# Patient Record
Sex: MALE | Race: WHITE | NOT HISPANIC OR LATINO | Employment: UNEMPLOYED
[De-identification: names, ages, dates, MRNs, and addresses within clinical notes are randomized per-mention and may not be internally consistent; named-entity substitution may affect disease eponyms.]

---

## 2017-01-04 RX ORDER — BUPROPION HYDROCHLORIDE 300 MG/1
TABLET ORAL
Start: 2017-01-04

## 2017-01-04 NOTE — TELEPHONE ENCOUNTER
Wellbutrin        Last Written Prescription Date: 11/10/2016  Last Fill Quantity: 30; # refills: 0  Last Office Visit with [facility], [facility] or [facility] prescribing provider:  10/05/2015        Last PHQ-9 score on record=   PHQ-9 SCORE 10/5/2015   Total Score 14       No results found for this basename: ast  No results found for this basename: alt

## 2017-01-04 NOTE — TELEPHONE ENCOUNTER
See TE from 12/7/2016, pt needs appt, [facility] denied Rx  Have tried to reach pt multiple times, no response from our outreach.  Denied refill request  Shanti ALLAN RN

## 2017-02-07 RX ORDER — BUPROPION HYDROCHLORIDE 300 MG/1
TABLET ORAL
Start: 2017-02-07

## 2017-02-07 RX ORDER — LAMOTRIGINE 200 MG/1
TABLET ORAL
Start: 2017-02-07

## 2017-02-07 NOTE — TELEPHONE ENCOUNTER
Denied  Needs appt; no showed to recent appt.  Multiple VMs left for pt.  Shanti ALLAN RN    Pending Prescriptions:                       Disp   Refills    lamoTRIgine (LAMICTAL) 200 MG tablet [Phar*30 tab*0        Sig: TAKE 1 TABLET BY MOUTH EVERY DAY        Last Written Prescription Date: 10/7/2016  Last Fill Quantity: 30,  # refills: 0   Last Office Visit with [facility], [facility] or [facility] prescribing provider: 10/2015

## 2017-02-07 NOTE — TELEPHONE ENCOUNTER
Last seen 10/2015.  Was given 1 month supply 11/2016.  No Show 11/25/16.    Needs OV for any refills.  Silvia Richard RN

## 2017-02-07 NOTE — TELEPHONE ENCOUNTER
Wellbutrin       Last Written Prescription Date: 11-10-16  Notes to Pharmacy: 1 month refill only; further refills will be sent at upcoming appointment  Last Fill Quantity: 30; # refills: 0  Last Office Visit with [facility], [facility] or [facility] prescribing provider:  10-5-2015        Last PHQ-9 score on record=   PHQ-9 SCORE 10/5/2015   Total Score 14       No results found for this basename: ast  No results found for this basename: alt

## 2020-03-10 ENCOUNTER — HEALTH MAINTENANCE LETTER (OUTPATIENT)
Age: 25
End: 2020-03-10

## 2020-12-27 ENCOUNTER — HEALTH MAINTENANCE LETTER (OUTPATIENT)
Age: 25
End: 2020-12-27

## 2021-04-24 ENCOUNTER — HEALTH MAINTENANCE LETTER (OUTPATIENT)
Age: 26
End: 2021-04-24

## 2021-10-04 ENCOUNTER — HEALTH MAINTENANCE LETTER (OUTPATIENT)
Age: 26
End: 2021-10-04

## 2022-04-14 ENCOUNTER — HOSPITAL ENCOUNTER (EMERGENCY)
Facility: CLINIC | Age: 27
Discharge: LEFT BEFORE DISCHARGE | End: 2022-04-14
Admitting: EMERGENCY MEDICINE
Payer: COMMERCIAL

## 2022-04-14 VITALS
HEART RATE: 93 BPM | HEIGHT: 67 IN | DIASTOLIC BLOOD PRESSURE: 92 MMHG | WEIGHT: 160 LBS | RESPIRATION RATE: 18 BRPM | BODY MASS INDEX: 25.11 KG/M2 | SYSTOLIC BLOOD PRESSURE: 160 MMHG | OXYGEN SATURATION: 98 % | TEMPERATURE: 97.8 F

## 2022-04-14 PROCEDURE — 93005 ELECTROCARDIOGRAM TRACING: CPT

## 2022-04-14 PROCEDURE — 999N000104 HC STATISTIC NO CHARGE

## 2022-04-14 NOTE — ED TRIAGE NOTES
Woke up with a tightness and pain to midsternal chest 2 days ago and is progressively getting worse. Stated pain is worse with coughing. Stated has chronic cough due to second hand smoke in the house. ABCs intact.

## 2022-05-02 ENCOUNTER — HOSPITAL ENCOUNTER (EMERGENCY)
Facility: CLINIC | Age: 27
Discharge: HOME | End: 2022-05-02
Attending: EMERGENCY MEDICINE | Admitting: EMERGENCY MEDICINE
Payer: COMMERCIAL

## 2022-05-02 VITALS
DIASTOLIC BLOOD PRESSURE: 61 MMHG | RESPIRATION RATE: 16 BRPM | HEART RATE: 98 BPM | OXYGEN SATURATION: 98 % | TEMPERATURE: 97.3 F | SYSTOLIC BLOOD PRESSURE: 124 MMHG

## 2022-05-02 DIAGNOSIS — F10.920 ALCOHOLIC INTOXICATION WITHOUT COMPLICATION (H): ICD-10-CM

## 2022-05-02 DIAGNOSIS — F32.A DEPRESSION, UNSPECIFIED DEPRESSION TYPE: ICD-10-CM

## 2022-05-02 LAB
ALCOHOL BREATH TEST: 0.15 (ref 0–0.01)
AMPHETAMINES UR QL SCN: ABNORMAL
BARBITURATES UR QL: ABNORMAL
BENZODIAZ UR QL: ABNORMAL
CANNABINOIDS UR QL SCN: ABNORMAL
COCAINE UR QL: ABNORMAL
OPIATES UR QL SCN: ABNORMAL

## 2022-05-02 PROCEDURE — 82075 ASSAY OF BREATH ETHANOL: CPT | Performed by: EMERGENCY MEDICINE

## 2022-05-02 PROCEDURE — 90791 PSYCH DIAGNOSTIC EVALUATION: CPT

## 2022-05-02 PROCEDURE — 80307 DRUG TEST PRSMV CHEM ANLYZR: CPT | Performed by: EMERGENCY MEDICINE

## 2022-05-02 PROCEDURE — 99285 EMERGENCY DEPT VISIT HI MDM: CPT | Performed by: EMERGENCY MEDICINE

## 2022-05-02 PROCEDURE — 99285 EMERGENCY DEPT VISIT HI MDM: CPT | Mod: 25 | Performed by: EMERGENCY MEDICINE

## 2022-05-02 ASSESSMENT — ENCOUNTER SYMPTOMS
ABDOMINAL PAIN: 0
DYSURIA: 0
SHORTNESS OF BREATH: 0
BRUISES/BLEEDS EASILY: 0
CONFUSION: 0
WEAKNESS: 0
FEVER: 0
BACK PAIN: 0

## 2022-05-02 NOTE — ED PROVIDER NOTES
"ED Provider Note  [facility]      History     Chief Complaint   Patient presents with     Suicidal     Aggressive Behavior     Alcohol Intoxication     HPI  Sebastián Rodas is a 26 year old male who has a past medical history of bipolar, alcohol abuse who presents emergency department by EMS for mental health evaluation.  Patient states that tonight he was drinking alcohol and was in a verbal altercation with his mother.  Patient states he did not hurt his mother however he said some things that would make her concerned for hurting himself.  Patient states that he was drinking, and there is an accumulation of all of his old family stressors, he got upset, and him and his mother wer arguing with each other.  Patient states that he made a comment such as \" you make me want to kill myself\". Patient states \"I don't have the balls to do it anyways\". patient also reports that he was swinging one of the swords around the house but states he did not want to hurt himself or hurt anybody else.  Patient denies any suicide ideation, homicide ideation, or intent to self-harm.  Patient denies any self injures behaviors.  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  Patient states he is stressed because he does have a history of alcohol abuse would like to go to treatment however does need to work and make money.  Patient states that he is considering going back to treatment.  Patient reports drinking alcohol daily for the past 7 years.  Denies any alcohol seizures or delirium tremens.  Patient reports this evening he did take a few shots of alcohol.  Patient states he is more calm and relaxed upon arrival to the emergency department.  Patient with no medical complaints.    Past Medical History  History reviewed. No pertinent past medical history.  History reviewed. No pertinent surgical history.  buPROPion (WELLBUTRIN XL) 300 MG 24 hr tablet  lamoTRIgine (LAMICTAL) " 200 MG tablet  traZODone (DESYREL) 50 MG tablet      No Known Allergies  Family History  Family History   Problem Relation Age of Onset     Hypertension Mother      Coronary Artery Disease Father      Substance Abuse Father      Mental Illness Mother      Mental Illness Father      Social History   Social History     Tobacco Use     Smoking status: Never Smoker     Smokeless tobacco: Never Used   Substance Use Topics     Alcohol use: No     Alcohol/week: 0.0 standard drinks     Drug use: No      Past medical history, past surgical history, medications, allergies, family history, and social history were reviewed with the patient. No additional pertinent items.       Review of Systems   Constitutional: Negative for fever.   HENT: Negative for congestion.    Eyes: Negative for visual disturbance.   Respiratory: Negative for shortness of breath.    Cardiovascular: Negative for chest pain.   Gastrointestinal: Negative for abdominal pain.   Endocrine: Negative for polyuria.   Genitourinary: Negative for dysuria.   Musculoskeletal: Negative for back pain.   Skin: Negative for rash.   Allergic/Immunologic: Negative for immunocompromised state.   Neurological: Negative for weakness.   Hematological: Does not bruise/bleed easily.   Psychiatric/Behavioral: Negative for confusion and self-injury.         Physical Exam   BP: 124/61  Pulse: 98  Temp: 97.3  F (36.3  C)  Resp: 16  SpO2: 98 %  Physical Exam  General: Afebrile, no acute distress   HEENT: Normocephalic, atraumatic, conjunctivae normal. MMM  Neck: non-tender, supple  Cardio: regular rate. regular rhythm   Resp: Normal work of breathing, no respiratory distress, lungs clear bilaterally, no wheezing, rhonchi, rales  Chest/Back: no visual signs of trauma, no CVA tenderness   Abdomen: soft, non distension, no tenderness, no peritoneal signs   Neuro: alert and fully oriented. CN II-XII grossly intact. Grossly normal strength and sensation in all extremities.   MSK: no  deformities. Normal range of motion  Integumentary/Skin: no rash visualized, normal color  Psych: normal affect, normal behavior, denies suicidal ideation, homicidal ideation, intent to self-harm    ED Course      Procedures    Mental Health Risk Assessment      PSS-3    Date and Time Over the past 2 weeks have you felt down, depressed, or hopeless? Over the past 2 weeks have you had thoughts of killing yourself? Have you ever attempted to kill yourself? When did this last happen? User   05/02/22 0021 yes yes no -- MM      C-SSRS ([place])    Date and Time Q1 Wished to be Dead (Past Month) Q2 Suicidal Thoughts (Past Month) Q3 Suicidal Thought Method Q4 Suicidal Intent without Specific Plan Q5 Suicide Intent with Specific Plan Q6 Suicide Behavior (Lifetime) Within the Past 3 Months? RETIRED: Level of Risk per Screen Screening Not Complete User   05/02/22 0021 yes yes no yes no no -- -- -- MM              Suicide assessment completed by mental health (D.E.C., LCSW, etc.)       Results for orders placed or performed during the hospital encounter of 05/02/22   Alcohol breath test POCT     Status: Abnormal   Result Value Ref Range    Alcohol Breath Test 0.153 (A) 0.00 - 0.01     Medications - No data to display     Assessments & Plan (with Medical Decision Making)   Sebastián Rodas is a 26 year old male who has a past medical history of bipolar, alcohol abuse who presents emergency department by EMS for mental health evaluation after making suicidal comments prior to arrival.  Upon arrival patient is well-appearing, afebrile, no distress.  Patient hemodynamically stable, vital signs within normal limits.  Patient calm, cooperative, currently denies any suicidal ideation, homicidal ideation, intent to self-harm.  Initial call breath test 0.153. Patient pending behavioral health assessment    Behavioral health  evaluated the patient and discussed with mother and at this time plan for discharge home. Please see  behavioral health assessors note for  Additional details. Patient denies any suicide ideation, homicidal ideation, or intent to self harm. Patient does not want to harm his mother. Mother is agreeable to have him come back home. Patient had a recent rule 25, would like to get into treatment. At this time patient is able to contract for safety and will discharge home. Patient and mother understand and agree with the plan     I have reviewed the nursing notes. I have reviewed the findings, diagnosis, plan and need for follow up with the patient.    New Prescriptions    No medications on file       Final diagnoses:   Suicide ideation   Alcoholic intoxication without complication (H)       --  Tova Duvall MD  [facility] EMERGENCY DEPARTMENT  5/2/2022     Tova Duvall MD  05/02/22 1693
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
## 2022-05-02 NOTE — ED NOTES
Bed: [facility]  Expected date:   Expected time:   Means of arrival:   Comments:  [facility] restraints, 26 yr male
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
## 2022-05-02 NOTE — ED TRIAGE NOTES
Triage Assessment     Row Name 05/02/22 0021       Triage Assessment (Adult)    Airway WDL WDL       Respiratory WDL    Respiratory WDL WDL       Skin Circulation/Temperature WDL    Skin Circulation/Temperature WDL WDL       Cognitive/Neuro/Behavioral WDL    Cognitive/Neuro/Behavioral WDL WDL

## 2022-05-02 NOTE — DISCHARGE INSTRUCTIONS
"Aftercare Plan  If I am feeling unsafe or I am in a crisis, I will:   Contact my established care providers:  Tennille Quarles -[phone], Bertrand Preciado MA and Nancy Zuleta -[phone] [address] [facility]   Call the National Suicide Prevention Lifeline: 711.839.9642 275.137.2436  Go to the nearest emergency room   Call 911     Warning signs that I or other people might notice when a crisis is developing for me: \"I drink and get angry.\"    Things I am able to do on my own to cope or help me feel better: \"Therapy, take my meds\"    Things that I am able to do with others to cope or help me feel better: \"Go back to treatment\"    Things I can use or do for distraction: \"try to find other things\"    Changes I can make to support my mental health and wellness: \"Go to inpatient treatment\"    People in my life that I can ask for help: \"My therapist\"    Your [facility] has a mental health crisis team you can call 24/7: [facility] Crisis  [phone]    Other things that are important when I m in crisis: \"I need to focus on treatment.\"    Appointment information and/or additional resources available to me:      Crisis Lines  Crisis Text Line  Text 939548  You will be connected with a trained live crisis counselor to provide support.    Por espanol, texto  PANFILO a 825280 o texto a 442-AYUDAME en WhatsApp    National Hope Line  1.800.SUICIDE [9220930]      Community Resources  Fast Tracker  Linking people to mental health and substance use disorder resources  fasttrackermn.org     [place] Mental Health Warm Line  Peer to peer support  Monday thru Saturday, 12 pm to 10 pm  894.715.6451 or 6.271.270.1341  Text \"Support\" to 77126    National [place] on Mental Illness (COMFORT)  885.781.1677 or 1.888.COMFORT.HELPS        Mental Health Apps  My3  https://my3app.org/    VirtualHopeBox  https://SiConnect.org/apps/virtual-hope-box/      Additional Information  Today you were seen by a licensed mental health professional " through Triage and Transition services, [facility] ([facility])  for a crisis assessment in the Emergency Department at [facility].  It is recommended that you follow up with your established providers (psychiatrist, mental health therapist, and/or primary care doctor - as relevant) as soon as possible. Coordinators from [facility] will be calling you in the next 24-48 hours to ensure that you have the resources you need.  You can also contact [facility] coordinators directly at [phone]. You may have been scheduled for or offered an appointment with a mental health provider. [facility] maintains an extensive network of licensed behavioral health providers to connect patients with the services they need.  We do not charge providers a fee to participate in our referral network.  We match patients with providers based on a patient's specific needs, insurance coverage, and location.  Our first effort will be to refer you to a provider within your care system, and will utilize providers outside your care system as needed.

## 2022-05-02 NOTE — ED NOTES
"Patient states that he doesn't want to harm himself at all. He made statements but not what he meant at all. Never wants to do that, and \"doesn't have any balls to do it anyways-lots to live for\".   "

## 2022-05-03 ENCOUNTER — PATIENT OUTREACH (OUTPATIENT)
Dept: CARE COORDINATION | Facility: CLINIC | Age: 27
End: 2022-05-03
Payer: COMMERCIAL

## 2022-05-03 DIAGNOSIS — Z71.89 OTHER SPECIFIED COUNSELING: ICD-10-CM
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
## 2022-05-15 ENCOUNTER — HEALTH MAINTENANCE LETTER (OUTPATIENT)
Age: 27
End: 2022-05-15

## 2022-09-11 ENCOUNTER — HEALTH MAINTENANCE LETTER (OUTPATIENT)
Age: 27
End: 2022-09-11

## 2023-06-03 ENCOUNTER — HEALTH MAINTENANCE LETTER (OUTPATIENT)
Age: 28
End: 2023-06-03

## 2024-07-07 ENCOUNTER — HEALTH MAINTENANCE LETTER (OUTPATIENT)
Age: 29
End: 2024-07-07

## 2025-01-01 ENCOUNTER — APPOINTMENT (OUTPATIENT)
Dept: OCCUPATIONAL THERAPY | Facility: CLINIC | Age: 30
DRG: 856 | End: 2025-01-01
Attending: SURGERY
Payer: COMMERCIAL

## 2025-01-01 ENCOUNTER — APPOINTMENT (OUTPATIENT)
Dept: PHYSICAL THERAPY | Facility: CLINIC | Age: 30
DRG: 856 | End: 2025-01-01
Attending: SURGERY
Payer: COMMERCIAL

## 2025-01-01 ENCOUNTER — APPOINTMENT (OUTPATIENT)
Dept: CT IMAGING | Facility: CLINIC | Age: 30
DRG: 856 | End: 2025-01-01
Attending: NURSE PRACTITIONER
Payer: COMMERCIAL

## 2025-01-01 ENCOUNTER — APPOINTMENT (OUTPATIENT)
Dept: SPEECH THERAPY | Facility: CLINIC | Age: 30
DRG: 856 | End: 2025-01-01
Attending: STUDENT IN AN ORGANIZED HEALTH CARE EDUCATION/TRAINING PROGRAM
Payer: COMMERCIAL

## 2025-01-01 ENCOUNTER — APPOINTMENT (OUTPATIENT)
Dept: INTERVENTIONAL RADIOLOGY/VASCULAR | Facility: CLINIC | Age: 30
DRG: 856 | End: 2025-01-01
Attending: NURSE PRACTITIONER
Payer: COMMERCIAL

## 2025-01-01 ENCOUNTER — APPOINTMENT (OUTPATIENT)
Dept: CT IMAGING | Facility: CLINIC | Age: 30
DRG: 856 | End: 2025-01-01
Payer: COMMERCIAL

## 2025-01-01 ENCOUNTER — HOSPITAL ENCOUNTER (OUTPATIENT)
Age: 30
End: 2025-01-01
Attending: SURGERY
Payer: COMMERCIAL

## 2025-01-01 ENCOUNTER — APPOINTMENT (OUTPATIENT)
Dept: CT IMAGING | Facility: CLINIC | Age: 30
DRG: 856 | End: 2025-01-01
Attending: SURGERY
Payer: COMMERCIAL

## 2025-01-01 ENCOUNTER — APPOINTMENT (OUTPATIENT)
Dept: GENERAL RADIOLOGY | Facility: CLINIC | Age: 30
DRG: 856 | End: 2025-01-01
Attending: NURSE PRACTITIONER
Payer: COMMERCIAL

## 2025-01-01 ENCOUNTER — APPOINTMENT (OUTPATIENT)
Dept: SPEECH THERAPY | Facility: CLINIC | Age: 30
DRG: 856 | End: 2025-01-01
Payer: COMMERCIAL

## 2025-01-01 PROCEDURE — 71045 X-RAY EXAM CHEST 1 VIEW: CPT | Mod: 26 | Performed by: STUDENT IN AN ORGANIZED HEALTH CARE EDUCATION/TRAINING PROGRAM

## 2025-01-01 PROCEDURE — 74177 CT ABD & PELVIS W/CONTRAST: CPT | Mod: 26 | Performed by: RADIOLOGY

## 2025-01-01 PROCEDURE — 71260 CT THORAX DX C+: CPT

## 2025-01-01 PROCEDURE — 49406 IMAGE CATH FLUID PERI/RETRO: CPT | Mod: GC | Performed by: STUDENT IN AN ORGANIZED HEALTH CARE EDUCATION/TRAINING PROGRAM

## 2025-01-01 PROCEDURE — 71260 CT THORAX DX C+: CPT | Mod: 26 | Performed by: RADIOLOGY

## 2025-01-01 PROCEDURE — 74177 CT ABD & PELVIS W/CONTRAST: CPT

## 2025-01-01 PROCEDURE — 272N000502 HC NEEDLE CR3

## 2025-01-01 PROCEDURE — C1729 CATH, DRAINAGE: HCPCS

## 2025-01-01 PROCEDURE — 49406 IMAGE CATH FLUID PERI/RETRO: CPT | Mod: XS

## 2025-01-01 PROCEDURE — 99153 MOD SED SAME PHYS/QHP EA: CPT

## 2025-01-01 PROCEDURE — 49406 IMAGE CATH FLUID PERI/RETRO: CPT

## 2025-01-01 PROCEDURE — C1769 GUIDE WIRE: HCPCS

## 2025-01-01 PROCEDURE — 74174 CTA ABD&PLVS W/CONTRAST: CPT | Mod: 26 | Performed by: RADIOLOGY

## 2025-01-01 PROCEDURE — 71275 CT ANGIOGRAPHY CHEST: CPT

## 2025-01-01 PROCEDURE — 99152 MOD SED SAME PHYS/QHP 5/>YRS: CPT | Mod: GC | Performed by: STUDENT IN AN ORGANIZED HEALTH CARE EDUCATION/TRAINING PROGRAM

## 2025-01-01 PROCEDURE — 71275 CT ANGIOGRAPHY CHEST: CPT | Mod: 26 | Performed by: RADIOLOGY

## 2025-01-01 PROCEDURE — 71045 X-RAY EXAM CHEST 1 VIEW: CPT

## 2025-01-01 PROCEDURE — 49406 IMAGE CATH FLUID PERI/RETRO: CPT | Mod: XS | Performed by: STUDENT IN AN ORGANIZED HEALTH CARE EDUCATION/TRAINING PROGRAM

## 2025-01-01 PROCEDURE — 74174 CTA ABD&PLVS W/CONTRAST: CPT

## 2025-04-07 NOTE — TELECONSULT
External Facility Transfer    Location: [facility] ICU  Diagnosis:   - Necrotizing pancreatitis  - Abdominal hypertension  - Multisystem organ failure  - Cardiogenic shock/cardiomyopathy  - Acute respiratory failure  - Acute renal failure on CRRT    Reason for transfer: Need for specialized service or procedure    Clinical details:   28 y/o man with longstanding history EtOH use disorder, prior hospitalizations for withdrawal. Admitted 3/30 with alcohol withdrawal, progressed to needing ICU care 4/1. Developed pancreatitis which has worsened, with necrosis seen on CT imaging. Increasing intra-abdominal pressures, though pressor needs and ventilator are stable per referring physician.    Needs advanced GI and pancreatic surgery consults.     Care everywhere has been updated and reviewed: Yes  Referring facility has made imaging available through PACS:  Not at this time but could be requested  If patient is transferring for specialty care or procedure, the specialist has agreed with need for transfer and anticipated timeline: Not applicable  Transfer accepted: Yes  ICU Level of Care Recommendation: [place]  ICU Service Recommendation: [place]-  MICU  Final destination: pending  Recommendations for referring provider: not applicable    Anthony Salinas MD, PhD  Surgical critical care  4/7/2025, 11:19 AM

## 2025-04-30 ENCOUNTER — APPOINTMENT (OUTPATIENT)
Dept: GENERAL RADIOLOGY | Facility: CLINIC | Age: 30
End: 2025-04-30
Payer: COMMERCIAL

## 2025-04-30 ENCOUNTER — HOSPITAL ENCOUNTER (INPATIENT)
Facility: CLINIC | Age: 30
End: 2025-04-30
Attending: SURGERY | Admitting: SURGERY
Payer: COMMERCIAL

## 2025-04-30 DIAGNOSIS — K85.22 ALCOHOL-INDUCED ACUTE PANCREATITIS WITH INFECTED NECROSIS: Primary | ICD-10-CM

## 2025-04-30 PROBLEM — K85.90 PANCREATITIS: Status: ACTIVE | Noted: 2025-04-30

## 2025-04-30 LAB
ABO + RH BLD: NORMAL
ALBUMIN SERPL BCG-MCNC: 2.6 G/DL (ref 3.5–5.2)
ALP SERPL-CCNC: 280 U/L (ref 40–150)
ALT SERPL W P-5'-P-CCNC: 111 U/L (ref 0–70)
ANION GAP SERPL CALCULATED.3IONS-SCNC: 11 MMOL/L (ref 7–15)
APTT PPP: 24 SECONDS (ref 22–38)
AST SERPL W P-5'-P-CCNC: 126 U/L (ref 0–45)
ATRIAL RATE - MUSE: 137 BPM
BASE EXCESS BLDV CALC-SCNC: -2.6 MMOL/L (ref -3–3)
BILIRUB SERPL-MCNC: 0.9 MG/DL
BLD GP AB SCN SERPL QL: NEGATIVE
BUN SERPL-MCNC: 39.2 MG/DL (ref 6–20)
CALCIUM SERPL-MCNC: 7.6 MG/DL (ref 8.8–10.4)
CHLORIDE SERPL-SCNC: 104 MMOL/L (ref 98–107)
CREAT SERPL-MCNC: 1.59 MG/DL (ref 0.67–1.17)
DIASTOLIC BLOOD PRESSURE - MUSE: NORMAL MMHG
EGFRCR SERPLBLD CKD-EPI 2021: 59 ML/MIN/1.73M2
ERYTHROCYTE [DISTWIDTH] IN BLOOD BY AUTOMATED COUNT: 20.7 % (ref 10–15)
FIBRINOGEN PPP-MCNC: 353 MG/DL (ref 170–510)
GLUCOSE BLDC GLUCOMTR-MCNC: 110 MG/DL (ref 70–99)
GLUCOSE BLDC GLUCOMTR-MCNC: 131 MG/DL (ref 70–99)
GLUCOSE SERPL-MCNC: 124 MG/DL (ref 70–99)
HCO3 BLDV-SCNC: 21 MMOL/L (ref 21–28)
HCO3 SERPL-SCNC: 19 MMOL/L (ref 22–29)
HCT VFR BLD AUTO: 24.3 % (ref 40–53)
HGB BLD-MCNC: 7.7 G/DL (ref 13.3–17.7)
INR PPP: 1.09 (ref 0.85–1.15)
INTERPRETATION ECG - MUSE: NORMAL
LACTATE SERPL-SCNC: 2.9 MMOL/L (ref 0.7–2)
LIPASE SERPL-CCNC: 107 U/L (ref 13–60)
MAGNESIUM SERPL-MCNC: 2.2 MG/DL (ref 1.7–2.3)
MCH RBC QN AUTO: 30.6 PG (ref 26.5–33)
MCHC RBC AUTO-ENTMCNC: 31.7 G/DL (ref 31.5–36.5)
MCV RBC AUTO: 96 FL (ref 78–100)
O2/TOTAL GAS SETTING VFR VENT: 30 %
OXYHGB MFR BLDV: 57 % (ref 70–75)
P AXIS - MUSE: 60 DEGREES
PCO2 BLDV: 29 MM HG (ref 40–50)
PH BLDV: 7.47 [PH] (ref 7.32–7.43)
PHOSPHATE SERPL-MCNC: 3.1 MG/DL (ref 2.5–4.5)
PLAT MORPH BLD: ABNORMAL
PLATELET # BLD AUTO: 117 10E3/UL (ref 150–450)
PO2 BLDV: 31 MM HG (ref 25–47)
POLYCHROMASIA BLD QL SMEAR: ABNORMAL
POTASSIUM SERPL-SCNC: 4.6 MMOL/L (ref 3.4–5.3)
PR INTERVAL - MUSE: 122 MS
PROT SERPL-MCNC: 5.8 G/DL (ref 6.4–8.3)
PROTHROMBIN TIME: 14.4 SECONDS (ref 11.8–14.8)
QRS DURATION - MUSE: 78 MS
QT - MUSE: 326 MS
QTC - MUSE: 492 MS
R AXIS - MUSE: 63 DEGREES
RBC # BLD AUTO: 2.52 10E6/UL (ref 4.4–5.9)
RBC MORPH BLD: ABNORMAL
SAO2 % BLDV: 58.8 % (ref 70–75)
SODIUM SERPL-SCNC: 134 MMOL/L (ref 135–145)
SPECIMEN EXP DATE BLD: NORMAL
SYSTOLIC BLOOD PRESSURE - MUSE: NORMAL MMHG
T AXIS - MUSE: 218 DEGREES
TOXIC GRANULES BLD QL SMEAR: PRESENT
VENTRICULAR RATE- MUSE: 137 BPM
WBC # BLD AUTO: 15.4 10E3/UL (ref 4–11)

## 2025-04-30 PROCEDURE — 86901 BLOOD TYPING SEROLOGIC RH(D): CPT

## 2025-04-30 PROCEDURE — 85384 FIBRINOGEN ACTIVITY: CPT

## 2025-04-30 PROCEDURE — 86923 COMPATIBILITY TEST ELECTRIC: CPT

## 2025-04-30 PROCEDURE — 84450 TRANSFERASE (AST) (SGOT): CPT

## 2025-04-30 PROCEDURE — 36415 COLL VENOUS BLD VENIPUNCTURE: CPT

## 2025-04-30 PROCEDURE — 71045 X-RAY EXAM CHEST 1 VIEW: CPT

## 2025-04-30 PROCEDURE — 93010 ELECTROCARDIOGRAM REPORT: CPT | Mod: GC | Performed by: INTERNAL MEDICINE

## 2025-04-30 PROCEDURE — 85730 THROMBOPLASTIN TIME PARTIAL: CPT

## 2025-04-30 PROCEDURE — 99291 CRITICAL CARE FIRST HOUR: CPT | Mod: GC | Performed by: INTERNAL MEDICINE

## 2025-04-30 PROCEDURE — 82805 BLOOD GASES W/O2 SATURATION: CPT

## 2025-04-30 PROCEDURE — 999N000157 HC STATISTIC RCP TIME EA 10 MIN

## 2025-04-30 PROCEDURE — 83605 ASSAY OF LACTIC ACID: CPT

## 2025-04-30 PROCEDURE — 250N000013 HC RX MED GY IP 250 OP 250 PS 637

## 2025-04-30 PROCEDURE — 5A1955Z RESPIRATORY VENTILATION, GREATER THAN 96 CONSECUTIVE HOURS: ICD-10-PCS | Performed by: SURGERY

## 2025-04-30 PROCEDURE — 85027 COMPLETE CBC AUTOMATED: CPT

## 2025-04-30 PROCEDURE — 200N000002 HC R&B ICU UMMC

## 2025-04-30 PROCEDURE — 83735 ASSAY OF MAGNESIUM: CPT

## 2025-04-30 PROCEDURE — 99223 1ST HOSP IP/OBS HIGH 75: CPT | Mod: 25 | Performed by: SURGERY

## 2025-04-30 PROCEDURE — 3E043XZ INTRODUCTION OF VASOPRESSOR INTO CENTRAL VEIN, PERCUTANEOUS APPROACH: ICD-10-PCS | Performed by: SURGERY

## 2025-04-30 PROCEDURE — 87040 BLOOD CULTURE FOR BACTERIA: CPT

## 2025-04-30 PROCEDURE — 71045 X-RAY EXAM CHEST 1 VIEW: CPT | Mod: 26 | Performed by: STUDENT IN AN ORGANIZED HEALTH CARE EDUCATION/TRAINING PROGRAM

## 2025-04-30 PROCEDURE — 83690 ASSAY OF LIPASE: CPT

## 2025-04-30 PROCEDURE — 3E0436Z INTRODUCTION OF NUTRITIONAL SUBSTANCE INTO CENTRAL VEIN, PERCUTANEOUS APPROACH: ICD-10-PCS | Performed by: SURGERY

## 2025-04-30 PROCEDURE — 85007 BL SMEAR W/DIFF WBC COUNT: CPT

## 2025-04-30 PROCEDURE — 85610 PROTHROMBIN TIME: CPT

## 2025-04-30 PROCEDURE — 84100 ASSAY OF PHOSPHORUS: CPT

## 2025-04-30 PROCEDURE — 94002 VENT MGMT INPAT INIT DAY: CPT

## 2025-04-30 PROCEDURE — 93005 ELECTROCARDIOGRAM TRACING: CPT

## 2025-04-30 PROCEDURE — 250N000011 HC RX IP 250 OP 636

## 2025-04-30 RX ORDER — NALOXONE HYDROCHLORIDE 0.4 MG/ML
0.2 INJECTION, SOLUTION INTRAMUSCULAR; INTRAVENOUS; SUBCUTANEOUS
Status: DISCONTINUED | OUTPATIENT
Start: 2025-04-30 | End: 2025-06-03 | Stop reason: HOSPADM

## 2025-04-30 RX ORDER — THIAMINE HYDROCHLORIDE 100 MG/ML
100 INJECTION, SOLUTION INTRAMUSCULAR; INTRAVENOUS DAILY
Status: DISCONTINUED | OUTPATIENT
Start: 2025-05-01 | End: 2025-05-10

## 2025-04-30 RX ORDER — MEROPENEM 500 MG/1
500 INJECTION, POWDER, FOR SOLUTION INTRAVENOUS EVERY 24 HOURS
Status: DISCONTINUED | OUTPATIENT
Start: 2025-05-01 | End: 2025-05-02

## 2025-04-30 RX ORDER — NALOXONE HYDROCHLORIDE 0.4 MG/ML
0.4 INJECTION, SOLUTION INTRAMUSCULAR; INTRAVENOUS; SUBCUTANEOUS
Status: DISCONTINUED | OUTPATIENT
Start: 2025-04-30 | End: 2025-06-03 | Stop reason: HOSPADM

## 2025-04-30 RX ORDER — MEROPENEM 1 G/1
1 INJECTION, POWDER, FOR SOLUTION INTRAVENOUS EVERY 8 HOURS
Status: DISCONTINUED | OUTPATIENT
Start: 2025-05-01 | End: 2025-04-30

## 2025-04-30 RX ORDER — DEXTROSE MONOHYDRATE 25 G/50ML
25-50 INJECTION, SOLUTION INTRAVENOUS
Status: DISCONTINUED | OUTPATIENT
Start: 2025-04-30 | End: 2025-05-02

## 2025-04-30 RX ORDER — NICOTINE POLACRILEX 4 MG
15-30 LOZENGE BUCCAL
Status: DISCONTINUED | OUTPATIENT
Start: 2025-04-30 | End: 2025-05-02

## 2025-04-30 RX ORDER — DEXTROSE MONOHYDRATE 25 G/50ML
25-50 INJECTION, SOLUTION INTRAVENOUS
Status: DISCONTINUED | OUTPATIENT
Start: 2025-04-30 | End: 2025-04-30

## 2025-04-30 RX ORDER — HEPARIN SODIUM 10000 [USP'U]/100ML
0-5000 INJECTION, SOLUTION INTRAVENOUS CONTINUOUS
Status: DISCONTINUED | OUTPATIENT
Start: 2025-05-01 | End: 2025-05-03

## 2025-04-30 RX ORDER — NICOTINE POLACRILEX 4 MG
15-30 LOZENGE BUCCAL
Status: DISCONTINUED | OUTPATIENT
Start: 2025-04-30 | End: 2025-04-30

## 2025-04-30 RX ORDER — CHLORHEXIDINE GLUCONATE ORAL RINSE 1.2 MG/ML
15 SOLUTION DENTAL EVERY 12 HOURS
Status: DISCONTINUED | OUTPATIENT
Start: 2025-04-30 | End: 2025-05-01

## 2025-04-30 RX ADMIN — CHLORHEXIDINE GLUCONATE 15 ML: 1.2 SOLUTION ORAL at 22:18

## 2025-04-30 RX ADMIN — Medication 50 MCG/HR: at 22:10

## 2025-04-30 ASSESSMENT — ACTIVITIES OF DAILY LIVING (ADL)
ADLS_ACUITY_SCORE: 41
ADLS_ACUITY_SCORE: 41
ADLS_ACUITY_SCORE: 71

## 2025-04-30 NOTE — PROGRESS NOTES
SURGICAL ICU ADMISSION NOTE  4/30/25      PRIMARY TEAM: EGS  PRIMARY PHYSICIAN: MD Waldo  REASON FOR CRITICAL CARE ADMISSION: Necrotizing Pancreatitis    ADMITTING PHYSICIAN: Dr. Bosch  Date of Service (when I saw the patient): 4/30/25    ASSESSMENT: Sebastián Rodas is a 29-year-old male with a history of alcohol abuse, anxiety, and bipolar disorder who was admitted on 3/30/2025 for alcohol withdrawal following a recent binge, presenting with diffuse abdominal pain. Initial workup revealed leukocytosis and elevated lipase concerning for acute pancreatitis. He developed acute encephalopathy and was transferred to the ICU on 3/31, requiring intubation and vasopressors by 4/1 due to shock. Hospital course has been complicated by acute renal failure requiring CRRT, cardiomyopathy (initial LVEF 20-25%, improved to 65-70% by 4/14), and necrotizing pancreatitis with unorganized fluid collections. He completed a 14-day course of meropenem but remains intermittently febrile and critically ill, requiring ongoing dialysis and vasopressor support. On 4/27, after clinical deterioration and imaging consistent with a likely perforated viscus, multidisciplinary consensus determined the condition was likely non-survivable. Following family discussion, he underwent emergent exploratory laparotomy, necrosectomy, transverse colectomy, abdominal washout, and drain placement transferred to the SICU at [facility] on 4/30 for hemodynamic monitoring.     PLAN:    Neurological:  # Acute pain   # Agitation  # Encephalopathy - Supportive Care  - Monitor neurological status. Delirium preventions and precautions.   - Pain: Fentanyl Drip, Fentanyl PRN bolus      Pulmonary:   # Acute hypoxic respiratory support  - VENT: FiO2 (%): 30 %, Resp: 27, Vent Mode: VC/AC, Resp Rate (Set): 16 breaths/min, Tidal Volume (Set, mL): 550 mL, PEEP (cm H2O): 5 cmH2O, Resp Rate (Set): 16 breaths/min, Tidal Volume (Set, mL): 550 mL, PEEP (cm H2O): 5 cmH2O  -  Continue full vent support. PST when meets criteria.  Ventilatory bundle.    Cardiovascular:    # Cardiomyopathy   - Initial LVEF 20-25%, improved to 65-70%  # Shock-distributive (septic): Concern for developing sepsis again with rising WBC (28K) and higher pressor requirements    - Monitor hemodynamic status  - Off pressors    Gastroenterology/Nutrition:  # S/p emergent exploratory laparotomy, necrosectomy, transverse colectomy, abdominal washout, and drain placement  #Severe necrotizing pancreatitis  - Secondary to alcohol use disorder, splenic vein thrombosis, ongoing fevers  # Protein calorie deficit malnutrition    - NPO, no exceptions  - TPN  - RD consult. Appreciate cares and recommendations.     Renal/Fluids/Electrolytes:   #AGMA  - Likely secondary to SARAHI, hypoperfusion, ongoing diarrhea, lactate not elevated    #Severe hypocalcemia  - 2/2 pancreatitis, bicarb, PTH resistance from hypomagnesemia, and Vit D deficiency  - Replete as indicated       # Acute kidney injury  # CCRT - CVVHDF - Flow 250  Baseline Cr 0.7-0.8. Presented with Cr 0.96 on 3/30. Rapidly markos to 5.2 on 4/1. Oliguric. Garcia UA with proteinuria, hematuria, pyuria, moderate bacteria.  Kidneys unremarkable on CT. Has severe ATN in the setting of shock, ADHF, intravascular hypovolemia/3rd spacing from pancreatitis.   --CRRT 4/1-4/4.   - IHD with levophed started 4/5 but poor CVC function / HoTN limiting.   - Line exchanged 4/10 and again 4/19 - due to poor flow.  - PCAD placed 4/21 by IR.   - CRRT resumed 4/21/25 for fluid overload and assist with more aggressive UF in setting of shock.    - Off pressors 4/30 - plan at outside hospital was to continue CRRT today and move to IHD tomorrow.  - Will continue to monitor intake and output.  - Garcia to be placed  - Nephrology consulted     Endocrine:   # Stress hyperglycemia    - Sliding scale for glucose management   - Goal to keep BG< 180 for optimal wound healing     ID:  # Leukocytosis  - WBC  14.3  - Completed 14 days course of meropenem and caspofungin. Restart meropenem and micafungin    Positive cultures:  - 4/30 blood cultures obtained     Heme:     # Acute blood loss anemia  # Thrombosed splenic vein   - Hemoglobin 7.4  - Transfuse if hgb <7.0 or signs/symptoms of hypoperfusion. Monitor and trend  - Low intensity heparin gtt     Musculoskeletal:   # Deconditioning   - Physical and occupational therapy consult     Skin:  # Pressure Ulcers - Buttocks/rectal Area   - Barrier cream with liberal application. Continue fecal management system   - WOC consult     General Cares/Prophylaxis:    DVT Prophylaxis: Low intensity heparin gtt  GI Prophylaxis: PPI  Restraints: Restraints for medical healing needed: YES    Lines/ tubes/ drains:  - SHANNON x 2  - PICC line  - Trach     Disposition:  - Surgical ICU    Patient to be discussed with Dr. Bosch.     Mireya López, DO  General Surgery, PGY-2    - - - - - - - - - - - - - - - - - - - - - - - - - - - - - - - - - - - - - - - - - - - - - -   HISTORY PRESENTING ILLNESS: Sebastián Rodas is a 29-year-old male with a history of alcohol abuse, anxiety, and bipolar disorder who was admitted on 3/30/2025 for alcohol withdrawal following a recent binge, presenting with diffuse abdominal pain. Initial workup revealed leukocytosis and elevated lipase concerning for acute pancreatitis. He developed acute encephalopathy and was transferred to the ICU on 3/31, requiring intubation and vasopressors by 4/1 due to shock. Hospital course has been complicated by acute renal failure requiring CRRT, cardiomyopathy (initial LVEF 20-25%, improved to 65-70% by 4/14), and necrotizing pancreatitis with unorganized fluid collections. He completed a 14-day course of meropenem but remains intermittently febrile and critically ill, requiring ongoing dialysis and vasopressor support. On 4/27, after clinical deterioration and imaging consistent with a likely perforated viscus, multidisciplinary  consensus determined the condition was likely non-survivable. Following family discussion, he underwent emergent exploratory laparotomy, necrosectomy, transverse colectomy, abdominal washout, and drain placement.  REVIEW OF SYSTEMS: 10 point ROS neg other than the symptoms noted above in the HPI.  PAST MEDICAL HISTORY:   -AUD  -Bipolar  -Alcohol Substance Use  SURGICAL HISTORY:   S/p emergent exploratory laparotomy, necrosectomy, transverse colectomy, abdominal washout, and drain placement    SOCIAL HISTORY:   Social History     Socioeconomic History    Marital status: Single   Tobacco Use    Smoking status: Never    Smokeless tobacco: Never   Substance and Sexual Activity    Alcohol use: No     Alcohol/week: 0.0 standard drinks of alcohol    Drug use: No    Sexual activity: Yes     Partners: Female     Social Drivers of Health     Financial Resource Strain: Low Risk  (3/31/2025)    [facility]    Financial Resource Strain     Difficulty of Paying Living Expenses: 3   Food Insecurity: No Food Insecurity (3/31/2025)    [facility]    Food Insecurity     Do you worry your food will run out before you are able to buy more?: 1   Transportation Needs: Unmet Transportation Needs (3/31/2025)    Received from [facility]    Transportation Needs     Does lack of transportation keep you from medical appointments?: 1     Does lack of transportation keep you from work, meetings or getting things that you need?: 2   Physical Activity: Not on File (8/26/2019)    Received from [facility]    Physical Activity     Physical Activity: 0   Stress: Not on File (8/26/2019)    Received from [facility]    Stress     Stress: 0   Social Connections: Socially Isolated (3/31/2025)    [facility] from [facility]    Social Connections     Do you often feel lonely or isolated from those around you?: 4    Housing Stability: Low Risk  (3/31/2025)    [facility] from [facility]    Housing Stability     What is your housing situation today?: 1     ALLERGIES:   No Known Allergies    MEDICATIONS:  Current Facility-Administered Medications   Medication Dose Route Frequency Provider Last Rate Last Admin    chlorhexidine (PERIDEX) 0.12 % solution 15 mL  15 mL Mouth/Throat Q12H Mireya López, DO   15 mL at 04/30/25 2218    glucose gel 15-30 g  15-30 g Oral Q15 Min PRN Mireya López DO        Or    dextrose 50 % injection 25-50 mL  25-50 mL Intravenous Q15 Min PRN Mireya López DO        Or    glucagon injection 1 mg  1 mg Subcutaneous Q15 Min PRN Mireya López DO        fentaNYL (SUBLIMAZE) 50 mcg/mL bolus from pump  50 mcg Intravenous Q1H PRN Mireya López DO        fentaNYL (SUBLIMAZE) infusion   mcg/hr Intravenous Continuous Mireya López DO 2 mL/hr at 05/01/25 0012 100 mcg/hr at 05/01/25 0012    heparin 25,000 units in 0.45% NaCl 250 mL ANTICOAGULANT infusion  0-5,000 Units/hr Intravenous Continuous Mireya López DO 8.5 mL/hr at 05/01/25 0026 850 Units/hr at 05/01/25 0026    insulin aspart (NovoLOG) injection (RAPID ACTING)  1-12 Units Subcutaneous Q4H Mireya López,         meropenem (MERREM) 500 mg vial to attach to  mL bag for ADULTS or 25 mL bag for PEDS  500 mg Intravenous Q24H Brendon Haas DO        micafungin (MYCAMINE) 100 mg in sodium chloride 0.9 % 100 mL intermittent infusion  100 mg Intravenous Q24H Mireya López DO        naloxone (NARCAN) injection 0.2 mg  0.2 mg Intravenous Q2 Min PRN Brendon Haas DO        Or    naloxone (NARCAN) injection 0.4 mg  0.4 mg Intravenous Q2 Min PRN Brendon Haas DO        Or    naloxone (NARCAN) injection 0.2 mg  0.2 mg Intramuscular Q2 Min PRN Brendon Haas DO        Or    naloxone (NARCAN) injection 0.4 mg  0.4 mg Intramuscular Q2 Min PRN Brendon Haas,          pantoprazole (PROTONIX) 2 mg/mL suspension 40 mg  40 mg Per Feeding Tube [facility] Maribel, Mireya, DO        Or    pantoprazole (PROTONIX) IV push injection 40 mg  40 mg Intravenous [facility] Maribel Mireya, DO        thiamine (B-1) injection 100 mg  100 mg Intravenous Daily Maribel MireyaDO         PHYSICAL EXAMINATION:  Temp:  [99.9  F (37.7  C)-100.9  F (38.3  C)] 100.9  F (38.3  C)  Pulse:  [129-144] 129  Resp:  [18-31] 27  BP: ()/(56-74) 80/56  FiO2 (%):  [30 %] 30 %  SpO2:  [98 %-100 %] 100 %    Gen: Resting comfortably in bed   Eyes: Nonicteric  ENT: Mucous Membranes Moist  Pulm: Nonlabored Breathing mechanical ventilation via trach  CV: S1, S2, no murmurs  Abd: abthera in place, SHANNON drains x 2 with dark thin sanguinous output, g-tube in place and clamped, remainder of abdomen is compressible   : No shore present  Extremities: warm and well perfused, palpable bilateral DP pulses  Neuro: moving all extremities on command, shaked and nodding his head to answer questions    LABS: Reviewed.   Arterial Blood Gases   No lab results found in last 7 days.  Complete Blood Count   Recent Labs   Lab 05/01/25  0020 04/30/25 2201   WBC 14.3* 15.4*   HGB 7.4* 7.7*   * 117*     Basic Metabolic Panel  Recent Labs   Lab 04/30/25  2349 04/30/25 2201 04/30/25 2116   NA  --  134*  --    POTASSIUM  --  4.6  --    CHLORIDE  --  104  --    CO2  --  19*  --    BUN  --  39.2*  --    CR  --  1.59*  --    * 124* 131*     Liver Function Tests  Recent Labs   Lab 04/30/25 2217 04/30/25 2201   AST  --  126*   ALT  --  111*   ALKPHOS  --  280*   BILITOTAL  --  0.9   ALBUMIN  --  2.6*   INR 1.09  --      Pancreatic Enzymes  Recent Labs   Lab 04/30/25 2201   LIPASE 107*     Coagulation Profile  Recent Labs   Lab 04/30/25  2217   INR 1.09   PTT 24       IMAGING:  Recent Results (from the past 24 hours)   XR Chest Port 1 View    Impression    RESIDENT PRELIMINARY INTERPRETATION  IMPRESSION:   1.  Tracheostomy tube in the  midthoracic trachea. Right IJ central  venous catheter with tip at the superior cavoatrial junction. Left  PICC with tip in the low SVC.  2.  Mild perihilar and left basilar CT opacities, likely atelectasis  3.  Trace left pleural effusion.

## 2025-05-01 ENCOUNTER — ANESTHESIA EVENT (OUTPATIENT)
Dept: SURGERY | Facility: CLINIC | Age: 30
End: 2025-05-01
Payer: COMMERCIAL

## 2025-05-01 ENCOUNTER — ANESTHESIA (OUTPATIENT)
Dept: SURGERY | Facility: CLINIC | Age: 30
End: 2025-05-01
Payer: COMMERCIAL

## 2025-05-01 LAB
ALBUMIN SERPL BCG-MCNC: 2.2 G/DL (ref 3.5–5.2)
ALBUMIN SERPL BCG-MCNC: 2.5 G/DL (ref 3.5–5.2)
ALLEN'S TEST: ABNORMAL
ALP SERPL-CCNC: 285 U/L (ref 40–150)
ALP SERPL-CCNC: 349 U/L (ref 40–150)
ALT SERPL W P-5'-P-CCNC: 103 U/L (ref 0–70)
ALT SERPL W P-5'-P-CCNC: 69 U/L (ref 0–70)
ANION GAP SERPL CALCULATED.3IONS-SCNC: 13 MMOL/L (ref 7–15)
ANION GAP SERPL CALCULATED.3IONS-SCNC: 14 MMOL/L (ref 7–15)
ANION GAP SERPL CALCULATED.3IONS-SCNC: 14 MMOL/L (ref 7–15)
APTT PPP: 26 SECONDS (ref 22–38)
AST SERPL W P-5'-P-CCNC: 121 U/L (ref 0–45)
AST SERPL W P-5'-P-CCNC: 76 U/L (ref 0–45)
BACTERIA BLD CULT: NORMAL
BACTERIA BLD CULT: NORMAL
BASE EXCESS BLDA CALC-SCNC: -2.8 MMOL/L (ref -3–3)
BASE EXCESS BLDA CALC-SCNC: -2.9 MMOL/L (ref -3–3)
BASE EXCESS BLDA CALC-SCNC: -3.3 MMOL/L (ref -3–3)
BASE EXCESS BLDA CALC-SCNC: -3.5 MMOL/L (ref -3–3)
BASE EXCESS BLDA CALC-SCNC: -4.3 MMOL/L (ref -3–3)
BASOPHILS # BLD MANUAL: 0 10E3/UL (ref 0–0.2)
BASOPHILS NFR BLD MANUAL: 0 %
BILIRUB SERPL-MCNC: 1 MG/DL
BILIRUB SERPL-MCNC: 1.1 MG/DL
BLD PROD TYP BPU: NORMAL
BLOOD COMPONENT TYPE: NORMAL
BUN SERPL-MCNC: 43.7 MG/DL (ref 6–20)
BUN SERPL-MCNC: 47.3 MG/DL (ref 6–20)
BUN SERPL-MCNC: 53.4 MG/DL (ref 6–20)
CA-I BLD-MCNC: 4.4 MG/DL (ref 4.4–5.2)
CALCIUM SERPL-MCNC: 7.3 MG/DL (ref 8.8–10.4)
CALCIUM SERPL-MCNC: 7.3 MG/DL (ref 8.8–10.4)
CALCIUM SERPL-MCNC: 8.4 MG/DL (ref 8.8–10.4)
CHLORIDE SERPL-SCNC: 104 MMOL/L (ref 98–107)
CHLORIDE SERPL-SCNC: 104 MMOL/L (ref 98–107)
CHLORIDE SERPL-SCNC: 107 MMOL/L (ref 98–107)
CLOT INIT KAOL IND TO POST HEP NEUT TRTO: 1.1 {RATIO}
CLOT INIT KAOLIN IND BLD US: 134 SEC (ref 113–166)
CLOT INIT KAOLIN IND P HEP NEUT BLD US: 127 SEC (ref 103–153)
CLOT STIFF PLT CONT BLD CALC: 10.7 HPA (ref 11.9–29.8)
CLOT STIFF TF IND P HEP NEUT BLD US: 12.6 HPA (ref 13–33.2)
CLOT STIFF TF IND+IIB-IIIA INH P HEP NEU: 1.9 HPA (ref 1–3.7)
CODING SYSTEM: NORMAL
CREAT SERPL-MCNC: 1.82 MG/DL (ref 0.67–1.17)
CREAT SERPL-MCNC: 2.03 MG/DL (ref 0.67–1.17)
CREAT SERPL-MCNC: 2.23 MG/DL (ref 0.67–1.17)
CROSSMATCH: NORMAL
EGFRCR SERPLBLD CKD-EPI 2021: 39 ML/MIN/1.73M2
EGFRCR SERPLBLD CKD-EPI 2021: 44 ML/MIN/1.73M2
EGFRCR SERPLBLD CKD-EPI 2021: 50 ML/MIN/1.73M2
EOSINOPHIL # BLD MANUAL: 0 10E3/UL (ref 0–0.7)
EOSINOPHIL NFR BLD MANUAL: 0 %
ERYTHROCYTE [DISTWIDTH] IN BLOOD BY AUTOMATED COUNT: 18.4 % (ref 10–15)
ERYTHROCYTE [DISTWIDTH] IN BLOOD BY AUTOMATED COUNT: 20.6 % (ref 10–15)
ERYTHROCYTE [DISTWIDTH] IN BLOOD BY AUTOMATED COUNT: 22.1 % (ref 10–15)
FIBRINOGEN PPP-MCNC: 340 MG/DL (ref 170–510)
GLUCOSE BLDC GLUCOMTR-MCNC: 114 MG/DL (ref 70–99)
GLUCOSE BLDC GLUCOMTR-MCNC: 123 MG/DL (ref 70–99)
GLUCOSE BLDC GLUCOMTR-MCNC: 136 MG/DL (ref 70–99)
GLUCOSE BLDC GLUCOMTR-MCNC: 145 MG/DL (ref 70–99)
GLUCOSE BLDC GLUCOMTR-MCNC: 211 MG/DL (ref 70–99)
GLUCOSE BLDC GLUCOMTR-MCNC: 245 MG/DL (ref 70–99)
GLUCOSE BLDC GLUCOMTR-MCNC: 98 MG/DL (ref 70–99)
GLUCOSE BLDC GLUCOMTR-MCNC: 99 MG/DL (ref 70–99)
GLUCOSE SERPL-MCNC: 127 MG/DL (ref 70–99)
GLUCOSE SERPL-MCNC: 94 MG/DL (ref 70–99)
GLUCOSE SERPL-MCNC: 99 MG/DL (ref 70–99)
HCO3 BLD-SCNC: 19 MMOL/L (ref 21–28)
HCO3 BLD-SCNC: 20 MMOL/L (ref 21–28)
HCO3 SERPL-SCNC: 17 MMOL/L (ref 22–29)
HCO3 SERPL-SCNC: 18 MMOL/L (ref 22–29)
HCO3 SERPL-SCNC: 18 MMOL/L (ref 22–29)
HCT VFR BLD AUTO: 23.3 % (ref 40–53)
HCT VFR BLD AUTO: 24.5 % (ref 40–53)
HCT VFR BLD AUTO: 36.9 % (ref 40–53)
HGB BLD-MCNC: 11.7 G/DL (ref 13.3–17.7)
HGB BLD-MCNC: 7.2 G/DL (ref 13.3–17.7)
HGB BLD-MCNC: 7.4 G/DL (ref 13.3–17.7)
INR PPP: 1.19 (ref 0.85–1.15)
ISSUE DATE AND TIME: NORMAL
LACTATE SERPL-SCNC: 2.5 MMOL/L (ref 0.7–2)
LACTATE SERPL-SCNC: 2.8 MMOL/L (ref 0.7–2)
LYMPHOCYTES # BLD MANUAL: 1.1 10E3/UL (ref 0.8–5.3)
LYMPHOCYTES NFR BLD MANUAL: 7 %
MAGNESIUM SERPL-MCNC: 2.3 MG/DL (ref 1.7–2.3)
MAGNESIUM SERPL-MCNC: 2.4 MG/DL (ref 1.7–2.3)
MCH RBC QN AUTO: 29.2 PG (ref 26.5–33)
MCH RBC QN AUTO: 30.1 PG (ref 26.5–33)
MCH RBC QN AUTO: 30.4 PG (ref 26.5–33)
MCHC RBC AUTO-ENTMCNC: 30.2 G/DL (ref 31.5–36.5)
MCHC RBC AUTO-ENTMCNC: 30.9 G/DL (ref 31.5–36.5)
MCHC RBC AUTO-ENTMCNC: 31.7 G/DL (ref 31.5–36.5)
MCV RBC AUTO: 95 FL (ref 78–100)
MCV RBC AUTO: 97 FL (ref 78–100)
MCV RBC AUTO: 98 FL (ref 78–100)
METAMYELOCYTES # BLD MANUAL: 0.3 10E3/UL
METAMYELOCYTES NFR BLD MANUAL: 2 %
MONOCYTES # BLD MANUAL: 0.4 10E3/UL (ref 0–1.3)
MONOCYTES NFR BLD MANUAL: 3 %
MYELOCYTES # BLD MANUAL: 0.7 10E3/UL
MYELOCYTES NFR BLD MANUAL: 4 %
NEUTROPHILS # BLD MANUAL: 12.7 10E3/UL (ref 1.6–8.3)
NEUTROPHILS NFR BLD MANUAL: 82 %
NRBC # BLD AUTO: 5.8 10E3/UL
NRBC BLD MANUAL-RTO: 38 %
O2/TOTAL GAS SETTING VFR VENT: 30 %
OXYHGB MFR BLDA: 93 % (ref 92–100)
OXYHGB MFR BLDA: 94 % (ref 92–100)
OXYHGB MFR BLDA: 94 % (ref 92–100)
OXYHGB MFR BLDA: 95 % (ref 92–100)
OXYHGB MFR BLDA: 96 % (ref 92–100)
PATH REV: ABNORMAL
PCO2 BLD: 24 MM HG (ref 35–45)
PCO2 BLD: 25 MM HG (ref 35–45)
PCO2 BLD: 25 MM HG (ref 35–45)
PCO2 BLD: 27 MM HG (ref 35–45)
PCO2 BLD: 29 MM HG (ref 35–45)
PEEP: 5 CM H2O
PH BLD: 7.44 [PH] (ref 7.35–7.45)
PH BLD: 7.44 [PH] (ref 7.35–7.45)
PH BLD: 7.48 [PH] (ref 7.35–7.45)
PH BLD: 7.5 [PH] (ref 7.35–7.45)
PH BLD: 7.5 [PH] (ref 7.35–7.45)
PHOSPHATE SERPL-MCNC: 3.6 MG/DL (ref 2.5–4.5)
PHOSPHATE SERPL-MCNC: 7 MG/DL (ref 2.5–4.5)
PLATELET # BLD AUTO: 112 10E3/UL (ref 150–450)
PLATELET # BLD AUTO: 117 10E3/UL (ref 150–450)
PLATELET # BLD AUTO: 123 10E3/UL (ref 150–450)
PO2 BLD: 67 MM HG (ref 80–105)
PO2 BLD: 72 MM HG (ref 80–105)
PO2 BLD: 76 MM HG (ref 80–105)
PO2 BLD: 78 MM HG (ref 80–105)
PO2 BLD: 79 MM HG (ref 80–105)
POTASSIUM SERPL-SCNC: 4.3 MMOL/L (ref 3.4–5.3)
POTASSIUM SERPL-SCNC: 4.3 MMOL/L (ref 3.4–5.3)
POTASSIUM SERPL-SCNC: 5.2 MMOL/L (ref 3.4–5.3)
PROMYELOCYTES # BLD MANUAL: 0.2 10E3/UL
PROMYELOCYTES NFR BLD MANUAL: 1 %
PROT SERPL-MCNC: 4.5 G/DL (ref 6.4–8.3)
PROT SERPL-MCNC: 5.4 G/DL (ref 6.4–8.3)
PROTHROMBIN TIME: 15.4 SECONDS (ref 11.8–14.8)
RBC # BLD AUTO: 2.37 10E6/UL (ref 4.4–5.9)
RBC # BLD AUTO: 2.53 10E6/UL (ref 4.4–5.9)
RBC # BLD AUTO: 3.89 10E6/UL (ref 4.4–5.9)
SAO2 % BLDA: 95.1 % (ref 96–97)
SAO2 % BLDA: 96.1 % (ref 96–97)
SAO2 % BLDA: 96.6 % (ref 96–97)
SAO2 % BLDA: 97.1 % (ref 96–97)
SAO2 % BLDA: 97.6 % (ref 96–97)
SODIUM SERPL-SCNC: 135 MMOL/L (ref 135–145)
SODIUM SERPL-SCNC: 136 MMOL/L (ref 135–145)
SODIUM SERPL-SCNC: 138 MMOL/L (ref 135–145)
TRIGL SERPL-MCNC: 248 MG/DL
UFH PPP CHRO-ACNC: <0.1 IU/ML
UNIT ABO/RH: NORMAL
UNIT NUMBER: NORMAL
UNIT STATUS: NORMAL
UNIT TYPE ISBT: 5100
UNIT TYPE ISBT: 6200
WBC # BLD AUTO: 14.3 10E3/UL (ref 4–11)
WBC # BLD AUTO: 14.3 10E3/UL (ref 4–11)
WBC # BLD AUTO: 16.2 10E3/UL (ref 4–11)

## 2025-05-01 PROCEDURE — 82805 BLOOD GASES W/O2 SATURATION: CPT

## 2025-05-01 PROCEDURE — 83735 ASSAY OF MAGNESIUM: CPT

## 2025-05-01 PROCEDURE — 85520 HEPARIN ASSAY: CPT | Performed by: SURGERY

## 2025-05-01 PROCEDURE — 85396 CLOTTING ASSAY WHOLE BLOOD: CPT

## 2025-05-01 PROCEDURE — 83605 ASSAY OF LACTIC ACID: CPT

## 2025-05-01 PROCEDURE — 3E1M38Z IRRIGATION OF PERITONEAL CAVITY USING IRRIGATING SUBSTANCE, PERCUTANEOUS APPROACH: ICD-10-PCS | Performed by: SURGERY

## 2025-05-01 PROCEDURE — 999N000157 HC STATISTIC RCP TIME EA 10 MIN

## 2025-05-01 PROCEDURE — 84520 ASSAY OF UREA NITROGEN: CPT

## 2025-05-01 PROCEDURE — 85384 FIBRINOGEN ACTIVITY: CPT | Performed by: STUDENT IN AN ORGANIZED HEALTH CARE EDUCATION/TRAINING PROGRAM

## 2025-05-01 PROCEDURE — P9059 PLASMA, FRZ BETWEEN 8-24HOUR: HCPCS

## 2025-05-01 PROCEDURE — 82805 BLOOD GASES W/O2 SATURATION: CPT | Performed by: STUDENT IN AN ORGANIZED HEALTH CARE EDUCATION/TRAINING PROGRAM

## 2025-05-01 PROCEDURE — 250N000025 HC SEVOFLURANE, PER MIN: Performed by: SURGERY

## 2025-05-01 PROCEDURE — 278N000051 HC OR IMPLANT GENERAL: Performed by: SURGERY

## 2025-05-01 PROCEDURE — 84132 ASSAY OF SERUM POTASSIUM: CPT | Performed by: STUDENT IN AN ORGANIZED HEALTH CARE EDUCATION/TRAINING PROGRAM

## 2025-05-01 PROCEDURE — 48105 RESECT/DEBRIDE PANCREAS: CPT | Mod: 52 | Performed by: SURGERY

## 2025-05-01 PROCEDURE — 99291 CRITICAL CARE FIRST HOUR: CPT | Mod: GC | Performed by: SURGERY

## 2025-05-01 PROCEDURE — 250N000011 HC RX IP 250 OP 636: Performed by: STUDENT IN AN ORGANIZED HEALTH CARE EDUCATION/TRAINING PROGRAM

## 2025-05-01 PROCEDURE — B4185 PARENTERAL SOL 10 GM LIPIDS: HCPCS | Performed by: STUDENT IN AN ORGANIZED HEALTH CARE EDUCATION/TRAINING PROGRAM

## 2025-05-01 PROCEDURE — 85027 COMPLETE CBC AUTOMATED: CPT | Performed by: STUDENT IN AN ORGANIZED HEALTH CARE EDUCATION/TRAINING PROGRAM

## 2025-05-01 PROCEDURE — 84478 ASSAY OF TRIGLYCERIDES: CPT

## 2025-05-01 PROCEDURE — G0463 HOSPITAL OUTPT CLINIC VISIT: HCPCS

## 2025-05-01 PROCEDURE — 82330 ASSAY OF CALCIUM: CPT | Performed by: STUDENT IN AN ORGANIZED HEALTH CARE EDUCATION/TRAINING PROGRAM

## 2025-05-01 PROCEDURE — 370N000017 HC ANESTHESIA TECHNICAL FEE, PER MIN: Performed by: SURGERY

## 2025-05-01 PROCEDURE — 87040 BLOOD CULTURE FOR BACTERIA: CPT | Performed by: STUDENT IN AN ORGANIZED HEALTH CARE EDUCATION/TRAINING PROGRAM

## 2025-05-01 PROCEDURE — 82310 ASSAY OF CALCIUM: CPT

## 2025-05-01 PROCEDURE — 85027 COMPLETE CBC AUTOMATED: CPT

## 2025-05-01 PROCEDURE — 94003 VENT MGMT INPAT SUBQ DAY: CPT

## 2025-05-01 PROCEDURE — P9016 RBC LEUKOCYTES REDUCED: HCPCS

## 2025-05-01 PROCEDURE — 83605 ASSAY OF LACTIC ACID: CPT | Performed by: STUDENT IN AN ORGANIZED HEALTH CARE EDUCATION/TRAINING PROGRAM

## 2025-05-01 PROCEDURE — 250N000009 HC RX 250: Performed by: STUDENT IN AN ORGANIZED HEALTH CARE EDUCATION/TRAINING PROGRAM

## 2025-05-01 PROCEDURE — 258N000003 HC RX IP 258 OP 636

## 2025-05-01 PROCEDURE — 250N000011 HC RX IP 250 OP 636

## 2025-05-01 PROCEDURE — 84100 ASSAY OF PHOSPHORUS: CPT | Performed by: STUDENT IN AN ORGANIZED HEALTH CARE EDUCATION/TRAINING PROGRAM

## 2025-05-01 PROCEDURE — 85610 PROTHROMBIN TIME: CPT | Performed by: STUDENT IN AN ORGANIZED HEALTH CARE EDUCATION/TRAINING PROGRAM

## 2025-05-01 PROCEDURE — 85520 HEPARIN ASSAY: CPT

## 2025-05-01 PROCEDURE — 88309 TISSUE EXAM BY PATHOLOGIST: CPT | Mod: 26 | Performed by: PATHOLOGY

## 2025-05-01 PROCEDURE — 200N000002 HC R&B ICU UMMC

## 2025-05-01 PROCEDURE — 85520 HEPARIN ASSAY: CPT | Performed by: STUDENT IN AN ORGANIZED HEALTH CARE EDUCATION/TRAINING PROGRAM

## 2025-05-01 PROCEDURE — P9045 ALBUMIN (HUMAN), 5%, 250 ML: HCPCS

## 2025-05-01 PROCEDURE — 258N000003 HC RX IP 258 OP 636: Performed by: STUDENT IN AN ORGANIZED HEALTH CARE EDUCATION/TRAINING PROGRAM

## 2025-05-01 PROCEDURE — 36415 COLL VENOUS BLD VENIPUNCTURE: CPT | Performed by: STUDENT IN AN ORGANIZED HEALTH CARE EDUCATION/TRAINING PROGRAM

## 2025-05-01 PROCEDURE — 250N000009 HC RX 250

## 2025-05-01 PROCEDURE — 250N000011 HC RX IP 250 OP 636: Mod: JZ | Performed by: STUDENT IN AN ORGANIZED HEALTH CARE EDUCATION/TRAINING PROGRAM

## 2025-05-01 PROCEDURE — 84100 ASSAY OF PHOSPHORUS: CPT

## 2025-05-01 PROCEDURE — 99233 SBSQ HOSP IP/OBS HIGH 50: CPT | Mod: 24 | Performed by: SURGERY

## 2025-05-01 PROCEDURE — 88309 TISSUE EXAM BY PATHOLOGIST: CPT | Mod: TC | Performed by: SURGERY

## 2025-05-01 PROCEDURE — 99222 1ST HOSP IP/OBS MODERATE 55: CPT | Performed by: CLINICAL NURSE SPECIALIST

## 2025-05-01 PROCEDURE — 272N000001 HC OR GENERAL SUPPLY STERILE: Performed by: SURGERY

## 2025-05-01 PROCEDURE — 0FBG0ZZ EXCISION OF PANCREAS, OPEN APPROACH: ICD-10-PCS | Performed by: SURGERY

## 2025-05-01 PROCEDURE — 250N000012 HC RX MED GY IP 250 OP 636 PS 637: Performed by: STUDENT IN AN ORGANIZED HEALTH CARE EDUCATION/TRAINING PROGRAM

## 2025-05-01 PROCEDURE — 83735 ASSAY OF MAGNESIUM: CPT | Performed by: STUDENT IN AN ORGANIZED HEALTH CARE EDUCATION/TRAINING PROGRAM

## 2025-05-01 PROCEDURE — 258N000001 HC RX 258

## 2025-05-01 PROCEDURE — 84132 ASSAY OF SERUM POTASSIUM: CPT

## 2025-05-01 PROCEDURE — 85730 THROMBOPLASTIN TIME PARTIAL: CPT | Performed by: STUDENT IN AN ORGANIZED HEALTH CARE EDUCATION/TRAINING PROGRAM

## 2025-05-01 PROCEDURE — 360N000076 HC SURGERY LEVEL 3, PER MIN: Performed by: SURGERY

## 2025-05-01 RX ORDER — CEFAZOLIN SODIUM 2 G/50ML
2 SOLUTION INTRAVENOUS
Status: CANCELLED | OUTPATIENT
Start: 2025-05-01

## 2025-05-01 RX ORDER — INDOMETHACIN 25 MG/1
CAPSULE ORAL PRN
Status: DISCONTINUED | OUTPATIENT
Start: 2025-05-01 | End: 2025-05-01

## 2025-05-01 RX ORDER — DEXMEDETOMIDINE HYDROCHLORIDE 4 UG/ML
.1-1.2 INJECTION, SOLUTION INTRAVENOUS CONTINUOUS
Status: DISCONTINUED | OUTPATIENT
Start: 2025-05-01 | End: 2025-05-13

## 2025-05-01 RX ORDER — NOREPINEPHRINE BITARTRATE 0.06 MG/ML
.01-.6 INJECTION, SOLUTION INTRAVENOUS CONTINUOUS
Status: DISCONTINUED | OUTPATIENT
Start: 2025-05-01 | End: 2025-05-16

## 2025-05-01 RX ORDER — FENTANYL CITRATE 0.05 MG/ML
INJECTION, SOLUTION INTRAMUSCULAR; INTRAVENOUS PRN
Status: DISCONTINUED | OUTPATIENT
Start: 2025-05-01 | End: 2025-05-01

## 2025-05-01 RX ORDER — DEXTROSE MONOHYDRATE 100 MG/ML
INJECTION, SOLUTION INTRAVENOUS CONTINUOUS PRN
Status: DISCONTINUED | OUTPATIENT
Start: 2025-05-01 | End: 2025-05-06

## 2025-05-01 RX ORDER — PROPOFOL 10 MG/ML
5-30 INJECTION, EMULSION INTRAVENOUS CONTINUOUS
Status: DISCONTINUED | OUTPATIENT
Start: 2025-05-01 | End: 2025-05-01

## 2025-05-01 RX ORDER — NOREPINEPHRINE BITARTRATE 0.02 MG/ML
INJECTION, SOLUTION INTRAVENOUS CONTINUOUS PRN
Status: DISCONTINUED | OUTPATIENT
Start: 2025-05-01 | End: 2025-05-01

## 2025-05-01 RX ORDER — VASOPRESSIN IN 0.9 % NACL 2 UNIT/2ML
SYRINGE (ML) INTRAVENOUS PRN
Status: DISCONTINUED | OUTPATIENT
Start: 2025-05-01 | End: 2025-05-01

## 2025-05-01 RX ORDER — CALCIUM CHLORIDE 100 MG/ML
INJECTION INTRAVENOUS; INTRAVENTRICULAR PRN
Status: DISCONTINUED | OUTPATIENT
Start: 2025-05-01 | End: 2025-05-01

## 2025-05-01 RX ORDER — NOREPINEPHRINE BITARTRATE 0.06 MG/ML
INJECTION, SOLUTION INTRAVENOUS
Status: DISCONTINUED
Start: 2025-05-01 | End: 2025-05-02 | Stop reason: HOSPADM

## 2025-05-01 RX ORDER — CEFAZOLIN SODIUM/WATER 2 G/20 ML
SYRINGE (ML) INTRAVENOUS PRN
Status: DISCONTINUED | OUTPATIENT
Start: 2025-05-01 | End: 2025-05-01

## 2025-05-01 RX ORDER — SODIUM CHLORIDE, SODIUM GLUCONATE, SODIUM ACETATE, POTASSIUM CHLORIDE AND MAGNESIUM CHLORIDE 526; 502; 368; 37; 30 MG/100ML; MG/100ML; MG/100ML; MG/100ML; MG/100ML
INJECTION, SOLUTION INTRAVENOUS CONTINUOUS PRN
Status: DISCONTINUED | OUTPATIENT
Start: 2025-05-01 | End: 2025-05-01

## 2025-05-01 RX ORDER — CEFAZOLIN SODIUM 2 G/50ML
2 SOLUTION INTRAVENOUS SEE ADMIN INSTRUCTIONS
Status: CANCELLED | OUTPATIENT
Start: 2025-05-01

## 2025-05-01 RX ORDER — DEXTROSE MONOHYDRATE 100 MG/ML
INJECTION, SOLUTION INTRAVENOUS CONTINUOUS
Status: DISCONTINUED | OUTPATIENT
Start: 2025-05-01 | End: 2025-05-01

## 2025-05-01 RX ADMIN — Medication 50 MG: at 09:42

## 2025-05-01 RX ADMIN — Medication 30 MG: at 11:22

## 2025-05-01 RX ADMIN — MICAFUNGIN 100 MG: 20 INJECTION, POWDER, LYOPHILIZED, FOR SOLUTION INTRAVENOUS at 16:24

## 2025-05-01 RX ADMIN — CALCIUM CHLORIDE INJECTION 1 G: 100 INJECTION, SOLUTION INTRAVENOUS at 10:25

## 2025-05-01 RX ADMIN — NOREPINEPHRINE BITARTRATE 0.02 MCG/KG/MIN: 0.02 INJECTION, SOLUTION INTRAVENOUS at 10:29

## 2025-05-01 RX ADMIN — FENTANYL CITRATE 200 MCG: 0.05 INJECTION, SOLUTION INTRAMUSCULAR; INTRAVENOUS at 10:45

## 2025-05-01 RX ADMIN — Medication 2 G: at 10:18

## 2025-05-01 RX ADMIN — SODIUM BICARBONATE 50 MEQ: 84 INJECTION, SOLUTION INTRAVENOUS at 10:25

## 2025-05-01 RX ADMIN — DEXTROSE: 10 SOLUTION INTRAVENOUS at 01:20

## 2025-05-01 RX ADMIN — THIAMINE HYDROCHLORIDE 100 MG: 100 INJECTION, SOLUTION INTRAMUSCULAR; INTRAVENOUS at 07:46

## 2025-05-01 RX ADMIN — NOREPINEPHRINE BITARTRATE 0.03 MCG/KG/MIN: 0.06 INJECTION, SOLUTION INTRAVENOUS at 13:05

## 2025-05-01 RX ADMIN — FENTANYL CITRATE 100 MCG: 0.05 INJECTION, SOLUTION INTRAMUSCULAR; INTRAVENOUS at 12:21

## 2025-05-01 RX ADMIN — HEPARIN SODIUM 850 UNITS/HR: 10000 INJECTION, SOLUTION INTRAVENOUS at 00:24

## 2025-05-01 RX ADMIN — MEROPENEM 500 MG: 500 INJECTION, POWDER, FOR SOLUTION INTRAVENOUS at 16:32

## 2025-05-01 RX ADMIN — PANTOPRAZOLE SODIUM 40 MG: 40 INJECTION, POWDER, FOR SOLUTION INTRAVENOUS at 07:58

## 2025-05-01 RX ADMIN — INSULIN ASPART 1 UNITS: 100 INJECTION, SOLUTION INTRAVENOUS; SUBCUTANEOUS at 18:26

## 2025-05-01 RX ADMIN — CALCIUM CHLORIDE INJECTION 1 G: 100 INJECTION, SOLUTION INTRAVENOUS at 11:06

## 2025-05-01 RX ADMIN — Medication 1 UNITS: at 11:06

## 2025-05-01 RX ADMIN — HEPARIN SODIUM 1150 UNITS/HR: 10000 INJECTION, SOLUTION INTRAVENOUS at 20:03

## 2025-05-01 RX ADMIN — NOREPINEPHRINE BITARTRATE 6.4 MCG: 1 INJECTION, SOLUTION, CONCENTRATE INTRAVENOUS at 10:57

## 2025-05-01 RX ADMIN — ALBUMIN HUMAN 12.5 G: 0.05 INJECTION, SOLUTION INTRAVENOUS at 03:46

## 2025-05-01 RX ADMIN — MIDAZOLAM 2 MG: 1 INJECTION INTRAMUSCULAR; INTRAVENOUS at 09:33

## 2025-05-01 RX ADMIN — INSULIN ASPART 5 UNITS: 100 INJECTION, SOLUTION INTRAVENOUS; SUBCUTANEOUS at 22:15

## 2025-05-01 RX ADMIN — SMOFLIPID 250 ML: 6; 6; 5; 3 INJECTION, EMULSION INTRAVENOUS at 20:18

## 2025-05-01 RX ADMIN — Medication 0.5 UNITS: at 11:34

## 2025-05-01 RX ADMIN — SODIUM BICARBONATE 50 MEQ: 84 INJECTION, SOLUTION INTRAVENOUS at 11:06

## 2025-05-01 RX ADMIN — SODIUM CHLORIDE, SODIUM GLUCONATE, SODIUM ACETATE, POTASSIUM CHLORIDE AND MAGNESIUM CHLORIDE: 526; 502; 368; 37; 30 INJECTION, SOLUTION INTRAVENOUS at 09:42

## 2025-05-01 RX ADMIN — FENTANYL CITRATE 100 MCG: 0.05 INJECTION, SOLUTION INTRAMUSCULAR; INTRAVENOUS at 10:17

## 2025-05-01 RX ADMIN — SODIUM CHLORIDE, SODIUM LACTATE, POTASSIUM CHLORIDE, AND CALCIUM CHLORIDE 1000 ML: .6; .31; .03; .02 INJECTION, SOLUTION INTRAVENOUS at 13:13

## 2025-05-01 RX ADMIN — MAGNESIUM SULFATE HEPTAHYDRATE: 500 INJECTION, SOLUTION INTRAMUSCULAR; INTRAVENOUS at 17:40

## 2025-05-01 RX ADMIN — HYDROMORPHONE HYDROCHLORIDE 0.5 MG: 1 INJECTION, SOLUTION INTRAMUSCULAR; INTRAVENOUS; SUBCUTANEOUS at 11:28

## 2025-05-01 RX ADMIN — Medication 50 MG: at 10:18

## 2025-05-01 ASSESSMENT — ACTIVITIES OF DAILY LIVING (ADL)
ADLS_ACUITY_SCORE: 56
ADLS_ACUITY_SCORE: 54
ADLS_ACUITY_SCORE: 56
ADLS_ACUITY_SCORE: 54
DEPENDENT_IADLS:: INDEPENDENT
ADLS_ACUITY_SCORE: 56
ADLS_ACUITY_SCORE: 54
ADLS_ACUITY_SCORE: 56
ADLS_ACUITY_SCORE: 54
ADLS_ACUITY_SCORE: 56
ADLS_ACUITY_SCORE: 56
ADLS_ACUITY_SCORE: 54
ADLS_ACUITY_SCORE: 56
ADLS_ACUITY_SCORE: 56
ADLS_ACUITY_SCORE: 54
ADLS_ACUITY_SCORE: 56
ADLS_ACUITY_SCORE: 54
ADLS_ACUITY_SCORE: 56
ADLS_ACUITY_SCORE: 54

## 2025-05-01 NOTE — H&P
[facility]    History and Physical - Emergency General Surgery Service       Date of Admission:  4/30/2025    Assessment & Plan: Surgery   Sebastián Rodas is a 29-year-old male with a history of alcohol use disorder, anxiety, and bipolar disorder who was admitted on 3/30/2025 for alcohol withdrawal following a recent binge, presenting with diffuse abdominal pain. Initial workup revealed leukocytosis and elevated lipase concerning for acute pancreatitis. On 4/27, after clinical deterioration and imaging consistent with a likely perforated viscus, he underwent emergent exploratory laparotomy, necrosectomy, transverse colectomy, abdominal washout, drain placement, and abthera placement. Patient was transferred to [facility] on 4/30/25 for further surgical management.     - Added on for reopening of laparotomy with washout for tomorrow  - Okay for heparin gtt at this time, will hold prior to OR  - Appreciate SICU cares      The patient's care was discussed with the chief resident.    Mireya López DO  [facility]  All communications related to this note should be expressed to resident/SANTOS on call for this team at the time of your communication. Please be advised that not all members of this team utilize vocera.  ______________________________________________________________________    Chief Complaint   Emergent exploratory laparotomy, necrosectomy, transverse colectomy, abdominal washout, drain placement, and abthera placement    History of Present Illness   Sebastián Rodas is a 29-year-old male with a history of alcohol use disorder, anxiety, and bipolar disorder who was admitted on 3/30/2025 for alcohol withdrawal following a recent binge, presenting with diffuse abdominal pain. Initial workup revealed leukocytosis and elevated lipase concerning for acute pancreatitis. He developed acute encephalopathy and was transferred to the  ICU on 3/31, requiring intubation and vasopressors by  due to shock. Hospital course has been complicated by acute renal failure requiring CRRT, cardiomyopathy (initial LVEF 20-25%, improved to 65-70% by ), and necrotizing pancreatitis with unorganized fluid collections. He completed a 14-day course of meropenem but remains intermittently febrile and critically ill, requiring ongoing dialysis and vasopressor support. On , after clinical deterioration and imaging consistent with a likely perforated viscus, following family discussion, he underwent emergent exploratory laparotomy, necrosectomy, transverse colectomy, abdominal washout, and drain placement. Patient was transferred to [facility] on 25 for further surgical management.       Past Medical History    Alcohol use disorder  Anxiety  Bipolar disorder    Past Surgical History   No past surgical history on file.    Prior to Admission Medications   Prior to Admission Medications   Prescriptions Last Dose Informant Patient Reported? Taking?   buPROPion (WELLBUTRIN XL) 300 MG 24 hr tablet   No No   Sig: TAKE ONE TABLET BY MOUTH EVERY MORNING   lamoTRIgine (LAMICTAL) 200 MG tablet   No No   Sig: TAKE 1 TABLET(200 MG) BY MOUTH DAILY   traZODone (DESYREL) 50 MG tablet   No No   Siat hs prn sleep      Facility-Administered Medications: None        Review of Systems    Unable to be obtained    Social History   I have reviewed this patient's social history and updated it with pertinent information if needed.  Social History     Tobacco Use    Smoking status: Never    Smokeless tobacco: Never   Substance Use Topics    Alcohol use: No     Alcohol/week: 0.0 standard drinks of alcohol    Drug use: No     Family History   I have reviewed this patient's family history and updated it with pertinent information if needed.  Family History   Problem Relation Age of Onset    Hypertension Mother     Coronary Artery Disease Father     Substance Abuse Father     Mental  Illness Mother     Mental Illness Father          Allergies   No Known Allergies     Physical Exam   Vital Signs: Temp: 100.9  F (38.3  C) Temp src: Bladder BP: (!) 80/56 Pulse: (!) 129   Resp: 27 SpO2: 100 % O2 Device: Mechanical Ventilator    Weight: 153 lbs 10.57 ozNo intake or output data in the 24 hours ending 04/30/25 2206     Gen: Resting comfortably in bed   Eyes: Nonicteric  ENT: Mucous Membranes Moist  Pulm: Nonlabored Breathing mechanical ventilation via trach  CV: S1, S2, no murmurs  Abd: abthera in place, SHANNON drains x 2 with dark thin sanguinous output, g-tube in place and clamped, remainder of abdomen is compressible   : No shore present  Extremities: warm and well perfused, palpable bilateral DP pulses  Neuro: moving all extremities on command, shaked and nodding his head to answer questions         Data     I have personally reviewed the following data over the past 24 hrs:    15.4 (H)  \   7.7 (L)   / 117 (L)     134 (L) 104 39.2 (H) /  110 (H)   4.6 19 (L) 1.59 (H) \     ALT: 111 (H) AST: 126 (H) AP: 280 (H) TBILI: 0.9   ALB: 2.6 (L) TOT PROTEIN: 5.8 (L) LIPASE: 107 (H)     Procal: N/A CRP: N/A Lactic Acid: 2.9 (H)       INR:  1.09 PTT:  24   D-dimer:  N/A Fibrinogen:  353       Imaging results reviewed over the past 24 hrs:   Recent Results (from the past 24 hours)   XR Chest Port 1 View    Impression    RESIDENT PRELIMINARY INTERPRETATION  IMPRESSION:   1.  Tracheostomy tube in the midthoracic trachea. Right IJ central  venous catheter with tip at the superior cavoatrial junction. Left  PICC with tip in the low SVC.  2.  Mild perihilar and left basilar CT opacities, likely atelectasis  3.  Trace left pleural effusion.

## 2025-05-01 NOTE — CARE CONFERENCE
Additional Information:     Scheduled Care Conference    Date: 5/2  Time: 12 pm providers 1 pm family    Location: 4C/4E     Providers:  Surgery - Tonny Escobar - Confirmed  Nephrology - Raheem Gabriel - Confirmed  Surgery - Casimiro Erglayla - Confirmed   ICU - Jean Paul Pierre - Unconfimred   Palliative - team will be present - Confirmed      Family:  Mother - Marbella Rodas - Confirmed     Call into conference information:   [phone]   Meeting ID# [id number]  PIN # [phone]      Lyla Jha   Inpatient [facility] 4 ICU/ED/OBS  [phone]

## 2025-05-01 NOTE — ANESTHESIA PREPROCEDURE EVALUATION
Anesthesia Pre-Procedure Evaluation    Patient: Sebastián oRdas   MRN: [id number] : 1995        Procedure : Procedure(s):  Reopen Laparotomy, Irrigation and Debriement, Poss bowel resection, Poss colostomy, Poss wound vac placement, Poss Abd closure          Sebastián Rodas is a 29-year-old male with a history of alcohol use disorder, anxiety, and bipolar disorder who was admitted on 3/30/2025 for alcohol withdrawal following a recent binge, presenting with diffuse abdominal pain. Initial workup revealed leukocytosis and elevated lipase concerning for acute pancreatitis. He developed acute encephalopathy and was transferred to the ICU on 3/31, requiring intubation and vasopressors by  due to shock. Hospital course has been complicated by acute renal failure requiring CRRT, cardiomyopathy (initial LVEF 20-25%, improved to 65-70% by ), and necrotizing pancreatitis with unorganized fluid collections. He completed a 14-day course of meropenem but remains intermittently febrile and critically ill, requiring ongoing dialysis and vasopressor support. On , after clinical deterioration and imaging consistent with a likely perforated viscus, following family discussion, he underwent emergent exploratory laparotomy, necrosectomy, transverse colectomy, abdominal washout, and drain placement. Patient was transferred to [facility] on 25 for further surgical management.     No past medical history on file.   No past surgical history on file.   No Known Allergies   Social History     Tobacco Use    Smoking status: Never    Smokeless tobacco: Never   Substance Use Topics    Alcohol use: No     Alcohol/week: 0.0 standard drinks of alcohol      Wt Readings from Last 1 Encounters:   25 69.7 kg (153 lb 10.6 oz)        Anesthesia Evaluation   Pt has had prior anesthetic. Type: General.        ROS/MED HX  ENT/Pulmonary: Comment: Trach      Neurologic: Comment: Acute encephalopathy      Cardiovascular:      "  METS/Exercise Tolerance:     Hematologic:       Musculoskeletal:       GI/Hepatic: Comment: PEG tube  Necrotizing pancreatitis s/p meropenem      Renal/Genitourinary:     (+) renal disease,  Pt requires dialysis,           Endo:       Psychiatric/Substance Use:     (+) psychiatric history anxiety and bipolar alcohol abuse      Infectious Disease:       Malignancy:       Other:            Physical Exam    Airway   unable to assess          Respiratory Devices and Support         Dental       (+) Multiple crowns, permanant bridges      Cardiovascular          Rhythm and rate: regular and tachycardia     Pulmonary       Comment: Mechanical breath sounds    breath sounds clear to auscultation           OUTSIDE LABS:  CBC:   Lab Results   Component Value Date    WBC 14.3 (H) 05/01/2025    WBC 14.3 (H) 05/01/2025    HGB 7.2 (L) 05/01/2025    HGB 7.4 (L) 05/01/2025    HCT 23.3 (L) 05/01/2025    HCT 24.5 (L) 05/01/2025     (L) 05/01/2025     (L) 05/01/2025     BMP:   Lab Results   Component Value Date     05/01/2025     05/01/2025    POTASSIUM 4.3 05/01/2025    POTASSIUM 4.3 05/01/2025    CHLORIDE 104 05/01/2025    CHLORIDE 104 05/01/2025    CO2 18 (L) 05/01/2025    CO2 18 (L) 05/01/2025    BUN 47.3 (H) 05/01/2025    BUN 43.7 (H) 05/01/2025    CR 2.03 (H) 05/01/2025    CR 1.82 (H) 05/01/2025     (H) 05/01/2025    GLC 94 05/01/2025    GLC 98 05/01/2025     COAGS:   Lab Results   Component Value Date    PTT 24 04/30/2025    INR 1.09 04/30/2025    FIBR 353 04/30/2025     POC: No results found for: \"BGM\", \"HCG\", \"HCGS\"  HEPATIC:   Lab Results   Component Value Date    ALBUMIN 2.5 (L) 05/01/2025    PROTTOTAL 5.4 (L) 05/01/2025     (H) 05/01/2025     (H) 05/01/2025    ALKPHOS 349 (H) 05/01/2025    BILITOTAL 1.0 05/01/2025     OTHER:   Lab Results   Component Value Date    LACT 2.8 (H) 05/01/2025    KARINA 7.3 (L) 05/01/2025    PHOS 3.6 05/01/2025    MAG 2.3 05/01/2025    LIPASE 107 " (H) 04/30/2025       Anesthesia Plan    ASA Status:  4    NPO Status:  NPO Appropriate    Anesthesia Type: General.     - Airway: Tracheostomy   Induction: Inhalation.   Maintenance: Inhalation.   Techniques and Equipment:     - Lines/Monitors: Arterial Line, BIS, 2nd IV     - Blood: Blood in Room     - Drips/Meds: Norepi     Consents    Anesthesia Plan(s) and associated risks, benefits, and realistic alternatives discussed. Questions answered and patient/representative(s) expressed understanding.     - Discussed: Risks, Benefits and Alternatives for the PROCEDURE were discussed     - Discussed with:  Patient      - Extended Intubation/Ventilatory Support Discussed: Yes.      - Patient is DNR/DNI Status: No     Use of blood products discussed: Yes.     - Discussed with: Legal guardian.     - Consented: consented to blood products     Postoperative Care    Pain management: IV analgesics, Multi-modal analgesia.   PONV prophylaxis: Ondansetron (or other 5HT-3)     Comments:               Papo Gramajo MD    Clinically Significant Risk Factors Present on Admission         # Hyponatremia: Lowest Na = 134 mmol/L in last 2 days, will monitor as appropriate       # Hypoalbuminemia: Lowest albumin = 2.5 g/dL at 5/1/2025  3:44 AM, will monitor as appropriate   # Thrombocytopenia: Lowest platelets = 112 in last 2 days, will monitor for bleeding        # Anemia: based on hgb <11

## 2025-05-01 NOTE — ANESTHESIA CARE TRANSFER NOTE
Patient: Sebastián Rodas    Procedure: Procedure(s):  Reopen Laparotomy, Irrigation and Debriement, placement of colostomy tube, temporary abdominal closure, necrosectomy       Diagnosis: Free intraperitoneal air [K66.8]  Diagnosis Additional Information: No value filed.    Anesthesia Type:   General     Note:    Oropharynx: spontaneously breathing (Trach secures)  Level of Consciousness: drowsy    Level of Supplemental Oxygen (L/min / FiO2): 10  Independent Airway: airway patency not satisfactory and stable  Dentition: dentition unchanged  Vital Signs Stable: post-procedure vital signs reviewed and stable  Report to RN Given: handoff report given  Patient transferred to: ICU    ICU Handoff: Call for PAUSE to initiate/utilize ICU HANDOFF, Identified Patient, Identified Responsible Provider, Reviewed the Pertinent Medical History, Discussed Surgical Course, Reviewed Intra-OP Anesthesia Management and Issues during Anesthesia, Set Expectations for Post Procedure Period and Allowed Opportunity for Questions and Acknowledgement of Understanding    Vitals:  Vitals Value Taken Time   BP     Temp     Pulse     Resp     SpO2         Electronically Signed By: Papo Gramajo MD  May 1, 2025  12:48 PM

## 2025-05-01 NOTE — PROGRESS NOTES
"  Emergency General Surgery Progress Note  Surgery Cross-Cover  Post Op Check    05/01/2025    Sebastián Rodas is a 29 year old male POD#0 s/p Procedure(s):  Reopen Laparotomy, Irrigation and Debriement, placement of colostomy tube, temporary abdominal closure, necrosectomy for Pre-Op Diagnosis Codes:      * Free intraperitoneal air [K66.8]    Pt is sedated /w fentanyl. Grimaces to painful stimuli. Unable to assess nausea, SOB, chest pain, or dizziness. Is voiding via urinary catheter. On 0.08 NE     /74 (BP Location: Right arm)   Pulse 112   Temp 100.6  F (38.1  C)   Resp 21   Ht 1.727 m (5' 7.99\")   Wt 69.7 kg (153 lb 10.6 oz)   SpO2 97%   BMI 23.37 kg/m      Gen: A&O x4, NAD   Chest: Tracheostomy on VENT   Abdomen: soft, tender, non-distended  Incision: clean, dry, intact with abthera in place /w sanguinous output. Shannon drains in place with similar sanguinous output.   Extremities: warm and well perfused  Devices: Abthera, shore catheter, SHANNON drains    A/P: No acute post-op issues. Continue plan of care per primary team. Please call with any questions.    Casimiro Chirinos MD  PGY-1, General Surgery   "

## 2025-05-01 NOTE — BRIEF OP NOTE
[facility]    Brief Operative Note    Pre-operative diagnosis: Free intraperitoneal air [K66.8]  Post-operative diagnosis Open abdomen, Infected pancreatic necrosis,  breakdown of previous colon resection staple line.     Procedure: Reopen Laparotomy, Irrigation and Debriement, placement of colostomy tube, temporary abdominal closure, necrosectomy, N/A - Abdomen    Surgeon: Surgeons and Role:     * Brendon Haas DO - Primary     * Casimiro Chirinos MD - Resident - Assisting     * Roxi Alston MD - Fellow - Assisting  Anesthesia: General   Estimated Blood Loss: Less than 100 ml    Drains: 24F Bashir-Snyder and 26F malecot   Specimens:   ID Type Source Tests Collected by Time Destination   1 : Presumed pancreas, necrosectomy Tissue Pancreas SURGICAL PATHOLOGY EXAM Brendon Haas DO 5/1/2025 11:10 AM      Findings:   Breakdown of the previous colonic resection staple line with janelle necrosis.  Large volume stool in abdomen.  Necrotic pancrease.  Thrombosed middle colic vessels. Large amount of fat necrosis throughout the abdomen.  Dense interloop adehsions .  Complications: None.  Implants: * No implants in log *

## 2025-05-01 NOTE — CONSULTS
Care Management Initial Consult    General Information  Assessment completed with: VM-chart review, Parents, MotherBlayne Tyson  Type of CM/SW Visit: Initial Assessment    Primary Care Provider verified and updated as needed: Yes   Readmission within the last 30 days: no previous admission in last 30 days      Reason for Consult: care coordination/care conference, decision-making, substance use concerns  Advance Care Planning: Advance Care Planning Reviewed: no concerns identified          Communication Assessment  Patient's communication style: spoken language (English or Bilingual)    Hearing Difficulty or Deaf: no   Wear Glasses or Blind: no    Cognitive  Cognitive/Neuro/Behavioral: .WDL except, speech  Level of Consciousness: lethargic  Arousal Level: opens eyes spontaneously  Orientation: other (see comments) (flora- trached)  Mood/Behavior: flat affect, calm  Best Language:  (flora- trached, doesn't attempt to mouth words)  Speech: trached    Living Environment:   People in home: parent(s), sibling(s)  Yanet Tyson  Current living Arrangements: house      Able to return to prior arrangements: yes       Family/Social Support:  Care provided by: self  Provides care for: no one  Marital Status: Single  Support system: Parent(s), Sibling(s)          Description of Support System: Supportive, Involved    Support Assessment: Adequate family and caregiver support    Current Resources:   Patient receiving home care services: No        Community Resources: [employer]  Equipment currently used at home: none  Supplies currently used at home: None    Employment/Financial:  Employment Status: unemployed        Financial Concerns: unemployed   Referral to Financial Worker: No       Does the patient's insurance plan have a 3 day qualifying hospital stay waiver?  No    Lifestyle & Psychosocial Needs:  Social Drivers of Health     Food Insecurity: Unknown (4/30/2025)    Food Insecurity     Within the past 12 months,  did you worry that your food would run out before you got money to buy more?: Patient unable to answer     Within the past 12 months, did the food you bought just not last and you didn t have money to get more?: Patient unable to answer   Depression: At risk (11/13/2024)    Received from [facility]     PHQ-2     PHQ2 Total: 5   Housing Stability: Unknown (4/30/2025)    Housing Stability     Do you have housing? : Patient unable to answer     Are you worried about losing your housing?: Patient unable to answer   Tobacco Use: Medium Risk (3/30/2025)    Received from [facility]    Patient History     Smoking Tobacco Use: Never     Smokeless Tobacco Use: Never     Passive Exposure: Yes   Financial Resource Strain: Unknown (4/30/2025)    Financial Resource Strain     Within the past 12 months, have you or your family members you live with been unable to get utilities (heat, electricity) when it was really needed?: Patient unable to answer   Alcohol Use: Not on file   Transportation Needs: Unknown (4/30/2025)    Transportation Needs     Within the past 12 months, has lack of transportation kept you from medical appointments, getting your medicines, non-medical meetings or appointments, work, or from getting things that you need?: Patient unable to answer   Recent Concern: Transportation Needs - Unmet Transportation Needs (3/31/2025)    [facility]    Transportation Needs     Does lack of transportation keep you from medical appointments?: 1     Does lack of transportation keep you from work, meetings or getting things that you need?: 2   Physical Activity: Not on File (8/26/2019)    Received from [facility]    Physical Activity     Physical Activity: 0   Interpersonal Safety: Unknown (4/30/2025)    Interpersonal Safety     Do you feel physically and emotionally safe where you currently live?: Patient unable to answer     Within the past  "12 months, have you been hit, slapped, kicked or otherwise physically hurt by someone?: Patient unable to answer     Within the past 12 months, have you been humiliated or emotionally abused in other ways by your partner or ex-partner?: Patient unable to answer   Stress: Not on File (8/26/2019)    Received from [facility]    Stress     Stress: 0   Social Connections: Socially Isolated (3/31/2025)    Received from [facility]    Social Connections     Do you often feel lonely or isolated from those around you?: 4   Health Literacy: Not on file       Functional Status:  Prior to admission patient needed assistance:   Dependent ADLs:: Independent  Dependent IADLs:: Independent       Mental Health Status:  Mental Health Status: Current Concern  Mental Health Management: Individual Therapy, Medication    Chemical Dependency Status:  Chemical Dependency Status: Current Concern  Chemical Dependency Management: Previous treatment          Values/Beliefs:  Spiritual, Cultural Beliefs, Rastafari Practices, Values that affect care: no               Discussed  Partnership in Safe Discharge Planning  document with patient/family: No    Additional Information:  SW was consulted for purpose of organizing family care conference. Patient also discussed in morning care rounds and medical team appreciates support from care management. Writer placed consult order and completed initial assessment via chart review; writer also met with pt and pt's mother, Marbella, at bedside. Writer confirmed facesheet information with Marbella, but additional information for purpose of this assessment was gathered via chart review as patient was transferred from [facility], and an initial assessment was completed upon admission.     Per h&p dated 4/30/25, Sebastián is a \"29-year-old male with a history of alcohol use disorder, anxiety, and bipolar disorder who was admitted on 3/30/2025 for alcohol withdrawal following a recent " "dominik, presenting with diffuse abdominal pain. Initial workup revealed leukocytosis and elevated lipase concerning for acute pancreatitis. On , after clinical deterioration and imaging consistent with a likely perforated viscus, he underwent emergent exploratory laparotomy, necrosectomy, transverse colectomy, abdominal washout, drain placement, and abthera placement. Patient was transferred to [facility] on 25 for further surgical management.\"    Per chart, Sebastián lives at home with his mother and younger brother in a single-family home in [place], [place]. He is currently unemployed, and has been seeking employment. He receives outpatient mental health therapy and has previously attended Chem Dep treatment (2 weeks at [facility] in 2024, previously another 30 day inpatient program followed by 90 days in sober housing). Sebastián has a history of depression, anxiety, and ADHD. Marbella and Sebastián's Uncle previously reported that Sebastián's father  last year, which has been reported as traumatic for him. Prior to admission at [facility], he was independent with all ADLs and IADLs. In his leisure time, he enjoys video games, playing [employer] and the keyboard. Sebastián also has an older brother with whom he speaks with regularly.     Writer shared care conference information with Marbella. She shared there her brother (Sebastián's uncle) will likely attend in person. Sebastián's older brother mall call-in. Writer offered choice of bedside care conference v. conference room, and Marbella shared that Sebastián has been quite lethargic and confused and she would prefer to have the conference without him as she predicts it could overwhelm him.     CHW also provided Marbella with a weekly parking pass, as she reports financial stress and concern with cost of parking.     Next Steps:     Care management will continue to follow for support, resources, and care coordination while Sebastián is inpatient.     Care conference with palliative, SICU, nephrology, " general surgery and family tomorrow, 5/2 in 4C/E Conference Room. See CHW note for more details.     JESENIA Morales, Northern Light Mayo HospitalSW  [facility] ICU Clinical - 4C/4E  Phone: [phone]  4C Vocera, & 4E Vocera

## 2025-05-01 NOTE — PLAN OF CARE
Goal Outcome Evaluation:      Plan of Care Reviewed With: patient    Overall Patient Progress: no change    Major Shift Events: Alert, follows commands, febrile, tachycardic, team aware, fent at 75 for pain, trached CMV 30%/15/550/5, rectal tube, minimal OP, D10 at 10 to replace TPN, cloudy brown OP from shore, open abdomen with wound vac and SHANNON drains x2, brown/ cloudy OP from all three drains, low intensity heparin gtt, mitts for pulling at trach    Plan:   Laparotomy/ washout today  HD today?  Wean fent as tolerated  Titrate heparin gtt per protocol  Monitor fevers      For vital signs and complete assessments, please see documentation flowsheets.

## 2025-05-01 NOTE — PROGRESS NOTES
SURGICAL ICU PROGRESS NOTE  05/01/2025        Date of Service (when I saw the patient): 05/01/2025    ASSESSMENT:  Sebastián Rodas is a 29-year-old male with a history of alcohol abuse, anxiety, and bipolar disorder, admitted on 3/30/2025 for alcohol withdrawal after a binge, presenting with abdominal pain. Initial workup revealed leukocytosis and elevated lipase, suggestive of acute pancreatitis. He developed encephalopathy and required ICU transfer on 3/31, intubation, and vasopressors by 4/1 due to shock. His hospital course was complicated by acute renal failure (requiring CRRT), cardiomyopathy (LVEF 20-25%, improved to 65-70% by 4/14), and necrotizing pancreatitis with unorganized fluid collections. He completed a 14-day course of meropenem but remained febrile and critically ill, requiring ongoing dialysis and vasopressor support. On 4/27, imaging indicated likely perforated viscus, and after family discussion, he underwent emergent laparotomy, necrosectomy, transverse colectomy, abdominal washout, and drain placement. He was transferred to the SICU at [facility] on 4/30 for further monitoring. On 5/1, he underwent a second laparotomy with irrigation and debridement, colostomy tube placement, temporary abdominal closure, and further necrosectomy due to breakdown of the colonic resection staple line, necrotic pancreas, thrombosed middle colic vessels, fat necrosis, and dense interloop adhesions. He has a poor prognosis given the OR findings, palliative care consulted.     CHANGES and MAJOR THINGS TODAY:   - NPO  - Norepinephrine drip ordered  - Propofol ordered, If needed for sedation  - Labs ordered (ABG, BMP, CBC)  -1L 0.9% saline ordered  - Resume Heparin drip  - Blood Cultures ordered  - Palliative consult ordered  - Care conference, 5/2/2025 - Appreciate social care work    PLAN:    Neurological:  # Acute pain   # Agitation  # Encephalopathy - Supportive Care  - Monitor neurological status. Delirium  preventions and precautions.   # Pain: Fentanyl Drip, Fentanyl PRN bolus   # Sedation: Propofol    Pulmonary:   # Acute hypoxic respiratory support  - VENT: FiO2 (%): 30 %, Resp: 26, Vent Mode: VC/AC, Resp Rate (Set): 16 breaths/min, Tidal Volume (Set, mL): 550 mL, PEEP (cm H2O): 5 cmH2O, Resp Rate (Set): 16 breaths/min, Tidal Volume (Set, mL): 550 mL, PEEP (cm H2O): 5 cmH2O  - Continue full vent support. PST when meets criteria.  Ventilatory bundle.    Cardiovascular:    # Cardiomyopathy   - Initial LVEF 20-25%, improved to 65-70%  # Shock-distributive (septic): Concern for developing sepsis again with rising WBC (28K) and higher pressor requirements    - Monitor hemodynamic status  - On pressor Norepinephrine  - 1L 0.9% saline IV    Gastroenterology/Nutrition:  # S/p emergent exploratory laparotomy, necrosectomy, transverse colectomy, abdominal washout, and drain placement  #Severe necrotizing pancreatitis  - Secondary to alcohol use disorder, splenic vein thrombosis, ongoing fevers  # Protein calorie deficit malnutrition    - NPO, no exceptions  - RD consult. Appreciate cares and recommendations.     Renal/Fluids/Electrolytes:   #AGMA  - Likely secondary to SARAHI, hypoperfusion, ongoing diarrhea, lactate not elevated    #Severe hypocalcemia  - 2/2 pancreatitis, bicarb, PTH resistance from hypomagnesemia, and Vit D deficiency  - Replete as indicated       # Acute kidney injury  # CCRT - CVVHDF - Flow 250  Baseline Cr 0.7-0.8. Presented with Cr 0.96 on 3/30. Rapidly markos to 5.2 on 4/1. Oliguric. Garcia UA with proteinuria, hematuria, pyuria, moderate bacteria.  Kidneys unremarkable on CT. Has severe ATN in the setting of shock, ADHF, intravascular hypovolemia/3rd spacing from pancreatitis.   --CRRT 4/1-4/4.   - IHD with levophed started 4/5 but poor CVC function / HoTN limiting.   - Line exchanged 4/10 and again 4/19 - due to poor flow.  - PCAD placed 4/21 by IR.   - CRRT resumed 4/21/25 for fluid overload and assist  with more aggressive UF in setting of shock.    - Off pressors 4/30 - plan at outside hospital was to continue CRRT today and move to IHD tomorrow.  - Will continue to monitor intake and output.  - Garcia to be placed  - Nephrology consulted, managing CRRT (5/1 vs 5/2 pending labs)    Endocrine:   # Stress hyperglycemia    - Sliding scale for glucose management   - Goal to keep BG< 180 for optimal wound healing     ID:  # Leukocytosis  - WBC 14.3  - Completed 14 days course of meropenem and caspofungin. Restart meropenem and micafungin    Positive cultures:  - 4/30 blood cultures obtained   - 5/1 blood cultures ordered    Heme:     # Acute blood loss anemia  # Thrombosed splenic vein   - Hemoglobin 7.4  - Transfuse if hgb <7.0 or signs/symptoms of hypoperfusion. Monitor and trend  - Low intensity heparin gtt   - Multiple transfusion in OR 5/1    Musculoskeletal:   # Deconditioning   - Physical and occupational therapy consult     Skin:  # Pressure Ulcers - Buttocks/rectal Area   - Barrier cream with liberal application. Continue fecal management system   - WOC consult     General Cares/Prophylaxis:    DVT Prophylaxis: Low intensity heparin gtt  GI Prophylaxis: PPI  Restraints: Restraints for medical healing needed: YES    Lines/ tubes/ drains:  - SHANNON x 3  - Drain 4 - Colostomy drain  - PICC line  - A line  - Trach     Disposition:  - Surgical ICU    Patient to be discussed with Dr. Saad Pierre MD  Plastic Surgery    - - - - - - - - - - - - - - - - - - - - - - - - - - - - - - - - - - - - - - - - - - - - - -   HISTORY PRESENTING ILLNESS: Sebastián Rodas is a 29-year-old male with a history of alcohol abuse, anxiety, and bipolar disorder, admitted on 3/30/2025 for alcohol withdrawal after a binge, presenting with abdominal pain. Initial workup revealed leukocytosis and elevated lipase, suggestive of acute pancreatitis. He developed encephalopathy and required ICU transfer on 3/31, intubation, and vasopressors  by 4/1 due to shock. His hospital course was complicated by acute renal failure (requiring CRRT), cardiomyopathy (LVEF 20-25%, improved to 65-70% by 4/14), and necrotizing pancreatitis with unorganized fluid collections. He completed a 14-day course of meropenem but remained febrile and critically ill, requiring ongoing dialysis and vasopressor support. On 4/27, imaging indicated likely perforated viscus, and after family discussion, he underwent emergent laparotomy, necrosectomy, transverse colectomy, abdominal washout, and drain placement. He was transferred to the SICU at [facility] on 4/30 for further monitoring. On 5/1, he underwent a second laparotomy with irrigation and debridement, colostomy tube placement, temporary abdominal closure, and further necrosectomy due to breakdown of the colonic resection staple line, necrotic pancreas, thrombosed middle colic vessels, fat necrosis, and dense interloop adhesions.    REVIEW OF SYSTEMS: 10 point ROS neg other than the symptoms noted above in the HPI.    PAST MEDICAL HISTORY:   -AUD  -Bipolar  -Alcohol Substance Use    SURGICAL HISTORY:   S/p emergent exploratory laparotomy, necrosectomy, transverse colectomy, abdominal washout, and drain placement    SOCIAL HISTORY:   Social History     Socioeconomic History    Marital status: Single   Tobacco Use    Smoking status: Never    Smokeless tobacco: Never   Substance and Sexual Activity    Alcohol use: No     Alcohol/week: 0.0 standard drinks of alcohol    Drug use: No    Sexual activity: Yes     Partners: Female     Social Drivers of Health     Financial Resource Strain: Low Risk  (3/31/2025)    Received from [employer] [facility]    Financial Resource Strain     Difficulty of Paying Living Expenses: 3   Food Insecurity: No Food Insecurity (3/31/2025)    Received from [employer] [facility]    Food Insecurity     Do you worry your food will run out before you are able to buy more?:  1   Transportation Needs: Unmet Transportation Needs (3/31/2025)    Received from [employer] [facility]    Transportation Needs     Does lack of transportation keep you from medical appointments?: 1     Does lack of transportation keep you from work, meetings or getting things that you need?: 2   Physical Activity: Not on File (8/26/2019)    Received from [employer]    Physical Activity     Physical Activity: 0   Stress: Not on File (8/26/2019)    Received from [employer]    Stress     Stress: 0   Social Connections: Socially Isolated (3/31/2025)    Received from [employer] [facility] [employer] [facility]    Social Connections     Do you often feel lonely or isolated from those around you?: 4   Housing Stability: Low Risk  (3/31/2025)    Received from [employer] [facility] [employer] [facility]    Housing Stability     What is your housing situation today?: 1     ALLERGIES:   No Known Allergies    MEDICATIONS:  Current Facility-Administered Medications   Medication Dose Route Frequency Provider Last Rate Last Admin    dextrose 10% infusion   Intravenous Continuous Roxi Alston MD   Stopped at 05/01/25 0930    dextrose 10% infusion   Intravenous Continuous PRN Roxi Alston MD        [Auto Hold] glucose gel 15-30 g  15-30 g Oral Q15 Min PRRoxi Bingham MD        Or    [Auto Hold] dextrose 50 % injection 25-50 mL  25-50 mL Intravenous Q15 Min PRRoxi Bingham MD        Or    [Auto Hold] glucagon injection 1 mg  1 mg Subcutaneous Q15 Min PRRoxi Bingham MD        [Auto Hold] fentaNYL (SUBLIMAZE) 50 mcg/mL bolus from pump  50 mcg Intravenous Q1H PRRoxi Bingham MD        fentaNYL (SUBLIMAZE) infusion   mcg/hr Intravenous Continuous Roxi Alston MD   Stopped at 05/01/25 0930    heparin 25,000 units in 0.45% NaCl 250 mL ANTICOAGULANT infusion  0-5,000 Units/hr Intravenous Continuous Roxi Alston MD   Stopped at 05/01/25 0930    [Auto Hold] insulin aspart (NovoLOG) injection (RAPID ACTING)  1-12  Units Subcutaneous Q4H Roxi Alston MD        lipids 4 oil (SMOFLIPID) 20 % infusion 250 mL  250 mL Intravenous Q24H Roxi Alston MD        [Auto Hold] meropenem (MERREM) 500 mg vial to attach to  mL bag for ADULTS or 25 mL bag for PEDS  500 mg Intravenous Q24H Roxi Alston MD        [Auto Hold] micafungin (MYCAMINE) 100 mg in sodium chloride 0.9 % 100 mL intermittent infusion  100 mg Intravenous Q24H Roxi Alston MD        [Auto Hold] naloxone (NARCAN) injection 0.2 mg  0.2 mg Intravenous Q2 Min PRN Roxi Alston MD        Or    [Auto Hold] naloxone (NARCAN) injection 0.4 mg  0.4 mg Intravenous Q2 Min PRN Roxi Alston MD        Or    [Auto Hold] naloxone (NARCAN) injection 0.2 mg  0.2 mg Intramuscular Q2 Min PRN Roxi Alston MD        Or    [Auto Hold] naloxone (NARCAN) injection 0.4 mg  0.4 mg Intramuscular Q2 Min PRN Roxi Alston MD        [Auto Hold] pantoprazole (PROTONIX) IV push injection 40 mg  40 mg Intravenous QAM AC Roxi Alston MD   40 mg at 05/01/25 0758    parenteral nutrition - ADULT compounded formula   CENTRAL LINE IV TPN CONTINUOUS Roxi Alston MD        [Auto Hold] thiamine (B-1) injection 100 mg  100 mg Intravenous Daily Roxi Alston MD   100 mg at 05/01/25 0746     PHYSICAL EXAMINATION:  Temp:  [99.9  F (37.7  C)-102.2  F (39  C)] 101.7  F (38.7  C)  Pulse:  [122-147] 134  Resp:  [18-32] 26  BP: ()/(51-74) 82/57  FiO2 (%):  [30 %] 30 %  SpO2:  [97 %-100 %] 99 %    Gen: Resting comfortably in bed   Eyes: Nonicteric  ENT: Mucous Membranes Moist  Pulm: Nonlabored Breathing mechanical ventilation via trach  CV: S1, S2, no murmurs  Abd: abthera in place, SHANNON drains x 3 with dark thin sanguinous output, 1 colostomy drain, g-tube in place and clamped, remainder of abdomen is compressible   : Garcia present  Extremities: warm and well perfused, palpable bilateral DP pulses  Neuro: Pt not able to cooperate due to sedation            LABS: Reviewed.   Arterial Blood Gases   No lab results found  in last 7 days.  Complete Blood Count     Recent Labs   Lab 05/01/25  0540 05/01/25  0020 04/30/25  2201   WBC 14.3* 14.3* 15.4*   HGB 7.2* 7.4* 7.7*   * 112* 117*     Basic Metabolic Panel  Recent Labs   Lab 05/01/25  0745 05/01/25  0344 05/01/25  0126 05/01/25  0117 04/30/25  2349 04/30/25  2201   NA  --  136  --  135  --  134*   POTASSIUM  --  4.3  --  4.3  --  4.6   CHLORIDE  --  104  --  104  --  104   CO2  --  18*  --  18*  --  19*   BUN  --  47.3*  --  43.7*  --  39.2*   CR  --  2.03*  --  1.82*  --  1.59*   * 98  94 99 99   < > 124*    < > = values in this interval not displayed.     Liver Function Tests  Recent Labs   Lab 05/01/25  0344 04/30/25  2217 04/30/25  2201   *  --  126*   *  --  111*   ALKPHOS 349*  --  280*   BILITOTAL 1.0  --  0.9   ALBUMIN 2.5*  --  2.6*   INR  --  1.09  --      Pancreatic Enzymes  Recent Labs   Lab 04/30/25  2201   LIPASE 107*     Coagulation Profile  Recent Labs   Lab 04/30/25 2217   INR 1.09   PTT 24       IMAGING:  Recent Results (from the past 24 hours)   XR Chest Port 1 View    Narrative    EXAM: XR CHEST PORT 1 VIEW 4/30/2025 9:31 PM    DEMOGRAPHICS: 29 years Male    INDICATION: Endotracheal tube positioning    COMPARISON: None    TECHNIQUE: Single portable AP view of the chest.    FINDINGS:   Tracheostomy tube in the midthoracic trachea. Right IJ central venous  catheter with tip in the superior cavoatrial junction. Left PICC with  tip at the lower SVC.    The cardiomediastinal silhouette is within normal limits. Mild  perihilar and left basilar streaky opacities. Trace left pleural  effusion. No definite pneumothorax. Metallic opacity overlying the  left upper quadrant, external to the patient or post  surgical/intervention, consider correlation      Impression    IMPRESSION:     1.  Tracheostomy tube in the midthoracic trachea about 5.7 cm above  the nichole. Right IJ central venous catheter with tip near the  superior cavoatrial junction.  Left PICC with tip in the low SVC.  2.  Mild perihilar and left basilar pulmonary opacities, possibly  atelectasis/pulmonary edema  3.  Trace left pleural effusion.  4.  Metallic opacity overlying the left upper quadrant, external to  the patient or post surgical/intervention, consider correlation    I have personally reviewed the examination and initial interpretation  and I agree with the findings.    KIAH TERRY MD         SYSTEM ID:  [id number]

## 2025-05-01 NOTE — ANESTHESIA POSTPROCEDURE EVALUATION
Patient: Sebastián Rodas    Procedure: Procedure(s):  Reopen Laparotomy, Irrigation and Debriement, placement of colostomy tube, temporary abdominal closure, necrosectomy       Anesthesia Type:  General    Note:  Disposition: ICU            ICU Sign Out: Anesthesiologist/ICU physician sign out WAS performed   Postop Pain Control: Uneventful            Sign Out: Well controlled pain   PONV: No   Neuro/Psych: Uneventful            Sign Out: Acceptable/Baseline neuro status   Airway/Respiratory:             Sign Out: AIRWAY IN SITU/Resp. Support   CV/Hemodynamics: Uneventful            Sign Out: Acceptable CV status; No obvious hypovolemia; No obvious fluid overload   Other NRE: NONE   DID A NON-ROUTINE EVENT OCCUR? No    Event details/Postop Comments:  Patient was all ready trach. Patient reversed and spont breathing. Adequately resuscitated during the case.           Last vitals:  Vitals:    05/01/25 1340 05/01/25 1350 05/01/25 1354   BP:   108/74   Pulse: 118 (!) 124 115   Resp:  21    Temp: 38  C (100.4  F) 38  C (100.4  F)    SpO2: 100% 97%        Electronically Signed By: Shaan Aceves DO  May 1, 2025  2:48 PM

## 2025-05-01 NOTE — PROGRESS NOTES
Surgery Progress Note  05/01/2025       Subjective:  - Patient on ventilator, sedated. Not following commands. Overnight had persistent tachycardia to 140s and fevers up to 101.8. Otherwise vitals stable. Has trach and PEG.      Objective:  Temp:  [99.9  F (37.7  C)-102.2  F (39  C)] 102.2  F (39  C)  Pulse:  [122-147] 141  Resp:  [18-32] 28  BP: ()/(51-74) 79/57  FiO2 (%):  [30 %] 30 %  SpO2:  [97 %-100 %] 98 %    I/O last 3 completed shifts:  In: 109.53 [I.V.:109.53]  Out: 1230 [Urine:395; Drains:835]      Gen: Sedated, alert but not oriented  Resp: Ventilated  Abd: Rigid, tender. Abthera in place draining copious brown output, 2 SHANNON drains R>L with similar brown output.   Ext: WWP, no edema     Labs:  Recent Labs   Lab 05/01/25  0540 05/01/25  0020 04/30/25  2201   WBC 14.3* 14.3* 15.4*   HGB 7.2* 7.4* 7.7*   * 112* 117*       Recent Labs   Lab 05/01/25  0344 05/01/25  0126 05/01/25  0117 04/30/25  2349 04/30/25  2201     --  135  --  134*   POTASSIUM 4.3  --  4.3  --  4.6   CHLORIDE 104  --  104  --  104   CO2 18*  --  18*  --  19*   BUN 47.3*  --  43.7*  --  39.2*   CR 2.03*  --  1.82*  --  1.59*   GLC 98  94 99 99   < > 124*   KARINA 7.3*  --  7.3*  --  7.6*   MAG 2.3  --   --   --  2.2   PHOS 3.6  --   --   --  3.1    < > = values in this interval not displayed.       Imaging:  CTAP OS 04/27:    Impression    1.  Worsening sequela of necrotizing pancreatitis. Dominant peripancreatic collection has increased in size, now measuring 19 x 13 cm, previously 17 x 11 cm; increasing gas within this collection is worrisome for infection. Large volume ascites and fat necrosis elsewhere throughout the abdomen and pelvis has also increased from prior.  2.  New presumed blood products in the dominant peripancreatic collection, as well as layering within the left paracolic gutter, suspicious for interval bleeding. Recommend trending hemoglobin levels; CTA abdomen and pelvis could be considered if there is  concern for active bleeding.  3.  Mild small bowel wall thickening, possibly reactive or sequela of enteritis.  4.  Similar right lower lobe consolidation.     Assessment/Plan:   Sebastián Rodas is a 29-year-old male with a history of alcohol use disorder, anxiety, and bipolar disorder who was admitted on 3/30/2025 for alcohol withdrawal following a recent binge, presenting with diffuse abdominal pain. Initial workup revealed leukocytosis and elevated lipase concerning for acute pancreatitis. He developed acute encephalopathy and was transferred to the ICU on 3/31, requiring intubation and vasopressors by 4/1 due to shock. Hospital course has been complicated by acute renal failure requiring CRRT, cardiomyopathy (initial LVEF 20-25%, improved to 65-70% by 4/14), and necrotizing pancreatitis with unorganized fluid collections. He completed a 14-day course of meropenem but remains intermittently febrile and critically ill, requiring ongoing dialysis and vasopressor support. On 4/27, after clinical deterioration and imaging consistent with a likely perforated viscus, following family discussion, he underwent emergent exploratory laparotomy, necrosectomy, transverse colectomy, abdominal washout, and drain placement. Patient was transferred to [facility] on 4/30/25 for further surgical management.     Overnight patient was persistently tachycardic with low-grade fevers. Required 250cc Albumin bolus. Lac 2.8. On hep GTT for known splenic vein thrombosis. Hgb 7.2 and slowly downtrending. WBC 14.3 and stable.    Plan:  - OR today for exploratory laparotomy, poss. Resection, poss. Anastomosis, poss. Closure, poss. Ostomy creation.   - Continue hep gtt. Plan to stop as soon as OR time is finalized, pending consent from mother who would like to discuss the plan in person.  - NPO  - Fentanyl for sedation  - D10 10cc  - Continue abx      Seen, examined, and discussed with chief resident, who will discuss with staff.   - - - - - - - -  - - - - - - - - - -  Casimiro Chirinos MD  Urology PGY-1  General surgery service

## 2025-05-01 NOTE — CONSULTS
[facility] Nurse Inpatient Assessment     Consulted for: Tracheostomy site, sacrum, left heel    [facility] nurse follow-up plan: weekly    Patient History (according to provider note(s):      Sebastián Rodas is a 29-year-old male with a history of alcohol abuse, anxiety, and bipolar disorder, admitted on 3/30/2025 for alcohol withdrawal after a binge, presenting with abdominal pain. Initial workup revealed leukocytosis and elevated lipase, suggestive of acute pancreatitis. He developed encephalopathy and required ICU transfer on 3/31, intubation, and vasopressors by 4/1 due to shock. His hospital course was complicated by acute renal failure (requiring CRRT), cardiomyopathy (LVEF 20-25%, improved to 65-70% by 4/14), and necrotizing pancreatitis with unorganized fluid collections. He completed a 14-day course of meropenem but remained febrile and critically ill, requiring ongoing dialysis and vasopressor support. On 4/27, imaging indicated likely perforated viscus, and after family discussion, he underwent emergent laparotomy, necrosectomy, transverse colectomy, abdominal washout, and drain placement. He was transferred to the SICU at [facility] on 4/30 for further monitoring. On 5/1, he underwent a second laparotomy with irrigation and debridement, colostomy tube placement, temporary abdominal closure, and further necrosectomy due to breakdown of the colonic resection staple line, necrotic pancreas, thrombosed middle colic vessels, fat necrosis, and dense interloop adhesions. He has a poor prognosis given the OR findings, palliative care consulted.     Assessment:      Areas visualized during today's visit: Focused:, Perineal area, Sacrum/coccyx, and left heel, trach    Pressure Injury Location: Left heel      Last photo: 5/1  Wound type: Pressure Injury     Pressure Injury Stage: Deep Tissue Pressure Injury (DTPI), present on admission       Wound history/plan of care:   Pt  admitted from OSH on 4/31. Wound present on admission. There is an intact blister over a deeper purple discoloration.    Wound base: 100 % Purple, Blister, and Epidermis     Palpation of the wound bed: boggy      Drainage: none     Description of drainage: none     Measurements (length x width x depth, in cm) 2  x 1.5  x  0 cm      Tunneling N/A     Undermining N/A  Periwound skin: Intact      Color: normal and consistent with surrounding tissue      Temperature: normal   Odor: none  Pain: unable to assess due to  sedation , none  Pain intervention prior to dressing change: slow and gentle cares   Treatment goal: Heal  and Protection  STATUS: initial assessment  Supplies ordered: at bedside    My PI Risk Assessment     Sensory Perception: 2 - Very Limited     Moisture: 2 - Very moist      Activity: 1 - Bedfast      Mobility: 2 - Very limited     Nutrition: 2 - Probably inadequate      Friction/Shear: 1 - Problem     TOTAL: 10    Pressure Injury Location: coccyx    Zoomed out                                              Zoomed in    Last photo: 5/1  Wound type: Pressure Injury, Incontinence Associated Dermatitis (IAD), and Moisture Associated Skin Damage (MASD)     Pressure Injury Stage: at least Stage 2, deferring definitive staging until wound base has evolved, present on admission        Wound history/plan of care:   Pt transferred from OSH on 4/31. Wound present on admission. There is MASD/IAD to the bilateral buttocks with exposed dermis. There is further breakdown over the Pt's coccyx that is fibrin vs slough. Wound is at least a stage 2. The wound will need to evolve further before it can be definitively staged.     Wound base: 60 % Dermis, 40 %  Fibrin vs slough     Palpation of the wound bed: normal      Drainage: moderate     Description of drainage: serosanguinous     Measurements (length x width x depth, in cm) 6  x 6  x  0.2 cm - central wound, 1.5 x 1.5 x 0.1 cm left buttock     Tunneling N/A      Undermining N/A  Periwound skin: Edematous and Skin stripping      Color: pink and red      Temperature: normal   Odor: none  Pain: unable to assess due to  sedation , none  Pain intervention prior to dressing change: slow and gentle cares   Treatment goal: Heal  and Protection  STATUS: initial assessment  Supplies ordered: at bedside    My PI Risk Assessment     Sensory Perception: 2 - Very Limited     Moisture: 2 - Very moist      Activity: 1 - Bedfast      Mobility: 2 - Very limited     Nutrition: 2 - Probably inadequate      Friction/Shear: 1 - Problem     TOTAL: 10    Wound location: Trach        Last photo: 5/1  Wound due to: Surgical Wound  Wound history/plan of care:  Pt transferred from OSH on 4/31. Pt had trach placed at OSH. There are areas of fibrin from previous trach suture locations which are mostly healed. There is a linear incision on the left side of Trach cannula from insertion. The wound base wass difficult to visualize, appears to be fibrin and adipose tissue marbled together.  Wound base: 100 % Fibrin and Adipose tissue     Palpation of the wound bed: normal      Drainage: moderate     Description of drainage: yellow - respiratory secretions     Measurements (length x width x depth, in cm): 0.4  x 1  x  0.4 cm      Tunneling: N/A     Undermining: N/A  Periwound skin: Intact      Color: normal and consistent with surrounding tissue      Temperature: normal   Odor: none  Pain: unable to assess due to  sedation , none  Pain interventions prior to dressing change: slow and gentle cares   Treatment goal: Heal  and Protection  STATUS: initial assessment  Supplies ordered: at bedside        Treatment Plan:     Left heel wound(s): Every 3 days Cleanse with saline and pat dry. Conform a sacral Mepilex around Pt's heel. Place in Prevalon boots. Float heels off of support surface with pillows under calves.    Buttocks/coccyx wound(s): BID and PRN    Cleanse the area with Mimi cleanse and protect, very  "gently with soft cloth.  Apply ostomy powder (#836616) on all open and denuded skin.  Apply thin layer of Triad paste (613987) on top of it.  With repeat application, do not scrub the paste, only remove soiled paste and reapply.  If complete removal of paste is necessary use baby oil/mineral oil (#353864) and soft wash cloth.  Ensure pt has chair cushion (#[id number]) while sitting up in the chair.  Use only one blue pad in between mattress and pt. No brief in bed.      Trach wound: Perform trach cares per unit routine. Place a fenestrated optifoam between trach faceplate and Pt's skin.    Pressure Injury Prevention (PIP) Plan:  If patient is declining pressure injury prevention interventions: Explore reason why and address patient's concerns, Educate on pressure injury risk and prevention intervention(s), If patient is still declining, document \"informed refusal\" , and Ensure Care team is aware ( provider, charge nurse, etc)  Mattress: Follow bed algorithm, add Low Air Loss (Air+) mattress pump if skin is very moist or constantly moist.   HOB: Maintain at or below 30 degrees, unless contraindicated  Repositioning in bed: Every 1-2 hours , Left/right positioning; avoid supine, Raise foot of bed prior to raising head of bed, to reduce patient sliding down (shear), and Frequent microturns using positioning wedges, as patient tolerates  Heels: Pillows under calves and Heel lift boots  Protective Dressing: Sacral Mepilex for prevention (#455642),  especially for the agitated patient   Positioning Equipment:Positioning wedges (#487694) to help maintain 30 degree side lying position   Chair positioning: Chair cushion (#[id number]) , Assist patient to reposition hourly, and Do NOT use a donut for sitting (this increases pressure to smaller area and creates a higher potential for injury)    If patient has a buttock pressure injury, or high risk for PI use chair cushion or SPS.  Moisture Management: Perineal cleansing /protection: " Follow Incontinence Protocol, Avoid brief in bed, Clean and dry skin folds with bathing , Consider InterDry (#693429) between folds, and Moisturize dry skin  Under Devices: Inspect skin under all medical devices during skin inspection , Ensure tubes are stabilized without tension, and Ensure patient is not lying on medical devices or equipment when repositioned  Ask provider to discontinue device when no longer needed.     Orders: Written    RECOMMEND PRIMARY TEAM ORDER: None, at this time  Education provided: plan of care  Discussed plan of care with: Nurse  Notify [facility] if wound(s) deteriorate.  Nursing to notify the Provider(s) and re-consult the [facility] Nurse if new skin concern.    DATA:     Current support surface: Standard  Low air loss (ISABELL pump, Isolibrium, Pulsate)  Containment of urine/stool: Incontinent pad in bed, Indwelling catheter, and Internal fecal management  BMI: Body mass index is 23.37 kg/m .   Active diet order: Orders Placed This Encounter      NPO for Medical/Clinical Reasons Except for: No Exceptions     Output: I/O last 3 completed shifts:  In: 2116.62 [I.V.:1216.62]  Out: 1945 [Urine:525; Drains:1320; Blood:100]     Labs:   Recent Labs   Lab 05/01/25  1322   ALBUMIN 2.2*   HGB 11.7*   INR 1.19*   WBC 16.2*     Pressure injury risk assessment:   Sensory Perception: 2-->very limited  Moisture: 3-->occasionally moist  Activity: 1-->bedfast  Mobility: 2-->very limited  Nutrition: 2-->probably inadequate  Friction and Shear: 2-->potential problem  Dick Score: 12    Stephen Tilley RN, CWOCN  Pager no longer in use, please contact through [employer] group: [facility] Nurse [place]    Dept. Office Number: [phone]

## 2025-05-01 NOTE — PLAN OF CARE
Goal Outcome Evaluation:      Plan of Care Reviewed With: parent    Overall Patient Progress: no changeOverall Patient Progress: no change    Outcome Evaluation: care management to follow pt while he is admitted; family and provider care conference tomorrow at noon

## 2025-05-01 NOTE — CONSULTS
Care Conference:     Family goals of care conference requested by SICU team. Requested teams are General Surgery, SICU, and Palliative. SW delegated to CHW who will be setting up conference. ICU SW will attend care conference.     Time: TBD- being arranged by CHW, see note    Location: TBD- being arranged by CHW, see note    Invitees: General Surgery, SICU, Palliative, and Family    Care Conference Call-In Instructions  1)  Call [phone]   2)  Enter pt tablet ID # ( [id number] )  3)  Enter pt tablet pin # ( [id number] )      JESENIA Morales, LICSW  [facility] ICU Clinical - 4C/4E  Phone: [phone]  4C Vocera, & 4E Vocera

## 2025-05-01 NOTE — PROGRESS NOTES
CLINICAL NUTRITION SERVICES - ASSESSMENT NOTE      Registered Dietitian Interventions:  TPN change order placed:  Dosing weight:  70 kg  Access: central    Initial parameters (per day)  Volume: max concentrate  Dextrose: 150 g  AA: 150 g  Lipids: 250 mL SMOF, 7 days per week     Dextrose titration:   Monitor lytes and if within acceptable parameters (Mg++ > or = 1.5, K+ is > or = 3, and PO4 > or = 1.9), increase dextrose by 50 g/day to goal of 250 g dextrose.    Additives: Infuvite, MTE    Goal PN provides 250 g dextrose, 150 g AA, and 250 mL SMOF lipids 7 days per week for total provision of 1950 Kcals (28 Kcals/kg), 2.1 g/kg protein, GIR 2 mg/kg/minute, and 26% fat kcals on average daily.     - TG ordered (add on lab)    Future/Additional Recommendations:  - Monitor lytes, ability to advance dextrose; weekly TG, LFT's, Bili  - Consider metabolic cart study pending GOC discussions  - Follow for ability to feed enterally.  Recommend Pivot 1.5 Luis (or equivalent) @ goal of  60ml/hr  (1440ml/day) provides: 2160 kcals, 135 g PRO, 1080 ml free H20, 248 g CHO, and 10 g fiber daily. With RELIZORB     REASON FOR ASSESSMENT  Pharmacy/nutrition to start and manage PN    Pt admitted to OSH 3/30/2025 for alcohol withdrawal following a recent binge and subsequent concern for severe pancreatitis c/b acute encephalopathy and shock requiring intubation on 3/31. Hospital course has been complicated by acute renal failure requiring CRRT, cardiomyopathy (initial LVEF 20-25%, improved to 65-70% by 4/14), and necrotizing pancreatitis with unorganized fluid collections. On 4/27, after clinical deterioration and imaging consistent with a likely perforated viscus and underwent emergent exploratory laparotomy, necrosectomy, transverse colectomy, abdominal washout, and drain placement transferred to the SICU at [facility] on 4/30 for hemodynamic monitoring. RTOR this am for second laparotomy with irrigation and debridement, colostomy tube  "placement, temporary abdominal closure, and further necrosectomy due to breakdown of the colonic resection staple line, necrotic pancreas, thrombosed middle colic vessels, fat necrosis, and dense interloop adhesion     INFORMATION OBTAINED  Assessed patient in room.    NUTRITION HISTORY  Per OSH record:  Pt intubated 3/31  CRRT off and on since 4/1 - stable enough for iHD for about two weeks, CRRT resumed 4/21 and held on transfer.  Unable to view RD notes from OSH despite extensive review of care everywhere - able to ascertain that pt received TPN 4/28 - 4/30 (2L clinimix), propofol, possibly receiving EN prior to that.  GJ placed 4/18     CURRENT NUTRITION ORDERS  Diet: NPO    LABS  Nutrition-relevant labs:   K+ and Phos WNL  BUN 47.3 (H) /Cr 2.03 (H)  ALT / AST / Alk phos elevated.  T bili WNL   (H)    MEDICATIONS  Nutrition-relevant medications:   High resistance novolog  IV thiamine 100 mg  Norepi @ 0.03 mcg/kg/min    ANTHROPOMETRICS  Height: 172.7 cm (5' 7.992\")  Admission Weight: 69.7 kg (153 lb 10.6 oz) (05/01/25 0000)   Most Recent Weight: 69.7 kg (153 lb 10.6 oz) (05/01/25 0000)  IBW: 70 kg  BMI: Body mass index is 23.37 kg/m .   Weight History:     Wt Readings from Last 20 Encounters:   05/01/25 69.7 kg (153 lb 10.6 oz)   04/14/22 72.6 kg (160 lb)   10/05/15 59.9 kg (132 lb)   04/01/25 0300 86.3 kg (190 lb 4.1 oz)   03/30/25 1802 81.6 kg (180 lb) - adm to OSH, 14.5% weight loss in one month    Dosing Weight: 70 kg, based on actual wt    ASSESSED NUTRITION NEEDS  Estimated Energy Needs: 1750 - 2100 kcals/day (25 - 30+ kcals/kg)  Justification: Increased needs aim for lower end with PN  Estimated Protein Needs: 140 - 175 grams protein/day (2 - 2.5 grams of pro/kg)  Justification:  CRRT, Increased needs, and Post-op  Estimated Fluid Needs: Per provider pending fluid status    SYSTEM AND PHYSICAL FINDINGS    GI symptoms: NPO with OR findings per above  Skin/wounds: WOCN following for Coccyx / buttocks " "wounds, left heel and neck; abd wound vac in place    MALNUTRITION  % Intake: Decreased intake does not meet criteria - PN at OSH  % Weight Loss: > 5% in 1 month (severe)   Subcutaneous Fat Loss: Orbital: Mild and Buccal: Mild  Muscle Loss: Temples (temporalis muscle): Mild, Shoulders (deltoids): Mild, Interosseous muscles: Mild, Thigh (quadriceps): Moderate, and Calf (gastrocnemius): Mild  Fluid Accumulation/Edema: Does not meet criteria  Malnutrition Diagnosis: Severe malnutrition in the context of acute illness or injury  Malnutrition Present on Admission: Yes    NUTRITION DIAGNOSIS  Altered gastrointestinal (GI) function related to necrotizing pancreatitis and recent GI surgery as evidenced by NPO and reliant on PN to meet 100% needs    INTERVENTIONS  Collaboration by nutrition professional with other providers - SICU, RN; clarified D10 order.  Parenteral nutrition/IV fluid management    GOALS  Total avg nutritional intake to meet a minimum of 25 kcal/kg and 2 g PRO/kg daily (per dosing wt 70 kg).     MONITORING/EVALUATION  Progress toward goals will be monitored and evaluated per policy.    Maryann Cobb, RD, LD, CNSC  \"4E Clinical Dietitian\" on Vocera  Weekend/Holiday RD - \"Weekend Clinical Dietitian\" on vocera    "

## 2025-05-01 NOTE — PLAN OF CARE
ICU End of Shift Summary. See flowsheets for vital signs and detailed assessment.    Changes this shift:    To OR for emergent exploratory laparotomy, necrosectomy, transverse colectomy, abdominal washout, and drain placement.  See surgery note for details and findings.  - Norepinephrine drip @ 0.08 to maintain BP (post procedure)  - fentanyl gtt @ 100 to control pain. Without fentanyl, pt is rigid in posture, has tachypnea  - trending abgs, hgb stable. Pt received 3 units of blood during procedure  -1L 0.9% saline given for low BP, started TPN early per MD team.  - hep gtt restarted.  - Blood Cultures done- peripheral  - Palliative consult ordered  - Care conference, 5/2/2025 social work consulted    Plan:  care conference. Trend abgs and UOP. Manage pain and control fever by external measures (ice packs, light bedding, room temp control)    Goal Outcome Evaluation:      Plan of Care Reviewed With: parent    Overall Patient Progress: decliningOverall Patient Progress: declining    Outcome Evaluation: care conference for noon 5/2 scheduled. pt continues to be tachypneic in mid 20-30s, tachycardic (ranges from 110-140s, and febrile ( tmax 102.) bloody output through wound vac. MD aware. trending ABGs

## 2025-05-01 NOTE — ANESTHESIA PROCEDURE NOTES
Arterial Line Procedure Note    Pre-Procedure   Staff -        Anesthesiologist:  Shaan Aceves DO       Resident/Fellow: Papo Gramajo MD       Performed By: resident       Location: OR       Pre-Anesthestic Checklist: patient identified, IV checked, risks and benefits discussed, informed consent, monitors and equipment checked, pre-op evaluation and at physician/surgeon's request  Timeout:       Correct Patient: Yes        Correct Procedure: Yes        Correct Site: Yes        Correct Position: Yes   Line Placement:   This line was placed Post Induction  Procedure   Procedure: arterial line       Diagnosis: Hemodynamic Monitoring       Laterality: right       Insertion Site: radial.  Sterile Prep        Standard elements of sterile barrier followed       Skin prep: Chloraprep  Insertion/Injection        Technique: ultrasound guided        1. Ultrasound was used to evaluate the access site.       2. Artery evaluated via ultrasound for patency/adequacy.       3. Using real-time ultrasound the needle/catheter was observed entering the artery/vein.       Catheter Type/Size: 20 G, 1.75 in/4.5 cm quick cath (integral wire)  Narrative         Secured by: suture       Tegaderm dressing used.       Complications: None apparent,        Arterial waveform: Yes        IBP within 10% of NIBP: Yes

## 2025-05-01 NOTE — PROGRESS NOTES
[facility]  Reason for admission/transfer: surgical mgmt of nec panc  2 RN skin assessment: completed by Murtaza  Result of skin assessment and interventions/actions: Excoriated bottom Mepis applied), L heel blister-like blue wound, scattered bruises/ scabs, open abdomen with wound vac and SHANNON drain x2, blachable redness at trach site (trach cares/ ties changed), WOC consult  Height, weight, drug calc weight: Done  Patient belongings (see Flowsheet)  ?

## 2025-05-01 NOTE — OP NOTE
[facility]     Operative Note     Pre-operative diagnosis:         Free intraperitoneal air [K66.8]  Post-operative diagnosis        Open abdomen, Infected pancreatic necrosis,  breakdown of previous colon resection staple line.      Procedure:      Reopen Laparotomy, Irrigation and Debriement, placement of colostomy tube, temporary abdominal closure, necrosectomy, N/A - Abdomen     Surgeon:         Surgeons and Role:     * Brendon Haas DO - Primary     * Casimiro Chirinos MD - Resident - Assisting     * Roxi Alston MD - Fellow - Assisting  Anesthesia:     General             Estimated Blood Loss: Less than 100 ml     Drains: 24F Bashir-Snyder and 26F malecot   Specimens:       ID Type Source Tests Collected by Time Destination   1 : Presumed pancreas, necrosectomy Tissue Pancreas SURGICAL PATHOLOGY EXAM Brendon Haas DO 5/1/2025 11:10 AM        Findings:                     Breakdown of the previous colonic resection staple line with janelle necrosis.  Large volume stool in abdomen.  Necrotic pancrease.  Thrombosed middle colic vessels. Large amount of fat necrosis throughout the abdomen.  Dense interloop adehsions .  Complications:            None.  Implants:         * No implants in log *     Indications: 29-year-old male transferred from outside hospital who is being treated for necrotizing pancreatitis who underwent abdominal exploration and resection of transverse colon secondary to colonic necrosis and perforation.  He was transferred here with open abdomen for definitive cares and possible necrosectomy.    Procedure: Patient was brought to the operating room and remained in the supine position.  Anesthesia was controlled by the [facility].  Preoperatively a arterial line was placed and packed red blood cells were transfused.  The abdomen was prepped and draped in the usual sterile fashion.  The ABThera device was taken down and upon  entry was immediately noticed large volume of stool and succus in the abdomen.  Copious amounts of irrigation were utilized to help clear with residual pancreatic necrosis as well as large feculent material from the abdomen.  The previous staple line near the hepatic flexure of the transverse colon was identified as the source of the feculence as there was a breakdown of the staple line secondary to colonic necrosis.    At this time the rest of the abdomen was attempted to be explored.  Dense interloop adhesions of small bowel with constricted mesentery was found throughout the abdomen.  We were only able to lightly free the mass from the surrounding abdominal wall.  This was done bluntly with finger fracture.  Attention was then paid to the stomach which was identified via the previously placed GJ tube.  We then identified the lesser sac and previously open injury from outside hospital.  We finished draining remaining pancreatic necrosis and abscess fluid.  Vascular structures were palpated and identified and easily removable pancreatic tissue was debrided bluntly and passed off.  At this time attention was turned back to the hepatic flexure and the broken down staple line.  No identifiable plane between the colon edge of the liver edge and the matted small bowel in the middle to allow for mobilization and takedown of the right hepatic flexure.  There was not enough length of the mesentery to bring up an ostomy to help control the spillage.  At this time a 26 Montserratian Malecot drain was brought into the field and passed into the abdomen 0 silk suture x 2 in a pursestring fashion were placed around the opening of the colon and Malecot tube was passed inside the colon.  The sutures were cinched down and tied.  A 24 Montserratian Jaxon drain was then placed into the lesser sac.  ABThera device was replaced.  Patient was transferred back to the ICU in critical condition.

## 2025-05-01 NOTE — CONSULTS
Nephrology Initial Consult  May 1, 2025      Sebastián Rodas MRN:[id number] YOB: 1995  Date of Admission:4/30/2025  Primary care provider: Tennille Quarles  Requesting physician: Brendon Haas    ASSESSMENT AND RECOMMENDATIONS:   SARAHI-Baseline Cr 0.8 as recently as March 2025 before acute events, was started on CRRT emergently on 4/1 in setting of septic shock. Had ~2 weeks of stability enough to manage with iHD but back on CRRT 4/21 until tx to [facility] on 4/30. Running fevers with intraabdominal sepsis, in OR today.  Plan to resume CRRT when indicated, labs prior to OR were reasonable after stopping CRRT yesterday.    -No need for new consent, continuing RRT started last month at [facility].    -Access is tunneled RIJ from 4/21.     -Holding on RRT for the moment with acceptable chemistries and OR today, will be watching closely for re-initiation of CRRT with fevers of 102 and borderline BP's.     Volume-Total body volume overloaded but much of it 3rd spaced.  Making small amount of UOP but would be surprised if this improves with current BP's.      Electrolytes-K 4.3, bicarb 18, CRRT was stopped yesterday, will hold for now but watch for need post OR.      BMD-No acute issues.     Anemia-Hgb 7.2, stable in the short term, acute management per team.     Nutrition-NPO today for surgery.     Time spent: 50 minutes on this date of encounter for chart review, physical exam, medical decision making and co-ordination of care.     Discussed with Dr Lees    Recommendations were communicated to primary team via verbal communication.     Raheem Gabriel, ASIF CNS  Clinical Nurse Specialist  [phone]    REASON FOR CONSULT: RRT dependent SARAHI    HISTORY OF PRESENT ILLNESS:  Sebastián Rodas is a 29 yom with Bipolar disorder, ETOH use c/b necrotizing pancreatitis admitted 3/30/25 to [facility] for ETOH withdrawal and abdominal pain, found to have acute pancreatitis which has progressed to  necrotizing pancreatitis complicated by SARAHI.  Seen by Nephrology at [place], started on CRRT 4/1.  Had periods of stability enough for iHD but back to CRRT on 4/21 until tx to [facility] for further surgical interventions.      PAST MEDICAL HISTORY:  ETOH use   -Necrotizing pancreatitis.    -Perforated viscous    -OR, colectomy 4/30/25  Bipolar disorder      MEDICATIONS:  PTA Meds  Prior to Admission medications    Medication Sig Last Dose Taking? Auth Provider Long Term End Date   buPROPion (WELLBUTRIN XL) 300 MG 24 hr tablet TAKE ONE TABLET BY MOUTH EVERY MORNING   Lei Torres MD Yes    lamoTRIgine (LAMICTAL) 200 MG tablet TAKE 1 TABLET(200 MG) BY MOUTH DAILY   Medina Valles MD Yes    traZODone (DESYREL) 50 MG tablet 1at hs prn sleep   Jamey Whaley MD Yes       Current Meds  Current Facility-Administered Medications   Medication Dose Route Frequency Provider Last Rate Last Admin    [Auto Hold] insulin aspart (NovoLOG) injection (RAPID ACTING)  1-12 Units Subcutaneous Q4H Mireya López DO        [Auto Hold] meropenem (MERREM) 500 mg vial to attach to  mL bag for ADULTS or 25 mL bag for PEDS  500 mg Intravenous Q24H Brendon Haas DO        [Auto Hold] micafungin (MYCAMINE) 100 mg in sodium chloride 0.9 % 100 mL intermittent infusion  100 mg Intravenous Q24H Mireya López DO        [Auto Hold] pantoprazole (PROTONIX) IV push injection 40 mg  40 mg Intravenous QAM AC Mireya López DO   40 mg at 05/01/25 0758    [Auto Hold] thiamine (B-1) injection 100 mg  100 mg Intravenous Daily Mireya López DO   100 mg at 05/01/25 0746     Infusion Meds  Current Facility-Administered Medications   Medication Dose Route Frequency Provider Last Rate Last Admin    dextrose 10% infusion   Intravenous Continuous Mireya López DO   Stopped at 05/01/25 0930    fentaNYL (SUBLIMAZE) infusion   mcg/hr Intravenous Continuous Mireya López DO   Stopped at 05/01/25 0930    heparin 25,000 units in  0.45% NaCl 250 mL ANTICOAGULANT infusion  0-5,000 Units/hr Intravenous Continuous Mireya López DO   Stopped at 05/01/25 0930       ALLERGIES:    No Known Allergies    REVIEW OF SYSTEMS:  A 10 point review of systems was negative except as noted above.    SOCIAL HISTORY:   Social History     Socioeconomic History    Marital status: Single     Spouse name: Not on file    Number of children: Not on file    Years of education: Not on file    Highest education level: Not on file   Occupational History    Not on file   Tobacco Use    Smoking status: Never    Smokeless tobacco: Never   Substance and Sexual Activity    Alcohol use: No     Alcohol/week: 0.0 standard drinks of alcohol    Drug use: No    Sexual activity: Yes     Partners: Female   Other Topics Concern    Not on file   Social History Narrative    Not on file     Social Drivers of Health     Financial Resource Strain: Unknown (4/30/2025)    Financial Resource Strain     Within the past 12 months, have you or your family members you live with been unable to get utilities (heat, electricity) when it was really needed?: Patient unable to answer   Food Insecurity: Unknown (4/30/2025)    Food Insecurity     Within the past 12 months, did you worry that your food would run out before you got money to buy more?: Patient unable to answer     Within the past 12 months, did the food you bought just not last and you didn t have money to get more?: Patient unable to answer   Transportation Needs: Unknown (4/30/2025)    Transportation Needs     Within the past 12 months, has lack of transportation kept you from medical appointments, getting your medicines, non-medical meetings or appointments, work, or from getting things that you need?: Patient unable to answer   Recent Concern: Transportation Needs - Unmet Transportation Needs (3/31/2025)    Received from [facility]    Transportation Needs     Does lack of transportation keep you from  "medical appointments?: 1     Does lack of transportation keep you from work, meetings or getting things that you need?: 2   Physical Activity: Not on File (2019)    Received from [facility]    Physical Activity     Physical Activity: 0   Stress: Not on File (2019)    Received from [employer]    Stress     Stress: 0   Social Connections: Socially Isolated (3/31/2025)    [facility] from [facility]    Social Connections     Do you often feel lonely or isolated from those around you?: 4   Interpersonal Safety: Unknown (2025)    Interpersonal Safety     Do you feel physically and emotionally safe where you currently live?: Patient unable to answer     Within the past 12 months, have you been hit, slapped, kicked or otherwise physically hurt by someone?: Patient unable to answer     Within the past 12 months, have you been humiliated or emotionally abused in other ways by your partner or ex-partner?: Patient unable to answer   Housing Stability: Unknown (2025)    Housing Stability     Do you have housing? : Patient unable to answer     Are you worried about losing your housing?: Patient unable to answer     Reviewed with patient, noncontributory.     FAMILY MEDICAL HISTORY:   Family History   Problem Relation Age of Onset    Hypertension Mother     Coronary Artery Disease Father     Substance Abuse Father     Mental Illness Mother     Mental Illness Father      Reviewed with patient, noncontribuory.     PHYSICAL EXAM:   Temp  Av.3  F (38.5  C)  Min: 99.9  F (37.7  C)  Max: 102.2  F (39  C)      Pulse  Av.8  Min: 122  Max: 147 Resp  Av  Min: 18  Max: 32  FiO2 (%)  Av %  Min: 30 %  Max: 30 %  SpO2  Av.9 %  Min: 97 %  Max: 100 %       BP (!) 82/57   Pulse (!) 134   Temp (!) 101.7  F (38.7  C)   Resp 26   Ht 1.727 m (5' 7.99\")   Wt 69.7 kg (153 lb 10.6 oz)   SpO2 99%   BMI 23.37 kg/m     Date 25 07 - 25 0659   Shift [id number] 2394-8120 " 6657-1080 24 Hour Total   INTAKE   I.V. 54.7   54.7   Shift Total(mL/kg) 54.7(0.78)   54.7(0.78)   OUTPUT   Urine 30   30   Drains 10   10   Stool 0   0   Shift Total(mL/kg) 40(0.57)   40(0.57)   Weight (kg) 69.7 69.7 69.7 69.7      Admit Weight: 69.7 kg (153 lb 10.6 oz)     GENERAL APPEARANCE: Intubated and sedated.   Pulmonary: Intubated and sedated.   CV: Regular rhythm, normal rate   - Edema +2 generalized  GI: Surgical dressing in place  MS: no evidence of inflammation in joints, no muscle tenderness  : + Garcia  SKIN: no rash, warm, dry  NEURO: Intubated and sedated.     LABS:   CMP  Recent Labs   Lab 05/01/25  0745 05/01/25  0344 05/01/25  0126 05/01/25  0117 04/30/25  2349 04/30/25  2201   NA  --  136  --  135  --  134*   POTASSIUM  --  4.3  --  4.3  --  4.6   CHLORIDE  --  104  --  104  --  104   CO2  --  18*  --  18*  --  19*   ANIONGAP  --  14  --  13  --  11   * 98  94 99 99   < > 124*   BUN  --  47.3*  --  43.7*  --  39.2*   CR  --  2.03*  --  1.82*  --  1.59*   GFRESTIMATED  --  44*  --  50*  --  59*   KARINA  --  7.3*  --  7.3*  --  7.6*   MAG  --  2.3  --   --   --  2.2   PHOS  --  3.6  --   --   --  3.1   PROTTOTAL  --  5.4*  --   --   --  5.8*   ALBUMIN  --  2.5*  --   --   --  2.6*   BILITOTAL  --  1.0  --   --   --  0.9   ALKPHOS  --  349*  --   --   --  280*   AST  --  121*  --   --   --  126*   ALT  --  103*  --   --   --  111*    < > = values in this interval not displayed.     CBC  Recent Labs   Lab 05/01/25  0540 05/01/25  0020 04/30/25  2201   HGB 7.2* 7.4* 7.7*   WBC 14.3* 14.3* 15.4*   RBC 2.37* 2.53* 2.52*   HCT 23.3* 24.5* 24.3*   MCV 98 97 96   MCH 30.4 29.2 30.6   MCHC 30.9* 30.2* 31.7   RDW 22.1* 20.6* 20.7*   * 112* 117*     INR  Recent Labs   Lab 04/30/25  2217   INR 1.09   PTT 24     ABG  Recent Labs   Lab 04/30/25  2201   O2PER 30      URINE STUDIES  No lab results found.  No lab results found.  PTH  No lab results found.  IRON STUDIES  No lab results  found.

## 2025-05-02 ENCOUNTER — APPOINTMENT (OUTPATIENT)
Dept: GENERAL RADIOLOGY | Facility: CLINIC | Age: 30
End: 2025-05-02
Payer: COMMERCIAL

## 2025-05-02 VITALS
SYSTOLIC BLOOD PRESSURE: 108 MMHG | DIASTOLIC BLOOD PRESSURE: 74 MMHG | RESPIRATION RATE: 32 BRPM | OXYGEN SATURATION: 94 % | HEIGHT: 68 IN | BODY MASS INDEX: 23.29 KG/M2 | WEIGHT: 153.66 LBS | TEMPERATURE: 100 F | HEART RATE: 131 BPM

## 2025-05-02 LAB
ALBUMIN SERPL BCG-MCNC: 1.8 G/DL (ref 3.5–5.2)
ALBUMIN SERPL BCG-MCNC: 1.9 G/DL (ref 3.5–5.2)
ALLEN'S TEST: ABNORMAL
ALP SERPL-CCNC: 212 U/L (ref 40–150)
ALT SERPL W P-5'-P-CCNC: 38 U/L (ref 0–70)
ANION GAP SERPL CALCULATED.3IONS-SCNC: 14 MMOL/L (ref 7–15)
ANION GAP SERPL CALCULATED.3IONS-SCNC: 15 MMOL/L (ref 7–15)
APTT PPP: 29 SECONDS (ref 22–38)
AST SERPL W P-5'-P-CCNC: 28 U/L (ref 0–45)
ATRIAL RATE - MUSE: 137 BPM
ATRIAL RATE - MUSE: 154 BPM
BASE EXCESS BLDA CALC-SCNC: -8.1 MMOL/L (ref -3–3)
BILIRUB DIRECT SERPL-MCNC: 0.48 MG/DL (ref 0–0.3)
BILIRUB SERPL-MCNC: 0.6 MG/DL
BLD PROD TYP BPU: NORMAL
BLOOD COMPONENT TYPE: NORMAL
BUN SERPL-MCNC: 101 MG/DL (ref 6–20)
BUN SERPL-MCNC: 83.6 MG/DL (ref 6–20)
CA-I BLD-MCNC: 4.2 MG/DL (ref 4.4–5.2)
CALCIUM SERPL-MCNC: 7.3 MG/DL (ref 8.8–10.4)
CALCIUM SERPL-MCNC: 7.4 MG/DL (ref 8.8–10.4)
CHLORIDE SERPL-SCNC: 105 MMOL/L (ref 98–107)
CHLORIDE SERPL-SCNC: 105 MMOL/L (ref 98–107)
CODING SYSTEM: NORMAL
CREAT SERPL-MCNC: 1.82 MG/DL (ref 0.67–1.17)
CREAT SERPL-MCNC: 2.08 MG/DL (ref 0.67–1.17)
CROSSMATCH: NORMAL
CYSTATIN C (ROCHE): 2.9 MG/L (ref 0.6–1)
DIASTOLIC BLOOD PRESSURE - MUSE: NORMAL MMHG
DIASTOLIC BLOOD PRESSURE - MUSE: NORMAL MMHG
EGFRCR SERPLBLD CKD-EPI 2021: 43 ML/MIN/1.73M2
EGFRCR SERPLBLD CKD-EPI 2021: 50 ML/MIN/1.73M2
ERYTHROCYTE [DISTWIDTH] IN BLOOD BY AUTOMATED COUNT: 18.6 % (ref 10–15)
EST. AVERAGE GLUCOSE BLD GHB EST-MCNC: 105 MG/DL
FIBRINOGEN PPP-MCNC: 487 MG/DL (ref 170–510)
GFR/BSA.PRED SERPLBLD CYS-BASED-ARV: 21 ML/MIN/1.73M2
GLUCOSE BLDC GLUCOMTR-MCNC: 102 MG/DL (ref 70–99)
GLUCOSE BLDC GLUCOMTR-MCNC: 109 MG/DL (ref 70–99)
GLUCOSE BLDC GLUCOMTR-MCNC: 113 MG/DL (ref 70–99)
GLUCOSE BLDC GLUCOMTR-MCNC: 114 MG/DL (ref 70–99)
GLUCOSE BLDC GLUCOMTR-MCNC: 114 MG/DL (ref 70–99)
GLUCOSE BLDC GLUCOMTR-MCNC: 116 MG/DL (ref 70–99)
GLUCOSE BLDC GLUCOMTR-MCNC: 125 MG/DL (ref 70–99)
GLUCOSE BLDC GLUCOMTR-MCNC: 125 MG/DL (ref 70–99)
GLUCOSE BLDC GLUCOMTR-MCNC: 126 MG/DL (ref 70–99)
GLUCOSE BLDC GLUCOMTR-MCNC: 127 MG/DL (ref 70–99)
GLUCOSE BLDC GLUCOMTR-MCNC: 136 MG/DL (ref 70–99)
GLUCOSE BLDC GLUCOMTR-MCNC: 144 MG/DL (ref 70–99)
GLUCOSE BLDC GLUCOMTR-MCNC: 166 MG/DL (ref 70–99)
GLUCOSE BLDC GLUCOMTR-MCNC: 184 MG/DL (ref 70–99)
GLUCOSE BLDC GLUCOMTR-MCNC: 196 MG/DL (ref 70–99)
GLUCOSE BLDC GLUCOMTR-MCNC: 210 MG/DL (ref 70–99)
GLUCOSE BLDC GLUCOMTR-MCNC: 238 MG/DL (ref 70–99)
GLUCOSE BLDC GLUCOMTR-MCNC: 279 MG/DL (ref 70–99)
GLUCOSE BLDC GLUCOMTR-MCNC: 289 MG/DL (ref 70–99)
GLUCOSE BLDC GLUCOMTR-MCNC: 299 MG/DL (ref 70–99)
GLUCOSE BLDC GLUCOMTR-MCNC: 92 MG/DL (ref 70–99)
GLUCOSE SERPL-MCNC: 125 MG/DL (ref 70–99)
GLUCOSE SERPL-MCNC: 257 MG/DL (ref 70–99)
HBA1C MFR BLD: 5.3 %
HCO3 BLD-SCNC: 17 MMOL/L (ref 21–28)
HCO3 SERPL-SCNC: 16 MMOL/L (ref 22–29)
HCO3 SERPL-SCNC: 17 MMOL/L (ref 22–29)
HCT VFR BLD AUTO: 30.5 % (ref 40–53)
HGB BLD-MCNC: 8.2 G/DL (ref 13.3–17.7)
HGB BLD-MCNC: 9 G/DL (ref 13.3–17.7)
HGB BLD-MCNC: 9.3 G/DL (ref 13.3–17.7)
HGB BLD-MCNC: 9.8 G/DL (ref 13.3–17.7)
INR PPP: 1.3 (ref 0.85–1.15)
INTERPRETATION ECG - MUSE: NORMAL
INTERPRETATION ECG - MUSE: NORMAL
ISSUE DATE AND TIME: NORMAL
MAGNESIUM SERPL-MCNC: 1.9 MG/DL (ref 1.7–2.3)
MAGNESIUM SERPL-MCNC: 2 MG/DL (ref 1.7–2.3)
MCH RBC QN AUTO: 30.5 PG (ref 26.5–33)
MCHC RBC AUTO-ENTMCNC: 32.1 G/DL (ref 31.5–36.5)
MCV RBC AUTO: 95 FL (ref 78–100)
O2/TOTAL GAS SETTING VFR VENT: 45 %
OXYHGB MFR BLDA: 96 % (ref 92–100)
P AXIS - MUSE: 53 DEGREES
P AXIS - MUSE: 60 DEGREES
PCO2 BLD: 35 MM HG (ref 35–45)
PEEP: 5 CM H2O
PH BLD: 7.31 [PH] (ref 7.35–7.45)
PHOSPHATE SERPL-MCNC: 7.6 MG/DL (ref 2.5–4.5)
PHOSPHATE SERPL-MCNC: 8.2 MG/DL (ref 2.5–4.5)
PLATELET # BLD AUTO: 169 10E3/UL (ref 150–450)
PO2 BLD: 96 MM HG (ref 80–105)
POTASSIUM SERPL-SCNC: 4.6 MMOL/L (ref 3.4–5.3)
POTASSIUM SERPL-SCNC: 5.1 MMOL/L (ref 3.4–5.3)
PR INTERVAL - MUSE: 122 MS
PR INTERVAL - MUSE: 124 MS
PROT SERPL-MCNC: 4.5 G/DL (ref 6.4–8.3)
PROTHROMBIN TIME: 16.5 SECONDS (ref 11.8–14.8)
QRS DURATION - MUSE: 68 MS
QRS DURATION - MUSE: 78 MS
QT - MUSE: 266 MS
QT - MUSE: 326 MS
QTC - MUSE: 426 MS
QTC - MUSE: 492 MS
R AXIS - MUSE: 63 DEGREES
R AXIS - MUSE: 64 DEGREES
RBC # BLD AUTO: 3.21 10E6/UL (ref 4.4–5.9)
SAO2 % BLDA: 97.4 % (ref 96–97)
SODIUM SERPL-SCNC: 136 MMOL/L (ref 135–145)
SODIUM SERPL-SCNC: 136 MMOL/L (ref 135–145)
SYSTOLIC BLOOD PRESSURE - MUSE: NORMAL MMHG
SYSTOLIC BLOOD PRESSURE - MUSE: NORMAL MMHG
T AXIS - MUSE: -46 DEGREES
T AXIS - MUSE: 218 DEGREES
UFH PPP CHRO-ACNC: 0.35 IU/ML
UNIT ABO/RH: NORMAL
UNIT NUMBER: NORMAL
UNIT STATUS: NORMAL
UNIT TYPE ISBT: 9500
VENTRICULAR RATE- MUSE: 137 BPM
VENTRICULAR RATE- MUSE: 154 BPM
WBC # BLD AUTO: 26.7 10E3/UL (ref 4–11)

## 2025-05-02 PROCEDURE — 99223 1ST HOSP IP/OBS HIGH 75: CPT | Performed by: FAMILY MEDICINE

## 2025-05-02 PROCEDURE — 200N000002 HC R&B ICU UMMC

## 2025-05-02 PROCEDURE — 250N000011 HC RX IP 250 OP 636: Mod: JZ | Performed by: STUDENT IN AN ORGANIZED HEALTH CARE EDUCATION/TRAINING PROGRAM

## 2025-05-02 PROCEDURE — 83735 ASSAY OF MAGNESIUM: CPT | Performed by: STUDENT IN AN ORGANIZED HEALTH CARE EDUCATION/TRAINING PROGRAM

## 2025-05-02 PROCEDURE — 83036 HEMOGLOBIN GLYCOSYLATED A1C: CPT

## 2025-05-02 PROCEDURE — 85520 HEPARIN ASSAY: CPT | Performed by: SURGERY

## 2025-05-02 PROCEDURE — 85384 FIBRINOGEN ACTIVITY: CPT | Performed by: STUDENT IN AN ORGANIZED HEALTH CARE EDUCATION/TRAINING PROGRAM

## 2025-05-02 PROCEDURE — 82330 ASSAY OF CALCIUM: CPT | Performed by: CLINICAL NURSE SPECIALIST

## 2025-05-02 PROCEDURE — 250N000009 HC RX 250: Performed by: SURGERY

## 2025-05-02 PROCEDURE — 85730 THROMBOPLASTIN TIME PARTIAL: CPT | Performed by: STUDENT IN AN ORGANIZED HEALTH CARE EDUCATION/TRAINING PROGRAM

## 2025-05-02 PROCEDURE — 250N000011 HC RX IP 250 OP 636

## 2025-05-02 PROCEDURE — 250N000009 HC RX 250: Performed by: STUDENT IN AN ORGANIZED HEALTH CARE EDUCATION/TRAINING PROGRAM

## 2025-05-02 PROCEDURE — B4185 PARENTERAL SOL 10 GM LIPIDS: HCPCS | Performed by: STUDENT IN AN ORGANIZED HEALTH CARE EDUCATION/TRAINING PROGRAM

## 2025-05-02 PROCEDURE — 85041 AUTOMATED RBC COUNT: CPT | Performed by: STUDENT IN AN ORGANIZED HEALTH CARE EDUCATION/TRAINING PROGRAM

## 2025-05-02 PROCEDURE — 99233 SBSQ HOSP IP/OBS HIGH 50: CPT | Mod: 24 | Performed by: SURGERY

## 2025-05-02 PROCEDURE — 94003 VENT MGMT INPAT SUBQ DAY: CPT

## 2025-05-02 PROCEDURE — 258N000003 HC RX IP 258 OP 636

## 2025-05-02 PROCEDURE — 82610 CYSTATIN C: CPT | Performed by: SURGERY

## 2025-05-02 PROCEDURE — 84100 ASSAY OF PHOSPHORUS: CPT | Performed by: STUDENT IN AN ORGANIZED HEALTH CARE EDUCATION/TRAINING PROGRAM

## 2025-05-02 PROCEDURE — 99418 PROLNG IP/OBS E/M EA 15 MIN: CPT | Performed by: CLINICAL NURSE SPECIALIST

## 2025-05-02 PROCEDURE — 250N000011 HC RX IP 250 OP 636: Performed by: SURGERY

## 2025-05-02 PROCEDURE — 82248 BILIRUBIN DIRECT: CPT | Performed by: STUDENT IN AN ORGANIZED HEALTH CARE EDUCATION/TRAINING PROGRAM

## 2025-05-02 PROCEDURE — 71045 X-RAY EXAM CHEST 1 VIEW: CPT

## 2025-05-02 PROCEDURE — 85610 PROTHROMBIN TIME: CPT | Performed by: STUDENT IN AN ORGANIZED HEALTH CARE EDUCATION/TRAINING PROGRAM

## 2025-05-02 PROCEDURE — P9016 RBC LEUKOCYTES REDUCED: HCPCS

## 2025-05-02 PROCEDURE — 258N000003 HC RX IP 258 OP 636: Performed by: STUDENT IN AN ORGANIZED HEALTH CARE EDUCATION/TRAINING PROGRAM

## 2025-05-02 PROCEDURE — 999N000157 HC STATISTIC RCP TIME EA 10 MIN

## 2025-05-02 PROCEDURE — 85018 HEMOGLOBIN: CPT

## 2025-05-02 PROCEDURE — 250N000011 HC RX IP 250 OP 636: Performed by: CLINICAL NURSE SPECIALIST

## 2025-05-02 PROCEDURE — 250N000009 HC RX 250: Performed by: CLINICAL NURSE SPECIALIST

## 2025-05-02 PROCEDURE — 82247 BILIRUBIN TOTAL: CPT | Performed by: STUDENT IN AN ORGANIZED HEALTH CARE EDUCATION/TRAINING PROGRAM

## 2025-05-02 PROCEDURE — 82805 BLOOD GASES W/O2 SATURATION: CPT

## 2025-05-02 PROCEDURE — 99233 SBSQ HOSP IP/OBS HIGH 50: CPT | Mod: FS | Performed by: INTERNAL MEDICINE

## 2025-05-02 PROCEDURE — 90947 DIALYSIS REPEATED EVAL: CPT

## 2025-05-02 PROCEDURE — 250N000009 HC RX 250

## 2025-05-02 PROCEDURE — 84100 ASSAY OF PHOSPHORUS: CPT | Performed by: CLINICAL NURSE SPECIALIST

## 2025-05-02 PROCEDURE — 71045 X-RAY EXAM CHEST 1 VIEW: CPT | Mod: 26 | Performed by: STUDENT IN AN ORGANIZED HEALTH CARE EDUCATION/TRAINING PROGRAM

## 2025-05-02 PROCEDURE — 250N000011 HC RX IP 250 OP 636: Performed by: STUDENT IN AN ORGANIZED HEALTH CARE EDUCATION/TRAINING PROGRAM

## 2025-05-02 PROCEDURE — 93005 ELECTROCARDIOGRAM TRACING: CPT

## 2025-05-02 PROCEDURE — 93010 ELECTROCARDIOGRAM REPORT: CPT | Performed by: INTERNAL MEDICINE

## 2025-05-02 PROCEDURE — 83735 ASSAY OF MAGNESIUM: CPT | Performed by: CLINICAL NURSE SPECIALIST

## 2025-05-02 PROCEDURE — 999N000248 HC STATISTIC IV INSERT WITH US BY RN

## 2025-05-02 PROCEDURE — 99291 CRITICAL CARE FIRST HOUR: CPT | Mod: GC | Performed by: SURGERY

## 2025-05-02 PROCEDURE — 82435 ASSAY OF BLOOD CHLORIDE: CPT | Performed by: STUDENT IN AN ORGANIZED HEALTH CARE EDUCATION/TRAINING PROGRAM

## 2025-05-02 RX ORDER — CALCIUM GLUCONATE 20 MG/ML
2 INJECTION, SOLUTION INTRAVENOUS EVERY 8 HOURS PRN
Status: DISCONTINUED | OUTPATIENT
Start: 2025-05-02 | End: 2025-05-05

## 2025-05-02 RX ORDER — DEXTROSE MONOHYDRATE 100 MG/ML
INJECTION, SOLUTION INTRAVENOUS CONTINUOUS PRN
Status: DISCONTINUED | OUTPATIENT
Start: 2025-05-02 | End: 2025-05-05

## 2025-05-02 RX ORDER — PIPERACILLIN SODIUM, TAZOBACTAM SODIUM 4; .5 G/20ML; G/20ML
4.5 INJECTION, POWDER, LYOPHILIZED, FOR SOLUTION INTRAVENOUS EVERY 6 HOURS
Status: DISCONTINUED | OUTPATIENT
Start: 2025-05-02 | End: 2025-05-07

## 2025-05-02 RX ORDER — CALCIUM CHLORIDE, MAGNESIUM CHLORIDE, DEXTROSE MONOHYDRATE, LACTIC ACID, SODIUM CHLORIDE, SODIUM BICARBONATE AND POTASSIUM CHLORIDE 5.15; 2.03; 22; 5.4; 6.46; 3.09; .157 G/L; G/L; G/L; G/L; G/L; G/L; G/L
12.5 INJECTION INTRAVENOUS CONTINUOUS
Status: DISCONTINUED | OUTPATIENT
Start: 2025-05-02 | End: 2025-05-05

## 2025-05-02 RX ORDER — NICOTINE POLACRILEX 4 MG
15-30 LOZENGE BUCCAL
Status: DISCONTINUED | OUTPATIENT
Start: 2025-05-02 | End: 2025-05-06

## 2025-05-02 RX ORDER — MAGNESIUM SULFATE HEPTAHYDRATE 40 MG/ML
2 INJECTION, SOLUTION INTRAVENOUS EVERY 8 HOURS PRN
Status: DISCONTINUED | OUTPATIENT
Start: 2025-05-02 | End: 2025-05-05

## 2025-05-02 RX ORDER — POTASSIUM CHLORIDE 29.8 MG/ML
20 INJECTION INTRAVENOUS EVERY 8 HOURS PRN
Status: DISCONTINUED | OUTPATIENT
Start: 2025-05-02 | End: 2025-05-05

## 2025-05-02 RX ORDER — DEXTROSE MONOHYDRATE 25 G/50ML
25-50 INJECTION, SOLUTION INTRAVENOUS
Status: DISCONTINUED | OUTPATIENT
Start: 2025-05-02 | End: 2025-05-06

## 2025-05-02 RX ORDER — HYDROMORPHONE HYDROCHLORIDE 1 MG/ML
0.5 INJECTION, SOLUTION INTRAMUSCULAR; INTRAVENOUS; SUBCUTANEOUS ONCE
Status: COMPLETED | OUTPATIENT
Start: 2025-05-02 | End: 2025-05-02

## 2025-05-02 RX ORDER — CALCIUM CHLORIDE, MAGNESIUM CHLORIDE, SODIUM CHLORIDE, SODIUM BICARBONATE, POTASSIUM CHLORIDE AND SODIUM PHOSPHATE DIBASIC DIHYDRATE 3.68; 3.05; 6.34; 3.09; .314; .187 G/L; G/L; G/L; G/L; G/L; G/L
12.5 INJECTION INTRAVENOUS CONTINUOUS
Status: DISCONTINUED | OUTPATIENT
Start: 2025-05-02 | End: 2025-05-03

## 2025-05-02 RX ORDER — CALCIUM CHLORIDE, MAGNESIUM CHLORIDE, SODIUM CHLORIDE, SODIUM BICARBONATE, POTASSIUM CHLORIDE AND SODIUM PHOSPHATE DIBASIC DIHYDRATE 3.68; 3.05; 6.34; 3.09; .314; .187 G/L; G/L; G/L; G/L; G/L; G/L
INJECTION INTRAVENOUS CONTINUOUS
Status: DISCONTINUED | OUTPATIENT
Start: 2025-05-02 | End: 2025-05-05

## 2025-05-02 RX ORDER — PIPERACILLIN SODIUM, TAZOBACTAM SODIUM 3; .375 G/15ML; G/15ML
3.38 INJECTION, POWDER, LYOPHILIZED, FOR SOLUTION INTRAVENOUS EVERY 6 HOURS
Status: DISCONTINUED | OUTPATIENT
Start: 2025-05-02 | End: 2025-05-02

## 2025-05-02 RX ADMIN — PIPERACILLIN AND TAZOBACTAM 3.38 G: 3; .375 INJECTION, POWDER, LYOPHILIZED, FOR SOLUTION INTRAVENOUS at 09:32

## 2025-05-02 RX ADMIN — PANTOPRAZOLE SODIUM 40 MG: 40 INJECTION, POWDER, FOR SOLUTION INTRAVENOUS at 07:54

## 2025-05-02 RX ADMIN — CALCIUM GLUCONATE 2 G: 20 INJECTION, SOLUTION INTRAVENOUS at 23:15

## 2025-05-02 RX ADMIN — CALCIUM CHLORIDE, MAGNESIUM CHLORIDE, SODIUM CHLORIDE, SODIUM BICARBONATE, POTASSIUM CHLORIDE AND SODIUM PHOSPHATE DIBASIC DIHYDRATE: 3.68; 3.05; 6.34; 3.09; .314; .187 INJECTION INTRAVENOUS at 20:10

## 2025-05-02 RX ADMIN — PIPERACILLIN AND TAZOBACTAM 3.38 G: 3; .375 INJECTION, POWDER, LYOPHILIZED, FOR SOLUTION INTRAVENOUS at 16:17

## 2025-05-02 RX ADMIN — INSULIN HUMAN 3.5 UNITS/HR: 1 INJECTION, SOLUTION INTRAVENOUS at 02:00

## 2025-05-02 RX ADMIN — CALCIUM CHLORIDE, MAGNESIUM CHLORIDE, DEXTROSE MONOHYDRATE, LACTIC ACID, SODIUM CHLORIDE, SODIUM BICARBONATE AND POTASSIUM CHLORIDE 12.5 ML/KG/HR: 5.15; 2.03; 22; 5.4; 6.46; 3.09; .157 INJECTION INTRAVENOUS at 20:09

## 2025-05-02 RX ADMIN — Medication 50 MCG: at 18:29

## 2025-05-02 RX ADMIN — THIAMINE HYDROCHLORIDE 100 MG: 100 INJECTION, SOLUTION INTRAMUSCULAR; INTRAVENOUS at 07:54

## 2025-05-02 RX ADMIN — Medication 50 MCG: at 20:18

## 2025-05-02 RX ADMIN — CALCIUM CHLORIDE, MAGNESIUM CHLORIDE, SODIUM CHLORIDE, SODIUM BICARBONATE, POTASSIUM CHLORIDE AND SODIUM PHOSPHATE DIBASIC DIHYDRATE 12.5 ML/KG/HR: 3.68; 3.05; 6.34; 3.09; .314; .187 INJECTION INTRAVENOUS at 20:10

## 2025-05-02 RX ADMIN — Medication 150 MCG/HR: at 20:08

## 2025-05-02 RX ADMIN — Medication 50 MCG: at 00:42

## 2025-05-02 RX ADMIN — NOREPINEPHRINE BITARTRATE 0.15 MCG/KG/MIN: 0.06 INJECTION, SOLUTION INTRAVENOUS at 19:58

## 2025-05-02 RX ADMIN — SMOFLIPID 250 ML: 6; 6; 5; 3 INJECTION, EMULSION INTRAVENOUS at 19:46

## 2025-05-02 RX ADMIN — DEXMEDETOMIDINE HYDROCHLORIDE 0.2 MCG/KG/HR: 4 INJECTION, SOLUTION INTRAVENOUS at 05:13

## 2025-05-02 RX ADMIN — HYDROMORPHONE HYDROCHLORIDE 0.5 MG: 1 INJECTION, SOLUTION INTRAMUSCULAR; INTRAVENOUS; SUBCUTANEOUS at 09:32

## 2025-05-02 RX ADMIN — INSULIN HUMAN 2 UNITS/HR: 1 INJECTION, SOLUTION INTRAVENOUS at 19:03

## 2025-05-02 RX ADMIN — Medication 50 MCG: at 08:48

## 2025-05-02 RX ADMIN — MAGNESIUM SULFATE HEPTAHYDRATE: 500 INJECTION, SOLUTION INTRAMUSCULAR; INTRAVENOUS at 17:59

## 2025-05-02 RX ADMIN — Medication 150 MCG/HR: at 05:33

## 2025-05-02 RX ADMIN — MICAFUNGIN 100 MG: 20 INJECTION, POWDER, LYOPHILIZED, FOR SOLUTION INTRAVENOUS at 17:40

## 2025-05-02 RX ADMIN — Medication 50 MCG: at 05:22

## 2025-05-02 RX ADMIN — PIPERACILLIN AND TAZOBACTAM 4.5 G: 4; .5 INJECTION, POWDER, LYOPHILIZED, FOR SOLUTION INTRAVENOUS at 22:41

## 2025-05-02 RX ADMIN — SODIUM CHLORIDE, SODIUM LACTATE, POTASSIUM CHLORIDE, AND CALCIUM CHLORIDE 1000 ML: .6; .31; .03; .02 INJECTION, SOLUTION INTRAVENOUS at 09:32

## 2025-05-02 ASSESSMENT — ACTIVITIES OF DAILY LIVING (ADL)
ADLS_ACUITY_SCORE: 56
ADLS_ACUITY_SCORE: 55
ADLS_ACUITY_SCORE: 56
ADLS_ACUITY_SCORE: 55
ADLS_ACUITY_SCORE: 56

## 2025-05-02 NOTE — CONSULTS
[facility]  Palliative Care Consultation Note    Patient: Sebastián Rodas  Date of Admission:  4/30/2025    Requesting Clinician / Team: General Surgery    Goals of care:   Full restorative cares at this time.  Involved care teams spoke openly with family that there is very limited likelihood that Sebastián will survive this hospitalization.  Decision was made to continue time-limited trial of full cares.    CODE STATUS: Remains full code at this time.  We discussed with family what I considered to be the minuscule probability that attempts at resuscitation would be successful for Zia, or support goals of eventually leaving the hospital to resume as normal of life as possible.  Family continues to consider  ACD: No ACD on file; family by consensus supports his mother Marbella as final decision maker when his surrogate decision maker is needed  POLST form: No POLST form on file    Medical management:  We are not involved in medical management of this patient at this time.    These recommendations have been discussed with family members and all treatment teams.    Brendon Fairbanks MD  Palliative Medicine Consult Team  Text me via [employer]  TT: 84 minutes, with > 50% spent in C/C/E patient/family/care teams re: GOC, POC, Sx management. [id number]      Assessments:  Sebastián Rodas is a 29-year-old male with a history of alcohol use disorder, anxiety, and bipolar disorder who was admitted on 3/30/2025 to OSH for alcohol withdrawal following a recent binge, presenting with diffuse abdominal pain. Initial workup revealed leukocytosis and elevated lipase concerning for acute pancreatitis.   On 4/27, after clinical deterioration and imaging consistent with a likely perforated viscus, he underwent emergent exploratory laparotomy, necrosectomy, transverse colectomy, abdominal washout, drain placement, and abthera placement.    Started on CRRT.    Patient was transferred to [facility] on 4/30/25 for  "further surgical management.  Reexploration of belly here at [facility] reveals no possibility of creating diverting colostomy, and limited likelihood of ever being unable to close the abdomen short of granulation    Today, the patient was seen for:  AUD, pancreatitis, perforated viscus, goals of care planning, palliative care encounter, patient support    Prognosis, Goals, & Planning:    Functional Status just prior to hospitalization: ECOG 2    Prognosis, Goals, and/or Advance Care Planning were addressed today: Yes        Summary/Comments: Prognosis was explained to family, family feels adequate supported, current plan is to do a several day time-limited trial.        Sebastián does not demonstrate capacity to make complex medical decisions today         Full code    Coping, Meaning, & Spirituality:   Mood, coping, and/or meaning in the context of serious illness were addressed today: Yes  Although Marbella is actively a member of the kole community and finds her [employer] kole to be essential to her coping/life, she describes Sebastián is being \"somewhat spiritual\" but not really part of a Shinto or Hinduism.    Social:   Single, no children or significant other, does have friends who he likes to do  activities with.  His mother and uncle describe him as being a very friendly and polite young man.  He enjoys outdoor activities as well, and has recently been looking for a job    History of Present Illness:  See context outlined in the \"assessment\" above.  Admitted to outside hospital with complications as described above, and transferred to our hospital for.  Mother Marbella notes that \"entire family is shocked by this\".  \"We had no idea it with this bad\".    ROS:  Comprehensive ROS is reviewed and is negative except as here & per HPI: Sebastián is unable to participate in review of systems secondary to intubation/sedation.  He appears comfortable     Past Medical History:  Alcohol use disorder.     Past Surgical History:  No " "past surgical history on file.      Family History:  Mother is living.  Unsure at this time about father.     Allergies:  No Known Allergies     Medications:  I have reviewed this patient's medication profile and medications from this hospitalization.   Precedex infusion   Fentanyl infusion 150 mcg/hour  Insulin drip    Physical Exam:  Blood pressure 108/74, pulse 107, temperature 100  F (37.8  C), temperature source Bladder, resp. rate 13, height 1.727 m (5' 7.99\"), weight 72.3 kg (159 lb 6.3 oz), SpO2 100%.  General: Sedated, tracheostomy, appears comfortable multiple lines and tubes.  Opened eyes to command but did not nod or follow commands for me.    ROUTINE ICU LABS   Recent Labs   Lab 05/03/25  0614 05/03/25  0339 05/03/25  0338 05/03/25  0313 05/02/25  2359 05/02/25  2224 05/02/25  0418 05/02/25  0413 05/01/25  1541 05/01/25  1322 05/01/25  0745 05/01/25  0344   NA  --  136  136  --   --   --  136  --  136  --  138  --  136   POTASSIUM  --  4.2  4.2  --   --   --  4.6  --  5.1  --  5.2  --  4.3   CHLORIDE  --  105  105  --   --   --  105  --  105  --  107  --  104   CO2  --  18*  18*  --   --   --  17*  --  16*  --  17*  --  18*   ANIONGAP  --  13  13  --   --   --  14  --  15  --  14  --  14   * 128*  128* 123* 120*   < > 125*   < > 257*   < > 127*   < > 98  94   BUN  --  86.7*  86.7*  --   --   --  101.0*  --  83.6*  --  53.4*  --  47.3*   CR  --  1.58*  1.58*  --   --   --  1.82*  --  2.08*  --  2.23*  --  2.03*   GFRESTIMATED  --  60*  60*  --   --   --  50*  --  43*  --  39*  --  44*   KARINA  --  8.1*  8.1*  --   --   --  7.4*  --  7.3*  --  8.4*  --  7.3*   MAG  --  2.9*  --   --   --  1.9  --  2.0  --  2.4*  --  2.3   PHOS  --  5.9*  --   --   --  7.6*  --  8.2*  --  7.0*  --  3.6   PROTTOTAL  --  4.7*  --   --   --   --   --  4.5*  --  4.5*  --  5.4*   ALBUMIN  --  1.8*  1.8*  --   --   --  1.8*  --  1.9*  --  2.2*  --  2.5*   BILITOTAL  --  0.8  --   --   --   --   --  0.6  --  1.1  " --  1.0   ALKPHOS  --  160*  --   --   --   --   --  212*  --  285*  --  349*   AST  --  24  --   --   --   --   --  28  --  76*  --  121*   ALT  --  22  --   --   --   --   --  38  --  69  --  103*    < > = values in this interval not displayed.     CBC  Recent Labs   Lab 05/03/25  0339 05/02/25  2359 05/02/25  1830 05/02/25  1259 05/02/25  0703 05/02/25  0413 05/01/25  1322 05/01/25  0540   WBC 18.1*  --   --   --   --  26.7* 16.2* 14.3*   RBC 2.89*  --   --   --   --  3.21* 3.89* 2.37*   HGB 8.5*  8.5* 8.6* 9.0* 8.2*   < > 9.8* 11.7* 7.2*   HCT 27.0*  --   --   --   --  30.5* 36.9* 23.3*   MCV 93  --   --   --   --  95 95 98   MCH 29.4  --   --   --   --  30.5 30.1 30.4   MCHC 31.5  --   --   --   --  32.1 31.7 30.9*   RDW 18.3*  --   --   --   --  18.6* 18.4* 22.1*     --   --   --   --  169 123* 117*    < > = values in this interval not displayed.     INR  Recent Labs   Lab 05/02/25  1449 05/01/25  1322 04/30/25  2217   INR 1.30* 1.19* 1.09     Arterial Blood Gas  Recent Labs   Lab 05/03/25  0339 05/02/25 0703 05/01/25 2033 05/01/25  1817   PH 7.32* 7.31* 7.44 7.48*   PCO2 39 35 27* 25*   PO2 113* 96 79* 67*   HCO3 20* 17* 19* 19*   O2PER 45 45 30 30

## 2025-05-02 NOTE — PROGRESS NOTES
Care Management Follow Up  Additional Information     5/2: CHW delegated by JONATHAN provided Pt's (family) with parking pass.       Lyla Jha   Inpatient CHW  [facility] 4 ICU/ED/OBS  [phone]

## 2025-05-02 NOTE — PLAN OF CARE
ICU End of Shift Summary: Please see flowsheets for detailed assessments and vital signs.     Changes this Shift: A&Ox4, follows commands and moves all extremities. T-max 38.9 (bladder): used ice packs, fan, and cold compress without relief, notified provider. MD came to beside and restarted Precedex @ 0.2. Tachypneic throughout shift mid 20s to 40s. Increased WOB: titrated up on FiO2 45% and all other vent settings remained unchanged. Tachycardic throughout shift low 120s to low 150s. Fentanyl increased from 100 to 150 and administered PRN fentanyl x2. Levo @ 0.08. Heparin increased from 1150 to 1450. Start insulin gtt @ 3.5 and currently @ 5.5 (Algorithm 3). TPN @ 50 and Lipids @ 20.8. New L-PIV placed. Adequate urine output, dark, cloudy, sediment. RUQ drain, brown  for shift. SHANNON drains x3 with dark red, sanguinous output. Abdomen wound vac with sanguinous output:  for shift.     Plan: Care conference. Continue to monitor oxygenation, pain, and sedation needs; titrate as tolerated by patient.     Goal Outcome Evaluation:    Problem: Adult Inpatient Plan of Care  Goal: Plan of Care Review  Description: The Plan of Care Review/Shift note should be completed every shift.  The Outcome Evaluation is a brief statement about your assessment that the patient is improving, declining, or no change.  This information will be displayed automatically on your shiftnote.  Outcome: Not Progressing  Flowsheets (Taken 5/2/2025 0618)  Plan of Care Reviewed With: patient  Overall Patient Progress: declining     Problem: Adult Inpatient Plan of Care  Goal: Optimal Comfort and Wellbeing  Intervention: Monitor Pain and Promote Comfort  Recent Flowsheet Documentation  Taken 5/2/2025 0400 by Delphine Nj, RN  Pain Management Interventions: around-the-clock dosing utilized  Taken 5/2/2025 0000 by Delphine Nj, RN  Pain Management Interventions:   around-the-clock dosing utilized   care clustered   cold applied    emotional support   pillow support provided   quiet environment facilitated   repositioned   rest  Taken 5/1/2025 2000 by Delphine Nj, RN  Pain Management Interventions:   around-the-clock dosing utilized   care clustered   cold applied   emotional support   pillow support provided   quiet environment facilitated   repositioned   rest    Plan of Care Reviewed With: patient    Overall Patient Progress: declining

## 2025-05-02 NOTE — PROGRESS NOTES
Medical Assistant Note:  Krupa Mora presents today for blood draw.    Patient seen by provider today: No.   present during visit today: Not Applicable.    Concerns: No Concerns.    Procedure:  Lab draw site: rac, Needle type: bf, Gauge: 23.    Post Assessment:  Labs drawn without difficulty: Yes.    Discharge Plan:  Departure Mode: Ambulatory.    Face to Face Time: 5 min.    Selene Nogueira, CMA         Nephrology Progress Note  05/02/2025       Sebastián Rodas is a 29 yom with Bipolar disorder, ETOH use c/b necrotizing pancreatitis admitted 3/30/25 to [facility] for ETOH withdrawal and abdominal pain, found to have acute pancreatitis which has progressed to necrotizing pancreatitis complicated by SARAHI.  Seen by Nephrology at [place], started on CRRT 4/1.  Had periods of stability enough for iHD but back to CRRT on 4/21 until tx to [facility] for further surgical interventions.       Interval History :   We had interdisciplinary conference with family regarding Mr Rodas as his intraabdominal process puts him at imminent risk of catastrophic bleeding and longer term will be difficult to avoid infections or provide nutrition. Family is discussing GOC, currently full code and restorative cares with plans to explore abdomen in OR again this weekend.  Given this we are restarting CRRT to manage chemistries and volume to support. Ongoing discussions given his risk of decompensation with little to escalate.     Assessment & Recommendations:   SARAHI-Baseline Cr 0.8 as recently as March 2025 before acute events, was started on CRRT emergently on 4/1 in setting of septic shock. Had ~2 weeks of stability enough to manage with iHD but back on CRRT 4/21 until tx to [facility] on 4/30. Running fevers with intraabdominal sepsis.  Had break from CRRT on admission but restarting today with plans for OR tomorrow.                  -No need for new consent, continuing RRT started last month at [facility].                 -Access is tunneled RIJ from 4/21.                  -CRRT, mix of 2k/4k baths, ordered for 0-50cc/h.      Volume-Total body volume overloaded but much of it 3rd spaced.  Making small amount of UOP but would be surprised if this improves with current BP's.       Electrolytes-K 5.1, bicarb 16, restarting CRRT.       BMD-No acute issues.      Anemia-Hgb 8.2, stable in the short term, acute management per team.     "  Nutrition-NPO today for surgery.      Time spent: 50 minutes on this date of encounter for chart review, physical exam, medical decision making and co-ordination of care.      Discussed with Dr Lees     Recommendations were communicated to primary team via verbal communication.        ASIF Roger CNS  Clinical Nurse Specialist  [phone]    Review of Systems:   I reviewed the following systems:  ROS not done due to vent/sedation.     Physical Exam:   I/O last 3 completed shifts:  In: 4498.12 [I.V.:1779.69; IV Piggyback:1000]  Out: 3328 [Urine:482; Drains:2746; Blood:100]   /74 (BP Location: Right arm)   Pulse 117   Temp 99.9  F (37.7  C)   Resp 21   Ht 1.727 m (5' 7.99\")   Wt 70.5 kg (155 lb 6.8 oz)   SpO2 92%   BMI 23.64 kg/m       GENERAL APPEARANCE: Vent via trach  Pulmonary: Intubated and sedated.   CV: Regular rhythm, normal rate   - Edema +2 generalized  GI: Surgical dressing in place  MS: no evidence of inflammation in joints, no muscle tenderness  : + Garcia  SKIN: no rash, warm, dry  NEURO: Intubated and sedated.     Labs:   All labs reviewed by me  Electrolytes/Renal -   Recent Labs   Lab Test 05/02/25  1412 05/02/25  1304 05/02/25  1237 05/02/25  0418 05/02/25  0413 05/01/25  1541 05/01/25  1322 05/01/25  0745 05/01/25  0344   NA  --   --   --   --  136  --  138  --  136   POTASSIUM  --   --   --   --  5.1  --  5.2  --  4.3   CHLORIDE  --   --   --   --  105  --  107  --  104   CO2  --   --   --   --  16*  --  17*  --  18*   BUN  --   --   --   --  83.6*  --  53.4*  --  47.3*   CR  --   --   --   --  2.08*  --  2.23*  --  2.03*   * 113* 136*   < > 257*   < > 127*   < > 98  94   KARINA  --   --   --   --  7.3*  --  8.4*  --  7.3*   MAG  --   --   --   --  2.0  --  2.4*  --  2.3   PHOS  --   --   --   --  8.2*  --  7.0*  --  3.6    < > = values in this interval not displayed.       CBC -   Recent Labs   Lab Test 05/02/25  1259 05/02/25  0703 05/02/25  0413 05/01/25  1322 " 05/01/25  0540   WBC  --   --  26.7* 16.2* 14.3*   HGB 8.2* 9.3* 9.8* 11.7* 7.2*   PLT  --   --  169 123* 117*       LFTs -   Recent Labs   Lab Test 05/02/25  0413 05/01/25  1322 05/01/25  0344   ALKPHOS 212* 285* 349*   BILITOTAL 0.6 1.1 1.0   ALT 38 69 103*   AST 28 76* 121*   PROTTOTAL 4.5* 4.5* 5.4*   ALBUMIN 1.9* 2.2* 2.5*       Iron Panel - No lab results found.        Current Medications:  Current Facility-Administered Medications   Medication Dose Route Frequency Provider Last Rate Last Admin    lipids 4 oil (SMOFLIPID) 20 % infusion 250 mL  250 mL Intravenous Q24H Roxi Alston MD   Stopped at 05/02/25 0800    micafungin (MYCAMINE) 100 mg in sodium chloride 0.9 % 100 mL intermittent infusion  100 mg Intravenous Q24H Roxi Alston  mL/hr at 05/01/25 1624 100 mg at 05/01/25 1624    pantoprazole (PROTONIX) IV push injection 40 mg  40 mg Intravenous QAM AC Roxi Alston MD   40 mg at 05/02/25 0754    piperacillin-tazobactam (ZOSYN) 3.375 g vial to attach to  mL bag  3.375 g Intravenous Q6H Jolie Dexter APRN CNP   Stopped at 05/02/25 1030    thiamine (B-1) injection 100 mg  100 mg Intravenous Daily Roxi Alston MD   100 mg at 05/02/25 0754     Current Facility-Administered Medications   Medication Dose Route Frequency Provider Last Rate Last Admin    dexmedeTOMIDine (PRECEDEX) 4 mcg/mL in sodium chloride 0.9 % 100 mL infusion  0.1-1.2 mcg/kg/hr (Dosing Weight) Intravenous Continuous Mireya López DO 3.5 mL/hr at 05/02/25 1339 0.2 mcg/kg/hr at 05/02/25 1339    dextrose 10% infusion   Intravenous Continuous PRN Mireya López DO        dextrose 10% infusion   Intravenous Continuous PRN Roxi Alston MD        dialysate for CVVHD & CVVHDF (PrismaSol BGK 2/3.5)  12.5 mL/kg/hr CRRT Continuous Raheem Gabriel APRN CNS        fentaNYL (SUBLIMAZE) infusion   mcg/hr Intravenous Continuous Roxi Alston MD 3 mL/hr at 05/02/25 1339 150 mcg/hr at 05/02/25 1339    [Held by provider] heparin  25,000 units in 0.45% NaCl 250 mL ANTICOAGULANT infusion  0-5,000 Units/hr Intravenous Continuous Roxi Alston MD   Stopped at 05/02/25 0640    insulin regular (MYXREDLIN) 1 unit/mL infusion  0-24 Units/hr Intravenous Continuous Mireya López DO 2 mL/hr at 05/02/25 1339 2 Units/hr at 05/02/25 1339    No heparin required   Does not apply Continuous PRN Raheem Gabriel APRN CNS        norepinephrine (LEVOPHED) 16 mg in  mL infusion MAX CONC CENTRAL LINE  0.01-0.6 mcg/kg/min (Dosing Weight) Intravenous Continuous Brandan Hicks PA-C 10.5 mL/hr at 05/02/25 1339 0.16 mcg/kg/min at 05/02/25 1339    parenteral nutrition - ADULT compounded formula   CENTRAL LINE IV TPN CONTINUOUS Brendon Haas DO        parenteral nutrition - ADULT compounded formula   CENTRAL LINE IV TPN CONTINUOUS Roxi Alston MD 50 mL/hr at 05/02/25 1237 Rate Verify at 05/02/25 1237    POST-filter replacement solution for CVVHD & CVVHDF (Phoxillum BK4/2.5)   CRRT Continuous Raheem Gabriel APRN CNS        PRE-filter replacement solution for CVVHD & CVVHDF (Phoxillum BK4/2.5)  12.5 mL/kg/hr CRRT Continuous Raheem Gabriel APRN CNS

## 2025-05-02 NOTE — CARE CONFERENCE
Care Conference Note:    Time: 12-1pm    Location: 4C/E Conf Room     Attendees:  General Surgery:   Dr. Lancaster-Marry, - Primary  Dr. Roxi Alston MD- Fellow     SICU:   Dr. Jamey Nash MD, Attending  Dr. Jean Paul Pierre MD Resident  Nona Fletcher, [facility]    Palliative:   Dr. Brendon Fairbanks MD     Renal:  Raheem Gabriel, APRN CNS     Family:   Marbella Rodas- Mother  Dustin- Uncle (brother of Marbella)  Rony and Ny (older brother and brother's fiance) on conference call    Narrative:   SICU team requested a  family care conference to include the above listed parties. Sebastián's medical picture & prognosis were discussed. Code status and goals of care were also discussed; no change in code status or goals of care as a result of the conference as family has requested time to process information shared by the medical teams.      Plan: Family requests additional time to process information presented at meeting. At this time, patient is still full code with restorative goals.      Care management will continue to follow while Sebastián is admitted. We are available for resources, care conference scheduling, and general support.       JESENIA Morales, [facility] ICU Clinical - 4C/4E  Phone: [phone]  4C Vocera, & 4E Vocera         yes

## 2025-05-02 NOTE — PROGRESS NOTES
SPIRITUAL HEALTH SERVICES - Progress Note  [place] 4C    Referral Source: Routine Consult per Family Request    Attempted to see Sebatsián's mother, Marbella, who is requesting spiritual support, but was unable to connect with her.    will try again on Monday 5/5.    Plan: A  will visit patient's mother on Monday 5/5 if she is available. Spiritual Health Services remain available on request. Please place a standard consult order on Epic.    Maria D Hough,   Chaplain Resident    [facility] routine referrals *[phone]   [facility] available 24/7 for emergent requests/referrals, either by paging the on-call  or by entering an ASAP/STAT consult in Epic (this will also page the on-call ).

## 2025-05-02 NOTE — PROGRESS NOTES
SURGICAL ICU PROGRESS NOTE  05/02/2025        Date of Service (when I saw the patient): 05/02/2025    ASSESSMENT:  Sebastián Rodsa is a 29M with alcohol abuse, anxiety, and bipolar disorder, who was admitted 3/30/25 for alcohol withdrawal and abdominal pain. Workup showed leukocytosis and elevated lipase, consistent with acute pancreatitis. He developed encephalopathy and shock, requiring ICU care, intubation, vasopressors, and CRRT by 4/1. His course was complicated by cardiomyopathy (EF 20-25%, improved to 65-70% by 4/14), necrotizing pancreatitis, and persistent infection despite 14 days of meropenem. On 4/27, imaging showed likely perforated viscus; he underwent emergent laparotomy, necrosectomy, and colectomy. Transferred to [facility] SICU on 4/30. On 5/1, reoperation revealed colonic staple line breakdown, necrotic pancreas, vessel thrombosis, and adhesions. Colostomy with malankot drain and temporary abdominal closure performed. Prognosis is poor; palliative care involved. Today: adequate stool output from malankot, increased sanguinous drainage from drains; heparin held. Care conference today.    CHANGES and MAJOR THINGS TODAY:   - NPO  - 1L 0/9% Saline Ordered  - Trend Hbg  - Zosyn added, meropenem discontinued  - Continue Insulin drip   - Heparin Held  - Palliative team following, appreciate input  - Care conference, 5/2/2025 - Appreciate social care work    PLAN:    Neurological:  # Acute pain   # Agitation  # Encephalopathy - Supportive Care  - Monitor neurological status. Delirium preventions and precautions.   # Pain: Fentanyl Drip, Fentanyl PRN bolus   # Sedation: Propofol, Precedex    Pulmonary:   # Acute hypoxic respiratory support  - VENT: FiO2 (%): 30 %, Resp: 26, Vent Mode: VC/AC, Resp Rate (Set): 16 breaths/min, Tidal Volume (Set, mL): 420 mL, PEEP (cm H2O): 5 cmH2O, Resp Rate (Set): 16 breaths/min, Tidal Volume (Set, mL): 550 mL, PEEP (cm H2O): 5 cmH2O  - Continue full vent support. PST when meets  criteria.  Ventilatory bundle.    Cardiovascular:    # Cardiomyopathy   - Initial LVEF 20-25%, improved to 65-70%  # Shock-distributive (septic): Concern for developing sepsis again with rising WBC (28K) and higher pressor requirements    - Monitor hemodynamic status  - On pressor Norepinephrine    Gastroenterology/Nutrition:  # S/p emergent exploratory laparotomy, necrosectomy, transverse colectomy, abdominal washout, and drain placement  #Severe necrotizing pancreatitis  - Secondary to alcohol use disorder, splenic vein thrombosis, ongoing fevers  # Protein calorie deficit malnutrition    - NPO, no exceptions  - RD consult. Appreciate cares and recommendations.     Renal/Fluids/Electrolytes:   #AGMA  - Likely secondary to SARAHI, hypoperfusion, ongoing diarrhea, lactate not elevated    #Severe hypocalcemia  - 2/2 pancreatitis, bicarb, PTH resistance from hypomagnesemia, and Vit D deficiency  - Replete as indicated       # Acute kidney injury  # CCRT  Baseline Cr 0.7-0.8. Presented with Cr 0.96 on 3/30. Rapidly markos to 5.2 on 4/1. Oliguric. Garcia UA with proteinuria, hematuria, pyuria, moderate bacteria.  Kidneys unremarkable on CT. Has severe ATN in the setting of shock, ADHF, intravascular hypovolemia/3rd spacing from pancreatitis.   --CRRT 4/1-4/4.   - IHD with levophed started 4/5 but poor CVC function / HoTN limiting.   - Line exchanged 4/10 and again 4/19 - due to poor flow.  - PCAD placed 4/21 by IR.   - CRRT resumed 4/21/25 for fluid overload and assist with more aggressive UF in setting of shock.    - Off pressors 4/30 - plan at outside hospital was to continue CRRT today and move to IHD tomorrow.  - Will continue to monitor intake and output.  - Continue Garcia   - Nephrology consulted, managing CRRT     Endocrine:   # Stress hyperglycemia    - Sliding scale for glucose management   - Goal to keep BG< 180 for optimal wound healing     ID:  # Leukocytosis  - WBC 26.7  - Completed 14 days course of meropenem  and caspofungin.   - Zosyn added, meropenem discontinued  - Continue micafungin    Positive cultures:  - 4/30 blood cultures obtained   - 5/1 blood cultures ordered - NGTD    Heme:     # Acute blood loss anemia  # Thrombosed splenic vein   - Hemoglobin 9.3  - Transfuse if hgb <7.0 or signs/symptoms of hypoperfusion. Monitor and trend  - Low intensity heparin gtt   - Multiple transfusion in OR 5/1    Musculoskeletal:   # Deconditioning   - Physical and occupational therapy consult     Skin:  # Pressure Ulcers - Buttocks/rectal Area   - Barrier cream with liberal application. Continue fecal management system   - WOC consult     General Cares/Prophylaxis:    DVT Prophylaxis: Low intensity heparin gtt  GI Prophylaxis: PPI  Restraints: Restraints for medical healing needed: YES    Lines/ tubes/ drains:  - SHANNON x 3  - Drain 4 - Colostomy drain  - PICC line  - A line  - Trach     Disposition:  - Surgical ICU    Patient to be discussed with Dr. Saad Pierre MD  Plastic Surgery    - - - - - - - - - - - - - - - - - - - - - - - - - - - - - - - - - - - - - - - - - - - - - -   HISTORY PRESENTING ILLNESS: Sebastián Rodas is a 29M with alcohol abuse, anxiety, and bipolar disorder, who was admitted 3/30/25 for alcohol withdrawal and abdominal pain. Workup showed leukocytosis and elevated lipase, consistent with acute pancreatitis. He developed encephalopathy and shock, requiring ICU care, intubation, vasopressors, and CRRT by 4/1. His course was complicated by cardiomyopathy (EF 20-25%, improved to 65-70% by 4/14), necrotizing pancreatitis, and persistent infection despite 14 days of meropenem. On 4/27, imaging showed likely perforated viscus; he underwent emergent laparotomy, necrosectomy, and colectomy. Transferred to [facility] SICU on 4/30. On 5/1, reoperation revealed colonic staple line breakdown, necrotic pancreas, vessel thrombosis, and adhesions. Colostomy with malankot drain and temporary abdominal closure performed.  Prognosis is poor; palliative care involved. Today: adequate stool output from malankot, increased sanguinous drainage; heparin held. Care conference today.    REVIEW OF SYSTEMS: 10 point ROS neg other than the symptoms noted above in the HPI.    PAST MEDICAL HISTORY:   -AUD  -Bipolar  -Alcohol Substance Use    SURGICAL HISTORY:   S/p emergent exploratory laparotomy, necrosectomy, transverse colectomy, abdominal washout, and drain placement    SOCIAL HISTORY:   Social History     Socioeconomic History    Marital status: Single   Tobacco Use    Smoking status: Never    Smokeless tobacco: Never   Substance and Sexual Activity    Alcohol use: No     Alcohol/week: 0.0 standard drinks of alcohol    Drug use: No    Sexual activity: Yes     Partners: Female     Social Drivers of Health     Financial Resource Strain: Low Risk  (3/31/2025)    Received from [facility]    Financial Resource Strain     Difficulty of Paying Living Expenses: 3   Food Insecurity: No Food Insecurity (3/31/2025)    [facility]    Food Insecurity     Do you worry your food will run out before you are able to buy more?: 1   Transportation Needs: Unmet Transportation Needs (3/31/2025)    Received from [facility]    Transportation Needs     Does lack of transportation keep you from medical appointments?: 1     Does lack of transportation keep you from work, meetings or getting things that you need?: 2   Physical Activity: Not on File (8/26/2019)    Received from [facility]    Physical Activity     Physical Activity: 0   Stress: Not on File (8/26/2019)    Received from [facility]    Stress     Stress: 0   Social Connections: Socially Isolated (3/31/2025)    Received from [facility]    Social Connections     Do you often feel lonely or isolated from those around you?: 4   Housing Stability: Low Risk  (3/31/2025)     Received from [facility]    Housing Stability     What is your housing situation today?: 1     ALLERGIES:   No Known Allergies    MEDICATIONS:  Current Facility-Administered Medications   Medication Dose Route Frequency Provider Last Rate Last Admin    dextrose 10% infusion   Intravenous Continuous Roxi Alston MD   Stopped at 05/01/25 0930    dextrose 10% infusion   Intravenous Continuous PRN Roxi Alston MD        [Auto Hold] glucose gel 15-30 g  15-30 g Oral Q15 Min PRRoxi Bingham MD        Or    [Auto Hold] dextrose 50 % injection 25-50 mL  25-50 mL Intravenous Q15 Min PRN Roxi Alston MD        Or    [Auto Hold] glucagon injection 1 mg  1 mg Subcutaneous Q15 Min PRRoxi Bingham MD        [Auto Hold] fentaNYL (SUBLIMAZE) 50 mcg/mL bolus from pump  50 mcg Intravenous Q1H PRRoxi Bingham MD        fentaNYL (SUBLIMAZE) infusion   mcg/hr Intravenous Continuous Roxi Alston MD   Stopped at 05/01/25 0930    heparin 25,000 units in 0.45% NaCl 250 mL ANTICOAGULANT infusion  0-5,000 Units/hr Intravenous Continuous Roxi Alston MD   Stopped at 05/01/25 0930    [Auto Hold] insulin aspart (NovoLOG) injection (RAPID ACTING)  1-12 Units Subcutaneous Q4H Roxi Alsotn MD        lipids 4 oil (SMOFLIPID) 20 % infusion 250 mL  250 mL Intravenous Q24H Roxi Alston MD        [Auto Hold] meropenem (MERREM) 500 mg vial to attach to  mL bag for ADULTS or 25 mL bag for PEDS  500 mg Intravenous Q24H Roxi Alston MD        [Auto Hold] micafungin (MYCAMINE) 100 mg in sodium chloride 0.9 % 100 mL intermittent infusion  100 mg Intravenous Q24H Roxi Alston MD        [Auto Hold] naloxone (NARCAN) injection 0.2 mg  0.2 mg Intravenous Q2 Min PRRoix Bingham MD        Or    [Auto Hold] naloxone (NARCAN) injection 0.4 mg  0.4 mg Intravenous Q2 Min Roxi Gomez MD        Or    [Auto Hold] naloxone (NARCAN) injection 0.2 mg  0.2 mg Intramuscular Q2 Min PRRoxi Bingham MD        Or    [Auto  Hold] naloxone (NARCAN) injection 0.4 mg  0.4 mg Intramuscular Q2 Min PRN Roxi Alston MD        [Auto Hold] pantoprazole (PROTONIX) IV push injection 40 mg  40 mg Intravenous QAM AC Roxi Alston MD   40 mg at 05/01/25 0758    parenteral nutrition - ADULT compounded formula   CENTRAL LINE IV TPN CONTINUOUS Roxi Alston MD        [Auto Hold] thiamine (B-1) injection 100 mg  100 mg Intravenous Daily Roxi Alston MD   100 mg at 05/01/25 0746     PHYSICAL EXAMINATION:  Temp:  [99.9  F (37.7  C)-102.2  F (39  C)] 101.7  F (38.7  C)  Pulse:  [122-147] 134  Resp:  [18-32] 26  BP: ()/(51-74) 82/57  FiO2 (%):  [30 %] 30 %  SpO2:  [97 %-100 %] 99 %    Gen: Resting comfortably in bed   Eyes: Nonicteric  ENT: Mucous Membranes Moist  Pulm: Nonlabored Breathing mechanical ventilation via trach  CV: S1, S2, no murmurs  Abd: abthera in place, SHANNON drains x 3 with dark thin sanguinous output, 1 colostomy drain, g-tube in place and clamped, remainder of abdomen is compressible   : Garcia present  Extremities: warm and well perfused, palpable bilateral DP pulses  Neuro: Pt not able to cooperate due to sedation            LABS: Reviewed.   Arterial Blood Gases   No lab results found in last 7 days.  Complete Blood Count     Recent Labs   Lab 05/01/25  0540 05/01/25  0020 04/30/25  2201   WBC 14.3* 14.3* 15.4*   HGB 7.2* 7.4* 7.7*   * 112* 117*     Basic Metabolic Panel  Recent Labs   Lab 05/01/25  0745 05/01/25  0344 05/01/25  0126 05/01/25  0117 04/30/25  2349 04/30/25  2201   NA  --  136  --  135  --  134*   POTASSIUM  --  4.3  --  4.3  --  4.6   CHLORIDE  --  104  --  104  --  104   CO2  --  18*  --  18*  --  19*   BUN  --  47.3*  --  43.7*  --  39.2*   CR  --  2.03*  --  1.82*  --  1.59*   * 98  94 99 99   < > 124*    < > = values in this interval not displayed.     Liver Function Tests  Recent Labs   Lab 05/01/25  0344 04/30/25  2217 04/30/25  2201   *  --  126*   *  --  111*   ALKPHOS 349*  --   280*   BILITOTAL 1.0  --  0.9   ALBUMIN 2.5*  --  2.6*   INR  --  1.09  --      Pancreatic Enzymes  Recent Labs   Lab 04/30/25  2201   LIPASE 107*     Coagulation Profile  Recent Labs   Lab 04/30/25  2217   INR 1.09   PTT 24       IMAGING:  Recent Results (from the past 24 hours)   XR Chest Port 1 View    Narrative    EXAM: XR CHEST PORT 1 VIEW 4/30/2025 9:31 PM    DEMOGRAPHICS: 29 years Male    INDICATION: Endotracheal tube positioning    COMPARISON: None    TECHNIQUE: Single portable AP view of the chest.    FINDINGS:   Tracheostomy tube in the midthoracic trachea. Right IJ central venous  catheter with tip in the superior cavoatrial junction. Left PICC with  tip at the lower SVC.    The cardiomediastinal silhouette is within normal limits. Mild  perihilar and left basilar streaky opacities. Trace left pleural  effusion. No definite pneumothorax. Metallic opacity overlying the  left upper quadrant, external to the patient or post  surgical/intervention, consider correlation      Impression    IMPRESSION:     1.  Tracheostomy tube in the midthoracic trachea about 5.7 cm above  the nichole. Right IJ central venous catheter with tip near the  superior cavoatrial junction. Left PICC with tip in the low SVC.  2.  Mild perihilar and left basilar pulmonary opacities, possibly  atelectasis/pulmonary edema  3.  Trace left pleural effusion.  4.  Metallic opacity overlying the left upper quadrant, external to  the patient or post surgical/intervention, consider correlation    I have personally reviewed the examination and initial interpretation  and I agree with the findings.    KIAH TERRY MD         SYSTEM ID:  [id number]

## 2025-05-02 NOTE — PROGRESS NOTES
Surgery Progress Note  05/02/2025       Subjective:  - Patient on ventilator, sedated, precedex added overnight for discomfort. Not following commands. Overnight had persistent tachycardia to 130s and fevers up to 102.2. On 0.08 levophed since procedure yesterday, Bps around 100s/50s.     Objective:  Temp:  [99.9  F (37.7  C)-102.4  F (39.1  C)] 102.2  F (39  C)  Pulse:  [112-155] 138  Resp:  [21-40] 32  BP: ()/(52-74) 108/74  MAP:  [55 mmHg-93 mmHg] 61 mmHg  Arterial Line BP: ()/(46-78) 90/47  FiO2 (%):  [30 %-45 %] 45 %  SpO2:  [88 %-100 %] 97 %    I/O last 3 completed shifts:  In: 4498.12 [I.V.:1779.69; IV Piggyback:1000]  Out: 3328 [Urine:482; Drains:2746; Blood:100]      Gen: Sedated, not alert   Resp: Ventilated, trach  Abd: Rigid, tender. Abthera in place draining copious sanguinous output, SHANNON drains with increased output this am.   Ext: WWP, no edema     Labs:  Recent Labs   Lab 05/02/25  0703 05/02/25  0413 05/01/25  1322 05/01/25  0540   WBC  --  26.7* 16.2* 14.3*   HGB 9.3* 9.8* 11.7* 7.2*   PLT  --  169 123* 117*       Recent Labs   Lab 05/02/25  0654 05/02/25  0608 05/02/25  0528 05/02/25  0418 05/02/25  0413 05/01/25  1541 05/01/25  1322 05/01/25  0745 05/01/25  0344   NA  --   --   --   --  136  --  138  --  136   POTASSIUM  --   --   --   --  5.1  --  5.2  --  4.3   CHLORIDE  --   --   --   --  105  --  107  --  104   CO2  --   --   --   --  16*  --  17*  --  18*   BUN  --   --   --   --  83.6*  --  53.4*  --  47.3*   CR  --   --   --   --  2.08*  --  2.23*  --  2.03*   * 184* 210*   < > 257*   < > 127*   < > 98  94   KARINA  --   --   --   --  7.3*  --  8.4*  --  7.3*   MAG  --   --   --   --  2.0  --  2.4*  --  2.3   PHOS  --   --   --   --  8.2*  --  7.0*  --  3.6    < > = values in this interval not displayed.       [facility]    Impression    1.  Worsening sequela of necrotizing pancreatitis. Dominant peripancreatic collection has increased in size, now measuring  19 x 13 cm, previously 17 x 11 cm; increasing gas within this collection is worrisome for infection. Large volume ascites and fat necrosis elsewhere throughout the abdomen and pelvis has also increased from prior.  2.  New presumed blood products in the dominant peripancreatic collection, as well as layering within the left paracolic gutter, suspicious for interval bleeding. Recommend trending hemoglobin levels; CTA abdomen and pelvis could be considered if there is concern for active bleeding.  3.  Mild small bowel wall thickening, possibly reactive or sequela of enteritis.  4.  Similar right lower lobe consolidation.     Assessment/Plan:   Sebastián Rodas is a 29-year-old male with a history of alcohol use disorder, anxiety, and bipolar disorder who was admitted on 3/30/2025 for alcohol withdrawal following a recent binge, presenting with diffuse abdominal pain. Initial workup revealed leukocytosis and elevated lipase concerning for acute pancreatitis. He developed acute encephalopathy and was transferred to the ICU on 3/31, requiring intubation and vasopressors by 4/1 due to shock. Hospital course has been complicated by acute renal failure requiring CRRT, cardiomyopathy (initial LVEF 20-25%, improved to 65-70% by 4/14), and necrotizing pancreatitis with unorganized fluid collections. He completed a 14-day course of meropenem but remains intermittently febrile and critically ill, requiring ongoing dialysis and vasopressor support. On 4/27, after clinical deterioration and imaging consistent with a likely perforated viscus, following family discussion, he underwent emergent exploratory laparotomy, necrosectomy, transverse colectomy, abdominal washout, and drain placement. Patient was transferred to [facility] on 4/30/25 for further surgical management.     Overnight patient was persistently tachycardic with fevers. Hgb 9.3, WBC uptrending 26.7. Oligouric. Increased Bloody output from drains this am concerning for  bleeding from the pancreatic bed.     Plan:  - Will discuss with SICU team for potentially stopping hep gtt.  - Goals of care discussion scheduled with family at noon.  - Other cares per SICU, we appreciate excellent cares.   - Obtained consent for abdominal washout, poss. Closure, tentative plan to take back on 5/3 or 5/4.   - Surgery will continue to follow.     Seen, examined, and discussed with chief resident, who will discuss with staff.   - - - - - - - - - - - - - - - - - -  Casimiro Chirinos MD  Urology PGY-1  General surgery service

## 2025-05-02 NOTE — PROGRESS NOTES
Brief SICU Note    Care conference held today with members of the SICU, nephrology, and palliative care teams, Dr. Haas from [facility], and the patient's family members.  We reviewed the operative findings from yesterday and provided a clinical update on his status which remains critical.  We reviewed that Sebastián is in multisystem organ failure as a result of his pancreatitis, surgical options are very limited, and that he is at risk of catastrophic bleeding at any time.  We reviewed code status, he will remain full code at this time however family are discussing DNR.    Roxi Alston MD  SICU Fellow

## 2025-05-02 NOTE — PHARMACY-ADMISSION MEDICATION HISTORY
Pharmacist Admission Medication History    Admission medication history is complete. The information provided in this note is only as accurate as the sources available at the time of the update.    Information Source(s): Clinic records, Hospital records, and CareEverywhere/SureScripts via  No patient interview    Pertinent Information:   - Patient likely not taking any prescription medication prior to admission.  - No recent RX fill history  - No mention in recent notes of medication prescribing  - Patient has been in hospital since 3/30/25    Changes made to PTA medication list:  Added: None  Deleted: all  Changed: None    Allergies reviewed with patient and updates made in EHR: unable to assess    Medication History Completed By: Dianne Brady [facility] 5/2/2025 7:52 AM    No outpatient medications have been marked as taking for the 4/30/25 encounter (Hospital Encounter).

## 2025-05-03 ENCOUNTER — ANESTHESIA EVENT (OUTPATIENT)
Dept: SURGERY | Facility: CLINIC | Age: 30
End: 2025-05-03
Payer: COMMERCIAL

## 2025-05-03 LAB
ALBUMIN SERPL BCG-MCNC: 1.6 G/DL (ref 3.5–5.2)
ALBUMIN SERPL BCG-MCNC: 1.7 G/DL (ref 3.5–5.2)
ALBUMIN SERPL BCG-MCNC: 1.8 G/DL (ref 3.5–5.2)
ALBUMIN SERPL BCG-MCNC: 1.8 G/DL (ref 3.5–5.2)
ALLEN'S TEST: ABNORMAL
ALP SERPL-CCNC: 160 U/L (ref 40–150)
ALT SERPL W P-5'-P-CCNC: 22 U/L (ref 0–70)
ANION GAP SERPL CALCULATED.3IONS-SCNC: 12 MMOL/L (ref 7–15)
ANION GAP SERPL CALCULATED.3IONS-SCNC: 12 MMOL/L (ref 7–15)
ANION GAP SERPL CALCULATED.3IONS-SCNC: 13 MMOL/L (ref 7–15)
ANION GAP SERPL CALCULATED.3IONS-SCNC: 13 MMOL/L (ref 7–15)
APTT PPP: 31 SECONDS (ref 22–38)
AST SERPL W P-5'-P-CCNC: 24 U/L (ref 0–45)
BASE EXCESS BLDA CALC-SCNC: -5.6 MMOL/L (ref -3–3)
BILIRUB DIRECT SERPL-MCNC: 0.6 MG/DL (ref 0–0.3)
BILIRUB SERPL-MCNC: 0.8 MG/DL
BLD PROD TYP BPU: NORMAL
BLOOD COMPONENT TYPE: NORMAL
BUN SERPL-MCNC: 67.4 MG/DL (ref 6–20)
BUN SERPL-MCNC: 74.4 MG/DL (ref 6–20)
BUN SERPL-MCNC: 86.7 MG/DL (ref 6–20)
BUN SERPL-MCNC: 86.7 MG/DL (ref 6–20)
CA-I BLD-MCNC: 4.3 MG/DL (ref 4.4–5.2)
CA-I BLD-MCNC: 4.5 MG/DL (ref 4.4–5.2)
CA-I BLD-MCNC: 4.6 MG/DL (ref 4.4–5.2)
CALCIUM SERPL-MCNC: 7.3 MG/DL (ref 8.8–10.4)
CALCIUM SERPL-MCNC: 7.6 MG/DL (ref 8.8–10.4)
CALCIUM SERPL-MCNC: 8.1 MG/DL (ref 8.8–10.4)
CALCIUM SERPL-MCNC: 8.1 MG/DL (ref 8.8–10.4)
CHLORIDE SERPL-SCNC: 105 MMOL/L (ref 98–107)
CHLORIDE SERPL-SCNC: 105 MMOL/L (ref 98–107)
CHLORIDE SERPL-SCNC: 106 MMOL/L (ref 98–107)
CHLORIDE SERPL-SCNC: 106 MMOL/L (ref 98–107)
CODING SYSTEM: NORMAL
CREAT SERPL-MCNC: 1.22 MG/DL (ref 0.67–1.17)
CREAT SERPL-MCNC: 1.36 MG/DL (ref 0.67–1.17)
CREAT SERPL-MCNC: 1.58 MG/DL (ref 0.67–1.17)
CREAT SERPL-MCNC: 1.58 MG/DL (ref 0.67–1.17)
CROSSMATCH: NORMAL
EGFRCR SERPLBLD CKD-EPI 2021: 60 ML/MIN/1.73M2
EGFRCR SERPLBLD CKD-EPI 2021: 60 ML/MIN/1.73M2
EGFRCR SERPLBLD CKD-EPI 2021: 71 ML/MIN/1.73M2
EGFRCR SERPLBLD CKD-EPI 2021: 81 ML/MIN/1.73M2
ERYTHROCYTE [DISTWIDTH] IN BLOOD BY AUTOMATED COUNT: 18.3 % (ref 10–15)
GLUCOSE BLDC GLUCOMTR-MCNC: 109 MG/DL (ref 70–99)
GLUCOSE BLDC GLUCOMTR-MCNC: 112 MG/DL (ref 70–99)
GLUCOSE BLDC GLUCOMTR-MCNC: 117 MG/DL (ref 70–99)
GLUCOSE BLDC GLUCOMTR-MCNC: 118 MG/DL (ref 70–99)
GLUCOSE BLDC GLUCOMTR-MCNC: 120 MG/DL (ref 70–99)
GLUCOSE BLDC GLUCOMTR-MCNC: 123 MG/DL (ref 70–99)
GLUCOSE BLDC GLUCOMTR-MCNC: 123 MG/DL (ref 70–99)
GLUCOSE BLDC GLUCOMTR-MCNC: 124 MG/DL (ref 70–99)
GLUCOSE BLDC GLUCOMTR-MCNC: 125 MG/DL (ref 70–99)
GLUCOSE BLDC GLUCOMTR-MCNC: 126 MG/DL (ref 70–99)
GLUCOSE BLDC GLUCOMTR-MCNC: 126 MG/DL (ref 70–99)
GLUCOSE BLDC GLUCOMTR-MCNC: 98 MG/DL (ref 70–99)
GLUCOSE BLDC GLUCOMTR-MCNC: 98 MG/DL (ref 70–99)
GLUCOSE SERPL-MCNC: 107 MG/DL (ref 70–99)
GLUCOSE SERPL-MCNC: 121 MG/DL (ref 70–99)
GLUCOSE SERPL-MCNC: 128 MG/DL (ref 70–99)
GLUCOSE SERPL-MCNC: 128 MG/DL (ref 70–99)
HCO3 BLD-SCNC: 20 MMOL/L (ref 21–28)
HCO3 SERPL-SCNC: 18 MMOL/L (ref 22–29)
HCO3 SERPL-SCNC: 18 MMOL/L (ref 22–29)
HCO3 SERPL-SCNC: 19 MMOL/L (ref 22–29)
HCO3 SERPL-SCNC: 19 MMOL/L (ref 22–29)
HCT VFR BLD AUTO: 27 % (ref 40–53)
HGB BLD-MCNC: 8.5 G/DL (ref 13.3–17.7)
HGB BLD-MCNC: 8.5 G/DL (ref 13.3–17.7)
HGB BLD-MCNC: 8.6 G/DL (ref 13.3–17.7)
HGB BLD-MCNC: 8.7 G/DL (ref 13.3–17.7)
HGB BLD-MCNC: 9.4 G/DL (ref 13.3–17.7)
INR PPP: 1.24 (ref 0.85–1.15)
ISSUE DATE AND TIME: NORMAL
MAGNESIUM SERPL-MCNC: 1.8 MG/DL (ref 1.7–2.3)
MAGNESIUM SERPL-MCNC: 2.1 MG/DL (ref 1.7–2.3)
MAGNESIUM SERPL-MCNC: 2.9 MG/DL (ref 1.7–2.3)
MCH RBC QN AUTO: 29.4 PG (ref 26.5–33)
MCHC RBC AUTO-ENTMCNC: 31.5 G/DL (ref 31.5–36.5)
MCV RBC AUTO: 93 FL (ref 78–100)
O2/TOTAL GAS SETTING VFR VENT: 45 %
OXYHGB MFR BLDA: 97 % (ref 92–100)
PCO2 BLD: 39 MM HG (ref 35–45)
PEEP: 5 CM H2O
PH BLD: 7.32 [PH] (ref 7.35–7.45)
PHOSPHATE SERPL-MCNC: 4.4 MG/DL (ref 2.5–4.5)
PHOSPHATE SERPL-MCNC: 4.9 MG/DL (ref 2.5–4.5)
PHOSPHATE SERPL-MCNC: 5.9 MG/DL (ref 2.5–4.5)
PLATELET # BLD AUTO: 180 10E3/UL (ref 150–450)
PO2 BLD: 113 MM HG (ref 80–105)
POTASSIUM SERPL-SCNC: 3.8 MMOL/L (ref 3.4–5.3)
POTASSIUM SERPL-SCNC: 4.2 MMOL/L (ref 3.4–5.3)
POTASSIUM SERPL-SCNC: 4.2 MMOL/L (ref 3.4–5.3)
POTASSIUM SERPL-SCNC: 4.3 MMOL/L (ref 3.4–5.3)
PROT SERPL-MCNC: 4.7 G/DL (ref 6.4–8.3)
PROTHROMBIN TIME: 15.6 SECONDS (ref 11.8–14.8)
RBC # BLD AUTO: 2.89 10E6/UL (ref 4.4–5.9)
SAO2 % BLDA: 99 % (ref 96–97)
SODIUM SERPL-SCNC: 136 MMOL/L (ref 135–145)
SODIUM SERPL-SCNC: 136 MMOL/L (ref 135–145)
SODIUM SERPL-SCNC: 137 MMOL/L (ref 135–145)
SODIUM SERPL-SCNC: 137 MMOL/L (ref 135–145)
UNIT ABO/RH: NORMAL
UNIT NUMBER: NORMAL
UNIT STATUS: NORMAL
UNIT TYPE ISBT: 5100
WBC # BLD AUTO: 18.1 10E3/UL (ref 4–11)

## 2025-05-03 PROCEDURE — 99291 CRITICAL CARE FIRST HOUR: CPT | Mod: GC | Performed by: SURGERY

## 2025-05-03 PROCEDURE — 258N000003 HC RX IP 258 OP 636

## 2025-05-03 PROCEDURE — B4185 PARENTERAL SOL 10 GM LIPIDS: HCPCS | Performed by: STUDENT IN AN ORGANIZED HEALTH CARE EDUCATION/TRAINING PROGRAM

## 2025-05-03 PROCEDURE — 250N000009 HC RX 250: Performed by: CLINICAL NURSE SPECIALIST

## 2025-05-03 PROCEDURE — 250N000011 HC RX IP 250 OP 636: Performed by: STUDENT IN AN ORGANIZED HEALTH CARE EDUCATION/TRAINING PROGRAM

## 2025-05-03 PROCEDURE — 250N000009 HC RX 250: Performed by: SURGERY

## 2025-05-03 PROCEDURE — 99233 SBSQ HOSP IP/OBS HIGH 50: CPT | Mod: 24 | Performed by: SURGERY

## 2025-05-03 PROCEDURE — 200N000002 HC R&B ICU UMMC

## 2025-05-03 PROCEDURE — 90947 DIALYSIS REPEATED EVAL: CPT

## 2025-05-03 PROCEDURE — 83735 ASSAY OF MAGNESIUM: CPT | Performed by: CLINICAL NURSE SPECIALIST

## 2025-05-03 PROCEDURE — 85018 HEMOGLOBIN: CPT

## 2025-05-03 PROCEDURE — 84075 ASSAY ALKALINE PHOSPHATASE: CPT | Performed by: STUDENT IN AN ORGANIZED HEALTH CARE EDUCATION/TRAINING PROGRAM

## 2025-05-03 PROCEDURE — P9016 RBC LEUKOCYTES REDUCED: HCPCS

## 2025-05-03 PROCEDURE — 85027 COMPLETE CBC AUTOMATED: CPT | Performed by: STUDENT IN AN ORGANIZED HEALTH CARE EDUCATION/TRAINING PROGRAM

## 2025-05-03 PROCEDURE — 86923 COMPATIBILITY TEST ELECTRIC: CPT

## 2025-05-03 PROCEDURE — 250N000011 HC RX IP 250 OP 636: Performed by: SURGERY

## 2025-05-03 PROCEDURE — 82040 ASSAY OF SERUM ALBUMIN: CPT | Performed by: STUDENT IN AN ORGANIZED HEALTH CARE EDUCATION/TRAINING PROGRAM

## 2025-05-03 PROCEDURE — 82330 ASSAY OF CALCIUM: CPT | Performed by: CLINICAL NURSE SPECIALIST

## 2025-05-03 PROCEDURE — 250N000009 HC RX 250

## 2025-05-03 PROCEDURE — 82805 BLOOD GASES W/O2 SATURATION: CPT

## 2025-05-03 PROCEDURE — 999N000157 HC STATISTIC RCP TIME EA 10 MIN

## 2025-05-03 PROCEDURE — 85730 THROMBOPLASTIN TIME PARTIAL: CPT

## 2025-05-03 PROCEDURE — 250N000011 HC RX IP 250 OP 636: Mod: JZ

## 2025-05-03 PROCEDURE — 82310 ASSAY OF CALCIUM: CPT | Performed by: CLINICAL NURSE SPECIALIST

## 2025-05-03 PROCEDURE — 99231 SBSQ HOSP IP/OBS SF/LOW 25: CPT | Performed by: INTERNAL MEDICINE

## 2025-05-03 PROCEDURE — 85610 PROTHROMBIN TIME: CPT

## 2025-05-03 PROCEDURE — 258N000003 HC RX IP 258 OP 636: Performed by: STUDENT IN AN ORGANIZED HEALTH CARE EDUCATION/TRAINING PROGRAM

## 2025-05-03 PROCEDURE — 250N000009 HC RX 250: Performed by: STUDENT IN AN ORGANIZED HEALTH CARE EDUCATION/TRAINING PROGRAM

## 2025-05-03 PROCEDURE — 82248 BILIRUBIN DIRECT: CPT | Performed by: CLINICAL NURSE SPECIALIST

## 2025-05-03 PROCEDURE — 84100 ASSAY OF PHOSPHORUS: CPT | Performed by: CLINICAL NURSE SPECIALIST

## 2025-05-03 PROCEDURE — 86901 BLOOD TYPING SEROLOGIC RH(D): CPT | Performed by: SURGERY

## 2025-05-03 PROCEDURE — 250N000011 HC RX IP 250 OP 636

## 2025-05-03 PROCEDURE — 250N000011 HC RX IP 250 OP 636: Mod: JZ | Performed by: CLINICAL NURSE SPECIALIST

## 2025-05-03 PROCEDURE — 250N000009 HC RX 250: Performed by: INTERNAL MEDICINE

## 2025-05-03 PROCEDURE — 94003 VENT MGMT INPAT SUBQ DAY: CPT

## 2025-05-03 PROCEDURE — 250N000011 HC RX IP 250 OP 636: Mod: JZ | Performed by: STUDENT IN AN ORGANIZED HEALTH CARE EDUCATION/TRAINING PROGRAM

## 2025-05-03 RX ORDER — FUROSEMIDE 10 MG/ML
20 INJECTION INTRAMUSCULAR; INTRAVENOUS ONCE
Status: DISCONTINUED | OUTPATIENT
Start: 2025-05-03 | End: 2025-05-03

## 2025-05-03 RX ORDER — CALCIUM CHLORIDE, MAGNESIUM CHLORIDE, SODIUM CHLORIDE, SODIUM BICARBONATE, POTASSIUM CHLORIDE AND SODIUM PHOSPHATE DIBASIC DIHYDRATE 3.68; 3.05; 6.34; 3.09; .314; .187 G/L; G/L; G/L; G/L; G/L; G/L
15 INJECTION INTRAVENOUS CONTINUOUS
Status: DISCONTINUED | OUTPATIENT
Start: 2025-05-03 | End: 2025-05-05

## 2025-05-03 RX ORDER — HEPARIN SODIUM 5000 [USP'U]/.5ML
5000 INJECTION, SOLUTION INTRAVENOUS; SUBCUTANEOUS EVERY 8 HOURS SCHEDULED
Status: DISCONTINUED | OUTPATIENT
Start: 2025-05-03 | End: 2025-06-03 | Stop reason: HOSPADM

## 2025-05-03 RX ADMIN — THIAMINE HYDROCHLORIDE 100 MG: 100 INJECTION, SOLUTION INTRAMUSCULAR; INTRAVENOUS at 08:23

## 2025-05-03 RX ADMIN — PIPERACILLIN AND TAZOBACTAM 4.5 G: 4; .5 INJECTION, POWDER, LYOPHILIZED, FOR SOLUTION INTRAVENOUS at 21:57

## 2025-05-03 RX ADMIN — PIPERACILLIN AND TAZOBACTAM 4.5 G: 4; .5 INJECTION, POWDER, LYOPHILIZED, FOR SOLUTION INTRAVENOUS at 03:22

## 2025-05-03 RX ADMIN — CALCIUM CHLORIDE, MAGNESIUM CHLORIDE, DEXTROSE MONOHYDRATE, LACTIC ACID, SODIUM CHLORIDE, SODIUM BICARBONATE AND POTASSIUM CHLORIDE 12.5 ML/KG/HR: 5.15; 2.03; 22; 5.4; 6.46; 3.09; .157 INJECTION INTRAVENOUS at 07:03

## 2025-05-03 RX ADMIN — PIPERACILLIN AND TAZOBACTAM 4.5 G: 4; .5 INJECTION, POWDER, LYOPHILIZED, FOR SOLUTION INTRAVENOUS at 16:18

## 2025-05-03 RX ADMIN — DEXMEDETOMIDINE HYDROCHLORIDE 0.2 MCG/KG/HR: 4 INJECTION, SOLUTION INTRAVENOUS at 01:31

## 2025-05-03 RX ADMIN — VASOPRESSIN 1.5 UNITS/HR: 20 INJECTION, SOLUTION INTRAMUSCULAR; SUBCUTANEOUS at 21:33

## 2025-05-03 RX ADMIN — CALCIUM CHLORIDE, MAGNESIUM CHLORIDE, DEXTROSE MONOHYDRATE, LACTIC ACID, SODIUM CHLORIDE, SODIUM BICARBONATE AND POTASSIUM CHLORIDE 12.5 ML/KG/HR: 5.15; 2.03; 22; 5.4; 6.46; 3.09; .157 INJECTION INTRAVENOUS at 01:36

## 2025-05-03 RX ADMIN — SMOFLIPID 250 ML: 6; 6; 5; 3 INJECTION, EMULSION INTRAVENOUS at 20:25

## 2025-05-03 RX ADMIN — Medication 50 MCG: at 12:43

## 2025-05-03 RX ADMIN — Medication 150 MCG/HR: at 12:17

## 2025-05-03 RX ADMIN — CALCIUM CHLORIDE, MAGNESIUM CHLORIDE, SODIUM CHLORIDE, SODIUM BICARBONATE, POTASSIUM CHLORIDE AND SODIUM PHOSPHATE DIBASIC DIHYDRATE 12.5 ML/KG/HR: 3.68; 3.05; 6.34; 3.09; .314; .187 INJECTION INTRAVENOUS at 01:34

## 2025-05-03 RX ADMIN — CALCIUM CHLORIDE, MAGNESIUM CHLORIDE, DEXTROSE MONOHYDRATE, LACTIC ACID, SODIUM CHLORIDE, SODIUM BICARBONATE AND POTASSIUM CHLORIDE 12.5 ML/KG/HR: 5.15; 2.03; 22; 5.4; 6.46; 3.09; .157 INJECTION INTRAVENOUS at 19:07

## 2025-05-03 RX ADMIN — CALCIUM CHLORIDE, MAGNESIUM CHLORIDE, DEXTROSE MONOHYDRATE, LACTIC ACID, SODIUM CHLORIDE, SODIUM BICARBONATE AND POTASSIUM CHLORIDE 12.5 ML/KG/HR: 5.15; 2.03; 22; 5.4; 6.46; 3.09; .157 INJECTION INTRAVENOUS at 12:47

## 2025-05-03 RX ADMIN — Medication 50 MCG: at 14:18

## 2025-05-03 RX ADMIN — MAGNESIUM SULFATE HEPTAHYDRATE 2 G: 2 INJECTION, SOLUTION INTRAVENOUS at 00:31

## 2025-05-03 RX ADMIN — MAGNESIUM SULFATE HEPTAHYDRATE: 500 INJECTION, SOLUTION INTRAMUSCULAR; INTRAVENOUS at 19:50

## 2025-05-03 RX ADMIN — CALCIUM CHLORIDE, MAGNESIUM CHLORIDE, SODIUM CHLORIDE, SODIUM BICARBONATE, POTASSIUM CHLORIDE AND SODIUM PHOSPHATE DIBASIC DIHYDRATE 15 ML/KG/HR: 3.68; 3.05; 6.34; 3.09; .314; .187 INJECTION INTRAVENOUS at 17:32

## 2025-05-03 RX ADMIN — MICAFUNGIN 100 MG: 20 INJECTION, POWDER, LYOPHILIZED, FOR SOLUTION INTRAVENOUS at 16:59

## 2025-05-03 RX ADMIN — NOREPINEPHRINE BITARTRATE 0.1 MCG/KG/MIN: 0.06 INJECTION, SOLUTION INTRAVENOUS at 23:19

## 2025-05-03 RX ADMIN — VASOPRESSIN 1 UNITS/HR: 20 INJECTION, SOLUTION INTRAMUSCULAR; SUBCUTANEOUS at 08:37

## 2025-05-03 RX ADMIN — PIPERACILLIN AND TAZOBACTAM 4.5 G: 4; .5 INJECTION, POWDER, LYOPHILIZED, FOR SOLUTION INTRAVENOUS at 09:33

## 2025-05-03 RX ADMIN — Medication 50 MCG: at 22:44

## 2025-05-03 RX ADMIN — HEPARIN SODIUM 5000 UNITS: 5000 INJECTION, SOLUTION INTRAVENOUS; SUBCUTANEOUS at 18:11

## 2025-05-03 RX ADMIN — CALCIUM CHLORIDE, MAGNESIUM CHLORIDE, SODIUM CHLORIDE, SODIUM BICARBONATE, POTASSIUM CHLORIDE AND SODIUM PHOSPHATE DIBASIC DIHYDRATE 12.5 ML/KG/HR: 3.68; 3.05; 6.34; 3.09; .314; .187 INJECTION INTRAVENOUS at 07:05

## 2025-05-03 RX ADMIN — Medication 50 MCG: at 01:24

## 2025-05-03 RX ADMIN — ALTEPLASE 2 MG: 2.2 INJECTION, POWDER, LYOPHILIZED, FOR SOLUTION INTRAVENOUS at 22:32

## 2025-05-03 RX ADMIN — CALCIUM GLUCONATE 2 G: 20 INJECTION, SOLUTION INTRAVENOUS at 21:23

## 2025-05-03 RX ADMIN — SODIUM CHLORIDE, SODIUM LACTATE, POTASSIUM CHLORIDE, AND CALCIUM CHLORIDE 500 ML: .6; .31; .03; .02 INJECTION, SOLUTION INTRAVENOUS at 09:09

## 2025-05-03 RX ADMIN — CALCIUM CHLORIDE, MAGNESIUM CHLORIDE, SODIUM CHLORIDE, SODIUM BICARBONATE, POTASSIUM CHLORIDE AND SODIUM PHOSPHATE DIBASIC DIHYDRATE 15 ML/KG/HR: 3.68; 3.05; 6.34; 3.09; .314; .187 INJECTION INTRAVENOUS at 12:47

## 2025-05-03 RX ADMIN — HEPARIN SODIUM 5000 UNITS: 5000 INJECTION, SOLUTION INTRAVENOUS; SUBCUTANEOUS at 09:14

## 2025-05-03 RX ADMIN — PANTOPRAZOLE SODIUM 40 MG: 40 INJECTION, POWDER, FOR SOLUTION INTRAVENOUS at 08:23

## 2025-05-03 RX ADMIN — Medication 50 MCG: at 23:35

## 2025-05-03 RX ADMIN — MAGNESIUM SULFATE HEPTAHYDRATE 2 G: 2 INJECTION, SOLUTION INTRAVENOUS at 22:35

## 2025-05-03 ASSESSMENT — ACTIVITIES OF DAILY LIVING (ADL)
ADLS_ACUITY_SCORE: 56
ADLS_ACUITY_SCORE: 55
ADLS_ACUITY_SCORE: 52
ADLS_ACUITY_SCORE: 51
ADLS_ACUITY_SCORE: 51
ADLS_ACUITY_SCORE: 56
ADLS_ACUITY_SCORE: 51
ADLS_ACUITY_SCORE: 55
ADLS_ACUITY_SCORE: 51
ADLS_ACUITY_SCORE: 52
ADLS_ACUITY_SCORE: 56
ADLS_ACUITY_SCORE: 52
ADLS_ACUITY_SCORE: 56
ADLS_ACUITY_SCORE: 56
ADLS_ACUITY_SCORE: 52
ADLS_ACUITY_SCORE: 51
ADLS_ACUITY_SCORE: 55
ADLS_ACUITY_SCORE: 51
ADLS_ACUITY_SCORE: 51

## 2025-05-03 NOTE — PLAN OF CARE
ICU End of Shift Summary. See flowsheets for vital signs and detailed assessment.    Changes this shift: Pt alert and calm on decreased dose of Precedex; able to follow simple commands and make needs known. Pt was asking to watch tv and asking for help with volume control, but is also still testing CAM positive. PRN fentanyl for c/o pain, worse with cares/movement. Vasopressin added, labile levo needs. Tmax 100.4, Thermax turned off briefly. Now normothermic with thermax set at 36C. CRRT sent went down suddenly this morning and unable to return blood; 1 unit RBCs given. Hgb 9.4, 8.7 today, trending q6h. SHANNON drain output stable in amount and color. No output from malecot drain, team updated. Aiming for more net even today; meeting. 500 LR given this am d/t tachycardia, bp and HR improved after. PS 5/5 x 1 hour. Family updated at bedside today by nursing and SICU team.    Plan:  Possible ex lap tomorrow. Continue to closely monitor drain output. Update team with changes.      Goal Outcome Evaluation:           Overall Patient Progress: no changeOverall Patient Progress: no change    Outcome Evaluation: stable drain output, stable hgb

## 2025-05-03 NOTE — PROGRESS NOTES
ICU End of Shift Summary. See flowsheets for vital signs and detailed assessment.    Changes this shift: Precedex and fentanyl drips continued, arouses but does not follow commands. PRN fentanyl boluses for grimacing. Tachy to 130 this AM, resolved to NSR 80-90 after 1L LR bolus. Levo at 0.15 to maintain MAP greater than 65. Vent settings unchanged, scant secretions. See flowsheets for SHANNON drain output, all three continue to have bloody drainage. 1 unit RBCs given. Last hgb: 9. Yellow fluid with some brown output from colostomy tube. Garcia in place- see flowsheets for output. Care conference held today.       Plan:  Initiate CRRT at 2000.

## 2025-05-03 NOTE — PROGRESS NOTES
CRRT STATUS NOTE    DATA:  Time:  0630 5/3  Pressures WNL:  YES  Filter Status:  WDL    Problems Reported/Alarms Noted:  None reported by bedside RN    Supplies Present:  Yes    ASSESSMENT:  Patient Net Fluid Balance:    Intake/Output Summary (Last 24 hours) at 5/3/2025 0631  Last data filed at 5/3/2025 0600  Gross per 24 hour   Intake 4182.68 ml   Output 2726.6 ml   Net 1456.08 ml      Ended 5/2 +1.1L, currently -104ml since 0000    Vital Signs:  Temp: 99.7  F (37.6  C) Temp src: Bladder   Pulse: 93   Resp: 19 SpO2: 100 % O2 Device: Mechanical Ventilator        Labs:    Lab Results   Component Value Date    WBC 18.1 (H) 05/03/2025    HGB 8.5 (L) 05/03/2025    HGB 8.5 (L) 05/03/2025    HCT 27.0 (L) 05/03/2025    MCV 93 05/03/2025     05/03/2025     Lab Results   Component Value Date     05/03/2025     05/03/2025    POTASSIUM 4.2 05/03/2025    POTASSIUM 4.2 05/03/2025    CHLORIDE 105 05/03/2025    CHLORIDE 105 05/03/2025    CO2 18 (L) 05/03/2025    CO2 18 (L) 05/03/2025     (H) 05/03/2025     Lab Results   Component Value Date    BUN 86.7 (H) 05/03/2025    BUN 86.7 (H) 05/03/2025    CR 1.58 (H) 05/03/2025    CR 1.58 (H) 05/03/2025         Goals of Therapy:  0-50mL/hr    INTERVENTIONS: Continue fluid removal per goals of care as patient tolerates. Check filter daily. Change filter q72 hours and PRN. Please call CRRT Resource RN on [place] with any questions or concerns.

## 2025-05-03 NOTE — PROGRESS NOTES
"CRRT INITIATION NOTE    Consent for CRRT Completed:  NO - previously on CRRT at OSH  Patient s Vascular Access: Catheter     R tunneled subclavian line   Placement Confirmed: YES - verb \"okay to use\" order from SICU fellow      DATA:  Procedure:  CRRT intiation  Start Time:  2015 5/2/2025  Machine#:  [id number]  Filter:    Blood Flow:  200  ML/min  Pre-Replacement Solution:  Phoxillium BK4  Post-Replacement Solution:  Phoxillium BK4  Dialysate Solution:  Prismol 2K  Pre-Replacement Solution Rate:  900 mL/hr  Post-Replacement Solution Rate:  200 mL/hr  Dialysate Flow Rate:  900 mL/hr   Patient Removal Rate:  0 mL/hr  Anticoagulation Type and Rate:  N/A    ASSESSMENT:  How Patient Tolerated Initiation:   Vital Signs:  Stable  Initial Pressures:  Access:  -53  Filter:  126  Return:  47  TMP:  63  Change in Filter Pressure:  15      INTERVENTIONS:  None    PLAN:  Notify CRRT resource nurse of any alarms, remove fluid as able, draw labs 2hrs following initiation         "

## 2025-05-03 NOTE — ANESTHESIA PREPROCEDURE EVALUATION
Anesthesia Pre-Procedure Evaluation    Patient: Sebastián Rodas   MRN: [id number] : 1995        Procedure : Procedure(s):  Laparotomy exploratory-poss abdominal cloaure and other indicated procedures          No past medical history on file.   Past Surgical History:   Procedure Laterality Date    LAPAROTOMY, LYSIS ADHESIONS, COMBINED N/A 2025    Procedure: Reopen Laparotomy, Irrigation and Debriement, placement of colostomy tube, temporary abdominal closure, necrosectomy;  Surgeon: Brendon Haas DO;  Location:  OR      No Known Allergies   Social History     Tobacco Use    Smoking status: Never    Smokeless tobacco: Never   Substance Use Topics    Alcohol use: No     Alcohol/week: 0.0 standard drinks of alcohol      Wt Readings from Last 1 Encounters:   25 72.3 kg (159 lb 6.3 oz)        Anesthesia Evaluation   Pt has had prior anesthetic. Type: General.        ROS/MED HX  ENT/Pulmonary: Comment: Trach      Neurologic: Comment: Acute encephalopathy      Cardiovascular:     (+)  - -   -  - -                                 Previous cardiac testing   Echo: Date: 25 Results:  Limited ECHO at OSH:  1. Normal left ventricular chamber size. Normal left ventricular wall thickness. Normal left ventricular systolic function. Estimated left ventricular ejection fraction is 65-70%. No regional wall motion abnormalities.  2. Normal right ventricular size and systolic function based on limited visualization.     Stress Test:  Date: Results:    ECG Reviewed:  Date: 25 Results:  Sinus rhythm   Nonspecific T wave abnormality   Abnormal ECG     Cath:  Date: Results:      METS/Exercise Tolerance:     Hematologic:       Musculoskeletal:       GI/Hepatic: Comment: PEG tube  Necrotizing pancreatitis s/p meropenem      Renal/Genitourinary: Comment: Pt currently with R. Double lumen tunneled subclavian line requiring CRRT    (+) renal disease,  Pt requires dialysis,           Endo:      "  Psychiatric/Substance Use:     (+) psychiatric history anxiety and bipolar alcohol abuse      Infectious Disease:       Malignancy:       Other: Comment: 30 y/o with Bipolar disorder, ETOH use c/b necrotizing pancreatitis admitted 3/30/25 to [facility] for ETOH withdrawal and abdominal pain, found to have acute pancreatitis which has progressed to necrotizing pancreatitis complicated by SARAHI. Pt initially tolerated HD, now requiring CRRT.           Physical Exam    Airway   unable to assess          Respiratory Devices and Support         Dental    unable to assess        Cardiovascular          Rhythm and rate: regular and tachycardia     Pulmonary                   OUTSIDE LABS:  CBC:   Lab Results   Component Value Date    WBC 18.1 (H) 05/03/2025    WBC 26.7 (H) 05/02/2025    HGB 8.5 (L) 05/03/2025    HGB 8.5 (L) 05/03/2025    HCT 27.0 (L) 05/03/2025    HCT 30.5 (L) 05/02/2025     05/03/2025     05/02/2025     BMP:   Lab Results   Component Value Date     05/03/2025     05/03/2025     05/03/2025    POTASSIUM 4.3 05/03/2025    POTASSIUM 4.2 05/03/2025    POTASSIUM 4.2 05/03/2025    CHLORIDE 106 05/03/2025    CHLORIDE 105 05/03/2025    CHLORIDE 105 05/03/2025    CO2 19 (L) 05/03/2025    CO2 18 (L) 05/03/2025    CO2 18 (L) 05/03/2025    BUN 74.4 (H) 05/03/2025    BUN 86.7 (H) 05/03/2025    BUN 86.7 (H) 05/03/2025    CR 1.36 (H) 05/03/2025    CR 1.58 (H) 05/03/2025    CR 1.58 (H) 05/03/2025     (H) 05/03/2025    GLC 98 05/03/2025     COAGS:   Lab Results   Component Value Date    PTT 29 05/02/2025    INR 1.30 (H) 05/02/2025    FIBR 487 05/02/2025     POC: No results found for: \"BGM\", \"HCG\", \"HCGS\"  HEPATIC:   Lab Results   Component Value Date    ALBUMIN 1.7 (L) 05/03/2025    PROTTOTAL 4.7 (L) 05/03/2025    ALT 22 05/03/2025    AST 24 05/03/2025    ALKPHOS 160 (H) 05/03/2025    BILITOTAL 0.8 05/03/2025     OTHER:   Lab Results   Component Value Date    PH 7.32 (L) " 05/03/2025    LACT 2.5 (H) 05/01/2025    A1C 5.3 05/02/2025    KARINA 7.6 (L) 05/03/2025    PHOS 4.9 (H) 05/03/2025    MAG 2.1 05/03/2025    LIPASE 107 (H) 04/30/2025       Anesthesia Plan    ASA Status:  4    NPO Status:  NPO Appropriate    Anesthesia Type: General.     - Airway: Tracheostomy   Induction: Intravenous, Propofol.   Maintenance: Balanced.   Techniques and Equipment:     - Lines/Monitors: Arterial Line, BIS, 2nd IV, Central Line     - Blood: Blood in Room     - Drips/Meds: Norepi     Consents    Anesthesia Plan(s) and associated risks, benefits, and realistic alternatives discussed. Questions answered and patient/representative(s) expressed understanding.     - Discussed: Risks, Benefits and Alternatives for the PROCEDURE were discussed     - Discussed with:  Parent (Mother and/or Father)      - Extended Intubation/Ventilatory Support Discussed: Yes.      - Patient is DNR/DNI Status: No     Use of blood products discussed: Yes.     - Discussed with: Legal guardian.     - Consented: consented to blood products     Postoperative Care    Pain management: IV analgesics, Multi-modal analgesia.   PONV prophylaxis: Ondansetron (or other 5HT-3)     Comments:               Eduin Dao MD    Clinically Significant Risk Factors           # Hypocalcemia: Lowest iCa = 4.2 mg/dL in last 2 days, will monitor and replace as appropriate     # Hypoalbuminemia: Lowest albumin = 1.7 g/dL at 5/3/2025 11:41 AM, will monitor as appropriate  # Coagulation Defect: INR = 1.30 (Ref range: 0.85 - 1.15) and/or PTT = 29 Seconds (Ref range: 22 - 38 Seconds), will monitor for bleeding  # Thrombocytopenia: Lowest platelets = 123 in last 2 days, will monitor for bleeding               # Severe Malnutrition: based on nutrition assessment and treatment provided per dietitian's recommendations., PRESENT ON ADMISSION   # Financial/Environmental Concerns: unemployed

## 2025-05-03 NOTE — PROGRESS NOTES
Nephrology Progress Note  05/03/2025       Sebastián Rodas is a 29 yom with Bipolar disorder, ETOH use c/b necrotizing pancreatitis admitted 3/30/25 to [facility] for ETOH withdrawal and abdominal pain, found to have acute pancreatitis which has progressed to necrotizing pancreatitis complicated by SARAHI.  Seen by Nephrology at [place], started on CRRT 4/1.  Had periods of stability enough for iHD but back to CRRT on 4/21 until tx to [facility] for further surgical interventions.       Interval History :   CRRt was resumed. Circuit clotted 13 hours after initiated. Other labs appear to be  relatively stable although no change to his metabolic acidosis as bicarb remains at 19. Albumin is 1.7.     Assessment & Recommendations:   SARAHI-Baseline Cr 0.8 as recently as March 2025 before acute events, was started on CRRT emergently on 4/1 in setting of septic shock. Had ~2 weeks of stability enough to manage with iHD but back on CRRT 4/21 until tx to [facility] on 4/30. Running fevers with intraabdominal sepsis.  Had break from CRRT on admission but restarting today with plans for OR tomorrow.                  -No need for new consent, continuing RRT started last month at [facility].                 -Access is tunneled RIJ from 4/21.  Note this is not a subclavian line                  CRRT orders renewed. Increased pre dilution rate to attempt to keep circuit open     Volume-minimal UOP.  Attempting I=O      Electrolytes-bicarb is 18. The increase in the pre dilutation rate will likely help improve this.      BMD-No acute issues.      Discussed with nursing.      Maria D Boateng MD MS A total of 35 mintues was spent reviewing the chart, examining the patient and discussing the care plan.     Review of Systems:   I reviewed the following systems:  ROS not done due to vent/sedation.     Physical Exam:   I/O last 3 completed shifts:  In: 4215.68 [I.V.:2411.23]  Out: 2716.6 [Urine:365; Drains:1554; Other:797.6]   /74 (BP  "Location: Right arm)   Pulse 84   Temp 97.7  F (36.5  C)   Resp 13   Ht 1.727 m (5' 7.99\")   Wt 72.3 kg (159 lb 6.3 oz)   SpO2 95%   BMI 24.24 kg/m       Gen; trached opens eyes to voice  :L;ungs: clear anterior  Card: regular  Abd: open, bowel sounds present multiple drains with sig sanguineous outpt.  Ext: no edema  Access Tunneled R internal jugular catheter    Labs:   All labs reviewed by me  Electrolytes/Renal -   Recent Labs   Lab Test 05/03/25  1244 05/03/25  1141 05/03/25  0614 05/03/25 0339 05/02/25 2359 05/02/25  2224   NA  --  137  --  136  136  --  136   POTASSIUM  --  4.3  --  4.2  4.2  --  4.6   CHLORIDE  --  106  --  105  105  --  105   CO2  --  19*  --  18*  18*  --  17*   BUN  --  74.4*  --  86.7*  86.7*  --  101.0*   CR  --  1.36*  --  1.58*  1.58*  --  1.82*   * 98  107*   < > 128*  128*   < > 125*   KARINA  --  7.6*  --  8.1*  8.1*  --  7.4*   MAG  --  2.1  --  2.9*  --  1.9   PHOS  --  4.9*  --  5.9*  --  7.6*    < > = values in this interval not displayed.       CBC -   Recent Labs   Lab Test 05/03/25 0339 05/02/25 2359 05/02/25  1830 05/02/25  0703 05/02/25  0413 05/01/25  1322   WBC 18.1*  --   --   --  26.7* 16.2*   HGB 8.5*  8.5* 8.6* 9.0*   < > 9.8* 11.7*     --   --   --  169 123*    < > = values in this interval not displayed.       LFTs -   Recent Labs   Lab Test 05/03/25  1141 05/03/25  0339 05/02/25  2224 05/02/25 0413 05/01/25  1322   ALKPHOS  --  160*  --  212* 285*   BILITOTAL  --  0.8  --  0.6 1.1   ALT  --  22  --  38 69   AST  --  24  --  28 76*   PROTTOTAL  --  4.7*  --  4.5* 4.5*   ALBUMIN 1.7* 1.8*  1.8* 1.8* 1.9* 2.2*       Iron Panel - No lab results found.        Current Medications:  Current Facility-Administered Medications   Medication Dose Route Frequency Provider Last Rate Last Admin    heparin ANTICOAGULANT injection 5,000 Units  5,000 Units Subcutaneous Q8H [facility] Jolie Dexter, ASIF CNP   5,000 Units at 05/03/25 0914    " lipids 4 oil (SMOFLIPID) 20 % infusion 250 mL  250 mL Intravenous Q24H Roxi Alston MD   Stopped at 05/03/25 0800    micafungin (MYCAMINE) 100 mg in sodium chloride 0.9 % 100 mL intermittent infusion  100 mg Intravenous Q24H Roxi Alston  mL/hr at 05/02/25 1740 100 mg at 05/02/25 1740    pantoprazole (PROTONIX) IV push injection 40 mg  40 mg Intravenous QAM AC Roxi Alston MD   40 mg at 05/03/25 0823    piperacillin-tazobactam (ZOSYN) 4.5 g vial to attach to  mL bag  4.5 g Intravenous Q6H Brendon Haas DO   4.5 g at 05/03/25 0933    thiamine (B-1) injection 100 mg  100 mg Intravenous Daily Roxi Alston MD   100 mg at 05/03/25 0823     Current Facility-Administered Medications   Medication Dose Route Frequency Provider Last Rate Last Admin    dexmedeTOMIDine (PRECEDEX) 4 mcg/mL in sodium chloride 0.9 % 100 mL infusion  0.1-1.2 mcg/kg/hr (Dosing Weight) Intravenous Continuous Mireya López DO 1.7 mL/hr at 05/03/25 1326 0.1 mcg/kg/hr at 05/03/25 1326    dextrose 10% infusion   Intravenous Continuous PRN Mireya López DO        dextrose 10% infusion   Intravenous Continuous PRN Roxi Alston MD        dialysate for CVVHD & CVVHDF (PrismaSol BGK 2/3.5)  12.5 mL/kg/hr CRRT Continuous Raheem Gabriel APRN  mL/hr at 05/03/25 1247 12.5 mL/kg/hr at 05/03/25 1247    fentaNYL (SUBLIMAZE) infusion   mcg/hr Intravenous Continuous Roxi Alston MD 3 mL/hr at 05/03/25 1300 150 mcg/hr at 05/03/25 1300    insulin regular (MYXREDLIN) 1 unit/mL infusion  0-24 Units/hr Intravenous Continuous Mireya López DO 2 mL/hr at 05/03/25 1300 2 Units/hr at 05/03/25 1300    No heparin required   Does not apply Continuous PRN Gabriel, Raheem Theo, APRN CNS        norepinephrine (LEVOPHED) 16 mg in  mL infusion MAX CONC CENTRAL LINE  0.01-0.6 mcg/kg/min (Dosing Weight) Intravenous Continuous Brandan Hicks PA-C 3.3 mL/hr at 05/03/25 1300 0.05 mcg/kg/min at 05/03/25 1300    parenteral nutrition -  ADULT compounded formula   CENTRAL LINE IV TPN CONTINUOUS Brendon Haas DO        parenteral nutrition - ADULT compounded formula   CENTRAL LINE IV TPN CONTINUOUS Brendon Haas DO 50 mL/hr at 05/03/25 0800 Rate Verify at 05/03/25 0800    POST-filter replacement solution for CVVHD & CVVHDF (Phoxillum BK4/2.5)   CRRT Continuous Raheem Gabriel APRN  mL/hr at 05/02/25 2010 New Bag at 05/02/25 2010    PRE-filter replacement solution for CVVHD & CVVHDF (Phoxillum BK4/2.5)  15 mL/kg/hr CRRT Continuous Maria D Boateng MD 1,100 mL/hr at 05/03/25 1247 15 mL/kg/hr at 05/03/25 1247    vasopressin 1 unit/mL MAX Conc (PITRESSIN) infusion  0.5-4 Units/hr Intravenous Continuous Jean Paul Pierre MD 1.5 mL/hr at 05/03/25 1300 1.5 Units/hr at 05/03/25 1300

## 2025-05-03 NOTE — PROGRESS NOTES
SURGICAL ICU PROGRESS NOTE  05/03/2025      Date of Service (when I saw the patient): 05/03/2025    ASSESSMENT:  Sebastián Rodas is a 29M with alcohol abuse, anxiety, and bipolar disorder, who was admitted 3/30/25 for alcohol withdrawal and abdominal pain. Workup showed leukocytosis and elevated lipase, consistent with acute pancreatitis. He developed encephalopathy and shock, requiring ICU care, intubation, vasopressors, and CRRT by 4/1. His course was complicated by cardiomyopathy (EF 20-25%, improved to 65-70% by 4/14), necrotizing pancreatitis, and persistent infection despite 14 days of meropenem. On 4/27, imaging showed likely perforated viscus; he underwent emergent laparotomy, necrosectomy, and colectomy. Transferred to [facility] SICU on 4/30. On 5/1, reoperation revealed colonic staple line breakdown, necrotic pancreas, vessel thrombosis, and adhesions. Colostomy with malankot drain and temporary abdominal closure performed. Prognosis is poor; palliative care involved. Today: adequate stool output from malankot, increased sanguinous drainage from drains; heparin held. Care conference today.    CHANGES and MAJOR THINGS TODAY:   - NPO  - 500 mL 0/9% Saline Ordered  - Trend Hbg  - Continue Insulin drip   - SQH started, prophylactic only   - Palliative team following  - 1 Unit of pRBC transfused    PLAN:    Neurological:  # Acute pain   # Agitation  # Encephalopathy - Supportive Care  - Monitor neurological status. Delirium preventions and precautions.   # Pain: Fentanyl Drip, Fentanyl PRN bolus   # Sedation: Precedex    Pulmonary:   # Acute hypoxic respiratory support  - VENT: FiO2 (%): 30 %, Resp: 26, Vent Mode: VC/AC, Resp Rate (Set): 16 breaths/min, Tidal Volume (Set, mL): 420 mL, PEEP (cm H2O): 5 cmH2O, Resp Rate (Set): 16 breaths/min, Tidal Volume (Set, mL): 550 mL, PEEP (cm H2O): 5 cmH2O  - Continue full vent support. PST when meets criteria.  Ventilatory bundle.    Cardiovascular:    # Cardiomyopathy   -  Initial LVEF 20-25%, improved to 65-70%  # Shock-distributive (septic): Concern for developing sepsis with rising WBC (28K) and higher pressor requirements    - Monitor hemodynamic status  - On pressor Norepinephrine  - On Vasopressin    Gastroenterology/Nutrition:  # S/p emergent exploratory laparotomy, necrosectomy, transverse colectomy, abdominal washout, and drain placement  #Severe necrotizing pancreatitis  - Secondary to alcohol use disorder, splenic vein thrombosis, ongoing fevers  # Protein calorie deficit malnutrition    - NPO, no exceptions  - PPI   - RD consult. Appreciate cares and recommendations.     Renal/Fluids/Electrolytes:   #AGMA  - Likely secondary to SARAHI, hypoperfusion, ongoing diarrhea, lactate not elevated    #Severe hypocalcemia (8.1)  - 2/2 pancreatitis, bicarb, PTH resistance from hypomagnesemia, and Vit D deficiency  - Replete as indicated       # Acute kidney injury  # CCRT  Baseline Cr 0.7-0.8. Presented with Cr 0.96 on 3/30. Rapidly markos to 5.2 on 4/1. Oliguric. Garcia UA with proteinuria, hematuria, pyuria, moderate bacteria.  Kidneys unremarkable on CT. Has severe ATN in the setting of shock, ADHF, intravascular hypovolemia/3rd spacing from pancreatitis.   --CRRT 4/1-4/4.   - IHD with levophed started 4/5 but poor CVC function / HoTN limiting.   - Line exchanged 4/10 and again 4/19 - due to poor flow.  - PCAD placed 4/21 by IR.   - CRRT resumed 4/21/25 for fluid overload and assist with more aggressive UF in setting of shock.    - Off pressors 4/30 - plan at outside hospital was to continue CRRT today and move to IHD tomorrow.  - Back on pressor and CRRT 5/1   - Will continue to monitor intake and output.  - Continue Garcia   - Nephrology consulted, managing CRRT     Endocrine:   # Stress hyperglycemia    - Sliding scale for glucose management   - Goal to keep BG< 180 for optimal wound healing     ID:  # Leukocytosis  - WBC 18.1 (26.7)  - Completed 14 days course of meropenem and  caspofungin.   - Zosyn added, meropenem discontinued  - Continue micafungin    Positive cultures:  - 4/30 blood cultures obtained   - 5/1 blood cultures ordered - NGTD    Heme:     # Acute blood loss anemia  # Thrombosed splenic vein   - Hemoglobin 8.5 (9.0)  - Transfuse if hgb <7.0 or signs/symptoms of hypoperfusion. Monitor and trend  - Multiple transfusion in OR 5/1  - SQH  - Transfuse 1 unit pRBC    Musculoskeletal:   # Deconditioning   - Physical and occupational therapy consult     Skin:  # Pressure Ulcers - Buttocks/rectal Area   - Barrier cream with liberal application. Continue fecal management system   - WOC consult     General Cares/Prophylaxis:    DVT Prophylaxis: SQH  GI Prophylaxis: PPI  Restraints: Restraints for medical healing needed: YES    Lines/ tubes/ drains:  - SHANNON x 3  - Drain 4 - Colostomy drain  - PICC line  - A line  - Trach     Disposition:  - Surgical ICU    Patient to be discussed with Dr. Saad Pierre MD  Plastic Surgery    - - - - - - - - - - - - - - - - - - - - - - - - - - - - - - - - - - - - - - - - - - - - - -   HISTORY PRESENTING ILLNESS: Sebastián Rodas is a 29M with alcohol abuse, anxiety, and bipolar disorder, who was admitted 3/30/25 for alcohol withdrawal and abdominal pain. Workup showed leukocytosis and elevated lipase, consistent with acute pancreatitis. He developed encephalopathy and shock, requiring ICU care, intubation, vasopressors, and CRRT by 4/1. His course was complicated by cardiomyopathy (EF 20-25%, improved to 65-70% by 4/14), necrotizing pancreatitis, and persistent infection despite 14 days of meropenem. On 4/27, imaging showed likely perforated viscus; he underwent emergent laparotomy, necrosectomy, and colectomy. Transferred to [facility] SICU on 4/30. On 5/1, reoperation revealed colonic staple line breakdown, necrotic pancreas, vessel thrombosis, and adhesions. Colostomy with malankot drain and temporary abdominal closure performed. Prognosis is poor;  palliative care involved. Today: adequate stool output from malankot, increased sanguinous drainage; heparin held. Care conference today.    REVIEW OF SYSTEMS: 10 point ROS neg other than the symptoms noted above in the HPI.    PAST MEDICAL HISTORY:   -AUD  -Bipolar  -Alcohol Substance Use    SURGICAL HISTORY:   S/p emergent exploratory laparotomy, necrosectomy, transverse colectomy, abdominal washout, and drain placement    SOCIAL HISTORY:   Social History     Socioeconomic History    Marital status: Single   Tobacco Use    Smoking status: Never    Smokeless tobacco: Never   Substance and Sexual Activity    Alcohol use: No     Alcohol/week: 0.0 standard drinks of alcohol    Drug use: No    Sexual activity: Yes     Partners: Female     Social Drivers of Health     Financial Resource Strain: Low Risk  (3/31/2025)    [facility]    Financial Resource Strain     Difficulty of Paying Living Expenses: 3   Food Insecurity: No Food Insecurity (3/31/2025)    Received from [facility]    Food Insecurity     Do you worry your food will run out before you are able to buy more?: 1   Transportation Needs: Unmet Transportation Needs (3/31/2025)    Received from [facility]    Transportation Needs     Does lack of transportation keep you from medical appointments?: 1     Does lack of transportation keep you from work, meetings or getting things that you need?: 2   Physical Activity: Not on File (8/26/2019)    Received from [facility]    Physical Activity     Physical Activity: 0   Stress: Not on File (8/26/2019)    Received from [facility]    Stress     Stress: 0   Social Connections: Socially Isolated (3/31/2025)    Received from [facility]    Social Connections     Do you often feel lonely or isolated from those around you?: 4   Housing Stability: Low Risk  (3/31/2025)    Received from [employer]  [facility]    Housing Stability     What is your housing situation today?: 1     ALLERGIES:   No Known Allergies    MEDICATIONS:  Current Facility-Administered Medications   Medication Dose Route Frequency Provider Last Rate Last Admin    dextrose 10% infusion   Intravenous Continuous Roxi Alston MD   Stopped at 05/01/25 0930    dextrose 10% infusion   Intravenous Continuous PRRoxi Bingham MD        [Auto Hold] glucose gel 15-30 g  15-30 g Oral Q15 Min PRRoxi Bingham MD        Or    [Auto Hold] dextrose 50 % injection 25-50 mL  25-50 mL Intravenous Q15 Min PRRoix Bingham MD        Or    [Auto Hold] glucagon injection 1 mg  1 mg Subcutaneous Q15 Min PRRoxi Bingham MD        [Auto Hold] fentaNYL (SUBLIMAZE) 50 mcg/mL bolus from pump  50 mcg Intravenous Q1H PRRoxi Bingham MD        fentaNYL (SUBLIMAZE) infusion   mcg/hr Intravenous Continuous Roxi Alston MD   Stopped at 05/01/25 0930    heparin 25,000 units in 0.45% NaCl 250 mL ANTICOAGULANT infusion  0-5,000 Units/hr Intravenous Continuous Roxi Alston MD   Stopped at 05/01/25 0930    [Auto Hold] insulin aspart (NovoLOG) injection (RAPID ACTING)  1-12 Units Subcutaneous Q4H Roxi Alston MD        lipids 4 oil (SMOFLIPID) 20 % infusion 250 mL  250 mL Intravenous Q24H Roxi Alston MD        [Auto Hold] meropenem (MERREM) 500 mg vial to attach to  mL bag for ADULTS or 25 mL bag for PEDS  500 mg Intravenous Q24H Roxi Alston MD        [Auto Hold] micafungin (MYCAMINE) 100 mg in sodium chloride 0.9 % 100 mL intermittent infusion  100 mg Intravenous Q24H Roxi Alston MD        [Auto Hold] naloxone (NARCAN) injection 0.2 mg  0.2 mg Intravenous Q2 Min Roxi Gomez MD        Or    [Auto Hold] naloxone (NARCAN) injection 0.4 mg  0.4 mg Intravenous Q2 Min Roxi Gomez MD        Or    [Auto Hold] naloxone (NARCAN) injection 0.2 mg  0.2 mg Intramuscular Q2 Min PRRoxi Bingham MD        Or    [Auto Hold] naloxone (NARCAN)  injection 0.4 mg  0.4 mg Intramuscular Q2 Min PRN Roxi Alston MD        [Auto Hold] pantoprazole (PROTONIX) IV push injection 40 mg  40 mg Intravenous QAM AC Roxi Alston MD   40 mg at 05/01/25 0758    parenteral nutrition - ADULT compounded formula   CENTRAL LINE IV TPN CONTINUOUS Roxi Alston MD        [Auto Hold] thiamine (B-1) injection 100 mg  100 mg Intravenous Daily Roxi Alston MD   100 mg at 05/01/25 0746     PHYSICAL EXAMINATION:  Temp:  [99.9  F (37.7  C)-102.2  F (39  C)] 101.7  F (38.7  C)  Pulse:  [122-147] 134  Resp:  [18-32] 26  BP: ()/(51-74) 82/57  FiO2 (%):  [30 %] 30 %  SpO2:  [97 %-100 %] 99 %    Gen: Resting comfortably in bed   Eyes: Nonicteric  ENT: Mucous Membranes Moist  Pulm: Nonlabored Breathing mechanical ventilation via trach  CV: S1, S2, no murmurs  Abd: abthera in place, SHANNON drains x 3 with dark thin sanguinous output, 1 colostomy drain, g-tube in place and clamped, remainder of abdomen is compressible   : Garcia present  Extremities: warm and well perfused, palpable bilateral DP pulses  Neuro: Pt not able to cooperate due to sedation            LABS: Reviewed.   Arterial Blood Gases   No lab results found in last 7 days.  Complete Blood Count     Recent Labs   Lab 05/01/25  0540 05/01/25  0020 04/30/25  2201   WBC 14.3* 14.3* 15.4*   HGB 7.2* 7.4* 7.7*   * 112* 117*     Basic Metabolic Panel  Recent Labs   Lab 05/01/25  0745 05/01/25  0344 05/01/25  0126 05/01/25  0117 04/30/25  2349 04/30/25  2201   NA  --  136  --  135  --  134*   POTASSIUM  --  4.3  --  4.3  --  4.6   CHLORIDE  --  104  --  104  --  104   CO2  --  18*  --  18*  --  19*   BUN  --  47.3*  --  43.7*  --  39.2*   CR  --  2.03*  --  1.82*  --  1.59*   * 98  94 99 99   < > 124*    < > = values in this interval not displayed.     Liver Function Tests  Recent Labs   Lab 05/01/25  0344 04/30/25  2217 04/30/25  2201   *  --  126*   *  --  111*   ALKPHOS 349*  --  280*   BILITOTAL 1.0  --   0.9   ALBUMIN 2.5*  --  2.6*   INR  --  1.09  --      Pancreatic Enzymes  Recent Labs   Lab 04/30/25  2201   LIPASE 107*     Coagulation Profile  Recent Labs   Lab 04/30/25  2217   INR 1.09   PTT 24       IMAGING:  Recent Results (from the past 24 hours)   XR Chest Port 1 View    Narrative    EXAM: XR CHEST PORT 1 VIEW 4/30/2025 9:31 PM    DEMOGRAPHICS: 29 years Male    INDICATION: Endotracheal tube positioning    COMPARISON: None    TECHNIQUE: Single portable AP view of the chest.    FINDINGS:   Tracheostomy tube in the midthoracic trachea. Right IJ central venous  catheter with tip in the superior cavoatrial junction. Left PICC with  tip at the lower SVC.    The cardiomediastinal silhouette is within normal limits. Mild  perihilar and left basilar streaky opacities. Trace left pleural  effusion. No definite pneumothorax. Metallic opacity overlying the  left upper quadrant, external to the patient or post  surgical/intervention, consider correlation      Impression    IMPRESSION:     1.  Tracheostomy tube in the midthoracic trachea about 5.7 cm above  the nichole. Right IJ central venous catheter with tip near the  superior cavoatrial junction. Left PICC with tip in the low SVC.  2.  Mild perihilar and left basilar pulmonary opacities, possibly  atelectasis/pulmonary edema  3.  Trace left pleural effusion.  4.  Metallic opacity overlying the left upper quadrant, external to  the patient or post surgical/intervention, consider correlation    I have personally reviewed the examination and initial interpretation  and I agree with the findings.    KIAH TERRY MD         SYSTEM ID:  [id number]

## 2025-05-03 NOTE — PLAN OF CARE
Goal Outcome Evaluation:  ICU End of Shift Summary. See flowsheets for vital signs and detailed assessment.    Changes this shift: Pt. Was able to squeeze my hands and wiggle toes to command.  Denied pain at 2000, but when asked about pain after bath and linen change nodded his head yes.  Fentanyl 50 given.  HR decreased from 120's to 90's and pt appeared more comfortable.     CRRT started at approx 2000.  Gradually increased pull over about 3 hours.  Pt is tolerating an average pull of about 50/hr.  Outputs vary Q 2 hours depending on drain outputs.   Pt has had sl fever per bladder temp.  Decreased thermax on CRRT machine to 36.  Dr. Dejesus aware.     Remains on 0.15 of Levophed, Fent 150, Insulin 2 @ 2 units, Dex 0.3, TPN and Lipids.  Two 50 mcg fentanyl bolus' given overnoc.  For pain.      Pt's mom Marbella called this morning wondering about pt going to the OR.  According to SICU team no plan has been made for pt to go to the OR today, however final decision would be made by Dr. Mohr.   Mom updated on this information.  Mom stated how frustrated she was   That Sebastián was unable to talk.  Explained to Mom that pt was on continuous pain medication and a little bit of sedation to keep him comfortable. Also told her that Sebastián did follow some commands overnoc.      Plan: Continue cares for now.

## 2025-05-04 ENCOUNTER — APPOINTMENT (OUTPATIENT)
Dept: GENERAL RADIOLOGY | Facility: CLINIC | Age: 30
End: 2025-05-04
Attending: SURGERY
Payer: COMMERCIAL

## 2025-05-04 ENCOUNTER — ANESTHESIA (OUTPATIENT)
Dept: SURGERY | Facility: CLINIC | Age: 30
End: 2025-05-04
Payer: COMMERCIAL

## 2025-05-04 LAB
ABO + RH BLD: NORMAL
ALBUMIN SERPL BCG-MCNC: 1.5 G/DL (ref 3.5–5.2)
ALBUMIN SERPL BCG-MCNC: 1.6 G/DL (ref 3.5–5.2)
ALP SERPL-CCNC: 144 U/L (ref 40–150)
ALT SERPL W P-5'-P-CCNC: 13 U/L (ref 0–70)
ANION GAP SERPL CALCULATED.3IONS-SCNC: 12 MMOL/L (ref 7–15)
ANION GAP SERPL CALCULATED.3IONS-SCNC: 13 MMOL/L (ref 7–15)
AST SERPL W P-5'-P-CCNC: 21 U/L (ref 0–45)
BILIRUB DIRECT SERPL-MCNC: 0.55 MG/DL (ref 0–0.3)
BILIRUB SERPL-MCNC: 0.6 MG/DL
BLD GP AB SCN SERPL QL: NEGATIVE
BLD PROD TYP BPU: NORMAL
BLOOD COMPONENT TYPE: NORMAL
BUN SERPL-MCNC: 55.4 MG/DL (ref 6–20)
BUN SERPL-MCNC: 66.4 MG/DL (ref 6–20)
BUN SERPL-MCNC: 67.3 MG/DL (ref 6–20)
BUN SERPL-MCNC: 67.3 MG/DL (ref 6–20)
CA-I BLD-MCNC: 4.3 MG/DL (ref 4.4–5.2)
CA-I BLD-MCNC: 4.5 MG/DL (ref 4.4–5.2)
CA-I BLD-MCNC: 4.7 MG/DL (ref 4.4–5.2)
CALCIUM SERPL-MCNC: 7.1 MG/DL (ref 8.8–10.4)
CALCIUM SERPL-MCNC: 7.5 MG/DL (ref 8.8–10.4)
CALCIUM SERPL-MCNC: 7.5 MG/DL (ref 8.8–10.4)
CALCIUM SERPL-MCNC: 7.9 MG/DL (ref 8.8–10.4)
CHLORIDE SERPL-SCNC: 105 MMOL/L (ref 98–107)
CHLORIDE SERPL-SCNC: 106 MMOL/L (ref 98–107)
CODING SYSTEM: NORMAL
CREAT SERPL-MCNC: 1.1 MG/DL (ref 0.67–1.17)
CREAT SERPL-MCNC: 1.24 MG/DL (ref 0.67–1.17)
CREAT SERPL-MCNC: 1.24 MG/DL (ref 0.67–1.17)
CREAT SERPL-MCNC: 1.28 MG/DL (ref 0.67–1.17)
CROSSMATCH: NORMAL
EGFRCR SERPLBLD CKD-EPI 2021: 77 ML/MIN/1.73M2
EGFRCR SERPLBLD CKD-EPI 2021: 80 ML/MIN/1.73M2
EGFRCR SERPLBLD CKD-EPI 2021: 80 ML/MIN/1.73M2
EGFRCR SERPLBLD CKD-EPI 2021: >90 ML/MIN/1.73M2
ERYTHROCYTE [DISTWIDTH] IN BLOOD BY AUTOMATED COUNT: 16.2 % (ref 10–15)
ERYTHROCYTE [DISTWIDTH] IN BLOOD BY AUTOMATED COUNT: 17.5 % (ref 10–15)
ERYTHROCYTE [DISTWIDTH] IN BLOOD BY AUTOMATED COUNT: 17.5 % (ref 10–15)
GLUCOSE BLDC GLUCOMTR-MCNC: 109 MG/DL (ref 70–99)
GLUCOSE BLDC GLUCOMTR-MCNC: 115 MG/DL (ref 70–99)
GLUCOSE BLDC GLUCOMTR-MCNC: 118 MG/DL (ref 70–99)
GLUCOSE BLDC GLUCOMTR-MCNC: 119 MG/DL (ref 70–99)
GLUCOSE BLDC GLUCOMTR-MCNC: 132 MG/DL (ref 70–99)
GLUCOSE BLDC GLUCOMTR-MCNC: 133 MG/DL (ref 70–99)
GLUCOSE BLDC GLUCOMTR-MCNC: 150 MG/DL (ref 70–99)
GLUCOSE BLDC GLUCOMTR-MCNC: 169 MG/DL (ref 70–99)
GLUCOSE BLDC GLUCOMTR-MCNC: 200 MG/DL (ref 70–99)
GLUCOSE SERPL-MCNC: 125 MG/DL (ref 70–99)
GLUCOSE SERPL-MCNC: 145 MG/DL (ref 70–99)
GLUCOSE SERPL-MCNC: 145 MG/DL (ref 70–99)
GLUCOSE SERPL-MCNC: 212 MG/DL (ref 70–99)
HCO3 SERPL-SCNC: 18 MMOL/L (ref 22–29)
HCO3 SERPL-SCNC: 18 MMOL/L (ref 22–29)
HCO3 SERPL-SCNC: 19 MMOL/L (ref 22–29)
HCO3 SERPL-SCNC: 19 MMOL/L (ref 22–29)
HCT VFR BLD AUTO: 25.4 % (ref 40–53)
HCT VFR BLD AUTO: 25.5 % (ref 40–53)
HCT VFR BLD AUTO: 29.3 % (ref 40–53)
HGB BLD-MCNC: 8.2 G/DL (ref 13.3–17.7)
HGB BLD-MCNC: 8.2 G/DL (ref 13.3–17.7)
HGB BLD-MCNC: 9.5 G/DL (ref 13.3–17.7)
ISSUE DATE AND TIME: NORMAL
ISSUE DATE AND TIME: NORMAL
MAGNESIUM SERPL-MCNC: 2 MG/DL (ref 1.7–2.3)
MAGNESIUM SERPL-MCNC: 2 MG/DL (ref 1.7–2.3)
MAGNESIUM SERPL-MCNC: 2.3 MG/DL (ref 1.7–2.3)
MCH RBC QN AUTO: 29.8 PG (ref 26.5–33)
MCH RBC QN AUTO: 30 PG (ref 26.5–33)
MCH RBC QN AUTO: 30 PG (ref 26.5–33)
MCHC RBC AUTO-ENTMCNC: 32.2 G/DL (ref 31.5–36.5)
MCHC RBC AUTO-ENTMCNC: 32.3 G/DL (ref 31.5–36.5)
MCHC RBC AUTO-ENTMCNC: 32.4 G/DL (ref 31.5–36.5)
MCV RBC AUTO: 92 FL (ref 78–100)
MCV RBC AUTO: 93 FL (ref 78–100)
MCV RBC AUTO: 93 FL (ref 78–100)
PHOSPHATE SERPL-MCNC: 3.8 MG/DL (ref 2.5–4.5)
PHOSPHATE SERPL-MCNC: 4.2 MG/DL (ref 2.5–4.5)
PHOSPHATE SERPL-MCNC: 4.3 MG/DL (ref 2.5–4.5)
PLATELET # BLD AUTO: 165 10E3/UL (ref 150–450)
PLATELET # BLD AUTO: 204 10E3/UL (ref 150–450)
PLATELET # BLD AUTO: 220 10E3/UL (ref 150–450)
POTASSIUM SERPL-SCNC: 3.6 MMOL/L (ref 3.4–5.3)
POTASSIUM SERPL-SCNC: 3.8 MMOL/L (ref 3.4–5.3)
POTASSIUM SERPL-SCNC: 3.8 MMOL/L (ref 3.4–5.3)
POTASSIUM SERPL-SCNC: 4 MMOL/L (ref 3.4–5.3)
PREALB SERPL-MCNC: <3 MG/DL (ref 20–40)
PROT SERPL-MCNC: 4.3 G/DL (ref 6.4–8.3)
RBC # BLD AUTO: 2.73 10E6/UL (ref 4.4–5.9)
RBC # BLD AUTO: 2.75 10E6/UL (ref 4.4–5.9)
RBC # BLD AUTO: 3.17 10E6/UL (ref 4.4–5.9)
SODIUM SERPL-SCNC: 137 MMOL/L (ref 135–145)
SPECIMEN EXP DATE BLD: NORMAL
UNIT ABO/RH: NORMAL
UNIT NUMBER: NORMAL
UNIT STATUS: NORMAL
UNIT TYPE ISBT: 5100
WBC # BLD AUTO: 14.5 10E3/UL (ref 4–11)
WBC # BLD AUTO: 21.7 10E3/UL (ref 4–11)
WBC # BLD AUTO: 24 10E3/UL (ref 4–11)

## 2025-05-04 PROCEDURE — 99291 CRITICAL CARE FIRST HOUR: CPT | Mod: GC | Performed by: SURGERY

## 2025-05-04 PROCEDURE — 82247 BILIRUBIN TOTAL: CPT | Performed by: STUDENT IN AN ORGANIZED HEALTH CARE EDUCATION/TRAINING PROGRAM

## 2025-05-04 PROCEDURE — 250N000011 HC RX IP 250 OP 636: Performed by: STUDENT IN AN ORGANIZED HEALTH CARE EDUCATION/TRAINING PROGRAM

## 2025-05-04 PROCEDURE — 200N000002 HC R&B ICU UMMC

## 2025-05-04 PROCEDURE — 82435 ASSAY OF BLOOD CHLORIDE: CPT | Performed by: CLINICAL NURSE SPECIALIST

## 2025-05-04 PROCEDURE — 250N000011 HC RX IP 250 OP 636: Performed by: SURGERY

## 2025-05-04 PROCEDURE — 84134 ASSAY OF PREALBUMIN: CPT | Performed by: STUDENT IN AN ORGANIZED HEALTH CARE EDUCATION/TRAINING PROGRAM

## 2025-05-04 PROCEDURE — 250N000024 HC ISOFLURANE, PER MIN: Performed by: SURGERY

## 2025-05-04 PROCEDURE — 250N000011 HC RX IP 250 OP 636

## 2025-05-04 PROCEDURE — 360N000076 HC SURGERY LEVEL 3, PER MIN: Performed by: SURGERY

## 2025-05-04 PROCEDURE — 0FBG0ZZ EXCISION OF PANCREAS, OPEN APPROACH: ICD-10-PCS | Performed by: SURGERY

## 2025-05-04 PROCEDURE — 99232 SBSQ HOSP IP/OBS MODERATE 35: CPT | Performed by: INTERNAL MEDICINE

## 2025-05-04 PROCEDURE — 99233 SBSQ HOSP IP/OBS HIGH 50: CPT | Mod: 57 | Performed by: SURGERY

## 2025-05-04 PROCEDURE — 258N000003 HC RX IP 258 OP 636

## 2025-05-04 PROCEDURE — P9016 RBC LEUKOCYTES REDUCED: HCPCS

## 2025-05-04 PROCEDURE — 250N000011 HC RX IP 250 OP 636: Mod: JZ | Performed by: STUDENT IN AN ORGANIZED HEALTH CARE EDUCATION/TRAINING PROGRAM

## 2025-05-04 PROCEDURE — 83735 ASSAY OF MAGNESIUM: CPT | Performed by: CLINICAL NURSE SPECIALIST

## 2025-05-04 PROCEDURE — 84100 ASSAY OF PHOSPHORUS: CPT | Performed by: CLINICAL NURSE SPECIALIST

## 2025-05-04 PROCEDURE — 250N000011 HC RX IP 250 OP 636: Mod: JZ

## 2025-05-04 PROCEDURE — 82330 ASSAY OF CALCIUM: CPT | Performed by: CLINICAL NURSE SPECIALIST

## 2025-05-04 PROCEDURE — B4185 PARENTERAL SOL 10 GM LIPIDS: HCPCS | Performed by: STUDENT IN AN ORGANIZED HEALTH CARE EDUCATION/TRAINING PROGRAM

## 2025-05-04 PROCEDURE — 90947 DIALYSIS REPEATED EVAL: CPT

## 2025-05-04 PROCEDURE — 71045 X-RAY EXAM CHEST 1 VIEW: CPT | Mod: 26 | Performed by: RADIOLOGY

## 2025-05-04 PROCEDURE — 48105 RESECT/DEBRIDE PANCREAS: CPT | Mod: 52 | Performed by: SURGERY

## 2025-05-04 PROCEDURE — 250N000009 HC RX 250: Performed by: STUDENT IN AN ORGANIZED HEALTH CARE EDUCATION/TRAINING PROGRAM

## 2025-05-04 PROCEDURE — 85027 COMPLETE CBC AUTOMATED: CPT | Performed by: STUDENT IN AN ORGANIZED HEALTH CARE EDUCATION/TRAINING PROGRAM

## 2025-05-04 PROCEDURE — 250N000009 HC RX 250: Performed by: SURGERY

## 2025-05-04 PROCEDURE — 82435 ASSAY OF BLOOD CHLORIDE: CPT | Performed by: STUDENT IN AN ORGANIZED HEALTH CARE EDUCATION/TRAINING PROGRAM

## 2025-05-04 PROCEDURE — 258N000003 HC RX IP 258 OP 636: Performed by: STUDENT IN AN ORGANIZED HEALTH CARE EDUCATION/TRAINING PROGRAM

## 2025-05-04 PROCEDURE — 250N000009 HC RX 250: Performed by: INTERNAL MEDICINE

## 2025-05-04 PROCEDURE — 82565 ASSAY OF CREATININE: CPT | Performed by: STUDENT IN AN ORGANIZED HEALTH CARE EDUCATION/TRAINING PROGRAM

## 2025-05-04 PROCEDURE — 82248 BILIRUBIN DIRECT: CPT | Performed by: CLINICAL NURSE SPECIALIST

## 2025-05-04 PROCEDURE — 250N000009 HC RX 250

## 2025-05-04 PROCEDURE — 85014 HEMATOCRIT: CPT

## 2025-05-04 PROCEDURE — 999N000157 HC STATISTIC RCP TIME EA 10 MIN

## 2025-05-04 PROCEDURE — 83735 ASSAY OF MAGNESIUM: CPT | Performed by: STUDENT IN AN ORGANIZED HEALTH CARE EDUCATION/TRAINING PROGRAM

## 2025-05-04 PROCEDURE — 94003 VENT MGMT INPAT SUBQ DAY: CPT

## 2025-05-04 PROCEDURE — 272N000001 HC OR GENERAL SUPPLY STERILE: Performed by: SURGERY

## 2025-05-04 PROCEDURE — 999N000065 XR CHEST PORT 1 VIEW

## 2025-05-04 PROCEDURE — 250N000009 HC RX 250: Performed by: CLINICAL NURSE SPECIALIST

## 2025-05-04 PROCEDURE — 370N000017 HC ANESTHESIA TECHNICAL FEE, PER MIN: Performed by: SURGERY

## 2025-05-04 PROCEDURE — 82330 ASSAY OF CALCIUM: CPT | Performed by: STUDENT IN AN ORGANIZED HEALTH CARE EDUCATION/TRAINING PROGRAM

## 2025-05-04 RX ORDER — CEFAZOLIN SODIUM 2 G/50ML
2 SOLUTION INTRAVENOUS
Status: DISCONTINUED | OUTPATIENT
Start: 2025-05-04 | End: 2025-05-04

## 2025-05-04 RX ORDER — DEXTROSE MONOHYDRATE 25 G/50ML
25-50 INJECTION, SOLUTION INTRAVENOUS
Status: DISCONTINUED | OUTPATIENT
Start: 2025-05-04 | End: 2025-05-04

## 2025-05-04 RX ORDER — FENTANYL CITRATE 50 UG/ML
INJECTION, SOLUTION INTRAMUSCULAR; INTRAVENOUS PRN
Status: DISCONTINUED | OUTPATIENT
Start: 2025-05-04 | End: 2025-05-04

## 2025-05-04 RX ORDER — SODIUM CHLORIDE, SODIUM LACTATE, POTASSIUM CHLORIDE, CALCIUM CHLORIDE 600; 310; 30; 20 MG/100ML; MG/100ML; MG/100ML; MG/100ML
INJECTION, SOLUTION INTRAVENOUS CONTINUOUS
Status: DISCONTINUED | OUTPATIENT
Start: 2025-05-04 | End: 2025-05-04

## 2025-05-04 RX ORDER — SODIUM CHLORIDE, SODIUM GLUCONATE, SODIUM ACETATE, POTASSIUM CHLORIDE AND MAGNESIUM CHLORIDE 526; 502; 368; 37; 30 MG/100ML; MG/100ML; MG/100ML; MG/100ML; MG/100ML
INJECTION, SOLUTION INTRAVENOUS CONTINUOUS PRN
Status: DISCONTINUED | OUTPATIENT
Start: 2025-05-04 | End: 2025-05-04

## 2025-05-04 RX ORDER — NICOTINE POLACRILEX 4 MG
15-30 LOZENGE BUCCAL
Status: DISCONTINUED | OUTPATIENT
Start: 2025-05-04 | End: 2025-05-04

## 2025-05-04 RX ORDER — CEFAZOLIN SODIUM 2 G/50ML
2 SOLUTION INTRAVENOUS SEE ADMIN INSTRUCTIONS
Status: DISCONTINUED | OUTPATIENT
Start: 2025-05-04 | End: 2025-05-04

## 2025-05-04 RX ORDER — LIDOCAINE 40 MG/G
CREAM TOPICAL
Status: DISCONTINUED | OUTPATIENT
Start: 2025-05-04 | End: 2025-06-03 | Stop reason: HOSPADM

## 2025-05-04 RX ORDER — HEPARIN SODIUM 5000 [USP'U]/.5ML
5000 INJECTION, SOLUTION INTRAVENOUS; SUBCUTANEOUS
Status: DISCONTINUED | OUTPATIENT
Start: 2025-05-04 | End: 2025-05-04

## 2025-05-04 RX ADMIN — Medication 100 MG: at 10:24

## 2025-05-04 RX ADMIN — MAGNESIUM SULFATE HEPTAHYDRATE: 500 INJECTION, SOLUTION INTRAMUSCULAR; INTRAVENOUS at 20:38

## 2025-05-04 RX ADMIN — CALCIUM GLUCONATE 2 G: 20 INJECTION, SOLUTION INTRAVENOUS at 11:46

## 2025-05-04 RX ADMIN — HYDROMORPHONE HYDROCHLORIDE 0.5 MG: 1 INJECTION, SOLUTION INTRAMUSCULAR; INTRAVENOUS; SUBCUTANEOUS at 10:11

## 2025-05-04 RX ADMIN — CALCIUM CHLORIDE, MAGNESIUM CHLORIDE, DEXTROSE MONOHYDRATE, LACTIC ACID, SODIUM CHLORIDE, SODIUM BICARBONATE AND POTASSIUM CHLORIDE 12.5 ML/KG/HR: 5.15; 2.03; 22; 5.4; 6.46; 3.09; .157 INJECTION INTRAVENOUS at 05:37

## 2025-05-04 RX ADMIN — Medication 200 MCG/HR: at 16:47

## 2025-05-04 RX ADMIN — CALCIUM CHLORIDE, MAGNESIUM CHLORIDE, DEXTROSE MONOHYDRATE, LACTIC ACID, SODIUM CHLORIDE, SODIUM BICARBONATE AND POTASSIUM CHLORIDE 12.5 ML/KG/HR: 5.15; 2.03; 22; 5.4; 6.46; 3.09; .157 INJECTION INTRAVENOUS at 20:02

## 2025-05-04 RX ADMIN — ALTEPLASE 2 MG: 2.2 INJECTION, POWDER, LYOPHILIZED, FOR SOLUTION INTRAVENOUS at 00:49

## 2025-05-04 RX ADMIN — FENTANYL CITRATE 100 MCG: 50 INJECTION INTRAMUSCULAR; INTRAVENOUS at 10:29

## 2025-05-04 RX ADMIN — NOREPINEPHRINE BITARTRATE 6.4 MCG: 1 INJECTION, SOLUTION, CONCENTRATE INTRAVENOUS at 08:18

## 2025-05-04 RX ADMIN — CALCIUM CHLORIDE, MAGNESIUM CHLORIDE, SODIUM CHLORIDE, SODIUM BICARBONATE, POTASSIUM CHLORIDE AND SODIUM PHOSPHATE DIBASIC DIHYDRATE 15 ML/KG/HR: 3.68; 3.05; 6.34; 3.09; .314; .187 INJECTION INTRAVENOUS at 20:02

## 2025-05-04 RX ADMIN — SMOFLIPID 250 ML: 6; 6; 5; 3 INJECTION, EMULSION INTRAVENOUS at 20:50

## 2025-05-04 RX ADMIN — FENTANYL CITRATE 50 MCG: 50 INJECTION INTRAMUSCULAR; INTRAVENOUS at 09:05

## 2025-05-04 RX ADMIN — DEXMEDETOMIDINE HYDROCHLORIDE 0.2 MCG/KG/HR: 4 INJECTION, SOLUTION INTRAVENOUS at 09:18

## 2025-05-04 RX ADMIN — Medication 30 MG: at 08:12

## 2025-05-04 RX ADMIN — Medication 20 MG: at 09:32

## 2025-05-04 RX ADMIN — MIDAZOLAM 2 MG: 1 INJECTION INTRAMUSCULAR; INTRAVENOUS at 07:55

## 2025-05-04 RX ADMIN — CALCIUM CHLORIDE, MAGNESIUM CHLORIDE, SODIUM CHLORIDE, SODIUM BICARBONATE, POTASSIUM CHLORIDE AND SODIUM PHOSPHATE DIBASIC DIHYDRATE: 3.68; 3.05; 6.34; 3.09; .314; .187 INJECTION INTRAVENOUS at 00:03

## 2025-05-04 RX ADMIN — HEPARIN SODIUM 5000 UNITS: 5000 INJECTION, SOLUTION INTRAVENOUS; SUBCUTANEOUS at 02:01

## 2025-05-04 RX ADMIN — FENTANYL CITRATE 50 MCG: 50 INJECTION INTRAMUSCULAR; INTRAVENOUS at 09:02

## 2025-05-04 RX ADMIN — CALCIUM CHLORIDE, MAGNESIUM CHLORIDE, SODIUM CHLORIDE, SODIUM BICARBONATE, POTASSIUM CHLORIDE AND SODIUM PHOSPHATE DIBASIC DIHYDRATE 15 ML/KG/HR: 3.68; 3.05; 6.34; 3.09; .314; .187 INJECTION INTRAVENOUS at 00:36

## 2025-05-04 RX ADMIN — THIAMINE HYDROCHLORIDE 100 MG: 100 INJECTION, SOLUTION INTRAMUSCULAR; INTRAVENOUS at 07:42

## 2025-05-04 RX ADMIN — PIPERACILLIN AND TAZOBACTAM 4.5 G: 4; .5 INJECTION, POWDER, LYOPHILIZED, FOR SOLUTION INTRAVENOUS at 22:44

## 2025-05-04 RX ADMIN — Medication 20 MG: at 08:29

## 2025-05-04 RX ADMIN — Medication 50 MCG: at 21:02

## 2025-05-04 RX ADMIN — HEPARIN SODIUM 5000 UNITS: 5000 INJECTION, SOLUTION INTRAVENOUS; SUBCUTANEOUS at 19:50

## 2025-05-04 RX ADMIN — Medication 30 MG: at 08:48

## 2025-05-04 RX ADMIN — CALCIUM CHLORIDE, MAGNESIUM CHLORIDE, SODIUM CHLORIDE, SODIUM BICARBONATE, POTASSIUM CHLORIDE AND SODIUM PHOSPHATE DIBASIC DIHYDRATE 15 ML/KG/HR: 3.68; 3.05; 6.34; 3.09; .314; .187 INJECTION INTRAVENOUS at 15:28

## 2025-05-04 RX ADMIN — CALCIUM CHLORIDE, MAGNESIUM CHLORIDE, DEXTROSE MONOHYDRATE, LACTIC ACID, SODIUM CHLORIDE, SODIUM BICARBONATE AND POTASSIUM CHLORIDE 12.5 ML/KG/HR: 5.15; 2.03; 22; 5.4; 6.46; 3.09; .157 INJECTION INTRAVENOUS at 14:30

## 2025-05-04 RX ADMIN — Medication 50 MCG: at 11:15

## 2025-05-04 RX ADMIN — Medication 100 MG: at 10:17

## 2025-05-04 RX ADMIN — CEFAZOLIN SODIUM 2 G: 2 SOLUTION INTRAVENOUS at 08:59

## 2025-05-04 RX ADMIN — PANTOPRAZOLE SODIUM 40 MG: 40 INJECTION, POWDER, FOR SOLUTION INTRAVENOUS at 07:42

## 2025-05-04 RX ADMIN — SODIUM CHLORIDE, SODIUM LACTATE, POTASSIUM CHLORIDE, AND CALCIUM CHLORIDE: .6; .31; .03; .02 INJECTION, SOLUTION INTRAVENOUS at 07:52

## 2025-05-04 RX ADMIN — PIPERACILLIN AND TAZOBACTAM 4.5 G: 4; .5 INJECTION, POWDER, LYOPHILIZED, FOR SOLUTION INTRAVENOUS at 16:06

## 2025-05-04 RX ADMIN — Medication 50 MCG: at 04:10

## 2025-05-04 RX ADMIN — SODIUM CHLORIDE, SODIUM GLUCONATE, SODIUM ACETATE, POTASSIUM CHLORIDE AND MAGNESIUM CHLORIDE: 526; 502; 368; 37; 30 INJECTION, SOLUTION INTRAVENOUS at 08:15

## 2025-05-04 RX ADMIN — DEXMEDETOMIDINE HYDROCHLORIDE 4 MCG: 100 INJECTION, SOLUTION INTRAVENOUS at 09:16

## 2025-05-04 RX ADMIN — Medication 150 MCG/HR: at 03:43

## 2025-05-04 RX ADMIN — SODIUM CHLORIDE 500 ML: 0.9 INJECTION, SOLUTION INTRAVENOUS at 15:21

## 2025-05-04 RX ADMIN — PIPERACILLIN AND TAZOBACTAM 4.5 G: 4; .5 INJECTION, POWDER, LYOPHILIZED, FOR SOLUTION INTRAVENOUS at 03:45

## 2025-05-04 RX ADMIN — PIPERACILLIN AND TAZOBACTAM 4.5 G: 4; .5 INJECTION, POWDER, LYOPHILIZED, FOR SOLUTION INTRAVENOUS at 09:30

## 2025-05-04 RX ADMIN — Medication 100 MCG: at 16:04

## 2025-05-04 RX ADMIN — CALCIUM CHLORIDE, MAGNESIUM CHLORIDE, SODIUM CHLORIDE, SODIUM BICARBONATE, POTASSIUM CHLORIDE AND SODIUM PHOSPHATE DIBASIC DIHYDRATE 15 ML/KG/HR: 3.68; 3.05; 6.34; 3.09; .314; .187 INJECTION INTRAVENOUS at 07:28

## 2025-05-04 ASSESSMENT — ACTIVITIES OF DAILY LIVING (ADL)
ADLS_ACUITY_SCORE: 56
ADLS_ACUITY_SCORE: 52
ADLS_ACUITY_SCORE: 56
ADLS_ACUITY_SCORE: 56
ADLS_ACUITY_SCORE: 52
ADLS_ACUITY_SCORE: 56
ADLS_ACUITY_SCORE: 52

## 2025-05-04 NOTE — OP NOTE
[facility]     Brief Operative Note     Pre-operative diagnosis:         Pancreatitis [K85.90]  Post-operative diagnosis        Same as pre-operative diagnosis     Procedure:      Laparotomy Exploratory, Open Pancreatic Necrosectomy, Abdominal Wash Out, Temporary Abdominal Closure, N/A - Abdomen     Surgeon:         Surgeons and Role:     * Brendon Haas,  - Primary     * Ganesh Griffiths MD - Resident - Assisting     * Lana Waters MD - Resident - Assisting  Anesthesia:     General             Estimated Blood Loss: 200ml     Drains:  Additional 19 Fr pelvic drain existing from RLQ.   Specimens:     * No specimens in log *  Findings: Necrotic pancreatic tail debrided with viable pancreatic body remaining. Suture ligation of bleeding left gastroepiploic artery. Debridement of necrotic fat along left mid abd. Abd left open and Abthera replaced.   Complications:            None.  Implants:         * No implants in log *    Indications: 29-year-old male with history of severe acute necrotizing pancreatitis transferred from outside facility with open abdomen and transverse colectomy secondary to colonic perforation.  He remains in the ICU with severe necrotizing pancreatitis taking back to the OR for additional washout and necrosectomy.  Risk and benefits of the procedure were discussed in detail with the patient's mother who signed consent.    Procedure: Patient brought to the operating room and placed in the supine position.  Arms remained out.  The abdomen was prepped and draped in the usual sterile fashion.  Timeout was performed ensuring right patient right procedure.  ABThera device was removed.  Reexploration of the abdomen started with gentle blunt dissection of the small bowel contents from the way around to the abdominal wall, ensuring breaking up any loculated fluid collections.  Chavarria retractor was set up and the stomach was  retracted cephalad.  This gave full access to the lesser sac and reevaluation of previous necrosectomy area.  Small amount of clot was removed from the lesser sac.  The lesser sac was irrigated and washed out.  Additional areas of pancreatic necrosis were identified around the tail and the uncinate process.  These were bluntly dissected free and passed off the field.  Small amount of oozing was noted around the celiac plexus this was controlled with Surgicel.  Bleeding vessel around the gastroepiploic on the greater curvature of the stomach was noticed this was suture-ligated with 2-0 silk sutures x 2.  Hemostasis was achieved.    The lesser sac was reevaluated and small areas of fat necrosis from surrounding structures was further removed.  The splenic artery was identified and still had a palpable pulse.  There was a segment of pancreatic body still perfused and densely adhered to the retroperitoneum.  It was decided not to attempt to remove this at this time due to concerns of possible excessive bleeding.    The abdomen was irrigated with 3 L of warm saline water ensuring to break up any identifiable pockets of necrotic fluid and fat.      Fibrillar was placed into the lesser sac around the exposed celiac plexus and SMA.    An additional 19 [place] Jaxon drain was placed through the right lower quadrant into the pelvis.    24 Bol[place] Jaxon drain was repositioned back into the lesser sac, the left lower quadrant 19 [place] Jaxon drain was placed along the left paracolic gutter the right upper quadrant 19 Bol[place] Jaxon drain was placed along the right pericolic gutter with the tip up by the liver.  The 26 [place] Malecot colostomy tube was evaluated and showed no evidence of stool leaking around.  GJ tube from outside hospital was still intact.    Temporary abdominal closure was obtained with ABThera device.    Patient tolerated the procedure well and was transferred back to the ICU in critical condition.

## 2025-05-04 NOTE — PROGRESS NOTES
Surgery Progress Note  05/04/2025       Subjective:  No acute overnight events. Remains intubated, ventilated, and sedated. On levo 0.1 and vaso 1.5, stable overnight MAPs around 70s . On insulin gtt. Drains remain in place. WBC downtrending from 18 to 14 (remains elevated). Hemoglobin stable at 8.2.      Objective:  Temp:  [97.2  F (36.2  C)-100.4  F (38  C)] 99.7  F (37.6  C)  Pulse:  [] 94  Resp:  [13-34] 13  MAP:  [55 mmHg-86 mmHg] 72 mmHg  Arterial Line BP: ()/(41-67) 124/52  FiO2 (%):  [40 %-45 %] 40 %  SpO2:  [91 %-100 %] 100 %    I/O last 3 completed shifts:  In: 3538.99 [I.V.:965.56; Other:0.9; IV Piggyback:830]  Out: 2394.4 [Urine:299; Drains:928; Other:1167.4]      Gen: Sedated, arouses to painful stimuli  Resp: Ventilated, trach  Abd: Rigid, tender to palpation diffusely. Abthera in place draining copious sanguinous output, SHANNON drains with stable sanguineous output in bulbs. Bulbs to suction. Malinkrodt drain with stool in bag.    Ext: WWP, no edema     Labs:  Recent Labs   Lab 05/04/25  0331 05/03/25  1820 05/03/25  1413 05/03/25  0339 05/02/25  0703 05/02/25  0413   WBC 14.5*  --   --  18.1*  --  26.7*   HGB 8.2* 8.7* 9.4* 8.5*  8.5*   < > 9.8*     --   --  180  --  169    < > = values in this interval not displayed.       Recent Labs   Lab 05/04/25  0723 05/04/25  0608 05/04/25  0331 05/03/25  2209 05/03/25  2054 05/03/25  1244 05/03/25  1141   NA  --   --  137  137  --  137  --  137   POTASSIUM  --   --  3.8  3.8  --  3.8  --  4.3   CHLORIDE  --   --  106  106  --  106  --  106   CO2  --   --  18*  18*  --  19*  --  19*   BUN  --   --  67.3*  67.3*  --  67.4*  --  74.4*   CR  --   --  1.24*  1.24*  --  1.22*  --  1.36*   * 109* 132*  145*  145*   < > 125*  121*   < > 98  107*   KARINA  --   --  7.5*  7.5*  --  7.3*  --  7.6*   MAG  --   --  2.3  --  1.8  --  2.1   PHOS  --   --  4.2  --  4.4  --  4.9*    < > = values in this interval not displayed.        Imaging:  CTAP OSH 04/27:    Impression    1.  Worsening sequela of necrotizing pancreatitis. Dominant peripancreatic collection has increased in size, now measuring 19 x 13 cm, previously 17 x 11 cm; increasing gas within this collection is worrisome for infection. Large volume ascites and fat necrosis elsewhere throughout the abdomen and pelvis has also increased from prior.  2.  New presumed blood products in the dominant peripancreatic collection, as well as layering within the left paracolic gutter, suspicious for interval bleeding. Recommend trending hemoglobin levels; CTA abdomen and pelvis could be considered if there is concern for active bleeding.  3.  Mild small bowel wall thickening, possibly reactive or sequela of enteritis.  4.  Similar right lower lobe consolidation.     Assessment/Plan:   Sebastián Rodas is a 29-year-old male with a history of alcohol use disorder, anxiety, and bipolar disorder who was admitted on 3/30/2025 for alcohol withdrawal following a recent binge, presenting with diffuse abdominal pain. Initial workup revealed leukocytosis and elevated lipase concerning for acute pancreatitis. He developed acute encephalopathy and was transferred to the ICU on 3/31, requiring intubation and vasopressors by 4/1 due to shock. Hospital course has been complicated by acute renal failure requiring CRRT, cardiomyopathy (initial LVEF 20-25%, improved to 65-70% by 4/14), and necrotizing pancreatitis with unorganized fluid collections. He completed a 14-day course of meropenem but remains intermittently febrile and critically ill, requiring ongoing dialysis and vasopressor support. On 4/27, after clinical deterioration and imaging consistent with a likely perforated viscus, following family discussion, he underwent emergent exploratory laparotomy, necrosectomy, transverse colectomy, abdominal washout, and drain placement. Patient was transferred to [facility] on 4/30/25 for further surgical  management. Went to OR 5/1 for re-open laparotomy, I&D, placement of colostomy tube in presumed previous colectomy staple line, necrosectomy, and Abthera placement. On 5/2, increasing sanguineous output from drains concerning for bleeding from pancreatic bed. Family care conference held 5/2, plan for restorative cares at this time. Take back to OR today (5/4) for abdominal washout.     Plan:  - OR today 5/4 for abdominal wash out, possible closure   - Other cares per SICU, we appreciate excellent cares.   - Surgery will continue to follow.     Seen, examined, and discussed with chief resident, who will discuss with staff.   - - - - - - - - - - - - - - - - - -  Casimiro Chirinos MD  Urology PGY-1  General Surgery Service

## 2025-05-04 NOTE — PROGRESS NOTES
Nephrology Progress Note  05/04/2025       Sebastián Rodas is a 29 yom with Bipolar disorder, ETOH use c/b necrotizing pancreatitis admitted 3/30/25 to [facility] for ETOH withdrawal and abdominal pain, found to have acute pancreatitis which has progressed to necrotizing pancreatitis complicated by SARAHI.  Seen by Nephrology at [place], started on CRRT 4/1.  Had periods of stability enough for iHD but back to CRRT on 4/21 until tx to [facility] for further surgical interventions.       Interval History :   CRRT did well overnight although some issues with the access and it had to have TPA in the venous port. Appears to be working better now. Labs are stable. Went for exploratory laparotomy this am. Debridement of necrotic fat, ligation of bleeding L gastroepiploic artery. Ab left open. Drains moved. Continyes to have bloody drainage. I/O just short of keeping even. WAs up about 900.     Assessment & Recommendations:   SARAHI-Baseline Cr 0.8 as recently as March 2025 before acute events, was started on CRRT emergently on 4/1 in setting of septic shock. Had ~2 weeks of stability enough to manage with iHD but back on CRRT 4/21 until tx to [facility] on 4/30. Running fevers with intraabdominal sepsis.  Had break from CRRT on admission but restarting today with plans for OR tomorrow.                  -No need for new consent, continuing RRT started last month at [facility].                 -Access is tunneled RIJ from 4/21.  Note this is not a subclavian line                  CRRT orders renewed. Will leave UF as a range. Somewhat low now post or but if stabilized can attempt I=O.          Volume-minimal UOP.  Attempting I=O      Electrolytes-bicarb is 18. And ivan  Will assess after these am labs. He is currently on mixed baths and K is stable. No change for now  BMD-No acute issues.      Discussed with nursing.      Maria D Boateng MD MS A total of 35 mintues was spent reviewing the chart, examining the patient and discussing  "the care plan.     Review of Systems:   I reviewed the following systems:  ROS not done due to vent/sedation.     Physical Exam:   I/O last 3 completed shifts:  In: 3538.99 [I.V.:965.56; Other:0.9; IV Piggyback:830]  Out: 2394.4 [Urine:299; Drains:928; Other:1167.4]   /74 (BP Location: Right arm)   Pulse 94   Temp 99.7  F (37.6  C)   Resp 13   Ht 1.727 m (5' 7.99\")   Wt 73.3 kg (161 lb 9.6 oz)   SpO2 100%   BMI 24.58 kg/m       Gen; trached opens eyes to voice  :L;ungs: clear anterior  Card: regular bu tachycardic  Abd: open, bowel sounds present multiple drains with sig sanguineous outpt.  Ext: no edema but some scrotal edema  Access Tunneled R internal jugular catheter    Labs:   All labs reviewed by me  Electrolytes/Renal -   Recent Labs   Lab Test 05/04/25  1119 05/04/25  0723 05/04/25  0608 05/04/25  0331 05/03/25  2209 05/03/25  2054 05/03/25  1244 05/03/25  1141   NA  --   --   --  137  137  --  137  --  137   POTASSIUM  --   --   --  3.8  3.8  --  3.8  --  4.3   CHLORIDE  --   --   --  106  106  --  106  --  106   CO2  --   --   --  18*  18*  --  19*  --  19*   BUN  --   --   --  67.3*  67.3*  --  67.4*  --  74.4*   CR  --   --   --  1.24*  1.24*  --  1.22*  --  1.36*   * 118* 109* 132*  145*  145*   < > 125*  121*   < > 98  107*   KARINA  --   --   --  7.5*  7.5*  --  7.3*  --  7.6*   MAG  --   --   --  2.3  --  1.8  --  2.1   PHOS  --   --   --  4.2  --  4.4  --  4.9*    < > = values in this interval not displayed.       CBC -   Recent Labs   Lab Test 05/04/25  0331 05/03/25  1820 05/03/25  1413 05/03/25  0339 05/02/25  0703 05/02/25  0413   WBC 14.5*  --   --  18.1*  --  26.7*   HGB 8.2* 8.7* 9.4* 8.5*  8.5*   < > 9.8*     --   --  180  --  169    < > = values in this interval not displayed.       LFTs -   Recent Labs   Lab Test 05/04/25  0331 05/03/25  2054 05/03/25  1141 05/03/25  0339 05/02/25  2224 05/02/25  0413   ALKPHOS 144  --   --  160*  --  212*   BILITOTAL " 0.6  --   --  0.8  --  0.6   ALT 13  --   --  22  --  38   AST 21  --   --  24  --  28   PROTTOTAL 4.3*  --   --  4.7*  --  4.5*   ALBUMIN 1.6*  1.6* 1.6* 1.7* 1.8*  1.8*   < > 1.9*    < > = values in this interval not displayed.       Iron Panel - No lab results found.        Current Medications:  Current Facility-Administered Medications   Medication Dose Route Frequency Provider Last Rate Last Admin    ceFAZolin (ANCEF) 2 g in dextrose 50 mL intermittent infusion  2 g Intravenous Pre-Op/Pre-procedure x 1 dose Ganesh Griffiths MD        ceFAZolin (ANCEF) 2 g in dextrose 50 mL intermittent infusion  2 g Intravenous See Admin Instructions Ganesh Griffiths MD   2 g at 05/04/25 0859    heparin ANTICOAGULANT injection 5,000 Units  5,000 Units Subcutaneous Pre-Op/Pre-procedure x 1 dose Ganesh Griffiths MD        heparin ANTICOAGULANT injection 5,000 Units  5,000 Units Subcutaneous Q8H JENNIFER Ganesh Griffiths MD   5,000 Units at 05/04/25 0201    lipids 4 oil (SMOFLIPID) 20 % infusion 250 mL  250 mL Intravenous Q24H Ganesh Griffiths MD 20 mL/hr at 05/04/25 0800 Rate Verify at 05/04/25 0800    micafungin (MYCAMINE) 100 mg in sodium chloride 0.9 % 100 mL intermittent infusion  100 mg Intravenous Q24H Ganesh Griffiths  mL/hr at 05/03/25 1659 100 mg at 05/03/25 1659    pantoprazole (PROTONIX) IV push injection 40 mg  40 mg Intravenous QAM AC Ganesh Griffiths MD   40 mg at 05/04/25 0742    piperacillin-tazobactam (ZOSYN) 4.5 g vial to attach to  mL bag  4.5 g Intravenous Q6H Ganesh Griffiths MD   4.5 g at 05/04/25 0930    sodium chloride (PF) 0.9% PF flush 3 mL  3 mL Intracatheter Q8H Ganesh Griffiths MD   3 mL at 05/04/25 0742    thiamine (B-1) injection 100 mg  100 mg Intravenous Daily Ganesh Griffiths MD   100 mg at 05/04/25 0742     Current Facility-Administered Medications   Medication Dose Route Frequency Provider Last Rate Last Admin    dexmedeTOMIDine  (PRECEDEX) 4 mcg/mL in sodium chloride 0.9 % 100 mL infusion  0.1-1.2 mcg/kg/hr (Dosing Weight) Intravenous Continuous Ganesh Griffiths MD 3.5 mL/hr at 05/04/25 1100 0.2 mcg/kg/hr at 05/04/25 1100    dextrose 10% infusion   Intravenous Continuous PRN Ganesh Griffiths MD        dextrose 10% infusion   Intravenous Continuous PRN Ganesh Griffiths MD        dialysate for CVVHD & CVVHDF (PrismaSol BGK 2/3.5)  12.5 mL/kg/hr CRRT Continuous Ganesh Griffiths  mL/hr at 05/04/25 0537 12.5 mL/kg/hr at 05/04/25 0537    fentaNYL (SUBLIMAZE) infusion   mcg/hr Intravenous Continuous Ganesh Griffiths MD 4 mL/hr at 05/04/25 1100 200 mcg/hr at 05/04/25 1100    insulin regular (MYXREDLIN) 1 unit/mL infusion  0-24 Units/hr Intravenous Continuous Ganesh Griffiths MD 3 mL/hr at 05/04/25 0847 3 Units/hr at 05/04/25 0847    lactated ringers infusion   Intravenous Continuous Ganesh Griffiths  mL/hr at 05/04/25 0800 Restarted at 05/04/25 1003    No heparin required   Does not apply Continuous PRN Ganesh Griffiths MD        norepinephrine (LEVOPHED) 16 mg in  mL infusion MAX CONC CENTRAL LINE  0.01-0.6 mcg/kg/min (Dosing Weight) Intravenous Continuous Ganesh Griffiths MD 11.8 mL/hr at 05/04/25 1100 0.18 mcg/kg/min at 05/04/25 1100    parenteral nutrition - ADULT compounded formula   CENTRAL LINE IV TPN CONTINUOUS Ganesh Griffiths MD 50 mL/hr at 05/04/25 0800 Rate Verify at 05/04/25 0800    POST-filter replacement solution for CVVHD & CVVHDF (Phoxillum BK4/2.5)   CRRT Continuous Ganesh Griffiths  mL/hr at 05/04/25 0003 New Bag at 05/04/25 0003    PRE-filter replacement solution for CVVHD & CVVHDF (Phoxillum BK4/2.5)  15 mL/kg/hr CRRT Continuous Ganesh Griffiths MD 1,100 mL/hr at 05/04/25 0728 15 mL/kg/hr at 05/04/25 0728    vasopressin 1 unit/mL MAX Conc (PITRESSIN) infusion  0.5-4 Units/hr Intravenous Continuous Ganesh Griffiths MD 1.5 mL/hr at  05/04/25 1100 1.5 Units/hr at 05/04/25 1100

## 2025-05-04 NOTE — PROGRESS NOTES
CRRT STATUS NOTE    DATA:  Time:  1806  Pressures WNL:  YES  Filter Status:  WDL    Problems Reported/Alarms Noted:  Pt to OR - filter changed, otherwise not issues during shift.    Supplies Present:  YES    ASSESSMENT:  Patient Net Fluid Balance:    +628 ml since midnight  Intake/Output Summary (Last 24 hours) at 5/4/2025 1806  Last data filed at 5/4/2025 1800  Gross per 24 hour   Intake 3423.38 ml   Output 2323.6 ml   Net 1099.78 ml                                                    Date 05/04/25 0700 - 05/05/25 0659   Shift 7622-5277 7601-7725 0590-4398 24 Hour Total   INTAKE   I.V. 874.67 128.69  1003.36   IV Piggyback 110 610  720      200   Shift Total(mL/kg) 1184.67(16.16) 738.69(10.08)  1923.36(26.24)   OUTPUT   Urine 95 5  100   Drains 700 170  870   Other 191.1 0  191.1   Blood 200   200   Shift Total(mL/kg) 1186.1(16.18) 175(2.39)  1361.1(18.57)   Weight (kg) 73.3 73.3 73.3 73.3                  I/O this shift:  In: 738.69 [I.V.:128.69; IV Piggyback:610]  Out: 175 [Urine:5; Drains:170]      Vital Signs:  Temp: 100.6  F (38.1  C) Temp src: Bladder   Pulse: 92   Resp: 19 SpO2: 100 % O2 Device: Mechanical Ventilator            Labs: K 3.6 (05/04 1115), Mag 2.0 (05/04 1115), Phos 4.3 (05/04 1115), Hgb 8.2 (05/04 1254), ICa 4.3          Goals of Therapy:  0-50 ml/hr - unable to achieve goal d/t return to OR in am and pressor needs.    INTERVENTIONS:   Filter changed.    PLAN:  Continue CRRT as per MD order. Notify CRRT Resource RN with any questions or concerns.

## 2025-05-04 NOTE — PLAN OF CARE
ICU End of Shift Summary. See flowsheets for vital signs and detailed assessment.    Changes this shift:  OR this morning for abd washout with new SHANNON placement (#4) and repositioning of SHANNON drain #1. PEEP changed to 7. Insulin gtt discontinued and patient transitioned to sliding scale. 2g Ca Gluc given for iCal 4.3. Patient requiring an increase in Levo and Vaso throughout the shift, 500mL NS bolus given and 1u PRBC given.     Currently: Patient sedated on Fent and Dex; arouses to voice, slow to respond but nods and intermittently follows commands; OTERO. TMax 100.6; SR-ST 70's-120's; requiring pressors to maintain MAP >65. Satting well on CMV settings 40/420/16/7. Strict NPO; 4 SHANNON drains and 1 [place] drain in place; open abd with wound vac in place (cannister changed). Patient remains on CRRT, unable to pull any fluid d/t hemodynamic instability.    Plan: Continue with POC, notify SICU with any acute changes.    Goal Outcome Evaluation:    Plan of Care Reviewed With: patient, family    Overall Patient Progress: declining    Outcome Evaluation: OR this morning for abd washout and drain placement. Increasing pressor needs throughout the day. 500mL NS bolus and 1u PRBC given.       regular rate and rhythm/no rub

## 2025-05-04 NOTE — BRIEF OP NOTE
[facility]    Brief Operative Note    Pre-operative diagnosis: Pancreatitis [K85.90]  Post-operative diagnosis Same as pre-operative diagnosis    Procedure: Laparotomy Exploratory, Open Pancreatic Necrosectomy, Abdominal Wash Out, Temporary Abdominal Closure, N/A - Abdomen    Surgeon: Surgeons and Role:     * Brendon Haas DO - Primary     * Ganesh Griffiths MD - Resident - Assisting     * Lana Waters MD - Resident - Assisting  Anesthesia: General   Estimated Blood Loss: 200ml    Drains:  Additional 19 Fr pelvic drain existing from RLQ.   Specimens: * No specimens in log *  Findings: Necrotic pancreatic tail debrided with viable pancreatic body remaining. Suture ligation of bleeding left gastroepiploic artery. Debridement of necrotic fat along left mid abd. Abd left open and Abthera replaced.   Complications: None.  Implants: * No implants in log *

## 2025-05-04 NOTE — PROGRESS NOTES
CRRT STATUS NOTE    DATA:  Time:   0600 AM  Pressures WNL:  YES  Filter Status:  WDL    Problems Reported/Alarms Noted:  None.    Supplies Present:  YES    ASSESSMENT:  Patient Net Fluid Balance:  Net  +240 ml since MN.  Vital Signs:  HR 84, /51, MAP 69  Labs:  K 3.8, Mg 2.3, Phos 4.2, iCa 4.5, Hgb 8.2, Plt 165  Goals of Therapy:  0-50mL/hr    INTERVENTIONS:   TPA instilled in return line x2. Recirc x2, failed second time, so set changed.    PLAN:  Continue to monitor circuit daily and change set q72 hours or PRN for clotting/clogging. Please call CRRT RN with any quesitons/problems.

## 2025-05-04 NOTE — ANESTHESIA CARE TRANSFER NOTE
Patient: Sebastián Rodas    Procedure: Procedure(s):  Laparotomy Exploratory, Open Pancreatic Necrosectomy, Abdominal Wash Out, Temporary Abdominal Closure       Diagnosis: Pancreatitis [K85.90]  Diagnosis Additional Information: No value filed.    Anesthesia Type:   General     Note:    Oropharynx: ventilatory support  Level of Consciousness: iatrogenic sedation      Independent Airway: airway patency not satisfactory and stable  Dentition: dentition changed  Vital Signs Stable: post-procedure vital signs reviewed and stable  Report to RN Given: handoff report given  Patient transferred to: ICU    ICU Handoff: Call for PAUSE to initiate/utilize ICU HANDOFF, Identified Patient, Identified Responsible Provider, Reviewed the Pertinent Medical History, Discussed Surgical Course, Reviewed Intra-OP Anesthesia Management and Issues during Anesthesia, Set Expectations for Post Procedure Period and Allowed Opportunity for Questions and Acknowledgement of Understanding      Vitals:  Vitals Value Taken Time   /47    Temp     Pulse 117    Resp     SpO2 99        Electronically Signed By: ASIF Bray CRNA  May 4, 2025  10:48 AM

## 2025-05-04 NOTE — PLAN OF CARE
Goal Outcome Evaluation:  ICU End of Shift Summary. See flowsheets for vital signs and detailed assessment.    Changes this shift:   CRRT went down at change of shift last maranda. (Sat) After restarting, many access pressure alarms.  No blood return from blue port.  Order obtained for TPA for line.  Blue line TPA'd x 2, before getting good blood return.  Has been running well sinc 0300.  Despite CRRT being down for several hours, pt only reasonably positive for fluid this am.  Pt still putting out from SHANNON drains etc.      Pt more interactive this shift. Asking to be suctioned etc.  Also more grimacing with turns.  PRN fentanyl given .        Plan:   Possible OR today for a washout.

## 2025-05-04 NOTE — PROGRESS NOTES
SURGICAL ICU PROGRESS NOTE  05/04/2025      Date of Service (when I saw the patient): 05/04/2025    ASSESSMENT:  Sebastián Rodas is a 29M with alcohol abuse, anxiety, and bipolar disorder, who was admitted 3/30/25 for alcohol withdrawal and abdominal pain. Workup showed leukocytosis and elevated lipase, consistent with acute pancreatitis. He developed encephalopathy and shock, requiring ICU care, intubation, vasopressors, and CRRT by 4/1. His course was complicated by cardiomyopathy (EF 20-25%, improved to 65-70% by 4/14), necrotizing pancreatitis, and persistent infection despite 14 days of meropenem. On 4/27, imaging showed likely perforated viscus; he underwent emergent laparotomy, necrosectomy, and colectomy. Transferred to [facility] SICU on 4/30. On 5/1, reoperation revealed colonic staple line breakdown, necrotic pancreas, vessel thrombosis, and adhesions. Colostomy with malankot drain and temporary abdominal closure performed. Prognosis is poor; palliative care involved. Care conference 5/2 with family to talk goasl of care. Full code, family acknowledged that poor prognosis is to be expected. Today 5/4 s/p exploratory surgery, additional necrotic pancreas remove with no obvious spillage or sign of bleeding.     CHANGES and MAJOR THINGS TODAY:   -Insulin drip discontinue, change to sliding scale  -Fentanyl for pain controlled increased  -Stopped Micafungin  -Additional drain present from surgery  -CBC ordered PM to trend hbg  -Continue rest of care per the note    PLAN:    Neurological:  # Acute pain   # Agitation  # Encephalopathy - Supportive Care  - Monitor neurological status. Delirium preventions and precautions.   # Pain: Fentanyl Drip, Fentanyl PRN bolus   # Sedation: Precedex    Pulmonary:   # Acute hypoxic respiratory support  - VENT: FiO2 (%): 30 %, Resp: 26, Vent Mode: VC/AC, Resp Rate (Set): 16 breaths/min, Tidal Volume (Set, mL): 420 mL, PEEP (cm H2O): 5 cmH2O, Resp Rate (Set): 16 breaths/min, Tidal  "Volume (Set, mL): 550 mL, PEEP (cm H2O): 5 cmH2O  - Continue full vent support. PST when meets criteria.  Ventilatory bundle.    Cardiovascular:    # Cardiomyopathy   - Initial LVEF 20-25%, improved to 65-70%  # Shock-distributive (septic): Concern for developing sepsis with rising WBC (28K) and higher pressor requirements    - Monitor hemodynamic status  - On pressor Norepinephrine  - On Vasopressin    Gastroenterology/Nutrition:  # S/p emergent exploratory laparotomy, necrosectomy, transverse colectomy, abdominal washout, and drain placement  #Severe necrotizing pancreatitis  - Secondary to alcohol use disorder, splenic vein thrombosis, ongoing fevers  # Protein calorie deficit malnutrition    - NPO, no exceptions  - PPI   - RD consult. Appreciate cares and recommendations.     \"% Intake: Decreased intake does not meet criteria - PN at OSH  % Weight Loss: > 5% in 1 month (severe)   Subcutaneous Fat Loss: Orbital: Mild and Buccal: Mild  Muscle Loss: Temples (temporalis muscle): Mild, Shoulders (deltoids): Mild, Interosseous muscles: Mild, Thigh (quadriceps): Moderate, and Calf (gastrocnemius): Mild  Fluid Accumulation/Edema: Does not meet criteria  Malnutrition Diagnosis: Severe malnutrition in the context of acute illness or injury  Malnutrition Present on Admission: Yes    Renal/Fluids/Electrolytes:   #AGMA  - Likely secondary to SARAHI, hypoperfusion, ongoing diarrhea, lactate not elevated    #Severe hypocalcemia (8.1)  - 2/2 pancreatitis, bicarb, PTH resistance from hypomagnesemia, and Vit D deficiency  - Replete as indicated       # Acute kidney injury  # CCRT  Baseline Cr 0.7-0.8. Presented with Cr 0.96 on 3/30. Rapidly markos to 5.2 on 4/1. Oliguric. Garcia UA with proteinuria, hematuria, pyuria, moderate bacteria.  Kidneys unremarkable on CT. Has severe ATN in the setting of shock, ADHF, intravascular hypovolemia/3rd spacing from pancreatitis.   --CRRT 4/1-4/4.   - IHD with levophed started 4/5 but poor CVC " function / HoTN limiting.   - Line exchanged 4/10 and again 4/19 - due to poor flow.  - PCAD placed 4/21 by IR.   - CRRT resumed 4/21/25 for fluid overload and assist with more aggressive UF in setting of shock.    - Off pressors 4/30 - plan at outside hospital was to continue CRRT today and move to IHD tomorrow.  - Back on pressor and CRRT 5/1   - Will continue to monitor intake and output.  - Continue Garcia   - Nephrology consulted, managing CRRT     Endocrine:   # Stress hyperglycemia    - Sliding scale for glucose management   - Goal to keep BG< 180 for optimal wound healing     ID:  # Leukocytosis  - WBC 14.5 (18.1)  - Completed 14 days course of meropenem and caspofungin.   - Zosyn added, meropenem discontinued  - Discontinued micafungin    Positive cultures:  - 4/30 blood cultures obtained   - 5/1 blood cultures ordered - NGTD    Heme:     # Acute blood loss anemia  # Thrombosed splenic vein   - Hemoglobin 8.2 (8.7)  - Transfuse if hgb <7.0 or signs/symptoms of hypoperfusion. Monitor and trend  - Multiple transfusion in OR 5/1  - SQH    Musculoskeletal:   # Deconditioning   - Physical and occupational therapy consult     Skin:  # Pressure Ulcers - Buttocks/rectal Area   - Barrier cream with liberal application. Continue fecal management system   - WOC consult     #Pressure Ulcers- Left Heel  Pressure Injury Location: Left heel   Wound type: Pressure Injury     Pressure Injury Stage: Deep Tissue Pressure Injury (DTPI), present on admission    General Cares/Prophylaxis:    DVT Prophylaxis: SQH  GI Prophylaxis: PPI  Restraints: Restraints for medical healing needed: YES    Lines/ tubes/ drains:  - SHANNON x 4  - Drain 4 - Colostomy drain  - PICC line  - A line  - Trach     Disposition:  - Surgical ICU    Patient to be discussed with Dr. Saad Pierre MD  Plastic Surgery    - - - - - - - - - - - - - - - - - - - - - - - - - - - - - - - - - - - - - - - - - - - - - -   HISTORY PRESENTING ILLNESS: Sebastián Rodas  is a 29M with alcohol abuse, anxiety, and bipolar disorder, who was admitted 3/30/25 for alcohol withdrawal and abdominal pain. Workup showed leukocytosis and elevated lipase, consistent with acute pancreatitis. He developed encephalopathy and shock, requiring ICU care, intubation, vasopressors, and CRRT by 4/1. His course was complicated by cardiomyopathy (EF 20-25%, improved to 65-70% by 4/14), necrotizing pancreatitis, and persistent infection despite 14 days of meropenem. On 4/27, imaging showed likely perforated viscus; he underwent emergent laparotomy, necrosectomy, and colectomy. Transferred to [facility] SICU on 4/30. On 5/1, reoperation revealed colonic staple line breakdown, necrotic pancreas, vessel thrombosis, and adhesions. Colostomy with malankot drain and temporary abdominal closure performed. Prognosis is poor; palliative care involved. Care conference 5/2 with family to talk goasl of care. Full code, family acknowledged that poor prognosis is to be expected. Today 5/4 s/p exploratory surgery, additional necrotic pancreas remove with no obvious spillage or sign of bleeding.     REVIEW OF SYSTEMS: 10 point ROS neg other than the symptoms noted above in the HPI.    PAST MEDICAL HISTORY:   -AUD  -Bipolar  -Alcohol Substance Use    SURGICAL HISTORY:   S/p emergent exploratory laparotomy, necrosectomy, transverse colectomy, abdominal washout, and drain placement    SOCIAL HISTORY:   Social History     Socioeconomic History    Marital status: Single   Tobacco Use    Smoking status: Never    Smokeless tobacco: Never   Substance and Sexual Activity    Alcohol use: No     Alcohol/week: 0.0 standard drinks of alcohol    Drug use: No    Sexual activity: Yes     Partners: Female     Social Drivers of Health     Financial Resource Strain: Low Risk  (3/31/2025)    Received from [facility]    Financial Resource Strain     Difficulty of Paying Living Expenses: 3   Food Insecurity: No Food  Insecurity (3/31/2025)    Received from [facility]    Food Insecurity     Do you worry your food will run out before you are able to buy more?: 1   Transportation Needs: Unmet Transportation Needs (3/31/2025)    Received from [facility]    Transportation Needs     Does lack of transportation keep you from medical appointments?: 1     Does lack of transportation keep you from work, meetings or getting things that you need?: 2   Physical Activity: Not on File (8/26/2019)    Received from [facility]    Physical Activity     Physical Activity: 0   Stress: Not on File (8/26/2019)    Received from [facility]    Stress     Stress: 0   Social Connections: Socially Isolated (3/31/2025)    Received from [facility]    Social Connections     Do you often feel lonely or isolated from those around you?: 4   Housing Stability: Low Risk  (3/31/2025)    Received from [facility]    Housing Stability     What is your housing situation today?: 1     ALLERGIES:   No Known Allergies    MEDICATIONS:  Current Facility-Administered Medications   Medication Dose Route Frequency Provider Last Rate Last Admin    dextrose 10% infusion   Intravenous Continuous Roxi Alston MD   Stopped at 05/01/25 0930    dextrose 10% infusion   Intravenous Continuous PRN Roxi Alston MD        [Auto Hold] glucose gel 15-30 g  15-30 g Oral Q15 Min PRRoxi Bingham MD        Or    [Auto Hold] dextrose 50 % injection 25-50 mL  25-50 mL Intravenous Q15 Min PRRoxi Bingham MD        Or    [Auto Hold] glucagon injection 1 mg  1 mg Subcutaneous Q15 Min PRRoxi Binghma MD        [Auto Hold] fentaNYL (SUBLIMAZE) 50 mcg/mL bolus from pump  50 mcg Intravenous Q1H PRRoxi Bingham MD        fentaNYL (SUBLIMAZE) infusion   mcg/hr Intravenous Continuous Roxi Alston MD   Stopped at 05/01/25 0930    heparin 25,000 units in 0.45% NaCl 250 mL  ANTICOAGULANT infusion  0-5,000 Units/hr Intravenous Continuous Roxi Alston MD   Stopped at 05/01/25 0930    [Auto Hold] insulin aspart (NovoLOG) injection (RAPID ACTING)  1-12 Units Subcutaneous Q4H Roxi Alston MD        lipids 4 oil (SMOFLIPID) 20 % infusion 250 mL  250 mL Intravenous Q24H Roxi Alston MD        [Auto Hold] meropenem (MERREM) 500 mg vial to attach to  mL bag for ADULTS or 25 mL bag for PEDS  500 mg Intravenous Q24H Roxi Alston MD        [Auto Hold] micafungin (MYCAMINE) 100 mg in sodium chloride 0.9 % 100 mL intermittent infusion  100 mg Intravenous Q24H Roxi Alston MD        [Auto Hold] naloxone (NARCAN) injection 0.2 mg  0.2 mg Intravenous Q2 Min PRN Roxi Alston MD        Or    [Auto Hold] naloxone (NARCAN) injection 0.4 mg  0.4 mg Intravenous Q2 Min PRN Roxi Alston MD        Or    [Auto Hold] naloxone (NARCAN) injection 0.2 mg  0.2 mg Intramuscular Q2 Min PRN Roxi Alston MD        Or    [Auto Hold] naloxone (NARCAN) injection 0.4 mg  0.4 mg Intramuscular Q2 Min PRN Roxi Alston MD        [Auto Hold] pantoprazole (PROTONIX) IV push injection 40 mg  40 mg Intravenous QAM AC Roxi Alston MD   40 mg at 05/01/25 0758    parenteral nutrition - ADULT compounded formula   CENTRAL LINE IV TPN CONTINUOUS Roxi Alston MD        [Auto Hold] thiamine (B-1) injection 100 mg  100 mg Intravenous Daily Roxi Alston MD   100 mg at 05/01/25 0746     PHYSICAL EXAMINATION:  Temp:  [99.9  F (37.7  C)-102.2  F (39  C)] 101.7  F (38.7  C)  Pulse:  [122-147] 134  Resp:  [18-32] 26  BP: ()/(51-74) 82/57  FiO2 (%):  [30 %] 30 %  SpO2:  [97 %-100 %] 99 %    Gen: Resting comfortably in bed   Eyes: Nonicteric  ENT: Mucous Membranes Moist  Pulm: Nonlabored Breathing mechanical ventilation via trach  CV: S1, S2, no murmurs  Abd: abthera in place, SHANNON drains x 4 with dark thin sanguinous output, 1 colostomy drain, g-tube in place and clamped, remainder of abdomen is compressible   : Garcia  present  Extremities: warm and well perfused, palpable bilateral DP pulses  Neuro: Pt not able to cooperate due to sedation    LABS: Reviewed.   Arterial Blood Gases   No lab results found in last 7 days.  Complete Blood Count     Recent Labs   Lab 05/01/25  0540 05/01/25  0020 04/30/25  2201   WBC 14.3* 14.3* 15.4*   HGB 7.2* 7.4* 7.7*   * 112* 117*     Basic Metabolic Panel  Recent Labs   Lab 05/01/25  0745 05/01/25  0344 05/01/25  0126 05/01/25  0117 04/30/25  2349 04/30/25  2201   NA  --  136  --  135  --  134*   POTASSIUM  --  4.3  --  4.3  --  4.6   CHLORIDE  --  104  --  104  --  104   CO2  --  18*  --  18*  --  19*   BUN  --  47.3*  --  43.7*  --  39.2*   CR  --  2.03*  --  1.82*  --  1.59*   * 98  94 99 99   < > 124*    < > = values in this interval not displayed.     Liver Function Tests  Recent Labs   Lab 05/01/25  0344 04/30/25  2217 04/30/25  2201   *  --  126*   *  --  111*   ALKPHOS 349*  --  280*   BILITOTAL 1.0  --  0.9   ALBUMIN 2.5*  --  2.6*   INR  --  1.09  --      Pancreatic Enzymes  Recent Labs   Lab 04/30/25 2201   LIPASE 107*     Coagulation Profile  Recent Labs   Lab 04/30/25 2217   INR 1.09   PTT 24       IMAGING:  Recent Results (from the past 24 hours)   XR Chest Port 1 View    Narrative    EXAM: XR CHEST PORT 1 VIEW 4/30/2025 9:31 PM    DEMOGRAPHICS: 29 years Male    INDICATION: Endotracheal tube positioning    COMPARISON: None    TECHNIQUE: Single portable AP view of the chest.    FINDINGS:   Tracheostomy tube in the midthoracic trachea. Right IJ central venous  catheter with tip in the superior cavoatrial junction. Left PICC with  tip at the lower SVC.    The cardiomediastinal silhouette is within normal limits. Mild  perihilar and left basilar streaky opacities. Trace left pleural  effusion. No definite pneumothorax. Metallic opacity overlying the  left upper quadrant, external to the patient or post  surgical/intervention, consider correlation       Impression    IMPRESSION:     1.  Tracheostomy tube in the midthoracic trachea about 5.7 cm above  the nichole. Right IJ central venous catheter with tip near the  superior cavoatrial junction. Left PICC with tip in the low SVC.  2.  Mild perihilar and left basilar pulmonary opacities, possibly  atelectasis/pulmonary edema  3.  Trace left pleural effusion.  4.  Metallic opacity overlying the left upper quadrant, external to  the patient or post surgical/intervention, consider correlation    I have personally reviewed the examination and initial interpretation  and I agree with the findings.    KIAH TERRY MD         SYSTEM ID:  [id number]

## 2025-05-05 ENCOUNTER — APPOINTMENT (OUTPATIENT)
Dept: GENERAL RADIOLOGY | Facility: CLINIC | Age: 30
End: 2025-05-05
Attending: SURGERY
Payer: COMMERCIAL

## 2025-05-05 LAB
ALBUMIN SERPL BCG-MCNC: 1.6 G/DL (ref 3.5–5.2)
ALBUMIN SERPL BCG-MCNC: 1.6 G/DL (ref 3.5–5.2)
ALBUMIN SERPL BCG-MCNC: 1.7 G/DL (ref 3.5–5.2)
ALLEN'S TEST: ABNORMAL
ALP SERPL-CCNC: 161 U/L (ref 40–150)
ALT SERPL W P-5'-P-CCNC: 11 U/L (ref 0–70)
ANION GAP SERPL CALCULATED.3IONS-SCNC: 10 MMOL/L (ref 7–15)
ANION GAP SERPL CALCULATED.3IONS-SCNC: 11 MMOL/L (ref 7–15)
ANION GAP SERPL CALCULATED.3IONS-SCNC: 11 MMOL/L (ref 7–15)
AST SERPL W P-5'-P-CCNC: 20 U/L (ref 0–45)
BACTERIA BLD CULT: NO GROWTH
BACTERIA BLD CULT: NO GROWTH
BASE EXCESS BLDA CALC-SCNC: -3.2 MMOL/L (ref -3–3)
BILIRUB DIRECT SERPL-MCNC: 0.63 MG/DL (ref 0–0.3)
BILIRUB SERPL-MCNC: 0.8 MG/DL
BUN SERPL-MCNC: 43.6 MG/DL (ref 6–20)
BUN SERPL-MCNC: 49.2 MG/DL (ref 6–20)
BUN SERPL-MCNC: 49.2 MG/DL (ref 6–20)
CA-I BLD-MCNC: 4.5 MG/DL (ref 4.4–5.2)
CA-I BLD-MCNC: 4.7 MG/DL (ref 4.4–5.2)
CALCIUM SERPL-MCNC: 7.8 MG/DL (ref 8.8–10.4)
CALCIUM SERPL-MCNC: 7.9 MG/DL (ref 8.8–10.4)
CALCIUM SERPL-MCNC: 7.9 MG/DL (ref 8.8–10.4)
CHLORIDE SERPL-SCNC: 104 MMOL/L (ref 98–107)
CHLORIDE SERPL-SCNC: 104 MMOL/L (ref 98–107)
CHLORIDE SERPL-SCNC: 105 MMOL/L (ref 98–107)
CREAT SERPL-MCNC: 0.97 MG/DL (ref 0.67–1.17)
EGFRCR SERPLBLD CKD-EPI 2021: >90 ML/MIN/1.73M2
ERYTHROCYTE [DISTWIDTH] IN BLOOD BY AUTOMATED COUNT: 16.3 % (ref 10–15)
GLUCOSE BLDC GLUCOMTR-MCNC: 165 MG/DL (ref 70–99)
GLUCOSE BLDC GLUCOMTR-MCNC: 167 MG/DL (ref 70–99)
GLUCOSE BLDC GLUCOMTR-MCNC: 168 MG/DL (ref 70–99)
GLUCOSE BLDC GLUCOMTR-MCNC: 174 MG/DL (ref 70–99)
GLUCOSE BLDC GLUCOMTR-MCNC: 176 MG/DL (ref 70–99)
GLUCOSE BLDC GLUCOMTR-MCNC: 201 MG/DL (ref 70–99)
GLUCOSE BLDC GLUCOMTR-MCNC: 213 MG/DL (ref 70–99)
GLUCOSE SERPL-MCNC: 188 MG/DL (ref 70–99)
GLUCOSE SERPL-MCNC: 214 MG/DL (ref 70–99)
GLUCOSE SERPL-MCNC: 214 MG/DL (ref 70–99)
HCO3 BLD-SCNC: 22 MMOL/L (ref 21–28)
HCO3 SERPL-SCNC: 20 MMOL/L (ref 22–29)
HCO3 SERPL-SCNC: 20 MMOL/L (ref 22–29)
HCO3 SERPL-SCNC: 21 MMOL/L (ref 22–29)
HCT VFR BLD AUTO: 27 % (ref 40–53)
HGB BLD-MCNC: 8.6 G/DL (ref 13.3–17.7)
HGB BLD-MCNC: 8.8 G/DL (ref 13.3–17.7)
MAGNESIUM SERPL-MCNC: 2 MG/DL (ref 1.7–2.3)
MAGNESIUM SERPL-MCNC: 2.1 MG/DL (ref 1.7–2.3)
MCH RBC QN AUTO: 30 PG (ref 26.5–33)
MCHC RBC AUTO-ENTMCNC: 32.6 G/DL (ref 31.5–36.5)
MCV RBC AUTO: 92 FL (ref 78–100)
O2/TOTAL GAS SETTING VFR VENT: 40 %
OXYHGB MFR BLDA: 98 % (ref 92–100)
PATH REPORT.COMMENTS IMP SPEC: NORMAL
PATH REPORT.COMMENTS IMP SPEC: NORMAL
PATH REPORT.FINAL DX SPEC: NORMAL
PATH REPORT.GROSS SPEC: NORMAL
PATH REPORT.MICROSCOPIC SPEC OTHER STN: NORMAL
PATH REPORT.RELEVANT HX SPEC: NORMAL
PCO2 BLD: 40 MM HG (ref 35–45)
PH BLD: 7.35 [PH] (ref 7.35–7.45)
PHOSPHATE SERPL-MCNC: 3.2 MG/DL (ref 2.5–4.5)
PHOSPHATE SERPL-MCNC: 3.5 MG/DL (ref 2.5–4.5)
PHOTO IMAGE: NORMAL
PLATELET # BLD AUTO: 187 10E3/UL (ref 150–450)
PO2 BLD: 135 MM HG (ref 80–105)
POTASSIUM SERPL-SCNC: 3.4 MMOL/L (ref 3.4–5.3)
POTASSIUM SERPL-SCNC: 3.4 MMOL/L (ref 3.4–5.3)
POTASSIUM SERPL-SCNC: 3.9 MMOL/L (ref 3.4–5.3)
PROT SERPL-MCNC: 4.7 G/DL (ref 6.4–8.3)
RBC # BLD AUTO: 2.93 10E6/UL (ref 4.4–5.9)
SAO2 % BLDA: 99.8 % (ref 96–97)
SODIUM SERPL-SCNC: 135 MMOL/L (ref 135–145)
SODIUM SERPL-SCNC: 135 MMOL/L (ref 135–145)
SODIUM SERPL-SCNC: 136 MMOL/L (ref 135–145)
TRIGL SERPL-MCNC: 256 MG/DL
WBC # BLD AUTO: 14.8 10E3/UL (ref 4–11)

## 2025-05-05 PROCEDURE — 250N000011 HC RX IP 250 OP 636: Performed by: STUDENT IN AN ORGANIZED HEALTH CARE EDUCATION/TRAINING PROGRAM

## 2025-05-05 PROCEDURE — 85018 HEMOGLOBIN: CPT | Performed by: STUDENT IN AN ORGANIZED HEALTH CARE EDUCATION/TRAINING PROGRAM

## 2025-05-05 PROCEDURE — 250N000009 HC RX 250: Performed by: STUDENT IN AN ORGANIZED HEALTH CARE EDUCATION/TRAINING PROGRAM

## 2025-05-05 PROCEDURE — G0463 HOSPITAL OUTPT CLINIC VISIT: HCPCS

## 2025-05-05 PROCEDURE — 99221 1ST HOSP IP/OBS SF/LOW 40: CPT | Performed by: PHYSICIAN ASSISTANT

## 2025-05-05 PROCEDURE — 82330 ASSAY OF CALCIUM: CPT | Performed by: CLINICAL NURSE SPECIALIST

## 2025-05-05 PROCEDURE — 999N000157 HC STATISTIC RCP TIME EA 10 MIN

## 2025-05-05 PROCEDURE — 71045 X-RAY EXAM CHEST 1 VIEW: CPT | Mod: 26 | Performed by: STUDENT IN AN ORGANIZED HEALTH CARE EDUCATION/TRAINING PROGRAM

## 2025-05-05 PROCEDURE — 83735 ASSAY OF MAGNESIUM: CPT | Performed by: CLINICAL NURSE SPECIALIST

## 2025-05-05 PROCEDURE — 84100 ASSAY OF PHOSPHORUS: CPT | Performed by: STUDENT IN AN ORGANIZED HEALTH CARE EDUCATION/TRAINING PROGRAM

## 2025-05-05 PROCEDURE — 99418 PROLNG IP/OBS E/M EA 15 MIN: CPT | Performed by: CLINICAL NURSE SPECIALIST

## 2025-05-05 PROCEDURE — 82248 BILIRUBIN DIRECT: CPT | Performed by: STUDENT IN AN ORGANIZED HEALTH CARE EDUCATION/TRAINING PROGRAM

## 2025-05-05 PROCEDURE — 250N000011 HC RX IP 250 OP 636: Mod: JZ | Performed by: STUDENT IN AN ORGANIZED HEALTH CARE EDUCATION/TRAINING PROGRAM

## 2025-05-05 PROCEDURE — 84478 ASSAY OF TRIGLYCERIDES: CPT | Performed by: STUDENT IN AN ORGANIZED HEALTH CARE EDUCATION/TRAINING PROGRAM

## 2025-05-05 PROCEDURE — 94003 VENT MGMT INPAT SUBQ DAY: CPT

## 2025-05-05 PROCEDURE — B4185 PARENTERAL SOL 10 GM LIPIDS: HCPCS | Performed by: STUDENT IN AN ORGANIZED HEALTH CARE EDUCATION/TRAINING PROGRAM

## 2025-05-05 PROCEDURE — 85014 HEMATOCRIT: CPT | Performed by: STUDENT IN AN ORGANIZED HEALTH CARE EDUCATION/TRAINING PROGRAM

## 2025-05-05 PROCEDURE — 82040 ASSAY OF SERUM ALBUMIN: CPT | Performed by: STUDENT IN AN ORGANIZED HEALTH CARE EDUCATION/TRAINING PROGRAM

## 2025-05-05 PROCEDURE — 83735 ASSAY OF MAGNESIUM: CPT | Performed by: STUDENT IN AN ORGANIZED HEALTH CARE EDUCATION/TRAINING PROGRAM

## 2025-05-05 PROCEDURE — 250N000009 HC RX 250: Performed by: SURGERY

## 2025-05-05 PROCEDURE — 71045 X-RAY EXAM CHEST 1 VIEW: CPT

## 2025-05-05 PROCEDURE — 82805 BLOOD GASES W/O2 SATURATION: CPT | Performed by: STUDENT IN AN ORGANIZED HEALTH CARE EDUCATION/TRAINING PROGRAM

## 2025-05-05 PROCEDURE — 99233 SBSQ HOSP IP/OBS HIGH 50: CPT | Mod: FS | Performed by: CLINICAL NURSE SPECIALIST

## 2025-05-05 PROCEDURE — 999N000253 HC STATISTIC WEANING TRIALS

## 2025-05-05 PROCEDURE — 250N000012 HC RX MED GY IP 250 OP 636 PS 637

## 2025-05-05 PROCEDURE — 258N000003 HC RX IP 258 OP 636: Performed by: STUDENT IN AN ORGANIZED HEALTH CARE EDUCATION/TRAINING PROGRAM

## 2025-05-05 PROCEDURE — 82330 ASSAY OF CALCIUM: CPT | Performed by: STUDENT IN AN ORGANIZED HEALTH CARE EDUCATION/TRAINING PROGRAM

## 2025-05-05 PROCEDURE — 250N000009 HC RX 250: Performed by: CLINICAL NURSE SPECIALIST

## 2025-05-05 PROCEDURE — 94681 O2 UPTK CO2 OUTP % O2 XTRC: CPT

## 2025-05-05 PROCEDURE — 250N000011 HC RX IP 250 OP 636: Mod: JZ

## 2025-05-05 PROCEDURE — 90947 DIALYSIS REPEATED EVAL: CPT

## 2025-05-05 PROCEDURE — 84132 ASSAY OF SERUM POTASSIUM: CPT | Performed by: CLINICAL NURSE SPECIALIST

## 2025-05-05 PROCEDURE — 200N000002 HC R&B ICU UMMC

## 2025-05-05 PROCEDURE — 99291 CRITICAL CARE FIRST HOUR: CPT | Mod: 24 | Performed by: SURGERY

## 2025-05-05 PROCEDURE — 85041 AUTOMATED RBC COUNT: CPT | Performed by: STUDENT IN AN ORGANIZED HEALTH CARE EDUCATION/TRAINING PROGRAM

## 2025-05-05 PROCEDURE — 82310 ASSAY OF CALCIUM: CPT | Performed by: CLINICAL NURSE SPECIALIST

## 2025-05-05 RX ORDER — DEXTROSE MONOHYDRATE 100 MG/ML
INJECTION, SOLUTION INTRAVENOUS CONTINUOUS PRN
Status: DISCONTINUED | OUTPATIENT
Start: 2025-05-05 | End: 2025-05-12

## 2025-05-05 RX ORDER — MAGNESIUM SULFATE HEPTAHYDRATE 40 MG/ML
2 INJECTION, SOLUTION INTRAVENOUS EVERY 8 HOURS PRN
Status: DISCONTINUED | OUTPATIENT
Start: 2025-05-05 | End: 2025-05-10

## 2025-05-05 RX ORDER — CALCIUM CHLORIDE, MAGNESIUM CHLORIDE, SODIUM CHLORIDE, SODIUM BICARBONATE, POTASSIUM CHLORIDE AND SODIUM PHOSPHATE DIBASIC DIHYDRATE 3.68; 3.05; 6.34; 3.09; .314; .187 G/L; G/L; G/L; G/L; G/L; G/L
12.5 INJECTION INTRAVENOUS CONTINUOUS
Status: DISCONTINUED | OUTPATIENT
Start: 2025-05-05 | End: 2025-05-10

## 2025-05-05 RX ORDER — DEXMEDETOMIDINE HYDROCHLORIDE 4 UG/ML
.1-1.2 INJECTION, SOLUTION INTRAVENOUS CONTINUOUS
Status: CANCELLED | OUTPATIENT
Start: 2025-05-05

## 2025-05-05 RX ORDER — METHOCARBAMOL 100 MG/ML
500 INJECTION, SOLUTION INTRAMUSCULAR; INTRAVENOUS EVERY 8 HOURS SCHEDULED
Status: DISCONTINUED | OUTPATIENT
Start: 2025-05-05 | End: 2025-05-08

## 2025-05-05 RX ORDER — CALCIUM CHLORIDE, MAGNESIUM CHLORIDE, SODIUM CHLORIDE, SODIUM BICARBONATE, POTASSIUM CHLORIDE AND SODIUM PHOSPHATE DIBASIC DIHYDRATE 3.68; 3.05; 6.34; 3.09; .314; .187 G/L; G/L; G/L; G/L; G/L; G/L
INJECTION INTRAVENOUS CONTINUOUS
Status: DISCONTINUED | OUTPATIENT
Start: 2025-05-05 | End: 2025-05-10

## 2025-05-05 RX ORDER — CALCIUM GLUCONATE 20 MG/ML
2 INJECTION, SOLUTION INTRAVENOUS EVERY 8 HOURS PRN
Status: DISCONTINUED | OUTPATIENT
Start: 2025-05-05 | End: 2025-05-10

## 2025-05-05 RX ORDER — POTASSIUM CHLORIDE 29.8 MG/ML
20 INJECTION INTRAVENOUS EVERY 8 HOURS PRN
Status: DISCONTINUED | OUTPATIENT
Start: 2025-05-05 | End: 2025-05-10

## 2025-05-05 RX ADMIN — VASOPRESSIN 1.5 UNITS/HR: 20 INJECTION, SOLUTION INTRAMUSCULAR; SUBCUTANEOUS at 00:24

## 2025-05-05 RX ADMIN — Medication 50 MCG: at 01:14

## 2025-05-05 RX ADMIN — DEXMEDETOMIDINE HYDROCHLORIDE 0.3 MCG/KG/HR: 4 INJECTION, SOLUTION INTRAVENOUS at 10:22

## 2025-05-05 RX ADMIN — Medication 50 MCG: at 00:52

## 2025-05-05 RX ADMIN — Medication 50 MCG: at 06:15

## 2025-05-05 RX ADMIN — CALCIUM CHLORIDE, MAGNESIUM CHLORIDE, SODIUM CHLORIDE, SODIUM BICARBONATE, POTASSIUM CHLORIDE AND SODIUM PHOSPHATE DIBASIC DIHYDRATE 12.5 ML/KG/HR: 3.68; 3.05; 6.34; 3.09; .314; .187 INJECTION INTRAVENOUS at 10:25

## 2025-05-05 RX ADMIN — HEPARIN SODIUM 5000 UNITS: 5000 INJECTION, SOLUTION INTRAVENOUS; SUBCUTANEOUS at 03:45

## 2025-05-05 RX ADMIN — MAGNESIUM SULFATE HEPTAHYDRATE: 500 INJECTION, SOLUTION INTRAMUSCULAR; INTRAVENOUS at 20:03

## 2025-05-05 RX ADMIN — HEPARIN SODIUM 5000 UNITS: 5000 INJECTION, SOLUTION INTRAVENOUS; SUBCUTANEOUS at 12:36

## 2025-05-05 RX ADMIN — INSULIN GLARGINE 5 UNITS: 100 INJECTION, SOLUTION SUBCUTANEOUS at 12:36

## 2025-05-05 RX ADMIN — PANTOPRAZOLE SODIUM 40 MG: 40 INJECTION, POWDER, FOR SOLUTION INTRAVENOUS at 08:08

## 2025-05-05 RX ADMIN — POTASSIUM CHLORIDE 20 MEQ: 29.8 INJECTION, SOLUTION INTRAVENOUS at 05:33

## 2025-05-05 RX ADMIN — CALCIUM CHLORIDE, MAGNESIUM CHLORIDE, SODIUM CHLORIDE, SODIUM BICARBONATE, POTASSIUM CHLORIDE AND SODIUM PHOSPHATE DIBASIC DIHYDRATE 12.5 ML/KG/HR: 3.68; 3.05; 6.34; 3.09; .314; .187 INJECTION INTRAVENOUS at 16:12

## 2025-05-05 RX ADMIN — CALCIUM CHLORIDE, MAGNESIUM CHLORIDE, SODIUM CHLORIDE, SODIUM BICARBONATE, POTASSIUM CHLORIDE AND SODIUM PHOSPHATE DIBASIC DIHYDRATE 12.5 ML/KG/HR: 3.68; 3.05; 6.34; 3.09; .314; .187 INJECTION INTRAVENOUS at 21:30

## 2025-05-05 RX ADMIN — DEXMEDETOMIDINE HYDROCHLORIDE 0.6 MCG/KG/HR: 4 INJECTION, SOLUTION INTRAVENOUS at 20:11

## 2025-05-05 RX ADMIN — THIAMINE HYDROCHLORIDE 100 MG: 100 INJECTION, SOLUTION INTRAMUSCULAR; INTRAVENOUS at 08:08

## 2025-05-05 RX ADMIN — Medication 50 MCG: at 06:31

## 2025-05-05 RX ADMIN — NOREPINEPHRINE BITARTRATE 0.08 MCG/KG/MIN: 0.06 INJECTION, SOLUTION INTRAVENOUS at 06:12

## 2025-05-05 RX ADMIN — Medication 50 MCG: at 00:06

## 2025-05-05 RX ADMIN — PIPERACILLIN AND TAZOBACTAM 4.5 G: 4; .5 INJECTION, POWDER, LYOPHILIZED, FOR SOLUTION INTRAVENOUS at 03:47

## 2025-05-05 RX ADMIN — Medication 0.1 MG/HR: at 10:40

## 2025-05-05 RX ADMIN — METHOCARBAMOL 500 MG: 100 INJECTION, SOLUTION INTRAMUSCULAR; INTRAVENOUS at 10:39

## 2025-05-05 RX ADMIN — SMOFLIPID 250 ML: 6; 6; 5; 3 INJECTION, EMULSION INTRAVENOUS at 20:13

## 2025-05-05 RX ADMIN — METHOCARBAMOL 500 MG: 100 INJECTION, SOLUTION INTRAMUSCULAR; INTRAVENOUS at 18:41

## 2025-05-05 RX ADMIN — CALCIUM CHLORIDE, MAGNESIUM CHLORIDE, DEXTROSE MONOHYDRATE, LACTIC ACID, SODIUM CHLORIDE, SODIUM BICARBONATE AND POTASSIUM CHLORIDE 12.5 ML/KG/HR: 5.15; 2.03; 22; 5.4; 6.46; 3.09; .157 INJECTION INTRAVENOUS at 06:59

## 2025-05-05 RX ADMIN — CALCIUM CHLORIDE, MAGNESIUM CHLORIDE, SODIUM CHLORIDE, SODIUM BICARBONATE, POTASSIUM CHLORIDE AND SODIUM PHOSPHATE DIBASIC DIHYDRATE 15 ML/KG/HR: 3.68; 3.05; 6.34; 3.09; .314; .187 INJECTION INTRAVENOUS at 05:16

## 2025-05-05 RX ADMIN — CALCIUM CHLORIDE, MAGNESIUM CHLORIDE, SODIUM CHLORIDE, SODIUM BICARBONATE, POTASSIUM CHLORIDE AND SODIUM PHOSPHATE DIBASIC DIHYDRATE: 3.68; 3.05; 6.34; 3.09; .314; .187 INJECTION INTRAVENOUS at 06:36

## 2025-05-05 RX ADMIN — CALCIUM CHLORIDE, MAGNESIUM CHLORIDE, SODIUM CHLORIDE, SODIUM BICARBONATE, POTASSIUM CHLORIDE AND SODIUM PHOSPHATE DIBASIC DIHYDRATE 12.5 ML/KG/HR: 3.68; 3.05; 6.34; 3.09; .314; .187 INJECTION INTRAVENOUS at 09:10

## 2025-05-05 RX ADMIN — Medication 100 MCG: at 08:06

## 2025-05-05 RX ADMIN — PIPERACILLIN AND TAZOBACTAM 4.5 G: 4; .5 INJECTION, POWDER, LYOPHILIZED, FOR SOLUTION INTRAVENOUS at 10:53

## 2025-05-05 RX ADMIN — Medication 200 MCG/HR: at 04:42

## 2025-05-05 RX ADMIN — CALCIUM CHLORIDE, MAGNESIUM CHLORIDE, DEXTROSE MONOHYDRATE, LACTIC ACID, SODIUM CHLORIDE, SODIUM BICARBONATE AND POTASSIUM CHLORIDE 12.5 ML/KG/HR: 5.15; 2.03; 22; 5.4; 6.46; 3.09; .157 INJECTION INTRAVENOUS at 01:22

## 2025-05-05 RX ADMIN — HEPARIN SODIUM 5000 UNITS: 5000 INJECTION, SOLUTION INTRAVENOUS; SUBCUTANEOUS at 20:37

## 2025-05-05 RX ADMIN — CALCIUM CHLORIDE, MAGNESIUM CHLORIDE, SODIUM CHLORIDE, SODIUM BICARBONATE, POTASSIUM CHLORIDE AND SODIUM PHOSPHATE DIBASIC DIHYDRATE 12.5 ML/KG/HR: 3.68; 3.05; 6.34; 3.09; .314; .187 INJECTION INTRAVENOUS at 15:15

## 2025-05-05 RX ADMIN — CALCIUM CHLORIDE, MAGNESIUM CHLORIDE, SODIUM CHLORIDE, SODIUM BICARBONATE, POTASSIUM CHLORIDE AND SODIUM PHOSPHATE DIBASIC DIHYDRATE 12.5 ML/KG/HR: 3.68; 3.05; 6.34; 3.09; .314; .187 INJECTION INTRAVENOUS at 20:52

## 2025-05-05 RX ADMIN — CALCIUM CHLORIDE, MAGNESIUM CHLORIDE, SODIUM CHLORIDE, SODIUM BICARBONATE, POTASSIUM CHLORIDE AND SODIUM PHOSPHATE DIBASIC DIHYDRATE 15 ML/KG/HR: 3.68; 3.05; 6.34; 3.09; .314; .187 INJECTION INTRAVENOUS at 01:14

## 2025-05-05 RX ADMIN — PIPERACILLIN AND TAZOBACTAM 4.5 G: 4; .5 INJECTION, POWDER, LYOPHILIZED, FOR SOLUTION INTRAVENOUS at 16:12

## 2025-05-05 RX ADMIN — PIPERACILLIN AND TAZOBACTAM 4.5 G: 4; .5 INJECTION, POWDER, LYOPHILIZED, FOR SOLUTION INTRAVENOUS at 21:34

## 2025-05-05 ASSESSMENT — ACTIVITIES OF DAILY LIVING (ADL)
ADLS_ACUITY_SCORE: 50
ADLS_ACUITY_SCORE: 52
ADLS_ACUITY_SCORE: 50
ADLS_ACUITY_SCORE: 52
ADLS_ACUITY_SCORE: 50
ADLS_ACUITY_SCORE: 52
ADLS_ACUITY_SCORE: 50
ADLS_ACUITY_SCORE: 52
ADLS_ACUITY_SCORE: 50
ADLS_ACUITY_SCORE: 52
ADLS_ACUITY_SCORE: 52
ADLS_ACUITY_SCORE: 50
ADLS_ACUITY_SCORE: 52
ADLS_ACUITY_SCORE: 50
ADLS_ACUITY_SCORE: 48

## 2025-05-05 NOTE — PROGRESS NOTES
"CLINICAL NUTRITION SERVICES - BRIEF NOTE     Reason for RD note: OK to start trophic EN via Jtube per surgery    New Findings/Chart Review:  RTOR yesterday for washout  Nutrition support: TPN at goal  Labs/ meds reviewed    Interventions:  Trophic EN via J tube, no plans for advancement: Pivot @ 20 ml/hr, with relizorb    Relizorb orders placed as follows (patient care order + supplies):  For volumes <500ml (rates <20 ml/hr) - use 1 cartridge daily  For volumes 500-1000 ml (rates 21-40 ml/hr) use 2 cartridges daily (1 cartridge q 12 hours or 2 cartridges in tandem)  For volumes 3872-3828 ml (rates 41-60 ml/hr) - use 3 cartridges daily (1 cartridge q 8 hours)  For volumes 2528-5348 ml (rates 61-80 ml/hr) - use 4 cartridges daily (1 cartridge q 6 hours or 2 cartridges in tandem q 12 hours)  For volumes 0144-0016 ml (rates  ml/hr) - use 5 cartridges daily      Prime cartridge prior to starting pump. Do not over-tighten connectors  Label cartridge with date, time placed, and time due to change  Document use, and time due to change in the GI Enteral Access device LDA flowsheet  Obtain cartridges in 4C unit med room or call SPD at [phone] ([place])  and provide Mayi Zhaopin #[id number]    Metabolic cart study    Cu level ordered (am draw 5/6) due to prolonged CRRT    Future/Additional Recommendations:  Monitor for ability to advance EN / transition vitamin/mineral supplementation to enteral    Goal EN: Pivot 1.5 Luis (or equivalent) @ goal of  60ml/hr  (1440ml/day) provides: 2160 kcals, 135 g PRO, 1080 ml free H20, 248 g CHO, and 10 g fiber daily. OK to discontinue PN when EN tolerated @ 40 ml/hr    Nutrition will continue to follow per protocol.    Maryann Cobb, RD, LD, CNSC  \"4E Clinical Dietitian\" on Vocera  Weekend/Holiday RD - \"Weekend Clinical Dietitian\" on vocera    "

## 2025-05-05 NOTE — PROGRESS NOTES
Surgery Progress Note  05/05/2025       Subjective:  No acute overnight events. Remains intubated, ventilated, and sedated. On levo 0.08 and weaned off of vaso overnight. On insulin gtt. Drains remain in place. WBC stable around 14. Hemoglobin stable at 8.8. He is s/p takeback to OR for washout, tolerated procedure well.     Objective:  Temp:  [97.7  F (36.5  C)-100.8  F (38.2  C)] 99  F (37.2  C)  Pulse:  [] 91  Resp:  [13-22] 17  MAP:  [58 mmHg-105 mmHg] 62 mmHg  Arterial Line BP: ()/(42-80) 117/42  FiO2 (%):  [40 %-60 %] 40 %  SpO2:  [94 %-100 %] 100 %    I/O last 3 completed shifts:  In: 4179.69 [I.V.:1617.92; Other:1.1; IV Piggyback:720]  Out: 2945.6 [Urine:245; Drains:1590; Other:910.6; Blood:200]      Gen: Sedated but more arousable today.   Resp: Ventilated, trach  Abd: Rigid, tender to palpation diffusely. Abthera in place draining copious sanguinous output, SHANNON drains with stable sanguineous output in bulbs. Bulbs to suction. [place] drain with stool in bag.    Ext: WWP, no edema     Labs:  Recent Labs   Lab 05/05/25  0339 05/04/25 2011 05/04/25  1254   WBC 14.8* 21.7* 24.0*   HGB 8.8* 9.5* 8.2*    204 220       Recent Labs   Lab 05/05/25  0339 05/05/25  0337 05/05/25  0004 05/04/25 2011 05/04/25  1119 05/04/25  1115     135  --   --  137  --  137   POTASSIUM 3.4  3.4  --   --  4.0  --  3.6   CHLORIDE 104  104  --   --  105  --  106   CO2 20*  20*  --   --  19*  --  19*   BUN 49.2*  49.2*  --   --  55.4*  --  66.4*   CR 0.97  0.97  --   --  1.10  --  1.28*   *  214* 201* 213* 212*   < > 125*   KARINA 7.9*  7.9*  --   --  7.9*  --  7.1*   MAG 2.0  --   --  2.0  --  2.0   PHOS 3.5  --   --  3.8  --  4.3    < > = values in this interval not displayed.       Imaging:  [facility] 04/27:    Impression    1.  Worsening sequela of necrotizing pancreatitis. Dominant peripancreatic collection has increased in size, now measuring 19 x 13 cm, previously 17 x 11 cm; increasing gas  within this collection is worrisome for infection. Large volume ascites and fat necrosis elsewhere throughout the abdomen and pelvis has also increased from prior.  2.  New presumed blood products in the dominant peripancreatic collection, as well as layering within the left paracolic gutter, suspicious for interval bleeding. Recommend trending hemoglobin levels; CTA abdomen and pelvis could be considered if there is concern for active bleeding.  3.  Mild small bowel wall thickening, possibly reactive or sequela of enteritis.  4.  Similar right lower lobe consolidation.     Assessment/Plan:   Sebastián Rodas is a 29-year-old male with a history of alcohol use disorder, anxiety, and bipolar disorder who was admitted on 3/30/2025 for alcohol withdrawal following a recent binge, presenting with diffuse abdominal pain. Initial workup revealed leukocytosis and elevated lipase concerning for acute pancreatitis. He developed acute encephalopathy and was transferred to the ICU on 3/31, requiring intubation and vasopressors by 4/1 due to shock. Hospital course has been complicated by acute renal failure requiring CRRT, cardiomyopathy (initial LVEF 20-25%, improved to 65-70% by 4/14), and necrotizing pancreatitis with unorganized fluid collections. He completed a 14-day course of meropenem but remains intermittently febrile and critically ill, requiring ongoing dialysis and vasopressor support. On 4/27, after clinical deterioration and imaging consistent with a likely perforated viscus, following family discussion, he underwent emergent exploratory laparotomy, necrosectomy, transverse colectomy, abdominal washout, and drain placement. Patient was transferred to [facility] on 4/30/25 for further surgical management. Went to OR 5/1 for re-open laparotomy, I&D, placement of colostomy tube in presumed previous colectomy staple line, necrosectomy, and Abthera placement. On 5/2, increasing sanguineous output from drains concerning for  bleeding from pancreatic bed. Family care conference held 5/2, plan for restorative cares at this time. Take back to OR yesterday (5/4) for abdominal washout.     Plan:  - Plan for goals of care discussion with family  - Ok for trickle feeds via J tube from surgery standpoint.    - Other cares per SICU, we appreciate excellent cares.   - Surgery will continue to follow.     Seen, examined, and discussed with chief resident, who will discuss with staff.   - - - - - - - - - - - - - - - - - -  Casimiro Chirinos MD  Urology PGY-1  General Surgery Service

## 2025-05-05 NOTE — PROGRESS NOTES
CRRT STATUS NOTE    DATA:  Time:  1755   Pressures WNL:  YES  Filter Status:  WDL    Problems Reported/Alarms Noted:  Filter changed d/t high TMP    Supplies Present:  YES    ASSESSMENT:  Patient Net Fluid Balance:   - 238 ml since midnight  Intake/Output Summary (Last 24 hours) at 5/5/2025 1755  Last data filed at 5/5/2025 1700  Gross per 24 hour   Intake 3056.1 ml   Output 2787.7 ml   Net 268.4 ml                                                    Date 05/05/25 0700 - 05/06/25 0659   Shift 3958-6867 6345-2142 0259-3865 24 Hour Total   INTAKE   I.V. 231.24 73.53  304.77   IV Piggyback 110 110  220   .2 150  633.2   Enteral 30 30  60   Shift Total(mL/kg) 854.44(11.59) 363.53(4.93)  1217.97(16.53)   OUTPUT   Urine 67 10  77   Drains 310 325  635   Other 391.2 100  491.2   Shift Total(mL/kg) 768.2(10.42) 435(5.9)  1203.2(16.33)   Weight (kg) 73.7 73.7 73.7 73.7                  I/O this shift:  In: 363.53 [I.V.:73.53; IV Piggyback:110]  Out: 435 [Urine:10; Drains:325; Other:100]      Vital Signs:  Temp: 99.3  F (37.4  C) Temp src: Bladder   Pulse: 80   Resp: 14 SpO2: 100 % O2 Device: Mechanical Ventilator            Labs: K 3.9 (05/05 1609), Mag 2.1 (05/05 1609), Phos 3.2 (05/05 1609), Hgb 8.6 (05/05 1609), ICa 4.5        Goals of Therapy:  I = O - achieving    INTERVENTIONS:   Filter changed d/t high TMPs    PLAN:  Continue CRRT as per MD order. Notify CRRT Resource RN with any questions or concerns.

## 2025-05-05 NOTE — ANESTHESIA POSTPROCEDURE EVALUATION
Patient: Sebastián Rodas    Procedure: Procedure(s):  Laparotomy Exploratory, Open Pancreatic Necrosectomy, Abdominal Wash Out, Temporary Abdominal Closure       Anesthesia Type:  General    Note:  Disposition: ICU            ICU Sign Out: Anesthesiologist/ICU physician sign out WAS performed   Postop Pain Control: Uneventful            Sign Out: Well controlled pain   PONV: No   Neuro/Psych: Uneventful            Sign Out: Acceptable/Baseline neuro status   Airway/Respiratory: Uneventful            Sign Out: AIRWAY IN SITU/Resp. Support   CV/Hemodynamics: Uneventful            Sign Out: Acceptable CV status; No obvious hypovolemia; No obvious fluid overload   Other NRE: NONE   DID A NON-ROUTINE EVENT OCCUR? No           Last vitals:  Vitals:    05/05/25 0830 05/05/25 0845 05/05/25 0900   BP:      Pulse: 100 93 89   Resp: 23 14 15   Temp: 37.2  C (99  F)     SpO2: 100%  100%       Electronically Signed By: Martín Duron MD  May 5, 2025  9:12 AM

## 2025-05-05 NOTE — CONSULTS
"Otolaryngology Consult Note  May 5, 2025      CC: Evaluate tracheostomy site    HPI: Sebastián Rodas is a 29 year old male with a past medical history of alcohol abuse, anxiety, and bipolar disorder, who was admitted 3/30/25 to OSH for alcohol withdrawal and acute pancreatitis complicated by encephalopathy, shock, cardiomyopathy, necrotizing pancreatitis, perforated viscus. He underwent emergent laparotomy, necrosectomy, colectomy, and tracheostomy at OSH. Transferred to [facility] SICU on 4/30. Course continues to be complicated with poor prognosis. ENT was consulted to evaluate the tracheostomy site due to concerns for wound breakdown. [facility] RN has already evaluated the site 5/1 and recommended optifoam dressing. Over the last 2 days the site has been \"opening up\" per ICU team.     Tracheostomy was placed by ENT at OSH on 4/18/25. Placement was uncomplicated with vertical skin incision, trach placed between second and third tracheal rings and audrey flap placed per operative report. He has a 6 proximal XLT shiley which has not been changed since initial placement per chart review.      No past medical history on file.    Past Surgical History:   Procedure Laterality Date    LAPAROTOMY SECOND LOOK N/A 5/4/2025    Procedure: Laparotomy Exploratory, Open Pancreatic Necrosectomy, Abdominal Wash Out, Temporary Abdominal Closure;  Surgeon: Brendon Haas DO;  Location: [facility] OR    LAPAROTOMY, LYSIS ADHESIONS, COMBINED N/A 5/1/2025    Procedure: Reopen Laparotomy, Irrigation and Debriement, placement of colostomy tube, temporary abdominal closure, necrosectomy;  Surgeon: rBendon Haas DO;  Location: [facility] OR       No current outpatient medications on file.        No Known Allergies    Social History     Socioeconomic History    Marital status: Single     Spouse name: Not on file    Number of children: Not on file    Years of education: Not on file    Highest education level: Not on file   Occupational " History    Not on file   Tobacco Use    Smoking status: Never    Smokeless tobacco: Never   Substance and Sexual Activity    Alcohol use: No     Alcohol/week: 0.0 standard drinks of alcohol    Drug use: No    Sexual activity: Yes     Partners: Female   Other Topics Concern    Not on file   Social History Narrative    Not on file     Social Drivers of Health     Financial Resource Strain: Unknown (4/30/2025)    Financial Resource Strain     Within the past 12 months, have you or your family members you live with been unable to get utilities (heat, electricity) when it was really needed?: Patient unable to answer   Food Insecurity: Unknown (4/30/2025)    Food Insecurity     Within the past 12 months, did you worry that your food would run out before you got money to buy more?: Patient unable to answer     Within the past 12 months, did the food you bought just not last and you didn t have money to get more?: Patient unable to answer   Transportation Needs: Unknown (4/30/2025)    Transportation Needs     Within the past 12 months, has lack of transportation kept you from medical appointments, getting your medicines, non-medical meetings or appointments, work, or from getting things that you need?: Patient unable to answer   Recent Concern: Transportation Needs - Unmet Transportation Needs (3/31/2025)    Received from [facility]    Transportation Needs     Does lack of transportation keep you from medical appointments?: 1     Does lack of transportation keep you from work, meetings or getting things that you need?: 2   Physical Activity: Not on File (8/26/2019)    Received from [facility]    Physical Activity     Physical Activity: 0   Stress: Not on File (8/26/2019)    Received from [facility]    Stress     Stress: 0   Social Connections: Socially Isolated (3/31/2025)    Received from [facility]    Social Connections     Do you often feel lonely or isolated from  "those around you?: 4   Interpersonal Safety: Unknown (4/30/2025)    Interpersonal Safety     Do you feel physically and emotionally safe where you currently live?: Patient unable to answer     Within the past 12 months, have you been hit, slapped, kicked or otherwise physically hurt by someone?: Patient unable to answer     Within the past 12 months, have you been humiliated or emotionally abused in other ways by your partner or ex-partner?: Patient unable to answer   Housing Stability: Unknown (4/30/2025)    Housing Stability     Do you have housing? : Patient unable to answer     Are you worried about losing your housing?: Patient unable to answer       Family History   Problem Relation Age of Onset    Hypertension Mother     Coronary Artery Disease Father     Substance Abuse Father     Mental Illness Mother     Mental Illness Father        ROS: 12 point review of systems is negative unless noted in HPI.    PHYSICAL EXAM:  General: laying in bed, no acute distress  /74 (BP Location: Right arm)   Pulse (!) 123   Temp 99.1  F (37.3  C)   Resp 16   Ht 1.727 m (5' 7.99\")   Wt 73.7 kg (162 lb 7.7 oz)   SpO2 100%   BMI 24.71 kg/m    HEAD: normocephalic, atraumatic  Face: symmetrical, no swelling, edema, or erythema.   Eyes:  clear sclera  Ears: external auricles appear normal  Nose: no anterior drainage  Neck: No neck swelling or erythema. 6 proximal XLT shiley in place. Trach stoma appears large, but no bleeding, drainage, erythema or skin breakdown. Trach ties are found to be loose and vent circuit pulling trach to the right. With adjusting ties and circuit, trach sitting appropriately flat against the neck. trachea midline  Respiratory: mechanically ventilated via trach  Skin: no rashes or skin lesions of the face/neck  Cardio: extremities warm and well perfused       ROUTINE IP LABS (Last four results)  BMP  Recent Labs   Lab 05/05/25  0858 05/05/25  0339 05/05/25  0337 05/05/25  0004 05/04/25 2011 " 05/04/25  1119 05/04/25  1115 05/04/25  0608 05/04/25  0331   NA  --  135  135  --   --  137  --  137  --  137  137   POTASSIUM  --  3.4  3.4  --   --  4.0  --  3.6  --  3.8  3.8   CHLORIDE  --  104  104  --   --  105  --  106  --  106  106   KARINA  --  7.9*  7.9*  --   --  7.9*  --  7.1*  --  7.5*  7.5*   CO2  --  20*  20*  --   --  19*  --  19*  --  18*  18*   BUN  --  49.2*  49.2*  --   --  55.4*  --  66.4*  --  67.3*  67.3*   CR  --  0.97  0.97  --   --  1.10  --  1.28*  --  1.24*  1.24*   * 214*  214* 201* 213* 212*   < > 125*   < > 132*  145*  145*    < > = values in this interval not displayed.     CBC  Recent Labs   Lab 05/05/25  0339 05/04/25 2011 05/04/25  1254 05/04/25  0331   WBC 14.8* 21.7* 24.0* 14.5*   RBC 2.93* 3.17* 2.75* 2.73*   HGB 8.8* 9.5* 8.2* 8.2*   HCT 27.0* 29.3* 25.5* 25.4*   MCV 92 92 93 93   MCH 30.0 30.0 29.8 30.0   MCHC 32.6 32.4 32.2 32.3   RDW 16.3* 16.2* 17.5* 17.5*    204 220 165     INR  Recent Labs   Lab 05/03/25 2059 05/02/25  1449 05/01/25  1322 04/30/25  2217   INR 1.24* 1.30* 1.19* 1.09     Assessment and Plan  Sebastián Rodas is a 29 year old male with a past medical history of alcohol abuse, anxiety, and bipolar disorder, who was admitted 3/30/25 to OSH for alcohol withdrawal and acute pancreatitis complicated by encephalopathy, shock, cardiomyopathy, necrotizing pancreatitis, perforated viscus. A tracheostomy was placed at OSH 4/18, ENT was consulted to evaluate the trach site today. The stoma is wide, but does not appear acutely infected and no surrounding skin breakdown. The trach ties were loose and the vent circuit was pulling the tube laterally, likely putting stretch on the stoma site. Considering the above, would recommend the following interventions:    Trach Tube Instructions:   1) Routine tracheostomy cares. RN to change trach dressing daily and as needed for saturation with secretions. It is important to keep the site dry and remove  excess secretions as able.   2) Keep trach ties snug, only 2 finger breadth between skin and ties to avoid rocking/movement of the trach within the stoma which can lead to wound breakdown and increased risk for accidental decannulation. Support vent circuit to prevent pulling on the trach tube - use supportive arm from the vent machine and rolled towel under attachment to trach tube.   3) Perform regular suctioning   4) RN should change disposable inner canulas every 8 hr shift and as needed  5) Keep trach tube obturator taped to wall behind the head of the bed   6) Keep extra un opened 5 proximal XLT Shiley and 6 proximal XLT Shiley at bedside at all times   7) Minimize the amount of air in the cuff needed to adequately apply positive pressure ventilate. If positive pressure ventilation is not need then the cuff should be down.     Clinically Significant Risk Factors           # Hypocalcemia: Lowest iCa = 4.3 mg/dL in last 2 days, will monitor and replace as appropriate     # Hypoalbuminemia: Lowest albumin = 1.5 g/dL at 5/4/2025 11:15 AM, will monitor as appropriate  # Coagulation Defect: INR = 1.24 (Ref range: 0.85 - 1.15) and/or PTT = 31 Seconds (Ref range: 22 - 38 Seconds), will monitor for bleeding                # Severe Malnutrition: based on nutrition assessment and treatment provided per dietitian's recommendations.    # Financial/Environmental Concerns: unemployed         -- Patient and above plan to be discussed with Dr. An.    Nona Kee PA-C  Otolaryngology-Head & Neck Surgery  Please page ENT with questions by dialing [phone] and entering job [phone] when prompted.

## 2025-05-05 NOTE — PROGRESS NOTES
SURGICAL ICU PROGRESS NOTE  05/05/2025      Date of Service (when I saw the patient): 05/05/2025    ASSESSMENT:  Sebastián Rodas is a 29M with alcohol abuse, anxiety, and bipolar disorder, who was admitted 3/30/25 for alcohol withdrawal and abdominal pain. Workup showed leukocytosis and elevated lipase, consistent with acute pancreatitis. He developed encephalopathy and shock, requiring ICU care, intubation, vasopressors, and CRRT by 4/1. His course was complicated by cardiomyopathy (EF 20-25%, improved to 65-70% by 4/14), necrotizing pancreatitis, and persistent infection despite 14 days of meropenem. On 4/27, imaging showed likely perforated viscus; he underwent emergent laparotomy, necrosectomy, and colectomy. Transferred to [facility] SICU on 4/30. On 5/1, reoperation revealed colonic staple line breakdown, necrotic pancreas, vessel thrombosis, and adhesions. Colostomy with malankot drain and temporary abdominal closure performed. Prognosis is poor; palliative care involved. Care conference 5/2 with family to talk goasl of care. Full code, family acknowledged that poor prognosis is to be expected. 5/4 s/p exploratory surgery, additional necrotic pancreas remove with no obvious spillage or sign of bleeding at the time. Today, the pt pain seems less controlled with fentanyl, necrotic debris with grey/dark output from drain 3 and opening of the trach site. Progressing from respiratory standpoint.     CHANGES and MAJOR THINGS TODAY:   - add IV robaxin (no more than 3 days), dilaudid gtt for pain control  - pressure support trial today  - ongoing assessment of fluid requirements. Historically volume responsive  - ongoing sanguinous drain output  - trickle feed can be started  - continue zosyn, no end date at this time  - trend hb, prbc transfusion PRN  - add 5 units Lantus, continue HSSI  - remove shore  - bladder scan per shift, straight cath as needed  - ENT consulted for trach    PLAN:    Neurological:  # Acute pain  "  # Agitation  # Encephalopathy - Supportive Care  - Monitor neurological status. Delirium preventions and precautions.   # Pain: IV Robaxin, IV dilaudid gtt   # Sedation: Precedex    Pulmonary:   # Acute hypoxic respiratory support  - VENT: FiO2 (%): 30 %, Resp: 26, Vent Mode: VC/AC, Resp Rate (Set): 16 breaths/min, Tidal Volume (Set, mL): 420 mL, PEEP (cm H2O): 5 cmH2O, Resp Rate (Set): 16 breaths/min, Tidal Volume (Set, mL): 550 mL, PEEP (cm H2O): 5 cmH2O  - Vent changed to pressure support. PST when meets criteria.  Ventilatory bundle.  -ENT consulted for trach site evaluation, has been opening up in the last 24 hours.     Cardiovascular:    # Cardiomyopathy   - Initial LVEF 20-25%, improved to 65-70%  # Shock-distributive (septic): Concern for developing sepsis with rising WBC (28K) and higher pressor requirements    - Monitor hemodynamic status  - On pressor Norepinephrine  - On Vasopressin    Gastroenterology/Nutrition:  # S/p emergent exploratory laparotomy, necrosectomy, transverse colectomy, abdominal washout, and drain placement  #Severe necrotizing pancreatitis  - Secondary to alcohol use disorder, splenic vein thrombosis, ongoing fevers  # Protein calorie deficit malnutrition    - Trickle feed can be started 5/5 per EGS  - PPI   - RD consult. Appreciate cares and recommendations.     \"% Intake: Decreased intake does not meet criteria - PN at OSH  % Weight Loss: > 5% in 1 month (severe)   Subcutaneous Fat Loss: Orbital: Mild and Buccal: Mild  Muscle Loss: Temples (temporalis muscle): Mild, Shoulders (deltoids): Mild, Interosseous muscles: Mild, Thigh (quadriceps): Moderate, and Calf (gastrocnemius): Mild  Fluid Accumulation/Edema: Does not meet criteria  Malnutrition Diagnosis: Severe malnutrition in the context of acute illness or injury  Malnutrition Present on Admission: Yes    Renal/Fluids/Electrolytes:   #AGMA  - Likely secondary to SARAHI, hypoperfusion, ongoing diarrhea, lactate not " elevated    #Severe hypocalcemia (7.9)  - 2/2 pancreatitis, bicarb, PTH resistance from hypomagnesemia, and Vit D deficiency  - Replete as indicated       # Acute kidney injury  # CCRT  Baseline Cr 0.7-0.8. Presented with Cr 0.96 on 3/30. Rapidly markos to 5.2 on 4/1. Oliguric. Garcia UA with proteinuria, hematuria, pyuria, moderate bacteria.  Kidneys unremarkable on CT. Has severe ATN in the setting of shock, ADHF, intravascular hypovolemia/3rd spacing from pancreatitis.   --CRRT 4/1-4/4.   - IHD with levophed started 4/5 but poor CVC function / HoTN limiting.   - Line exchanged 4/10 and again 4/19 - due to poor flow.  - PCAD placed 4/21 by IR.   - CRRT resumed 4/21/25 for fluid overload and assist with more aggressive UF in setting of shock.    - Off pressors 4/30 - plan at outside hospital was to continue CRRT today and move to IHD tomorrow.  - Back on pressor and CRRT 5/1   - Will continue to monitor intake and output.  - Remove Garcia   - Nephrology consulted, managing CRRT     Endocrine:   # Stress hyperglycemia    - Sliding scale for glucose management   - 5 units Lantus,  - Goal to keep BG< 180 for optimal wound healing     ID:  # Leukocytosis  - WBC 14.8 (21)  - Completed 14 days course of meropenem and caspofungin.   - Zosyn added, meropenem discontinued  - Discontinued micafungin    Positive cultures:  - 4/30 blood cultures obtained   - 5/1 blood cultures ordered - NGTD    Heme:     # Acute blood loss anemia  # Thrombosed splenic vein   - Hemoglobin 8.8 (9.5)  - Transfuse if hgb <7.0 or signs/symptoms of hypoperfusion. Monitor and trend  - Multiple transfusion in OR 5/1  - SQH    Musculoskeletal:   # Deconditioning   - Physical and occupational therapy consult     Skin:  # Pressure Ulcers - Buttocks/rectal Area   - Barrier cream with liberal application. Continue fecal management system   - WOC consult     #Pressure Ulcers- Left Heel  Pressure Injury Location: Left heel   Wound type: Pressure Injury      Pressure Injury Stage: Deep Tissue Pressure Injury (DTPI), present on admission    General Cares/Prophylaxis:    DVT Prophylaxis: SQH  GI Prophylaxis: PPI  Restraints: Restraints for medical healing needed: NO    Lines/ tubes/ drains:  - SHANNON x 4  - Drain 4 - Colostomy drain  - PICC line  - A line  - Trach     Disposition:  - Surgical ICU    Patient to be discussed with Dr. Kelley Pierre MD  Plastic Surgery    - - - - - - - - - - - - - - - - - - - - - - - - - - - - - - - - - - - - - - - - - - - - - -   HISTORY PRESENTING ILLNESS: Sebastián Rodas is a 29M with alcohol abuse, anxiety, and bipolar disorder, who was admitted 3/30/25 for alcohol withdrawal and abdominal pain. Workup showed leukocytosis and elevated lipase, consistent with acute pancreatitis. He developed encephalopathy and shock, requiring ICU care, intubation, vasopressors, and CRRT by 4/1. His course was complicated by cardiomyopathy (EF 20-25%, improved to 65-70% by 4/14), necrotizing pancreatitis, and persistent infection despite 14 days of meropenem. On 4/27, imaging showed likely perforated viscus; he underwent emergent laparotomy, necrosectomy, and colectomy. Transferred to [facility] SICU on 4/30. On 5/1, reoperation revealed colonic staple line breakdown, necrotic pancreas, vessel thrombosis, and adhesions. Colostomy with malankot drain and temporary abdominal closure performed. Prognosis is poor; palliative care involved. Care conference 5/2 with family to talk goasl of care. Full code, family acknowledged that poor prognosis is to be expected. Today 5/4 s/p exploratory surgery, additional necrotic pancreas remove with no obvious spillage or sign of bleeding.     REVIEW OF SYSTEMS: 10 point ROS neg other than the symptoms noted above in the HPI.    PAST MEDICAL HISTORY:   -AUD  -Bipolar  -Alcohol Substance Use    SURGICAL HISTORY:   S/p emergent exploratory laparotomy, necrosectomy, transverse colectomy, abdominal washout, and drain  placement    SOCIAL HISTORY:   Social History     Socioeconomic History    Marital status: Single   Tobacco Use    Smoking status: Never    Smokeless tobacco: Never   Substance and Sexual Activity    Alcohol use: No     Alcohol/week: 0.0 standard drinks of alcohol    Drug use: No    Sexual activity: Yes     Partners: Female     Social Drivers of Health     Financial Resource Strain: Low Risk  (3/31/2025)    Received from [facility]    Financial Resource Strain     Difficulty of Paying Living Expenses: 3   Food Insecurity: No Food Insecurity (3/31/2025)    Received from [facility]    Food Insecurity     Do you worry your food will run out before you are able to buy more?: 1   Transportation Needs: Unmet Transportation Needs (3/31/2025)    Received from [facility]    Transportation Needs     Does lack of transportation keep you from medical appointments?: 1     Does lack of transportation keep you from work, meetings or getting things that you need?: 2   Physical Activity: Not on File (8/26/2019)    Received from [facility]    Physical Activity     Physical Activity: 0   Stress: Not on File (8/26/2019)    Received from [facility]    Stress     Stress: 0   Social Connections: Socially Isolated (3/31/2025)    Received from [facility]    Social Connections     Do you often feel lonely or isolated from those around you?: 4   Housing Stability: Low Risk  (3/31/2025)    Received from [facility]    Housing Stability     What is your housing situation today?: 1     ALLERGIES:   No Known Allergies    MEDICATIONS:  Current Facility-Administered Medications   Medication Dose Route Frequency Provider Last Rate Last Admin    dextrose 10% infusion   Intravenous Continuous Roxi Alston MD   Stopped at 05/01/25 0930    dextrose 10% infusion   Intravenous Continuous JOCELINE Alston  MD Roxi        [Auto Hold] glucose gel 15-30 g  15-30 g Oral Q15 Min PRRoxi Bingham MD        Or    [Auto Hold] dextrose 50 % injection 25-50 mL  25-50 mL Intravenous Q15 Min PRRoxi Bingham MD        Or    [Auto Hold] glucagon injection 1 mg  1 mg Subcutaneous Q15 Min Roxi Gomez MD        [Auto Hold] fentaNYL (SUBLIMAZE) 50 mcg/mL bolus from pump  50 mcg Intravenous Q1H PRRoxi Bingham MD        fentaNYL (SUBLIMAZE) infusion   mcg/hr Intravenous Continuous Roxi Alston MD   Stopped at 05/01/25 0930    heparin 25,000 units in 0.45% NaCl 250 mL ANTICOAGULANT infusion  0-5,000 Units/hr Intravenous Continuous Roxi Alston MD   Stopped at 05/01/25 0930    [Auto Hold] insulin aspart (NovoLOG) injection (RAPID ACTING)  1-12 Units Subcutaneous Q4H Roxi Alston MD        lipids 4 oil (SMOFLIPID) 20 % infusion 250 mL  250 mL Intravenous Q24H Roxi Alston MD        [Auto Hold] meropenem (MERREM) 500 mg vial to attach to  mL bag for ADULTS or 25 mL bag for PEDS  500 mg Intravenous Q24H Roxi Alston MD        [Auto Hold] micafungin (MYCAMINE) 100 mg in sodium chloride 0.9 % 100 mL intermittent infusion  100 mg Intravenous Q24H Roxi Alston MD        [Auto Hold] naloxone (NARCAN) injection 0.2 mg  0.2 mg Intravenous Q2 Min PRRoxi Bingham MD        Or    [Auto Hold] naloxone (NARCAN) injection 0.4 mg  0.4 mg Intravenous Q2 Min Roxi Gomez MD        Or    [Auto Hold] naloxone (NARCAN) injection 0.2 mg  0.2 mg Intramuscular Q2 Min Roxi Gomez MD        Or    [Auto Hold] naloxone (NARCAN) injection 0.4 mg  0.4 mg Intramuscular Q2 Min Roxi Gomez MD        [Auto Hold] pantoprazole (PROTONIX) IV push injection 40 mg  40 mg Intravenous QAM AC Roxi Alston MD   40 mg at 05/01/25 0758    parenteral nutrition - ADULT compounded formula   CENTRAL LINE IV TPN CONTINUOUS Roxi Alston MD        [Auto Hold] thiamine (B-1) injection 100 mg  100 mg Intravenous Daily Roxi Alston MD   100 mg at 05/01/25  0746     PHYSICAL EXAMINATION:  Temp:  [99.9  F (37.7  C)-102.2  F (39  C)] 101.7  F (38.7  C)  Pulse:  [122-147] 134  Resp:  [18-32] 26  BP: ()/(51-74) 82/57  FiO2 (%):  [30 %] 30 %  SpO2:  [97 %-100 %] 99 %    Gen: Resting comfortably in bed   Eyes: Nonicteric  ENT: Mucous Membranes Moist  Pulm: Nonlabored Breathing mechanical ventilation via trach  CV: S1, S2, no murmurs  Abd: abthera in place, SHANNON drains x 4 with dark thin sanguinous output, 1 colostomy drain, g-tube in place and clamped, remainder of abdomen is compressible   : Garcia present  Extremities: warm and well perfused, palpable bilateral DP pulses  Neuro: Pt not able to cooperate due to sedation    LABS: Reviewed.   Arterial Blood Gases   No lab results found in last 7 days.  Complete Blood Count     Recent Labs   Lab 05/01/25  0540 05/01/25  0020 04/30/25  2201   WBC 14.3* 14.3* 15.4*   HGB 7.2* 7.4* 7.7*   * 112* 117*     Basic Metabolic Panel  Recent Labs   Lab 05/01/25  0745 05/01/25  0344 05/01/25  0126 05/01/25  0117 04/30/25  2349 04/30/25  2201   NA  --  136  --  135  --  134*   POTASSIUM  --  4.3  --  4.3  --  4.6   CHLORIDE  --  104  --  104  --  104   CO2  --  18*  --  18*  --  19*   BUN  --  47.3*  --  43.7*  --  39.2*   CR  --  2.03*  --  1.82*  --  1.59*   * 98  94 99 99   < > 124*    < > = values in this interval not displayed.     Liver Function Tests  Recent Labs   Lab 05/01/25  0344 04/30/25  2217 04/30/25  2201   *  --  126*   *  --  111*   ALKPHOS 349*  --  280*   BILITOTAL 1.0  --  0.9   ALBUMIN 2.5*  --  2.6*   INR  --  1.09  --      Pancreatic Enzymes  Recent Labs   Lab 04/30/25  2201   LIPASE 107*     Coagulation Profile  Recent Labs   Lab 04/30/25  2217   INR 1.09   PTT 24       IMAGING:  Recent Results (from the past 24 hours)   XR Chest Port 1 View    Narrative    EXAM: XR CHEST PORT 1 VIEW 4/30/2025 9:31 PM    DEMOGRAPHICS: 29 years Male    INDICATION: Endotracheal tube  positioning    COMPARISON: None    TECHNIQUE: Single portable AP view of the chest.    FINDINGS:   Tracheostomy tube in the midthoracic trachea. Right IJ central venous  catheter with tip in the superior cavoatrial junction. Left PICC with  tip at the lower SVC.    The cardiomediastinal silhouette is within normal limits. Mild  perihilar and left basilar streaky opacities. Trace left pleural  effusion. No definite pneumothorax. Metallic opacity overlying the  left upper quadrant, external to the patient or post  surgical/intervention, consider correlation      Impression    IMPRESSION:     1.  Tracheostomy tube in the midthoracic trachea about 5.7 cm above  the nichole. Right IJ central venous catheter with tip near the  superior cavoatrial junction. Left PICC with tip in the low SVC.  2.  Mild perihilar and left basilar pulmonary opacities, possibly  atelectasis/pulmonary edema  3.  Trace left pleural effusion.  4.  Metallic opacity overlying the left upper quadrant, external to  the patient or post surgical/intervention, consider correlation    I have personally reviewed the examination and initial interpretation  and I agree with the findings.    KIAH TERRY MD         SYSTEM ID:  [id number]

## 2025-05-05 NOTE — CONSULTS
"SPIRITUAL HEALTH SERVICES Consult Note (Palliative)  [facility] ([place]) 4C     Summary: Visit with patient Sebastián Rodas's uncle Dustin at bedside.    Dustin said he and Sebastián's mother Marbella were Sebastián's primary visitors. He described Sebastián and \"polite\" and \"always wanting to help\" while also struggling to \"find his place in the world\". Dustin shared story of Sebastián's alcohol use as well as awareness of Sebastián's current condition.     [place] said that family are \"waiting for God's will to be done\", hoping that Sebastián might survive this hospitalization while aware that he is very ill. Dustin said that he and Marbella are mutually supportive and supportive of Sebastián. Marbella and Dustin are Confucianist (Sebastián personal spirituality with Confucianist roots per Dustin), and they rely on their kole for coping and meaning. Dustin requested prayer with Sebastián, which I provided.     Plan: Chaplains will follow for spiritual support while Sebastián is admitted.     Alba Hunter M.Div., [facility]     To reach Spiritual Health, securely message with the Vocera Web Console or enter an ASAP/STAT consult in [employer], which will also page the on-call .    "

## 2025-05-05 NOTE — PLAN OF CARE
Goal Outcome Evaluation:       ICU End of Shift Summary. See flowsheets for vital signs and detailed assessment.    Changes this shift: Pt. grimaces with most turns and cares. Fentanyl bumps given for pain.  Increased Dex to 0.3.  May need additional meds for pain control.    Weaned vaso off.  Levo weaned to 0.08.    MAP remains > 65.      Pt noted to have trace to small amt edema in LE's .  Scrotum is very firm and edematous.  Weight increased.   Attempting to keep pt I=O on CRRT. Pt tolerating well so far.                                                                        Glucoses in the 200's overnoc.  Covered with sliding scale insulin.    SHANNON drain #3 more greenish brown in color compared to day before    . Plan: Discuss pain management.  Attempt to wean pressors.   Attempt to pull fluid as tolerated on CRRT.

## 2025-05-05 NOTE — PROGRESS NOTES
[facility] Nurse Inpatient Assessment     Consulted for: Tracheostomy site, sacrum, left heel    [facility] nurse follow-up plan: weekly    Patient History (according to provider note(s):      Sebastián Rodas is a 29-year-old male with a history of alcohol abuse, anxiety, and bipolar disorder, admitted on 3/30/2025 for alcohol withdrawal after a binge, presenting with abdominal pain. Initial workup revealed leukocytosis and elevated lipase, suggestive of acute pancreatitis. He developed encephalopathy and required ICU transfer on 3/31, intubation, and vasopressors by 4/1 due to shock. His hospital course was complicated by acute renal failure (requiring CRRT), cardiomyopathy (LVEF 20-25%, improved to 65-70% by 4/14), and necrotizing pancreatitis with unorganized fluid collections. He completed a 14-day course of meropenem but remained febrile and critically ill, requiring ongoing dialysis and vasopressor support. On 4/27, imaging indicated likely perforated viscus, and after family discussion, he underwent emergent laparotomy, necrosectomy, transverse colectomy, abdominal washout, and drain placement. He was transferred to the SICU at [facility] on 4/30 for further monitoring. On 5/1, he underwent a second laparotomy with irrigation and debridement, colostomy tube placement, temporary abdominal closure, and further necrosectomy due to breakdown of the colonic resection staple line, necrotic pancreas, thrombosed middle colic vessels, fat necrosis, and dense interloop adhesions. He has a poor prognosis given the OR findings, palliative care consulted.     Assessment:      Areas visualized during today's visit: Focused:, Perineal area, Sacrum/coccyx, and left heel, trach    Pressure Injury Location: Left heel      Last photo: 5/5  Wound type: Pressure Injury     Pressure Injury Stage: Deep Tissue Pressure Injury (DTPI), present on admission       Wound history/plan of care:   Pt  admitted from OSH on 4/31. Wound present on admission. There is an intact blister over a deeper purple discoloration.    Wound base: 100 % Purple, Blister, and Epidermis     Palpation of the wound bed: boggy      Drainage: none     Description of drainage: none     Measurements (length x width x depth, in cm) 2  x 1.5  x  0 cm      Tunneling N/A     Undermining N/A  Periwound skin: Intact      Color: normal and consistent with surrounding tissue      Temperature: normal   Odor: none  Pain: unable to assess due to  sedation , none  Pain intervention prior to dressing change: slow and gentle cares   Treatment goal: Heal  and Protection  STATUS: stable  Supplies ordered: at bedside    My PI Risk Assessment     Sensory Perception: 2 - Very Limited     Moisture: 2 - Very moist      Activity: 1 - Bedfast      Mobility: 2 - Very limited     Nutrition: 2 - Probably inadequate      Friction/Shear: 1 - Problem     TOTAL: 10    Pressure Injury Location: coccyx    Zoomed out                                              Zoomed in      Last photo: 5/5  Wound type: Pressure Injury, Incontinence Associated Dermatitis (IAD), and Moisture Associated Skin Damage (MASD)     Pressure Injury Stage: at least Stage 2, deferring definitive staging until wound base has evolved, present on admission        Wound history/plan of care:   Pt transferred from OSH on 4/31. Wound present on admission. There is MASD/IAD to the bilateral buttocks with exposed dermis. There is further breakdown over the Pt's coccyx that is fibrin vs slough. Wound is at least a stage 2. The wound will need to evolve further before it can be definitively staged.     Wound base: 80 % Dermis, 20 %  Fibrin vs slough     Palpation of the wound bed: normal      Drainage: moderate     Description of drainage: serosanguinous     Measurements (length x width x depth, in cm) 6  x 6  x  0.2 cm - central wound, 1.5 x 1.5 x 0.1 cm left buttock     Tunneling N/A     Undermining  N/A  Periwound skin: Edematous and Skin stripping      Color: pink and red      Temperature: normal   Odor: none  Pain: unable to assess due to  sedation , none  Pain intervention prior to dressing change: slow and gentle cares   Treatment goal: Heal  and Protection  STATUS: stable  Supplies ordered: at bedside    My PI Risk Assessment     Sensory Perception: 2 - Very Limited     Moisture: 2 - Very moist      Activity: 1 - Bedfast      Mobility: 2 - Very limited     Nutrition: 2 - Probably inadequate      Friction/Shear: 1 - Problem     TOTAL: 10    Wound location: Trach          Last photo: 5/5  Wound due to: Surgical Wound  Wound history/plan of care:  Pt transferred from OSH on 4/31. Pt had trach placed at OSH. There are areas of fibrin from previous trach suture locations which are mostly healed. There is a linear incision on the left side of Trach cannula from insertion. The wound base wass difficult to visualize, appears to be fibrin and adipose tissue marbled together.  5/5: Called by bedside RN with concerns for skin breakdown near trach. On assessment Trach incision appears to have opened up significantly from prior assessment. Moderate amount of respiratory secretions present. ENT consulted by primary team to assess trach site as well.  Wound base: 100 % Fibrin and Adipose tissue     Palpation of the wound bed: normal      Drainage: moderate     Description of drainage: yellow - respiratory secretions     Measurements (length x width x depth, in cm): 2  x 3  x  0.8 cm      Tunneling: N/A     Undermining: N/A  Periwound skin: Intact      Color: normal and consistent with surrounding tissue      Temperature: normal   Odor: none  Pain: unable to assess due to  sedation , none  Pain interventions prior to dressing change: slow and gentle cares   Treatment goal: Heal  and Protection  STATUS: deteriorating  Supplies ordered: at bedside        Treatment Plan:     Left heel wound(s): Every 3 days Cleanse with  "saline and pat dry. Conform a sacral Mepilex around Pt's heel. Place in Prevalon boots. Float heels off of support surface with pillows under calves.    Buttocks/coccyx wound(s): Every other day   Cleanse with wound cleanser and pat dry.   Apply no sting barrier film to analilia-wound area and allow to dry.   Cover open wound with Vaseline gauze.  Cover with Sacral Mepilex.    If wound requiring more than daily dressing changes switch to the below wound cares:    BID and PRN:  Cleanse the area with Mimi cleanse and protect, very gently with soft cloth.  Apply ostomy powder (#983015) on all open and denuded skin.  Apply thin layer of Triad paste ([id number]) on top of it.  With repeat application, do not scrub the paste, only remove soiled paste and reapply.  If complete removal of paste is necessary use baby oil/mineral oil (#890853) and soft wash cloth.  Ensure pt has chair cushion (#078421) while sitting up in the chair.  Use only one blue pad in between mattress and pt. No brief in bed.      Trach wound: Perform trach cares per unit routine. Place a fenestrated optifoam between trach faceplate and Pt's skin.    Pressure Injury Prevention (PIP) Plan:  If patient is declining pressure injury prevention interventions: Explore reason why and address patient's concerns, Educate on pressure injury risk and prevention intervention(s), If patient is still declining, document \"informed refusal\" , and Ensure Care team is aware ( provider, charge nurse, etc)  Mattress: Follow bed algorithm, add Low Air Loss (Air+) mattress pump if skin is very moist or constantly moist.   HOB: Maintain at or below 30 degrees, unless contraindicated  Repositioning in bed: Every 1-2 hours , Left/right positioning; avoid supine, Raise foot of bed prior to raising head of bed, to reduce patient sliding down (shear), and Frequent microturns using positioning wedges, as patient tolerates  Heels: Pillows under calves and Heel lift boots  Protective " Dressing: Sacral Mepilex for prevention (#742732),  especially for the agitated patient   Positioning Equipment:Positioning wedges (#464237) to help maintain 30 degree side lying position   Chair positioning: Chair cushion (#268660) , Assist patient to reposition hourly, and Do NOT use a donut for sitting (this increases pressure to smaller area and creates a higher potential for injury)    If patient has a buttock pressure injury, or high risk for PI use chair cushion or SPS.  Moisture Management: Perineal cleansing /protection: Follow Incontinence Protocol, Avoid brief in bed, Clean and dry skin folds with bathing , Consider InterDry (#862571) between folds, and Moisturize dry skin  Under Devices: Inspect skin under all medical devices during skin inspection , Ensure tubes are stabilized without tension, and Ensure patient is not lying on medical devices or equipment when repositioned  Ask provider to discontinue device when no longer needed.     Orders: Reviewed and Updated    RECOMMEND PRIMARY TEAM ORDER: None, at this time  Education provided: plan of care  Discussed plan of care with: Nurse  Notify WOC if wound(s) deteriorate.  Nursing to notify the Provider(s) and re-consult the WOC Nurse if new skin concern.    DATA:     Current support surface: Standard  Low air loss (ISABELL pump, Isolibrium, Pulsate)  Containment of urine/stool: Incontinent pad in bed, Indwelling catheter, and Internal fecal management  BMI: Body mass index is 24.71 kg/m .   Active diet order: Orders Placed This Encounter      NPO for Procedure/Surgery per Anesthesia Guidelines Except for: Meds; Clear liquids before procedure/surgery: ADULT (Age GREATER than or Equal to 18 years) - Clear liquids 2 hours before procedure/surgery     Output: I/O last 3 completed shifts:  In: 4179.69 [I.V.:1617.92; Other:1.1; IV Piggyback:720]  Out: 2945.6 [Urine:245; Drains:1590; Other:910.6; Blood:200]     Labs:   Recent Labs   Lab 05/05/25  3207  05/04/25  1254 05/04/25  1115 05/04/25  0331 05/03/25 2059 05/02/25  0413 05/02/25  0207   ALBUMIN 1.6*  1.6*   < > 1.5*   < >  --    < >  --    PREALB  --   --  <3.0*  --   --   --   --    HGB 8.8*   < >  --    < >  --    < >  --    INR  --   --   --   --  1.24*   < >  --    WBC 14.8*   < >  --    < >  --    < >  --    A1C  --   --   --   --   --   --  5.3    < > = values in this interval not displayed.     Pressure injury risk assessment:   Sensory Perception: 2-->very limited  Moisture: 4-->rarely moist  Activity: 1-->bedfast  Mobility: 2-->very limited  Nutrition: 3-->adequate  Friction and Shear: 2-->potential problem  Dick Score: 14    Stephen Tilley RN, CWOCN  Pager no longer in use, please contact through Tiffanie Moreno group: [facility] Nurse [place]    Dept. Office Number: [phone]

## 2025-05-05 NOTE — PROGRESS NOTES
ICU End of Shift Summary. See flowsheets for vital signs and detailed assessment.    Changes this shift: Dilaudid gtt started for better pain control, Fent gtt d/c'd. TF started @ 10mL/hr w/ standard FW, relizorb device added per patient order. Patient on P/S for one hour, 7/5, before becoming too tachypneic and uncomfortable. WOC at bedside at request of RN to address patient's sacral wound and better options for dressings to manage patient's pain, additional conversation was had about trach dressing options/suggestions; ENT consulted. ENT tightened up patient's trach ties to allow 1 finger width underneath. Changes in color and consistency from drains #1 and #3 today after TF were started at noon (see flowsheets for specifics), SICU aware. Garcia removed.    Currently: Arouses to voice/opens eyes spontaneously; OTERO; makes needs known; endorses pain in abd. TMax 99's; SR-ST 70's-120's; requiring levo to maintain MAP > 65. Satting well and remains comfortable on CMV settings 40/420/16/7 w/ PIPs teens-20's; 6 shiley XLT. TF @ 10 w/ standard FWF; see charting for 4 SHANNON drains, malankot drain, and wound vac output.     Plan: Advance TF @ 2000 to goal (20 mL/hr). Due to void @ 0000. Continue to monitor and follow POC. Notify SICU with any changes in color/consistency from JPs.    Goal Outcome Evaluation:    Plan of Care Reviewed With: patient    Overall Patient Progress: no change    Outcome Evaluation: Changed Fent gtt to Dilaudid gtt for better pain control. Patient P/S for one hour 7/5 before becoming too tachypneic and uncomfortable. Trophic feeds started via [employer].

## 2025-05-05 NOTE — PROGRESS NOTES
CRRT STATUS NOTE    DATA:  Time:  6:49 AM  Pressures WNL:  YES  Filter Status:  WDL    Problems Reported/Alarms Noted: none    Supplies Present:  YES    ASSESSMENT:  Patient Net Fluid Balance: 5/4 +1.5 L, -253 ml since midnight  Vital Signs: Temp: 99  F (37.2  C) Temp src: Bladder   Pulse: 83   Resp: 18 SpO2: 100 % O2 Device: Mechanical Ventilator     Labs: K 3.4 Mg 2 WBC 14.8 Hgb 8.8  Goals of Therapy: 0-50 ml/hr    INTERVENTIONS:   None needed this shift.    PLAN:  Continue CRRT goals of therapy as tolerated. Contact CRRT resource with questions/concerns.

## 2025-05-06 ENCOUNTER — HOSPITAL ENCOUNTER (OUTPATIENT)
Facility: CLINIC | Age: 30
End: 2025-05-06
Payer: COMMERCIAL

## 2025-05-06 ENCOUNTER — ANESTHESIA EVENT (OUTPATIENT)
Dept: SURGERY | Facility: CLINIC | Age: 30
End: 2025-05-06
Payer: COMMERCIAL

## 2025-05-06 ENCOUNTER — APPOINTMENT (OUTPATIENT)
Dept: GENERAL RADIOLOGY | Facility: CLINIC | Age: 30
End: 2025-05-06
Attending: SURGERY
Payer: COMMERCIAL

## 2025-05-06 LAB
ALBUMIN SERPL BCG-MCNC: 1.7 G/DL (ref 3.5–5.2)
ALLEN'S TEST: ABNORMAL
ALP SERPL-CCNC: 243 U/L (ref 40–150)
ALT SERPL W P-5'-P-CCNC: 8 U/L (ref 0–70)
ANION GAP SERPL CALCULATED.3IONS-SCNC: 11 MMOL/L (ref 7–15)
AST SERPL W P-5'-P-CCNC: 22 U/L (ref 0–45)
ATRIAL RATE - MUSE: 94 BPM
BACTERIA BLD CULT: NO GROWTH
BACTERIA BLD CULT: NO GROWTH
BASE EXCESS BLDA CALC-SCNC: -2.7 MMOL/L (ref -3–3)
BASE EXCESS BLDV CALC-SCNC: NORMAL MMOL/L
BILIRUB DIRECT SERPL-MCNC: 0.71 MG/DL (ref 0–0.3)
BILIRUB SERPL-MCNC: 0.8 MG/DL
BUN SERPL-MCNC: 41.3 MG/DL (ref 6–20)
BUN SERPL-MCNC: 41.5 MG/DL (ref 6–20)
BUN SERPL-MCNC: 41.5 MG/DL (ref 6–20)
CA-I BLD-MCNC: 4.5 MG/DL (ref 4.4–5.2)
CA-I BLD-MCNC: 4.5 MG/DL (ref 4.4–5.2)
CALCIUM SERPL-MCNC: 7.5 MG/DL (ref 8.8–10.4)
CALCIUM SERPL-MCNC: 7.5 MG/DL (ref 8.8–10.4)
CALCIUM SERPL-MCNC: 7.8 MG/DL (ref 8.8–10.4)
CHLORIDE SERPL-SCNC: 105 MMOL/L (ref 98–107)
CHLORIDE SERPL-SCNC: 105 MMOL/L (ref 98–107)
CHLORIDE SERPL-SCNC: 106 MMOL/L (ref 98–107)
CREAT SERPL-MCNC: 0.9 MG/DL (ref 0.67–1.17)
CREAT SERPL-MCNC: 0.9 MG/DL (ref 0.67–1.17)
CREAT SERPL-MCNC: 0.97 MG/DL (ref 0.67–1.17)
DIASTOLIC BLOOD PRESSURE - MUSE: NORMAL MMHG
EGFRCR SERPLBLD CKD-EPI 2021: >90 ML/MIN/1.73M2
ERYTHROCYTE [DISTWIDTH] IN BLOOD BY AUTOMATED COUNT: 16.2 % (ref 10–15)
ERYTHROCYTE [DISTWIDTH] IN BLOOD BY AUTOMATED COUNT: 16.2 % (ref 10–15)
GLUCOSE BLDC GLUCOMTR-MCNC: 152 MG/DL (ref 70–99)
GLUCOSE BLDC GLUCOMTR-MCNC: 157 MG/DL (ref 70–99)
GLUCOSE BLDC GLUCOMTR-MCNC: 165 MG/DL (ref 70–99)
GLUCOSE BLDC GLUCOMTR-MCNC: 169 MG/DL (ref 70–99)
GLUCOSE BLDC GLUCOMTR-MCNC: 171 MG/DL (ref 70–99)
GLUCOSE BLDC GLUCOMTR-MCNC: 180 MG/DL (ref 70–99)
GLUCOSE SERPL-MCNC: 186 MG/DL (ref 70–99)
GLUCOSE SERPL-MCNC: 192 MG/DL (ref 70–99)
GLUCOSE SERPL-MCNC: 192 MG/DL (ref 70–99)
HCO3 BLD-SCNC: 22 MMOL/L (ref 21–28)
HCO3 BLDV-SCNC: NORMAL MMOL/L
HCO3 SERPL-SCNC: 20 MMOL/L (ref 22–29)
HCT VFR BLD AUTO: 24.1 % (ref 40–53)
HCT VFR BLD AUTO: 25.4 % (ref 40–53)
HGB BLD-MCNC: 7.8 G/DL (ref 13.3–17.7)
HGB BLD-MCNC: 8 G/DL (ref 13.3–17.7)
INTERPRETATION ECG - MUSE: NORMAL
MAGNESIUM SERPL-MCNC: 2.1 MG/DL (ref 1.7–2.3)
MAGNESIUM SERPL-MCNC: 2.2 MG/DL (ref 1.7–2.3)
MCH RBC QN AUTO: 29.1 PG (ref 26.5–33)
MCH RBC QN AUTO: 29.8 PG (ref 26.5–33)
MCHC RBC AUTO-ENTMCNC: 31.5 G/DL (ref 31.5–36.5)
MCHC RBC AUTO-ENTMCNC: 32.4 G/DL (ref 31.5–36.5)
MCV RBC AUTO: 92 FL (ref 78–100)
MCV RBC AUTO: 92 FL (ref 78–100)
O2/TOTAL GAS SETTING VFR VENT: 30 %
O2/TOTAL GAS SETTING VFR VENT: 30 %
OXYHGB MFR BLDA: 97 % (ref 92–100)
OXYHGB MFR BLDV: NORMAL %
P AXIS - MUSE: 35 DEGREES
PCO2 BLD: 37 MM HG (ref 35–45)
PCO2 BLDV: NORMAL MM[HG]
PH BLD: 7.38 [PH] (ref 7.35–7.45)
PH BLDV: NORMAL [PH]
PHOSPHATE SERPL-MCNC: 3.1 MG/DL (ref 2.5–4.5)
PHOSPHATE SERPL-MCNC: 3.3 MG/DL (ref 2.5–4.5)
PLATELET # BLD AUTO: 164 10E3/UL (ref 150–450)
PLATELET # BLD AUTO: 193 10E3/UL (ref 150–450)
PO2 BLD: 96 MM HG (ref 80–105)
PO2 BLDV: NORMAL MM[HG]
POTASSIUM SERPL-SCNC: 3.7 MMOL/L (ref 3.4–5.3)
POTASSIUM SERPL-SCNC: 3.7 MMOL/L (ref 3.4–5.3)
POTASSIUM SERPL-SCNC: 3.9 MMOL/L (ref 3.4–5.3)
PR INTERVAL - MUSE: 120 MS
PROT SERPL-MCNC: 4.8 G/DL (ref 6.4–8.3)
QRS DURATION - MUSE: 90 MS
QT - MUSE: 362 MS
QTC - MUSE: 452 MS
R AXIS - MUSE: 62 DEGREES
RBC # BLD AUTO: 2.62 10E6/UL (ref 4.4–5.9)
RBC # BLD AUTO: 2.75 10E6/UL (ref 4.4–5.9)
SAO2 % BLDA: 98.7 % (ref 96–97)
SAO2 % BLDV: NORMAL %
SODIUM SERPL-SCNC: 136 MMOL/L (ref 135–145)
SODIUM SERPL-SCNC: 136 MMOL/L (ref 135–145)
SODIUM SERPL-SCNC: 137 MMOL/L (ref 135–145)
SYSTOLIC BLOOD PRESSURE - MUSE: NORMAL MMHG
T AXIS - MUSE: 48 DEGREES
VENTRICULAR RATE- MUSE: 94 BPM
WBC # BLD AUTO: 13 10E3/UL (ref 4–11)
WBC # BLD AUTO: 16.2 10E3/UL (ref 4–11)

## 2025-05-06 PROCEDURE — 250N000011 HC RX IP 250 OP 636: Performed by: STUDENT IN AN ORGANIZED HEALTH CARE EDUCATION/TRAINING PROGRAM

## 2025-05-06 PROCEDURE — 82435 ASSAY OF BLOOD CHLORIDE: CPT | Performed by: CLINICAL NURSE SPECIALIST

## 2025-05-06 PROCEDURE — 83735 ASSAY OF MAGNESIUM: CPT | Performed by: CLINICAL NURSE SPECIALIST

## 2025-05-06 PROCEDURE — 250N000009 HC RX 250: Performed by: STUDENT IN AN ORGANIZED HEALTH CARE EDUCATION/TRAINING PROGRAM

## 2025-05-06 PROCEDURE — 82525 ASSAY OF COPPER: CPT

## 2025-05-06 PROCEDURE — 82330 ASSAY OF CALCIUM: CPT | Performed by: CLINICAL NURSE SPECIALIST

## 2025-05-06 PROCEDURE — 85014 HEMATOCRIT: CPT | Performed by: STUDENT IN AN ORGANIZED HEALTH CARE EDUCATION/TRAINING PROGRAM

## 2025-05-06 PROCEDURE — 99291 CRITICAL CARE FIRST HOUR: CPT | Mod: 24 | Performed by: PHYSICIAN ASSISTANT

## 2025-05-06 PROCEDURE — 82248 BILIRUBIN DIRECT: CPT | Performed by: CLINICAL NURSE SPECIALIST

## 2025-05-06 PROCEDURE — 84100 ASSAY OF PHOSPHORUS: CPT | Performed by: CLINICAL NURSE SPECIALIST

## 2025-05-06 PROCEDURE — 97530 THERAPEUTIC ACTIVITIES: CPT | Mod: GO

## 2025-05-06 PROCEDURE — B4185 PARENTERAL SOL 10 GM LIPIDS: HCPCS | Performed by: STUDENT IN AN ORGANIZED HEALTH CARE EDUCATION/TRAINING PROGRAM

## 2025-05-06 PROCEDURE — 82435 ASSAY OF BLOOD CHLORIDE: CPT | Performed by: STUDENT IN AN ORGANIZED HEALTH CARE EDUCATION/TRAINING PROGRAM

## 2025-05-06 PROCEDURE — 99233 SBSQ HOSP IP/OBS HIGH 50: CPT | Mod: FS | Performed by: INTERNAL MEDICINE

## 2025-05-06 PROCEDURE — 97530 THERAPEUTIC ACTIVITIES: CPT | Mod: GP

## 2025-05-06 PROCEDURE — 200N000002 HC R&B ICU UMMC

## 2025-05-06 PROCEDURE — 82805 BLOOD GASES W/O2 SATURATION: CPT | Performed by: STUDENT IN AN ORGANIZED HEALTH CARE EDUCATION/TRAINING PROGRAM

## 2025-05-06 PROCEDURE — 85048 AUTOMATED LEUKOCYTE COUNT: CPT | Performed by: STUDENT IN AN ORGANIZED HEALTH CARE EDUCATION/TRAINING PROGRAM

## 2025-05-06 PROCEDURE — 82805 BLOOD GASES W/O2 SATURATION: CPT | Performed by: SURGERY

## 2025-05-06 PROCEDURE — 250N000011 HC RX IP 250 OP 636

## 2025-05-06 PROCEDURE — 97162 PT EVAL MOD COMPLEX 30 MIN: CPT | Mod: GP

## 2025-05-06 PROCEDURE — 84132 ASSAY OF SERUM POTASSIUM: CPT | Performed by: STUDENT IN AN ORGANIZED HEALTH CARE EDUCATION/TRAINING PROGRAM

## 2025-05-06 PROCEDURE — 999N000157 HC STATISTIC RCP TIME EA 10 MIN

## 2025-05-06 PROCEDURE — 71045 X-RAY EXAM CHEST 1 VIEW: CPT

## 2025-05-06 PROCEDURE — 99418 PROLNG IP/OBS E/M EA 15 MIN: CPT | Performed by: INTERNAL MEDICINE

## 2025-05-06 PROCEDURE — 71045 X-RAY EXAM CHEST 1 VIEW: CPT | Mod: 26 | Performed by: RADIOLOGY

## 2025-05-06 PROCEDURE — 250N000009 HC RX 250: Performed by: CLINICAL NURSE SPECIALIST

## 2025-05-06 PROCEDURE — 90947 DIALYSIS REPEATED EVAL: CPT

## 2025-05-06 PROCEDURE — 250N000009 HC RX 250: Performed by: SURGERY

## 2025-05-06 PROCEDURE — 999N000253 HC STATISTIC WEANING TRIALS

## 2025-05-06 PROCEDURE — 94003 VENT MGMT INPAT SUBQ DAY: CPT

## 2025-05-06 PROCEDURE — 97165 OT EVAL LOW COMPLEX 30 MIN: CPT | Mod: GO

## 2025-05-06 RX ORDER — DEXTROSE MONOHYDRATE 25 G/50ML
25-50 INJECTION, SOLUTION INTRAVENOUS
Status: DISCONTINUED | OUTPATIENT
Start: 2025-05-06 | End: 2025-05-16

## 2025-05-06 RX ORDER — CEFAZOLIN SODIUM 2 G/50ML
2 SOLUTION INTRAVENOUS
Status: DISCONTINUED | OUTPATIENT
Start: 2025-05-06 | End: 2025-05-07 | Stop reason: HOSPADM

## 2025-05-06 RX ORDER — CEFAZOLIN SODIUM 2 G/50ML
2 SOLUTION INTRAVENOUS SEE ADMIN INSTRUCTIONS
Status: DISCONTINUED | OUTPATIENT
Start: 2025-05-06 | End: 2025-05-07 | Stop reason: HOSPADM

## 2025-05-06 RX ORDER — NICOTINE POLACRILEX 4 MG
15-30 LOZENGE BUCCAL
Status: DISCONTINUED | OUTPATIENT
Start: 2025-05-06 | End: 2025-05-16

## 2025-05-06 RX ADMIN — PIPERACILLIN AND TAZOBACTAM 4.5 G: 4; .5 INJECTION, POWDER, LYOPHILIZED, FOR SOLUTION INTRAVENOUS at 21:51

## 2025-05-06 RX ADMIN — DEXMEDETOMIDINE HYDROCHLORIDE 0.6 MCG/KG/HR: 4 INJECTION, SOLUTION INTRAVENOUS at 23:49

## 2025-05-06 RX ADMIN — Medication 0.5 MG/HR: at 23:19

## 2025-05-06 RX ADMIN — DEXMEDETOMIDINE HYDROCHLORIDE 0.6 MCG/KG/HR: 4 INJECTION, SOLUTION INTRAVENOUS at 05:35

## 2025-05-06 RX ADMIN — CALCIUM CHLORIDE, MAGNESIUM CHLORIDE, SODIUM CHLORIDE, SODIUM BICARBONATE, POTASSIUM CHLORIDE AND SODIUM PHOSPHATE DIBASIC DIHYDRATE 12.5 ML/KG/HR: 3.68; 3.05; 6.34; 3.09; .314; .187 INJECTION INTRAVENOUS at 08:49

## 2025-05-06 RX ADMIN — CALCIUM CHLORIDE, MAGNESIUM CHLORIDE, SODIUM CHLORIDE, SODIUM BICARBONATE, POTASSIUM CHLORIDE AND SODIUM PHOSPHATE DIBASIC DIHYDRATE 12.5 ML/KG/HR: 3.68; 3.05; 6.34; 3.09; .314; .187 INJECTION INTRAVENOUS at 19:13

## 2025-05-06 RX ADMIN — HEPARIN SODIUM 5000 UNITS: 5000 INJECTION, SOLUTION INTRAVENOUS; SUBCUTANEOUS at 03:09

## 2025-05-06 RX ADMIN — CALCIUM CHLORIDE, MAGNESIUM CHLORIDE, SODIUM CHLORIDE, SODIUM BICARBONATE, POTASSIUM CHLORIDE AND SODIUM PHOSPHATE DIBASIC DIHYDRATE 12.5 ML/KG/HR: 3.68; 3.05; 6.34; 3.09; .314; .187 INJECTION INTRAVENOUS at 14:19

## 2025-05-06 RX ADMIN — PIPERACILLIN AND TAZOBACTAM 4.5 G: 4; .5 INJECTION, POWDER, LYOPHILIZED, FOR SOLUTION INTRAVENOUS at 16:08

## 2025-05-06 RX ADMIN — THIAMINE HYDROCHLORIDE 100 MG: 100 INJECTION, SOLUTION INTRAMUSCULAR; INTRAVENOUS at 08:25

## 2025-05-06 RX ADMIN — HEPARIN SODIUM 5000 UNITS: 5000 INJECTION, SOLUTION INTRAVENOUS; SUBCUTANEOUS at 19:52

## 2025-05-06 RX ADMIN — CALCIUM CHLORIDE, MAGNESIUM CHLORIDE, SODIUM CHLORIDE, SODIUM BICARBONATE, POTASSIUM CHLORIDE AND SODIUM PHOSPHATE DIBASIC DIHYDRATE: 3.68; 3.05; 6.34; 3.09; .314; .187 INJECTION INTRAVENOUS at 08:48

## 2025-05-06 RX ADMIN — CALCIUM CHLORIDE, MAGNESIUM CHLORIDE, SODIUM CHLORIDE, SODIUM BICARBONATE, POTASSIUM CHLORIDE AND SODIUM PHOSPHATE DIBASIC DIHYDRATE 12.5 ML/KG/HR: 3.68; 3.05; 6.34; 3.09; .314; .187 INJECTION INTRAVENOUS at 19:57

## 2025-05-06 RX ADMIN — PIPERACILLIN AND TAZOBACTAM 4.5 G: 4; .5 INJECTION, POWDER, LYOPHILIZED, FOR SOLUTION INTRAVENOUS at 11:17

## 2025-05-06 RX ADMIN — CALCIUM CHLORIDE, MAGNESIUM CHLORIDE, SODIUM CHLORIDE, SODIUM BICARBONATE, POTASSIUM CHLORIDE AND SODIUM PHOSPHATE DIBASIC DIHYDRATE 12.5 ML/KG/HR: 3.68; 3.05; 6.34; 3.09; .314; .187 INJECTION INTRAVENOUS at 02:28

## 2025-05-06 RX ADMIN — PANTOPRAZOLE SODIUM 40 MG: 40 INJECTION, POWDER, FOR SOLUTION INTRAVENOUS at 06:44

## 2025-05-06 RX ADMIN — SMOFLIPID 250 ML: 6; 6; 5; 3 INJECTION, EMULSION INTRAVENOUS at 20:03

## 2025-05-06 RX ADMIN — NOREPINEPHRINE BITARTRATE 0.09 MCG/KG/MIN: 0.06 INJECTION, SOLUTION INTRAVENOUS at 23:47

## 2025-05-06 RX ADMIN — PIPERACILLIN AND TAZOBACTAM 4.5 G: 4; .5 INJECTION, POWDER, LYOPHILIZED, FOR SOLUTION INTRAVENOUS at 03:09

## 2025-05-06 RX ADMIN — HEPARIN SODIUM 5000 UNITS: 5000 INJECTION, SOLUTION INTRAVENOUS; SUBCUTANEOUS at 11:17

## 2025-05-06 RX ADMIN — METHOCARBAMOL 500 MG: 100 INJECTION, SOLUTION INTRAMUSCULAR; INTRAVENOUS at 17:57

## 2025-05-06 RX ADMIN — METHOCARBAMOL 500 MG: 100 INJECTION, SOLUTION INTRAMUSCULAR; INTRAVENOUS at 11:17

## 2025-05-06 RX ADMIN — CALCIUM CHLORIDE, MAGNESIUM CHLORIDE, SODIUM CHLORIDE, SODIUM BICARBONATE, POTASSIUM CHLORIDE AND SODIUM PHOSPHATE DIBASIC DIHYDRATE 12.5 ML/KG/HR: 3.68; 3.05; 6.34; 3.09; .314; .187 INJECTION INTRAVENOUS at 13:35

## 2025-05-06 RX ADMIN — CALCIUM CHLORIDE, MAGNESIUM CHLORIDE, SODIUM CHLORIDE, SODIUM BICARBONATE, POTASSIUM CHLORIDE AND SODIUM PHOSPHATE DIBASIC DIHYDRATE 12.5 ML/KG/HR: 3.68; 3.05; 6.34; 3.09; .314; .187 INJECTION INTRAVENOUS at 03:22

## 2025-05-06 RX ADMIN — METHOCARBAMOL 500 MG: 100 INJECTION, SOLUTION INTRAMUSCULAR; INTRAVENOUS at 01:48

## 2025-05-06 RX ADMIN — DEXMEDETOMIDINE HYDROCHLORIDE 0.6 MCG/KG/HR: 4 INJECTION, SOLUTION INTRAVENOUS at 15:41

## 2025-05-06 RX ADMIN — CALCIUM CHLORIDE, MAGNESIUM CHLORIDE, SODIUM CHLORIDE, SODIUM BICARBONATE, POTASSIUM CHLORIDE AND SODIUM PHOSPHATE DIBASIC DIHYDRATE 12.5 ML/KG/HR: 3.68; 3.05; 6.34; 3.09; .314; .187 INJECTION INTRAVENOUS at 08:48

## 2025-05-06 RX ADMIN — MAGNESIUM SULFATE HEPTAHYDRATE: 500 INJECTION, SOLUTION INTRAMUSCULAR; INTRAVENOUS at 20:03

## 2025-05-06 ASSESSMENT — ACTIVITIES OF DAILY LIVING (ADL)
ADLS_ACUITY_SCORE: 48
ADLS_ACUITY_SCORE: 53
ADLS_ACUITY_SCORE: 53
ADLS_ACUITY_SCORE: 48
ADLS_ACUITY_SCORE: 53
ADLS_ACUITY_SCORE: 53
ADLS_ACUITY_SCORE: 48
ADLS_ACUITY_SCORE: 53
ADLS_ACUITY_SCORE: 53
ADLS_ACUITY_SCORE: 48
ADLS_ACUITY_SCORE: 53
ADLS_ACUITY_SCORE: 48
ADLS_ACUITY_SCORE: 53
ADLS_ACUITY_SCORE: 48

## 2025-05-06 NOTE — PROGRESS NOTES
SURGICAL ICU PROGRESS NOTE  05/06/2025        Date of Service (when I saw the patient): 05/06/2025    ASSESSMENT:  Sebastián Rodas is a 29M with alcohol abuse, anxiety, and bipolar disorder, who was admitted 3/30/25 for alcohol withdrawal and abdominal pain. Workup showed leukocytosis and elevated lipase, consistent with acute pancreatitis. He developed encephalopathy and shock, requiring ICU care, intubation, vasopressors, and CRRT by 4/1. His course was complicated by cardiomyopathy (EF 20-25%, improved to 65-70% by 4/14), necrotizing pancreatitis, and persistent infection despite 14 days of meropenem. On 4/27, imaging showed likely perforated viscus; he underwent emergent laparotomy, necrosectomy, and colectomy. Transferred to [facility] SICU on 4/30. On 5/1, reoperation revealed colonic staple line breakdown, necrotic pancreas, vessel thrombosis, and adhesions. Colostomy with malankot drain and temporary abdominal closure performed. Prognosis is poor; palliative care involved. Care conference 5/2 with family to talk goasl of care. Full code, family acknowledged that poor prognosis is to be expected. 5/4 s/p exploratory surgery, additional necrotic pancreas remove with no obvious spillage or sign of bleeding at the time. Today, the pt pain seems less controlled with fentanyl, necrotic debris with grey/dark output from drain 3 and opening of the trach site. Progressing from respiratory standpoint.     CHANGES and MAJOR THINGS TODAY:  TF's held overnight due to abthera output change in color, resume feeds today   Increase lantus to 8 units  Confer with EGS re: OR takeback wash out  planned for 5/7/25  Change labs to q12    PST today, if tolerates, okay to leave on PS setting.     PLAN:    Neurological:  # Acute pain   # Encephalopathy  - Monitor neurological status. Delirium preventions and precautions.   # Pain: IV Robaxin, IV dilaudid gtt             # Sedation: Precedex gtt mcg/kg/min    Pulmonary:   # Acute hypoxic  respiratory support  # S/p tracheostomy placement   - FiO2 (%): 30 %, Resp: 25, Vent Mode: VC/AC, Resp Rate (Set): 16 breaths/min, Tidal Volume (Set, mL): 420 mL, PEEP (cm H2O): 7 cmH2O, Pressure Support (cm H2O): 5 cmH2O, Resp Rate (Set): 16 breaths/min, Tidal Volume (Set, mL): 420 mL, PEEP (cm H2O): 7 cmH2O     - Vent changed to pressure support.   - Orders for PST.     Ventilatory bundle.  - ENT consulted for trach site evaluation 5/6/25. See care orders in [facility]     Cardiovascular:    # Cardiomyopathy   - Initial LVEF 20-25%, improved to 65-70%  # Shock-distributive (septic)  - Monitor hemodynamic status. MP goal > 65.    - Norepi 0.08 mcg/kg/min   - Attempt fluid removal with UF (see renal below)      Gastroenterology/Nutrition:  # S/p emergent exploratory laparotomy, necrosectomy, transverse colectomy, abdominal washout, and drain placement 5/1 and 5/4  #Severe necrotizing pancreatitis  # Splenic vein thrombosis  - will defer management of dressings and drain removal per EGS     # Severe  Protein calorie deficit malnutrition  due to critical ilness  - Trickle feeds started 5/5 per EGS, will resume today   - RD consult. Appreciate cares and recommendations.     Renal/Fluids/Electrolytes:   #AGMA  - Likely secondary to SARAHI, hypoperfusion, ongoing diarrhea,  now resolved   #Severe hypocalcemia (7.9)  - 2/2 pancreatitis, bicarb, PTH resistance from hypomagnesemia, and Vit D deficiency     # Acute kidney injury on CCRT  Baseline Cr 0.7-0.8. Presented with Cr 0.96 on 3/30. Rapidly markos to 5.2 on 4/1. Oliguric. Garcia UA with proteinuria, hematuria, pyuria, moderate bacteria.  Kidneys unremarkable on CT. Has severe ATN in the setting of shock, ADHF, intravascular hypovolemia/3rd spacing from pancreatitis.   -CRRT 4/1-4/4.   - IHD with levophed started 4/5 but poor CVC function and hypotension   - Line exchanged 4/10 and again 4/19 - due to poor flow.  - PCAD placed 4/21 by IR.   - CRRT resumed 4/21/25 for fluid  overload and assist with more aggressive UF in setting of shock. Back on pressor and CRRT 5/1   - Nephrology consulted, managing CRRT, plan for UF removal 0-50ml/hr       Endocrine:   # Stress hyperglycemia    - hgb A1c 5.3, no hx of DM   - Sliding scale for glucose management. Increase to 8 units  today   - Goal to keep BG< 180 for optimal wound healing      ID:  # Leukocytosis  # pancreatitis, with infected pancreatitic necrosis   - WBC 13.0 ( 14.8)   - Completed 14 days course of meropenem and caspofungin.   - Zosyn added,5/2/25--keep for now, reassess with ROTR about stopping abx   - Discontinued micafungin 5/3     cultures:  - 4/30 blood cultures- NGTD   - 5/1 blood cultures ordered - NGTD     Heme:     # Acute blood loss anemia due   # Anemia of critical illness   # Thrombosed splenic vein   - Transfuse if hgb <7.0 or signs/symptoms of hypoperfusion. Monitor and trend  - Multiple transfusion in OR 5/1  - SQH     Musculoskeletal:   # Deconditioning and weakness due to critical illness   - Physical and occupational therapy consult      Skin:  # Pressure Ulcers - Buttocks/rectal Area   - Barrier cream with liberal application. Continue fecal management system   - WOC consult      #Pressure Ulcers- Left Heel  Pressure Injury Location: Left heel   Wound type: Pressure Injury     Pressure Injury Stage: Deep Tissue Pressure Injury (DTPI), present on admission     General Cares/Prophylaxis:    DVT Prophylaxis: SQH  GI Prophylaxis: PPI  Restraints: Restraints for medical healing needed: NO     Lines/ tubes/ drains:  - SHANNON x 4  - Drain 4 - Colostomy drain  - PICC line- left   - A line  - Trach  - HD line--  Right subclavian   - Abthera      Disposition:  - Surgical ICU  Patient to be discussed with Dr. Benson     Patient seen, findings and plan discussed with surgical ICU staff,    Time spent on this Encounter   Billing:  I spent 42 minutes bedside and on the inpatient unit today managing the critical care of Sebastián  Clark, excluding procedures in relation to the issues listed in this note.      Isacc Burrell PA-C    ====================================  INTERVAL HISTORY:  Course reviewed. Events overnight discussed. Higher abthera output reported. This am, pt arouses to name, follows simple commands. Denies pain or SOB with head shakes     OBJECTIVE:   1. VITAL SIGNS:   Temp:  [98  F (36.7  C)-99.5  F (37.5  C)] 98.3  F (36.8  C)  Pulse:  [] 106  Resp:  [13-30] 25  MAP:  [39 mmHg-86 mmHg] 77 mmHg  Arterial Line BP: (102-138)/(34-70) 128/57  FiO2 (%):  [30 %-40 %] 30 %  SpO2:  [93 %-100 %] 97 %  FiO2 (%): 30 %, Resp: 25, Vent Mode: VC/AC, Resp Rate (Set): 16 breaths/min, Tidal Volume (Set, mL): 420 mL, PEEP (cm H2O): 7 cmH2O, Pressure Support (cm H2O): 5 cmH2O, Resp Rate (Set): 16 breaths/min, Tidal Volume (Set, mL): 420 mL, PEEP (cm H2O): 7 cmH2O    2. INTAKE/ OUTPUT:   I/O last 3 completed shifts:  In: 2093.21 [I.V.:910.01; NG/GT:90; IV Piggyback:220]  Out: 2453.2 [Urine:102; Drains:1526; Other:825.2]    3. PHYSICAL EXAMINATION:  General: laying in bed, arouses to name and normal tone of voice, follows commands   HEENT: PERRLA. Trach present and secure, site dressed   Neuro: follows simple commands when asked, consistently   Pulm/Resp: Clear breath sounds bilaterally, lungs coarse but clear   CV: RRR, S1/S2   Abdomen: G-J tube in place, site secured abdomen with abthera in place, suction intact. SHANNON drains  x2, colostomy with green stool,  VAC present and to suction, thin tea colored drainage   :  no shore, no urine to assess   MSK/Extremities: generalized edema in extremities, calves soft and compressible. Pulses 2+     4. INVESTIGATIONS:   Arterial Blood Gases   Recent Labs   Lab 05/06/25  0347 05/05/25  0339 05/03/25  0339 05/02/25  0703   PH 7.38 7.35 7.32* 7.31*   PCO2 37 40 39 35   PO2 96 135* 113* 96   HCO3 22 22 20* 17*     Complete Blood Count   Recent Labs   Lab 05/06/25  0347 05/05/25  1609 05/05/25  0339  05/04/25 2011 05/04/25  1254   WBC 13.0*  --  14.8* 21.7* 24.0*   HGB 7.8* 8.6* 8.8* 9.5* 8.2*     --  187 204 220     Basic Metabolic Panel  Recent Labs   Lab 05/06/25 0347 05/06/25 0346 05/05/25  2342 05/05/25 2032 05/05/25  1610 05/05/25  1609 05/05/25  0858 05/05/25 0339 05/05/25 0004 05/04/25 2011     136  --   --   --   --  136  --  135  135  --  137   POTASSIUM 3.7  3.7  --   --   --   --  3.9  --  3.4  3.4  --  4.0   CHLORIDE 105  105  --   --   --   --  105  --  104  104  --  105   CO2 20*  20*  --   --   --   --  21*  --  20*  20*  --  19*   BUN 41.5*  41.5*  --   --   --   --  43.6*  --  49.2*  49.2*  --  55.4*   CR 0.90  0.90  --   --   --   --  0.97  --  0.97  0.97  --  1.10   *  192* 180* 167* 168*   < > 188*   < > 214*  214*   < > 212*    < > = values in this interval not displayed.     Liver Function Tests  Recent Labs   Lab 05/06/25 0347 05/05/25  1609 05/05/25  0339 05/04/25 2011 05/04/25  1115 05/04/25  0331 05/03/25 2059 05/03/25  1141 05/03/25 0339 05/02/25 2224 05/02/25  1449 05/02/25  0413 05/01/25  1322 05/01/25 0344 04/30/25  2217   AST 22  --  20  --   --  21  --   --  24  --   --    < > 76*   < >  --    ALT 8  --  11  --   --  13  --   --  22  --   --    < > 69   < >  --    ALKPHOS 243*  --  161*  --   --  144  --   --  160*  --   --    < > 285*   < >  --    BILITOTAL 0.8  --  0.8  --   --  0.6  --   --  0.8  --   --    < > 1.1   < >  --    ALBUMIN 1.7*  1.7* 1.7* 1.6*  1.6* 1.6*   < > 1.6*  1.6*  --    < > 1.8*  1.8*   < >  --    < > 2.2*   < >  --    INR  --   --   --   --   --   --  1.24*  --   --   --  1.30*  --  1.19*  --  1.09    < > = values in this interval not displayed.     Pancreatic Enzymes  Recent Labs   Lab 04/30/25  2201   LIPASE 107*     Coagulation Profile  Recent Labs   Lab 05/03/25  2059 05/02/25  1449 05/01/25  1322 04/30/25  2217   INR 1.24* 1.30* 1.19* 1.09   PTT 31 29 26 24         5. RADIOLOGY:   No results found  for this or any previous visit (from the past 24 hours).    =========================================

## 2025-05-06 NOTE — PROGRESS NOTES
Surgery Progress Note  05/06/2025       Subjective:  No acute overnight events. Remains intubated, ventilated, and sedated. Responds to voice and commands. Tube feeds stopped overnight. Remain off. On 0.07 levo. Abthera remains in place. Drains remain in place, output stable.      Objective:  Temp:  [98  F (36.7  C)-99.5  F (37.5  C)] 98.1  F (36.7  C)  Pulse:  [] 91  Resp:  [13-30] 19  MAP:  [39 mmHg-86 mmHg] 78 mmHg  Arterial Line BP: (102-138)/(34-70) 133/58  FiO2 (%):  [30 %-40 %] 30 %  SpO2:  [93 %-100 %] 95 %    I/O last 3 completed shifts:  In: 2093.21 [I.V.:910.01; NG/GT:90; IV Piggyback:220]  Out: 2453.2 [Urine:102; Drains:1526; Other:825.2]      Gen: Sedated but more arousable today.   Resp: Ventilated, trach  Abd: Rigid, tender to palpation diffusely. Abthera in place. SHANNON drains with stable sanguineous output in bulbs. Bulbs to suction. [place] drain with stool in bag.    Ext: WWP, no edema     Labs:  Recent Labs   Lab 05/06/25  0347 05/05/25  1609 05/05/25 0339 05/04/25 2011   WBC 13.0*  --  14.8* 21.7*   HGB 7.8* 8.6* 8.8* 9.5*     --  187 204       Recent Labs   Lab 05/06/25  0831 05/06/25  0347 05/06/25  0346 05/05/25  1610 05/05/25  1609 05/05/25  0858 05/05/25  0339   NA  --  136  136  --   --  136  --  135  135   POTASSIUM  --  3.7  3.7  --   --  3.9  --  3.4  3.4   CHLORIDE  --  105  105  --   --  105  --  104  104   CO2  --  20*  20*  --   --  21*  --  20*  20*   BUN  --  41.5*  41.5*  --   --  43.6*  --  49.2*  49.2*   CR  --  0.90  0.90  --   --  0.97  --  0.97  0.97   * 192*  192* 180*   < > 188*   < > 214*  214*   KARINA  --  7.5*  7.5*  --   --  7.8*  --  7.9*  7.9*   MAG  --  2.1  --   --  2.1  --  2.0   PHOS  --  3.1  --   --  3.2  --  3.5    < > = values in this interval not displayed.       Imaging:  CTAP [facility] 04/27:    Impression    1.  Worsening sequela of necrotizing pancreatitis. Dominant peripancreatic collection has increased in size, now  measuring 19 x 13 cm, previously 17 x 11 cm; increasing gas within this collection is worrisome for infection. Large volume ascites and fat necrosis elsewhere throughout the abdomen and pelvis has also increased from prior.  2.  New presumed blood products in the dominant peripancreatic collection, as well as layering within the left paracolic gutter, suspicious for interval bleeding. Recommend trending hemoglobin levels; CTA abdomen and pelvis could be considered if there is concern for active bleeding.  3.  Mild small bowel wall thickening, possibly reactive or sequela of enteritis.  4.  Similar right lower lobe consolidation.     Assessment/Plan:   Sebastián Rodas is a 29-year-old male with a history of alcohol use disorder, anxiety, and bipolar disorder who was admitted on 3/30/2025 for alcohol withdrawal following a recent binge, presenting with diffuse abdominal pain. Initial workup revealed leukocytosis and elevated lipase concerning for acute pancreatitis. He developed acute encephalopathy and was transferred to the ICU on 3/31, requiring intubation and vasopressors by 4/1 due to shock. Hospital course has been complicated by acute renal failure requiring CRRT, cardiomyopathy (initial LVEF 20-25%, improved to 65-70% by 4/14), and necrotizing pancreatitis with unorganized fluid collections. He completed a 14-day course of meropenem but remains intermittently febrile and critically ill, requiring ongoing dialysis and vasopressor support. On 4/27, after clinical deterioration and imaging consistent with a likely perforated viscus, following family discussion, he underwent emergent exploratory laparotomy, necrosectomy, transverse colectomy, abdominal washout, and drain placement. Patient was transferred to [facility] on 4/30/25 for further surgical management. Went to OR 5/1 for re-open laparotomy, I&D, placement of colostomy tube in presumed previous colectomy staple line, necrosectomy, and Abthera placement. On 5/2,  increasing sanguineous output from drains concerning for bleeding from pancreatic bed. Family care conference held 5/2, plan for restorative cares at this time. Take back to OR (5/4) for abdominal washout with tentative plan for take back to OR 5/7.     Plan:  - Return to OR 5/7, EGS team to discuss with family today and obtain consent: Plan for abthera takedown, washout, possible ostomy, possible abdominal closure, possible abthera placement.   - Plan for goals of care discussion with family on Thursday 5/8  - Continue to hold trickle feeds but defer to SICU   - Other cares per SICU, we appreciate excellent cares.   - Surgery will continue to follow.     Seen, examined, and discussed with chief resident, who will discuss with staff.   - - - - - - - - - - - - - - - - - -    Vanita Saldana MD  General Surgery, PGY-1

## 2025-05-06 NOTE — PROGRESS NOTES
CLINICAL NUTRITION SERVICES - BRIEF NOTE      Reason for RD note: Following up on metabolic cart study from yesterday (5/5)    New Findings/Chart Review:  TF @ 10 mL/hr. TPN at goal.    Obtained metabolic cart study 5/5 @ 1600 with the following results: MREE = 2512 kcals/day (equiv to 36 kcal/kg/day) with RQ = 0.91.  Pt received 40 ml of TF, 1200 mL TPN, and 250 mL IV lipid in 24 hours preceding the study providing 2017 kcals (80 % MREE).  RQ is within physiologic range; RQ is somewhat logical given provisions (slightly hypocaloric) received prior to study.  Would aim energy intakes minimally at % of this MREE, or 7990-5162 kcal/day (equiv to 29-36 kcal/kg/day).    Interventions:    Updated EN support order to reflect sustaining TF at 10 mL/hr for now per rounds. Continue ReliZorb.    Continue TPN as ordered:  Goal PN provides 250 g dextrose, 150 g AA, and 250 mL SMOF lipids 7 days per week for total provision of 1950 Kcals (28 Kcals/kg), 2.1 g/kg protein, GIR 2 mg/kg/minute, and 26% fat kcals on average daily     Trophic TF @ 10 mL/hr + TPN at goal provides 2310 kcal/day (92% MREE from 5/5) -- this is within goal of % MREE    Future/Additional Recommendations:  Monitor ability to advance TF beyond 10 mL/hr.     Nutrition will continue to follow per protocol.    Tennille Conn RD, LD  Available on [employer] - can search by name or unit Dietitian  **Clinical Nutrition is no longer available via pager

## 2025-05-06 NOTE — PROGRESS NOTES
Nephrology Progress Note  05/06/2025       Sebastián Rodas is a 29 yom with Bipolar disorder, ETOH use c/b necrotizing pancreatitis admitted 3/30/25 to [facility] for ETOH withdrawal and abdominal pain, found to have acute pancreatitis which has progressed to necrotizing pancreatitis complicated by SARAHI.  Seen by Nephrology at [facility], started on CRRT 4/1.  Had periods of stability enough for iHD but back to CRRT on 4/21 until tx to [facility] for further surgical interventions.       Interval History :   Mr Rodas continues on CRRT, labs stable on all 4k baths. Able to be 0.6L negative yesterday between UF and GI losses, is on moderate dose pressor and ordered for 0-50cc/h today.  Tentatively planned for OR today so will have some down-time.        Assessment & Recommendations:   SARAHI-Baseline Cr 0.8 as recently as March 2025 before acute events, was started on CRRT emergently on 4/1 in setting of septic shock. Had ~2 weeks of stability enough to manage with iHD but back on CRRT 4/21 until tx to [facility] on 4/30. Running fevers with intraabdominal sepsis.  Continuing RRT from OSH, restarted CRRT on 5/2 after OR.                  -No need for new consent, continuing RRT started last month at [facility].                 -Access is tunneled RIJ from 4/21.                  -CRRT, changed to all 4k baths, ordered for 0-50cc/h.      Volume-Total body volume overloaded but much of it 3rd spaced.  Making small amount of UOP but would be surprised if this improves with current BP's.       Electrolytes-K 3.7 on all 4k baths, bicarb 20, ABG 7.35/40/135/22.      BMD-No acute issues.      Anemia-Hgb 7.8, stable in the short term, acute management per team.      Nutrition-TPN started 5/1     Time spent: 50 minutes on this date of encounter for chart review, physical exam, medical decision making and co-ordination of care.      Discussed with Dr Hartman     Recommendations were communicated to primary team via verbal communication.     "    Raheem Gabriel, ASIF CNS  Clinical Nurse Specialist  [phone]    Review of Systems:   I reviewed the following systems:  ROS not done due to vent/sedation.     Physical Exam:   I/O last 3 completed shifts:  In: 2093.21 [I.V.:910.01; NG/GT:90; IV Piggyback:220]  Out: 2453.2 [Urine:102; Drains:1526; Other:825.2]   /74 (BP Location: Right arm)   Pulse 106   Temp 98.3  F (36.8  C) (Axillary)   Resp 25   Ht 1.727 m (5' 7.99\")   Wt 82.1 kg (181 lb)   SpO2 97%   BMI 27.53 kg/m       GENERAL APPEARANCE: Vent via trach  Pulmonary: Intubated and sedated.   CV: Regular rhythm, normal rate   - Edema +2 generalized  GI: Surgical dressing in place  MS: no evidence of inflammation in joints, no muscle tenderness  : + Garcia  SKIN: no rash, warm, dry  NEURO: Intubated and sedated.     Labs:   All labs reviewed by me  Electrolytes/Renal -   Recent Labs   Lab Test 05/06/25  0347 05/06/25  0346 05/05/25  2342 05/05/25  1610 05/05/25  1609 05/05/25  0858 05/05/25  0339     136  --   --   --  136  --  135  135   POTASSIUM 3.7  3.7  --   --   --  3.9  --  3.4  3.4   CHLORIDE 105  105  --   --   --  105  --  104  104   CO2 20*  20*  --   --   --  21*  --  20*  20*   BUN 41.5*  41.5*  --   --   --  43.6*  --  49.2*  49.2*   CR 0.90  0.90  --   --   --  0.97  --  0.97  0.97   *  192* 180* 167*   < > 188*   < > 214*  214*   KARINA 7.5*  7.5*  --   --   --  7.8*  --  7.9*  7.9*   MAG 2.1  --   --   --  2.1  --  2.0   PHOS 3.1  --   --   --  3.2  --  3.5    < > = values in this interval not displayed.       CBC -   Recent Labs   Lab Test 05/06/25 0347 05/05/25 1609 05/05/25 0339 05/04/25 2011   WBC 13.0*  --  14.8* 21.7*   HGB 7.8* 8.6* 8.8* 9.5*     --  187 204       LFTs -   Recent Labs   Lab Test 05/06/25 0347 05/05/25  1609 05/05/25 0339 05/04/25  1115 05/04/25 0331   ALKPHOS 243*  --  161*  --  144   BILITOTAL 0.8  --  0.8  --  0.6   ALT 8  --  11  --  13   AST 22  --  20  --  " 21   PROTTOTAL 4.8*  --  4.7*  --  4.3*   ALBUMIN 1.7*  1.7* 1.7* 1.6*  1.6*   < > 1.6*  1.6*    < > = values in this interval not displayed.       Iron Panel - No lab results found.        Current Medications:  Current Facility-Administered Medications   Medication Dose Route Frequency Provider Last Rate Last Admin    heparin ANTICOAGULANT injection 5,000 Units  5,000 Units Subcutaneous Q8H Ganesh Beltran MD   5,000 Units at 05/06/25 0309    insulin aspart (NovoLOG) injection (RAPID ACTING)  1-12 Units Subcutaneous Q4H Brendon Haas DO   2 Units at 05/06/25 0351    insulin glargine (LANTUS PEN) injection 5 Units  5 Units Subcutaneous Q24H Marina Zamarripa MD   5 Units at 05/05/25 1236    lipids 4 oil (SMOFLIPID) 20 % infusion 250 mL  250 mL Intravenous Q24H Ganesh Griffiths MD 20.8 mL/hr at 05/06/25 0700 Rate Verify at 05/06/25 0700    methocarbamol (ROBAXIN) injection 500 mg  500 mg Intravenous Q8H Marina Hood MD   500 mg at 05/06/25 0148    pantoprazole (PROTONIX) IV push injection 40 mg  40 mg Intravenous QAM AC Ganesh Griffiths MD   40 mg at 05/06/25 0644    piperacillin-tazobactam (ZOSYN) 4.5 g vial to attach to  mL bag  4.5 g Intravenous Q6H Ganesh Griffiths MD   4.5 g at 05/06/25 0309    sodium chloride (PF) 0.9% PF flush 3 mL  3 mL Intracatheter Q8H Ganesh Griffiths MD   3 mL at 05/06/25 0644    thiamine (B-1) injection 100 mg  100 mg Intravenous Daily Ganesh Griffiths MD   100 mg at 05/05/25 0808     Current Facility-Administered Medications   Medication Dose Route Frequency Provider Last Rate Last Admin    dexmedeTOMIDine (PRECEDEX) 4 mcg/mL in sodium chloride 0.9 % 100 mL infusion  0.1-1.2 mcg/kg/hr (Dosing Weight) Intravenous Continuous Ganesh Griffiths MD 10.5 mL/hr at 05/06/25 0700 0.6 mcg/kg/hr at 05/06/25 0700    dextrose 10% infusion   Intravenous Continuous MILTONN Mireya López DO        dextrose 10% infusion   Intravenous  Continuous PRN Ganesh Griffiths MD        dialysate for CVVHD & CVVHDF (Phoxillum BK4/2.5)  12.5 mL/kg/hr CRRT Continuous Raheem Gabriel APRN  mL/hr at 05/06/25 0228 12.5 mL/kg/hr at 05/06/25 0228    HYDROmorphone (DILAUDID) 0.2 mg/mL infusion ADULT/PEDS GREATER than or EQUAL to 20 kg  0.3-1 mg/hr Intravenous Continuous Marina Zamarripa MD 2.5 mL/hr at 05/06/25 0700 0.5 mg/hr at 05/06/25 0700    No heparin required   Does not apply Continuous PRN Raheem Gabriel APRN CNS        norepinephrine (LEVOPHED) 16 mg in  mL infusion MAX CONC CENTRAL LINE  0.01-0.6 mcg/kg/min (Dosing Weight) Intravenous Continuous Ganesh Griffiths MD 5.9 mL/hr at 05/06/25 0700 0.09 mcg/kg/min at 05/06/25 0700    parenteral nutrition - ADULT compounded formula   CENTRAL LINE IV TPN CONTINUOUS Brendon Haas DO 50 mL/hr at 05/06/25 0700 Rate Verify at 05/06/25 0700    POST-filter replacement solution for CVVHD & CVVHDF (Phoxillum BK4/2.5)   CRRT Continuous Raheem Gabriel APRN CNS        PRE-filter replacement solution for CVVHD & CVVHDF (Phoxillum BK4/2.5)  12.5 mL/kg/hr CRRT Continuous Raheem Gabriel APRN  mL/hr at 05/06/25 0322 12.5 mL/kg/hr at 05/06/25 0322    vasopressin 1 unit/mL MAX Conc (PITRESSIN) infusion  0.5-4 Units/hr Intravenous Continuous Ganesh Griffiths MD   Stopped at 05/05/25 0300

## 2025-05-06 NOTE — PROGRESS NOTES
05/06/25 1300   Appointment Info   Signing Clinician's Name / Credentials (PT) Tami Macias, PT   Rehab Comments (PT) selina harris MD ok w pt up to chair. Possible OR 5/7   Living Environment   People in Home parent(s);sibling(s)   Current Living Arrangements house   Home Accessibility stairs within home   Number of Stairs, Within Home, Primary other (see comments)  (intubated via trach, head nods 'yes' to stairs within home to reach bedroom/bathroom)   Transportation Anticipated family or friend will provide   Living Environment Comments Pt poor historian this date, remains intubated via trach, limited verbal communication. Nods 'yes' to living in house with parents and siblings. Will follow up as able.   Self-Care   Current Activity Tolerance poor   Equipment Currently Used at Home none   Activity/Exercise/Self-Care Comment Per pt nods yes/no pt was I with mobility and ADLs prior to admission   General Information   Onset of Illness/Injury or Date of Surgery 03/31/25   Referring Physician Brendon Haas,    Patient/Family Therapy Goals Statement (PT) Improve function and return home   Pertinent History of Current Problem (include personal factors and/or comorbidities that impact the POC) 29-year-old male with a history of alcohol use disorder, anxiety, and bipolar disorder who was admitted on 3/30/2025 for alcohol withdrawal following a recent binge, presenting with diffuse abdominal pain. Initial workup revealed leukocytosis and elevated lipase concerning for acute pancreatitis. He developed acute encephalopathy and was transferred to the ICU on 3/31, requiring intubation and vasopressors by 4/1 due to shock. Hospital course has been complicated by acute renal failure requiring CRRT, cardiomyopathy (initial LVEF 20-25%, improved to 65-70% by 4/14), and necrotizing pancreatitis with unorganized fluid collections. He completed a 14-day course of meropenem but remains intermittently  febrile and critically ill, requiring ongoing dialysis and vasopressor support. On 4/27, after clinical deterioration and imaging consistent with a likely perforated viscus, following family discussion, he underwent emergent exploratory laparotomy, necrosectomy, transverse colectomy, abdominal washout, and drain placement. Patient was transferred to [facility] on 4/30/25 for further surgical management. Went to OR 5/1 for re-open laparotomy, I&D, placement of colostomy tube in presumed previous colectomy staple line, necrosectomy, and Abthera placement. On 5/2, increasing sanguineous output from drains concerning for bleeding from pancreatic bed. Family care conference held 5/2, plan for restorative cares at this time. Take back to OR (5/4) for abdominal washout with tentative plan for take back to OR 5/7.   Existing Precautions/Restrictions abdominal   General Observations Pt nods yes to therapy/getting  up to chair with assistance   Cognition   Affect/Mental Status (Cognition) flat/blunted affect;low arousal/lethargic   Orientation Status (Cognition) oriented to;person;unable/difficult to assess   Follows Commands (Cognition) follows one-step commands;50-74% accuracy;delayed response/completion;increased processing time needed;repetition of directions required;verbal cues/prompting required   Behavioral Issues overwhelmed easily   Posture    Posture Forward head position;Protracted shoulders   Range of Motion (ROM)   ROM Comment B LEs mildly decreased consistent with medical course   Strength (Manual Muscle Testing)   Strength Comments B LEs moderately decreased consistent with medical course   Bed Mobility   Comment, (Bed Mobility) Mod A x2 rolling   Transfers   Comment, (Transfers) dependent lift to chair   Gait/Stairs (Locomotion)   Comment, (Gait/Stairs) dependent gait   Balance   Balance Comments dependent unsupported sitting currently   Clinical Impression   Criteria for Skilled Therapeutic Intervention Yes,  treatment indicated   PT Diagnosis (PT) Impaired functional mobility   Influenced by the following impairments decreased strength, act tolerance, balance, respiratory status   Functional limitations due to impairments decreased I with transfers, gait, bed mob, barrier navigation, ADLs   Clinical Presentation (PT Evaluation Complexity) evolving   Clinical Presentation Rationale clinical judgement   Clinical Decision Making (Complexity) moderate complexity   Planned Therapy Interventions (PT) balance training;bed mobility training;gait training;home exercise program;motor coordination training;patient/family education;postural re-education;ROM (range of motion);stair training;strengthening;transfer training;progressive activity/exercise;risk factor education;home program guidelines   Risk & Benefits of therapy have been explained evaluation/treatment results reviewed;care plan/treatment goals reviewed;risks/benefits reviewed;current/potential barriers reviewed;participants voiced agreement with care plan;participants included;patient   PT Total Evaluation Time   PT Eval, Moderate Complexity Minutes (76089) 6   Physical Therapy Goals   PT Frequency 5x/week   PT Goals Bed Mobility;Transfers;Gait;Stairs   PT: Bed Mobility Modified independent;Supine to/from sit;Rolling;Within precautions   PT: Transfers Modified independent;Sit to/from stand;Bed to/from chair;Assistive device;Within precautions   PT: Gait Modified independent;Assistive device;150 feet   PT: Stairs Modified independent;Assistive device;4 stairs;Rail on right   PT Discharge Planning   PT Discharge Recommendation (DC Rec) Transitional Care Facility   PT Rationale for DC Rec Patient presents significantly below baseline functional mobility with long medical course and admission of just over 1 month (mostly at outside hospital).  Patient will need rehab stay to progress functional independence, safety and tolerance for mobility and ADLs.   Physical Therapy  Time and Intention   Total Session Time (sum of timed and untimed services) 6

## 2025-05-06 NOTE — PROGRESS NOTES
CRRT STATUS NOTE    DATA:  Time:  6:35 PM  Pressures WNL:  Yes  Filter Status:  WDL    Problems Reported/Alarms Noted:  None    Supplies Present:  Yes    ASSESSMENT:  Patient Net Fluid Balance: +529 since 0000     Vitals:  Temp:  [98  F (36.7  C)-98.4  F (36.9  C)] 98.4  F (36.9  C)  Pulse:  [] 92  Resp:  [13-35] 15  MAP:  [39 mmHg-239 mmHg] 70 mmHg  Arterial Line BP: ()/() 114/53  FiO2 (%):  [30 %-40 %] 30 %  SpO2:  [93 %-100 %] 98 %     Labs:    Lab Results   Component Value Date     05/06/2025    POTASSIUM 3.9 05/06/2025    CR 0.97 05/06/2025    BUN 41.3 (H) 05/06/2025    MAG 2.2 05/06/2025    PHOS 3.3 05/06/2025    WBC 16.2 (H) 05/06/2025    HGB 8.0 (L) 05/06/2025    HCT 25.4 (L) 05/06/2025     05/06/2025       Goals of Therapy:  0-50 ml/hr     INTERVENTIONS:   None    PLAN:  Continue fluid removal as tolerated per goals of therapy.  Check filter daily and change filter q 72 hrs and PRN.  Please contact the CRRT resource RN on Voc[employer] with any questions/concerns.

## 2025-05-06 NOTE — PLAN OF CARE
ICU End of Shift Summary. See flowsheets for vital signs and detailed assessment.    Changes this shift: RASS 0 to -1. On Dilaudid gtt @ 0.5mg/hr + bolus' + Dex gtt @ 0.6. Pain primarily to sacrum.  Up in chair x2 hours. Levo gtt @ 0.08; targeting MAP >65. SBT 30% 5/5 all shift, no issues. No BM. On TPN + trophic TF reinitiated. Conservative sliding scale coverage admin'd. Anuric; bladder scanned for 98mL. No circuit issues; ended shift net positive 300cc this shift.     Plan: Wean vent/pressors as able. RTOR tomorrow.     Goal Outcome Evaluation:      Plan of Care Reviewed With: patient, parent    Overall Patient Progress: improvingOverall Patient Progress: improving    Outcome Evaluation: SBT 30% 5/5 all shift; Levo gtt unchanged. Follows commands, however intermittent confusion/AMS.    Problem: Gas Exchange Impaired  Goal: Optimal Gas Exchange  Outcome: Progressing  Intervention: Optimize Oxygenation and Ventilation  Recent Flowsheet Documentation  Taken 5/6/2025 1800 by Scott Fletcher RN  Head of Bed (HOB) Positioning: HOB at 30 degrees  Taken 5/6/2025 1600 by Scott Fletcher RN  Head of Bed (HOB) Positioning: HOB at 30 degrees  Taken 5/6/2025 1400 by Scott Fletcher RN  Head of Bed (HOB) Positioning: HOB at 30 degrees  Taken 5/6/2025 1200 by Sctot Fletcher RN  Head of Bed (HOB) Positioning: HOB at 30 degrees  Taken 5/6/2025 1000 by Scott Fletcher RN  Head of Bed (HOB) Positioning: HOB at 30 degrees  Taken 5/6/2025 0900 by Scott Fletcher RN  Head of Bed (HOB) Positioning: HOB at 30 degrees  Taken 5/6/2025 0800 by Scott Fletcher RN  Head of Bed (HOB) Positioning: HOB at 30 degrees

## 2025-05-06 NOTE — PLAN OF CARE
ICU End of Shift Summary: Please see flowsheets for detailed assessment and vital signs.     Changes this Shift: Patient arouse to voice, opens eyes spontaneously, and endorses pain to abdomen. Afebrile and SR mid to high 80s to intermittently ST high 120s. Levo titrated to keep greater >65 @ 0.09. CMV settings 30/420/16/7 with PIPs in mid to high teens. FiO2 weaned from 40% to 30% and patient tolerating. Dilaudid titrated up from 0.3 to 0.5, and administered PRN dilaudid x4. Per MD feeds stopped @ 0315. Bladder scanned for 16 mL @ 0000.     Plan: Reassess need for tube feeds. Continue to monitor and follow plan of care. Notify SICU with any changes in JPs output: color/consistency.     Goal Outcome Evaluation  Problem: Adult Inpatient Plan of Care  Goal: Plan of Care Review  Description: The Plan of Care Review/Shift note should be completed every shift.  The Outcome Evaluation is a brief statement about your assessment that the patient is improving, declining, or no change.  This information will be displayed automatically on your shiftnote.  Outcome: Not Progressing  Flowsheets (Taken 5/6/2025 0220)  Outcome Evaluation: Titrated Dilaudid from 0.3 to 0.5. FiO2 weaned from 40% to 30% and patient tolerating. Per MD feeds left @ 10 mL/hr with Q4 30mL FWF.  Plan of Care Reviewed With: patient  Overall Patient Progress: no change     Problem: Adult Inpatient Plan of Care  Goal: Optimal Comfort and Wellbeing  Intervention: Monitor Pain and Promote Comfort  Recent Flowsheet Documentation  Taken 5/5/2025 2000 by Delphine Nj, RN  Pain Management Interventions: around-the-clock dosing utilized      Plan of Care Reviewed With: patient    Overall Patient Progress: no change    Outcome Evaluation: Titrated Dilaudid from 0.3 to 0.5. FiO2 weaned from 40% to 30% and patient tolerating. Per MD feeds left @ 10 mL/hr with Q4 30mL FWF.       [Mother] : mother [Patient] : patient

## 2025-05-06 NOTE — PROGRESS NOTES
05/06/25 1259   Appointment Info   Signing Clinician's Name / Credentials (OT) Cassie Reeves OTR/L   Rehab Comments (OT) abd precs- ab thera and wound vac in place   Living Environment   People in Home parent(s);sibling(s)   Current Living Arrangements house   Home Accessibility stairs within home   Number of Stairs, Within Home, Primary other (see comments)  (intubated via trach, head nods 'yes' to stairs within home to reach bedroom/bathroom)   Transportation Anticipated family or friend will provide   Living Environment Comments Pt poor historian this date, remains intubated via trach, limited verbal communication. Nods 'yes' to living in house with parents and siblings. Will follow up as able.   Self-Care   Current Activity Tolerance poor   Equipment Currently Used at Home none   Activity/Exercise/Self-Care Comment Per pt report, IND with ADL tasks, no AD for functional mobility.   Instrumental Activities of Daily Living (IADL)   IADL Comments Pt reports IADL IND at baseline, will need to follow up as pt with limited verbal communication. Reports 'taking care of cat.'   General Information   Onset of Illness/Injury or Date of Surgery 04/30/25   Referring Physician Ganesh Griffiths MD   Patient/Family Therapy Goal Statement (OT) Did not state   Additional Occupational Profile Info/Pertinent History of Current Problem Per EMR,  Sebastián Rodas is a 29M with alcohol abuse, anxiety, and bipolar disorder, who was admitted 3/30/25 for alcohol withdrawal and abdominal pain. Workup showed leukocytosis and elevated lipase, consistent with acute pancreatitis. He developed encephalopathy and shock, requiring ICU care, intubation, vasopressors, and CRRT by 4/1. His course was complicated by cardiomyopathy (EF 20-25%, improved to 65-70% by 4/14), necrotizing pancreatitis, and persistent infection despite 14 days of meropenem. On 4/27, imaging showed likely perforated viscus; he underwent emergent laparotomy,  necrosectomy, and colectomy. Transferred to [facility] SICU on 4/30. On 5/1, reoperation revealed colonic staple line breakdown, necrotic pancreas, vessel thrombosis, and adhesions. Colostomy with malankot drain and temporary abdominal closure performed. Prognosis is poor; palliative care involved. Care conference 5/2 with family to talk goasl of care. Full code, family acknowledged that poor prognosis is to be expected. 5/4 s/p exploratory surgery, additional necrotic pancreas remove with no obvious spillage or sign of bleeding at the time. Today, the pt pain seems less controlled with fentanyl, necrotic debris with grey/dark output from drain 3 and opening of the trach site. Progressing from respiratory standpoint.   Existing Precautions/Restrictions abdominal;fall;lifting   Limitations/Impairments safety/cognitive   Left Upper Extremity (Weight-bearing Status) partial weight-bearing (PWB)  (no > 10 lb)   Right Upper Extremity (Weight-bearing Status) partial weight-bearing (PWB)  (no > 10 lb)   Left Lower Extremity (Weight-bearing Status) full weight-bearing (FWB)   Right Lower Extremity (Weight-bearing Status) full weight-bearing (FWB)   General Observations and Info OT consult: weakness   Cognitive Status Examination   Orientation Status person   Cognitive Status Comments Pt presents lethargic this date, able to head nod yes/no intermittently to Th questions, open eyes and tracks to Th voice   Visual Perception   Impact of Vision Impairment on Function (Vision) Unable to formally assess due to pt cognitive status and remains intubated via trach, will monitor   Sensory   Sensory Comments Unable to formally assess due to pt cognitive status and remains intubated via trach, will monitor   Pain Assessment   Patient Currently in Pain Yes, see Vital Sign flowsheet   Posture   Posture Comments per clinical judgement, mod-max A EOB balance, poor head control   Range of Motion Comprehensive   Comment, General Range of Motion B  UE AROM limited by post-surgical pain, deconditioning, and post-op precautions   Strength Comprehensive (MMT)   Comment, General Manual Muscle Testing (MMT) Assessment B UE strength significantly deconditioned, formal MMT not assessed this date, will monitor   Coordination   Coordination Comments Unable to formally assess due to pt cognitive status and remains intubated via trach, will monitor   Bed Mobility   Bed Mobility supine-sit   Supine-Sit [place] (Bed Mobility) maximum assist (25% patient effort);2 person assist;dependent (less than 25% patient effort)   Comment (Bed Mobility) per clinical judgement   Transfers   Transfers sit-stand transfer;toilet transfer   Sit-Stand Transfer   Sit-Stand [place] (Transfers) dependent (less than 25% patient effort)   Sit/Stand Transfer Comments per clinical judgement   Toilet Transfer   Type (Toilet Transfer) sit-stand   [place] Level (Toilet Transfer) dependent (less than 25% patient effort)   Toilet Transfer Comments per clinical judgement   Balance   Balance Assessment sitting static balance   Balance Comments per clinical judgement, mod-max A   Activities of Daily Living   BADL Assessment/Intervention bathing;upper body dressing;lower body dressing;toileting;grooming   Bathing Assessment/Intervention   [place] Level (Bathing) dependent (less than 25% patient effort)   Comment, (Bathing) per clinical judgement   Upper Body Dressing Assessment/Training   Comment, (Upper Body Dressing) per clinical judgement   [place] Level (Upper Body Dressing) maximum assist (25% patient effort);dependent (less than 25% patient effort)   Lower Body Dressing Assessment/Training   Comment, (Lower Body Dressing) per clinical judgement   [place] Level (Lower Body Dressing) dependent (less than 25% patient effort)   Grooming Assessment/Training   [place] Level (Grooming) maximum assist (25% patient effort)   Comment, (Grooming) per clinical judgement    Toileting   Comment, (Toileting) per clinical judgement   [place] Level (Toileting) dependent (less than 25% patient effort)   Clinical Impression   Criteria for Skilled Therapeutic Interventions Met (OT) Yes, treatment indicated   OT Diagnosis Decreased ADL IND/safety, functional transfers, overall activity tolerance, IADL tasks, cognition, muscular strength/endurance   OT Problem List-Impairments impacting ADL problems related to;activity tolerance impaired;balance;cognition;motor control;range of motion (ROM);strength;pain;post-surgical precautions;postural control   Assessment of Occupational Performance 5 or more Performance Deficits   Identified Performance Deficits all ADL/IADL tasks, functional mobility, cognition, muscular strength/endurance   Planned Therapy Interventions (OT) ADL retraining;IADL retraining;balance training;bed mobility training;cognition;ROM;strengthening;transfer training;progressive activity/exercise   Clinical Decision Making Complexity (OT) problem focused assessment/low complexity   Risk & Benefits of therapy have been explained evaluation/treatment results reviewed;care plan/treatment goals reviewed;risks/benefits reviewed;participants included;patient   Clinical Impression Comments Pt will benefit from continued skilled OT during IP stay to progress IND/safety and return to PLOF   OT Total Evaluation Time   OT Eval, Low Complexity Minutes (83099) 5   OT Goals   Therapy Frequency (OT) 5 times/week   OT Predicted Duration/Target Date for Goal Attainment 06/10/25   OT Goals Hygiene/Grooming;Upper Body Dressing;Lower Body Dressing;Upper Body Bathing;Lower Body Bathing;Toilet Transfer/Toileting;Cognition;OT Goal 1   OT: Hygiene/Grooming modified independent;within precautions;while standing   OT: Upper Body Dressing Modified independent;within precautions   OT: Lower Body Dressing Modified independent;including set-up/clothing retrieval;within precautions   OT: Upper Body Bathing  Modified independent;within precautions   OT: Lower Body Bathing Modified independent;with precautions   OT: Toilet Transfer/Toileting Modified independent;toilet transfer;cleaning and garment management;within precautions   OT: Cognitive Patient/caregiver will verbalize understanding of cognitive assessment results/recommendations as needed for safe discharge planning   OT: Goal 1 Pt will be able to demonstrate UE HEP within precautions with SBA prior to dc home   OT Discharge Planning   OT Plan OT: review precs, monitor cognition, seated ADLs, EOB/core as able   OT Discharge Recommendation (DC Rec) Transitional Care Facility   OT Rationale for DC Rec Pt significantly below baseline function for ADL tasks, functional mobility, overall activity tolerance, cognition. Limited by significant deconditioning, post-surgical pain, overall cognition. Currently, requiring OH lift for all functional transfers. Recommending continued rehab at TCU to progress IND/safety and return to PLOF. Will monitor if pt becomes ARU candidate.   OT Brief overview of current status OH lift   OT Total Distance Amb During Session (feet) 0   Total Session Time   Total Session Time (sum of timed and untimed services) 5

## 2025-05-06 NOTE — PROGRESS NOTES
CRRT STATUS NOTE    DATA:  Time:  0400 AM  Pressures WNL:  YES  Filter Status:  WDL    Problems Reported/Alarms Noted:  None.    Supplies Present:  YES    ASSESSMENT:  Patient Net Fluid Balance:  Net -96 ml since MN.  Vital Signs:  HR 84, /52, MAP 70  Labs:  K 3.7, Mg 2.1, Phos 3.1, iCa 4.5, Hgb 7.8, Plt 164  Goals of Therapy:  I = O    INTERVENTIONS:   None.    PLAN:  Continue to monitor circuit daily and change set q72 hours or PRN for clotting/clogging. Please call CRRT RN with any quesitons/problems.

## 2025-05-07 ENCOUNTER — ANESTHESIA (OUTPATIENT)
Dept: SURGERY | Facility: CLINIC | Age: 30
End: 2025-05-07
Payer: COMMERCIAL

## 2025-05-07 LAB
ABO + RH BLD: NORMAL
ALBUMIN SERPL BCG-MCNC: 1.8 G/DL (ref 3.5–5.2)
ALBUMIN SERPL BCG-MCNC: 1.8 G/DL (ref 3.5–5.2)
ALP SERPL-CCNC: 351 U/L (ref 40–150)
ALT SERPL W P-5'-P-CCNC: 9 U/L (ref 0–70)
ANION GAP SERPL CALCULATED.3IONS-SCNC: 11 MMOL/L (ref 7–15)
ANION GAP SERPL CALCULATED.3IONS-SCNC: 11 MMOL/L (ref 7–15)
AST SERPL W P-5'-P-CCNC: 24 U/L (ref 0–45)
BILIRUB DIRECT SERPL-MCNC: 0.62 MG/DL (ref 0–0.3)
BILIRUB SERPL-MCNC: 0.8 MG/DL
BLD GP AB SCN SERPL QL: NEGATIVE
BUN SERPL-MCNC: 38.9 MG/DL (ref 6–20)
BUN SERPL-MCNC: 40.1 MG/DL (ref 6–20)
CA-I BLD-MCNC: 4.4 MG/DL (ref 4.4–5.2)
CA-I BLD-MCNC: 4.4 MG/DL (ref 4.4–5.2)
CALCIUM SERPL-MCNC: 7.6 MG/DL (ref 8.8–10.4)
CALCIUM SERPL-MCNC: 7.8 MG/DL (ref 8.8–10.4)
CHLORIDE SERPL-SCNC: 104 MMOL/L (ref 98–107)
CHLORIDE SERPL-SCNC: 105 MMOL/L (ref 98–107)
COPPER SERPL-MCNC: 41.9 UG/DL
CREAT SERPL-MCNC: 1.03 MG/DL (ref 0.67–1.17)
CREAT SERPL-MCNC: 1.13 MG/DL (ref 0.67–1.17)
EGFRCR SERPLBLD CKD-EPI 2021: 89 ML/MIN/1.73M2
EGFRCR SERPLBLD CKD-EPI 2021: >90 ML/MIN/1.73M2
ERYTHROCYTE [DISTWIDTH] IN BLOOD BY AUTOMATED COUNT: 16.3 % (ref 10–15)
ERYTHROCYTE [DISTWIDTH] IN BLOOD BY AUTOMATED COUNT: 16.5 % (ref 10–15)
GLUCOSE BLDC GLUCOMTR-MCNC: 153 MG/DL (ref 70–99)
GLUCOSE BLDC GLUCOMTR-MCNC: 168 MG/DL (ref 70–99)
GLUCOSE BLDC GLUCOMTR-MCNC: 184 MG/DL (ref 70–99)
GLUCOSE BLDC GLUCOMTR-MCNC: 192 MG/DL (ref 70–99)
GLUCOSE BLDC GLUCOMTR-MCNC: 201 MG/DL (ref 70–99)
GLUCOSE SERPL-MCNC: 164 MG/DL (ref 70–99)
GLUCOSE SERPL-MCNC: 213 MG/DL (ref 70–99)
HCO3 SERPL-SCNC: 20 MMOL/L (ref 22–29)
HCO3 SERPL-SCNC: 21 MMOL/L (ref 22–29)
HCT VFR BLD AUTO: 22.6 % (ref 40–53)
HCT VFR BLD AUTO: 24.4 % (ref 40–53)
HGB BLD-MCNC: 7.3 G/DL (ref 13.3–17.7)
HGB BLD-MCNC: 8 G/DL (ref 13.3–17.7)
MAGNESIUM SERPL-MCNC: 2.3 MG/DL (ref 1.7–2.3)
MAGNESIUM SERPL-MCNC: 3 MG/DL (ref 1.7–2.3)
MCH RBC QN AUTO: 30.3 PG (ref 26.5–33)
MCH RBC QN AUTO: 30.4 PG (ref 26.5–33)
MCHC RBC AUTO-ENTMCNC: 32.3 G/DL (ref 31.5–36.5)
MCHC RBC AUTO-ENTMCNC: 32.8 G/DL (ref 31.5–36.5)
MCV RBC AUTO: 92 FL (ref 78–100)
MCV RBC AUTO: 94 FL (ref 78–100)
PHOSPHATE SERPL-MCNC: 3.3 MG/DL (ref 2.5–4.5)
PHOSPHATE SERPL-MCNC: 4.6 MG/DL (ref 2.5–4.5)
PLATELET # BLD AUTO: 191 10E3/UL (ref 150–450)
PLATELET # BLD AUTO: 204 10E3/UL (ref 150–450)
POTASSIUM SERPL-SCNC: 3.7 MMOL/L (ref 3.4–5.3)
POTASSIUM SERPL-SCNC: 4.6 MMOL/L (ref 3.4–5.3)
PROT SERPL-MCNC: 5.5 G/DL (ref 6.4–8.3)
RBC # BLD AUTO: 2.4 10E6/UL (ref 4.4–5.9)
RBC # BLD AUTO: 2.64 10E6/UL (ref 4.4–5.9)
SODIUM SERPL-SCNC: 136 MMOL/L (ref 135–145)
SODIUM SERPL-SCNC: 136 MMOL/L (ref 135–145)
SPECIMEN EXP DATE BLD: NORMAL
WBC # BLD AUTO: 12.9 10E3/UL (ref 4–11)
WBC # BLD AUTO: 15.3 10E3/UL (ref 4–11)

## 2025-05-07 PROCEDURE — 250N000011 HC RX IP 250 OP 636: Performed by: STUDENT IN AN ORGANIZED HEALTH CARE EDUCATION/TRAINING PROGRAM

## 2025-05-07 PROCEDURE — 86923 COMPATIBILITY TEST ELECTRIC: CPT

## 2025-05-07 PROCEDURE — 82247 BILIRUBIN TOTAL: CPT | Performed by: CLINICAL NURSE SPECIALIST

## 2025-05-07 PROCEDURE — 85027 COMPLETE CBC AUTOMATED: CPT | Performed by: STUDENT IN AN ORGANIZED HEALTH CARE EDUCATION/TRAINING PROGRAM

## 2025-05-07 PROCEDURE — 85048 AUTOMATED LEUKOCYTE COUNT: CPT | Performed by: STUDENT IN AN ORGANIZED HEALTH CARE EDUCATION/TRAINING PROGRAM

## 2025-05-07 PROCEDURE — 360N000076 HC SURGERY LEVEL 3, PER MIN: Performed by: SURGERY

## 2025-05-07 PROCEDURE — 90947 DIALYSIS REPEATED EVAL: CPT

## 2025-05-07 PROCEDURE — 80053 COMPREHEN METABOLIC PANEL: CPT | Performed by: CLINICAL NURSE SPECIALIST

## 2025-05-07 PROCEDURE — 99418 PROLNG IP/OBS E/M EA 15 MIN: CPT | Performed by: INTERNAL MEDICINE

## 2025-05-07 PROCEDURE — 250N000009 HC RX 250: Performed by: STUDENT IN AN ORGANIZED HEALTH CARE EDUCATION/TRAINING PROGRAM

## 2025-05-07 PROCEDURE — 84075 ASSAY ALKALINE PHOSPHATASE: CPT | Performed by: CLINICAL NURSE SPECIALIST

## 2025-05-07 PROCEDURE — 84450 TRANSFERASE (AST) (SGOT): CPT | Performed by: CLINICAL NURSE SPECIALIST

## 2025-05-07 PROCEDURE — 83735 ASSAY OF MAGNESIUM: CPT | Performed by: CLINICAL NURSE SPECIALIST

## 2025-05-07 PROCEDURE — 82330 ASSAY OF CALCIUM: CPT | Performed by: CLINICAL NURSE SPECIALIST

## 2025-05-07 PROCEDURE — 250N000025 HC SEVOFLURANE, PER MIN: Performed by: SURGERY

## 2025-05-07 PROCEDURE — 200N000002 HC R&B ICU UMMC

## 2025-05-07 PROCEDURE — 250N000009 HC RX 250: Performed by: SURGERY

## 2025-05-07 PROCEDURE — 999N000157 HC STATISTIC RCP TIME EA 10 MIN

## 2025-05-07 PROCEDURE — 80051 ELECTROLYTE PANEL: CPT | Performed by: CLINICAL NURSE SPECIALIST

## 2025-05-07 PROCEDURE — 86901 BLOOD TYPING SEROLOGIC RH(D): CPT | Performed by: SURGERY

## 2025-05-07 PROCEDURE — 82435 ASSAY OF BLOOD CHLORIDE: CPT | Performed by: CLINICAL NURSE SPECIALIST

## 2025-05-07 PROCEDURE — 250N000009 HC RX 250: Performed by: REGISTERED NURSE

## 2025-05-07 PROCEDURE — C1781 MESH (IMPLANTABLE): HCPCS | Performed by: SURGERY

## 2025-05-07 PROCEDURE — 82248 BILIRUBIN DIRECT: CPT | Performed by: CLINICAL NURSE SPECIALIST

## 2025-05-07 PROCEDURE — 99233 SBSQ HOSP IP/OBS HIGH 50: CPT | Mod: FS | Performed by: INTERNAL MEDICINE

## 2025-05-07 PROCEDURE — 80076 HEPATIC FUNCTION PANEL: CPT | Performed by: CLINICAL NURSE SPECIALIST

## 2025-05-07 PROCEDURE — 15777 ACELLULAR DERM MATRIX IMPLT: CPT | Mod: 58 | Performed by: SURGERY

## 2025-05-07 PROCEDURE — 250N000011 HC RX IP 250 OP 636: Performed by: REGISTERED NURSE

## 2025-05-07 PROCEDURE — 258N000003 HC RX IP 258 OP 636: Performed by: REGISTERED NURSE

## 2025-05-07 PROCEDURE — 370N000017 HC ANESTHESIA TECHNICAL FEE, PER MIN: Performed by: SURGERY

## 2025-05-07 PROCEDURE — 84460 ALANINE AMINO (ALT) (SGPT): CPT | Performed by: CLINICAL NURSE SPECIALIST

## 2025-05-07 PROCEDURE — 86850 RBC ANTIBODY SCREEN: CPT | Performed by: SURGERY

## 2025-05-07 PROCEDURE — 272N000001 HC OR GENERAL SUPPLY STERILE: Performed by: SURGERY

## 2025-05-07 PROCEDURE — 84100 ASSAY OF PHOSPHORUS: CPT | Performed by: CLINICAL NURSE SPECIALIST

## 2025-05-07 PROCEDURE — 49002 REOPENING OF ABDOMEN: CPT | Mod: 58 | Performed by: SURGERY

## 2025-05-07 PROCEDURE — 3E1M38Z IRRIGATION OF PERITONEAL CAVITY USING IRRIGATING SUBSTANCE, PERCUTANEOUS APPROACH: ICD-10-PCS | Performed by: SURGERY

## 2025-05-07 PROCEDURE — B4185 PARENTERAL SOL 10 GM LIPIDS: HCPCS | Performed by: STUDENT IN AN ORGANIZED HEALTH CARE EDUCATION/TRAINING PROGRAM

## 2025-05-07 PROCEDURE — 250N000011 HC RX IP 250 OP 636: Performed by: PHYSICIAN ASSISTANT

## 2025-05-07 PROCEDURE — 250N000009 HC RX 250: Performed by: CLINICAL NURSE SPECIALIST

## 2025-05-07 PROCEDURE — 82565 ASSAY OF CREATININE: CPT | Performed by: CLINICAL NURSE SPECIALIST

## 2025-05-07 PROCEDURE — 250N000011 HC RX IP 250 OP 636: Mod: JZ

## 2025-05-07 PROCEDURE — 85014 HEMATOCRIT: CPT | Performed by: STUDENT IN AN ORGANIZED HEALTH CARE EDUCATION/TRAINING PROGRAM

## 2025-05-07 PROCEDURE — 94003 VENT MGMT INPAT SUBQ DAY: CPT

## 2025-05-07 PROCEDURE — 86923 COMPATIBILITY TEST ELECTRIC: CPT | Performed by: STUDENT IN AN ORGANIZED HEALTH CARE EDUCATION/TRAINING PROGRAM

## 2025-05-07 DEVICE — UNDYED KNITTED (POLYGLACTIN 910), SYNTHETIC ABSORBABLE MESH
Type: IMPLANTABLE DEVICE | Site: ABDOMEN | Status: FUNCTIONAL
Brand: VICRYL

## 2025-05-07 RX ORDER — DEXAMETHASONE SODIUM PHOSPHATE 4 MG/ML
INJECTION, SOLUTION INTRA-ARTICULAR; INTRALESIONAL; INTRAMUSCULAR; INTRAVENOUS; SOFT TISSUE PRN
Status: DISCONTINUED | OUTPATIENT
Start: 2025-05-07 | End: 2025-05-07

## 2025-05-07 RX ORDER — SODIUM CHLORIDE, SODIUM LACTATE, POTASSIUM CHLORIDE, CALCIUM CHLORIDE 600; 310; 30; 20 MG/100ML; MG/100ML; MG/100ML; MG/100ML
INJECTION, SOLUTION INTRAVENOUS CONTINUOUS PRN
Status: DISCONTINUED | OUTPATIENT
Start: 2025-05-07 | End: 2025-05-07

## 2025-05-07 RX ORDER — PROPOFOL 10 MG/ML
INJECTION, EMULSION INTRAVENOUS PRN
Status: DISCONTINUED | OUTPATIENT
Start: 2025-05-07 | End: 2025-05-07

## 2025-05-07 RX ORDER — PROPOFOL 10 MG/ML
INJECTION, EMULSION INTRAVENOUS CONTINUOUS PRN
Status: DISCONTINUED | OUTPATIENT
Start: 2025-05-07 | End: 2025-05-07

## 2025-05-07 RX ORDER — ONDANSETRON 2 MG/ML
INJECTION INTRAMUSCULAR; INTRAVENOUS PRN
Status: DISCONTINUED | OUTPATIENT
Start: 2025-05-07 | End: 2025-05-07

## 2025-05-07 RX ORDER — FENTANYL CITRATE 50 UG/ML
INJECTION, SOLUTION INTRAMUSCULAR; INTRAVENOUS PRN
Status: DISCONTINUED | OUTPATIENT
Start: 2025-05-07 | End: 2025-05-07

## 2025-05-07 RX ORDER — PIPERACILLIN SODIUM, TAZOBACTAM SODIUM 4; .5 G/20ML; G/20ML
4.5 INJECTION, POWDER, LYOPHILIZED, FOR SOLUTION INTRAVENOUS EVERY 6 HOURS
Status: COMPLETED | OUTPATIENT
Start: 2025-05-07 | End: 2025-05-11

## 2025-05-07 RX ADMIN — PROPOFOL 50 MG: 10 INJECTION, EMULSION INTRAVENOUS at 10:06

## 2025-05-07 RX ADMIN — CALCIUM CHLORIDE, MAGNESIUM CHLORIDE, SODIUM CHLORIDE, SODIUM BICARBONATE, POTASSIUM CHLORIDE AND SODIUM PHOSPHATE DIBASIC DIHYDRATE 12.5 ML/KG/HR: 3.68; 3.05; 6.34; 3.09; .314; .187 INJECTION INTRAVENOUS at 21:11

## 2025-05-07 RX ADMIN — METHOCARBAMOL 500 MG: 100 INJECTION, SOLUTION INTRAMUSCULAR; INTRAVENOUS at 17:59

## 2025-05-07 RX ADMIN — CALCIUM CHLORIDE, MAGNESIUM CHLORIDE, SODIUM CHLORIDE, SODIUM BICARBONATE, POTASSIUM CHLORIDE AND SODIUM PHOSPHATE DIBASIC DIHYDRATE 12.5 ML/KG/HR: 3.68; 3.05; 6.34; 3.09; .314; .187 INJECTION INTRAVENOUS at 02:20

## 2025-05-07 RX ADMIN — HYDROMORPHONE HYDROCHLORIDE 0.5 MG: 1 INJECTION, SOLUTION INTRAMUSCULAR; INTRAVENOUS; SUBCUTANEOUS at 10:34

## 2025-05-07 RX ADMIN — CALCIUM CHLORIDE, MAGNESIUM CHLORIDE, SODIUM CHLORIDE, SODIUM BICARBONATE, POTASSIUM CHLORIDE AND SODIUM PHOSPHATE DIBASIC DIHYDRATE: 3.68; 3.05; 6.34; 3.09; .314; .187 INJECTION INTRAVENOUS at 13:46

## 2025-05-07 RX ADMIN — HEPARIN SODIUM 5000 UNITS: 5000 INJECTION, SOLUTION INTRAVENOUS; SUBCUTANEOUS at 03:54

## 2025-05-07 RX ADMIN — CALCIUM CHLORIDE, MAGNESIUM CHLORIDE, SODIUM CHLORIDE, SODIUM BICARBONATE, POTASSIUM CHLORIDE AND SODIUM PHOSPHATE DIBASIC DIHYDRATE 12.5 ML/KG/HR: 3.68; 3.05; 6.34; 3.09; .314; .187 INJECTION INTRAVENOUS at 01:20

## 2025-05-07 RX ADMIN — DEXMEDETOMIDINE HYDROCHLORIDE 0.7 MCG/KG/HR: 4 INJECTION, SOLUTION INTRAVENOUS at 06:05

## 2025-05-07 RX ADMIN — DEXMEDETOMIDINE HYDROCHLORIDE 0.7 MCG/KG/HR: 4 INJECTION, SOLUTION INTRAVENOUS at 14:37

## 2025-05-07 RX ADMIN — FENTANYL CITRATE 50 MCG: 50 INJECTION INTRAMUSCULAR; INTRAVENOUS at 09:57

## 2025-05-07 RX ADMIN — FENTANYL CITRATE 50 MCG: 50 INJECTION INTRAMUSCULAR; INTRAVENOUS at 10:06

## 2025-05-07 RX ADMIN — HEPARIN SODIUM 5000 UNITS: 5000 INJECTION, SOLUTION INTRAVENOUS; SUBCUTANEOUS at 20:45

## 2025-05-07 RX ADMIN — METHOCARBAMOL 500 MG: 100 INJECTION, SOLUTION INTRAMUSCULAR; INTRAVENOUS at 02:24

## 2025-05-07 RX ADMIN — PANTOPRAZOLE SODIUM 40 MG: 40 INJECTION, POWDER, FOR SOLUTION INTRAVENOUS at 08:23

## 2025-05-07 RX ADMIN — DEXAMETHASONE SODIUM PHOSPHATE 4 MG: 4 INJECTION, SOLUTION INTRAMUSCULAR; INTRAVENOUS at 10:14

## 2025-05-07 RX ADMIN — SODIUM CHLORIDE, SODIUM LACTATE, POTASSIUM CHLORIDE, AND CALCIUM CHLORIDE: .6; .31; .03; .02 INJECTION, SOLUTION INTRAVENOUS at 09:46

## 2025-05-07 RX ADMIN — DEXMEDETOMIDINE HYDROCHLORIDE 0.7 MCG/KG/HR: 4 INJECTION, SOLUTION INTRAVENOUS at 21:01

## 2025-05-07 RX ADMIN — ONDANSETRON 4 MG: 2 INJECTION INTRAMUSCULAR; INTRAVENOUS at 10:14

## 2025-05-07 RX ADMIN — Medication 100 MG: at 11:34

## 2025-05-07 RX ADMIN — PIPERACILLIN AND TAZOBACTAM 4.5 G: 4; .5 INJECTION, POWDER, LYOPHILIZED, FOR SOLUTION INTRAVENOUS at 10:01

## 2025-05-07 RX ADMIN — PIPERACILLIN AND TAZOBACTAM 4.5 G: 4; .5 INJECTION, POWDER, LYOPHILIZED, FOR SOLUTION INTRAVENOUS at 16:03

## 2025-05-07 RX ADMIN — CALCIUM CHLORIDE, MAGNESIUM CHLORIDE, SODIUM CHLORIDE, SODIUM BICARBONATE, POTASSIUM CHLORIDE AND SODIUM PHOSPHATE DIBASIC DIHYDRATE 12.5 ML/KG/HR: 3.68; 3.05; 6.34; 3.09; .314; .187 INJECTION INTRAVENOUS at 21:59

## 2025-05-07 RX ADMIN — CALCIUM CHLORIDE, MAGNESIUM CHLORIDE, SODIUM CHLORIDE, SODIUM BICARBONATE, POTASSIUM CHLORIDE AND SODIUM PHOSPHATE DIBASIC DIHYDRATE 12.5 ML/KG/HR: 3.68; 3.05; 6.34; 3.09; .314; .187 INJECTION INTRAVENOUS at 06:49

## 2025-05-07 RX ADMIN — PROPOFOL 30 MCG/KG/MIN: 10 INJECTION, EMULSION INTRAVENOUS at 10:10

## 2025-05-07 RX ADMIN — THIAMINE HYDROCHLORIDE 100 MG: 100 INJECTION, SOLUTION INTRAMUSCULAR; INTRAVENOUS at 08:22

## 2025-05-07 RX ADMIN — PIPERACILLIN AND TAZOBACTAM 4.5 G: 4; .5 INJECTION, POWDER, LYOPHILIZED, FOR SOLUTION INTRAVENOUS at 03:54

## 2025-05-07 RX ADMIN — SMOFLIPID 250 ML: 6; 6; 5; 3 INJECTION, EMULSION INTRAVENOUS at 20:45

## 2025-05-07 RX ADMIN — MAGNESIUM SULFATE HEPTAHYDRATE: 500 INJECTION, SOLUTION INTRAMUSCULAR; INTRAVENOUS at 20:45

## 2025-05-07 RX ADMIN — CALCIUM CHLORIDE, MAGNESIUM CHLORIDE, SODIUM CHLORIDE, SODIUM BICARBONATE, POTASSIUM CHLORIDE AND SODIUM PHOSPHATE DIBASIC DIHYDRATE 12.5 ML/KG/HR: 3.68; 3.05; 6.34; 3.09; .314; .187 INJECTION INTRAVENOUS at 15:07

## 2025-05-07 RX ADMIN — PIPERACILLIN AND TAZOBACTAM 4.5 G: 4; .5 INJECTION, POWDER, LYOPHILIZED, FOR SOLUTION INTRAVENOUS at 22:16

## 2025-05-07 RX ADMIN — Medication 50 MG: at 10:12

## 2025-05-07 RX ADMIN — CALCIUM CHLORIDE, MAGNESIUM CHLORIDE, SODIUM CHLORIDE, SODIUM BICARBONATE, POTASSIUM CHLORIDE AND SODIUM PHOSPHATE DIBASIC DIHYDRATE 12.5 ML/KG/HR: 3.68; 3.05; 6.34; 3.09; .314; .187 INJECTION INTRAVENOUS at 07:49

## 2025-05-07 ASSESSMENT — ACTIVITIES OF DAILY LIVING (ADL)
ADLS_ACUITY_SCORE: 53
ADLS_ACUITY_SCORE: 48
ADLS_ACUITY_SCORE: 53
ADLS_ACUITY_SCORE: 48
ADLS_ACUITY_SCORE: 53
ADLS_ACUITY_SCORE: 48
ADLS_ACUITY_SCORE: 53
ADLS_ACUITY_SCORE: 48
ADLS_ACUITY_SCORE: 48
ADLS_ACUITY_SCORE: 53
ADLS_ACUITY_SCORE: 48
ADLS_ACUITY_SCORE: 53
ADLS_ACUITY_SCORE: 48
ADLS_ACUITY_SCORE: 53

## 2025-05-07 NOTE — ANESTHESIA PREPROCEDURE EVALUATION
Anesthesia Pre-Procedure Evaluation    Patient: Sebastián Rodas   MRN: [id number] : 1995          Procedure : Procedure(s):  ABDOMINAL WASHOUT, POSSIBLE OSTOMY CREATION, POSSIBLE  CLOSURE         No past medical history on file.   Past Surgical History:   Procedure Laterality Date    LAPAROTOMY SECOND LOOK N/A 2025    Procedure: Laparotomy Exploratory, Open Pancreatic Necrosectomy, Abdominal Wash Out, Temporary Abdominal Closure;  Surgeon: Brendon Haas DO;  Location: [facility] OR    LAPAROTOMY, LYSIS ADHESIONS, COMBINED N/A 2025    Procedure: Reopen Laparotomy, Irrigation and Debriement, placement of colostomy tube, temporary abdominal closure, necrosectomy;  Surgeon: Brendon Haas DO;  Location: [facility] OR      No Known Allergies   Social History     Tobacco Use    Smoking status: Never    Smokeless tobacco: Never   Substance Use Topics    Alcohol use: No     Alcohol/week: 0.0 standard drinks of alcohol      Wt Readings from Last 1 Encounters:   25 75.6 kg (166 lb 10.7 oz)        Anesthesia Evaluation   Pt has had prior anesthetic.         ROS/MED HX  ENT/Pulmonary: Comment: Intubated for airway protection in the context of encephalopathy        Neurologic: Comment: encephalopathy      Cardiovascular: Comment: Mixed shock  At one point LVEF was reduced to 20-25%  Latest TTE  from OSH:    Final Conclusion Previous Study: 2025    1.  Limited echocardiogram to assess LV systolic function    2.  Technically challenging study    3. Left ventricle was not well visualized. Normal left ventricular systolic function.   Estimated left ventricular ejection fraction is 65-70%.    Regional wall motion assessment cannot be performed due to suboptimal endocardial border   definition.    4. Right ventricle was not well visualized    Estimated EF: 65-70%     EKG showed SR        METS/Exercise Tolerance:     Hematologic: Comments: # Acute blood loss anemia due   # Anemia of  critical illness   # Thrombosed splenic vein   - Transfuse if hgb <7.0 or signs/symptoms of hypoperfusion. Monitor and trend  - Multiple transfusion in OR 5/1  - SQH      (+) History of blood clots,     anemia, history of blood transfusion,         Musculoskeletal:       GI/Hepatic: Comment: Necrotizing pancreatitis  S/p emergent exploratory laparotomy, necrosectomy, transverse colectomy, abdominal washout, and drain placement 5/1 and 5/4  Liver dysfunction  Malnutrition      Renal/Genitourinary:     (+) renal disease, type: ARF, Pt requires dialysis, type: Hemodialysis,          Endo: Comment: Stress induced hyperglycemia      Psychiatric/Substance Use: Comment: EtOH abuse      Infectious Disease: Comment: pancreatitis, with infected pancreatitic necrosis   - WBC 13.0 ( 14.8)   - Completed 14 days course of meropenem and caspofungin.   - Zosyn added,5/2/25--keep for now, reassess with ROTR about stopping abx   - Discontinued micafungin 5/3        Malignancy:       Other:              Physical Exam  Airway  Mallampati: unable to assess  Neck ROM: unable to assess  Upper bite lip test: unable to assess  Mouth opening: unable to assess    Cardiovascular   Rhythm: regular  Rate: tachycardia   Comments: Mixed shock  At one point LVEF was reduced to 20-25%  Latest TTE 4/25 from OSH:    Final Conclusion Previous Study: 04/14/2025    1.  Limited echocardiogram to assess LV systolic function    2.  Technically challenging study    3. Left ventricle was not well visualized. Normal left ventricular systolic function.   Estimated left ventricular ejection fraction is 65-70%.    Regional wall motion assessment cannot be performed due to suboptimal endocardial border   definition.    4. Right ventricle was not well visualized    Estimated EF: 65-70%     EKG showed SR    Dental     Pulmonary Breath sounds clear to auscultation        Neurological   He appears sedated.    Other Findings       OUTSIDE LABS:  CBC:   Lab Results  "  Component Value Date    WBC 12.9 (H) 05/07/2025    WBC 16.2 (H) 05/06/2025    HGB 8.0 (L) 05/07/2025    HGB 8.0 (L) 05/06/2025    HCT 24.4 (L) 05/07/2025    HCT 25.4 (L) 05/06/2025     05/07/2025     05/06/2025     BMP:   Lab Results   Component Value Date     05/07/2025     05/06/2025    POTASSIUM 3.7 05/07/2025    POTASSIUM 3.9 05/06/2025    CHLORIDE 104 05/07/2025    CHLORIDE 106 05/06/2025    CO2 21 (L) 05/07/2025    CO2 20 (L) 05/06/2025    BUN 38.9 (H) 05/07/2025    BUN 41.3 (H) 05/06/2025    CR 1.03 05/07/2025    CR 0.97 05/06/2025     (H) 05/07/2025     (H) 05/07/2025     COAGS:   Lab Results   Component Value Date    PTT 31 05/03/2025    INR 1.24 (H) 05/03/2025    FIBR 487 05/02/2025     POC: No results found for: \"BGM\", \"HCG\", \"HCGS\"  HEPATIC:   Lab Results   Component Value Date    ALBUMIN 1.8 (L) 05/07/2025    PROTTOTAL 5.5 (L) 05/07/2025    ALT 9 05/07/2025    AST 24 05/07/2025    ALKPHOS 351 (H) 05/07/2025    BILITOTAL 0.8 05/07/2025     OTHER:   Lab Results   Component Value Date    PH 7.38 05/06/2025    LACT 2.5 (H) 05/01/2025    A1C 5.3 05/02/2025    KARINA 7.8 (L) 05/07/2025    PHOS 3.3 05/07/2025    MAG 2.3 05/07/2025    LIPASE 107 (H) 04/30/2025       Anesthesia Plan    ASA Status:  4    Anesthesia Type: general.   Induction: inhalational.   Techniques and Equipment:       - Monitoring Plan: standard ASA monitoring, train of four monitoring, processed EEG monitor.     Consents    Anesthesia Plan(s) and associated risks, benefits, and realistic alternatives discussed. Questions answered and patient/representative(s) expressed understanding.     - Discussed: surgeon     - Discussed with:             Postoperative Care    Pain management: multimodal analgesia.        Comments:                 Keith Reeder MD    I have reviewed the pertinent notes and labs in the chart from the past 30 days and (re)examined the patient.  Any updates or changes from those " "notes are reflected in this note.    Clinically Significant Risk Factors               # Hypoalbuminemia: Lowest albumin = 1.5 g/dL at 5/4/2025 11:15 AM, will monitor as appropriate                # Overweight: Estimated body mass index is 25.35 kg/m  as calculated from the following:    Height as of this encounter: 1.727 m (5' 7.99\").    Weight as of this encounter: 75.6 kg (166 lb 10.7 oz).   # Severe Malnutrition: based on nutrition assessment and treatment provided per dietitian's recommendations.    # Financial/Environmental Concerns: unemployed               "

## 2025-05-07 NOTE — PROGRESS NOTES
CRRT STATUS NOTE    DATA:  Time:  5:57 PM  Pressures WNL:  Yes  Filter Status:  WDL    Problems Reported/Alarms Noted:  None    Supplies Present:  Yes    ASSESSMENT:  Patient Net Fluid Balance:  Net IO Since Admission: 2,813.61 mL [05/07/25 1757]     Intake/Output Summary (Last 24 hours) at 5/7/2025 1757  Last data filed at 5/7/2025 1700  Gross per 24 hour   Intake 2672.58 ml   Output 3722.8 ml   Net -1050.22 ml      Vitals:  Temp:  [97.5  F (36.4  C)-98.7  F (37.1  C)] 98.7  F (37.1  C)  Pulse:  [] 81  Resp:  [13-27] 17  MAP:  [56 mmHg-87 mmHg] 69 mmHg  Arterial Line BP: ()/(40-69) 107/54  FiO2 (%):  [30 %] 30 %  SpO2:  [95 %-100 %] 95 %   Labs:    Lab Results   Component Value Date     05/07/2025    POTASSIUM 4.6 05/07/2025    CR 1.13 05/07/2025    BUN 40.1 (H) 05/07/2025    MAG 3.0 (H) 05/07/2025    PHOS 4.6 (H) 05/07/2025    WBC 15.3 (H) 05/07/2025    HGB 7.3 (L) 05/07/2025    HCT 22.6 (L) 05/07/2025     05/07/2025           Goals of Therapy:  0-50cc/hr    INTERVENTIONS:   Review flow sheets and discuss plan of care with bedside nurse and nephrology team.    PLAN:  Continue fluid removal as tolerated per goals of therapy.  Check filter daily and change filter q 72 hrs and PRN.  Please contact the CRRT resource RN at [phone] with any questions/concerns.

## 2025-05-07 NOTE — ANESTHESIA CARE TRANSFER NOTE
Patient: Sebastián Rodas    Procedure: Procedure(s):  ABDOMINAL WASHOUT, CLOSURE WITH BRIDGING VICRYL MESH       Diagnosis: Alcohol-induced acute pancreatitis with infected necrosis [K85.22]  Diagnosis Additional Information: No value filed.    Anesthesia Type:   No value filed.     Note:    Oropharynx: oropharynx clear of all foreign objects and spontaneously breathing  Level of consciousness: patient trached and chemically sedated on prop gtt.  Patient oxygen source: ambu bag.  Level of Supplemental Oxygen (L/min / FiO2): 10  Independent Airway: airway patency not satisfactory and stable  Dentition: dentition unchanged  Vital Signs Stable: post-procedure vital signs reviewed and stable  Report to RN Given: handoff report given  Patient transferred to: ICU    ICU Handoff: Call for PAUSE to initiate/utilize ICU HANDOFF, Identified Patient, Identified Responsible Provider, Reviewed the Pertinent Medical History, Discussed Surgical Course, Reviewed Intra-OP Anesthesia Management and Issues during Anesthesia, Set Expectations for Post Procedure Period and Allowed Opportunity for Questions and Acknowledgement of Understanding      Vitals:  Vitals Value Taken Time   /54    Temp     Pulse 83    Resp 16    SpO2 98%        Electronically Signed By: ASIF Eckert CRNA  May 7, 2025  11:59 AM

## 2025-05-07 NOTE — PROGRESS NOTES
SHANNON drain # 3 became disconnected from the bulb. Drain tube was zip tied to bulb drain. Disconnection happened at the zip tie.    Reconnected and attached foam tape to hold until Surgeon arrives.   Called OR Control Desk to notify Dr. Richardson. Waiting for orders.    Response: Surgery arrived at bedside and assessed #3 SHANNON drain. They are aware that the drain intermittently becomes disconnected. Bulb reattached to drain and taped with tagaderm. When closed and depressed, drain appears to be functioning properly.   Also, no post op orders to acknowledge. Verbal orders to keep abdominal binder in place and do not restart tube feeding tonight.

## 2025-05-07 NOTE — OP NOTE
[facility]    Operative Note      Pre-operative diagnosis: Alcohol-induced acute pancreatitis with infected necrosis [K85.22]   Post-operative diagnosis same   Procedure: Procedure(s):  ABDOMINAL WASHOUT, POSSIBLE OSTOMY CREATION, POSSIBLE  CLOSURE   Surgeon: Surgeons and Role:     * Bertrand Richardson MD - Primary     * Mireya López DO - Resident - Assisting  Leah Jamison MD - resident assisting    Anesthesia: General    Estimated blood loss: Less than 50 ml   Drains: No new drains placed   Specimens: * No specimens in log *   Findings: Saponified fat, viscera immobile and fused together.  Bile stained material in upper abdomen. Fascia retracted.  Bridging vicryl mesh placed and dressings applied.     Complications: None.             OPERATIVE INDICATIONS:  Sebastián Rodas is a 29 year old-year-old male an open abdomen for severe pancreatitis complicated by necrotic colon.  Prior surgery has noted immobile viscera and no ability to bring up a stoma- cecostomy tube in place.  He remains critically ill with an open abdomen    OPERATIVE DETAILS:  The patient was brought to the operating room, already intubated.  He was transferred to the OR bed and the outer abthera removed- his abdomen was then prepared in a routine fashion.  A timeout was performed prior to surgery and documented by the nursing team.    The inner layer of the abthera was removed and the abdomen inspected. Irrigation performed.  Some mild bile staining was noted in the upper abdomen.  Cecostomy tube was noted in the right colon.  His viscera was edematous and fused to itself and the other elements of the abdomen.  His existing drains were left in situ.  No bleeding evident    Fascia was unable to be closed (nor skin).  A piece of vicryl mesh was laid loosely over the viscera and extended laterally/superiorly/inferiorly- with slits cut for the drains.  A second piece of vicryl was then sutured  with 2.0 vicryl running stitches to the fascia circumferentially.  Xeroform and gauze dressings applied and he was returned to the ICU.    All needle and sponge counts were correct x2 at the end of the procedure.    I was present throughout the procedure.                     No indicators present

## 2025-05-07 NOTE — PROGRESS NOTES
SURGICAL ICU PROGRESS NOTE  05/07/2025    Date of Service (when I saw the patient): 05/07/2025    ASSESSMENT:  Sebastián Rodas is a 29M with alcohol abuse, anxiety, and bipolar disorder, who was admitted 3/30/25 for alcohol withdrawal and abdominal pain. Workup showed leukocytosis and elevated lipase, consistent with acute pancreatitis. He developed encephalopathy and shock, requiring ICU care, intubation, vasopressors, and CRRT by 4/1. His course was complicated by cardiomyopathy (EF 20-25%, improved to 65-70% by 4/14), necrotizing pancreatitis, and persistent infection despite 14 days of meropenem. On 4/27, imaging showed likely perforated viscus; he underwent emergent laparotomy, necrosectomy, and colectomy. Transferred to [facility] SICU on 4/30. On 5/1, reoperation revealed colonic staple line breakdown, necrotic pancreas, vessel thrombosis, and adhesions. Colostomy with malankot drain and temporary abdominal closure performed. Prognosis is poor; palliative care involved. Care conference 5/2 with family to talk goals of care. Full code, family acknowledged that poor prognosis is to be expected. 5/4 s/p exploratory surgery, additional necrotic pancreas remove with no obvious spillage or sign of bleeding at the time. Today, 5/7/25  back to OR for abdominal wash out.  Necrotic debris with grey/dark output from drain 3 and opening of the trach site. Progressing from respiratory standpoint.     CHANGES and MAJOR THINGS TODAY:  OR abdominal wash out today  TF's held overnight for OR today  Tolerated PST yesterday, okay to leave on PS setting.   Zosyn can be stopped if nothing found in OR.      PLAN:    Neurological:  # Acute pain   # Encephalopathy  - Monitor neurological status. Delirium preventions and precautions.   # Pain: IV Robaxin, IV dilaudid gtt             # Sedation: Precedex gtt mcg/kg/min      Pulmonary:   # Acute hypoxic respiratory support  # S/p tracheostomy placement   - FiO2 (%): 30 %, Resp: 20, Vent  Mode: CPAP/PS, Resp Rate (Set): 16 breaths/min, Tidal Volume (Set, mL): 420 mL, PEEP (cm H2O): 5 cmH2O, Pressure Support (cm H2O): 5 cmH2O, Resp Rate (Set): 16 breaths/min, Tidal Volume (Set, mL): 420 mL, PEEP (cm H2O): 5 cmH2O     - Vent changed to pressure support.   - Orders for PST.     Ventilatory bundle.  - ENT trach site evaluation 5/5/25 recommending usual trach cares, regular dressing changes, and keeping ties snug.     Cardiovascular:    # Cardiomyopathy   - Initial LVEF 20-25%, improved to 65-70%  # Shock-distributive (septic)  - Monitor hemodynamic status. MP goal > 65.    - Norepi 0.09 mcg/kg/min   - Attempt fluid removal with UF (see renal below)      Gastroenterology/Nutrition:  # S/p emergent exploratory laparotomy, necrosectomy, transverse colectomy, abdominal washout, and drain placement 5/1 and 5/4  #Severe necrotizing pancreatitis  # Splenic vein thrombosis  - will defer management of dressings and drain removal per EGS   - on PPI    # Severe  Protein calorie deficit malnutrition due to critical ilness  - Trickle feeds started 5/5 per EGS, stopped at midnight for OR today  - RD consult. Appreciate cares and recommendations.     Renal/Fluids/Electrolytes:   #AGMA  - Likely secondary to SARAHI, hypoperfusion, ongoing diarrhea,  now resolved   #Severe hypocalcemia (7.8)  - 2/2 pancreatitis, bicarb, PTH resistance from hypomagnesemia, and Vit D deficiency     # Acute kidney injury on CCRT  Baseline Cr 0.7-0.8. Presented with Cr 0.96 on 3/30. Rapidly markos to 5.2 on 4/1. Oliguric. Garcia UA with proteinuria, hematuria, pyuria, moderate bacteria.  Kidneys unremarkable on CT. Has severe ATN in the setting of shock, ADHF, intravascular hypovolemia/3rd spacing from pancreatitis.   -CRRT 4/1-4/4.   - IHD with levophed started 4/5 but poor CVC function and hypotension   - Line exchanged 4/10 and again 4/19 - due to poor flow.  - PCAD placed 4/21 by IR.   - CRRT resumed 4/21/25 for fluid overload and assist with  more aggressive UF in setting of shock. Back on pressor and CRRT 5/1   - Nephrology consulted, managing CRRT, plan for UF removal 0-50ml/hr     - Weight up to 75kg from 65kg on admit. Net -891mL fluid loss yesterday.     Endocrine:   # Stress hyperglycemia    - hgb A1c 5.3, no hx of DM   - Sliding scale for glucose management. Increased to 8 units Lantus on 5/6  - Goal to keep BG< 180 for optimal wound healing      ID:  # Leukocytosis  # pancreatitis, with infected pancreatitic necrosis   - WBC 12.9   - Completed 14 days course of meropenem and caspofungin.   - Zosyn added 5/2/25--keep for now, reassess with ROTR about stopping abx   - Discontinued micafungin 5/3     cultures:  - 4/30 blood cultures- NGTD   - 5/1 blood cultures ordered - NGTD     Heme:     # Acute blood loss anemia due   # Anemia of critical illness   # Thrombosed splenic vein   - Transfuse if hgb <7.0 or signs/symptoms of hypoperfusion. Monitor and trend  Hemoglobin   Date Value Ref Range Status   05/07/2025 8.0 (L) 13.3 - 17.7 g/dL Final     - Multiple transfusion in OR 5/1  - SQH     Musculoskeletal:   # Deconditioning and weakness due to critical illness   - Physical and occupational therapy consult      Skin:  # Pressure Ulcers - Buttocks/rectal Area   - Barrier cream with liberal application. Continue fecal management system   - WOC consult      #Pressure Ulcers- Left Heel  Pressure Injury Location: Left heel   Wound type: Pressure Injury     Pressure Injury Stage: Deep Tissue Pressure Injury (DTPI), present on admission     General Cares/Prophylaxis:    DVT Prophylaxis: SQH  GI Prophylaxis: PPI  Restraints: Restraints for medical healing needed: NO     Lines/ tubes/ drains:  - SHANNON x 4  - Drain 4 - Colostomy drain  - PICC line- left   - A line  - Trach  - HD line--  Right subclavian   - Abthera      Disposition:  - Surgical ICU       Patient seen, findings and plan discussed with surgical ICU staff  Dr. Kelley Harrington,  MS4  [employer]    Resident/Fellow Attestation   I, Isacc Burrell PA-C, was present with the medical/SANTOS student who participated in the service and in the documentation of the note.  I have verified the history and personally performed the physical exam and medical decision making.  I agree with the assessment and plan of care as documented in the note.    Plan to OR today for washout and takeback today - keep Zosyn for 4 more days post closure   Resume feeds post OR today (held since midnight in anticipation for OR).  CPAP trial today   Wean sedation as able   Continue to remove fluid on CRRT as blood pressures allow without increase in pressor requirements.     Isacc Burrell PA-C  Date of Service (when I saw the patient): 05/07/25    Time spent on this Encounter   Billing:  I spent total of 40 minutes bedside and on the inpatient unit today managing the critical care of Sebastián Rodas in relation to the issues listed in this note.      ====================================  INTERVAL HISTORY:  Course reviewed. Events overnight discussed. This am, pt arouses to name, follows simple commands.    OBJECTIVE:   1. VITAL SIGNS:   Temp:  [97.6  F (36.4  C)-98.6  F (37  C)] 97.6  F (36.4  C)  Pulse:  [] 105  Resp:  [13-35] 20  MAP:  [58 mmHg-239 mmHg] 75 mmHg  Arterial Line BP: ()/() 117/57  FiO2 (%):  [30 %] 30 %  SpO2:  [95 %-100 %] 100 %  FiO2 (%): 30 %, Resp: 20, Vent Mode: CPAP/PS, Resp Rate (Set): 16 breaths/min, Tidal Volume (Set, mL): 420 mL, PEEP (cm H2O): 5 cmH2O, Pressure Support (cm H2O): 5 cmH2O, Resp Rate (Set): 16 breaths/min, Tidal Volume (Set, mL): 420 mL, PEEP (cm H2O): 5 cmH2O    2. INTAKE/ OUTPUT:   I/O last 3 completed shifts:  In: 3391.94 [I.V.:782.98; NG/GT:60; IV Piggyback:200]  Out: 4283 [Drains:2063; Other:2220]    3. PHYSICAL EXAMINATION:  General: laying in bed, arouses to name and normal tone of voice  HEENT: PERRLA. Trach present and secure, site  dressed   Neuro: follows simple commands when asked, extraocular movements intact but unable to converge towards nose  Pulm/Resp: Clear breath sounds bilaterally, lungs coarse but clear.  CV: RRR, S1/S2   Abdomen: G-J tube in place, site secured abdomen with abthera in place, suction intact. SHANNON drains  x4, drain 2 and 3 include necrotic debris. colostomy with green stool,  VAC present and to suction, thin tea colored drainage   :  no shore, no urine to assess   MSK/Extremities: generalized edema in extremities    4. INVESTIGATIONS:   Arterial Blood Gases   Recent Labs   Lab 05/06/25 0347 05/05/25  0339 05/03/25  0339 05/02/25  0703   PH 7.38 7.35 7.32* 7.31*   PCO2 37 40 39 35   PO2 96 135* 113* 96   HCO3 22 22 20* 17*     Complete Blood Count   Recent Labs   Lab 05/07/25  0344 05/06/25  1617 05/06/25  0347 05/05/25  1609 05/05/25  0339   WBC 12.9* 16.2* 13.0*  --  14.8*   HGB 8.0* 8.0* 7.8* 8.6* 8.8*    193 164  --  187     Basic Metabolic Panel  Recent Labs   Lab 05/07/25  0818 05/07/25  0348 05/07/25  0344 05/06/25  2334 05/06/25 2001 05/06/25  1617 05/06/25  0831 05/06/25  0347 05/05/25  1610 05/05/25  1609   NA  --   --  136  --   --  137  --  136  136  --  136   POTASSIUM  --   --  3.7  --   --  3.9  --  3.7  3.7  --  3.9   CHLORIDE  --   --  104  --   --  106  --  105  105  --  105   CO2  --   --  21*  --   --  20*  --  20*  20*  --  21*   BUN  --   --  38.9*  --   --  41.3*  --  41.5*  41.5*  --  43.6*   CR  --   --  1.03  --   --  0.97  --  0.90  0.90  --  0.97   * 153* 164* 152*   < > 186*   < > 192*  192*   < > 188*    < > = values in this interval not displayed.     Liver Function Tests  Recent Labs   Lab 05/07/25  0344 05/06/25  1617 05/06/25  0347 05/05/25  1609 05/05/25  0339 05/04/25  1115 05/04/25  0331 05/03/25  2059 05/02/25  2224 05/02/25  1449 05/02/25  0413 05/01/25  1322 05/01/25  0344 04/30/25  2217   AST 24  --  22  --  20  --  21  --    < >  --    < > 76*   < >  --     ALT 9  --  8  --  11  --  13  --    < >  --    < > 69   < >  --    ALKPHOS 351*  --  243*  --  161*  --  144  --    < >  --    < > 285*   < >  --    BILITOTAL 0.8  --  0.8  --  0.8  --  0.6  --    < >  --    < > 1.1   < >  --    ALBUMIN 1.8* 1.7* 1.7*  1.7* 1.7* 1.6*  1.6*   < > 1.6*  1.6*  --    < >  --    < > 2.2*   < >  --    INR  --   --   --   --   --   --   --  1.24*  --  1.30*  --  1.19*  --  1.09    < > = values in this interval not displayed.     Pancreatic Enzymes  Recent Labs   Lab 04/30/25  2201   LIPASE 107*     Coagulation Profile  Recent Labs   Lab 05/03/25  2059 05/02/25  1449 05/01/25  1322 04/30/25  2217   INR 1.24* 1.30* 1.19* 1.09   PTT 31 29 26 24         5. RADIOLOGY:   No results found for this or any previous visit (from the past 24 hours).    =========================================

## 2025-05-07 NOTE — PROGRESS NOTES
"SPIRITUAL HEALTH SERVICES Follow Up Note (Palliative)  [facility] ([place]) 4C     Summary: Visit with patient Sebastián Rodas's mother Marbella in his room. Marbella welcomes prayers (Yazidism, personal spirituality) for Sebastián and said that they are \"holding on\" as a family in terms of emotional distress. She worries about Sebastián and his prognosis while trying to provide comforting presence, as are other family visiting.     Marbella told stories of Sebastián and his life at home, including stories of his cat.     Plan: Chaplains will follow for spiritual support while Sebastián is admitted.     Alba Hunter M.Div., [facility]     To reach Spiritual Health, securely message with the [employer] Web Console or enter an ASAP/STAT consult in [employer], which will also page the on-call .    "

## 2025-05-07 NOTE — PLAN OF CARE
Major Shift Events:  Pt RASS -1 to +1, follows commands, nods Y/N to questions, and OTERO. SR/ST 90s-100s. Levo gtt for MAP >65. CRRT w/o alarms. Pt on TPN/Lipids. TF held at midnight for OR today. Bladder scan for 175 ml.     Plan: OR for washout  For vital signs and complete assessments, please see documentation flowsheets.

## 2025-05-07 NOTE — PROGRESS NOTES
Nephrology Progress Note  05/07/2025       Sebastián Rodas is a 29 yom with Bipolar disorder, ETOH use c/b necrotizing pancreatitis admitted 3/30/25 to [facility] for ETOH withdrawal and abdominal pain, found to have acute pancreatitis which has progressed to necrotizing pancreatitis complicated by SARAHI.  Seen by Nephrology at [place], started on CRRT 4/1.  Had periods of stability enough for iHD but back to CRRT on 4/21 until tx to [facility] for further surgical interventions.       Interval History :   Mr Rodas continues on CRRT, labs stable on all 4k baths, able to be slightly (0.3L) negative yesterday, similar goal today but will have some down-time in OR for washout. Planning care conference tomorrow to update family depending on OR findings.     Assessment & Recommendations:   SARAHI-Baseline Cr 0.8 as recently as March 2025 before acute events, was started on CRRT emergently on 4/1 in setting of septic shock. Had ~2 weeks of stability enough to manage with iHD but back on CRRT 4/21 until tx to [facility] on 4/30. Running fevers with intraabdominal sepsis.  Continuing RRT from OSH, restarted CRRT on 5/2 after OR.                  -No need for new consent, continuing RRT started last month at [facility].                 -Access is tunneled RIJ from 4/21.                  -CRRT, changed to all 4k baths, ordered for 0-50cc/h.      Volume-Total body volume overloaded but much of it 3rd spaced.  Making small amount of UOP but would be surprised if this improves with current BP's.       Electrolytes-K 3.7 on all 4k baths, bicarb 21, ABG 7.38/37/96/22.      BMD-No acute issues.      Anemia-Hgb 8.0, stable in the short term, acute management per team.      Nutrition-TPN started 5/1     Time spent: 50 minutes on this date of encounter for chart review, physical exam, medical decision making and co-ordination of care.      Discussed with Dr Hartman     Recommendations were communicated to primary team via verbal communication.  "       Raheem Gabriel, ASIF CNS  Clinical Nurse Specialist  [phone]    Review of Systems:   I reviewed the following systems:  ROS not done due to vent/sedation.     Physical Exam:   I/O last 3 completed shifts:  In: 3391.94 [I.V.:782.98; NG/GT:60; IV Piggyback:200]  Out: 4283 [Drains:2063; Other:2220]   /74 (BP Location: Right arm)   Pulse 105   Temp 97.6  F (36.4  C) (Oral)   Resp 20   Ht 1.727 m (5' 7.99\")   Wt 75.6 kg (166 lb 10.7 oz)   SpO2 100%   BMI 25.35 kg/m       GENERAL APPEARANCE: Vent via trach  Pulmonary: Intubated and sedated.   CV: Regular rhythm, normal rate   - Edema +2 generalized  GI: Surgical dressing in place  MS: no evidence of inflammation in joints, no muscle tenderness  : + Garcia  SKIN: no rash, warm, dry  NEURO: Intubated and sedated.     Labs:   All labs reviewed by me  Electrolytes/Renal -   Recent Labs   Lab Test 05/07/25 0818 05/07/25 0348 05/07/25 0344 05/06/25 2001 05/06/25  1617 05/06/25  0831 05/06/25 0347   NA  --   --  136  --  137  --  136  136   POTASSIUM  --   --  3.7  --  3.9  --  3.7  3.7   CHLORIDE  --   --  104  --  106  --  105  105   CO2  --   --  21*  --  20*  --  20*  20*   BUN  --   --  38.9*  --  41.3*  --  41.5*  41.5*   CR  --   --  1.03  --  0.97  --  0.90  0.90   * 153* 164*   < > 186*   < > 192*  192*   KARINA  --   --  7.8*  --  7.8*  --  7.5*  7.5*   MAG  --   --  2.3  --  2.2  --  2.1   PHOS  --   --  3.3  --  3.3  --  3.1    < > = values in this interval not displayed.       CBC -   Recent Labs   Lab Test 05/07/25 0344 05/06/25  1617 05/06/25  0347   WBC 12.9* 16.2* 13.0*   HGB 8.0* 8.0* 7.8*    193 164       LFTs -   Recent Labs   Lab Test 05/07/25 0344 05/06/25  1617 05/06/25 0347 05/05/25  1609 05/05/25  0339   ALKPHOS 351*  --  243*  --  161*   BILITOTAL 0.8  --  0.8  --  0.8   ALT 9  --  8  --  11   AST 24  --  22  --  20   PROTTOTAL 5.5*  --  4.8*  --  4.7*   ALBUMIN 1.8* 1.7* 1.7*  1.7*   < > 1.6*  1.6* "    < > = values in this interval not displayed.       Iron Panel - No lab results found.        Current Medications:  Current Facility-Administered Medications   Medication Dose Route Frequency Provider Last Rate Last Admin    ceFAZolin (ANCEF) 2 g in dextrose 50 mL intermittent infusion  2 g Intravenous Pre-Op/Pre-procedure x 1 dose Casimiro Chirinos MD        ceFAZolin (ANCEF) 2 g in dextrose 50 mL intermittent infusion  2 g Intravenous See Admin Instructions Casimiro Chirinos MD        heparin ANTICOAGULANT injection 5,000 Units  5,000 Units Subcutaneous Q8H [facility] Ganesh Griffiths MD   5,000 Units at 05/07/25 0354    insulin aspart (NovoLOG) injection (RAPID ACTING)  1-12 Units Subcutaneous Q4H Brendon Haas DO   2 Units at 05/07/25 0823    insulin glargine (LANTUS PEN) injection 8 Units  8 Units Subcutaneous Q24H Isacc Burrell PA-C   8 Units at 05/06/25 1419    lipids 4 oil (SMOFLIPID) 20 % infusion 250 mL  250 mL Intravenous Q24H Ganesh Griffiths MD 20.8 mL/hr at 05/07/25 0700 Rate Verify at 05/07/25 0700    methocarbamol (ROBAXIN) injection 500 mg  500 mg Intravenous Q8H Marina Hood MD   500 mg at 05/07/25 0224    pantoprazole (PROTONIX) IV push injection 40 mg  40 mg Intravenous QAM AC Ganesh Griffiths MD   40 mg at 05/07/25 0823    piperacillin-tazobactam (ZOSYN) 4.5 g vial to attach to  mL bag  4.5 g Intravenous Q6H Ganesh Griffiths MD   4.5 g at 05/07/25 0354    sodium chloride (PF) 0.9% PF flush 3 mL  3 mL Intracatheter Q8H Ganesh Griffiths MD   3 mL at 05/07/25 0824    thiamine (B-1) injection 100 mg  100 mg Intravenous Daily Ganesh Griffiths MD   100 mg at 05/07/25 0822     Current Facility-Administered Medications   Medication Dose Route Frequency Provider Last Rate Last Admin    dexmedeTOMIDine (PRECEDEX) 4 mcg/mL in sodium chloride 0.9 % 100 mL infusion  0.1-1.2 mcg/kg/hr (Dosing Weight) Intravenous Continuous Ganesh Griffiths MD 12.2  mL/hr at 05/07/25 0800 0.7 mcg/kg/hr at 05/07/25 0800    dextrose 10% infusion   Intravenous Continuous PRN Mireya López DO        dialysate for CVVHD & CVVHDF (Phoxillum BK4/2.5)  12.5 mL/kg/hr CRRT Continuous Raheem Gabriel APRN  mL/hr at 05/07/25 0649 12.5 mL/kg/hr at 05/07/25 0649    HYDROmorphone (DILAUDID) 0.2 mg/mL infusion ADULT/PEDS GREATER than or EQUAL to 20 kg  0.3-0.8 mg/hr Intravenous Continuous Roxi Alston MD 2.5 mL/hr at 05/07/25 0800 0.5 mg/hr at 05/07/25 0800    No heparin required   Does not apply Continuous PRN Raheem Gabriel APRN CNS        norepinephrine (LEVOPHED) 16 mg in  mL infusion MAX CONC CENTRAL LINE  0.01-0.6 mcg/kg/min (Dosing Weight) Intravenous Continuous Ganesh Griffiths MD 5.2 mL/hr at 05/07/25 0800 0.08 mcg/kg/min at 05/07/25 0800    parenteral nutrition - ADULT compounded formula   CENTRAL LINE IV TPN CONTINUOUS Brendon Haas DO 50 mL/hr at 05/07/25 0700 Rate Verify at 05/07/25 0700    POST-filter replacement solution for CVVHD & CVVHDF (Phoxillum BK4/2.5)   CRRT Continuous Raheem Gabriel APRN  mL/hr at 05/06/25 0848 New Bag at 05/06/25 0848    PRE-filter replacement solution for CVVHD & CVVHDF (Phoxillum BK4/2.5)  12.5 mL/kg/hr CRRT Continuous Raheem Gabriel APRN  mL/hr at 05/07/25 0749 12.5 mL/kg/hr at 05/07/25 0749

## 2025-05-07 NOTE — ANESTHESIA POSTPROCEDURE EVALUATION
Patient: Sebastián Rodas    Procedure: Procedure(s):  ABDOMINAL WASHOUT, CLOSURE WITH BRIDGING VICRYL MESH       Anesthesia Type:  No value filed.    Note:  Disposition: ICU            ICU Sign Out: Anesthesiologist/ICU physician sign out WAS performed   Postop Pain Control:    PONV:    Neuro/Psych:             Sign Out: PLANNED postop sedation   Airway/Respiratory:             Sign Out: AIRWAY IN SITU/Resp. Support               Airway in situ/Resp. Support: ETT   CV/Hemodynamics:             Sign Out: Acceptable CV status   Other NRE: NONE   DID A NON-ROUTINE EVENT OCCUR? No           Last vitals:  Vitals:    05/07/25 1430 05/07/25 1437 05/07/25 1445   BP:      Pulse: 85 84 84   Resp:      Temp:      SpO2: 97% 97% 97%       Electronically Signed By: Keith Reeder MD  May 7, 2025  3:12 PM

## 2025-05-08 ENCOUNTER — APPOINTMENT (OUTPATIENT)
Dept: GENERAL RADIOLOGY | Facility: CLINIC | Age: 30
End: 2025-05-08
Payer: COMMERCIAL

## 2025-05-08 LAB
ALBUMIN SERPL BCG-MCNC: 2 G/DL (ref 3.5–5.2)
ALBUMIN SERPL BCG-MCNC: 2.1 G/DL (ref 3.5–5.2)
ALP SERPL-CCNC: 337 U/L (ref 40–150)
ALT SERPL W P-5'-P-CCNC: 9 U/L (ref 0–70)
ANION GAP SERPL CALCULATED.3IONS-SCNC: 12 MMOL/L (ref 7–15)
AST SERPL W P-5'-P-CCNC: 23 U/L (ref 0–45)
ATRIAL RATE - MUSE: 137 BPM
BILIRUB DIRECT SERPL-MCNC: 0.47 MG/DL (ref 0–0.3)
BILIRUB SERPL-MCNC: 0.6 MG/DL
BUN SERPL-MCNC: 42 MG/DL (ref 6–20)
BUN SERPL-MCNC: 43.3 MG/DL (ref 6–20)
BUN SERPL-MCNC: 43.3 MG/DL (ref 6–20)
CA-I BLD-MCNC: 4.4 MG/DL (ref 4.4–5.2)
CA-I BLD-MCNC: 4.4 MG/DL (ref 4.4–5.2)
CALCIUM SERPL-MCNC: 7.7 MG/DL (ref 8.8–10.4)
CHLORIDE SERPL-SCNC: 104 MMOL/L (ref 98–107)
CREAT SERPL-MCNC: 1.09 MG/DL (ref 0.67–1.17)
CREAT SERPL-MCNC: 1.12 MG/DL (ref 0.67–1.17)
CREAT SERPL-MCNC: 1.12 MG/DL (ref 0.67–1.17)
DIASTOLIC BLOOD PRESSURE - MUSE: NORMAL MMHG
EGFRCR SERPLBLD CKD-EPI 2021: 90 ML/MIN/1.73M2
EGFRCR SERPLBLD CKD-EPI 2021: 90 ML/MIN/1.73M2
EGFRCR SERPLBLD CKD-EPI 2021: >90 ML/MIN/1.73M2
ERYTHROCYTE [DISTWIDTH] IN BLOOD BY AUTOMATED COUNT: 16.4 % (ref 10–15)
ERYTHROCYTE [DISTWIDTH] IN BLOOD BY AUTOMATED COUNT: 16.5 % (ref 10–15)
GLUCOSE BLDC GLUCOMTR-MCNC: 204 MG/DL (ref 70–99)
GLUCOSE BLDC GLUCOMTR-MCNC: 212 MG/DL (ref 70–99)
GLUCOSE BLDC GLUCOMTR-MCNC: 215 MG/DL (ref 70–99)
GLUCOSE BLDC GLUCOMTR-MCNC: 223 MG/DL (ref 70–99)
GLUCOSE BLDC GLUCOMTR-MCNC: 226 MG/DL (ref 70–99)
GLUCOSE BLDC GLUCOMTR-MCNC: 251 MG/DL (ref 70–99)
GLUCOSE SERPL-MCNC: 231 MG/DL (ref 70–99)
GLUCOSE SERPL-MCNC: 231 MG/DL (ref 70–99)
GLUCOSE SERPL-MCNC: 239 MG/DL (ref 70–99)
HCO3 SERPL-SCNC: 20 MMOL/L (ref 22–29)
HCO3 SERPL-SCNC: 21 MMOL/L (ref 22–29)
HCO3 SERPL-SCNC: 21 MMOL/L (ref 22–29)
HCT VFR BLD AUTO: 22.8 % (ref 40–53)
HCT VFR BLD AUTO: 23.1 % (ref 40–53)
HGB BLD-MCNC: 7.2 G/DL (ref 13.3–17.7)
INTERPRETATION ECG - MUSE: NORMAL
MAGNESIUM SERPL-MCNC: 2.5 MG/DL (ref 1.7–2.3)
MAGNESIUM SERPL-MCNC: 2.6 MG/DL (ref 1.7–2.3)
MCH RBC QN AUTO: 29.8 PG (ref 26.5–33)
MCH RBC QN AUTO: 30 PG (ref 26.5–33)
MCHC RBC AUTO-ENTMCNC: 31.2 G/DL (ref 31.5–36.5)
MCHC RBC AUTO-ENTMCNC: 31.6 G/DL (ref 31.5–36.5)
MCV RBC AUTO: 95 FL (ref 78–100)
MCV RBC AUTO: 96 FL (ref 78–100)
P AXIS - MUSE: 47 DEGREES
PHOSPHATE SERPL-MCNC: 3.2 MG/DL (ref 2.5–4.5)
PHOSPHATE SERPL-MCNC: 3.9 MG/DL (ref 2.5–4.5)
PLATELET # BLD AUTO: 219 10E3/UL (ref 150–450)
PLATELET # BLD AUTO: 223 10E3/UL (ref 150–450)
POTASSIUM SERPL-SCNC: 3.8 MMOL/L (ref 3.4–5.3)
POTASSIUM SERPL-SCNC: 3.8 MMOL/L (ref 3.4–5.3)
POTASSIUM SERPL-SCNC: 4.1 MMOL/L (ref 3.4–5.3)
PR INTERVAL - MUSE: 114 MS
PROT SERPL-MCNC: 6 G/DL (ref 6.4–8.3)
QRS DURATION - MUSE: 82 MS
QT - MUSE: 292 MS
QTC - MUSE: 440 MS
R AXIS - MUSE: 57 DEGREES
RBC # BLD AUTO: 2.4 10E6/UL (ref 4.4–5.9)
RBC # BLD AUTO: 2.42 10E6/UL (ref 4.4–5.9)
SODIUM SERPL-SCNC: 136 MMOL/L (ref 135–145)
SODIUM SERPL-SCNC: 137 MMOL/L (ref 135–145)
SODIUM SERPL-SCNC: 137 MMOL/L (ref 135–145)
SYSTOLIC BLOOD PRESSURE - MUSE: NORMAL MMHG
T AXIS - MUSE: 244 DEGREES
VENTRICULAR RATE- MUSE: 137 BPM
WBC # BLD AUTO: 12.6 10E3/UL (ref 4–11)
WBC # BLD AUTO: 12.9 10E3/UL (ref 4–11)

## 2025-05-08 PROCEDURE — 250N000011 HC RX IP 250 OP 636: Mod: JZ

## 2025-05-08 PROCEDURE — 84100 ASSAY OF PHOSPHORUS: CPT | Performed by: CLINICAL NURSE SPECIALIST

## 2025-05-08 PROCEDURE — 71045 X-RAY EXAM CHEST 1 VIEW: CPT

## 2025-05-08 PROCEDURE — 85049 AUTOMATED PLATELET COUNT: CPT | Performed by: STUDENT IN AN ORGANIZED HEALTH CARE EDUCATION/TRAINING PROGRAM

## 2025-05-08 PROCEDURE — 82947 ASSAY GLUCOSE BLOOD QUANT: CPT | Performed by: CLINICAL NURSE SPECIALIST

## 2025-05-08 PROCEDURE — 250N000011 HC RX IP 250 OP 636: Performed by: STUDENT IN AN ORGANIZED HEALTH CARE EDUCATION/TRAINING PROGRAM

## 2025-05-08 PROCEDURE — 85014 HEMATOCRIT: CPT | Performed by: STUDENT IN AN ORGANIZED HEALTH CARE EDUCATION/TRAINING PROGRAM

## 2025-05-08 PROCEDURE — 83735 ASSAY OF MAGNESIUM: CPT | Performed by: CLINICAL NURSE SPECIALIST

## 2025-05-08 PROCEDURE — 999N000253 HC STATISTIC WEANING TRIALS

## 2025-05-08 PROCEDURE — 999N000157 HC STATISTIC RCP TIME EA 10 MIN

## 2025-05-08 PROCEDURE — 999N000128 HC STATISTIC PERIPHERAL IV START W/O US GUIDANCE

## 2025-05-08 PROCEDURE — 250N000009 HC RX 250: Performed by: CLINICAL NURSE SPECIALIST

## 2025-05-08 PROCEDURE — 82374 ASSAY BLOOD CARBON DIOXIDE: CPT

## 2025-05-08 PROCEDURE — 250N000009 HC RX 250: Performed by: STUDENT IN AN ORGANIZED HEALTH CARE EDUCATION/TRAINING PROGRAM

## 2025-05-08 PROCEDURE — 90947 DIALYSIS REPEATED EVAL: CPT

## 2025-05-08 PROCEDURE — 84460 ALANINE AMINO (ALT) (SGPT): CPT | Performed by: CLINICAL NURSE SPECIALIST

## 2025-05-08 PROCEDURE — 97530 THERAPEUTIC ACTIVITIES: CPT | Mod: GO

## 2025-05-08 PROCEDURE — 84075 ASSAY ALKALINE PHOSPHATASE: CPT | Performed by: CLINICAL NURSE SPECIALIST

## 2025-05-08 PROCEDURE — 200N000002 HC R&B ICU UMMC

## 2025-05-08 PROCEDURE — 97535 SELF CARE MNGMENT TRAINING: CPT | Mod: GO

## 2025-05-08 PROCEDURE — 250N000013 HC RX MED GY IP 250 OP 250 PS 637

## 2025-05-08 PROCEDURE — 84132 ASSAY OF SERUM POTASSIUM: CPT | Performed by: CLINICAL NURSE SPECIALIST

## 2025-05-08 PROCEDURE — 82435 ASSAY OF BLOOD CHLORIDE: CPT | Performed by: CLINICAL NURSE SPECIALIST

## 2025-05-08 PROCEDURE — 82330 ASSAY OF CALCIUM: CPT | Performed by: CLINICAL NURSE SPECIALIST

## 2025-05-08 PROCEDURE — 93010 ELECTROCARDIOGRAM REPORT: CPT | Performed by: INTERNAL MEDICINE

## 2025-05-08 PROCEDURE — 250N000011 HC RX IP 250 OP 636: Performed by: PHYSICIAN ASSISTANT

## 2025-05-08 PROCEDURE — 71045 X-RAY EXAM CHEST 1 VIEW: CPT | Mod: 26 | Performed by: RADIOLOGY

## 2025-05-08 PROCEDURE — 99233 SBSQ HOSP IP/OBS HIGH 50: CPT | Mod: FS | Performed by: INTERNAL MEDICINE

## 2025-05-08 PROCEDURE — 82247 BILIRUBIN TOTAL: CPT | Performed by: CLINICAL NURSE SPECIALIST

## 2025-05-08 PROCEDURE — 94003 VENT MGMT INPAT SUBQ DAY: CPT

## 2025-05-08 PROCEDURE — 80053 COMPREHEN METABOLIC PANEL: CPT | Performed by: CLINICAL NURSE SPECIALIST

## 2025-05-08 PROCEDURE — 84450 TRANSFERASE (AST) (SGOT): CPT | Performed by: CLINICAL NURSE SPECIALIST

## 2025-05-08 PROCEDURE — 97530 THERAPEUTIC ACTIVITIES: CPT | Mod: GP

## 2025-05-08 PROCEDURE — 84155 ASSAY OF PROTEIN SERUM: CPT | Performed by: CLINICAL NURSE SPECIALIST

## 2025-05-08 PROCEDURE — 93005 ELECTROCARDIOGRAM TRACING: CPT

## 2025-05-08 PROCEDURE — 82248 BILIRUBIN DIRECT: CPT | Performed by: CLINICAL NURSE SPECIALIST

## 2025-05-08 PROCEDURE — 99291 CRITICAL CARE FIRST HOUR: CPT | Mod: 24 | Performed by: SURGERY

## 2025-05-08 PROCEDURE — 250N000009 HC RX 250: Performed by: SURGERY

## 2025-05-08 PROCEDURE — 85018 HEMOGLOBIN: CPT

## 2025-05-08 PROCEDURE — 99418 PROLNG IP/OBS E/M EA 15 MIN: CPT | Performed by: INTERNAL MEDICINE

## 2025-05-08 RX ORDER — AMOXICILLIN 250 MG
1 CAPSULE ORAL 2 TIMES DAILY
Status: DISCONTINUED | OUTPATIENT
Start: 2025-05-08 | End: 2025-05-09

## 2025-05-08 RX ORDER — BISACODYL 10 MG
10 SUPPOSITORY, RECTAL RECTAL DAILY PRN
Status: DISCONTINUED | OUTPATIENT
Start: 2025-05-11 | End: 2025-05-11

## 2025-05-08 RX ORDER — ONDANSETRON 4 MG/1
4 TABLET, ORALLY DISINTEGRATING ORAL EVERY 6 HOURS PRN
Status: DISCONTINUED | OUTPATIENT
Start: 2025-05-08 | End: 2025-06-03 | Stop reason: HOSPADM

## 2025-05-08 RX ORDER — HYDROMORPHONE HYDROCHLORIDE 1 MG/ML
0.5 INJECTION, SOLUTION INTRAMUSCULAR; INTRAVENOUS; SUBCUTANEOUS ONCE
Status: DISCONTINUED | OUTPATIENT
Start: 2025-05-08 | End: 2025-05-08

## 2025-05-08 RX ORDER — HYDROMORPHONE HYDROCHLORIDE 1 MG/ML
0.3 INJECTION, SOLUTION INTRAMUSCULAR; INTRAVENOUS; SUBCUTANEOUS
Status: DISCONTINUED | OUTPATIENT
Start: 2025-05-08 | End: 2025-05-09

## 2025-05-08 RX ORDER — ONDANSETRON 2 MG/ML
4 INJECTION INTRAMUSCULAR; INTRAVENOUS EVERY 6 HOURS PRN
Status: DISCONTINUED | OUTPATIENT
Start: 2025-05-08 | End: 2025-06-03 | Stop reason: HOSPADM

## 2025-05-08 RX ORDER — HYDROMORPHONE HYDROCHLORIDE 1 MG/ML
0.5 INJECTION, SOLUTION INTRAMUSCULAR; INTRAVENOUS; SUBCUTANEOUS
Status: DISCONTINUED | OUTPATIENT
Start: 2025-05-08 | End: 2025-05-09

## 2025-05-08 RX ORDER — METHOCARBAMOL 500 MG/1
500 TABLET, FILM COATED ORAL 4 TIMES DAILY
Status: DISCONTINUED | OUTPATIENT
Start: 2025-05-08 | End: 2025-05-13

## 2025-05-08 RX ORDER — HYDROMORPHONE HCL IN WATER/PF 6 MG/30 ML
0.4 PATIENT CONTROLLED ANALGESIA SYRINGE INTRAVENOUS
Status: DISCONTINUED | OUTPATIENT
Start: 2025-05-08 | End: 2025-05-08

## 2025-05-08 RX ORDER — OXYCODONE HYDROCHLORIDE 10 MG/1
10 TABLET ORAL EVERY 4 HOURS PRN
Status: DISCONTINUED | OUTPATIENT
Start: 2025-05-08 | End: 2025-05-09

## 2025-05-08 RX ORDER — POLYETHYLENE GLYCOL 3350 17 G/17G
17 POWDER, FOR SOLUTION ORAL DAILY
Status: DISCONTINUED | OUTPATIENT
Start: 2025-05-09 | End: 2025-05-09

## 2025-05-08 RX ORDER — OXYCODONE HYDROCHLORIDE 5 MG/1
5 TABLET ORAL EVERY 4 HOURS PRN
Status: DISCONTINUED | OUTPATIENT
Start: 2025-05-08 | End: 2025-05-09

## 2025-05-08 RX ORDER — PROCHLORPERAZINE MALEATE 5 MG/1
10 TABLET ORAL EVERY 6 HOURS PRN
Status: DISCONTINUED | OUTPATIENT
Start: 2025-05-08 | End: 2025-06-03 | Stop reason: HOSPADM

## 2025-05-08 RX ORDER — HYDROMORPHONE HCL IN WATER/PF 6 MG/30 ML
0.2 PATIENT CONTROLLED ANALGESIA SYRINGE INTRAVENOUS
Status: DISCONTINUED | OUTPATIENT
Start: 2025-05-08 | End: 2025-05-08

## 2025-05-08 RX ORDER — ACETAMINOPHEN 325 MG/1
975 TABLET ORAL EVERY 8 HOURS
Status: DISCONTINUED | OUTPATIENT
Start: 2025-05-08 | End: 2025-05-13

## 2025-05-08 RX ADMIN — OXYCODONE HYDROCHLORIDE 5 MG: 5 TABLET ORAL at 10:06

## 2025-05-08 RX ADMIN — HEPARIN SODIUM 5000 UNITS: 5000 INJECTION, SOLUTION INTRAVENOUS; SUBCUTANEOUS at 13:13

## 2025-05-08 RX ADMIN — MAGNESIUM SULFATE HEPTAHYDRATE: 500 INJECTION, SOLUTION INTRAMUSCULAR; INTRAVENOUS at 20:31

## 2025-05-08 RX ADMIN — ACETAMINOPHEN 975 MG: 325 TABLET ORAL at 18:27

## 2025-05-08 RX ADMIN — ACETAMINOPHEN 975 MG: 325 TABLET ORAL at 10:06

## 2025-05-08 RX ADMIN — DEXMEDETOMIDINE HYDROCHLORIDE 0.9 MCG/KG/HR: 4 INJECTION, SOLUTION INTRAVENOUS at 22:59

## 2025-05-08 RX ADMIN — OXYCODONE HYDROCHLORIDE 10 MG: 10 TABLET ORAL at 22:28

## 2025-05-08 RX ADMIN — CALCIUM CHLORIDE, MAGNESIUM CHLORIDE, SODIUM CHLORIDE, SODIUM BICARBONATE, POTASSIUM CHLORIDE AND SODIUM PHOSPHATE DIBASIC DIHYDRATE 12.5 ML/KG/HR: 3.68; 3.05; 6.34; 3.09; .314; .187 INJECTION INTRAVENOUS at 02:48

## 2025-05-08 RX ADMIN — CALCIUM CHLORIDE, MAGNESIUM CHLORIDE, SODIUM CHLORIDE, SODIUM BICARBONATE, POTASSIUM CHLORIDE AND SODIUM PHOSPHATE DIBASIC DIHYDRATE 12.5 ML/KG/HR: 3.68; 3.05; 6.34; 3.09; .314; .187 INJECTION INTRAVENOUS at 08:28

## 2025-05-08 RX ADMIN — CALCIUM CHLORIDE, MAGNESIUM CHLORIDE, SODIUM CHLORIDE, SODIUM BICARBONATE, POTASSIUM CHLORIDE AND SODIUM PHOSPHATE DIBASIC DIHYDRATE 12.5 ML/KG/HR: 3.68; 3.05; 6.34; 3.09; .314; .187 INJECTION INTRAVENOUS at 19:38

## 2025-05-08 RX ADMIN — METHOCARBAMOL 500 MG: 500 TABLET ORAL at 19:41

## 2025-05-08 RX ADMIN — METHOCARBAMOL 500 MG: 500 TABLET ORAL at 10:06

## 2025-05-08 RX ADMIN — HYDROMORPHONE HYDROCHLORIDE 0.5 MG: 1 INJECTION, SOLUTION INTRAMUSCULAR; INTRAVENOUS; SUBCUTANEOUS at 16:58

## 2025-05-08 RX ADMIN — PIPERACILLIN AND TAZOBACTAM 4.5 G: 4; .5 INJECTION, POWDER, LYOPHILIZED, FOR SOLUTION INTRAVENOUS at 15:17

## 2025-05-08 RX ADMIN — HYDROMORPHONE HYDROCHLORIDE 0.4 MG: 0.2 INJECTION, SOLUTION INTRAMUSCULAR; INTRAVENOUS; SUBCUTANEOUS at 13:11

## 2025-05-08 RX ADMIN — HYDROMORPHONE HYDROCHLORIDE 0.5 MG: 1 INJECTION, SOLUTION INTRAMUSCULAR; INTRAVENOUS; SUBCUTANEOUS at 23:32

## 2025-05-08 RX ADMIN — OXYCODONE HYDROCHLORIDE 10 MG: 10 TABLET ORAL at 18:27

## 2025-05-08 RX ADMIN — HEPARIN SODIUM 5000 UNITS: 5000 INJECTION, SOLUTION INTRAVENOUS; SUBCUTANEOUS at 19:41

## 2025-05-08 RX ADMIN — PIPERACILLIN AND TAZOBACTAM 4.5 G: 4; .5 INJECTION, POWDER, LYOPHILIZED, FOR SOLUTION INTRAVENOUS at 10:06

## 2025-05-08 RX ADMIN — THIAMINE HYDROCHLORIDE 100 MG: 100 INJECTION, SOLUTION INTRAMUSCULAR; INTRAVENOUS at 07:59

## 2025-05-08 RX ADMIN — SENNOSIDES AND DOCUSATE SODIUM 1 TABLET: 50; 8.6 TABLET ORAL at 10:06

## 2025-05-08 RX ADMIN — METHOCARBAMOL 500 MG: 500 TABLET ORAL at 15:09

## 2025-05-08 RX ADMIN — PIPERACILLIN AND TAZOBACTAM 4.5 G: 4; .5 INJECTION, POWDER, LYOPHILIZED, FOR SOLUTION INTRAVENOUS at 22:31

## 2025-05-08 RX ADMIN — CALCIUM CHLORIDE, MAGNESIUM CHLORIDE, SODIUM CHLORIDE, SODIUM BICARBONATE, POTASSIUM CHLORIDE AND SODIUM PHOSPHATE DIBASIC DIHYDRATE: 3.68; 3.05; 6.34; 3.09; .314; .187 INJECTION INTRAVENOUS at 14:02

## 2025-05-08 RX ADMIN — PANTOPRAZOLE SODIUM 40 MG: 40 INJECTION, POWDER, FOR SOLUTION INTRAVENOUS at 07:59

## 2025-05-08 RX ADMIN — CALCIUM CHLORIDE, MAGNESIUM CHLORIDE, SODIUM CHLORIDE, SODIUM BICARBONATE, POTASSIUM CHLORIDE AND SODIUM PHOSPHATE DIBASIC DIHYDRATE 12.5 ML/KG/HR: 3.68; 3.05; 6.34; 3.09; .314; .187 INJECTION INTRAVENOUS at 08:25

## 2025-05-08 RX ADMIN — METHOCARBAMOL 500 MG: 100 INJECTION, SOLUTION INTRAMUSCULAR; INTRAVENOUS at 02:07

## 2025-05-08 RX ADMIN — HYDROMORPHONE HYDROCHLORIDE 0.5 MG: 1 INJECTION, SOLUTION INTRAMUSCULAR; INTRAVENOUS; SUBCUTANEOUS at 15:13

## 2025-05-08 RX ADMIN — SENNOSIDES AND DOCUSATE SODIUM 1 TABLET: 50; 8.6 TABLET ORAL at 19:41

## 2025-05-08 RX ADMIN — CALCIUM CHLORIDE, MAGNESIUM CHLORIDE, SODIUM CHLORIDE, SODIUM BICARBONATE, POTASSIUM CHLORIDE AND SODIUM PHOSPHATE DIBASIC DIHYDRATE 12.5 ML/KG/HR: 3.68; 3.05; 6.34; 3.09; .314; .187 INJECTION INTRAVENOUS at 14:09

## 2025-05-08 RX ADMIN — CALCIUM CHLORIDE, MAGNESIUM CHLORIDE, SODIUM CHLORIDE, SODIUM BICARBONATE, POTASSIUM CHLORIDE AND SODIUM PHOSPHATE DIBASIC DIHYDRATE 12.5 ML/KG/HR: 3.68; 3.05; 6.34; 3.09; .314; .187 INJECTION INTRAVENOUS at 03:19

## 2025-05-08 RX ADMIN — DEXMEDETOMIDINE HYDROCHLORIDE 0.7 MCG/KG/HR: 4 INJECTION, SOLUTION INTRAVENOUS at 08:38

## 2025-05-08 RX ADMIN — METHOCARBAMOL 500 MG: 500 TABLET ORAL at 13:13

## 2025-05-08 RX ADMIN — HYDROMORPHONE HYDROCHLORIDE 0.5 MG: 1 INJECTION, SOLUTION INTRAMUSCULAR; INTRAVENOUS; SUBCUTANEOUS at 20:47

## 2025-05-08 RX ADMIN — HEPARIN SODIUM 5000 UNITS: 5000 INJECTION, SOLUTION INTRAVENOUS; SUBCUTANEOUS at 03:43

## 2025-05-08 RX ADMIN — DEXMEDETOMIDINE HYDROCHLORIDE 0.9 MCG/KG/HR: 4 INJECTION, SOLUTION INTRAVENOUS at 15:08

## 2025-05-08 RX ADMIN — CALCIUM CHLORIDE, MAGNESIUM CHLORIDE, SODIUM CHLORIDE, SODIUM BICARBONATE, POTASSIUM CHLORIDE AND SODIUM PHOSPHATE DIBASIC DIHYDRATE 12.5 ML/KG/HR: 3.68; 3.05; 6.34; 3.09; .314; .187 INJECTION INTRAVENOUS at 14:04

## 2025-05-08 RX ADMIN — OXYCODONE HYDROCHLORIDE 10 MG: 10 TABLET ORAL at 15:09

## 2025-05-08 RX ADMIN — PIPERACILLIN AND TAZOBACTAM 4.5 G: 4; .5 INJECTION, POWDER, LYOPHILIZED, FOR SOLUTION INTRAVENOUS at 03:42

## 2025-05-08 ASSESSMENT — ACTIVITIES OF DAILY LIVING (ADL)
ADLS_ACUITY_SCORE: 48
ADLS_ACUITY_SCORE: 48
ADLS_ACUITY_SCORE: 52
ADLS_ACUITY_SCORE: 48
ADLS_ACUITY_SCORE: 50
ADLS_ACUITY_SCORE: 48

## 2025-05-08 NOTE — PROGRESS NOTES
ICU End of Shift Summary. See flowsheets for vital signs and detailed assessment.    Changes this shift:   RASS 0 to -1. PERRLA, moves extremities.   Levophed for MAP 65.  Trach to vent, PS trial 5/5 at 30% since return from surgery at 13:00. Abdominal surgery, wash out and closure with mesh, abd dressings, and abdominal binder in place. Abd pad has mod amt pink secretions. Dr. Haas at bedside and evaluated abdominal dressings, all 4 SHANNON drains, and mallocot drain. Restarted TF at 10 ml/hr, per orders. Increase 10 ml every 8 hours for goal of 60 ml/hr.  Bladder scanned for 236, straight cath for 200 ml.  CRRT goal is 0 to -50 ml/hr. Pt is currently net   -448 ml for the day.  Pt's mother, Marbella, at bedside until pt returned from OR.       Plan:  Monitor SHANNON drains. Vent weaning, wean levo as tolerated.

## 2025-05-08 NOTE — PROGRESS NOTES
SURGICAL ICU PROGRESS NOTE  05/08/2025    Date of Service (when I saw the patient): 05/08/2025    ASSESSMENT:  Sebastián Rodas is a 29M with alcohol abuse, anxiety, and bipolar disorder, who was admitted 3/30/25 for alcohol withdrawal and abdominal pain. Workup showed leukocytosis and elevated lipase, consistent with acute pancreatitis. He developed encephalopathy and shock, requiring ICU care, intubation, vasopressors, and CRRT by 4/1. His course was complicated by cardiomyopathy (EF 20-25%, improved to 65-70% by 4/14), necrotizing pancreatitis, and persistent infection despite 14 days of meropenem. On 4/27, imaging showed likely perforated viscus; he underwent emergent laparotomy, necrosectomy, and colectomy. Transferred to [facility] SICU on 4/30. On 5/1, reoperation revealed colonic staple line breakdown, necrotic pancreas, vessel thrombosis, and adhesions. Colostomy with malankot drain and temporary abdominal closure performed. Prognosis is poor; palliative care involved. Care conference 5/2 with family to talk goals of care. Full code, family acknowledged that poor prognosis is to be expected. 5/4 s/p exploratory surgery, additional necrotic pancreas remove with no obvious spillage or sign of bleeding at the time. 5/7 abdominal wash out.  Necrotic debris with grey/dark output from drain 3 and opening of the trach site. Progressing from respiratory standpoint.     CHANGES and MAJOR THINGS TODAY:  - TF's restarted after OR yesterday, starting at 10ml/hr increasing 10ml every 8 hours for goal of 60ml/hr.  - zosyn for 3 more days  - wean sedation as able   - stop dilaudid gtt   - add oxy and dilaudid PRN, PO Tylenol and robaxin scheduled  - trach dome today  - advance TF to goal  - continue CRRT, FB goal per nephrology (0-50 ml/hr)  - continue SQH, revisit anticoagulation discussion with surgery 5/9    PLAN:    Neurological:  # Acute pain   # Encephalopathy  - Monitor neurological status. Delirium preventions and  precautions.   # Pain: PO Robaxin, PO dilaudid, PO Oxy        # Sedation: Precedex gtt mcg/kg/min      Pulmonary:   # Acute hypoxic respiratory support  # S/p tracheostomy placement   - FiO2 (%): 30 %, Resp: 21, Vent Mode: VC/AC, Resp Rate (Set): 16 breaths/min, Tidal Volume (Set, mL): 420 mL, PEEP (cm H2O): 5 cmH2O, Pressure Support (cm H2O): 5 cmH2O, Resp Rate (Set): 16 breaths/min, Tidal Volume (Set, mL): 420 mL, PEEP (cm H2O): 5 cmH2O   - trach dome today  - Vent changed to pressure support.   - Orders for PST.     Ventilatory bundle.  - ENT trach site evaluation 5/5/25 recommending usual trach cares, regular dressing changes, and keeping ties snug.   - Chest Xray 5/6 showed stable right basilar opacities and stable small bilateral pleural effusions.     Cardiovascular:    # Cardiomyopathy   - Initial LVEF 20-25%, improved to 65-70%  # Shock-distributive (septic)  - Monitor hemodynamic status. MP goal > 65.    - Norepi 0.03 mcg/kg/min   - Attempt fluid removal with UF (see renal below)      Gastroenterology/Nutrition:  # S/p emergent exploratory laparotomy, necrosectomy, transverse colectomy, abdominal washout, and drain placement 5/1 and 5/4  #Severe necrotizing pancreatitis  # Splenic vein thrombosis  - will defer management of dressings and drain removal per EGS   - on PPI    # Severe  Protein calorie deficit malnutrition due to critical ilness  - Trickle feeds started 5/5 per EGS, restarted after OR on 5/7. Starting at 10ml/hr, increasing by 10ml every 8 hours for a goal of 60ml/hr.   - RD consult. Appreciate cares and recommendations.     Renal/Fluids/Electrolytes:   #AGMA  - Likely secondary to SARAHI, hypoperfusion, ongoing diarrhea,  now resolved   #Severe hypocalcemia (7.7)  - 2/2 pancreatitis, bicarb, PTH resistance from hypomagnesemia, and Vit D deficiency     # Acute kidney injury on CCRT  Baseline Cr 0.7-0.8. Presented with Cr 0.96 on 3/30. Rapidly markos to 5.2 on 4/1. Oliguric. Garcia UA with  proteinuria, hematuria, pyuria, moderate bacteria.  Kidneys unremarkable on CT. Has severe ATN in the setting of shock, ADHF, intravascular hypovolemia/3rd spacing from pancreatitis.   -CRRT 4/1-4/4.   - IHD with levophed started 4/5 but poor CVC function and hypotension   - Line exchanged 4/10 and again 4/19 - due to poor flow.  - PCAD placed 4/21 by IR.   - CRRT resumed 4/21/25 for fluid overload and assist with more aggressive UF in setting of shock. Back on pressor and CRRT 5/1   - Nephrology consulted, managing CRRT, plan for UF removal 0-50ml/hr     - Weight up to 75kg from 65kg on admit. Net -851mL fluid loss yesterday.     Endocrine:   # Stress hyperglycemia    - hgb A1c 5.3, no hx of DM   - Sliding scale for glucose management. Increased to 8 units Lantus on 5/6  - Goal to keep BG< 180 for optimal wound healing      ID:  # Leukocytosis  # pancreatitis, with infected pancreatitic necrosis   - WBC 12.6   - Completed 14 days course of meropenem and caspofungin.   - Zosyn added 5/2/25--keep until 5/11  - Discontinued micafungin 5/3     cultures:  - 4/30 blood cultures- NGTD   - 5/1 blood cultures ordered - NGTD     Heme:     # Acute blood loss anemia due   # Anemia of critical illness   # Thrombosed splenic vein   - Transfuse if hgb <7.0 or signs/symptoms of hypoperfusion. Monitor and trend  Hemoglobin   Date Value Ref Range Status   05/08/2025 7.2 (L) 13.3 - 17.7 g/dL Final     - Multiple transfusion in OR 5/1  - SQH     Musculoskeletal:   # Deconditioning and weakness due to critical illness   - Physical and occupational therapy consult      Skin:  # Pressure Ulcers - Buttocks/rectal Area   - Barrier cream with liberal application. Continue fecal management system   - WOC consult      #Pressure Ulcers- Left Heel  Pressure Injury Location: Left heel   Wound type: Pressure Injury     Pressure Injury Stage: Deep Tissue Pressure Injury (DTPI), present on admission     General Cares/Prophylaxis:    DVT  Prophylaxis: SQH  GI Prophylaxis: PPI  Restraints: Restraints for medical healing needed: NO     Lines/ tubes/ drains:  - SHANNON x 4  - Drain 4 - Colostomy drain  - PICC line- left   - A line  - Trach  - HD line--  Right subclavian   - Abthera      Disposition:  - Surgical ICU       Patient seen, findings and plan discussed with surgical ICU staff  Dr. Kelley Harrington, MS4  [employer]  I agree with the content of this note  Jean Paul Pierre MD  Plastic Surgery, PGY 1    Discussed with staff, Dr. Phoenix  Time spent on this encounter: 30 min    ====================================  INTERVAL HISTORY:  Course reviewed. Events overnight discussed. This am, pt arouses to name, follows simple commands. Currently denies any pain. Tried to communicate, unable to understand at first, then pointed to mouth and nodded yes when asked if he required suction.     OBJECTIVE:   1. VITAL SIGNS:   Temp:  [97.5  F (36.4  C)-98.7  F (37.1  C)] 97.9  F (36.6  C)  Pulse:  [] 84  Resp:  [13-29] 21  MAP:  [56 mmHg-88 mmHg] 82 mmHg  Arterial Line BP: ()/(41-68) 131/64  FiO2 (%):  [30 %] 30 %  SpO2:  [93 %-100 %] 98 %  FiO2 (%): 30 %, Resp: 21, Vent Mode: VC/AC, Resp Rate (Set): 16 breaths/min, Tidal Volume (Set, mL): 420 mL, PEEP (cm H2O): 5 cmH2O, Pressure Support (cm H2O): 5 cmH2O, Resp Rate (Set): 16 breaths/min, Tidal Volume (Set, mL): 420 mL, PEEP (cm H2O): 5 cmH2O    2. INTAKE/ OUTPUT:   I/O last 3 completed shifts:  In: 2653 [I.V.:982.4; Other:0.2; NG/GT:60; IV Piggyback:100]  Out: 2969.7 [Urine:200; Drains:1498; Other:1266.7; Blood:5]    3. PHYSICAL EXAMINATION:  General: laying in bed, awake and alert. Tried to communicate but unable to understand.   HEENT: PERRLA. Trach present and secure, site dressed.   Neuro: follows simple commands when asked, extraocular movements intact.  Pulm/Resp: Clear breath sounds bilaterally, lungs coarse but clear.  CV: RRR, S1/S2   Abdomen: G-J tube in  place, site secured abdomen with abthera in place, suction intact. SHANNON drains  x4, drain 2 and 3 include gray/brown fluid. colostomy with green stool,   :  no shore, no urine to assess   MSK/Extremities: generalized edema in extremities    4. INVESTIGATIONS:   Arterial Blood Gases   Recent Labs   Lab 05/06/25 0347 05/05/25 0339 05/03/25  0339 05/02/25  0703   PH 7.38 7.35 7.32* 7.31*   PCO2 37 40 39 35   PO2 96 135* 113* 96   HCO3 22 22 20* 17*     Complete Blood Count   Recent Labs   Lab 05/08/25 0326 05/07/25 1623 05/07/25 0344 05/06/25  1617   WBC 12.6* 15.3* 12.9* 16.2*   HGB 7.2* 7.3* 8.0* 8.0*    191 204 193     Basic Metabolic Panel  Recent Labs   Lab 05/08/25 0326 05/08/25  0020 05/07/25 2035 05/07/25 1623 05/07/25 0348 05/07/25 0344 05/06/25 2001 05/06/25  1617     --   --  136  --  136  --  137   POTASSIUM 4.1  --   --  4.6  --  3.7  --  3.9   CHLORIDE 104  --   --  105  --  104  --  106   CO2 20*  --   --  20*  --  21*  --  20*   BUN 42.0*  --   --  40.1*  --  38.9*  --  41.3*   CR 1.09  --   --  1.13  --  1.03  --  0.97   *  239* 226* 184* 213*   < > 164*   < > 186*    < > = values in this interval not displayed.     Liver Function Tests  Recent Labs   Lab 05/08/25 0326 05/07/25 1623 05/07/25 0344 05/06/25  1617 05/06/25 0347 05/05/25  1609 05/05/25  0339 05/04/25  0331 05/03/25 2059 05/02/25  2224 05/02/25  1449 05/02/25  0413 05/01/25  1322   AST 23  --  24  --  22  --  20   < >  --    < >  --    < > 76*   ALT 9  --  9  --  8  --  11   < >  --    < >  --    < > 69   ALKPHOS 337*  --  351*  --  243*  --  161*   < >  --    < >  --    < > 285*   BILITOTAL 0.6  --  0.8  --  0.8  --  0.8   < >  --    < >  --    < > 1.1   ALBUMIN 2.0* 1.8* 1.8* 1.7* 1.7*  1.7*   < > 1.6*  1.6*   < >  --    < >  --    < > 2.2*   INR  --   --   --   --   --   --   --   --  1.24*  --  1.30*  --  1.19*    < > = values in this interval not displayed.     Pancreatic Enzymes  No lab results  found in last 7 days.    Coagulation Profile  Recent Labs   Lab 05/03/25 2059 05/02/25  1449 05/01/25  1322   INR 1.24* 1.30* 1.19*   PTT 31 29 26         5. RADIOLOGY:   No results found for this or any previous visit (from the past 24 hours).    =========================================

## 2025-05-08 NOTE — PROGRESS NOTES
Nephrology Progress Note  05/08/2025       Sebastián Rodas is a 29 yom with Bipolar disorder, ETOH use c/b necrotizing pancreatitis admitted 3/30/25 to [facility] for ETOH withdrawal and abdominal pain, found to have acute pancreatitis which has progressed to necrotizing pancreatitis complicated by SARAHI.  Seen by Nephrology at [place], started on CRRT 4/1.  Had periods of stability enough for iHD but back to CRRT on 4/21 until tx to [facility] for further surgical interventions.       Interval History :   Mr Rodas continues on CRRT, labs stable on all 4k baths, we again were able to be slightly negative and is ordered for 0-50cc/h again. No changes in nephrology plan, still tenuous from surgical/bleeding standpoint.     Assessment & Recommendations:   SARAHI-Baseline Cr 0.8 as recently as March 2025 before acute events, was started on CRRT emergently on 4/1 in setting of septic shock. Had ~2 weeks of stability enough to manage with iHD but back on CRRT 4/21 until tx to [facility] on 4/30. Running fevers with intraabdominal sepsis.  Continuing RRT from OSH, restarted CRRT on 5/2 after OR.                  -No need for new consent, continuing RRT started last month at [facility].                 -Access is tunneled RIJ from 4/21.                  -CRRT, changed to all 4k baths, ordered for 0-50cc/h.      Volume-Total body volume overloaded but much of it 3rd spaced.  Making small amount of UOP but would be surprised if this improves with current BP's.       Electrolytes-K 4.1 on all 4k baths, bicarb 20, ABG 7.38/40/135/22.      BMD-No acute issues.      Anemia-Hgb 7.2, stable in the short term, acute management per team.      Nutrition-TPN started 5/1     Time spent: 50 minutes on this date of encounter for chart review, physical exam, medical decision making and co-ordination of care.      Discussed with Dr Hartman     Recommendations were communicated to primary team via verbal communication.        ASIF Roger  "CNS  Clinical Nurse Specialist  [phone]    Review of Systems:   I reviewed the following systems:  ROS not done due to vent/sedation.     Physical Exam:   I/O last 3 completed shifts:  In: 2771.37 [I.V.:1050.77; Other:0.2; NG/GT:90]  Out: 2854.7 [Urine:200; Drains:1216; Other:1433.7; Blood:5]   /74 (BP Location: Right arm)   Pulse 86   Temp 97.9  F (36.6  C) (Oral)   Resp 21   Ht 1.727 m (5' 7.99\")   Wt 75.6 kg (166 lb 10.7 oz)   SpO2 97%   BMI 25.35 kg/m       GENERAL APPEARANCE: Vent via trach  Pulmonary: Intubated and sedated.   CV: Regular rhythm, normal rate   - Edema +2 generalized  GI: Surgical dressing in place  MS: no evidence of inflammation in joints, no muscle tenderness  : + Garcia  SKIN: no rash, warm, dry  NEURO: Intubated and sedated.     Labs:   All labs reviewed by me  Electrolytes/Renal -   Recent Labs   Lab Test 05/08/25 0326 05/08/25  0020 05/07/25 2035 05/07/25 1623 05/07/25  0348 05/07/25  0344     --   --  136  --  136   POTASSIUM 4.1  --   --  4.6  --  3.7   CHLORIDE 104  --   --  105  --  104   CO2 20*  --   --  20*  --  21*   BUN 42.0*  --   --  40.1*  --  38.9*   CR 1.09  --   --  1.13  --  1.03   *  239* 226*   < > 213*   < > 164*   KARINA 7.7*  --   --  7.6*  --  7.8*   MAG 2.6*  --   --  3.0*  --  2.3   PHOS 3.9  --   --  4.6*  --  3.3    < > = values in this interval not displayed.       CBC -   Recent Labs   Lab Test 05/08/25 0326 05/07/25  1623 05/07/25  0344   WBC 12.6* 15.3* 12.9*   HGB 7.2* 7.3* 8.0*    191 204       LFTs -   Recent Labs   Lab Test 05/08/25  0326 05/07/25  1623 05/07/25  0344 05/06/25  1617 05/06/25  0347   ALKPHOS 337*  --  351*  --  243*   BILITOTAL 0.6  --  0.8  --  0.8   ALT 9  --  9  --  8   AST 23  --  24  --  22   PROTTOTAL 6.0*  --  5.5*  --  4.8*   ALBUMIN 2.0* 1.8* 1.8*   < > 1.7*  1.7*    < > = values in this interval not displayed.       Iron Panel - No lab results found.        Current Medications:  Current " Facility-Administered Medications   Medication Dose Route Frequency Provider Last Rate Last Admin    heparin ANTICOAGULANT injection 5,000 Units  5,000 Units Subcutaneous Q8H [facility] Ganesh Griffiths MD   5,000 Units at 05/08/25 0343    insulin aspart (NovoLOG) injection (RAPID ACTING)  1-12 Units Subcutaneous Q4H Brendon Haas DO   3 Units at 05/08/25 0417    insulin glargine (LANTUS PEN) injection 8 Units  8 Units Subcutaneous Q24H Roxi Alston MD        lipids 4 oil (SMOFLIPID) 20 % infusion 250 mL  250 mL Intravenous Q24H Ganesh Griffiths MD 20.8 mL/hr at 05/07/25 2045 250 mL at 05/07/25 2045    methocarbamol (ROBAXIN) injection 500 mg  500 mg Intravenous Q8H Marina Hood MD   500 mg at 05/08/25 0207    pantoprazole (PROTONIX) IV push injection 40 mg  40 mg Intravenous QAM  Ganesh Griffiths MD   40 mg at 05/07/25 0823    piperacillin-tazobactam (ZOSYN) 4.5 g vial to attach to  mL bag  4.5 g Intravenous Q6H Isacc Burrell PA-C   4.5 g at 05/08/25 0342    sodium chloride (PF) 0.9% PF flush 3 mL  3 mL Intracatheter Q8H Ganesh Griffiths MD   3 mL at 05/07/25 1559    thiamine (B-1) injection 100 mg  100 mg Intravenous Daily Ganesh Griffiths MD   100 mg at 05/07/25 0822     Current Facility-Administered Medications   Medication Dose Route Frequency Provider Last Rate Last Admin    dexmedeTOMIDine (PRECEDEX) 4 mcg/mL in sodium chloride 0.9 % 100 mL infusion  0.1-1.2 mcg/kg/hr (Dosing Weight) Intravenous Continuous Ganesh Griffiths MD 12.2 mL/hr at 05/08/25 0700 0.7 mcg/kg/hr at 05/08/25 0700    dextrose 10% infusion   Intravenous Continuous PRN Mireya López DO        dialysate for CVVHD & CVVHDF (Phoxillum BK4/2.5)  12.5 mL/kg/hr CRRT Continuous Gabriel, Raheem Theo, APRN  mL/hr at 05/08/25 0248 12.5 mL/kg/hr at 05/08/25 0248    HYDROmorphone (DILAUDID) 0.2 mg/mL infusion ADULT/PEDS GREATER than or EQUAL to 20 kg  0.3-0.8 mg/hr Intravenous Continuous  Roxi Alston MD 2.5 mL/hr at 05/08/25 0700 0.5 mg/hr at 05/08/25 0700    No heparin required   Does not apply Continuous PRN Raheem Gabriel APRN CNS        norepinephrine (LEVOPHED) 16 mg in  mL infusion MAX CONC CENTRAL LINE  0.01-0.6 mcg/kg/min (Dosing Weight) Intravenous Continuous Ganesh Griffiths MD 2 mL/hr at 05/08/25 0700 0.03 mcg/kg/min at 05/08/25 0700    parenteral nutrition - ADULT compounded formula   CENTRAL LINE IV TPN CONTINUOUS Brendon Haas DO 50 mL/hr at 05/07/25 2045 New Bag at 05/07/25 2045    POST-filter replacement solution for CVVHD & CVVHDF (Phoxillum BK4/2.5)   CRRT Continuous Raheem Gabriel APRN  mL/hr at 05/07/25 1346 New Bag at 05/07/25 1346    PRE-filter replacement solution for CVVHD & CVVHDF (Phoxillum BK4/2.5)  12.5 mL/kg/hr CRRT Continuous Raheem Gabriel APRN  mL/hr at 05/08/25 0319 12.5 mL/kg/hr at 05/08/25 0319

## 2025-05-08 NOTE — PROGRESS NOTES
Surgery Progress Note  05/08/2025       Subjective:  No acute overnight events. Remains intubated, ventilated, and sedated. Alert and responds to voice and commands. Tube feeds resumed since yesterday. On 0.03 mcg/kg/min norepinephrine. Midline dressing soaked and was changed with new dressing this am. Drains remain in place, output stable.      Objective:  Temp:  [97.5  F (36.4  C)-98.7  F (37.1  C)] 97.9  F (36.6  C)  Pulse:  [] 84  Resp:  [13-29] 21  MAP:  [56 mmHg-88 mmHg] 82 mmHg  Arterial Line BP: ()/(41-68) 131/64  FiO2 (%):  [30 %] 30 %  SpO2:  [93 %-100 %] 98 %    I/O last 3 completed shifts:  In: 2771.37 [I.V.:1050.77; Other:0.2; NG/GT:90]  Out: 2854.7 [Urine:200; Drains:1216; Other:1433.7; Blood:5]      Gen: Alert, sedated but arousable to stimuli  Resp: Ventilated, trach  Abd: Rigid, tender to palpation diffusely. Wound dressings in place. SHANNON drains with stable sanguineous output in bulbs. [place] drain with stool in bag.    Ext: WWP, no edema     Labs:  Recent Labs   Lab 05/08/25  0326 05/07/25  1623 05/07/25  0344   WBC 12.6* 15.3* 12.9*   HGB 7.2* 7.3* 8.0*    191 204       Recent Labs   Lab 05/08/25  0326 05/08/25  0020 05/07/25  2035 05/07/25  1623 05/07/25  0348 05/07/25  0344     --   --  136  --  136   POTASSIUM 4.1  --   --  4.6  --  3.7   CHLORIDE 104  --   --  105  --  104   CO2 20*  --   --  20*  --  21*   BUN 42.0*  --   --  40.1*  --  38.9*   CR 1.09  --   --  1.13  --  1.03   *  239* 226*   < > 213*   < > 164*   KARINA 7.7*  --   --  7.6*  --  7.8*   MAG 2.6*  --   --  3.0*  --  2.3   PHOS 3.9  --   --  4.6*  --  3.3    < > = values in this interval not displayed.       Imaging:  CTAP OS 04/27:    Impression    1.  Worsening sequela of necrotizing pancreatitis. Dominant peripancreatic collection has increased in size, now measuring 19 x 13 cm, previously 17 x 11 cm; increasing gas within this collection is worrisome for infection. Large volume ascites and  fat necrosis elsewhere throughout the abdomen and pelvis has also increased from prior.  2.  New presumed blood products in the dominant peripancreatic collection, as well as layering within the left paracolic gutter, suspicious for interval bleeding. Recommend trending hemoglobin levels; CTA abdomen and pelvis could be considered if there is concern for active bleeding.  3.  Mild small bowel wall thickening, possibly reactive or sequela of enteritis.  4.  Similar right lower lobe consolidation.     Assessment/Plan:   Sebastián Rodas is a 29-year-old male with a history of alcohol use disorder, anxiety, and bipolar disorder who was admitted on 3/30/2025 for alcohol withdrawal following a recent binge, presenting with diffuse abdominal pain. Initial workup revealed leukocytosis and elevated lipase concerning for acute pancreatitis. He developed acute encephalopathy and was transferred to the ICU on 3/31, requiring intubation and vasopressors by 4/1 due to shock. Hospital course has been complicated by acute renal failure requiring CRRT, cardiomyopathy (initial LVEF 20-25%, improved to 65-70% by 4/14), and necrotizing pancreatitis with unorganized fluid collections. He completed a 14-day course of meropenem but remains intermittently febrile and critically ill, requiring ongoing dialysis and vasopressor support. On 4/27, after clinical deterioration and imaging consistent with a likely perforated viscus, following family discussion, he underwent emergent exploratory laparotomy, necrosectomy, transverse colectomy, abdominal washout, and drain placement. Patient was transferred to [facility] on 4/30/25 for further surgical management. Went to OR 5/1 for re-open laparotomy, I&D, placement of colostomy tube in presumed previous colectomy staple line, necrosectomy, and Abthera placement. On 5/2, increasing sanguineous output from drains concerning for bleeding from pancreatic bed. Family care conference held 5/2, plan for  restorative cares at that time. Take back to OR twice (5/4 and 5/7) for abdominal washout.    Plan:  - Nursing to change dressing and packing once every night with a layer of xeroform on the bottom of the wound covering the skin edges, a whole pack of kerlix packed over xeroform wet to dry and ABD on top. May change abdominal binder if gets soaked PRN. (Ordered). Surgery team will change dressing daily during am rounds.  - Plan for goals of care discussion with family today 5/8  - Trickle feeds resumed, continue to evaluate how he is tolerating  - Other cares per SICU, we appreciate excellent cares.   - Surgery will continue to follow.    - - - - - - - - - - - - - - - - - -  Seen, examined, and discussed with chief resident, who will discuss with staff.     Anish Dickson  Medical Student, [employer] Class of 2027  Emergency General Surgery Service    Resident/Fellow Attestation   I, Casimiro Chirinos MD, was present with the medical/SANTOS student who participated in the service and in the documentation of the note.  I have verified the history and personally performed the physical exam and medical decision making.  I agree with the assessment and plan of care as documented in the note.      Casimiro Chirinos MD  PGY1  Date of Service (when I saw the patient): 05/08/25

## 2025-05-08 NOTE — PROGRESS NOTES
CRRT STATUS NOTE    DATA:  Time:  0558  Pressures WNL:  YES  Filter Status:  WDL    Problems Reported/Alarms Noted:  none    Supplies Present:  YES    ASSESSMENT:    Patient Net Fluid Balance:  -289 net since midnight       Intake/Output Summary (Last 24 hours) at 5/8/2025 0558  Last data filed at 5/8/2025 0500  Gross per 24 hour   Intake 2756.97 ml   Output 2930.7 ml   Net -173.73 ml       Vital Signs: Temp:  [97.5  F (36.4  C)-98.7  F (37.1  C)] 97.9  F (36.6  C)  Pulse:  [] 84  Resp:  [13-29] 21  MAP:  [56 mmHg-88 mmHg] 80 mmHg  Arterial Line BP: ()/(41-68) 127/63  FiO2 (%):  [30 %] 30 %  SpO2:  [93 %-100 %] 98 %       Most Recent BMP's:  Recent Labs   Lab Test 05/08/25  0326 05/07/25  1623 05/07/25  0344 05/06/25  1617 05/06/25 0347 05/05/25  1609    136 136 137 136  136 136   POTASSIUM 4.1 4.6 3.7 3.9 3.7  3.7 3.9   CHLORIDE 104 105 104 106 105  105 105   CO2 20* 20* 21* 20* 20*  20* 21*   BUN 42.0* 40.1* 38.9* 41.3* 41.5*  41.5* 43.6*   CR 1.09 1.13 1.03 0.97 0.90  0.90 0.97   ANIONGAP 12 11 11 11 11  11 10   KARINA 7.7* 7.6* 7.8* 7.8* 7.5*  7.5* 7.8*     Most Recent CBC's:  Recent Labs   Lab Test 05/08/25  0326 05/07/25  1623 05/07/25  0344 05/06/25  1617   WBC 12.6* 15.3* 12.9* 16.2*   HGB 7.2* 7.3* 8.0* 8.0*   MCV 96 94 92 92    191 204 193     Most Recent ABG's:  Recent Labs   Lab Test 05/06/25 0347 05/05/25  0339 05/03/25  0339   PH 7.38 7.35 7.32*   PO2 96 135* 113*   PCO2 37 40 39   HCO3 22 22 20*   MARGOTH -2.7 -3.2* -5.6*       Goals of Therapy:  0-50ml/hr     INTERVENTIONS:   CRRT line dressing changed by bedside RN.  Charting reviewed. Rounding completed. Treatment plan discussed with bedside nurse.     PLAN:  Continue with current plan of care please contact CRRT resource with any questions or concerns via [employer].

## 2025-05-08 NOTE — PROGRESS NOTES
ICU End of Shift Summary. See flowsheets for vital signs and detailed assessment.    Changes this shift: Pt following commands. RASS 0/-overnight. Reporting some pain overnight. PRN dilaudid given with good result. SR-ST overnights. Increase HR with cares. Levo on. On CMV settings. TF increased to 20 with SFWF.   4 drains in place. Abdominal binder in place with significant leakage around it overnight. Surgery attending aware. No change to plan.     Plan:  Notify team with changes.

## 2025-05-08 NOTE — PROGRESS NOTES
SPIRITUAL HEALTH SERVICES Follow Up Note (Palliative)  [facility] ([place]) 4C     Summary: As mother Marbella and uncle Dustin have requested, I prayed for patient Sebastián Rodas.     Plan: Chaplains will follow for spiritual support while Sebastián is admitted.     Alba Hunter M.Div., [facility]     To reach Spiritual Health, securely message with the [employer] Web Console or enter an ASAP/STAT consult in [employer], which will also page the on-call .

## 2025-05-08 NOTE — PROGRESS NOTES
CRRT STATUS NOTE    DATA:  Time:  6:54 PM  Pressures WNL:  YES  Filter Status:  WDL    Problems Reported/Alarms Noted:  None this shift    Supplies Present:  YES    ASSESSMENT:  Patient Net Fluid Balance:  -658 mL since MN    Vital Signs:    Temp: 97.7  F (36.5  C) Temp src: Oral BP: 129/80 Pulse: (!) 127   Resp: 21 SpO2: 100 % O2 Device: Trach dome (Simultaneous filing. User may not have seen previous data.) Oxygen Delivery: 50 LPM     Labs:  Electrolytes WNL. Hgb stable.    Goals of Therapy:  0-50 ml/hr    INTERVENTIONS:   None this shift    PLAN:  Continue therapy per ordered goals. Contact CRRT Resource on [employer] with any questions/concerns.

## 2025-05-08 NOTE — PROGRESS NOTES
Care Management Follow Up    Length of Stay (days): 8    Expected Discharge Date: 05/13/2025     Concerns to be Addressed: basic needs       Patient plan of care discussed at interdisciplinary rounds: Yes    Anticipated Discharge Disposition: Other (Comments) (TBD)     Anticipated Discharge Services: Other (see comment) (TBD)    Anticipated Discharge DME: Other (see comment) (TBD)    Patient/family educated on Medicare website which has current facility and service quality ratings: no    Education Provided on the Discharge Plan: No    Patient/Family in Agreement with the Plan: unable to assess    Referrals Placed by CM/SW: n/a    Private pay costs discussed: Not applicable    Discussed  Partnership in Safe Discharge Planning  document with patient/family: No     Handoff Completed: No, handoff not indicated or clinically appropriate    Additional Information: JONATHAN listened to VM from Marbella (mom) that she would like to request a 2nd weekly parking pass and then can she get another pass for pt's other family to come visit.    JONATHAN checked what resources have been given. Found one weekly parking pass was already given ($45 value).    SW left one weekly parking pass in pt's room.     If JONATHAN gives a 3rd pass then that exceeds $100 which requires supervisor approval.    JONATHAN sent message to supervisor, Mei Avila, to ask if the 3rd pass can be given.    JONATHAN spoke with Marbella via phone about why I left one parking pass, not two. She understood.     Next Steps: Resources over $100 need approval from supervisor before distribution; awaiting approval.    RY Foster  Covering 4C/4E  Unit JONATHAN Desk: [phone]

## 2025-05-08 NOTE — PLAN OF CARE
ICU End of Shift Summary. See flowsheets for vital signs and detailed assessment.    Changes this shift: A&O to self only. Moves all extremities, follows commands. Discontinued dilaudid drip, PRN available. Dex continuous. -140 team aware. MAP>65 Levo 0.02. Afebrile. 6.0 shiley XL. PS 30% 5/5 all day. CMV settings overnight. Thick inline secretions. GJ with FT at 40, goal of 60. 30q4 flush. Bladder scanned x2 during day with max of 170. CRRT continued with goal 0-50. Currently net -688 for the day. Abd binder in place - dressing changed at 1600. L sided drains had increased output with color change to red. Team aware.     Plan:  Continue to monitor. Report changes to SICU.    Goal Outcome Evaluation:      Plan of Care Reviewed With: patient, family          Outcome Evaluation: Promoting self cares. Endorsing pain. Not tolerating chair time. PS all day.

## 2025-05-08 NOTE — PROGRESS NOTES
CLINICAL NUTRITION SERVICES - REASSESSMENT NOTE     RECOMMENDATIONS FOR MDs/PROVIDERS TO ORDER:  CTM tolerance to EN support with advancement to goal rate.     Registered Dietitian Interventions:  Continue TPN but holding SMOF lipid as TF advancing toward goal rate and appears to be tolerating so far:  Goal PN provides 250 g dextrose, 150 g AA + holding SMOF lipid for total provision of 1450 Kcals (21 Kcals/kg), 2.1 g/kg protein, GIR 2 mg/kg/minute, and 0% fat kcals on average daily     Continue advancing TF to goal as tolerated:  Pivot 1.5 Luis (or equivalent) @ goal of  60ml/hr  (1440ml/day) provides: 2160 kcals (86% MREE from 5/5), 135 g PRO (1.9 g pro/kg), 1080 ml free H20, 248 g CHO, and 10 g fiber daily.     -- this is within goal of % MREE     Future/Additional Recommendations:  OK to discontinue PN when EN tolerated @ 40 ml/hr. Order enteral liquid MVI/mineral once TPN off as well.     INFORMATION OBTAINED  Assessed patient in room.    CURRENT NUTRITION ORDERS  Diet: None (NPO)    Nutrition Support: EN via J-tube and TPN  5/1 - Current: TPN. Goal PN provides 250 g dextrose, 150 g AA, and 250 mL SMOF lipids 7 days per week for total provision of 1950 Kcals (28 Kcals/kg), 2.1 g/kg protein, GIR 2 mg/kg/minute, and 26% fat kcals on average daily     5/1: 150 g dex   5/2: 200 g dex (since TPN started earlier than 8pm on 5/1, PharmD adding more overfill to 5/2 bag get to start time of 8pm on 5/3)   5/3: 250 g dex     5/5 - 5/7: Pivot @ 10 ml/hr, with relizorb.      Trophic TF @ 10 mL/hr + TPN at goal provides 2310 kcal/day (92% MREE from 5/5)     5/7 - Current: Pivot 1.5 Luis (or equivalent) @ goal of  60ml/hr  (1440ml/day) provides: 2160 kcals, 135 g PRO, 1080 ml free H20, 248 g CHO, and 10 g fiber daily. OK to discontinue PN when EN tolerated @ 40 ml/hr     FWF: 30 mL q4h    CURRENT INTAKE/TOLERANCE  Tolerating trophic TF and TPN at goal. Pt and pt's family at bedside deny any questions at this time. Writer  explained reason for visit and hopeful discontinuation of IV nutrition tomorrow if continues to tolerate advancement of EN support. Also discussed kcal goals based off of metabolic cart study 5/5.  -->Meeting nutrition needs since TPN at goal dextrose on 5/3.     NEW FINDINGS  GI symptoms: Reviewed: Last BM 5/2. On scheduled Miralax and senna-docusate BID.     Skin/wounds: Reviewed      Nutrition-relevant labs: Reviewed; Per most recent WOCN note on 5/5:  DTPI to L heel. Status: Stable  IAD and MASD to coccyx. Status: Stable  Surgical wound d/t trach. Status: Deteriorating    Nutrition-relevant medications: Reviewed; 1-12 units Novolog q4h; 8 units Lantus q24h; 100 mg thiamine daily; norepi gtt    Weight: Continue dosing wt of 70 kg  Date/Time Weight Weight Method   05/07/25 0500 75.6 kg (166 lb 10.7 oz) Bed scale   05/06/25 0400 82.1 kg (181 lb) Bed scale   05/05/25 0100 73.7 kg (162 lb 7.7 oz) --   05/04/25 0400 73.3 kg (161 lb 9.6 oz) --   05/03/25 0100 72.3 kg (159 lb 6.3 oz) Bed scale   05/02/25 0600 70.5 kg (155 lb 6.8 oz) Bed scale   05/01/25 0000 69.7 kg (153 lb 10.6 oz) Bed scale     Met cart: Obtained metabolic cart study 5/5 @ 1600 with the following results: MREE = 2512 kcals/day (equiv to 36 kcal/kg/day) with RQ = 0.91.  Pt received 40 ml of TF, 1200 mL TPN, and 250 mL IV lipid in 24 hours preceding the study providing 2017 kcals (80 % MREE).  RQ is within physiologic range; RQ is somewhat logical given provisions (slightly hypocaloric) received prior to study.  Would aim energy intakes minimally at % of this MREE, or 1621-0862 kcal/day (equiv to 29-36 kcal/kg/day).     MALNUTRITION  % Intake: Decreased intake does not meet criteria  % Weight Loss: Weight loss does not meet criteria  - no further wt loss noted  Subcutaneous Fat Loss: Orbital: Mild and Buccal: Mild  Muscle Loss: Temples (temporalis muscle): Mild, Clavicles (pectoralis and deltoids): Mild, Shoulders (deltoids): Mild, Thigh  (quadriceps): Mild, and Calf (gastrocnemius): Mild  Fluid Accumulation/Edema: Mild, 1+  Malnutrition Diagnosis: Moderate malnutrition in the context of acute illness or injury  Malnutrition Present on Admission: Yes    EVALUATION OF THE PROGRESS TOWARD GOALS   Previous Goals  Total avg nutritional intake to meet a minimum of 25 kcal/kg and 2 g PRO/kg daily (per dosing wt 70 kg).   Evaluation: Met    Previous Nutrition Diagnosis  Altered gastrointestinal (GI) function related to necrotizing pancreatitis and recent GI surgery as evidenced by NPO and reliant on PN to meet 100% needs   Evaluation: Improving    NUTRITION DIAGNOSIS  Inadequate oral intake related to inability to take PO as evidenced by tracheostomy, NPO status, and ongoing need for nutrition support (EN and PN) to meet nutrition needs at this time.     INTERVENTIONS  Collaboration by nutrition professional with other providers  Enteral nutrition management  Nutrition education content  Parenteral nutrition/IV fluid management    GOALS  Total avg nutritional intake to meet a minimum of 29 kcal/kg (80% MREE from 5/5) and 1.5 g PRO/kg daily (per dosing wt 70 kg).     MONITORING/EVALUATION  Progress toward goals will be monitored and evaluated per policy.    Tennille Conn RD, LD  Available on Vocera - can search by name or unit Dietitian  **Clinical Nutrition is no longer available via pager

## 2025-05-09 ENCOUNTER — APPOINTMENT (OUTPATIENT)
Dept: ULTRASOUND IMAGING | Facility: CLINIC | Age: 30
End: 2025-05-09
Attending: SURGERY
Payer: COMMERCIAL

## 2025-05-09 VITALS
WEIGHT: 162.92 LBS | HEART RATE: 143 BPM | TEMPERATURE: 100.2 F | BODY MASS INDEX: 24.69 KG/M2 | HEIGHT: 68 IN | SYSTOLIC BLOOD PRESSURE: 129 MMHG | DIASTOLIC BLOOD PRESSURE: 80 MMHG | OXYGEN SATURATION: 100 % | RESPIRATION RATE: 22 BRPM

## 2025-05-09 LAB
ALBUMIN SERPL BCG-MCNC: 1.9 G/DL (ref 3.5–5.2)
ALBUMIN SERPL BCG-MCNC: 2.1 G/DL (ref 3.5–5.2)
ALP SERPL-CCNC: 332 U/L (ref 40–150)
ALT SERPL W P-5'-P-CCNC: 18 U/L (ref 0–70)
ANION GAP SERPL CALCULATED.3IONS-SCNC: 11 MMOL/L (ref 7–15)
ANION GAP SERPL CALCULATED.3IONS-SCNC: 9 MMOL/L (ref 7–15)
AST SERPL W P-5'-P-CCNC: 28 U/L (ref 0–45)
BILIRUB DIRECT SERPL-MCNC: 0.46 MG/DL (ref 0–0.3)
BILIRUB SERPL-MCNC: 0.6 MG/DL
BLD PROD TYP BPU: NORMAL
BLOOD COMPONENT TYPE: NORMAL
BUN SERPL-MCNC: 44.3 MG/DL (ref 6–20)
BUN SERPL-MCNC: 46.3 MG/DL (ref 6–20)
CA-I BLD-MCNC: 4.2 MG/DL (ref 4.4–5.2)
CA-I BLD-MCNC: 4.4 MG/DL (ref 4.4–5.2)
CALCIUM SERPL-MCNC: 7.1 MG/DL (ref 8.8–10.4)
CALCIUM SERPL-MCNC: 7.7 MG/DL (ref 8.8–10.4)
CHLORIDE SERPL-SCNC: 104 MMOL/L (ref 98–107)
CHLORIDE SERPL-SCNC: 104 MMOL/L (ref 98–107)
CODING SYSTEM: NORMAL
CREAT SERPL-MCNC: 1.15 MG/DL (ref 0.67–1.17)
CREAT SERPL-MCNC: 1.17 MG/DL (ref 0.67–1.17)
CROSSMATCH: NORMAL
EGFRCR SERPLBLD CKD-EPI 2021: 85 ML/MIN/1.73M2
EGFRCR SERPLBLD CKD-EPI 2021: 87 ML/MIN/1.73M2
ERYTHROCYTE [DISTWIDTH] IN BLOOD BY AUTOMATED COUNT: 16 % (ref 10–15)
ERYTHROCYTE [DISTWIDTH] IN BLOOD BY AUTOMATED COUNT: 16.3 % (ref 10–15)
GLUCOSE BLDC GLUCOMTR-MCNC: 160 MG/DL (ref 70–99)
GLUCOSE BLDC GLUCOMTR-MCNC: 160 MG/DL (ref 70–99)
GLUCOSE BLDC GLUCOMTR-MCNC: 185 MG/DL (ref 70–99)
GLUCOSE BLDC GLUCOMTR-MCNC: 239 MG/DL (ref 70–99)
GLUCOSE BLDC GLUCOMTR-MCNC: 243 MG/DL (ref 70–99)
GLUCOSE BLDC GLUCOMTR-MCNC: 264 MG/DL (ref 70–99)
GLUCOSE SERPL-MCNC: 185 MG/DL (ref 70–99)
GLUCOSE SERPL-MCNC: 294 MG/DL (ref 70–99)
HCO3 SERPL-SCNC: 21 MMOL/L (ref 22–29)
HCO3 SERPL-SCNC: 22 MMOL/L (ref 22–29)
HCT VFR BLD AUTO: 22.7 % (ref 40–53)
HCT VFR BLD AUTO: 25.4 % (ref 40–53)
HGB BLD-MCNC: 6.7 G/DL (ref 13.3–17.7)
HGB BLD-MCNC: 6.7 G/DL (ref 13.3–17.7)
HGB BLD-MCNC: 7.2 G/DL (ref 13.3–17.7)
HGB BLD-MCNC: 7.4 G/DL (ref 13.3–17.7)
HGB BLD-MCNC: 8.2 G/DL (ref 13.3–17.7)
ISSUE DATE AND TIME: NORMAL
MAGNESIUM SERPL-MCNC: 2.5 MG/DL (ref 1.7–2.3)
MAGNESIUM SERPL-MCNC: 2.6 MG/DL (ref 1.7–2.3)
MCH RBC QN AUTO: 29.9 PG (ref 26.5–33)
MCH RBC QN AUTO: 30 PG (ref 26.5–33)
MCHC RBC AUTO-ENTMCNC: 31.7 G/DL (ref 31.5–36.5)
MCHC RBC AUTO-ENTMCNC: 32.3 G/DL (ref 31.5–36.5)
MCV RBC AUTO: 93 FL (ref 78–100)
MCV RBC AUTO: 94 FL (ref 78–100)
PHOSPHATE SERPL-MCNC: 2.7 MG/DL (ref 2.5–4.5)
PHOSPHATE SERPL-MCNC: 3.2 MG/DL (ref 2.5–4.5)
PLATELET # BLD AUTO: 180 10E3/UL (ref 150–450)
PLATELET # BLD AUTO: 215 10E3/UL (ref 150–450)
POTASSIUM SERPL-SCNC: 4.1 MMOL/L (ref 3.4–5.3)
POTASSIUM SERPL-SCNC: 4.3 MMOL/L (ref 3.4–5.3)
PROT SERPL-MCNC: 6.2 G/DL (ref 6.4–8.3)
RBC # BLD AUTO: 2.41 10E6/UL (ref 4.4–5.9)
RBC # BLD AUTO: 2.73 10E6/UL (ref 4.4–5.9)
SODIUM SERPL-SCNC: 135 MMOL/L (ref 135–145)
SODIUM SERPL-SCNC: 136 MMOL/L (ref 135–145)
UNIT ABO/RH: NORMAL
UNIT NUMBER: NORMAL
UNIT STATUS: NORMAL
UNIT TYPE ISBT: 5100
WBC # BLD AUTO: 13.8 10E3/UL (ref 4–11)
WBC # BLD AUTO: 14.1 10E3/UL (ref 4–11)

## 2025-05-09 PROCEDURE — 250N000011 HC RX IP 250 OP 636: Mod: JZ

## 2025-05-09 PROCEDURE — 250N000011 HC RX IP 250 OP 636: Performed by: STUDENT IN AN ORGANIZED HEALTH CARE EDUCATION/TRAINING PROGRAM

## 2025-05-09 PROCEDURE — 250N000013 HC RX MED GY IP 250 OP 250 PS 637

## 2025-05-09 PROCEDURE — 93976 VASCULAR STUDY: CPT

## 2025-05-09 PROCEDURE — 99233 SBSQ HOSP IP/OBS HIGH 50: CPT | Mod: FS | Performed by: INTERNAL MEDICINE

## 2025-05-09 PROCEDURE — 76705 ECHO EXAM OF ABDOMEN: CPT | Mod: 26 | Performed by: RADIOLOGY

## 2025-05-09 PROCEDURE — 85018 HEMOGLOBIN: CPT | Performed by: STUDENT IN AN ORGANIZED HEALTH CARE EDUCATION/TRAINING PROGRAM

## 2025-05-09 PROCEDURE — 93005 ELECTROCARDIOGRAM TRACING: CPT

## 2025-05-09 PROCEDURE — 82248 BILIRUBIN DIRECT: CPT | Performed by: CLINICAL NURSE SPECIALIST

## 2025-05-09 PROCEDURE — 84100 ASSAY OF PHOSPHORUS: CPT | Performed by: CLINICAL NURSE SPECIALIST

## 2025-05-09 PROCEDURE — 999N000157 HC STATISTIC RCP TIME EA 10 MIN

## 2025-05-09 PROCEDURE — 85018 HEMOGLOBIN: CPT

## 2025-05-09 PROCEDURE — 99291 CRITICAL CARE FIRST HOUR: CPT | Mod: 24 | Performed by: SURGERY

## 2025-05-09 PROCEDURE — 250N000011 HC RX IP 250 OP 636: Performed by: CLINICAL NURSE SPECIALIST

## 2025-05-09 PROCEDURE — G0463 HOSPITAL OUTPT CLINIC VISIT: HCPCS

## 2025-05-09 PROCEDURE — 250N000009 HC RX 250

## 2025-05-09 PROCEDURE — 94003 VENT MGMT INPAT SUBQ DAY: CPT

## 2025-05-09 PROCEDURE — 93976 VASCULAR STUDY: CPT | Mod: 26 | Performed by: RADIOLOGY

## 2025-05-09 PROCEDURE — 76705 ECHO EXAM OF ABDOMEN: CPT

## 2025-05-09 PROCEDURE — 83735 ASSAY OF MAGNESIUM: CPT | Performed by: CLINICAL NURSE SPECIALIST

## 2025-05-09 PROCEDURE — 250N000009 HC RX 250: Performed by: STUDENT IN AN ORGANIZED HEALTH CARE EDUCATION/TRAINING PROGRAM

## 2025-05-09 PROCEDURE — 82247 BILIRUBIN TOTAL: CPT | Performed by: CLINICAL NURSE SPECIALIST

## 2025-05-09 PROCEDURE — 200N000002 HC R&B ICU UMMC

## 2025-05-09 PROCEDURE — 84075 ASSAY ALKALINE PHOSPHATASE: CPT | Performed by: CLINICAL NURSE SPECIALIST

## 2025-05-09 PROCEDURE — 99233 SBSQ HOSP IP/OBS HIGH 50: CPT | Performed by: FAMILY MEDICINE

## 2025-05-09 PROCEDURE — 80053 COMPREHEN METABOLIC PANEL: CPT | Performed by: CLINICAL NURSE SPECIALIST

## 2025-05-09 PROCEDURE — 84132 ASSAY OF SERUM POTASSIUM: CPT | Performed by: CLINICAL NURSE SPECIALIST

## 2025-05-09 PROCEDURE — 250N000009 HC RX 250: Performed by: CLINICAL NURSE SPECIALIST

## 2025-05-09 PROCEDURE — P9016 RBC LEUKOCYTES REDUCED: HCPCS

## 2025-05-09 PROCEDURE — 84460 ALANINE AMINO (ALT) (SGPT): CPT | Performed by: CLINICAL NURSE SPECIALIST

## 2025-05-09 PROCEDURE — 250N000013 HC RX MED GY IP 250 OP 250 PS 637: Performed by: SURGERY

## 2025-05-09 PROCEDURE — 93010 ELECTROCARDIOGRAM REPORT: CPT | Performed by: INTERNAL MEDICINE

## 2025-05-09 PROCEDURE — 90947 DIALYSIS REPEATED EVAL: CPT

## 2025-05-09 PROCEDURE — 250N000011 HC RX IP 250 OP 636

## 2025-05-09 PROCEDURE — 258N000003 HC RX IP 258 OP 636

## 2025-05-09 PROCEDURE — 84450 TRANSFERASE (AST) (SGOT): CPT | Performed by: CLINICAL NURSE SPECIALIST

## 2025-05-09 PROCEDURE — 250N000013 HC RX MED GY IP 250 OP 250 PS 637: Performed by: STUDENT IN AN ORGANIZED HEALTH CARE EDUCATION/TRAINING PROGRAM

## 2025-05-09 PROCEDURE — 82330 ASSAY OF CALCIUM: CPT | Performed by: CLINICAL NURSE SPECIALIST

## 2025-05-09 PROCEDURE — 82310 ASSAY OF CALCIUM: CPT | Performed by: CLINICAL NURSE SPECIALIST

## 2025-05-09 PROCEDURE — 85018 HEMOGLOBIN: CPT | Performed by: SURGERY

## 2025-05-09 PROCEDURE — 99418 PROLNG IP/OBS E/M EA 15 MIN: CPT | Performed by: INTERNAL MEDICINE

## 2025-05-09 PROCEDURE — 250N000011 HC RX IP 250 OP 636: Performed by: PHYSICIAN ASSISTANT

## 2025-05-09 PROCEDURE — 82565 ASSAY OF CREATININE: CPT | Performed by: CLINICAL NURSE SPECIALIST

## 2025-05-09 RX ORDER — QUETIAPINE FUMARATE 50 MG/1
50 TABLET, FILM COATED ORAL AT BEDTIME
Status: DISCONTINUED | OUTPATIENT
Start: 2025-05-09 | End: 2025-05-13

## 2025-05-09 RX ORDER — IOPAMIDOL 755 MG/ML
96 INJECTION, SOLUTION INTRAVASCULAR ONCE
Status: COMPLETED | OUTPATIENT
Start: 2025-05-09 | End: 2025-05-09

## 2025-05-09 RX ORDER — OXYCODONE HYDROCHLORIDE 10 MG/1
10 TABLET ORAL EVERY 4 HOURS PRN
Refills: 0 | Status: DISCONTINUED | OUTPATIENT
Start: 2025-05-09 | End: 2025-05-09

## 2025-05-09 RX ORDER — FOLIC ACID 1 MG/1
1 TABLET ORAL DAILY
Status: DISCONTINUED | OUTPATIENT
Start: 2025-05-10 | End: 2025-05-15

## 2025-05-09 RX ORDER — POLYETHYLENE GLYCOL 3350 17 G/17G
17 POWDER, FOR SOLUTION ORAL 2 TIMES DAILY
Status: DISCONTINUED | OUTPATIENT
Start: 2025-05-09 | End: 2025-05-13

## 2025-05-09 RX ORDER — CLONIDINE HYDROCHLORIDE 0.1 MG/1
0.1 TABLET ORAL EVERY 8 HOURS
Status: DISCONTINUED | OUTPATIENT
Start: 2025-05-09 | End: 2025-05-13

## 2025-05-09 RX ORDER — AMOXICILLIN 250 MG
2 CAPSULE ORAL 2 TIMES DAILY
Status: DISCONTINUED | OUTPATIENT
Start: 2025-05-09 | End: 2025-05-13

## 2025-05-09 RX ORDER — OXYCODONE HYDROCHLORIDE 10 MG/1
10 TABLET ORAL
Refills: 0 | Status: DISCONTINUED | OUTPATIENT
Start: 2025-05-09 | End: 2025-05-17

## 2025-05-09 RX ORDER — ASCORBIC ACID 500 MG/5ML
500 SYRUP ORAL 2 TIMES DAILY
Status: DISCONTINUED | OUTPATIENT
Start: 2025-05-10 | End: 2025-05-15

## 2025-05-09 RX ORDER — MULTIPLE VITAMINS W/ MINERALS TAB 9MG-400MCG
1 TAB ORAL DAILY
Status: DISCONTINUED | OUTPATIENT
Start: 2025-05-10 | End: 2025-05-15

## 2025-05-09 RX ADMIN — CALCIUM CHLORIDE, MAGNESIUM CHLORIDE, SODIUM CHLORIDE, SODIUM BICARBONATE, POTASSIUM CHLORIDE AND SODIUM PHOSPHATE DIBASIC DIHYDRATE 12.5 ML/KG/HR: 3.68; 3.05; 6.34; 3.09; .314; .187 INJECTION INTRAVENOUS at 07:03

## 2025-05-09 RX ADMIN — Medication 40 MG: at 06:43

## 2025-05-09 RX ADMIN — ACETAMINOPHEN 975 MG: 325 TABLET ORAL at 00:30

## 2025-05-09 RX ADMIN — SENNOSIDES AND DOCUSATE SODIUM 2 TABLET: 50; 8.6 TABLET ORAL at 19:40

## 2025-05-09 RX ADMIN — SENNOSIDES AND DOCUSATE SODIUM 1 TABLET: 50; 8.6 TABLET ORAL at 08:41

## 2025-05-09 RX ADMIN — CLONIDINE HYDROCHLORIDE 0.1 MG: 0.1 TABLET ORAL at 16:41

## 2025-05-09 RX ADMIN — ACETAMINOPHEN 975 MG: 325 TABLET ORAL at 16:42

## 2025-05-09 RX ADMIN — PIPERACILLIN AND TAZOBACTAM 4.5 G: 4; .5 INJECTION, POWDER, LYOPHILIZED, FOR SOLUTION INTRAVENOUS at 15:44

## 2025-05-09 RX ADMIN — POLYETHYLENE GLYCOL 3350 17 G: 17 POWDER, FOR SOLUTION ORAL at 19:40

## 2025-05-09 RX ADMIN — ACETAMINOPHEN 975 MG: 325 TABLET ORAL at 08:42

## 2025-05-09 RX ADMIN — CALCIUM CHLORIDE, MAGNESIUM CHLORIDE, SODIUM CHLORIDE, SODIUM BICARBONATE, POTASSIUM CHLORIDE AND SODIUM PHOSPHATE DIBASIC DIHYDRATE 12.5 ML/KG/HR: 3.68; 3.05; 6.34; 3.09; .314; .187 INJECTION INTRAVENOUS at 01:44

## 2025-05-09 RX ADMIN — CALCIUM CHLORIDE, MAGNESIUM CHLORIDE, SODIUM CHLORIDE, SODIUM BICARBONATE, POTASSIUM CHLORIDE AND SODIUM PHOSPHATE DIBASIC DIHYDRATE 12.5 ML/KG/HR: 3.68; 3.05; 6.34; 3.09; .314; .187 INJECTION INTRAVENOUS at 20:06

## 2025-05-09 RX ADMIN — HYDROMORPHONE HYDROCHLORIDE 0.5 MG: 1 INJECTION, SOLUTION INTRAMUSCULAR; INTRAVENOUS; SUBCUTANEOUS at 19:40

## 2025-05-09 RX ADMIN — DEXMEDETOMIDINE HYDROCHLORIDE 1 MCG/KG/HR: 4 INJECTION, SOLUTION INTRAVENOUS at 02:25

## 2025-05-09 RX ADMIN — CALCIUM CHLORIDE, MAGNESIUM CHLORIDE, SODIUM CHLORIDE, SODIUM BICARBONATE, POTASSIUM CHLORIDE AND SODIUM PHOSPHATE DIBASIC DIHYDRATE 12.5 ML/KG/HR: 3.68; 3.05; 6.34; 3.09; .314; .187 INJECTION INTRAVENOUS at 12:35

## 2025-05-09 RX ADMIN — METHOCARBAMOL 500 MG: 500 TABLET ORAL at 19:40

## 2025-05-09 RX ADMIN — OXYCODONE HYDROCHLORIDE 15 MG: 10 TABLET ORAL at 10:53

## 2025-05-09 RX ADMIN — DEXMEDETOMIDINE HYDROCHLORIDE 1.2 MCG/KG/HR: 4 INJECTION, SOLUTION INTRAVENOUS at 18:02

## 2025-05-09 RX ADMIN — HYDROMORPHONE HYDROCHLORIDE 0.5 MG: 1 INJECTION, SOLUTION INTRAMUSCULAR; INTRAVENOUS; SUBCUTANEOUS at 09:38

## 2025-05-09 RX ADMIN — Medication 0.3 MG/HR: at 23:08

## 2025-05-09 RX ADMIN — POLYETHYLENE GLYCOL 3350 17 G: 17 POWDER, FOR SOLUTION ORAL at 11:55

## 2025-05-09 RX ADMIN — METHOCARBAMOL 500 MG: 500 TABLET ORAL at 11:55

## 2025-05-09 RX ADMIN — HEPARIN SODIUM 5000 UNITS: 5000 INJECTION, SOLUTION INTRAVENOUS; SUBCUTANEOUS at 04:30

## 2025-05-09 RX ADMIN — PIPERACILLIN AND TAZOBACTAM 4.5 G: 4; .5 INJECTION, POWDER, LYOPHILIZED, FOR SOLUTION INTRAVENOUS at 09:43

## 2025-05-09 RX ADMIN — DEXMEDETOMIDINE HYDROCHLORIDE 1 MCG/KG/HR: 4 INJECTION, SOLUTION INTRAVENOUS at 09:02

## 2025-05-09 RX ADMIN — METHOCARBAMOL 500 MG: 500 TABLET ORAL at 16:11

## 2025-05-09 RX ADMIN — DEXMEDETOMIDINE HYDROCHLORIDE 1.2 MCG/KG/HR: 4 INJECTION, SOLUTION INTRAVENOUS at 22:39

## 2025-05-09 RX ADMIN — THIAMINE HYDROCHLORIDE 100 MG: 100 INJECTION, SOLUTION INTRAMUSCULAR; INTRAVENOUS at 08:41

## 2025-05-09 RX ADMIN — PIPERACILLIN AND TAZOBACTAM 4.5 G: 4; .5 INJECTION, POWDER, LYOPHILIZED, FOR SOLUTION INTRAVENOUS at 22:02

## 2025-05-09 RX ADMIN — POLYETHYLENE GLYCOL 3350 17 G: 17 POWDER, FOR SOLUTION ORAL at 08:41

## 2025-05-09 RX ADMIN — HYDROMORPHONE HYDROCHLORIDE 0.5 MG: 1 INJECTION, SOLUTION INTRAMUSCULAR; INTRAVENOUS; SUBCUTANEOUS at 04:50

## 2025-05-09 RX ADMIN — OXYCODONE HYDROCHLORIDE 10 MG: 10 TABLET ORAL at 06:54

## 2025-05-09 RX ADMIN — CALCIUM CHLORIDE, MAGNESIUM CHLORIDE, SODIUM CHLORIDE, SODIUM BICARBONATE, POTASSIUM CHLORIDE AND SODIUM PHOSPHATE DIBASIC DIHYDRATE 12.5 ML/KG/HR: 3.68; 3.05; 6.34; 3.09; .314; .187 INJECTION INTRAVENOUS at 12:36

## 2025-05-09 RX ADMIN — METHOCARBAMOL 500 MG: 500 TABLET ORAL at 08:41

## 2025-05-09 RX ADMIN — HYDROMORPHONE HYDROCHLORIDE 0.5 MG: 1 INJECTION, SOLUTION INTRAMUSCULAR; INTRAVENOUS; SUBCUTANEOUS at 22:02

## 2025-05-09 RX ADMIN — HEPARIN SODIUM 5000 UNITS: 5000 INJECTION, SOLUTION INTRAVENOUS; SUBCUTANEOUS at 11:55

## 2025-05-09 RX ADMIN — CALCIUM CHLORIDE, MAGNESIUM CHLORIDE, SODIUM CHLORIDE, SODIUM BICARBONATE, POTASSIUM CHLORIDE AND SODIUM PHOSPHATE DIBASIC DIHYDRATE: 3.68; 3.05; 6.34; 3.09; .314; .187 INJECTION INTRAVENOUS at 15:51

## 2025-05-09 RX ADMIN — HYDROMORPHONE HYDROCHLORIDE 0.5 MG: 1 INJECTION, SOLUTION INTRAMUSCULAR; INTRAVENOUS; SUBCUTANEOUS at 06:12

## 2025-05-09 RX ADMIN — CALCIUM GLUCONATE 2 G: 20 INJECTION, SOLUTION INTRAVENOUS at 17:40

## 2025-05-09 RX ADMIN — IOPAMIDOL 153 ML: 755 INJECTION, SOLUTION INTRAVENOUS at 21:00

## 2025-05-09 RX ADMIN — HYDROMORPHONE HYDROCHLORIDE 0.5 MG: 1 INJECTION, SOLUTION INTRAMUSCULAR; INTRAVENOUS; SUBCUTANEOUS at 13:14

## 2025-05-09 RX ADMIN — OXYCODONE HYDROCHLORIDE 10 MG: 10 TABLET ORAL at 02:25

## 2025-05-09 RX ADMIN — HYDROMORPHONE HYDROCHLORIDE 0.5 MG: 1 INJECTION, SOLUTION INTRAMUSCULAR; INTRAVENOUS; SUBCUTANEOUS at 16:08

## 2025-05-09 RX ADMIN — QUETIAPINE FUMARATE 50 MG: 50 TABLET ORAL at 22:02

## 2025-05-09 RX ADMIN — PIPERACILLIN AND TAZOBACTAM 4.5 G: 4; .5 INJECTION, POWDER, LYOPHILIZED, FOR SOLUTION INTRAVENOUS at 04:21

## 2025-05-09 RX ADMIN — CUPRIC CHLORIDE 2 MG: 0.4 INJECTION, SOLUTION INTRAVENOUS at 11:00

## 2025-05-09 RX ADMIN — OXYCODONE HYDROCHLORIDE 15 MG: 10 TABLET ORAL at 15:00

## 2025-05-09 ASSESSMENT — ACTIVITIES OF DAILY LIVING (ADL)
ADLS_ACUITY_SCORE: 52
ADLS_ACUITY_SCORE: 54
ADLS_ACUITY_SCORE: 52
ADLS_ACUITY_SCORE: 54
ADLS_ACUITY_SCORE: 54
ADLS_ACUITY_SCORE: 52
ADLS_ACUITY_SCORE: 54
ADLS_ACUITY_SCORE: 52
ADLS_ACUITY_SCORE: 54
ADLS_ACUITY_SCORE: 52
ADLS_ACUITY_SCORE: 54
ADLS_ACUITY_SCORE: 54
ADLS_ACUITY_SCORE: 52
ADLS_ACUITY_SCORE: 52
ADLS_ACUITY_SCORE: 54
ADLS_ACUITY_SCORE: 52

## 2025-05-09 NOTE — CONSULTS
Social Work Brief Note:    Acknowledgment of CM/SW IP auto-consult due to elevated risk readmission score. Care management is already following this patient and will continue to follow while Sebastián is admitted. See previous CM/SW notes.     JESENIA Morales, [facility] ICU Clinical - 4C/4E  Phone: [phone]  4C Vocera, & 4E Vocera

## 2025-05-09 NOTE — PROGRESS NOTES
Brief Progress Note    Status code was discussed with Sebastián who is awake, alert and has capacity. He clearly stated wanting to be full code status and remain full code status at this time.    A care conference is tentatively planned for 5/10/2025    Jean Paul Pierre MD  Plastic Surgery

## 2025-05-09 NOTE — PROGRESS NOTES
CRRT STATUS NOTE    DATA:  Pressures WNL:  YES  Filter Status:  WDL    Problems Reported/Alarms Noted:  none    Supplies Present:  YES    ASSESSMENT:    Patient Net Fluid Balance:  +1332 net since midnight       Intake/Output Summary (Last 24 hours) at 5/9/2025 1847  Last data filed at 5/9/2025 1800  Gross per 24 hour   Intake 4753.49 ml   Output 3664.4 ml   Net 1089.09 ml       Vital Signs: Temp:  [97.6  F (36.4  C)-100.2  F (37.9  C)] 99.3  F (37.4  C)  Pulse:  [100-165] 142  Resp:  [12-34] 20  BP: (113)/(57) 113/57  MAP:  [62 mmHg-94 mmHg] 87 mmHg  Arterial Line BP: ()/(37-76) 129/65  FiO2 (%):  [30 %-50 %] 30 %  SpO2:  [94 %-100 %] 100 %       Most Recent BMP's:  Recent Labs   Lab Test 05/09/25 1622 05/09/25  0412 05/08/25  1419 05/08/25  0326 05/07/25  1623 05/07/25  0344    136 137  137 136 136 136   POTASSIUM 4.3 4.1 3.8  3.8 4.1 4.6 3.7   CHLORIDE 104 104 104  104 104 105 104   CO2 22 21* 21*  21* 20* 20* 21*   BUN 46.3* 44.3* 43.3*  43.3* 42.0* 40.1* 38.9*   CR 1.15 1.17 1.12  1.12 1.09 1.13 1.03   ANIONGAP 9 11 12  12 12 11 11   KARINA 7.1* 7.7* 7.7*  7.7* 7.7* 7.6* 7.8*     Most Recent CBC's:  Recent Labs   Lab Test 05/09/25 1622 05/09/25  1233 05/09/25  0412 05/09/25  0022 05/08/25  1419 05/08/25  0326   WBC 13.8*  --  14.1*  --  12.9* 12.6*   HGB 7.2* 6.7* 8.2* 6.7* 7.2*  7.2* 7.2*   MCV 94  --  93  --  95 96     --  215  --  223 219     Most Recent ABG's:  Recent Labs   Lab Test 05/06/25  0347 05/05/25  0339 05/03/25  0339   PH 7.38 7.35 7.32*   PO2 96 135* 113*   PCO2 37 40 39   HCO3 22 22 20*   MARGOTH -2.7 -3.2* -5.6*       Goals of Therapy:  No UF, clearance only     INTERVENTIONS:   Charting reviewed. Rounding completed. Treatment plan discussed with bedside nurse.     PLAN:  Continue with current plan of care please contact CRRT resource with any questions or concerns via [employer].

## 2025-05-09 NOTE — PROGRESS NOTES
SPIRITUAL HEALTH SERVICES  SPIRITUAL ASSESSMENT Progress Note  [facility] ([place]) 4C     REFERRAL SOURCE/REASON FOR VISIT: Follow Up    Visit with Sebastián Rodas; Palliative physician Dr. Fairbanks present for visit. Sebastián indicated his interest in meeting with his medical teams, mother, and uncle to discuss and make decisions about code status and goals of care. Sebastián requested hugs and benefits from appropriate therapeutic touch.     PLAN: Chaplains will continue to follow while Sebastián is admitted.     Judy Esposito MDiv  Chaplain Resident    Spiritual Health Services is available 24/7 for emergent requests and consults, either by paging the on-call  or by entering an ASAP/STAT consult in [employer], which will also page the on-call .

## 2025-05-09 NOTE — PROGRESS NOTES
SURGICAL ICU PROGRESS NOTE  05/09/2025    Date of Service (when I saw the patient): 05/09/2025    ASSESSMENT:  Sebastián Rodas is a 29M with alcohol abuse, anxiety, and bipolar disorder, who was admitted 3/30/25 for alcohol withdrawal and abdominal pain. Workup showed leukocytosis and elevated lipase, consistent with acute pancreatitis. He developed encephalopathy and shock, requiring ICU care, intubation, vasopressors, and CRRT by 4/1. His course was complicated by cardiomyopathy (EF 20-25%, improved to 65-70% by 4/14), necrotizing pancreatitis, and persistent infection despite 14 days of meropenem. On 4/27, imaging showed likely perforated viscus; he underwent emergent laparotomy, necrosectomy, and colectomy. Transferred to [facility] SICU on 4/30. On 5/1, reoperation revealed colonic staple line breakdown, necrotic pancreas, vessel thrombosis, and adhesions. Colostomy with malankot drain and temporary abdominal closure performed. Prognosis is poor; palliative care involved. Care conference 5/2 with family to talk goals of care. Full code, family acknowledged that poor prognosis is to be expected. 5/4 s/p exploratory surgery, additional necrotic pancreas remove with no obvious spillage or sign of bleeding at the time. 5/7 abdominal wash out.  Necrotic debris with grey/dark output from drain 3 and opening of the trach site. Progressing from respiratory standpoint.     CHANGES and MAJOR THINGS TODAY:  - progressively tachycardic in the 100-140's last night, 160 this morning for an hour. Repeat EKG today.  - Glucose not at goal, increased Lantus to 12 units.   - Continue SQH  - Speech consult today  - Palliative care input requested by patient to discuss what withdrawing cares looks like  - TF's at goal.   - Zosyn for 2 more days.   - wean sedation as able   - trach dome today, tolerating pressure support  - continue CRRT, FB goal per nephrology (0-50 ml/hr)  - increase oxy dose (10-15)    PLAN:    Neurological:  # Acute  pain   # Encephalopathy  - Monitor neurological status. Delirium preventions and precautions.   # Pain: PO Robaxin, PRN IV dilaudid, PRN PO Oxy        # Sedation: Precedex gtt mcg/kg/min      Pulmonary:   # Acute hypoxic respiratory support  # S/p tracheostomy placement   - FiO2 (%): 50 %, Resp: 25, Vent Mode: VC/AC, Resp Rate (Set): 16 breaths/min, Tidal Volume (Set, mL): 420 mL, PEEP (cm H2O): 5 cmH2O, Pressure Support (cm H2O): 5 cmH2O, Resp Rate (Set): 16 breaths/min, Tidal Volume (Set, mL): 420 mL, PEEP (cm H2O): 5 cmH2O   - trach dome today  - Vent changed to pressure support.    Ventilatory bundle.  - ENT trach site evaluation 5/5/25 recommending usual trach cares, regular dressing changes, and keeping ties snug.   - Chest Xray 5/6 showed stable right basilar opacities and stable small bilateral pleural effusions.   - Repeat chest x-ray 5/8 showed no major changes.    Cardiovascular:    # Cardiomyopathy   - Initial LVEF 20-25%, improved to 65-70%  # Shock-distributive (septic)  - Monitor hemodynamic status. MP goal > 65.   - Attempt fluid removal with UF (see renal below)   - EKG 5/8 showed sinus tachycardia with no ectopy  - Repeat EKG today     Gastroenterology/Nutrition:  # S/p emergent exploratory laparotomy, necrosectomy, transverse colectomy, abdominal washout, and drain placement 5/1 and 5/4  #Severe necrotizing pancreatitis  # Splenic vein thrombosis  - will defer management of dressings and drain removal per EGS   - on PPI  - U/S revealed no visualization of splenic vein.     # Severe  Protein calorie deficit malnutrition due to critical ilness  - Trickle feeds started 5/5 per EGS, restarted after OR on 5/7. Starting at 10ml/hr, increasing by 10ml every 8 hours for a goal of 60ml/hr.   - Copper replacement ordered by nutrition  - RD consult. Appreciate cares and recommendations.     Renal/Fluids/Electrolytes:   #AGMA  - Likely secondary to SARAHI, hypoperfusion, ongoing diarrhea,  now resolved   #Severe  hypocalcemia (7.7)  - 2/2 pancreatitis, bicarb, PTH resistance from hypomagnesemia, and Vit D deficiency     # Acute kidney injury on CCRT  Baseline Cr 0.7-0.8. Presented with Cr 0.96 on 3/30. Rapidly markos to 5.2 on 4/1. Oliguric. Garcia UA with proteinuria, hematuria, pyuria, moderate bacteria.  Kidneys unremarkable on CT. Has severe ATN in the setting of shock, ADHF, intravascular hypovolemia/3rd spacing from pancreatitis.   -CRRT 4/1-4/4.   - IHD with levophed started 4/5 but poor CVC function and hypotension   - Line exchanged 4/10 and again 4/19 - due to poor flow.  - PCAD placed 4/21 by IR.   - CRRT resumed 4/21/25 for fluid overload and assist with more aggressive UF in setting of shock. Back on pressor and CRRT 5/1   - Nephrology consulted, managing CRRT, plan for UF removal 0-50ml/hr     Endocrine:   # Stress hyperglycemia    - hgb A1c 5.3, no hx of DM   - Sliding scale for glucose management. Increased to 12 units Lantus on 5/9  - Goal to keep BG< 180 for optimal wound healing      ID:  # Leukocytosis  # pancreatitis, with infected pancreatitic necrosis   WBC Count   Date Value Ref Range Status   05/09/2025 14.1 (H) 4.0 - 11.0 10e3/uL Final     - Completed 14 days course of meropenem and caspofungin.   - Zosyn added 5/2/25--keep until 5/11  - Discontinued micafungin 5/3     cultures:  - 4/30 blood cultures- NGTD   - 5/1 blood cultures ordered - NGTD     Heme:     # Acute blood loss anemia due   # Anemia of critical illness   # Thrombosed splenic vein   - Transfuse if hgb <7.0 or signs/symptoms of hypoperfusion. Monitor and trend  Hemoglobin   Date Value Ref Range Status   05/09/2025 8.2 (L) 13.3 - 17.7 g/dL Final     - Transfused 1U on 5/8  - SQH     Musculoskeletal:   # Deconditioning and weakness due to critical illness   - Physical and occupational therapy consult      Skin:  # Pressure Ulcers - Buttocks/rectal Area   - Barrier cream with liberal application. Continue fecal management system   - WOC  consult      #Pressure Ulcers- Left Heel  Pressure Injury Location: Left heel   Wound type: Pressure Injury     Pressure Injury Stage: Deep Tissue Pressure Injury (DTPI), present on admission     General Cares/Prophylaxis:    DVT Prophylaxis: SQH  GI Prophylaxis: PPI  Restraints: Restraints for medical healing needed: NO     Lines/ tubes/ drains:  - SHANNON x 4  - Drain 4 - Colostomy drain  - PICC line- left   - A line  - Trach  - HD line--  Right subclavian   - Abthera      Disposition:  - Surgical ICU       Patient seen, findings and plan discussed with surgical ICU staff  Dr. Kelley Harrington, MS4  [facility] [employer]  Resident/Fellow Attestation   I, Jean Paul Pierre MD, was present with the medical/SANTOS student who participated in the service and in the documentation of the note.  I have verified the history and personally performed the physical exam and medical decision making.  I agree with the assessment and plan of care as documented in the note.    Jean Paul Pierre MD  PGY1  Date of Service (when I saw the patient): 05/09/25  ====================================  INTERVAL HISTORY:  Course reviewed. Events overnight discussed. This am, pt arouses to name, follows simple commands. Currently denies any pain. Tried to communicate, unable to understand.    OBJECTIVE:   1. VITAL SIGNS:   Temp:  [97.6  F (36.4  C)-100.2  F (37.9  C)] 98.6  F (37  C)  Pulse:  [] 137  Resp:  [12-32] 25  BP: (129)/(80) 129/80  MAP:  [0 mmHg-96 mmHg] 77 mmHg  Arterial Line BP: (103-147)/(48-76) 117/59  FiO2 (%):  [30 %-50 %] 50 %  SpO2:  [94 %-100 %] 99 %  FiO2 (%): 50 %, Resp: 25, Vent Mode: VC/AC, Resp Rate (Set): 16 breaths/min, Tidal Volume (Set, mL): 420 mL, PEEP (cm H2O): 5 cmH2O, Pressure Support (cm H2O): 5 cmH2O, Resp Rate (Set): 16 breaths/min, Tidal Volume (Set, mL): 420 mL, PEEP (cm H2O): 5 cmH2O    2. INTAKE/ OUTPUT:   I/O last 3 completed shifts:  In: 4034.02 [I.V.:1067.69; NG/GT:517]  Out:  4706.4 [Urine:150; Drains:1745; Other:2811.4]    3. PHYSICAL EXAMINATION:  General: laying in bed, awakes upon arrival. More alert today.  HEENT: PERRLA. Trach present and secure, site dressed.   Neuro: follows simple commands when asked, extraocular movements intact.  Pulm/Resp: Clear breath sounds bilaterally, lungs coarse but clear.  CV: RRR, S1/S2   Abdomen: G-J tube in place, site secured abdomen with abthera in place, suction intact. SHANNON drains  x4, drain 2 and 3 include gray/brown fluid. colostomy with green stool,   :  no shore, no urine to assess   MSK/Extremities: generalized edema in extremities    4. INVESTIGATIONS:   Arterial Blood Gases   Recent Labs   Lab 05/06/25 0347 05/05/25 0339 05/03/25  0339   PH 7.38 7.35 7.32*   PCO2 37 40 39   PO2 96 135* 113*   HCO3 22 22 20*     Complete Blood Count   Recent Labs   Lab 05/09/25  0412 05/09/25  0022 05/08/25  1419 05/08/25 0326 05/07/25  1623   WBC 14.1*  --  12.9* 12.6* 15.3*   HGB 8.2* 6.7* 7.2*  7.2* 7.2* 7.3*     --  223 219 191     Basic Metabolic Panel  Recent Labs   Lab 05/09/25  0425 05/09/25  0412 05/09/25  0024 05/08/25 2006 05/08/25  1656 05/08/25  1419 05/08/25  0758 05/08/25  0326 05/07/25  2035 05/07/25  1623   NA  --  136  --   --   --  137  137  --  136  --  136   POTASSIUM  --  4.1  --   --   --  3.8  3.8  --  4.1  --  4.6   CHLORIDE  --  104  --   --   --  104  104  --  104  --  105   CO2  --  21*  --   --   --  21*  21*  --  20*  --  20*   BUN  --  44.3*  --   --   --  43.3*  43.3*  --  42.0*  --  40.1*   CR  --  1.17  --   --   --  1.12  1.12  --  1.09  --  1.13   * 185* 160* 215*   < > 231*  231*   < > 212*  239*   < > 213*    < > = values in this interval not displayed.     Liver Function Tests  Recent Labs   Lab 05/09/25  0412 05/08/25  1419 05/08/25  0326 05/07/25  1623 05/07/25  0344 05/06/25  1617 05/06/25  0347 05/04/25  0331 05/03/25  2059 05/02/25  2224 05/02/25  1449   AST 28  --  23  --  24  --  22    < >  --    < >  --    ALT 18  --  9  --  9  --  8   < >  --    < >  --    ALKPHOS 332*  --  337*  --  351*  --  243*   < >  --    < >  --    BILITOTAL 0.6  --  0.6  --  0.8  --  0.8   < >  --    < >  --    ALBUMIN 2.1* 2.1* 2.0* 1.8* 1.8*   < > 1.7*  1.7*   < >  --    < >  --    INR  --   --   --   --   --   --   --   --  1.24*  --  1.30*    < > = values in this interval not displayed.     Pancreatic Enzymes  No lab results found in last 7 days.    Coagulation Profile  Recent Labs   Lab 05/03/25 2059 05/02/25  1449   INR 1.24* 1.30*   PTT 31 29         5. RADIOLOGY:   Recent Results (from the past 24 hours)   XR Chest Port 1 View    Narrative    EXAM: XR CHEST PORT 1 VIEW 5/8/2025 2:55 PM    DEMOGRAPHICS: 29 years Male    INDICATION: Tachycardia    COMPARISON: Radiograph 5/6/2025    TECHNIQUE: Single portable AP view of the chest.    FINDINGS:   Tracheostomy tube in the midthoracic trachea. Right IJ central  catheter with tip at the superior cavoatrial junction. Left PICC with  tip in the lower SVC.    The cardiomediastinal silhouette is stable. No significant change in  bibasilar streaky opacities. Trace bilateral pleural effusions. No  definite pneumothorax. The visualized upper abdomen is unremarkable.  No acute osseous abnormality.       Impression    IMPRESSION:   1.  Stable support devices as above.  2.  No significant change in bibasilar atelectasis and trace pleural  effusions.    I have personally reviewed the examination and initial interpretation  and I agree with the findings.    SONIA VALLES MD         SYSTEM ID:  [id number]   US Abdomen Limited w Abdomen Doppler Limited    Impression    RESIDENT PRELIMINARY INTERPRETATION  Impression:   1. Slightly increased echogenicity of the hepatic parenchyma  suggestive of steatosis.   2. The splenic vein is not visualized. Otherwise unremarkable hepatic  Doppler evaluation.  3. Small complex perihepatic fluid collection measuring up to 5.1 cm.        =========================================

## 2025-05-09 NOTE — PROGRESS NOTES
CLINICAL NUTRITION SERVICES - BRIEF NOTE      Reason for RD note: Following up on nutrition POC.     New Findings/Chart Review:  Last BM 5/2 per RN flowsheets. Miralax added today 5/9. Has been on senna-docusate since 5/8.     Copper level 41.9 micrograms/dL (low) on 5/6 - no CRP to correlate, but would anticipate Cu++ level to be falsely elevated in presence of inflammation so likely true low level    Interventions:  SICU rounds: Will discontinue TPN. Insulin being adjusted.     Sent TPN change order: Discontinue TPN (run out current bag)    Continue to advance EN support to goal rate + ReliZorb:  Pivot 1.5 Luis (or equivalent) @ goal of  60ml/hr  (1440ml/day) provides: 2160 kcals, 135 g PRO, 1080 ml free H20, 248 g CHO, and 10 g fiber daily.     Ordered the following vitamins, to continue while on CRRT:   100 mg thiamine, 1 mg folate, 500 mg Vit C BID, MVI with minerals    Due to prolonged CRRT (> one week):  - Holding off on additional B6 at this time d/t TPN, will await lab value  - Labs to assess micronutrient status (to be drawn with AM labs on 5/10): CRP, Selenium, Zinc, Whole blood thiamine, and Vitamin B6    Copper chloride 2 mg IV x 5 days d/t low level on 5/6 and pt on CRRT - discussed with SICU team.    Future/Additional Recommendations:  -Monitor tolerance with advancement to goal TF rate.   -Monitor bowel status - last BM 5/2, increased bowel regimen 5/9.   -Monitor above labs to be drawn and supplement as indicated.    Nutrition will continue to follow per protocol.    Tennille Conn RD, LD  Available on [employer] - can search by name or unit Dietitian  **Clinical Nutrition is no longer available via pager

## 2025-05-09 NOTE — CONSULTS
[facility] Nurse Inpatient Assessment     Consulted for: Tracheostomy site, sacrum, left heel  5/9: New consult for midline wound draining heavily    [facility] nurse follow-up plan: daily    Patient History (according to provider note(s):      Sebastián Rodas is a 29-year-old male with a history of alcohol abuse, anxiety, and bipolar disorder, admitted on 3/30/2025 for alcohol withdrawal after a binge, presenting with abdominal pain. Initial workup revealed leukocytosis and elevated lipase, suggestive of acute pancreatitis. He developed encephalopathy and required ICU transfer on 3/31, intubation, and vasopressors by 4/1 due to shock. His hospital course was complicated by acute renal failure (requiring CRRT), cardiomyopathy (LVEF 20-25%, improved to 65-70% by 4/14), and necrotizing pancreatitis with unorganized fluid collections. He completed a 14-day course of meropenem but remained febrile and critically ill, requiring ongoing dialysis and vasopressor support. On 4/27, imaging indicated likely perforated viscus, and after family discussion, he underwent emergent laparotomy, necrosectomy, transverse colectomy, abdominal washout, and drain placement. He was transferred to the SICU at [facility] on 4/30 for further monitoring. On 5/1, he underwent a second laparotomy with irrigation and debridement, colostomy tube placement, temporary abdominal closure, and further necrosectomy due to breakdown of the colonic resection staple line, necrotic pancreas, thrombosed middle colic vessels, fat necrosis, and dense interloop adhesions. He has a poor prognosis given the OR findings, palliative care consulted.     Assessment:      Areas visualized during today's visit: Focused:, Perineal area, Sacrum/coccyx, Abdomen, and left heel, trach    Wound location: Abdomen      Last photo: 5/9  Wound due to: Surgical Wound  Wound history/plan of care: On 4/27, imaging showed likely perforated  viscus; he underwent emergent laparotomy, necrosectomy, and colectomy. Transferred to [facility] SICU on 4/30. On 5/1, reoperation revealed colonic staple line breakdown, necrotic pancreas, vessel thrombosis, and adhesions. Colostomy with malankot drain and temporary abdominal closure performed. Prognosis is poor; palliative care involved. Care conference 5/2 with family to talk goasl of care. Full code, family acknowledged that poor prognosis is to be expected. Today 5/4 s/p exploratory surgery, additional necrotic pancreas remove with no obvious spillage or sign of bleeding   Wound base: 70 % Green and surgical mesh, 30 % Adipose tissue     Palpation of the wound bed: fluctuance      Drainage: copious     Description of drainage: serosanguinous, bloody, and green     Measurements (length x width x depth, in cm): 31  x 15  x  3 cm      Tunneling: N/A     Undermining: N/A  Periwound skin: Intact      Color: normal and consistent with surrounding tissue      Temperature: normal   Odor: none  Pain: facial expression of distress, sharp  Pain interventions prior to dressing change: IV dilaudid and slow and gentle cares   Treatment goal: Drainage control and Infection control/prevention  STATUS: initial assessment  Supplies ordered: at bedside    Pressure Injury Location: Left heel - Assessed on Fridays        Last photo: 5/9  Wound type: Pressure Injury     Pressure Injury Stage: Deep Tissue Pressure Injury (DTPI), present on admission       Wound history/plan of care:   Pt admitted from OSH on 4/31. Wound present on admission. There is an intact blister over a deeper maroon discoloration.    Wound base: 100 % Maroon, Blister, and Epidermis     Palpation of the wound bed: boggy      Drainage: none     Description of drainage: none     Measurements (length x width x depth, in cm) 2  x 1.5  x  0 cm      Tunneling N/A     Undermining N/A  Periwound skin: Intact      Color: normal and consistent with surrounding tissue       Temperature: normal   Odor: none  Pain: unable to assess due to  sedation , none  Pain intervention prior to dressing change: slow and gentle cares   Treatment goal: Heal  and Protection  STATUS: stable  Supplies ordered: at bedside    My PI Risk Assessment     Sensory Perception: 2 - Very Limited     Moisture: 2 - Very moist      Activity: 1 - Bedfast      Mobility: 2 - Very limited     Nutrition: 2 - Probably inadequate      Friction/Shear: 1 - Problem     TOTAL: 10    Pressure Injury Location: coccyx - Assessed on Fridays    Zoomed out                                              Zoomed in      Last photo: 5/9  Wound type: Pressure Injury, Incontinence Associated Dermatitis (IAD), and Moisture Associated Skin Damage (MASD)     Pressure Injury Stage: 2, present on admission        Wound history/plan of care:   Pt transferred from OSH on 4/31. Wound present on admission. There is MASD/IAD to the bilateral buttocks with exposed dermis. There is further breakdown over the Pt's coccyx that is fibrin vs slough. Wound is at least a stage 2. The wound will need to evolve further before it can be definitively staged.   5/9: Wound continues to improve. Wound is a stage 2 pressure injury over coccyx with surrounding MASD and exposed dermis.    Wound base: 80 % Dermis, 20 % Fibrin     Palpation of the wound bed: normal      Drainage: moderate     Description of drainage: serosanguinous     Measurements (length x width x depth, in cm) 6  x 6  x  0.2 cm - central wound, 1.5 x 1.5 x 0.1 cm left buttock     Tunneling N/A     Undermining N/A  Periwound skin: Edematous and Skin stripping      Color: pink and red      Temperature: normal   Odor: none  Pain: unable to assess due to  sedation , none  Pain intervention prior to dressing change: slow and gentle cares   Treatment goal: Heal  and Protection  STATUS: improving and stable  Supplies ordered: at bedside    My PI Risk Assessment     Sensory Perception: 2 - Very Limited      Moisture: 2 - Very moist      Activity: 1 - Bedfast      Mobility: 2 - Very limited     Nutrition: 2 - Probably inadequate      Friction/Shear: 1 - Problem     TOTAL: 10    Wound location: Trach - Assessed on Fridays          Last photo: 5/9  Wound due to: Surgical Wound  Wound history/plan of care:  Pt transferred from OSH on 4/31. Pt had trach placed at OSH. There are areas of fibrin from previous trach suture locations which are mostly healed. There is a linear incision on the left side of Trach cannula from insertion. The wound base wass difficult to visualize, appears to be fibrin and adipose tissue marbled together.  5/5: Called by bedside RN with concerns for skin breakdown near trach. On assessment Trach incision appears to have opened up significantly from prior assessment. Moderate amount of respiratory secretions present. ENT consulted by primary team to assess trach site as well.  Wound base: 100 % Fibrin and Adipose tissue     Palpation of the wound bed: normal      Drainage: moderate     Description of drainage: yellow - respiratory secretions     Measurements (length x width x depth, in cm): 2  x 3  x  0.8 cm      Tunneling: N/A     Undermining: N/A  Periwound skin: Intact      Color: normal and consistent with surrounding tissue      Temperature: normal   Odor: none  Pain: unable to assess due to  sedation , none  Pain interventions prior to dressing change: slow and gentle cares   Treatment goal: Heal  and Protection  STATUS: stable  Supplies ordered: at bedside        Treatment Plan:     Left heel wound(s): Every 3 days Cleanse with saline and pat dry. Conform a sacral Mepilex around Pt's heel. Place in Prevalon boots. Float heels off of support surface with pillows under calves.    Buttocks/coccyx wound(s): Every other day   Cleanse with wound cleanser and pat dry.   Apply no sting barrier film to analilia-wound area and allow to dry.   Cover open wound with Vaseline gauze.  Cover with Sacral  "Mepilex.    If wound requiring more than daily dressing changes switch to the below wound cares:    BID and PRN:  Cleanse the area with Mimi cleanse and protect, very gently with soft cloth.  Apply ostomy powder (#668613) on all open and denuded skin.  Apply thin layer of Triad paste (547034) on top of it.  With repeat application, do not scrub the paste, only remove soiled paste and reapply.  If complete removal of paste is necessary use baby oil/mineral oil (#359074) and soft wash cloth.  Ensure pt has chair cushion (#098756) while sitting up in the chair.  Use only one blue pad in between mattress and pt. No brief in bed.      Trach wound: Perform trach cares per unit routine. Place a fenestrated optifoam between trach faceplate and Pt's skin.    Abdominal wound: Every 2 hours and PRN. Remove soiled Kerlix. Absorb as much drainage from wound base as possible. Moisten a small amount of Kerlix with Vashe and lay onto wound base. Fluff more Kerlix into wound base to help absorb drainage. Keep wound manager to gravity drainage. WO will assess wound manager daily M-F. If wound manager fails, place wet to dry dressing with Vashe moistened Kerlix Covered by Mextra Superabsorbant pads. Change Q2H and PRN.    Drain tube cares: PRN for saturation. Cleanse analilia-tubular skin with Vashe and pat dry. Apply no sting barrier film to analilia-tubular area and allow to dry. Fenestrate a 5x5 Mextra super absorbant pad (037956) and place around tube to absorb drainage.     Pressure Injury Prevention (PIP) Plan:  If patient is declining pressure injury prevention interventions: Explore reason why and address patient's concerns, Educate on pressure injury risk and prevention intervention(s), If patient is still declining, document \"informed refusal\" , and Ensure Care team is aware ( provider, charge nurse, etc)  Mattress: Follow bed algorithm, add Low Air Loss (Air+) mattress pump if skin is very moist or constantly moist.   HOB: " Maintain at or below 30 degrees, unless contraindicated  Repositioning in bed: Every 1-2 hours , Left/right positioning; avoid supine, Raise foot of bed prior to raising head of bed, to reduce patient sliding down (shear), and Frequent microturns using positioning wedges, as patient tolerates  Heels: Pillows under calves and Heel lift boots  Protective Dressing: Sacral Mepilex for prevention (#937423),  especially for the agitated patient   Positioning Equipment:Positioning wedges (#046486) to help maintain 30 degree side lying position   Chair positioning: Chair cushion (#633096) , Assist patient to reposition hourly, and Do NOT use a donut for sitting (this increases pressure to smaller area and creates a higher potential for injury)    If patient has a buttock pressure injury, or high risk for PI use chair cushion or SPS.  Moisture Management: Perineal cleansing /protection: Follow Incontinence Protocol, Avoid brief in bed, Clean and dry skin folds with bathing , Consider InterDry (#746598) between folds, and Moisturize dry skin  Under Devices: Inspect skin under all medical devices during skin inspection , Ensure tubes are stabilized without tension, and Ensure patient is not lying on medical devices or equipment when repositioned  Ask provider to discontinue device when no longer needed.     Orders: Reviewed and Written    RECOMMEND PRIMARY TEAM ORDER: None, at this time  Education provided: plan of care  Discussed plan of care with: Nurse  Notify [facility] if wound(s) deteriorate.  Nursing to notify the Provider(s) and re-consult the WOC Nurse if new skin concern.    DATA:     Current support surface: Standard  Low air loss (ISABELL pump, Isolibrium, Pulsate)  Containment of urine/stool: Incontinent pad in bed, Indwelling catheter, and Internal fecal management  BMI: Body mass index is 23.77 kg/m .   Active diet order: None     Output: I/O last 3 completed shifts:  In: 4034.02 [I.V.:1067.69; NG/GT:517]  Out: 4706.4  [Urine:150; Drains:1745; Other:2811.4]     Labs:   Recent Labs   Lab 05/09/25  1233 05/09/25  0412 05/04/25  1254 05/04/25  1115 05/04/25  0331 05/03/25 2059   ALBUMIN  --  2.1*   < > 1.5*   < >  --    PREALB  --   --   --  <3.0*  --   --    HGB 6.7* 8.2*   < >  --    < >  --    INR  --   --   --   --   --  1.24*   WBC  --  14.1*   < >  --    < >  --     < > = values in this interval not displayed.     Pressure injury risk assessment:   Sensory Perception: 3-->slightly limited  Moisture: 4-->rarely moist  Activity: 2-->chairfast  Mobility: 2-->very limited  Nutrition: 2-->probably inadequate  Friction and Shear: 1-->problem  Dick Score: 14    Stephen Tilley RN, CWOCN  Pager no longer in use, please contact through [email or web address] group: [facility] Nurse [place]    Dept. Office Number: [phone]

## 2025-05-09 NOTE — PROGRESS NOTES
Surgery Progress Note  05/09/2025       Subjective:  Overnight hemoglobin dropped to 6.7 at midnight, given one unit of blood, hemoglobin 8.2 this am. Remains ventilated, and sedated. Alert, responds to voice and follows commands. Tube feeds resumed since 5/7. Meeting MAP goals on 0.02 NE overnight and 0.03 NE this am. Midline dressing soaked and was changed with new dressing this am. Drains remain in place, output stable.     Objective:  Temp:  [97.6  F (36.4  C)-100.2  F (37.9  C)] 98.6  F (37  C)  Pulse:  [] 137  Resp:  [12-32] 25  BP: (129)/(80) 129/80  MAP:  [0 mmHg-96 mmHg] 77 mmHg  Arterial Line BP: (103-147)/(48-76) 117/59  FiO2 (%):  [30 %-50 %] 50 %  SpO2:  [94 %-100 %] 99 %    I/O last 3 completed shifts:  In: 4034.02 [I.V.:1067.69; NG/GT:517]  Out: 4706.4 [Urine:150; Drains:1745; Other:2811.4]      Gen: Alert, nods/gives thumbs up appropriately to questions  Resp: Ventilated, trach  Abd: Rigid, tender to palpation diffusely. Wound dressings in place. SHANNON drains with stable sanguineous output in bulbs. [place] drain with stool in bag.    Ext: WWP, no edema     Labs:  Recent Labs   Lab 05/09/25  0412 05/09/25  0022 05/08/25  1419 05/08/25  0326   WBC 14.1*  --  12.9* 12.6*   HGB 8.2* 6.7* 7.2*  7.2* 7.2*     --  223 219       Recent Labs   Lab 05/09/25  0425 05/09/25  0412 05/09/25  0024 05/08/25  1656 05/08/25  1419 05/08/25  0758 05/08/25  0326   NA  --  136  --   --  137  137  --  136   POTASSIUM  --  4.1  --   --  3.8  3.8  --  4.1   CHLORIDE  --  104  --   --  104  104  --  104   CO2  --  21*  --   --  21*  21*  --  20*   BUN  --  44.3*  --   --  43.3*  43.3*  --  42.0*   CR  --  1.17  --   --  1.12  1.12  --  1.09   * 185* 160*   < > 231*  231*   < > 212*  239*   KARINA  --  7.7*  --   --  7.7*  7.7*  --  7.7*   MAG  --  2.6*  --   --  2.5*  --  2.6*   PHOS  --  2.7  --   --  3.2  --  3.9    < > = values in this interval not displayed.       Imaging:  [facility]  04/27:    Impression    1.  Worsening sequela of necrotizing pancreatitis. Dominant peripancreatic collection has increased in size, now measuring 19 x 13 cm, previously 17 x 11 cm; increasing gas within this collection is worrisome for infection. Large volume ascites and fat necrosis elsewhere throughout the abdomen and pelvis has also increased from prior.  2.  New presumed blood products in the dominant peripancreatic collection, as well as layering within the left paracolic gutter, suspicious for interval bleeding. Recommend trending hemoglobin levels; CTA abdomen and pelvis could be considered if there is concern for active bleeding.  3.  Mild small bowel wall thickening, possibly reactive or sequela of enteritis.  4.  Similar right lower lobe consolidation.     Assessment/Plan:   Sebastián Rodas is a 29-year-old male with a history of alcohol use disorder, anxiety, and bipolar disorder who was admitted on 3/30/2025 for alcohol withdrawal following a recent binge, presenting with diffuse abdominal pain. Initial workup revealed leukocytosis and elevated lipase concerning for acute pancreatitis. He developed acute encephalopathy and was transferred to the ICU on 3/31, requiring intubation and vasopressors by 4/1 due to shock. Hospital course has been complicated by acute renal failure requiring CRRT, cardiomyopathy (initial LVEF 20-25%, improved to 65-70% by 4/14), and necrotizing pancreatitis with unorganized fluid collections. He completed a 14-day course of meropenem but remains intermittently febrile and critically ill, requiring ongoing dialysis and vasopressor support. On 4/27, after clinical deterioration and imaging consistent with a likely perforated viscus, following family discussion, he underwent emergent exploratory laparotomy, necrosectomy, transverse colectomy, abdominal washout, and drain placement. Patient was transferred to [facility] on 4/30/25 for further surgical management. Went to OR 5/1 for  "re-open laparotomy, I&D, placement of colostomy tube in presumed previous colectomy staple line, necrosectomy, and Abthera placement. On 5/2, increasing sanguineous output from drains concerning for bleeding from pancreatic bed. Family care conference held 5/2, plan for restorative cares at that time. Take back to OR twice (5/4 and 5/7) for abdominal washout. Per nursing note: 5/9 morning after he saw a picture of his abdomen that he \"desires to speak to team about updating goals of care, expressed desire to pull back on cares\". SICU team placed palliative care consult.    Plan:  - Surgery team will change dressings during am rounds, nursing to change in the pm and PRN during the day if copious drainage during the day.  - Plan for goals of care discussion per patient request, Palliative Care involved  - Agree with TF plan per nutrition.  - WOC consult placed for poss. Wound  for midline wound.  - Per preliminary US imaging report, would favor not starting anticoagulants given the high risk of bleeding. Will continue to follow for final report to r/o thrombotic disease extending beyond splenic vein.  - Other cares per SICU, we appreciate excellent cares.   - Surgery will continue to follow.    - - - - - - - - - - - - - - - - - -  Seen, examined, and discussed with chief resident, who will discuss with staff.     Anish Dickson  Medical Student, [employer] Class of 2027  Emergency General Surgery Service    Resident/Fellow Attestation   I, Casimiro Chirinos MD, was present with the medical/SANTOS student who participated in the service and in the documentation of the note.  I have verified the history and personally performed the physical exam and medical decision making.  I agree with the assessment and plan of care as documented in the note.      Casimiro Chirinos MD  PGY1  Date of Service (when I saw the patient): 05/09/25   "

## 2025-05-09 NOTE — PLAN OF CARE
ICU End of Shift Summary. See flowsheets for vital signs and detailed assessment.    Changes this shift: RASS -2 to +1, Precedex at 1mcg/kg/hr. Follows commands. PERRLA. Mouths words, using communication board. PRN's for pain to abdomen, oxy 10mg X3, IV dilaudid 0.5mg X4. Sinus tach 100-140's. No seen ectopy. MAP goal of 65 maintained on 0.02 of levo. Tmax 100.2, tylenol effective. Hgb 6.7, received 1 unit(s) PRBC and hgb was 8.2 at recheck. CMV settings since 2000, FiO2 30%, RR 16, , PEEP 5, PIP 11-26. Moderate amount of clear thin secretions. Lungs are coarse throughout. Cough is weak. Abd binder with moderate thin dark drainage from open abdomen. SHANNON X4 draining dark brown to maroon in color, see charting for amounts. GJ tube feeds at 50mL/hr, tolerating well. No output from colostomy tube. Bladder scanned for 189mL and straight cathed for 150mL d/t pt c/o pressure pain to bladder. CRRT running. Abdominal dressing change completed with general surgery this morning.     Pt enforced desires to speak to team about updating goals of care, expressed desire to pull back on cares.     Plan: Continue to wean pressures, wean vent support, possible discharge TPN in favor of tube feeds. Monitor SHANNON drain outputs.     Goal Outcome Evaluation:      Plan of Care Reviewed With: patient    Overall Patient Progress: no changeOverall Patient Progress: no change    Outcome Evaluation: See note

## 2025-05-09 NOTE — PROGRESS NOTES
CRRT STATUS NOTE    DATA:  Time:  7:41 AM  Pressures WNL:  YES  Filter Status:  WDL    Problems Reported/Alarms Noted:  none    Supplies Present:  YES    ASSESSMENT:  Patient Net Fluid Balance:  5/8  -963  5/9 0600 +45  Vital Signs:  Temp: 98.6  F (37  C) Temp src: Oral BP: 129/80 Pulse: (!) 137   Resp: 25 SpO2: 99 % O2 Device: Mechanical Ventilator Oxygen Delivery: 50 LPM.  Levo to keep MAP >65.  St cath for 150ml    Labs:  K 4.1  ICa 4.4  Hgb 6.7 (1uPRBC) 8.2  Goals of Therapy:  0-50ml/hr, meeting goals of therapy    INTERVENTIONS:   Restarted @ 223    PLAN:  Continue per Renal orders. Call CRRT resource RN via [employer] with concerns.

## 2025-05-09 NOTE — PLAN OF CARE
"  ICU End of Shift Summary. See flowsheets for vital signs and detailed assessment.    Changes this shift: RASS -1 to +1. Increased agitation and tachycardia when precedex was stopped; gtt restarted. PRN oxy 15mg given x 2 and Dilaudid 0.5x3. Oriented w/ intermittent confusion. Pt became scared/suspicious and forgot who writer was after writer returned from break. Follows commands. Mouthing statements of \"I want to be done with everything\". Sinus tach 130s when resting; 160s when agitated/in pain.trach dome 30%/40L most of day. Labile BP; levo titrated to MAP goal of 65. Tmax 99.1. One unit PRBc given for hgb 6.7, second unit PRBC given for hgb recheck of 7.2. iCal replaced. TF at goal; TPN stopped. Increasing output from ostomy. WOC placed collection container on abd incision 2/2 copious drainage.     Plan:  Goal Outcome Evaluation: q2h package kerlex packing change for abdomen. Wean Levo as able. Care conference 5/10. CT scan tonight.        Plan of Care Reviewed With: patient, parent    Overall Patient Progress: no changeOverall Patient Progress: no change    Outcome Evaluation: copious drainage from multiple SHANNON sites and abd incision. Intermittent confusion. able to make desires known to palliative.          "

## 2025-05-09 NOTE — PROGRESS NOTES
"  PALLIATIVE CARE PROGRESS NOTE  [facility]     Patient Name: Sebastián Rodas  Date of Admission: 4/30/2025   Today the patient was seen for: Pancreatitis, AUD, goals of care planning, respiratory insufficiency, renal insufficiency     Recommendations & Counseling     What is new today:  Per SICU team, patient had questions about not escalation/de-escalation of cares.  I met with him, and we talked at length about burdens of care balanced against the likelihood of a meaningful outcome/\"pay off\" for having done so.  Discussed in general the concept of non-- escalation of cares and de-escalation of cares.  Discussed that in either case, he would likely live only a short period of time (hours/days).  We discussed how comfort focused medications and ongoing meticulous cares would work if he chooses a less aggressive pathway.  We discussed that he does not need to make a decision about placing limitations on his care at this moment.  At this time, Sebastián would like to have a care plan meeting with his mom and uncle, as well as SICU team.  Palliative care will be happy to participate as well.  Sebastián agreed that it would be good for both he and his family to hear SICU team's update update as to what most likely outcomes would be if he continues on full interventional cares pathway.  He recognizes that his mother has been making decisions for him when he was not able to do so, and wants to include her and his uncle Dustin and upcoming discussions.   I spoke with Sebastián about CODE STATUS.  We discussed his current full CODE STATUS.  We discussed the very limited likelihood of meaningful benefit from attempts at resuscitation given his overall condition and intra-abdominal processes.  Discussed very limited likelihood of survival, and extremely limited likelihood of leaving the hospital neurologically intact.  Discussed that if he did elect attempts at cardiopulmonary resuscitation, " even if he did survive he would still be facing his very tenuous medical condition.  Sebastián indicated to me that he would like to change his CODE STATUS to DNR.  We discussed that he could go ahead and do that, and/or he could also include his mom in this decision if he wants to.  I believe Sebastián demonstrated the decisional capacity to elect all of the choices listed above.  He appropriately demonstrated full range of affect, from smiles to tears during our discussion.  We discussed that he will hopefully be able to contribute more directly to the conversation if he has a speaking valve placed soon.  Even without speaking valve in place, Sebastián was engaged, able to participate via gestures and thumbs up/thumbs down and nodding.    GOALS OF CARE:   Currently, goals are full restorative cares.  Currently reassessing that plan as outlined above.    ADVANCE CARE PLANNING:  No ACD on file; mother has been making decisions on his behalf over the recent week.  No POLST form on file  Code status: Full Code    MEDICAL MANAGEMENT:   Our team is not participating in medical management at this time    PSYCHOSOCIAL/SPIRITUAL:  Our palliative care  has been seeing Sebastián's mother for support.  If Sebastián is choosing to choose another pathway of care, we will reassess his own need for spiritual care support.    Palliative Care will continue to follow. Thank you for the consult and allowing us to aid in the care of Sebastián Rodas.    These recommendations have been discussed with primary team    Brendon Fairbanks MD  [facility], Palliative Care  Securely message with the [employer] Web Console (learn more here) or  Text page via [employer] Paging/Directory    TT: 67 minutes, with > 50% spent in C/C/E patient/family/care teams re: GOC, POC, Sx management.       Assessment          Sebastián Rodas is a 29-year-old male with a history of alcohol use disorder, anxiety, and bipolar disorder who was admitted on 3/30/2025 to OSH for alcohol  "withdrawal following a recent binge, presenting with diffuse abdominal pain. Initial workup revealed leukocytosis and elevated lipase concerning for acute pancreatitis.   On 4/27, after clinical deterioration and imaging consistent with a likely perforated viscus, he underwent emergent exploratory laparotomy, necrosectomy, transverse colectomy, abdominal washout, drain placement, and abthera placement.    Started on CRRT.    Patient was transferred to [facility] on 4/30/25 for further surgical management.  Reexploration of belly here at [facility] reveals no possibility of creating diverting colostomy.         Interval History:   Since our initial consultation 1 week ago, Sebastián has remained in the ICU, with returns to the OR for abdominal washout.  He remains on CRRT, has a tracheostomy in place, is intermittently requiring pressors and Precedex.  No longer on continuous opioid infusion, but doing well with PRNs.  His cognitive status is much better than at the time of admission; he awakens, and interacts with head nods, thumbs up/down, writing notes (not always legible) and whispering.    Now awaiting potential placement of a speaking valve for his trach.  Review of Systems:     Besides above, ROS was reviewed and is unremarkable     Physical Exam:   Blood pressure 129/80, pulse (!) 155, temperature 98.3  F (36.8  C), temperature source Oral, resp. rate 23, height 1.727 m (5' 7.99\"), weight 70.9 kg (156 lb 4.9 oz), SpO2 100%.   General: Awake alert and interactive.  Able to follow and participate in complex conversation.  CRRT in place  ENT: Tracheostomy in place  Pulmonary: On ventilator  CV: Tachycardic 140s-160s  Extremities: Trace BLE edema  Skin: Abdominal wound dressed.      Data Reviewed:   ROUTINE ICU LABS (Last four results)  CMP  Recent Labs   Lab 05/09/25  1211 05/09/25  0901 05/09/25  0425 05/09/25  0412 05/08/25  1656 05/08/25  1419 05/08/25  0758 05/08/25  0326 05/07/25  2035 05/07/25  1623 05/07/25  0348 " 05/07/25  0344 05/06/25  0831 05/06/25  0347   NA  --   --   --  136  --  137  137  --  136  --  136  --  136   < > 136  136   POTASSIUM  --   --   --  4.1  --  3.8  3.8  --  4.1  --  4.6  --  3.7   < > 3.7  3.7   CHLORIDE  --   --   --  104  --  104  104  --  104  --  105  --  104   < > 105  105   CO2  --   --   --  21*  --  21*  21*  --  20*  --  20*  --  21*   < > 20*  20*   ANIONGAP  --   --   --  11  --  12  12  --  12  --  11  --  11   < > 11  11   * 239* 185* 185*   < > 231*  231*   < > 212*  239*   < > 213*   < > 164*   < > 192*  192*   BUN  --   --   --  44.3*  --  43.3*  43.3*  --  42.0*  --  40.1*  --  38.9*   < > 41.5*  41.5*   CR  --   --   --  1.17  --  1.12  1.12  --  1.09  --  1.13  --  1.03   < > 0.90  0.90   GFRESTIMATED  --   --   --  85  --  90  90  --  >90  --  89  --  >90   < > >90  >90   KARINA  --   --   --  7.7*  --  7.7*  7.7*  --  7.7*  --  7.6*  --  7.8*   < > 7.5*  7.5*   MAG  --   --   --  2.6*  --  2.5*  --  2.6*  --  3.0*  --  2.3   < > 2.1   PHOS  --   --   --  2.7  --  3.2  --  3.9  --  4.6*  --  3.3   < > 3.1   PROTTOTAL  --   --   --  6.2*  --   --   --  6.0*  --   --   --  5.5*  --  4.8*   ALBUMIN  --   --   --  2.1*  --  2.1*  --  2.0*  --  1.8*  --  1.8*   < > 1.7*  1.7*   BILITOTAL  --   --   --  0.6  --   --   --  0.6  --   --   --  0.8  --  0.8   ALKPHOS  --   --   --  332*  --   --   --  337*  --   --   --  351*  --  243*   AST  --   --   --  28  --   --   --  23  --   --   --  24  --  22   ALT  --   --   --  18  --   --   --  9  --   --   --  9  --  8    < > = values in this interval not displayed.     CBC  Recent Labs   Lab 05/09/25  1233 05/09/25  0412 05/09/25  0022 05/08/25  1419 05/08/25  0326 05/07/25  1623   WBC  --  14.1*  --  12.9* 12.6* 15.3*   RBC  --  2.73*  --  2.40* 2.42* 2.40*   HGB 6.7* 8.2* 6.7* 7.2*  7.2* 7.2* 7.3*   HCT  --  25.4*  --  22.8* 23.1* 22.6*   MCV  --  93  --  95 96 94   MCH  --  30.0  --  30.0 29.8 30.4   MCHC  --   32.3  --  31.6 31.2* 32.3   RDW  --  16.3*  --  16.4* 16.5* 16.5*   PLT  --  215  --  223 219 191     INR  Recent Labs   Lab 05/03/25 2059 05/02/25  1449   INR 1.24* 1.30*     Arterial Blood Gas  Recent Labs   Lab 05/06/25  0347 05/05/25  0339 05/03/25  0339   PH 7.38 7.35 7.32*   PCO2 37 40 39   PO2 96 135* 113*   HCO3 22 22 20*   O2PER 30  30 40 45

## 2025-05-09 NOTE — CARE CONFERENCE
Scheduling Care Conference  Time & Location:   Per SICU team request arranged Care Conference for Saturday 5/10 at 1215 -pre-conference at 4C conference room and 1230 pm - meeting with family. Family requested meeting in the conference room.    Providers:   SETH Briones- Sent Vocera notification  EGS - Casimiro Chirinos Sent Vocera notification  Palliative - Phillip Cintron Sent Vocera notification   Primary RN Felix notified.   Family:   Mother Meryl Rodas ( [phone]) will call her brother Dustin.     Call into conference information:   [phone]   Meeting #[id number]  PIN # [phone]      Vianey Hoover, MSN, MA, CCM, RN  4C/4A/4E ICU Care Coordinator  Phone:[phone]  Available via [employer]     For Weekend & Holiday on call RN Care Coordinator:  [place] [facility] ([phone]) Saturday & Sunday; ([phone])  Recognized Holidays   Weekend 4C/4A/4E ICUs /6A Care Coordinator  Available via [employer]

## 2025-05-09 NOTE — PROGRESS NOTES
Nephrology Progress Note  05/09/2025       Sebastián Rodas is a 29 yom with Bipolar disorder, ETOH use c/b necrotizing pancreatitis admitted 3/30/25 to [facility] for ETOH withdrawal and abdominal pain, found to have acute pancreatitis which has progressed to necrotizing pancreatitis complicated by SARAHI.  Seen by Nephrology at [facility], started on CRRT 4/1.  Had periods of stability enough for iHD but back to CRRT on 4/21 until tx to [facility] for further surgical interventions.       Interval History :   Mr Rodas continues on CRRT on low dose pressor and high intake. Able to be negative ~1L yesterday and labs are stable.  Continuing to support, has been stable from intraabdominal bleeding standpoint.      With tachycardia this am and stable pulm status we will hold on UF for today, labs stable on 4k baths so will continue for clearance. Continuing to discuss GOC as he is at high risk for acute bleeding or infectious complications.      Assessment & Recommendations:   SARAHI-Baseline Cr 0.8 as recently as March 2025 before acute events, was started on CRRT emergently on 4/1 in setting of septic shock. Had ~2 weeks of stability enough to manage with iHD but back on CRRT 4/21 until tx to [facility] on 4/30. Running fevers with intraabdominal sepsis.  Continuing RRT from OSH, restarted CRRT on 5/2 after OR.                  -No need for new consent, continuing RRT started last month at [facility].                 -Access is tunneled RIJ from 4/21.                  -CRRT, changed to all 4k baths, no UF     Volume-Total body volume overloaded but much of it 3rd spaced. Tachycardic this am which is likely multifactorial but with stable pulm status we can hold on UF for today.      Electrolytes-K 4.1 on all 4k baths, bicarb 20, ABG 7.38/40/135/22.      BMD-No acute issues.      Anemia-Hgb 8.2, stable in the short term, acute management per team.      Nutrition-TPN started 5/1     Time spent: 50 minutes on this date of encounter  "for chart review, physical exam, medical decision making and co-ordination of care.      Discussed with Dr Hartman     Recommendations were communicated to primary team via verbal communication.        ASIF Roger CNS  Clinical Nurse Specialist  [phone]    Review of Systems:   I reviewed the following systems:  ROS not done due to vent/sedation.     Physical Exam:   I/O last 3 completed shifts:  In: 4034.02 [I.V.:1067.69; NG/GT:517]  Out: 4706.4 [Urine:150; Drains:1745; Other:2811.4]   /80 (BP Location: Left leg)   Pulse (!) 159   Temp 98.3  F (36.8  C) (Oral)   Resp 29   Ht 1.727 m (5' 7.99\")   Wt 70.9 kg (156 lb 4.9 oz)   SpO2 100%   BMI 23.77 kg/m       GENERAL APPEARANCE: Vent via trach  Pulmonary: Intubated and sedated.   CV: Regular rhythm, normal rate   - Edema +2 generalized  GI: Surgical dressing in place  MS: no evidence of inflammation in joints, no muscle tenderness  : + Garcia  SKIN: no rash, warm, dry  NEURO: Intubated and sedated.     Labs:   All labs reviewed by me  Electrolytes/Renal -   Recent Labs   Lab Test 05/09/25  0901 05/09/25  0425 05/09/25  0412 05/08/25  1656 05/08/25  1419 05/08/25  0758 05/08/25  0326   NA  --   --  136  --  137  137  --  136   POTASSIUM  --   --  4.1  --  3.8  3.8  --  4.1   CHLORIDE  --   --  104  --  104  104  --  104   CO2  --   --  21*  --  21*  21*  --  20*   BUN  --   --  44.3*  --  43.3*  43.3*  --  42.0*   CR  --   --  1.17  --  1.12  1.12  --  1.09   * 185* 185*   < > 231*  231*   < > 212*  239*   KARINA  --   --  7.7*  --  7.7*  7.7*  --  7.7*   MAG  --   --  2.6*  --  2.5*  --  2.6*   PHOS  --   --  2.7  --  3.2  --  3.9    < > = values in this interval not displayed.       CBC -   Recent Labs   Lab Test 05/09/25 0412 05/09/25  0022 05/08/25  1419 05/08/25  0326   WBC 14.1*  --  12.9* 12.6*   HGB 8.2* 6.7* 7.2*  7.2* 7.2*     --  223 219       LFTs -   Recent Labs   Lab Test 05/09/25 0412 05/08/25  1419 " 05/08/25  0326 05/07/25  1623 05/07/25  0344   ALKPHOS 332*  --  337*  --  351*   BILITOTAL 0.6  --  0.6  --  0.8   ALT 18  --  9  --  9   AST 28  --  23  --  24   PROTTOTAL 6.2*  --  6.0*  --  5.5*   ALBUMIN 2.1* 2.1* 2.0*   < > 1.8*    < > = values in this interval not displayed.       Iron Panel - No lab results found.        Current Medications:  Current Facility-Administered Medications   Medication Dose Route Frequency Provider Last Rate Last Admin    acetaminophen (TYLENOL) tablet 975 mg  975 mg Oral Q8H Eugenia Zavala MD   975 mg at 05/09/25 0842    heparin ANTICOAGULANT injection 5,000 Units  5,000 Units Subcutaneous Q8H [facility] Ganesh Griffiths MD   5,000 Units at 05/09/25 0430    insulin aspart (NovoLOG) injection (RAPID ACTING)  1-12 Units Subcutaneous Q4H Brendon Haas DO   4 Units at 05/09/25 0902    insulin glargine (LANTUS PEN) injection 12 Units  12 Units Subcutaneous Q24H Roxi Alston MD   12 Units at 05/09/25 0901    methocarbamol (ROBAXIN) tablet 500 mg  500 mg Oral 4x Daily Eugenia Zavala MD   500 mg at 05/09/25 0841    pantoprazole (PROTONIX) 2 mg/mL suspension 40 mg  40 mg Oral or Feeding Tube QAM AC Brendon Haas DO   40 mg at 05/09/25 0643    piperacillin-tazobactam (ZOSYN) 4.5 g vial to attach to  mL bag  4.5 g Intravenous Q6H Isacc Burrell PA-C   4.5 g at 05/09/25 0421    polyethylene glycol (MIRALAX) Packet 17 g  17 g Oral Daily Eugenia Zavala MD   17 g at 05/09/25 0841    senna-docusate (SENOKOT-S/PERICOLACE) 8.6-50 MG per tablet 1 tablet  1 tablet Oral BID Eugenia Zavala MD   1 tablet at 05/09/25 0841    sodium chloride (PF) 0.9% PF flush 3 mL  3 mL Intracatheter Q8H Ganesh Griffiths MD   3 mL at 05/09/25 0645    thiamine (B-1) injection 100 mg  100 mg Intravenous Daily Ganesh Griffiths MD   100 mg at 05/09/25 0841     Current Facility-Administered Medications   Medication Dose Route Frequency Provider Last Rate Last Admin     dexmedeTOMIDine (PRECEDEX) 4 mcg/mL in sodium chloride 0.9 % 100 mL infusion  0.1-1.2 mcg/kg/hr (Dosing Weight) Intravenous Continuous Ganesh Griffiths MD 17.4 mL/hr at 05/09/25 0902 1 mcg/kg/hr at 05/09/25 0902    dextrose 10% infusion   Intravenous Continuous PRN Mireya López DO        dialysate for CVVHD & CVVHDF (Phoxillum BK4/2.5)  12.5 mL/kg/hr CRRT Continuous Raheem Gabriel APRN  mL/hr at 05/09/25 0703 12.5 mL/kg/hr at 05/09/25 0703    No heparin required   Does not apply Continuous PRN Raheem Gabriel APRN CNS        norepinephrine (LEVOPHED) 16 mg in  mL infusion MAX CONC CENTRAL LINE  0.01-0.6 mcg/kg/min (Dosing Weight) Intravenous Continuous Ganesh Griffiths MD 2.6 mL/hr at 05/09/25 0905 0.04 mcg/kg/min at 05/09/25 0905    parenteral nutrition - ADULT compounded formula   CENTRAL LINE IV TPN CONTINUOUS Brendon Haas DO 50 mL/hr at 05/09/25 0800 Rate Verify at 05/09/25 0800    POST-filter replacement solution for CVVHD & CVVHDF (Phoxillum BK4/2.5)   CRRT Continuous Raheem Gabriel APRN  mL/hr at 05/08/25 1402 New Bag at 05/08/25 1402    PRE-filter replacement solution for CVVHD & CVVHDF (Phoxillum BK4/2.5)  12.5 mL/kg/hr CRRT Continuous Raheem Gabriel APRN  mL/hr at 05/09/25 0703 12.5 mL/kg/hr at 05/09/25 0703

## 2025-05-10 ENCOUNTER — APPOINTMENT (OUTPATIENT)
Dept: CT IMAGING | Facility: CLINIC | Age: 30
End: 2025-05-10
Payer: COMMERCIAL

## 2025-05-10 ENCOUNTER — APPOINTMENT (OUTPATIENT)
Dept: INTERVENTIONAL RADIOLOGY/VASCULAR | Facility: CLINIC | Age: 30
End: 2025-05-10
Attending: SURGERY
Payer: COMMERCIAL

## 2025-05-10 LAB
ABO + RH BLD: NORMAL
ALBUMIN SERPL BCG-MCNC: 1.9 G/DL (ref 3.5–5.2)
ALBUMIN SERPL BCG-MCNC: 2.4 G/DL (ref 3.5–5.2)
ALBUMIN SERPL BCG-MCNC: 3.9 G/DL (ref 3.5–5.2)
ALLEN'S TEST: ABNORMAL
ALP SERPL-CCNC: 109 U/L (ref 40–150)
ALP SERPL-CCNC: 194 U/L (ref 40–150)
ALT SERPL W P-5'-P-CCNC: 17 U/L (ref 0–70)
ALT SERPL W P-5'-P-CCNC: 27 U/L (ref 0–70)
ANION GAP SERPL CALCULATED.3IONS-SCNC: 12 MMOL/L (ref 7–15)
ANION GAP SERPL CALCULATED.3IONS-SCNC: 12 MMOL/L (ref 7–15)
ANION GAP SERPL CALCULATED.3IONS-SCNC: 14 MMOL/L (ref 7–15)
ANION GAP SERPL CALCULATED.3IONS-SCNC: 16 MMOL/L (ref 7–15)
APTT PPP: 30 SECONDS (ref 22–38)
APTT PPP: 33 SECONDS (ref 22–38)
APTT PPP: 37 SECONDS (ref 22–38)
AST SERPL W P-5'-P-CCNC: 30 U/L (ref 0–45)
AST SERPL W P-5'-P-CCNC: 33 U/L (ref 0–45)
BASE EXCESS BLDA CALC-SCNC: -3.4 MMOL/L (ref -3–3)
BASOPHILS # BLD MANUAL: 0.1 10E3/UL (ref 0–0.2)
BASOPHILS NFR BLD MANUAL: 1 %
BILIRUB DIRECT SERPL-MCNC: 1.15 MG/DL (ref 0–0.3)
BILIRUB SERPL-MCNC: 1.5 MG/DL
BILIRUB SERPL-MCNC: 3.1 MG/DL
BLD GP AB SCN SERPL QL: NEGATIVE
BLD PROD TYP BPU: NORMAL
BLOOD COMPONENT TYPE: NORMAL
BUN SERPL-MCNC: 33.4 MG/DL (ref 6–20)
BUN SERPL-MCNC: 34.2 MG/DL (ref 6–20)
BUN SERPL-MCNC: 37.2 MG/DL (ref 6–20)
BUN SERPL-MCNC: 45.9 MG/DL (ref 6–20)
BURR CELLS BLD QL SMEAR: SLIGHT
CA-I BLD-MCNC: 4.1 MG/DL (ref 4.4–5.2)
CA-I BLD-MCNC: 4.3 MG/DL (ref 4.4–5.2)
CA-I BLD-MCNC: 5.5 MG/DL (ref 4.4–5.2)
CALCIUM SERPL-MCNC: 10 MG/DL (ref 8.8–10.4)
CALCIUM SERPL-MCNC: 7.5 MG/DL (ref 8.8–10.4)
CALCIUM SERPL-MCNC: 8.7 MG/DL (ref 8.8–10.4)
CALCIUM SERPL-MCNC: 9.1 MG/DL (ref 8.8–10.4)
CF REDUC 30M P MA P HEP LENFR BLD TEG: 0 % (ref 0–8)
CF REDUC 60M P MA P HEPASE LENFR BLD TEG: 0.7 % (ref 0–15)
CFT P HPASE BLD TEG: 1.2 MINUTE (ref 1–3)
CHLORIDE SERPL-SCNC: 103 MMOL/L (ref 98–107)
CHLORIDE SERPL-SCNC: 105 MMOL/L (ref 98–107)
CHLORIDE SERPL-SCNC: 106 MMOL/L (ref 98–107)
CHLORIDE SERPL-SCNC: 109 MMOL/L (ref 98–107)
CI (COAGULATION INDEX)(Z): 2.5 (ref -3–3)
CLOT ANGLE P HPASE BLD TEG: 74.3 DEGREES (ref 53–72)
CLOT INIT P HPASE BLD TEG: 5.4 MINUTE (ref 5–10)
CLOT STRENGTH P HPASE BLD TEG: 12.1 KD/SC (ref 4.5–11)
CODING SYSTEM: NORMAL
CREAT SERPL-MCNC: 1.18 MG/DL (ref 0.67–1.17)
CREAT SERPL-MCNC: 1.18 MG/DL (ref 0.67–1.17)
CREAT SERPL-MCNC: 1.2 MG/DL (ref 0.67–1.17)
CREAT SERPL-MCNC: 1.27 MG/DL (ref 0.67–1.17)
CROSSMATCH: NORMAL
CRP SERPL-MCNC: 141 MG/L
EGFRCR SERPLBLD CKD-EPI 2021: 77 ML/MIN/1.73M2
EGFRCR SERPLBLD CKD-EPI 2021: 83 ML/MIN/1.73M2
EGFRCR SERPLBLD CKD-EPI 2021: 85 ML/MIN/1.73M2
EGFRCR SERPLBLD CKD-EPI 2021: 85 ML/MIN/1.73M2
EOSINOPHIL # BLD MANUAL: 0 10E3/UL (ref 0–0.7)
EOSINOPHIL NFR BLD MANUAL: 0 %
ERYTHROCYTE [DISTWIDTH] IN BLOOD BY AUTOMATED COUNT: 13.9 % (ref 10–15)
ERYTHROCYTE [DISTWIDTH] IN BLOOD BY AUTOMATED COUNT: 14.2 % (ref 10–15)
ERYTHROCYTE [DISTWIDTH] IN BLOOD BY AUTOMATED COUNT: 14.6 % (ref 10–15)
ERYTHROCYTE [DISTWIDTH] IN BLOOD BY AUTOMATED COUNT: 16.3 % (ref 10–15)
ERYTHROCYTE [DISTWIDTH] IN BLOOD BY AUTOMATED COUNT: 16.7 % (ref 10–15)
FIBRINOGEN PPP-MCNC: 249 MG/DL (ref 170–510)
FIBRINOGEN PPP-MCNC: 337 MG/DL (ref 170–510)
FIBRINOGEN PPP-MCNC: 571 MG/DL (ref 170–510)
GLUCOSE BLDC GLUCOMTR-MCNC: 136 MG/DL (ref 70–99)
GLUCOSE BLDC GLUCOMTR-MCNC: 142 MG/DL (ref 70–99)
GLUCOSE BLDC GLUCOMTR-MCNC: 168 MG/DL (ref 70–99)
GLUCOSE BLDC GLUCOMTR-MCNC: 207 MG/DL (ref 70–99)
GLUCOSE BLDC GLUCOMTR-MCNC: 232 MG/DL (ref 70–99)
GLUCOSE SERPL-MCNC: 153 MG/DL (ref 70–99)
GLUCOSE SERPL-MCNC: 172 MG/DL (ref 70–99)
GLUCOSE SERPL-MCNC: 198 MG/DL (ref 70–99)
GLUCOSE SERPL-MCNC: 206 MG/DL (ref 70–99)
HCO3 BLD-SCNC: 20 MMOL/L (ref 21–28)
HCO3 SERPL-SCNC: 17 MMOL/L (ref 22–29)
HCO3 SERPL-SCNC: 17 MMOL/L (ref 22–29)
HCO3 SERPL-SCNC: 19 MMOL/L (ref 22–29)
HCO3 SERPL-SCNC: 20 MMOL/L (ref 22–29)
HCT VFR BLD AUTO: 21.8 % (ref 40–53)
HCT VFR BLD AUTO: 23.4 % (ref 40–53)
HCT VFR BLD AUTO: 27.1 % (ref 40–53)
HCT VFR BLD AUTO: 28.5 % (ref 40–53)
HCT VFR BLD AUTO: 40 % (ref 40–53)
HGB BLD-MCNC: 13.5 G/DL (ref 13.3–17.7)
HGB BLD-MCNC: 7.3 G/DL (ref 13.3–17.7)
HGB BLD-MCNC: 7.7 G/DL (ref 13.3–17.7)
HGB BLD-MCNC: 8.9 G/DL (ref 13.3–17.7)
HGB BLD-MCNC: 9.3 G/DL (ref 13.3–17.7)
HGB BLD-MCNC: 9.3 G/DL (ref 13.3–17.7)
INR PPP: 1.17 (ref 0.85–1.15)
INR PPP: 1.28 (ref 0.85–1.15)
INR PPP: 1.65 (ref 0.85–1.15)
ISSUE DATE AND TIME: NORMAL
LACTATE SERPL-SCNC: 3.9 MMOL/L (ref 0.7–2)
LACTATE SERPL-SCNC: 4.4 MMOL/L (ref 0.7–2)
LACTATE SERPL-SCNC: 4.8 MMOL/L (ref 0.7–2)
LACTATE SERPL-SCNC: 5.9 MMOL/L (ref 0.7–2)
LYMPHOCYTES # BLD MANUAL: 0.6 10E3/UL (ref 0.8–5.3)
LYMPHOCYTES NFR BLD MANUAL: 6 %
MAGNESIUM SERPL-MCNC: 2.1 MG/DL (ref 1.7–2.3)
MAGNESIUM SERPL-MCNC: 2.5 MG/DL (ref 1.7–2.3)
MCF P HPASE BLD TEG: 70.7 MM (ref 50–70)
MCH RBC QN AUTO: 29.2 PG (ref 26.5–33)
MCH RBC QN AUTO: 29.7 PG (ref 26.5–33)
MCH RBC QN AUTO: 30 PG (ref 26.5–33)
MCH RBC QN AUTO: 30.3 PG (ref 26.5–33)
MCH RBC QN AUTO: 31.3 PG (ref 26.5–33)
MCHC RBC AUTO-ENTMCNC: 32.6 G/DL (ref 31.5–36.5)
MCHC RBC AUTO-ENTMCNC: 32.8 G/DL (ref 31.5–36.5)
MCHC RBC AUTO-ENTMCNC: 32.9 G/DL (ref 31.5–36.5)
MCHC RBC AUTO-ENTMCNC: 33.5 G/DL (ref 31.5–36.5)
MCHC RBC AUTO-ENTMCNC: 33.8 G/DL (ref 31.5–36.5)
MCV RBC AUTO: 89 FL (ref 78–100)
MCV RBC AUTO: 90 FL (ref 78–100)
MCV RBC AUTO: 90 FL (ref 78–100)
MCV RBC AUTO: 92 FL (ref 78–100)
MCV RBC AUTO: 94 FL (ref 78–100)
METAMYELOCYTES # BLD MANUAL: 0.1 10E3/UL
METAMYELOCYTES NFR BLD MANUAL: 1 %
MONOCYTES # BLD MANUAL: 0.5 10E3/UL (ref 0–1.3)
MONOCYTES NFR BLD MANUAL: 5 %
MTP TRACKING ORDER: NORMAL
MYELOCYTES # BLD MANUAL: 0.1 10E3/UL
MYELOCYTES NFR BLD MANUAL: 1 %
NEUTROPHILS # BLD MANUAL: 8.5 10E3/UL (ref 1.6–8.3)
NEUTROPHILS NFR BLD MANUAL: 86 %
NRBC # BLD AUTO: 0.3 10E3/UL
NRBC BLD MANUAL-RTO: 3 %
O2/TOTAL GAS SETTING VFR VENT: 30 %
OXYHGB MFR BLDA: 96 % (ref 92–100)
PCO2 BLD: 30 MM HG (ref 35–45)
PH BLD: 7.43 [PH] (ref 7.35–7.45)
PHOSPHATE SERPL-MCNC: 3.8 MG/DL (ref 2.5–4.5)
PHOSPHATE SERPL-MCNC: 6.5 MG/DL (ref 2.5–4.5)
PLAT MORPH BLD: ABNORMAL
PLAT MORPH BLD: NORMAL
PLAT MORPH BLD: NORMAL
PLATELET # BLD AUTO: 101 10E3/UL (ref 150–450)
PLATELET # BLD AUTO: 124 10E3/UL (ref 150–450)
PLATELET # BLD AUTO: 164 10E3/UL (ref 150–450)
PLATELET # BLD AUTO: 253 10E3/UL (ref 150–450)
PLATELET # BLD AUTO: 73 10E3/UL (ref 150–450)
PO2 BLD: 86 MM HG (ref 80–105)
POLYCHROMASIA BLD QL SMEAR: SLIGHT
POTASSIUM SERPL-SCNC: 4.8 MMOL/L (ref 3.4–5.3)
POTASSIUM SERPL-SCNC: 5 MMOL/L (ref 3.4–5.3)
POTASSIUM SERPL-SCNC: 5.6 MMOL/L (ref 3.4–5.3)
POTASSIUM SERPL-SCNC: 5.6 MMOL/L (ref 3.4–5.3)
POTASSIUM SERPL-SCNC: 5.9 MMOL/L (ref 3.4–5.3)
POTASSIUM SERPL-SCNC: 5.9 MMOL/L (ref 3.4–5.3)
POTASSIUM SERPL-SCNC: 6.2 MMOL/L (ref 3.4–5.3)
PROT SERPL-MCNC: 5.6 G/DL (ref 6.4–8.3)
PROT SERPL-MCNC: 6.3 G/DL (ref 6.4–8.3)
PROTHROMBIN TIME: 15.2 SECONDS (ref 11.8–14.8)
PROTHROMBIN TIME: 16.3 SECONDS (ref 11.8–14.8)
PROTHROMBIN TIME: 19.7 SECONDS (ref 11.8–14.8)
RBC # BLD AUTO: 2.33 10E6/UL (ref 4.4–5.9)
RBC # BLD AUTO: 2.64 10E6/UL (ref 4.4–5.9)
RBC # BLD AUTO: 3 10E6/UL (ref 4.4–5.9)
RBC # BLD AUTO: 3.1 10E6/UL (ref 4.4–5.9)
RBC # BLD AUTO: 4.46 10E6/UL (ref 4.4–5.9)
RBC MORPH BLD: ABNORMAL
RBC MORPH BLD: NORMAL
RBC MORPH BLD: NORMAL
SAO2 % BLDA: 98.6 % (ref 96–97)
SODIUM SERPL-SCNC: 134 MMOL/L (ref 135–145)
SODIUM SERPL-SCNC: 137 MMOL/L (ref 135–145)
SODIUM SERPL-SCNC: 139 MMOL/L (ref 135–145)
SODIUM SERPL-SCNC: 140 MMOL/L (ref 135–145)
SPECIMEN EXP DATE BLD: NORMAL
UNIT ABO/RH: NORMAL
UNIT NUMBER: NORMAL
UNIT STATUS: NORMAL
UNIT TYPE ISBT: 5100
UNIT TYPE ISBT: 600
UNIT TYPE ISBT: 6200
UNIT TYPE ISBT: 9500
UNIT TYPE ISBT: 9500
WBC # BLD AUTO: 10.9 10E3/UL (ref 4–11)
WBC # BLD AUTO: 13.3 10E3/UL (ref 4–11)
WBC # BLD AUTO: 17.6 10E3/UL (ref 4–11)
WBC # BLD AUTO: 8.1 10E3/UL (ref 4–11)
WBC # BLD AUTO: 9.9 10E3/UL (ref 4–11)

## 2025-05-10 PROCEDURE — 250N000011 HC RX IP 250 OP 636: Performed by: STUDENT IN AN ORGANIZED HEALTH CARE EDUCATION/TRAINING PROGRAM

## 2025-05-10 PROCEDURE — 84132 ASSAY OF SERUM POTASSIUM: CPT | Performed by: INTERNAL MEDICINE

## 2025-05-10 PROCEDURE — 200N000002 HC R&B ICU UMMC

## 2025-05-10 PROCEDURE — 84132 ASSAY OF SERUM POTASSIUM: CPT

## 2025-05-10 PROCEDURE — 258N000003 HC RX IP 258 OP 636: Performed by: STUDENT IN AN ORGANIZED HEALTH CARE EDUCATION/TRAINING PROGRAM

## 2025-05-10 PROCEDURE — 82805 BLOOD GASES W/O2 SATURATION: CPT | Performed by: STUDENT IN AN ORGANIZED HEALTH CARE EDUCATION/TRAINING PROGRAM

## 2025-05-10 PROCEDURE — 250N000011 HC RX IP 250 OP 636: Performed by: PHYSICIAN ASSISTANT

## 2025-05-10 PROCEDURE — 84155 ASSAY OF PROTEIN SERUM: CPT | Performed by: CLINICAL NURSE SPECIALIST

## 2025-05-10 PROCEDURE — 999N000254 HC STATISTIC VENTILATOR TRANSFER

## 2025-05-10 PROCEDURE — 85384 FIBRINOGEN ACTIVITY: CPT

## 2025-05-10 PROCEDURE — 83605 ASSAY OF LACTIC ACID: CPT

## 2025-05-10 PROCEDURE — 99233 SBSQ HOSP IP/OBS HIGH 50: CPT | Mod: 24 | Performed by: STUDENT IN AN ORGANIZED HEALTH CARE EDUCATION/TRAINING PROGRAM

## 2025-05-10 PROCEDURE — 83735 ASSAY OF MAGNESIUM: CPT | Performed by: CLINICAL NURSE SPECIALIST

## 2025-05-10 PROCEDURE — 84630 ASSAY OF ZINC: CPT

## 2025-05-10 PROCEDURE — 250N000009 HC RX 250: Performed by: CLINICAL NURSE SPECIALIST

## 2025-05-10 PROCEDURE — 90947 DIALYSIS REPEATED EVAL: CPT

## 2025-05-10 PROCEDURE — 250N000013 HC RX MED GY IP 250 OP 250 PS 637: Performed by: STUDENT IN AN ORGANIZED HEALTH CARE EDUCATION/TRAINING PROGRAM

## 2025-05-10 PROCEDURE — 84255 ASSAY OF SELENIUM: CPT

## 2025-05-10 PROCEDURE — C1769 GUIDE WIRE: HCPCS

## 2025-05-10 PROCEDURE — C1887 CATHETER, GUIDING: HCPCS

## 2025-05-10 PROCEDURE — 250N000013 HC RX MED GY IP 250 OP 250 PS 637

## 2025-05-10 PROCEDURE — P9016 RBC LEUKOCYTES REDUCED: HCPCS | Performed by: STUDENT IN AN ORGANIZED HEALTH CARE EDUCATION/TRAINING PROGRAM

## 2025-05-10 PROCEDURE — 258N000003 HC RX IP 258 OP 636

## 2025-05-10 PROCEDURE — 99152 MOD SED SAME PHYS/QHP 5/>YRS: CPT | Mod: GC | Performed by: RADIOLOGY

## 2025-05-10 PROCEDURE — 255N000002 HC RX 255 OP 636: Performed by: RADIOLOGY

## 2025-05-10 PROCEDURE — 250N000011 HC RX IP 250 OP 636: Mod: JZ

## 2025-05-10 PROCEDURE — C1760 CLOSURE DEV, VASC: HCPCS

## 2025-05-10 PROCEDURE — 250N000011 HC RX IP 250 OP 636

## 2025-05-10 PROCEDURE — P9056 BLOOD, L/R, IRRADIATED: HCPCS

## 2025-05-10 PROCEDURE — 86923 COMPATIBILITY TEST ELECTRIC: CPT

## 2025-05-10 PROCEDURE — 83605 ASSAY OF LACTIC ACID: CPT | Performed by: STUDENT IN AN ORGANIZED HEALTH CARE EDUCATION/TRAINING PROGRAM

## 2025-05-10 PROCEDURE — 76380 CAT SCAN FOLLOW-UP STUDY: CPT | Mod: 26 | Performed by: RADIOLOGY

## 2025-05-10 PROCEDURE — 92507 TX SP LANG VOICE COMM INDIV: CPT | Mod: GN

## 2025-05-10 PROCEDURE — 258N000001 HC RX 258

## 2025-05-10 PROCEDURE — 82330 ASSAY OF CALCIUM: CPT

## 2025-05-10 PROCEDURE — P9012 CRYOPRECIPITATE EACH UNIT: HCPCS

## 2025-05-10 PROCEDURE — 85396 CLOTTING ASSAY WHOLE BLOOD: CPT | Performed by: STUDENT IN AN ORGANIZED HEALTH CARE EDUCATION/TRAINING PROGRAM

## 2025-05-10 PROCEDURE — 84132 ASSAY OF SERUM POTASSIUM: CPT | Performed by: CLINICAL NURSE SPECIALIST

## 2025-05-10 PROCEDURE — 250N000011 HC RX IP 250 OP 636: Performed by: SURGERY

## 2025-05-10 PROCEDURE — 85014 HEMATOCRIT: CPT

## 2025-05-10 PROCEDURE — 99418 PROLNG IP/OBS E/M EA 15 MIN: CPT | Performed by: STUDENT IN AN ORGANIZED HEALTH CARE EDUCATION/TRAINING PROGRAM

## 2025-05-10 PROCEDURE — 74174 CTA ABD&PLVS W/CONTRAST: CPT | Mod: 26 | Performed by: RADIOLOGY

## 2025-05-10 PROCEDURE — 250N000011 HC RX IP 250 OP 636: Mod: JZ | Performed by: CLINICAL NURSE SPECIALIST

## 2025-05-10 PROCEDURE — 75726 ARTERY X-RAYS ABDOMEN: CPT

## 2025-05-10 PROCEDURE — P9059 PLASMA, FRZ BETWEEN 8-24HOUR: HCPCS

## 2025-05-10 PROCEDURE — 82330 ASSAY OF CALCIUM: CPT | Performed by: CLINICAL NURSE SPECIALIST

## 2025-05-10 PROCEDURE — 84100 ASSAY OF PHOSPHORUS: CPT

## 2025-05-10 PROCEDURE — 272N000143 HC KIT CR3

## 2025-05-10 PROCEDURE — 80069 RENAL FUNCTION PANEL: CPT | Performed by: CLINICAL NURSE SPECIALIST

## 2025-05-10 PROCEDURE — 999N000248 HC STATISTIC IV INSERT WITH US BY RN

## 2025-05-10 PROCEDURE — P9016 RBC LEUKOCYTES REDUCED: HCPCS

## 2025-05-10 PROCEDURE — 85730 THROMBOPLASTIN TIME PARTIAL: CPT

## 2025-05-10 PROCEDURE — 84460 ALANINE AMINO (ALT) (SGPT): CPT | Performed by: CLINICAL NURSE SPECIALIST

## 2025-05-10 PROCEDURE — 250N000009 HC RX 250: Performed by: STUDENT IN AN ORGANIZED HEALTH CARE EDUCATION/TRAINING PROGRAM

## 2025-05-10 PROCEDURE — 76937 US GUIDE VASCULAR ACCESS: CPT | Mod: 26 | Performed by: RADIOLOGY

## 2025-05-10 PROCEDURE — 85027 COMPLETE CBC AUTOMATED: CPT | Performed by: STUDENT IN AN ORGANIZED HEALTH CARE EDUCATION/TRAINING PROGRAM

## 2025-05-10 PROCEDURE — 250N000012 HC RX MED GY IP 250 OP 636 PS 637

## 2025-05-10 PROCEDURE — 74174 CTA ABD&PLVS W/CONTRAST: CPT

## 2025-05-10 PROCEDURE — 84207 ASSAY OF VITAMIN B-6: CPT

## 2025-05-10 PROCEDURE — 36247 INS CATH ABD/L-EXT ART 3RD: CPT | Mod: GC | Performed by: RADIOLOGY

## 2025-05-10 PROCEDURE — 272N000566 HC SHEATH CR3

## 2025-05-10 PROCEDURE — 76937 US GUIDE VASCULAR ACCESS: CPT

## 2025-05-10 PROCEDURE — 36247 INS CATH ABD/L-EXT ART 3RD: CPT

## 2025-05-10 PROCEDURE — 86140 C-REACTIVE PROTEIN: CPT

## 2025-05-10 PROCEDURE — 37244 VASC EMBOLIZE/OCCLUDE BLEED: CPT | Mod: GC | Performed by: RADIOLOGY

## 2025-05-10 PROCEDURE — 84425 ASSAY OF VITAMIN B-1: CPT

## 2025-05-10 PROCEDURE — 80051 ELECTROLYTE PANEL: CPT

## 2025-05-10 PROCEDURE — 85018 HEMOGLOBIN: CPT

## 2025-05-10 PROCEDURE — 999N000215 HC STATISTIC HFNC ADULT NON-CPAP

## 2025-05-10 PROCEDURE — 82248 BILIRUBIN DIRECT: CPT | Performed by: CLINICAL NURSE SPECIALIST

## 2025-05-10 PROCEDURE — 99233 SBSQ HOSP IP/OBS HIGH 50: CPT | Performed by: STUDENT IN AN ORGANIZED HEALTH CARE EDUCATION/TRAINING PROGRAM

## 2025-05-10 PROCEDURE — 85007 BL SMEAR W/DIFF WBC COUNT: CPT | Performed by: INTERNAL MEDICINE

## 2025-05-10 PROCEDURE — 85610 PROTHROMBIN TIME: CPT

## 2025-05-10 PROCEDURE — 75726 ARTERY X-RAYS ABDOMEN: CPT | Mod: 26 | Performed by: RADIOLOGY

## 2025-05-10 PROCEDURE — 999N000127 HC STATISTIC PERIPHERAL IV START W US GUIDANCE

## 2025-05-10 PROCEDURE — 37244 VASC EMBOLIZE/OCCLUDE BLEED: CPT

## 2025-05-10 PROCEDURE — 86901 BLOOD TYPING SEROLOGIC RH(D): CPT

## 2025-05-10 PROCEDURE — 250N000009 HC RX 250

## 2025-05-10 PROCEDURE — 04L13DZ OCCLUSION OF CELIAC ARTERY WITH INTRALUMINAL DEVICE, PERCUTANEOUS APPROACH: ICD-10-PCS | Performed by: RADIOLOGY

## 2025-05-10 PROCEDURE — 92597 ORAL SPEECH DEVICE EVAL: CPT | Mod: GN

## 2025-05-10 PROCEDURE — 75774 ARTERY X-RAY EACH VESSEL: CPT | Mod: 26 | Performed by: RADIOLOGY

## 2025-05-10 PROCEDURE — 82330 ASSAY OF CALCIUM: CPT | Performed by: INTERNAL MEDICINE

## 2025-05-10 PROCEDURE — P9037 PLATE PHERES LEUKOREDU IRRAD: HCPCS

## 2025-05-10 PROCEDURE — 250N000011 HC RX IP 250 OP 636: Mod: JZ | Performed by: STUDENT IN AN ORGANIZED HEALTH CARE EDUCATION/TRAINING PROGRAM

## 2025-05-10 PROCEDURE — 83735 ASSAY OF MAGNESIUM: CPT

## 2025-05-10 PROCEDURE — 36248 INS CATH ABD/L-EXT ART ADDL: CPT | Mod: GC | Performed by: RADIOLOGY

## 2025-05-10 PROCEDURE — P9045 ALBUMIN (HUMAN), 5%, 250 ML: HCPCS

## 2025-05-10 PROCEDURE — 99292 CRITICAL CARE ADDL 30 MIN: CPT | Mod: 24 | Performed by: SURGERY

## 2025-05-10 PROCEDURE — 250N000009 HC RX 250: Mod: JW

## 2025-05-10 PROCEDURE — 999N000157 HC STATISTIC RCP TIME EA 10 MIN

## 2025-05-10 PROCEDURE — 84100 ASSAY OF PHOSPHORUS: CPT | Performed by: CLINICAL NURSE SPECIALIST

## 2025-05-10 PROCEDURE — 272N000504 HC NEEDLE CR4

## 2025-05-10 PROCEDURE — 85027 COMPLETE CBC AUTOMATED: CPT | Performed by: INTERNAL MEDICINE

## 2025-05-10 PROCEDURE — 85041 AUTOMATED RBC COUNT: CPT

## 2025-05-10 PROCEDURE — 84075 ASSAY ALKALINE PHOSPHATASE: CPT | Performed by: CLINICAL NURSE SPECIALIST

## 2025-05-10 PROCEDURE — 99291 CRITICAL CARE FIRST HOUR: CPT | Mod: 24 | Performed by: SURGERY

## 2025-05-10 PROCEDURE — 250N000013 HC RX MED GY IP 250 OP 250 PS 637: Performed by: SURGERY

## 2025-05-10 RX ORDER — NALOXONE HYDROCHLORIDE 0.4 MG/ML
0.4 INJECTION, SOLUTION INTRAMUSCULAR; INTRAVENOUS; SUBCUTANEOUS
Status: DISCONTINUED | OUTPATIENT
Start: 2025-05-10 | End: 2025-05-10

## 2025-05-10 RX ORDER — PROPOFOL 10 MG/ML
5-75 INJECTION, EMULSION INTRAVENOUS CONTINUOUS
Status: DISCONTINUED | OUTPATIENT
Start: 2025-05-10 | End: 2025-05-11

## 2025-05-10 RX ORDER — CALCIUM GLUCONATE 20 MG/ML
1 INJECTION, SOLUTION INTRAVENOUS ONCE
Status: COMPLETED | OUTPATIENT
Start: 2025-05-10 | End: 2025-05-10

## 2025-05-10 RX ORDER — TRANEXAMIC ACID 10 MG/ML
1 INJECTION, SOLUTION INTRAVENOUS ONCE
Status: COMPLETED | OUTPATIENT
Start: 2025-05-10 | End: 2025-05-10

## 2025-05-10 RX ORDER — POTASSIUM CHLORIDE 29.8 MG/ML
20 INJECTION INTRAVENOUS EVERY 8 HOURS PRN
Status: DISCONTINUED | OUTPATIENT
Start: 2025-05-10 | End: 2025-05-11

## 2025-05-10 RX ORDER — CALCIUM GLUCONATE 98 MG/ML
1 INJECTION, SOLUTION INTRAVENOUS ONCE
Status: DISCONTINUED | OUTPATIENT
Start: 2025-05-10 | End: 2025-05-10

## 2025-05-10 RX ORDER — HYDROMORPHONE HCL IN WATER/PF 6 MG/30 ML
0.2 PATIENT CONTROLLED ANALGESIA SYRINGE INTRAVENOUS
Status: DISCONTINUED | OUTPATIENT
Start: 2025-05-10 | End: 2025-05-12

## 2025-05-10 RX ORDER — FENTANYL CITRATE 50 UG/ML
25-50 INJECTION, SOLUTION INTRAMUSCULAR; INTRAVENOUS EVERY 5 MIN PRN
Refills: 0 | Status: DISCONTINUED | OUTPATIENT
Start: 2025-05-10 | End: 2025-05-10

## 2025-05-10 RX ORDER — CALCIUM GLUCONATE 20 MG/ML
2 INJECTION, SOLUTION INTRAVENOUS ONCE
Status: COMPLETED | OUTPATIENT
Start: 2025-05-10 | End: 2025-05-10

## 2025-05-10 RX ORDER — NALOXONE HYDROCHLORIDE 0.4 MG/ML
0.2 INJECTION, SOLUTION INTRAMUSCULAR; INTRAVENOUS; SUBCUTANEOUS
Status: DISCONTINUED | OUTPATIENT
Start: 2025-05-10 | End: 2025-05-10

## 2025-05-10 RX ORDER — CALCIUM CHLORIDE, MAGNESIUM CHLORIDE, DEXTROSE MONOHYDRATE, LACTIC ACID, SODIUM CHLORIDE, SODIUM BICARBONATE AND POTASSIUM CHLORIDE 5.15; 2.03; 22; 5.4; 6.46; 3.09; .157 G/L; G/L; G/L; G/L; G/L; G/L; G/L
INJECTION INTRAVENOUS CONTINUOUS
Status: DISCONTINUED | OUTPATIENT
Start: 2025-05-10 | End: 2025-05-11

## 2025-05-10 RX ORDER — IOPAMIDOL 755 MG/ML
110 INJECTION, SOLUTION INTRAVASCULAR ONCE
Status: COMPLETED | OUTPATIENT
Start: 2025-05-10 | End: 2025-05-10

## 2025-05-10 RX ORDER — MIDAZOLAM HCL IN 0.9 % NACL/PF 1 MG/ML
1-6 PLASTIC BAG, INJECTION (ML) INTRAVENOUS CONTINUOUS
Status: DISCONTINUED | OUTPATIENT
Start: 2025-05-10 | End: 2025-05-11

## 2025-05-10 RX ORDER — PROPOFOL 10 MG/ML
INJECTION, EMULSION INTRAVENOUS
Status: COMPLETED
Start: 2025-05-10 | End: 2025-05-10

## 2025-05-10 RX ORDER — MAGNESIUM SULFATE HEPTAHYDRATE 40 MG/ML
2 INJECTION, SOLUTION INTRAVENOUS EVERY 8 HOURS PRN
Status: DISCONTINUED | OUTPATIENT
Start: 2025-05-10 | End: 2025-05-11

## 2025-05-10 RX ORDER — FLUMAZENIL 0.1 MG/ML
0.2 INJECTION, SOLUTION INTRAVENOUS
Status: DISCONTINUED | OUTPATIENT
Start: 2025-05-10 | End: 2025-05-10

## 2025-05-10 RX ORDER — NICOTINE POLACRILEX 4 MG
15-30 LOZENGE BUCCAL
Status: DISCONTINUED | OUTPATIENT
Start: 2025-05-10 | End: 2025-05-10

## 2025-05-10 RX ORDER — DEXTROSE MONOHYDRATE 25 G/50ML
25-50 INJECTION, SOLUTION INTRAVENOUS
Status: DISCONTINUED | OUTPATIENT
Start: 2025-05-10 | End: 2025-05-10

## 2025-05-10 RX ORDER — IODIXANOL 320 MG/ML
150 INJECTION, SOLUTION INTRAVASCULAR ONCE
Status: COMPLETED | OUTPATIENT
Start: 2025-05-10 | End: 2025-05-10

## 2025-05-10 RX ORDER — CALCIUM CHLORIDE, MAGNESIUM CHLORIDE, DEXTROSE MONOHYDRATE, LACTIC ACID, SODIUM CHLORIDE, SODIUM BICARBONATE AND POTASSIUM CHLORIDE 5.15; 2.03; 22; 5.4; 6.46; 3.09; .157 G/L; G/L; G/L; G/L; G/L; G/L; G/L
12.5 INJECTION INTRAVENOUS CONTINUOUS
Status: DISCONTINUED | OUTPATIENT
Start: 2025-05-10 | End: 2025-05-11

## 2025-05-10 RX ORDER — TRANEXAMIC ACID 10 MG/ML
1 INJECTION, SOLUTION INTRAVENOUS ONCE
Status: DISCONTINUED | OUTPATIENT
Start: 2025-05-10 | End: 2025-05-10

## 2025-05-10 RX ORDER — TRANEXAMIC ACID 10 MG/ML
125 INJECTION, SOLUTION INTRAVENOUS CONTINUOUS
Status: DISCONTINUED | OUTPATIENT
Start: 2025-05-10 | End: 2025-05-11

## 2025-05-10 RX ORDER — CALCIUM GLUCONATE 20 MG/ML
2 INJECTION, SOLUTION INTRAVENOUS EVERY 8 HOURS PRN
Status: DISCONTINUED | OUTPATIENT
Start: 2025-05-10 | End: 2025-05-11

## 2025-05-10 RX ORDER — VECURONIUM BROMIDE 1 MG/ML
10 INJECTION, POWDER, LYOPHILIZED, FOR SOLUTION INTRAVENOUS ONCE
Status: COMPLETED | OUTPATIENT
Start: 2025-05-10 | End: 2025-05-10

## 2025-05-10 RX ORDER — DEXTROSE MONOHYDRATE 25 G/50ML
25 INJECTION, SOLUTION INTRAVENOUS ONCE
Status: COMPLETED | OUTPATIENT
Start: 2025-05-10 | End: 2025-05-10

## 2025-05-10 RX ORDER — HYDROMORPHONE HCL IN WATER/PF 6 MG/30 ML
0.4 PATIENT CONTROLLED ANALGESIA SYRINGE INTRAVENOUS
Status: DISCONTINUED | OUTPATIENT
Start: 2025-05-10 | End: 2025-05-12

## 2025-05-10 RX ADMIN — FENTANYL CITRATE 50 MCG: 50 INJECTION, SOLUTION INTRAMUSCULAR; INTRAVENOUS at 18:41

## 2025-05-10 RX ADMIN — MIDAZOLAM 1 MG: 1 INJECTION INTRAMUSCULAR; INTRAVENOUS at 17:49

## 2025-05-10 RX ADMIN — CALCIUM GLUCONATE 2 G: 20 INJECTION, SOLUTION INTRAVENOUS at 16:41

## 2025-05-10 RX ADMIN — PIPERACILLIN AND TAZOBACTAM 4.5 G: 4; .5 INJECTION, POWDER, LYOPHILIZED, FOR SOLUTION INTRAVENOUS at 03:04

## 2025-05-10 RX ADMIN — MIDAZOLAM HYDROCHLORIDE 1 MG/HR: 1 INJECTION, SOLUTION INTRAVENOUS at 20:16

## 2025-05-10 RX ADMIN — DEXMEDETOMIDINE HYDROCHLORIDE 1.2 MCG/KG/HR: 4 INJECTION, SOLUTION INTRAVENOUS at 03:04

## 2025-05-10 RX ADMIN — CALCIUM CHLORIDE, MAGNESIUM CHLORIDE, SODIUM CHLORIDE, SODIUM BICARBONATE, POTASSIUM CHLORIDE AND SODIUM PHOSPHATE DIBASIC DIHYDRATE 12.5 ML/KG/HR: 3.68; 3.05; 6.34; 3.09; .314; .187 INJECTION INTRAVENOUS at 14:48

## 2025-05-10 RX ADMIN — METHOCARBAMOL 500 MG: 500 TABLET ORAL at 11:35

## 2025-05-10 RX ADMIN — ALBUMIN HUMAN 50 G: 0.05 INJECTION, SOLUTION INTRAVENOUS at 14:03

## 2025-05-10 RX ADMIN — Medication 500 MG: at 08:03

## 2025-05-10 RX ADMIN — HYDROMORPHONE HYDROCHLORIDE 0.4 MG: 0.2 INJECTION, SOLUTION INTRAMUSCULAR; INTRAVENOUS; SUBCUTANEOUS at 14:29

## 2025-05-10 RX ADMIN — PROPOFOL 20 MCG/KG/MIN: 10 INJECTION, EMULSION INTRAVENOUS at 20:51

## 2025-05-10 RX ADMIN — FENTANYL CITRATE 50 MCG: 50 INJECTION, SOLUTION INTRAMUSCULAR; INTRAVENOUS at 18:24

## 2025-05-10 RX ADMIN — FENTANYL CITRATE 50 MCG: 50 INJECTION, SOLUTION INTRAMUSCULAR; INTRAVENOUS at 17:59

## 2025-05-10 RX ADMIN — OXYCODONE HYDROCHLORIDE 15 MG: 10 TABLET ORAL at 20:19

## 2025-05-10 RX ADMIN — Medication 500 MG: at 21:48

## 2025-05-10 RX ADMIN — SENNOSIDES AND DOCUSATE SODIUM 2 TABLET: 50; 8.6 TABLET ORAL at 08:00

## 2025-05-10 RX ADMIN — IOPAMIDOL 110 ML: 755 INJECTION, SOLUTION INTRAVENOUS at 15:28

## 2025-05-10 RX ADMIN — MIDAZOLAM 1 MG: 1 INJECTION INTRAMUSCULAR; INTRAVENOUS at 18:23

## 2025-05-10 RX ADMIN — METHOCARBAMOL 500 MG: 500 TABLET ORAL at 20:19

## 2025-05-10 RX ADMIN — CALCIUM GLUCONATE 2 G: 20 INJECTION, SOLUTION INTRAVENOUS at 03:50

## 2025-05-10 RX ADMIN — CUPRIC CHLORIDE 2 MG: 0.4 INJECTION, SOLUTION INTRAVENOUS at 11:33

## 2025-05-10 RX ADMIN — FOLIC ACID 1 MG: 1 TABLET ORAL at 08:00

## 2025-05-10 RX ADMIN — FENTANYL CITRATE 25 MCG: 50 INJECTION, SOLUTION INTRAMUSCULAR; INTRAVENOUS at 19:09

## 2025-05-10 RX ADMIN — SODIUM CHLORIDE, SODIUM LACTATE, POTASSIUM CHLORIDE, AND CALCIUM CHLORIDE 1000 ML: .6; .31; .03; .02 INJECTION, SOLUTION INTRAVENOUS at 14:23

## 2025-05-10 RX ADMIN — NOREPINEPHRINE BITARTRATE 0.05 MCG/KG/MIN: 0.06 INJECTION, SOLUTION INTRAVENOUS at 09:55

## 2025-05-10 RX ADMIN — OXYCODONE HYDROCHLORIDE 10 MG: 10 TABLET ORAL at 07:59

## 2025-05-10 RX ADMIN — MIDAZOLAM 1 MG: 1 INJECTION INTRAMUSCULAR; INTRAVENOUS at 17:59

## 2025-05-10 RX ADMIN — POLYETHYLENE GLYCOL 3350 17 G: 17 POWDER, FOR SOLUTION ORAL at 08:00

## 2025-05-10 RX ADMIN — FENTANYL CITRATE 25 MCG: 50 INJECTION, SOLUTION INTRAMUSCULAR; INTRAVENOUS at 18:46

## 2025-05-10 RX ADMIN — CALCIUM CHLORIDE, MAGNESIUM CHLORIDE, SODIUM CHLORIDE, SODIUM BICARBONATE, POTASSIUM CHLORIDE AND SODIUM PHOSPHATE DIBASIC DIHYDRATE 12.5 ML/KG/HR: 3.68; 3.05; 6.34; 3.09; .314; .187 INJECTION INTRAVENOUS at 09:17

## 2025-05-10 RX ADMIN — PIPERACILLIN AND TAZOBACTAM 4.5 G: 4; .5 INJECTION, POWDER, LYOPHILIZED, FOR SOLUTION INTRAVENOUS at 10:06

## 2025-05-10 RX ADMIN — CALCIUM CHLORIDE, MAGNESIUM CHLORIDE, SODIUM CHLORIDE, SODIUM BICARBONATE, POTASSIUM CHLORIDE AND SODIUM PHOSPHATE DIBASIC DIHYDRATE 12.5 ML/KG/HR: 3.68; 3.05; 6.34; 3.09; .314; .187 INJECTION INTRAVENOUS at 14:49

## 2025-05-10 RX ADMIN — FENTANYL CITRATE 50 MCG: 50 INJECTION, SOLUTION INTRAMUSCULAR; INTRAVENOUS at 18:17

## 2025-05-10 RX ADMIN — MIDAZOLAM 2 MG: 1 INJECTION INTRAMUSCULAR; INTRAVENOUS at 20:07

## 2025-05-10 RX ADMIN — VECURONIUM BROMIDE 10 MG: 10 INJECTION, POWDER, LYOPHILIZED, FOR SOLUTION INTRAVENOUS at 21:16

## 2025-05-10 RX ADMIN — PIPERACILLIN AND TAZOBACTAM 4.5 G: 4; .5 INJECTION, POWDER, LYOPHILIZED, FOR SOLUTION INTRAVENOUS at 21:48

## 2025-05-10 RX ADMIN — CISATRACURIUM BESYLATE 3 MCG/KG/MIN: 200 INJECTION, SOLUTION INTRAVENOUS at 22:01

## 2025-05-10 RX ADMIN — CALCIUM CHLORIDE, MAGNESIUM CHLORIDE, DEXTROSE MONOHYDRATE, LACTIC ACID, SODIUM CHLORIDE, SODIUM BICARBONATE AND POTASSIUM CHLORIDE 12.5 ML/KG/HR: 5.15; 2.03; 22; 5.4; 6.46; 3.09; .157 INJECTION INTRAVENOUS at 20:27

## 2025-05-10 RX ADMIN — SODIUM CHLORIDE 7 UNITS: 0.9 INJECTION, SOLUTION INTRAVENOUS at 20:07

## 2025-05-10 RX ADMIN — OXYCODONE HYDROCHLORIDE 15 MG: 10 TABLET ORAL at 15:48

## 2025-05-10 RX ADMIN — PIPERACILLIN AND TAZOBACTAM 4.5 G: 4; .5 INJECTION, POWDER, LYOPHILIZED, FOR SOLUTION INTRAVENOUS at 15:49

## 2025-05-10 RX ADMIN — DEXTROSE 300 ML: 10 SOLUTION INTRAVENOUS at 20:17

## 2025-05-10 RX ADMIN — CALCIUM CHLORIDE 2 G: 100 INJECTION, SOLUTION INTRAVENOUS at 16:52

## 2025-05-10 RX ADMIN — IODIXANOL 480 ML: 320 INJECTION, SOLUTION INTRAVASCULAR at 19:32

## 2025-05-10 RX ADMIN — ACETAMINOPHEN 975 MG: 325 TABLET ORAL at 01:00

## 2025-05-10 RX ADMIN — LIDOCAINE HYDROCHLORIDE 10 ML: 10 INJECTION, SOLUTION EPIDURAL; INFILTRATION; INTRACAUDAL; PERINEURAL at 18:03

## 2025-05-10 RX ADMIN — MIDAZOLAM 1 MG: 1 INJECTION INTRAMUSCULAR; INTRAVENOUS at 16:54

## 2025-05-10 RX ADMIN — DEXTROSE MONOHYDRATE 25 G: 25 INJECTION, SOLUTION INTRAVENOUS at 20:06

## 2025-05-10 RX ADMIN — FENTANYL CITRATE 50 MCG: 50 INJECTION, SOLUTION INTRAMUSCULAR; INTRAVENOUS at 16:54

## 2025-05-10 RX ADMIN — MIDAZOLAM 1 MG: 1 INJECTION INTRAMUSCULAR; INTRAVENOUS at 18:17

## 2025-05-10 RX ADMIN — THIAMINE HCL TAB 100 MG 100 MG: 100 TAB at 08:00

## 2025-05-10 RX ADMIN — TRANEXAMIC ACID 1 G: 10 INJECTION, SOLUTION INTRAVENOUS at 14:40

## 2025-05-10 RX ADMIN — ACETAMINOPHEN 975 MG: 325 TABLET ORAL at 10:28

## 2025-05-10 RX ADMIN — VASOPRESSIN 2.4 UNITS/HR: 20 INJECTION, SOLUTION INTRAMUSCULAR; SUBCUTANEOUS at 13:54

## 2025-05-10 RX ADMIN — VASOPRESSIN 2.4 UNITS/HR: 20 INJECTION, SOLUTION INTRAMUSCULAR; SUBCUTANEOUS at 18:57

## 2025-05-10 RX ADMIN — CALCIUM CHLORIDE, MAGNESIUM CHLORIDE, DEXTROSE MONOHYDRATE, LACTIC ACID, SODIUM CHLORIDE, SODIUM BICARBONATE AND POTASSIUM CHLORIDE: 5.15; 2.03; 22; 5.4; 6.46; 3.09; .157 INJECTION INTRAVENOUS at 20:27

## 2025-05-10 RX ADMIN — METHOCARBAMOL 500 MG: 500 TABLET ORAL at 15:48

## 2025-05-10 RX ADMIN — ALBUMIN HUMAN 50 G: 0.05 INJECTION, SOLUTION INTRAVENOUS at 12:42

## 2025-05-10 RX ADMIN — CLONIDINE HYDROCHLORIDE 0.1 MG: 0.1 TABLET ORAL at 01:00

## 2025-05-10 RX ADMIN — MIDAZOLAM 0.5 MG: 1 INJECTION INTRAMUSCULAR; INTRAVENOUS at 18:46

## 2025-05-10 RX ADMIN — TRANEXAMIC ACID 125 MG/HR: 10 INJECTION, SOLUTION INTRAVENOUS at 15:06

## 2025-05-10 RX ADMIN — MIDAZOLAM 0.5 MG: 1 INJECTION INTRAMUSCULAR; INTRAVENOUS at 19:08

## 2025-05-10 RX ADMIN — Medication 40 MG: at 07:59

## 2025-05-10 RX ADMIN — CALCIUM CHLORIDE, MAGNESIUM CHLORIDE, SODIUM CHLORIDE, SODIUM BICARBONATE, POTASSIUM CHLORIDE AND SODIUM PHOSPHATE DIBASIC DIHYDRATE 12.5 ML/KG/HR: 3.68; 3.05; 6.34; 3.09; .314; .187 INJECTION INTRAVENOUS at 03:39

## 2025-05-10 RX ADMIN — Medication 1 TABLET: at 08:00

## 2025-05-10 RX ADMIN — FENTANYL CITRATE 50 MCG: 50 INJECTION, SOLUTION INTRAMUSCULAR; INTRAVENOUS at 17:49

## 2025-05-10 RX ADMIN — FENTANYL CITRATE 50 MCG: 50 INJECTION, SOLUTION INTRAMUSCULAR; INTRAVENOUS at 16:32

## 2025-05-10 RX ADMIN — DEXMEDETOMIDINE HYDROCHLORIDE 1.1 MCG/KG/HR: 4 INJECTION, SOLUTION INTRAVENOUS at 11:25

## 2025-05-10 RX ADMIN — MIDAZOLAM 1 MG: 1 INJECTION INTRAMUSCULAR; INTRAVENOUS at 16:32

## 2025-05-10 RX ADMIN — METHOCARBAMOL 500 MG: 500 TABLET ORAL at 08:00

## 2025-05-10 RX ADMIN — HYDROMORPHONE HYDROCHLORIDE 0.2 MG: 0.2 INJECTION, SOLUTION INTRAMUSCULAR; INTRAVENOUS; SUBCUTANEOUS at 11:19

## 2025-05-10 RX ADMIN — CLONIDINE HYDROCHLORIDE 0.1 MG: 0.1 TABLET ORAL at 08:00

## 2025-05-10 RX ADMIN — CALCIUM GLUCONATE 2 G: 20 INJECTION, SOLUTION INTRAVENOUS at 20:04

## 2025-05-10 RX ADMIN — MIDAZOLAM 1 MG: 1 INJECTION INTRAMUSCULAR; INTRAVENOUS at 18:41

## 2025-05-10 RX ADMIN — DEXMEDETOMIDINE HYDROCHLORIDE 1.2 MCG/KG/HR: 4 INJECTION, SOLUTION INTRAVENOUS at 06:54

## 2025-05-10 ASSESSMENT — ACTIVITIES OF DAILY LIVING (ADL)
ADLS_ACUITY_SCORE: 54

## 2025-05-10 NOTE — PROGRESS NOTES
Surgery Progress Note  05/10/2025       Subjective:  Yesterday afternoon hemoglobin decreased, reaching a low of 6.7. S/p three units of blood between 1pm and 6am, hemoglobin 8.9 this AM. Tolerating trach dome. Alert, responds to voice and follows commands. Tube feeds resumed since 5/7. Drains remain in place, output increased in LLQ, decreased in LUQ (pancreas): others stable.     Objective:  Temp:  [98.1  F (36.7  C)-100.9  F (38.3  C)] 100.9  F (38.3  C)  Pulse:  [] 98  Resp:  [12-34] 20  BP: (113)/(57) 113/57  MAP:  [59 mmHg-94 mmHg] 69 mmHg  Arterial Line BP: ()/(34-74) 110/53  FiO2 (%):  [30 %] 30 %  SpO2:  [94 %-100 %] 100 %    I/O last 3 completed shifts:  In: 5012.08 [I.V.:1307.41; Other:5; NG/GT:755]  Out: 3026 [Drains:2680; Other:346]      Gen: Alert, nods/gives thumbs up appropriately to questions  Resp: Ventilated, trach  Abd: Abdomen soft, tenders to palpation. Wound dressings in place. SHANNON drains with stable sanguineous output in bulbs. [place] drain with stool in bag.    Ext: WWP, no edema     Labs:  Recent Labs   Lab 05/10/25  0320 05/09/25  2235 05/09/25  1622 05/09/25  1233 05/09/25  0412   WBC 10.9  --  13.8*  --  14.1*   HGB 8.9* 7.4* 7.2*   < > 8.2*     --  180  --  215    < > = values in this interval not displayed.       Recent Labs   Lab 05/10/25  0326 05/10/25  0320 05/09/25  2012 05/09/25  1622 05/09/25  0425 05/09/25  0412   NA  --  137  --  135  --  136   POTASSIUM  --  4.8  --  4.3  --  4.1   CHLORIDE  --  106  --  104  --  104   CO2  --  19*  --  22  --  21*   BUN  --  45.9*  --  46.3*  --  44.3*   CR  --  1.27*  --  1.15  --  1.17   * 172* 160* 294*   < > 185*   KARINA  --  7.5*  --  7.1*  --  7.7*   MAG  --  2.5*  --  2.5*  --  2.6*   PHOS  --  3.8  --  3.2  --  2.7    < > = values in this interval not displayed.       Imaging:  CTAP [facility] 04/27:    Impression    1.  Worsening sequela of necrotizing pancreatitis. Dominant peripancreatic collection has increased in  size, now measuring 19 x 13 cm, previously 17 x 11 cm; increasing gas within this collection is worrisome for infection. Large volume ascites and fat necrosis elsewhere throughout the abdomen and pelvis has also increased from prior.  2.  New presumed blood products in the dominant peripancreatic collection, as well as layering within the left paracolic gutter, suspicious for interval bleeding. Recommend trending hemoglobin levels; CTA abdomen and pelvis could be considered if there is concern for active bleeding.  3.  Mild small bowel wall thickening, possibly reactive or sequela of enteritis.  4.  Similar right lower lobe consolidation.     Assessment/Plan:   Sebastián Rodas is a 29-year-old male with a history of alcohol use disorder, anxiety, and bipolar disorder who was admitted on 3/30/2025 for alcohol withdrawal following a recent binge, presenting with diffuse abdominal pain. Initial workup revealed leukocytosis and elevated lipase concerning for acute pancreatitis. He developed acute encephalopathy and was transferred to the ICU on 3/31, requiring intubation and vasopressors by 4/1 due to shock. Hospital course has been complicated by acute renal failure requiring CRRT, cardiomyopathy (initial LVEF 20-25%, improved to 65-70% by 4/14), and necrotizing pancreatitis with unorganized fluid collections. He completed a 14-day course of meropenem but remains intermittently febrile and critically ill, requiring ongoing dialysis and vasopressor support. On 4/27, after clinical deterioration and imaging consistent with a likely perforated viscus, following family discussion, he underwent emergent exploratory laparotomy, necrosectomy, transverse colectomy, abdominal washout, and drain placement. Patient was transferred to [facility] on 4/30/25 for further surgical management. Went to OR 5/1 for re-open laparotomy, I&D, placement of colostomy tube in presumed previous colectomy staple line, necrosectomy, and Abthera  "placement. On 5/2, increasing sanguineous output from drains concerning for bleeding from pancreatic bed. Family care conference held 5/2, plan for restorative cares at that time. Take back to OR twice (5/4 and 5/7) for abdominal washout. Per nursing note: 5/9 morning after he saw a picture of his abdomen that he \"desires to speak to team about updating goals of care, expressed desire to pull back on cares\". Goals of care conference scheduled for today with family, palliative care, SICU, and EGS.    Plan:  - Surgery team will change dressings during AM rounds, nursing to change in the pm and PRN during the day if copious drainage during the day.  - Plan for goals of care discussion per patient request today.  - Agree with TF plan per nutrition.  - Appreciate WOC cares  - Per US imaging report, would favor not starting anticoagulants given the high risk of bleeding.  - Other cares per SICU, we appreciate excellent cares.     - - - - - - - - - - - - - - - - - -  Seen, examined, and discussed with chief resident, who will discuss with staff.     Anish Dickson  Medical Student, [employer] Class of 2027  Emergency General Surgery Service    Resident Attestation   I, Mireya López DO, was present with the medical student who participated in the service and in the documentation of the note. I have verified the history and personally performed the physical exam and medical decision making. I agree with the assessment and plan of care as documented in the note and have edited where appropriate.      Mireya López DO  General Surgery PGY-2  Date of Service: 05/10/2025    "

## 2025-05-10 NOTE — PROGRESS NOTES
05/10/25 0690   Appointment Info   Signing Clinician's Name / Credentials (SLP) Babs Carter MA CCC-SLP   General Information   Onset of Illness/Injury or Date of Surgery 04/30/25   Referring Physician Durga Euceda MD   Patient/Family Therapy Goal Statement (SLP) None stated   Pertinent History of Current Problem Pt is a 29M with alcohol abuse, anxiety, and bipolar disorder, who was admitted 3/30/25 for alcohol withdrawal and abdominal pain. Workup showed leukocytosis and elevated lipase, consistent with acute pancreatitis. He developed encephalopathy and shock, requiring ICU care, intubation, vasopressors, and CRRT by 4/1. His course was complicated by cardiomyopathy (EF 20-25%, improved to 65-70% by 4/14), necrotizing pancreatitis, and persistent infection despite 14 days of meropenem. On 4/27, imaging showed likely perforated viscus; he underwent emergent laparotomy, necrosectomy, and colectomy. Transferred to [facility] SICU on 4/30. On 5/1, reoperation revealed colonic staple line breakdown, necrotic pancreas, vessel thrombosis, and adhesions. Colostomy with malankot drain and temporary abdominal closure performed. Prognosis is poor; palliative care involved. Care conference 5/2 with family to talk goals of care. Full code, family acknowledged that poor prognosis is to be expected. 5/4 s/p exploratory surgery, additional necrotic pancreas remove with no obvious spillage or sign of bleeding at the time. Went back to the OR 5/7 for abdominal wash out.  Progressing from respiratory standpoint   General Observations Pt positioned upright in bed, awake, and follwoing commands, but w/ diffuse atrophy and significant weakness   Type of Evaluation   Type of Evaluation Artificial Airway (Speaking Valve)   Tracheostomy Assessment (Speaking Valve)   Date of Tracheostomy 04/18/25  (at OSH)   Type, Tracheostomy Tube Juan Carlos  (XLT proximal)   Tube Size, Tracheostomy 6.0   Cuff, Tracheostomy Tube cuffed, deflated    Participation Ability (Speaking Valve) awake/alert;attempts to communicate, mouthing words;follows simple commands   Respiratory Status (Speaking Valve)   Oxygen Supply Trach Dome  (40L 30%)   Oral/Tracheal Secretions (Speaking Valve)   Tracheal Secretions (Speaking Valve Assessment) minimal secretions  (RN performed suctioning)   Speaking Valve Trials (Speaking Valve)   Cuff Inflated at Onset of Evaluation No   Orders received to deflate cuff for PMSV trial Yes   Airflow/Phonation Unable to phonate with occlusion of trach   Speaking Valve placed on tracheostomy tube   Oxygen saturation with PMSV placement 94 %   Respiratory Rate with PMSV placement 20 Per Minute   Breath Support (Speaking Valve Trial) exhales through mouth   Voice Production (Speaking Valve Trial) poor strength/quality;voicing not achieved   Cough Production (Speaking Valve Trial) weak;weak voice produced   Secretions During Valve Use (Speaking Valve Trial) secretions stable during valve use   Outcome of Trial (Speaking Valve) no change from baseline   Total amount of time with PMSV placement: 15   Recommendations (Speaking Valve Trials) speaking valve use recommended   General Therapy Interventions   Planned Therapy Interventions Communication   Communication Speaking valve instruction   Clinical Impression   Criteria for Skilled Therapeutic Interventions Met (SLP Eval) Yes, treatment indicated   SLP Diagnosis aphonia 2/2 trach   Risks & Benefits of therapy have been explained evaluation/treatment results reviewed;care plan/treatment goals reviewed;risks/benefits reviewed;current/potential barriers reviewed;participants voiced agreement with care plan;participants included;patient   Clinical Impression Comments Communication eval completed per MD order. Pt w/ shiley 6.0 proximal XLT in place, on HFTD 30L 40%. Cuff deflated upon arrival. RN provided tracheal suctioning w/ return of minimal secretions. No voicing appreciated w/ finger occlusion,  but pt passing air. SLP placed PMSV in conjunction w/ HFTD, VSS. Despite encouragement for use of diaphragmatic breathing, pt unable to voice. Pt achieved some whispered speech w/ valve in place. Suspect significant weakness is largely related. Recommend ongoing PMSV trials w/ SLP and RN only. Pt OK  for PMSV for short intervals of time (10-15 mins) w/ RN. SLP to follow for speaking valve tolerance.   SLP Total Evaluation Time   Eval: use and/or fitting of voice prosthetic device to supp speech (not aug. comm) Minutes (17958) 20   SLP Discharge Planning   SLP Plan PMSV tolerance   [facility]   SLP Rationale for DC Rec aphonia 2/2 trach   SLP Brief overview of current status  Recommend ongoing PMSV trials w/ SLP and RN only. Pt OK for PMSV for short intervals of time (10-15 mins) w/ RN. SLP to follow for speaking valve tolerance.   SLP Time and Intention   Total Session Time (sum of timed and untimed services) 25

## 2025-05-10 NOTE — PROGRESS NOTES
CRRT STATUS NOTE    DATA:  Time:  6:00 AM  Pressures WNL:  YES  Filter Status:  WDL    Problems Reported/Alarms Noted:  None.    Supplies Present:  YES    ASSESSMENT:  Patient Net Fluid Balance:  Net +1910 ml @ midnight, +119 ml @ 0600.  Vital Signs:  , /63, MAP 82  Labs:  K 4.8, Mg 2.5, Phos 3.8, iCa 4.3, Hgb 8.9, Plt 164  Goals of Therapy:  No UF    INTERVENTIONS:   New circuit.     PLAN:  Continue to monitor circuit daily and change set q72 hours or PRN for clotting/clogging. Please call CRRT RN with any questions/problems.

## 2025-05-10 NOTE — PROGRESS NOTES
SURGICAL ICU PROGRESS NOTE  05/10/2025    Date of Service (when I saw the patient): 05/10/2025    ASSESSMENT:  Sebastián Rodas is a 29M with alcohol abuse, anxiety, and bipolar disorder, who was admitted 3/30/25 for alcohol withdrawal and abdominal pain. Workup showed leukocytosis and elevated lipase, consistent with acute pancreatitis. He developed encephalopathy and shock, requiring ICU care, intubation, vasopressors, and CRRT by 4/1. His course was complicated by cardiomyopathy (EF 20-25%, improved to 65-70% by 4/14), necrotizing pancreatitis, and persistent infection despite 14 days of meropenem. On 4/27, imaging showed likely perforated viscus; he underwent emergent laparotomy, necrosectomy, and colectomy. Transferred to [facility] SICU on 4/30. On 5/1, reoperation revealed colonic staple line breakdown, necrotic pancreas, vessel thrombosis, and adhesions. Colostomy with malankot drain and temporary abdominal closure performed. Prognosis is poor; palliative care involved. Care conference 5/2 with family to talk goals of care. Full code, family acknowledged that poor prognosis is to be expected. 5/4 s/p exploratory surgery, additional necrotic pancreas remove with no obvious spillage or sign of bleeding at the time. Went back to the OR 5/7 for abdominal wash out.  Progressing from respiratory standpoint.     CHANGES and MAJOR THINGS TODAY:  - SQH held due to bleed on CT, monitor HgB  - Increased Lantus to 20U  - Wean dilaudid gtt, Oxycodone 15 mg q4 and PRN dilaudid added  - started on clonidine 0.1mg Q8  - Goals of care conference today, per patient request  - SLP for speaking valve  - TPN stopped, on tube feeds @ goal  - wean sedation as able   - Tolerating trach dome since yesterday morning.  - continue CRRT, FB goal per nephrology (0-50 ml/hr)    PLAN:    Neurological:  # Acute pain   # Encephalopathy  - Monitor neurological status. Delirium preventions and precautions.   # Pain: PO Robaxin, IV dilaudid PRN,  oxycodone 15mg Q4, Tylenol Q8. Wean dilaudid gtt  # Sedation: Precedex gtt 1.2mcg/kg/min     - Clonidine 0.1mg Q8 for anxiety and tachycardia     Pulmonary:   # Acute hypoxic respiratory support  # S/p tracheostomy placement   - FiO2 (%): 30 %, Resp: 20, Vent Mode: (S) CPAP/PS, Resp Rate (Set): 16 breaths/min, Tidal Volume (Set, mL): 420 mL, PEEP (cm H2O): 5 cmH2O, Pressure Support (cm H2O): 5 cmH2O, Resp Rate (Set): 16 breaths/min, Tidal Volume (Set, mL): 420 mL, PEEP (cm H2O): 5 cmH2O   - Tolerating trach dome at 30%/40L  - ENT trach site evaluation 5/5/25 recommending usual trach cares, regular dressing changes, and keeping ties snug.   - Chest Xray 5/6 showed stable right basilar opacities and stable small bilateral pleural effusions.   - Repeat chest x-ray 5/8 showed no major changes.  - CT PE 5/9 showed no evidence of pulmonary embolism    Cardiovascular:    # Cardiomyopathy   - Initial LVEF 20-25%, improved to 65-70%  # Shock-distributive (septic)  - Monitor hemodynamic status. MP goal > 65.   - Attempt fluid removal with UF (see renal below)   - EKG 5/8, 5/9 showed sinus tachycardia with no ectopy     Gastroenterology/Nutrition:  # S/p emergent exploratory laparotomy, necrosectomy, transverse colectomy, abdominal washout, and drain placement 5/1 and 5/4  #Severe necrotizing pancreatitis  # Splenic vein thrombosis  - will defer management of dressings and drain removal per EGS   - on PPI  - U/S 5/09 revealed no visualization of splenic vein.   - CT abdomen 5/9 showed Sequelae of necrotizing pancreatitis with extensive complex peripancreatic fluid fluid/inflammatory changes compatible with hemorrhage. There is a small focus of active bleed along the expected site of the necrosectomy immediately superior to the pancreatic neck. Large volume complex abdominal ascites, complex retroperitoneal fluid and extensive fat necrosis throughout the abdomen/pelvis. Thickened/enhancing peritoneum throughout the  abdomen/pelvis, compatible with peritonitis, with reactive small and large bowel thickening    # Severe Protein calorie deficit malnutrition due to critical illness  - Tube feeds @ goal (60 ml/hr), TPN stopped.   - Copper replacement ordered by nutrition  - RD consult. Appreciate cares and recommendations.     Renal/Fluids/Electrolytes:   #AGMA  - Likely secondary to SARAHI, hypoperfusion, ongoing diarrhea,  now resolved   #Severe hypocalcemia (7.5)  - Calcium given overnight  - 2/2 pancreatitis, bicarb, PTH resistance from hypomagnesemia, and Vit D deficiency     # Acute kidney injury on CCRT  Baseline Cr 0.7-0.8. Presented with Cr 0.96 on 3/30. Rapidly markos to 5.2 on 4/1. Oliguric. Garcia UA with proteinuria, hematuria, pyuria, moderate bacteria.  Kidneys unremarkable on CT. Has severe ATN in the setting of shock, ADHF, intravascular hypovolemia/3rd spacing from pancreatitis.   -CRRT 4/1-4/4.   - IHD with levophed started 4/5 but poor CVC function and hypotension   - Line exchanged 4/10 and again 4/19 - due to poor flow.  - PCAD placed 4/21 by IR.   - CRRT resumed 4/21/25 for fluid overload and assist with more aggressive UF in setting of shock. Back on pressor and CRRT 5/1   - Nephrology consulted, managing CRRT, plan for UF removal 0-50ml/hr     Endocrine:   # Stress hyperglycemia    - hgb A1c 5.3, no hx of DM   - Sliding scale for glucose management. Increased to 20 units Lantus on 5/9  - Goal to keep BG< 180 for optimal wound healing      ID:  # Leukocytosis  # pancreatitis, with infected pancreatitic necrosis   WBC Count   Date Value Ref Range Status   05/10/2025 10.9 4.0 - 11.0 10e3/uL Final     - Completed 14 days course of meropenem and caspofungin.   - Zosyn added 5/2/25--keep until 5/11  - Discontinued micafungin 5/3     cultures:  - 4/30 blood cultures- NGTD   - 5/1 blood cultures ordered - NGTD     Heme:     # Acute blood loss anemia due   # Anemia of critical illness   # Thrombosed splenic vein   -  Transfuse if hgb <7.0 or signs/symptoms of hypoperfusion. Monitor and trend  Hemoglobin   Date Value Ref Range Status   05/10/2025 8.9 (L) 13.3 - 17.7 g/dL Final     - Hemoglobin down to 6.7 on two occasions, transfused 3U on 5/9.  - CT abdomen showed active bleed near the neck of the pancreas.   - SQH held for now, CBC re-check at noon     Musculoskeletal:   # Deconditioning and weakness due to critical illness   - Physical and occupational therapy consult      Skin:  # Pressure Ulcers - Buttocks/rectal Area   - Barrier cream with liberal application. Continue fecal management system   - WOC consult      #Pressure Ulcers- Left Heel  Pressure Injury Location: Left heel   Wound type: Pressure Injury     Pressure Injury Stage: Deep Tissue Pressure Injury (DTPI), present on admission     General Cares/Prophylaxis:    DVT Prophylaxis: SQH  GI Prophylaxis: PPI  Restraints: Restraints for medical healing needed: NO     Lines/ tubes/ drains:  - SHANNON x 4  - Drain 4 - Colostomy drain  - PICC line- left   - A line  - Trach  - HD line--  Right subclavian   - Abthera      Disposition:  - Surgical ICU       Patient seen, findings and plan discussed with surgical ICU staff  Dr. Kelley Harrington, MS4  [employer]  Resident/Fellow Attestation   I, Durga Euceda MD, saw the patient with the medical student and have edited the note to reflect our combined findings. I agree with the history, exam, assessment and plan as outlined above, and have discussed this patient with the staff on service.  Durga Euceda MD, PGY-1    ====================================  INTERVAL HISTORY:  Course reviewed. Events overnight discussed. This AM, patient is sleeping, opens eyes to voice.     OBJECTIVE:   1. VITAL SIGNS:   Temp:  [97.6  F (36.4  C)-100.9  F (38.3  C)] 97.6  F (36.4  C)  Pulse:  [] 112  Resp:  [12-34] 20  BP: (113)/(57) 113/57  MAP:  [59 mmHg-94 mmHg] 72 mmHg  Arterial Line BP:  ()/(34-74) 111/54  FiO2 (%):  [30 %] 30 %  SpO2:  [94 %-100 %] 100 %  FiO2 (%): 30 %, Resp: 20, Vent Mode: (S) CPAP/PS, Resp Rate (Set): 16 breaths/min, Tidal Volume (Set, mL): 420 mL, PEEP (cm H2O): 5 cmH2O, Pressure Support (cm H2O): 5 cmH2O, Resp Rate (Set): 16 breaths/min, Tidal Volume (Set, mL): 420 mL, PEEP (cm H2O): 5 cmH2O    2. INTAKE/ OUTPUT:   I/O last 3 completed shifts:  In: 5012.08 [I.V.:1307.41; Other:5; NG/GT:755]  Out: 3026 [Drains:2680; Other:346]    3. PHYSICAL EXAMINATION:  General: laying in bed, asleep, opens eyes to voice.   HEENT: PERRLA. Trach present and secure, site dressed.   Pulm/Resp: Clear breath sounds bilaterally, lungs coarse but clear.  CV: RRR, S1/S2   Abdomen: G-J tube in place, site secured abdomen with abthera in place, suction intact. SHANNON drains  x4, drain 2 includes gray/brown fluid, drain 3 slightly more red/brown. colostomy with green stool,   :  no shore, no urine to assess   MSK/Extremities: generalized edema in extremities    4. INVESTIGATIONS:   Arterial Blood Gases   Recent Labs   Lab 05/06/25  0347 05/05/25  0339   PH 7.38 7.35   PCO2 37 40   PO2 96 135*   HCO3 22 22     Complete Blood Count   Recent Labs   Lab 05/10/25  0320 05/09/25  2235 05/09/25  1622 05/09/25  1233 05/09/25  0412 05/09/25  0022 05/08/25  1419   WBC 10.9  --  13.8*  --  14.1*  --  12.9*   HGB 8.9* 7.4* 7.2* 6.7* 8.2*   < > 7.2*  7.2*     --  180  --  215  --  223    < > = values in this interval not displayed.     Basic Metabolic Panel  Recent Labs   Lab 05/10/25  0817 05/10/25  0326 05/10/25  0320 05/09/25  2012 05/09/25  1622 05/09/25  0425 05/09/25  0412 05/08/25  1656 05/08/25  1419   NA  --   --  137  --  135  --  136  --  137  137   POTASSIUM  --   --  4.8  --  4.3  --  4.1  --  3.8  3.8   CHLORIDE  --   --  106  --  104  --  104  --  104  104   CO2  --   --  19*  --  22  --  21*  --  21*  21*   BUN  --   --  45.9*  --  46.3*  --  44.3*  --  43.3*  43.3*   CR  --   --   1.27*  --  1.15  --  1.17  --  1.12  1.12   * 168* 172* 160* 294*   < > 185*   < > 231*  231*    < > = values in this interval not displayed.     Liver Function Tests  Recent Labs   Lab 05/10/25  0320 05/09/25  1622 05/09/25  0412 05/08/25  1419 05/08/25  0326 05/07/25  1623 05/07/25  0344 05/04/25  0331 05/03/25 2059   AST 30  --  28  --  23  --  24   < >  --    ALT 17  --  18  --  9  --  9   < >  --    ALKPHOS 194*  --  332*  --  337*  --  351*   < >  --    BILITOTAL 1.5*  --  0.6  --  0.6  --  0.8   < >  --    ALBUMIN 1.9* 1.9* 2.1* 2.1* 2.0*   < > 1.8*   < >  --    INR  --   --   --   --   --   --   --   --  1.24*    < > = values in this interval not displayed.     Pancreatic Enzymes  No lab results found in last 7 days.    Coagulation Profile  Recent Labs   Lab 05/03/25 2059   INR 1.24*   PTT 31         5. RADIOLOGY:   Recent Results (from the past 24 hours)   CT Chest Pulmonary Embolism w Contrast    Narrative    EXAMINATION: CTA pulmonary angiogram, 5/9/2025 9:53 PM     COMPARISON: Chest radiograph 5/8/2025    HISTORY: Pancreatitis, new tachycardia. Evaluate for pulmonary  embolus.    TECHNIQUE: Volumetric helical acquisition of CT images of the chest  from the lung apices to the kidneys were acquired after the  administration of 153 mL of Isovue-370 IV contrast. Post-processed  multiplanar and/or MIP reformations were obtained, archived to PACS  and used in interpretation of this study.     FINDINGS:  Contrast bolus is: suboptimal.  Exam is negative for acute  pulmonary embolism. The RV to the LV ratio is within normal limits. No  reflux of contrast into the IVC. No paradoxical bowing of the  interventricular septum.      Lines, tubes, devices: Right IJ central transcatheter tip and left  upper extremity PICC tip terminates adjacent to the superior  cavoatrial junction. Tracheostomy tube tip in the mid thoracic trachea  with tip above the nichole.    Lungs: Layering debris seen within the central  trachea and mainstem  bronchi. Small right pleural effusion. No significant left pleural  effusion. Consolidation/atelectasis involving the right lower lobe.  Additional peribronchial vascular opacities within the lung bases.    Mediastinum: The visualized thyroid is unremarkable.Heart size is  within normal limits. No pericardial effusion. The thoracic aorta and  main pulmonary artery are within normal limits. Standard branching  pattern of the great vessels. No abnormal thoracic lymph nodes.    Bones and soft tissues: No acute or suspicious osseous abnormality.    Upper abdomen: Extensive inflammatory/postsurgical changes along the  upper abdomen. Please see separate CT 5/9/2025 further details.      Impression    IMPRESSION:   1. Exam is negative for acute pulmonary embolism. No evidence of right  heart strain.      2. Small right pleural effusion with right lower lobe  consolidation/atelectasis.    3. Layering debris within the central airways with bibasilar patchy  peribronchovascular opacities, can be seen with aspiration and/or  infectious/inflammatory process.    4. Please see separate CT report 5/9/2025 for further details  regarding the abdomen/pelvis.      In the event of a positive result for acute pulmonary embolism  resulting in right heart strain, please activate the PERT  Multidisciplinary group for consultation by paging [phone]     PERT -- Pulmonary Embolism Response Team (Multidisciplinary team  including cardiology, interventional radiology, critical care,  hematology)    I have personally reviewed the examination and initial interpretation  and I agree with the findings.    GLENN KATZ MD         SYSTEM ID:  [id number]   CTA GI Bleed    Narrative    EXAMINATION: CTA GI BLEED, 5/9/2025 9:57 PM    TECHNIQUE:  Helical CT images from the lung bases through the  symphysis pubis were obtained with contrast.  Coronal reformatted  images were generated at a workstation for further  assessment.    COMPARISON: None.    HISTORY: pancreatitis, worsening tachycardia, bloody drain output,  eval for source of bleeding or sepsis    FINDINGS:    Devices: Partially visualized central venous catheter with tip  abutting the superior cavoatrial junction. Percutaneous duodenal  jejunostomy tube with tip terminating along the proximal jejunum.  Surgical drains terminating within the left upper quadrant and left  midabdomen. Additional presumed colostomy drain within the right upper  quadrant.    Abdomen and pelvis:   Postsurgical changes of the abdomen status post necrosectomy,  transverse colectomy, and temporary abdominal closure.    Liver: Heterogeneous appearance of the hepatic parenchyma. Focal  linear hypoattenuating foci within the right hemiliver (series 11  image 150 and 158).    Gallbladder: Submucosal enhancement of the gallbladder wall with  borderline gallbladder wall thickening. No cholelithiasis.    Spleen: Normal size.    Pancreas: Postsurgical changes of pancreatic necrosectomy with  multiple areas of nonenhancing pancreatic parenchyma, most pronounced  along the pancreatic head and neck, compatible with necrotizing  pancreatitis. Extensive peripancreatic complex appearing fluid without  definite formed localized fluid collection. There is a suspected focus  of active bleeding along the expected site of prior necrosectomy  superior to the pancreatic neck (series 13 image 47; series 12 image  47; series 11 image 139).  The pancreatic duct is nondilated.    Adrenal glands: No adrenal nodules.    Kidneys: The kidneys enhance symmetrically. No hydronephrosis. No  renal calculi.    Bladder: Circumferential bladder wall thickening with mucosal  enhancement.    Pelvic organs: Bilateral hydroceles with complex fluid extending into  the bilateral inguinal canals and peritoneal thickening.    Gastrointestinal tract: Postsurgical changes of transverse colectomy.  Circumferential wall thickening  involving numerous loops of small and  residual large bowel throughout the abdomen, favored to be reactive.  No pneumatosis or portal venous gas.    Lymph nodes: No suspicious lymphadenopathy.    Peritoneum/mesentery: Extensive complex free fluid throughout the  abdomen with diffuse thickening/enhancing peritoneum. Extensive fat  necrosis throughout the omentum, peritoneum and retroperitoneum.  Complex retroperitoneal fluid also present.    Vessels: Nonaneurysmal abdominal aorta. Splenic vein not well  visualized. Remainder of the major abdominal vasculature appears  patent.    Heart and lung bases: Right lower lobe consolidation/atelectasis,  right pleural effusion, and patchy bibasilar peribronchial vascular  opacities, please see separate CT chest 5/9/2025 for further details.    Bones and soft tissues: No acute or suspicious osseous abnormality.      Impression    IMPRESSION:   1. Postsurgical changes of the abdomen status post necrosectomy,  transverse colectomy, and temporary abdominal closure. Sequelae of  necrotizing pancreatitis with extensive complex peripancreatic fluid  fluid/inflammatory changes compatible with hemorrhage. There is a  small focus of active bleed along the expected site of the  necrosectomy immediately superior to the pancreatic neck.   2. Large volume complex abdominal ascites, complex retroperitoneal  fluid and extensive fat necrosis throughout the abdomen/pelvis.  3. Thickened/enhancing peritoneum throughout the abdomen/pelvis,  compatible with peritonitis.  4. Small and large bowel wall thickening, favored to be reactive.  5. Additional chronic and incidental findings, as further detailed in  the body of the report.  6. Please see separate CT chest 5/9/2025 for thoracic findings.    Finding was identified on 5/9/2025 10:35 PM.     Marina Zamarripa MD was contacted by Dr. Theo Kam at 5/9/2025  11:05 PM and verbalized understanding of the urgent finding.     I have personally  reviewed the examination and initial interpretation  and I agree with the findings.    GLENN KATZ MD         SYSTEM ID:  [id number]       =========================================

## 2025-05-10 NOTE — PRE-PROCEDURE
GENERAL PRE-PROCEDURE:   Procedure:  Visceral angiogram and possible embolization  Date/Time:  5/10/2025 4:26 PM    Verbal consent obtained?: Yes    Written consent obtained?: Yes    Risks and benefits: Risks, benefits and alternatives were discussed    Consent given by:  Patient  Patient states understanding of procedure being performed: Yes    Patient's understanding of procedure matches consent: Yes    Procedure consent matches procedure scheduled: Yes    Expected level of sedation:  Moderate  Appropriately NPO:  Yes  ASA Class:  2  Mallampati  :  Grade 2- soft palate, base of uvula, tonsillar pillars, and portion of posterior pharyngeal wall visible  Lungs:  Lungs clear with good breath sounds bilaterally  Heart:  Normal heart sounds and rate  History & Physical reviewed:  History and physical reviewed and no updates needed  Statement of review:  I have reviewed the lab findings, diagnostic data, medications, and the plan for sedation

## 2025-05-10 NOTE — PROGRESS NOTES
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 This blooms on venous phase. Source vessel is still difficult to identify. Patient has been getting units of blood but as MTP protocol was recently activated. On both levophed and vasopressin.     Plan  Proceed with emergent angiogram and possible embolization.   Consent will be obtained from family prior to procedure start.   Implemented All Fall Risk Interventions:  [place] to call system. Call bell, personal items and telephone within reach. Instruct patient to call for assistance. Room bathroom lighting operational. Non-slip footwear when patient is off stretcher. Physically safe environment: no spills, clutter or unnecessary equipment. Stretcher in lowest position, wheels locked, appropriate side rails in place. Provide visual cue, wrist band, yellow gown, etc. Monitor gait and stability. Monitor for mental status changes and reorient to person, place, and time. Review medications for side effects contributing to fall risk. Reinforce activity limits and safety measures with patient and family.

## 2025-05-10 NOTE — PROGRESS NOTES
MTP initiated up on 4C at 1555. IR was ready for patient simultaneously. Patient brought down and MTP was started in IR utilizing downtime sheets--first unit in at 1612. Last unit in at 1700. Units checked by Luigi Bowman, Cassie Graham, Felix Dalal, and Miguelina Farias RNs. See downtime record in chart for further details. See vital signs flowsheet, MAR, and drips for further details. 1 unit whole blood, 4 units RBCs, 1 platelet (via pressure bag), and 2 units FFP given. Total volume: 2358 mLs.         Addendum by Miguelina Farias:    Once completed with IR transferred patient back to room at 1940. Angioseal in place/bedrest for 2 hours. Skin mottled, abd taught, oozing out of SHANNON site and bridging mesh.   Start of MTP product at 1745 last product finished at 2012    TOTAL MTP VOLUMES:    PRBC 11 units=3179ml  FFP 10 units=2865ml  Plt 3 units=672ml    Complete MTP volume: 6716mls

## 2025-05-10 NOTE — IR NOTE
Patient Name: Sebastián Rodas  Medical Record Number: [id number]  Today's Date: 5/10/2025    Procedure: Visceral angiogram  Proceduralist: Dr. Stanford, Dr. Zamarripa  Pathology present: NA    Procedure Start: 1638  Procedure end: 1924  Sedation medications administered: 400 mcg fentanyl, 8 mg midazolam     Report given to: Tuned ASHBY RN  : KASIE    Other Notes: Pt arrived to IR room 1 from . Consent reviewed. Pt denies any questions or concerns regarding procedure. Pt positioned supine and monitored per protocol. Pt tolerated procedure without any noted complications. Massive transfusion protocol continued throughout procedure. Pt transferred back to .       5 Fr arterial sheath removed at 1924  Angioseal deployed at 1925        Groin site appearance: WDL        Pulse assessment: Per doppler  FLAT BEDREST FOR 2 HOURS

## 2025-05-10 NOTE — PROGRESS NOTES
SICU progress note  DOS 05/10/2025    Patient was seen for acute worsening of presumed hemorrhagic shock. Significant increase in pressor needs over short time with large output from abdominal drains. Patient initially responsive to blood products. Sent for CTA which demonstrated large amount of contrast extrav in the abdomen concerning for arterial bleed. IR consulted and determined emergent angiogram was indicated. MTP was started to resuscitate patient while getting prepped for procedure and he went down to IR.    I personally accompanied the patient to the radiology suite and directed resuscitation with massive transfusion protocol and TXA. I personally reviewed CTA abdomen/pelvis and discussed with surgical team and interventional radiology. I reviewed all labs collected during this time. Family was at bedside when patient was being brought down to IR, they were aware of the changes and had been informed earlier in the day at care conference that surgical intervention for bleeding would not be offered as it would be considered futile.     No procedures were performed by me. Additional critical care time 100 minutes.    Mihkail Phoenix MD   Critical Care and Acute Care Surgery

## 2025-05-10 NOTE — PLAN OF CARE
"Goal Outcome Evaluation:  ICU End of Shift Summary. See flowsheets for vital signs and detailed assessment.    Changes this shift: pt was expressing abd pain, prn dilaudid given x2, transitioned to dilaudid gtt. RASS 0/-1. Intermittently disoriented to situation and time. Pt repeatedly stated he is uncomfortable and \"does not want this\" on my shift. Increased pressor requirements, able to wean after uRBCs. HR  when calm, 130-150 when agitated. Increasing sanguinous drainage from SHANNON drains and abd site. CT PE and ABD obtained, showed increased bleed + no PE. Trach dome 30%/40L ovnt. Good op thu ostomy. No pull on CRRT per SICU. Filter pressure high, circuit changed, able to return blood. RBC x1. Ca++ replaced x1.    Plan: care conference @1200. Manage pain and anxiety. Wean pressor as tolerated.      Intake/Output Summary (Last 24 hours) at 5/10/2025 0701  Last data filed at 5/10/2025 0700  Gross per 24 hour   Intake 4951.13 ml   Output 3026 ml   Net 1925.13 ml            Plan of Care Reviewed With: patient    Overall Patient Progress: decliningOverall Patient Progress: declining    Outcome Evaluation: see note          "

## 2025-05-10 NOTE — PROGRESS NOTES
PALLIATIVE CARE PROGRESS NOTE  [facility]     Patient Name: Sebastián Rodas  Date of Admission: 4/30/2025   Today the patient was seen for: Pancreatitis, AUD, goals of care planning, respiratory insufficiency, renal insufficiency     Recommendations & Counseling     GOALS OF CARE  Plan of care - restorative/life-sustaining without limits  Concern for waxing and waning of mental status.  Reviewed my colleagues note from yesterday 5/9 who noted that Sebastián want to pursue DNR status and after my conversation with them, stated at the time of the conversation seemed to have decisional capacity.  Also had impression that he would want to pursue comfort measures.  ICU team later checked in with Sebastián who stated that he would want to continue full code status instead (concern for delirium?)  Care conference held 5/10/2025.  Family present in person included mother Marbella and Uncle Dustin.  Medical teams present included SICU, EGS, and palliative.  Medical summary   Sebastián suffered pancreatitis from alcohol use which cause diffuse enzyme leakage into his abdominal cavity. This damaged many organs.  As such, he has multiple drains for fluid collections, stool from dehisced bowel anastomosis, and oozing from pancreatitis.  He also has a trach (doming well), is on tube feeds (concern some may be leaking out?), multiple lines, and is still on CRRT (kidney function unlikely to recover)  He went to the OR 5/4 and 5/7 for washout and closure with mesh.   Was more awake and had some conversations with him noting he may not want to continue aggressive cares but today unfortunately he is sleepier and unable to participate in conversations. Adjusting pain and sedation regimen.  Trialing on speaking valve.  Levo needs have significantly increased (less likely from hemorrhage, maybe from sepsis?, maybe from dehydration?). Work-up ongoing.  Needing transfusions for oozing  Considerations   First  Patient did not report to Lab on 7/31/2018 to complete testing, original orders were cancelled.  Please review pended orders, complete necessary items, and sign.  If testing is not needed, please delete order.  Please contact patient with follow-up instructions as needed.  Thank you.      informed family that no decisions need to be made today and that this is a medical update. In addition, voiced the following concerns.   Family made aware of concerns regarding long-term outlook. Will have weeks in the hospital at least and months at a LTC/LTACH trying to get better. In the meantime, could suffer from catastrophic issues such as RP bleed or stool from bowels leaking into abdominal area. At most, can consider IR consult to see if intervention for bleed can done or drain for stool. No surgery would be possible to fix this and due to caking and more recent surgeries, would need to wait a few weeks at least until considering re-exploration for anything.  Uncertain if he will even be able to eat and he will be very weak for a long time.  Noted there is nothing to do about the left over pancreas. It is near too many vessels but is still at risk for ooze enzymes and bleed.   If any of the above things occur, if no interventions are possible, at best we would medically support Sebastián which hopes they self-resolve.  Spoke at length about CODE status. Recommended DNR as CPR is unlikely to have any meaningful benefit.   Family notes that PTA, Sebastián was polite, and joked a lot and despite having issues with alcohol, was trying to find a job and frustrated he could not. He loved playing games with friends online as well.   Plan   Family would like to continue current cares including FULL Code. They voiced understanding regarding the above concerns.  Family also would like to slowly orient Sebastián to the situation. They are concerned that he would not be absorbing things adequately and would jump to a hasty decision.   They are okay with weekly updates to ensure we continue to be on the same page.  If having RP bleed or other catastrophic issue occur, reassess goals with family   Would continue to try and encourage family to try involving Sebastián in conversations with family when he is more awake and  oriented  Continue to speak with family about CODE status (would not push too much unless more acute issues are occurring, could sow distrust amongst family and medical teams)    ADVANCE CARE PLANNING:  No ACD on file; mother Marbella has been making decisions on his behalf over the recent week. Uncle Dustin also involved. Noted in chart that he has at least one brother named Rony GUIDRY form on file  Code status: Full Code    MEDICAL MANAGEMENT:   Our team is not participating in medical management at this time    PSYCHOSOCIAL/SPIRITUAL:  Our palliative care  has been seeing Sebastián's mother for support.  If Sebastián is choosing to choose another pathway of care, we will reassess his own need for spiritual care support.    Palliative Care will follow with plans to check in early next week. Please page if needed.    Total time spent was 80 minutes regarding goals of care and support on the date of the encounter. These recommendations were given to the primary team via this note/in-person.    Sunny Cintron DO / Internal and Palliative Medicine   Securely message with the Vocera Web Console (learn more here)   Text page via [employer] Paging/Directory          Assessment          Sebastián Rodas is a 29-year-old male with a history of alcohol use disorder, anxiety, and bipolar disorder who was admitted on 3/30/2025 to OSH for alcohol withdrawal following a recent binge, presenting with diffuse abdominal pain. Initial workup revealed leukocytosis and elevated lipase concerning for acute pancreatitis.   On 4/27, after clinical deterioration and imaging consistent with a likely perforated viscus, he underwent emergent exploratory laparotomy, necrosectomy, transverse colectomy, abdominal washout, drain placement, and abthera placement.    Started on CRRT.      Patient was transferred to [facility] on 4/30/25 for further surgical management.  Reexploration of belly here at [facility] reveals no possibility of creating diverting colostomy.   "     Interval History:     Since our initial consultation 1 week ago, Sebastián has remained in the ICU, with   returns to the OR for abdominal washout.  He remains on CRRT, has a tracheostomy in place, is intermittently requiring pressors and Precedex.  No longer on continuous opioid infusion, but doing well with PRNs.  His cognitive status is much better than at the time of admission; he awakens, and interacts with head nods, thumbs up/down, writing notes (not always legible) and whispering.    Now awaiting potential placement of a speaking valve for his trach.        Review of Systems:     Besides above, ROS was reviewed and is unremarkable     Physical Exam:   Blood pressure 113/57, pulse (!) 125, temperature 97.6  F (36.4  C), temperature source Oral, resp. rate 18, height 1.727 m (5' 7.99\"), weight 70.1 kg (154 lb 8.7 oz), SpO2 98%.   General: Sleepy, not oriented CRRT  ENT: Tracheostomy in place  Pulmonary: trach doming  CV: Tachycardic 140s-160s. Multiple lines in place   GI: Multiple drains in place. Abdominal wound dressed.  Extremities: Trace BLE edema      Data Reviewed:     Imaging  EXAMINATION: CTA GI BLEED, 5/9/2025 9:57 PM   IMPRESSION:   1. Postsurgical changes of the abdomen status post necrosectomy, transverse colectomy, and temporary abdominal closure. Sequelae of necrotizing pancreatitis with extensive complex peripancreatic fluid fluid/inflammatory changes compatible with hemorrhage. There is a small focus of active bleed along the expected site of the necrosectomy immediately superior to the pancreatic neck.   2. Large volume complex abdominal ascites, complex retroperitoneal fluid and extensive fat necrosis throughout the abdomen/pelvis.  3. Thickened/enhancing peritoneum throughout the abdomen/pelvis,  compatible with peritonitis.  4. Small and large bowel wall thickening, favored to be reactive.  5. Additional chronic and incidental findings, as further detailed in the body of the report.  6. " Please see separate CT chest 5/9/2025 for thoracic findings.      EXAMINATION: CTA pulmonary angiogram, 5/9/2025 9:53 PM   IMPRESSION:   1. Exam is negative for acute pulmonary embolism. No evidence of right heart strain.    2. Small right pleural effusion with right lower lobe consolidation/atelectasis.  3. Layering debris within the central airways with bibasilar patchy peribronchovascular opacities, can be seen with aspiration and/or infectious/inflammatory process.  4. Please see separate CT report 5/9/2025 for further details regarding the abdomen/pelvis.      EXAMINATION: US ABDOMEN LIMITED W ABDOMEN DOPPLER LIMITED 5/9/2025 - 6:50 AM   Impression:   1. Slightly increased echogenicity of the hepatic parenchyma suggestive of steatosis.   2. The splenic vein is not visualized. Otherwise unremarkable hepatic Doppler evaluation.  3. Small complex perihepatic fluid collection measuring up to 5.1 cm.      Labs  CMP  Recent Labs   Lab 05/10/25  0817 05/10/25  0326 05/10/25  0320 05/09/25  2012 05/09/25  1622 05/09/25  0425 05/09/25  0412 05/08/25  1656 05/08/25  1419 05/08/25  0758 05/08/25  0326 05/07/25  0348 05/07/25  0344   NA  --   --  137  --  135  --  136  --  137  137  --  136   < > 136   POTASSIUM  --   --  4.8  --  4.3  --  4.1  --  3.8  3.8  --  4.1   < > 3.7   CHLORIDE  --   --  106  --  104  --  104  --  104  104  --  104   < > 104   CO2  --   --  19*  --  22  --  21*  --  21*  21*  --  20*   < > 21*   ANIONGAP  --   --  12  --  9  --  11  --  12  12  --  12   < > 11   * 168* 172* 160* 294*   < > 185*   < > 231*  231*   < > 212*  239*   < > 164*   BUN  --   --  45.9*  --  46.3*  --  44.3*  --  43.3*  43.3*  --  42.0*   < > 38.9*   CR  --   --  1.27*  --  1.15  --  1.17  --  1.12  1.12  --  1.09   < > 1.03   GFRESTIMATED  --   --  77  --  87  --  85  --  90  90  --  >90   < > >90   KARINA  --   --  7.5*  --  7.1*  --  7.7*  --  7.7*  7.7*  --  7.7*   < > 7.8*   MAG  --   --  2.5*  --  2.5*   --  2.6*  --  2.5*  --  2.6*   < > 2.3   PHOS  --   --  3.8  --  3.2  --  2.7  --  3.2  --  3.9   < > 3.3   PROTTOTAL  --   --  5.6*  --   --   --  6.2*  --   --   --  6.0*  --  5.5*   ALBUMIN  --   --  1.9*  --  1.9*  --  2.1*  --  2.1*  --  2.0*   < > 1.8*   BILITOTAL  --   --  1.5*  --   --   --  0.6  --   --   --  0.6  --  0.8   ALKPHOS  --   --  194*  --   --   --  332*  --   --   --  337*  --  351*   AST  --   --  30  --   --   --  28  --   --   --  23  --  24   ALT  --   --  17  --   --   --  18  --   --   --  9  --  9    < > = values in this interval not displayed.     CBC  Recent Labs   Lab 05/10/25  0320 05/09/25  2235 05/09/25  1622 05/09/25  1233 05/09/25  0412 05/09/25  0022 05/08/25  1419   WBC 10.9  --  13.8*  --  14.1*  --  12.9*   RBC 3.00*  --  2.41*  --  2.73*  --  2.40*   HGB 8.9* 7.4* 7.2* 6.7* 8.2*   < > 7.2*  7.2*   HCT 27.1*  --  22.7*  --  25.4*  --  22.8*   MCV 90  --  94  --  93  --  95   MCH 29.7  --  29.9  --  30.0  --  30.0   MCHC 32.8  --  31.7  --  32.3  --  31.6   RDW 16.3*  --  16.0*  --  16.3*  --  16.4*     --  180  --  215  --  223    < > = values in this interval not displayed.     INR  Recent Labs   Lab 05/03/25 2059   INR 1.24*     Arterial Blood Gas  Recent Labs   Lab 05/06/25  0347 05/05/25  0339   PH 7.38 7.35   PCO2 37 40   PO2 96 135*   HCO3 22 22   O2PER 30  30 40

## 2025-05-10 NOTE — PROGRESS NOTES
Nephrology Progress Note  05/10/2025       Sebastián Rodas is a 29 yom with Bipolar disorder, ETOH use c/b necrotizing pancreatitis admitted 3/30/25 to [facility] for ETOH withdrawal and abdominal pain, found to have acute pancreatitis which has progressed to necrotizing pancreatitis complicated by SARAHI.  Seen by Nephrology at [place], started on CRRT 4/1.  Had periods of stability enough for iHD but back to CRRT on 4/21 until tx to [facility] for further surgical interventions.       Interval History :   -Remains critically ill in the ICU  -On norepi 0.06  -Net positive about 2L yesterday  -Significant output from his drains yesterday (2.68L)  -Na 137, K 4.8, Bicarb 19, Calcium 7.5, Phos 3.8, Albumin 1.9    Assessment & Recommendations:   SARAHI-Baseline Cr 0.8 as recently as March 2025 before acute events, was started on CRRT emergently on 4/1 in setting of septic shock. Had ~2 weeks of stability enough to manage with iHD but back on CRRT 4/21 until tx to [facility] on 4/30. Running fevers with intraabdominal sepsis.  Continuing RRT from OSH, restarted CRRT on 5/2 after OR.                  -No need for new consent, continuing RRT started last month at [facility].                 -Access is tunneled RIJ from 4/21.                  -CRRT 4K, 25cc/kg/hr, I's = O's. (He may not tolerate I's = O's and if pressor needs go up we can allow him to be net positive for the day. I just don't want us falling too far behind if he can tolerate us keeping him about net even)     Volume-Total body volume overloaded. Will try for I's = O's today and see how he tolerates.      Electrolytes-K 4.8 on all 4k baths, bicarb 19. Monitor trend and may need to adjust to 2K.      BMD- Calcium 7.5 but corrects up for albumin, phos 3.8      Anemia-Hgb 8.9     Nutrition- Pivot 1.5     Time spent: 30 minutes on this date of encounter for chart review, physical exam, medical decision making and co-ordination of care.       Physical Exam:   I/O last 3  "completed shifts:  In: 5012.08 [I.V.:1307.41; Other:5; NG/GT:755]  Out: 3026 [Drains:2680; Other:346]   /57   Pulse (!) 125   Temp 97.6  F (36.4  C) (Oral)   Resp 18   Ht 1.727 m (5' 7.99\")   Wt 70.1 kg (154 lb 8.7 oz)   SpO2 98%   BMI 23.50 kg/m       GENERAL APPEARANCE: Vent via trach  CV: Tachycardic   - Edema +1 LE  : + Garcia  SKIN: no rash, warm, dry    Labs:   All labs reviewed by me  Electrolytes/Renal -   Recent Labs   Lab Test 05/10/25  0817 05/10/25  0326 05/10/25  0320 05/09/25  2012 05/09/25  1622 05/09/25  0425 05/09/25 0412   NA  --   --  137  --  135  --  136   POTASSIUM  --   --  4.8  --  4.3  --  4.1   CHLORIDE  --   --  106  --  104  --  104   CO2  --   --  19*  --  22  --  21*   BUN  --   --  45.9*  --  46.3*  --  44.3*   CR  --   --  1.27*  --  1.15  --  1.17   * 168* 172*   < > 294*   < > 185*   KARINA  --   --  7.5*  --  7.1*  --  7.7*   MAG  --   --  2.5*  --  2.5*  --  2.6*   PHOS  --   --  3.8  --  3.2  --  2.7    < > = values in this interval not displayed.       CBC -   Recent Labs   Lab Test 05/10/25  0320 05/09/25  2235 05/09/25  1622 05/09/25  1233 05/09/25 0412   WBC 10.9  --  13.8*  --  14.1*   HGB 8.9* 7.4* 7.2*   < > 8.2*     --  180  --  215    < > = values in this interval not displayed.       LFTs -   Recent Labs   Lab Test 05/10/25  0320 05/09/25  1622 05/09/25  0412 05/08/25  1419 05/08/25  0326   ALKPHOS 194*  --  332*  --  337*   BILITOTAL 1.5*  --  0.6  --  0.6   ALT 17  --  18  --  9   AST 30  --  28  --  23   PROTTOTAL 5.6*  --  6.2*  --  6.0*   ALBUMIN 1.9* 1.9* 2.1*   < > 2.0*    < > = values in this interval not displayed.       Iron Panel - No lab results found.        Current Medications:  Current Facility-Administered Medications   Medication Dose Route Frequency Provider Last Rate Last Admin    acetaminophen (TYLENOL) tablet 975 mg  975 mg Oral Q8H Eugenia Zavala MD   975 mg at 05/10/25 0100    cloNIDine (CATAPRES) tablet 0.1 mg  0.1 " mg Oral Q8H Roxi Alston MD   0.1 mg at 05/10/25 0800    copper chloride 2 mg in sodium chloride 0.9 % 115 mL intermittent infusion  2 mg Intravenous Daily Jolie Dexter APRN CNP 57.5 mL/hr at 05/09/25 1100 2 mg at 05/09/25 1100    folic acid (FOLVITE) tablet 1 mg  1 mg Per Feeding Tube Daily Jolie Dexter APRN CNP   1 mg at 05/10/25 0800    [Held by provider] heparin ANTICOAGULANT injection 5,000 Units  5,000 Units Subcutaneous Q8H JENNIFER Ganesh Griffiths MD   5,000 Units at 05/09/25 1155    insulin aspart (NovoLOG) injection (RAPID ACTING)  1-12 Units Subcutaneous Q4H Brendon Haas DO   2 Units at 05/10/25 0329    insulin glargine (LANTUS PEN) injection 20 Units  20 Units Subcutaneous Q24H Roxi Alston MD   20 Units at 05/10/25 0818    methocarbamol (ROBAXIN) tablet 500 mg  500 mg Oral 4x Daily Eugenia Zavala MD   500 mg at 05/10/25 0800    multivitamin w/minerals (THERA-VIT-M) tablet 1 tablet  1 tablet Oral or Feeding Tube Daily Jolie Dexter APRN CNP   1 tablet at 05/10/25 0800    oxyCODONE (ROXICODONE) tablet 15 mg  15 mg Oral or Feeding Tube Q4H Eugenia Zavala MD        pantoprazole (PROTONIX) 2 mg/mL suspension 40 mg  40 mg Oral or Feeding Tube QAM AC Brendon Haas,    40 mg at 05/10/25 0759    piperacillin-tazobactam (ZOSYN) 4.5 g vial to attach to  mL bag  4.5 g Intravenous Q6H Isacc Burrell PA-C   4.5 g at 05/10/25 0304    polyethylene glycol (MIRALAX) Packet 17 g  17 g Oral BID Roxi Alston MD   17 g at 05/10/25 0800    QUEtiapine (SEROquel) tablet 50 mg  50 mg Oral At Bedtime Roxi Alston MD   50 mg at 05/09/25 2202    senna-docusate (SENOKOT-S/PERICOLACE) 8.6-50 MG per tablet 2 tablet  2 tablet Oral BID Roxi Alston MD   2 tablet at 05/10/25 0800    sodium chloride (PF) 0.9% PF flush 3 mL  3 mL Intracatheter Q8H Ganesh Griffiths MD   3 mL at 05/09/25 0645    thiamine (B-1) injection 100 mg  100 mg Intravenous  Daily Ganesh Griffiths MD   100 mg at 05/09/25 0841    thiamine (B-1) tablet 100 mg  100 mg Per Feeding Tube Daily Jolie Dexter APRN CNP   100 mg at 05/10/25 0800    vitamin C (ASCORBIC ACID) liquid 500 mg  500 mg Per Feeding Tube BID Jolie Dexter APRN CNP   500 mg at 05/10/25 0803     Current Facility-Administered Medications   Medication Dose Route Frequency Provider Last Rate Last Admin    dexmedeTOMIDine (PRECEDEX) 4 mcg/mL in sodium chloride 0.9 % 100 mL infusion  0.1-1.2 mcg/kg/hr (Dosing Weight) Intravenous Continuous Ganesh Griffiths MD 20.9 mL/hr at 05/10/25 0800 1.2 mcg/kg/hr at 05/10/25 0800    dextrose 10% infusion   Intravenous Continuous PRN Mireya López,         dialysate for CVVHD & CVVHDF (Phoxillum BK4/2.5)  12.5 mL/kg/hr CRRT Continuous Raheem Gabriel APRN  mL/hr at 05/10/25 0917 12.5 mL/kg/hr at 05/10/25 0917    HYDROmorphone (DILAUDID) 0.2 mg/mL infusion ADULT/PEDS GREATER than or EQUAL to 20 kg  0.1-0.4 mg/hr Intravenous Continuous Eugenia Zavala MD 1 mL/hr at 05/10/25 0947 0.2 mg/hr at 05/10/25 0947    No heparin required   Does not apply Continuous PRN Raheem Gabriel APRN CNS        norepinephrine (LEVOPHED) 16 mg in  mL infusion MAX CONC CENTRAL LINE  0.01-0.6 mcg/kg/min (Dosing Weight) Intravenous Continuous Ganesh Griffiths MD 3.3 mL/hr at 05/10/25 0857 0.05 mcg/kg/min at 05/10/25 0857    POST-filter replacement solution for CVVHD & CVVHDF (Phoxillum BK4/2.5)   CRRT Continuous Raheem Gabriel APRN  mL/hr at 05/09/25 1551 New Bag at 05/09/25 1551    PRE-filter replacement solution for CVVHD & CVVHDF (Phoxillum BK4/2.5)  12.5 mL/kg/hr CRRT Continuous Raheem Gabriel, ASIF  mL/hr at 05/10/25 0917 12.5 mL/kg/hr at 05/10/25 0917

## 2025-05-11 ENCOUNTER — APPOINTMENT (OUTPATIENT)
Dept: GENERAL RADIOLOGY | Facility: CLINIC | Age: 30
End: 2025-05-11
Payer: COMMERCIAL

## 2025-05-11 LAB
ALBUMIN SERPL BCG-MCNC: 3.1 G/DL (ref 3.5–5.2)
ALBUMIN SERPL BCG-MCNC: 3.2 G/DL (ref 3.5–5.2)
ALBUMIN SERPL BCG-MCNC: 3.5 G/DL (ref 3.5–5.2)
ALBUMIN SERPL BCG-MCNC: 3.6 G/DL (ref 3.5–5.2)
ALLEN'S TEST: ABNORMAL
ALP SERPL-CCNC: 114 U/L (ref 40–150)
ALP SERPL-CCNC: 117 U/L (ref 40–150)
ALT SERPL W P-5'-P-CCNC: 23 U/L (ref 0–70)
ALT SERPL W P-5'-P-CCNC: 23 U/L (ref 0–70)
ANION GAP SERPL CALCULATED.3IONS-SCNC: 13 MMOL/L (ref 7–15)
ANION GAP SERPL CALCULATED.3IONS-SCNC: 14 MMOL/L (ref 7–15)
APTT PPP: 29 SECONDS (ref 22–38)
AST SERPL W P-5'-P-CCNC: 31 U/L (ref 0–45)
AST SERPL W P-5'-P-CCNC: 33 U/L (ref 0–45)
ATRIAL RATE - MUSE: 124 BPM
ATRIAL RATE - MUSE: 135 BPM
BASE EXCESS BLDA CALC-SCNC: -3.4 MMOL/L (ref -3–3)
BILIRUB DIRECT SERPL-MCNC: 2.5 MG/DL (ref 0–0.3)
BILIRUB SERPL-MCNC: 3.3 MG/DL
BILIRUB SERPL-MCNC: 3.5 MG/DL
BUN SERPL-MCNC: 20.8 MG/DL (ref 6–20)
BUN SERPL-MCNC: 21.7 MG/DL (ref 6–20)
BUN SERPL-MCNC: 22.3 MG/DL (ref 6–20)
BUN SERPL-MCNC: 27.8 MG/DL (ref 6–20)
BUN SERPL-MCNC: 31.9 MG/DL (ref 6–20)
CA-I BLD-MCNC: 4.5 MG/DL (ref 4.4–5.2)
CA-I BLD-MCNC: 4.6 MG/DL (ref 4.4–5.2)
CA-I BLD-MCNC: 5 MG/DL (ref 4.4–5.2)
CALCIUM SERPL-MCNC: 8.6 MG/DL (ref 8.8–10.4)
CALCIUM SERPL-MCNC: 8.7 MG/DL (ref 8.8–10.4)
CALCIUM SERPL-MCNC: 8.7 MG/DL (ref 8.8–10.4)
CALCIUM SERPL-MCNC: 9.2 MG/DL (ref 8.8–10.4)
CALCIUM SERPL-MCNC: 9.3 MG/DL (ref 8.8–10.4)
CHLORIDE SERPL-SCNC: 100 MMOL/L (ref 98–107)
CHLORIDE SERPL-SCNC: 101 MMOL/L (ref 98–107)
CHLORIDE SERPL-SCNC: 101 MMOL/L (ref 98–107)
CHLORIDE SERPL-SCNC: 102 MMOL/L (ref 98–107)
CHLORIDE SERPL-SCNC: 103 MMOL/L (ref 98–107)
CREAT SERPL-MCNC: 0.93 MG/DL (ref 0.67–1.17)
CREAT SERPL-MCNC: 0.94 MG/DL (ref 0.67–1.17)
CREAT SERPL-MCNC: 0.97 MG/DL (ref 0.67–1.17)
CREAT SERPL-MCNC: 0.99 MG/DL (ref 0.67–1.17)
CREAT SERPL-MCNC: 1.06 MG/DL (ref 0.67–1.17)
DIASTOLIC BLOOD PRESSURE - MUSE: NORMAL MMHG
DIASTOLIC BLOOD PRESSURE - MUSE: NORMAL MMHG
EGFRCR SERPLBLD CKD-EPI 2021: >90 ML/MIN/1.73M2
ERYTHROCYTE [DISTWIDTH] IN BLOOD BY AUTOMATED COUNT: 14.3 % (ref 10–15)
ERYTHROCYTE [DISTWIDTH] IN BLOOD BY AUTOMATED COUNT: 14.7 % (ref 10–15)
FIBRINOGEN PPP-MCNC: 395 MG/DL (ref 170–510)
GLUCOSE BLDC GLUCOMTR-MCNC: 101 MG/DL (ref 70–99)
GLUCOSE BLDC GLUCOMTR-MCNC: 106 MG/DL (ref 70–99)
GLUCOSE BLDC GLUCOMTR-MCNC: 123 MG/DL (ref 70–99)
GLUCOSE BLDC GLUCOMTR-MCNC: 123 MG/DL (ref 70–99)
GLUCOSE BLDC GLUCOMTR-MCNC: 130 MG/DL (ref 70–99)
GLUCOSE BLDC GLUCOMTR-MCNC: 81 MG/DL (ref 70–99)
GLUCOSE BLDC GLUCOMTR-MCNC: 92 MG/DL (ref 70–99)
GLUCOSE BLDC GLUCOMTR-MCNC: 96 MG/DL (ref 70–99)
GLUCOSE SERPL-MCNC: 122 MG/DL (ref 70–99)
GLUCOSE SERPL-MCNC: 90 MG/DL (ref 70–99)
GLUCOSE SERPL-MCNC: 96 MG/DL (ref 70–99)
GLUCOSE SERPL-MCNC: 96 MG/DL (ref 70–99)
GLUCOSE SERPL-MCNC: 99 MG/DL (ref 70–99)
HCO3 BLD-SCNC: 23 MMOL/L (ref 21–28)
HCO3 SERPL-SCNC: 20 MMOL/L (ref 22–29)
HCO3 SERPL-SCNC: 21 MMOL/L (ref 22–29)
HCT VFR BLD AUTO: 38.3 % (ref 40–53)
HCT VFR BLD AUTO: 39 % (ref 40–53)
HGB BLD-MCNC: 13.3 G/DL (ref 13.3–17.7)
HGB BLD-MCNC: 13.7 G/DL (ref 13.3–17.7)
HGB BLD-MCNC: 13.9 G/DL (ref 13.3–17.7)
HGB BLD-MCNC: 14.1 G/DL (ref 13.3–17.7)
HGB BLD-MCNC: 14.3 G/DL (ref 13.3–17.7)
INR PPP: 1.13 (ref 0.85–1.15)
INTERPRETATION ECG - MUSE: NORMAL
INTERPRETATION ECG - MUSE: NORMAL
LACTATE SERPL-SCNC: 2.2 MMOL/L (ref 0.7–2)
LACTATE SERPL-SCNC: 2.5 MMOL/L (ref 0.7–2)
LACTATE SERPL-SCNC: 2.5 MMOL/L (ref 0.7–2)
LACTATE SERPL-SCNC: 2.9 MMOL/L (ref 0.7–2)
LACTATE SERPL-SCNC: 2.9 MMOL/L (ref 0.7–2)
LACTATE SERPL-SCNC: 3.3 MMOL/L (ref 0.7–2)
LACTATE SERPL-SCNC: 3.8 MMOL/L (ref 0.7–2)
MAGNESIUM SERPL-MCNC: 1.9 MG/DL (ref 1.7–2.3)
MAGNESIUM SERPL-MCNC: 2 MG/DL (ref 1.7–2.3)
MAGNESIUM SERPL-MCNC: 2.5 MG/DL (ref 1.7–2.3)
MAGNESIUM SERPL-MCNC: 2.6 MG/DL (ref 1.7–2.3)
MCH RBC QN AUTO: 30.2 PG (ref 26.5–33)
MCH RBC QN AUTO: 30.2 PG (ref 26.5–33)
MCHC RBC AUTO-ENTMCNC: 34.7 G/DL (ref 31.5–36.5)
MCHC RBC AUTO-ENTMCNC: 35.1 G/DL (ref 31.5–36.5)
MCV RBC AUTO: 86 FL (ref 78–100)
MCV RBC AUTO: 87 FL (ref 78–100)
O2/TOTAL GAS SETTING VFR VENT: 40 %
OXYHGB MFR BLDA: 96 % (ref 92–100)
P AXIS - MUSE: 52 DEGREES
P AXIS - MUSE: 54 DEGREES
PCO2 BLD: 43 MM HG (ref 35–45)
PEEP: 5 CM H2O
PH BLD: 7.33 [PH] (ref 7.35–7.45)
PHOSPHATE SERPL-MCNC: 3.9 MG/DL (ref 2.5–4.5)
PHOSPHATE SERPL-MCNC: 4.4 MG/DL (ref 2.5–4.5)
PHOSPHATE SERPL-MCNC: 4.4 MG/DL (ref 2.5–4.5)
PHOSPHATE SERPL-MCNC: 5.3 MG/DL (ref 2.5–4.5)
PLATELET # BLD AUTO: 107 10E3/UL (ref 150–450)
PLATELET # BLD AUTO: 119 10E3/UL (ref 150–450)
PO2 BLD: 89 MM HG (ref 80–105)
POTASSIUM SERPL-SCNC: 3.9 MMOL/L (ref 3.4–5.3)
POTASSIUM SERPL-SCNC: 4.2 MMOL/L (ref 3.4–5.3)
POTASSIUM SERPL-SCNC: 4.6 MMOL/L (ref 3.4–5.3)
PR INTERVAL - MUSE: 130 MS
PR INTERVAL - MUSE: 130 MS
PROT SERPL-MCNC: 6 G/DL (ref 6.4–8.3)
PROT SERPL-MCNC: 6.1 G/DL (ref 6.4–8.3)
PROTHROMBIN TIME: 14.8 SECONDS (ref 11.8–14.8)
QRS DURATION - MUSE: 74 MS
QRS DURATION - MUSE: 88 MS
QT - MUSE: 268 MS
QT - MUSE: 296 MS
QTC - MUSE: 402 MS
QTC - MUSE: 425 MS
R AXIS - MUSE: 67 DEGREES
R AXIS - MUSE: 71 DEGREES
RBC # BLD AUTO: 4.41 10E6/UL (ref 4.4–5.9)
RBC # BLD AUTO: 4.54 10E6/UL (ref 4.4–5.9)
SAO2 % BLDA: 98.3 % (ref 96–97)
SELENIUM SERPL-MCNC: 56.2 UG/L
SODIUM SERPL-SCNC: 134 MMOL/L (ref 135–145)
SODIUM SERPL-SCNC: 135 MMOL/L (ref 135–145)
SODIUM SERPL-SCNC: 135 MMOL/L (ref 135–145)
SODIUM SERPL-SCNC: 136 MMOL/L (ref 135–145)
SODIUM SERPL-SCNC: 137 MMOL/L (ref 135–145)
SYSTOLIC BLOOD PRESSURE - MUSE: NORMAL MMHG
SYSTOLIC BLOOD PRESSURE - MUSE: NORMAL MMHG
T AXIS - MUSE: -10 DEGREES
T AXIS - MUSE: -8 DEGREES
VENTRICULAR RATE- MUSE: 124 BPM
VENTRICULAR RATE- MUSE: 135 BPM
WBC # BLD AUTO: 10.1 10E3/UL (ref 4–11)
WBC # BLD AUTO: 11.9 10E3/UL (ref 4–11)
ZINC SERPL-MCNC: 95.9 UG/DL

## 2025-05-11 PROCEDURE — 84075 ASSAY ALKALINE PHOSPHATASE: CPT | Performed by: STUDENT IN AN ORGANIZED HEALTH CARE EDUCATION/TRAINING PROGRAM

## 2025-05-11 PROCEDURE — 82435 ASSAY OF BLOOD CHLORIDE: CPT | Performed by: STUDENT IN AN ORGANIZED HEALTH CARE EDUCATION/TRAINING PROGRAM

## 2025-05-11 PROCEDURE — 82330 ASSAY OF CALCIUM: CPT | Performed by: STUDENT IN AN ORGANIZED HEALTH CARE EDUCATION/TRAINING PROGRAM

## 2025-05-11 PROCEDURE — 258N000003 HC RX IP 258 OP 636

## 2025-05-11 PROCEDURE — 82248 BILIRUBIN DIRECT: CPT | Performed by: STUDENT IN AN ORGANIZED HEALTH CARE EDUCATION/TRAINING PROGRAM

## 2025-05-11 PROCEDURE — 999N000157 HC STATISTIC RCP TIME EA 10 MIN

## 2025-05-11 PROCEDURE — 83735 ASSAY OF MAGNESIUM: CPT | Performed by: STUDENT IN AN ORGANIZED HEALTH CARE EDUCATION/TRAINING PROGRAM

## 2025-05-11 PROCEDURE — 200N000002 HC R&B ICU UMMC

## 2025-05-11 PROCEDURE — 258N000001 HC RX 258

## 2025-05-11 PROCEDURE — 84132 ASSAY OF SERUM POTASSIUM: CPT | Performed by: STUDENT IN AN ORGANIZED HEALTH CARE EDUCATION/TRAINING PROGRAM

## 2025-05-11 PROCEDURE — 250N000013 HC RX MED GY IP 250 OP 250 PS 637

## 2025-05-11 PROCEDURE — 80053 COMPREHEN METABOLIC PANEL: CPT | Performed by: STUDENT IN AN ORGANIZED HEALTH CARE EDUCATION/TRAINING PROGRAM

## 2025-05-11 PROCEDURE — 82805 BLOOD GASES W/O2 SATURATION: CPT

## 2025-05-11 PROCEDURE — 250N000011 HC RX IP 250 OP 636: Performed by: STUDENT IN AN ORGANIZED HEALTH CARE EDUCATION/TRAINING PROGRAM

## 2025-05-11 PROCEDURE — 83605 ASSAY OF LACTIC ACID: CPT | Performed by: STUDENT IN AN ORGANIZED HEALTH CARE EDUCATION/TRAINING PROGRAM

## 2025-05-11 PROCEDURE — 90947 DIALYSIS REPEATED EVAL: CPT

## 2025-05-11 PROCEDURE — 94003 VENT MGMT INPAT SUBQ DAY: CPT

## 2025-05-11 PROCEDURE — 999N000065 XR ABDOMEN PORT 1 VIEW

## 2025-05-11 PROCEDURE — 250N000013 HC RX MED GY IP 250 OP 250 PS 637: Performed by: STUDENT IN AN ORGANIZED HEALTH CARE EDUCATION/TRAINING PROGRAM

## 2025-05-11 PROCEDURE — 84100 ASSAY OF PHOSPHORUS: CPT | Performed by: STUDENT IN AN ORGANIZED HEALTH CARE EDUCATION/TRAINING PROGRAM

## 2025-05-11 PROCEDURE — 250N000011 HC RX IP 250 OP 636: Mod: JZ

## 2025-05-11 PROCEDURE — 250N000011 HC RX IP 250 OP 636: Performed by: PHYSICIAN ASSISTANT

## 2025-05-11 PROCEDURE — 250N000009 HC RX 250: Performed by: STUDENT IN AN ORGANIZED HEALTH CARE EDUCATION/TRAINING PROGRAM

## 2025-05-11 PROCEDURE — 93010 ELECTROCARDIOGRAM REPORT: CPT | Performed by: INTERNAL MEDICINE

## 2025-05-11 PROCEDURE — 250N000009 HC RX 250

## 2025-05-11 PROCEDURE — 85027 COMPLETE CBC AUTOMATED: CPT | Performed by: STUDENT IN AN ORGANIZED HEALTH CARE EDUCATION/TRAINING PROGRAM

## 2025-05-11 PROCEDURE — 99233 SBSQ HOSP IP/OBS HIGH 50: CPT | Performed by: STUDENT IN AN ORGANIZED HEALTH CARE EDUCATION/TRAINING PROGRAM

## 2025-05-11 PROCEDURE — 85018 HEMOGLOBIN: CPT

## 2025-05-11 PROCEDURE — 84450 TRANSFERASE (AST) (SGOT): CPT | Performed by: STUDENT IN AN ORGANIZED HEALTH CARE EDUCATION/TRAINING PROGRAM

## 2025-05-11 PROCEDURE — 250N000013 HC RX MED GY IP 250 OP 250 PS 637: Performed by: SURGERY

## 2025-05-11 PROCEDURE — 74018 RADEX ABDOMEN 1 VIEW: CPT | Mod: 26 | Performed by: RADIOLOGY

## 2025-05-11 PROCEDURE — 99291 CRITICAL CARE FIRST HOUR: CPT | Mod: 24 | Performed by: SURGERY

## 2025-05-11 RX ORDER — CALCIUM CHLORIDE, MAGNESIUM CHLORIDE, SODIUM CHLORIDE, SODIUM BICARBONATE, POTASSIUM CHLORIDE AND SODIUM PHOSPHATE DIBASIC DIHYDRATE 3.68; 3.05; 6.34; 3.09; .314; .187 G/L; G/L; G/L; G/L; G/L; G/L
INJECTION INTRAVENOUS CONTINUOUS
Status: DISCONTINUED | OUTPATIENT
Start: 2025-05-11 | End: 2025-06-03 | Stop reason: HOSPADM

## 2025-05-11 RX ORDER — CALCIUM CHLORIDE, MAGNESIUM CHLORIDE, SODIUM CHLORIDE, SODIUM BICARBONATE, POTASSIUM CHLORIDE AND SODIUM PHOSPHATE DIBASIC DIHYDRATE 3.68; 3.05; 6.34; 3.09; .314; .187 G/L; G/L; G/L; G/L; G/L; G/L
10 INJECTION INTRAVENOUS CONTINUOUS
Status: DISCONTINUED | OUTPATIENT
Start: 2025-05-11 | End: 2025-05-27

## 2025-05-11 RX ORDER — POTASSIUM CHLORIDE 29.8 MG/ML
20 INJECTION INTRAVENOUS EVERY 8 HOURS PRN
Status: DISCONTINUED | OUTPATIENT
Start: 2025-05-11 | End: 2025-06-03

## 2025-05-11 RX ORDER — CALCIUM GLUCONATE 20 MG/ML
2 INJECTION, SOLUTION INTRAVENOUS EVERY 8 HOURS PRN
Status: DISCONTINUED | OUTPATIENT
Start: 2025-05-11 | End: 2025-06-03 | Stop reason: HOSPADM

## 2025-05-11 RX ORDER — MAGNESIUM SULFATE HEPTAHYDRATE 40 MG/ML
2 INJECTION, SOLUTION INTRAVENOUS EVERY 8 HOURS PRN
Status: DISCONTINUED | OUTPATIENT
Start: 2025-05-11 | End: 2025-06-03

## 2025-05-11 RX ADMIN — OXYCODONE HYDROCHLORIDE 15 MG: 10 TABLET ORAL at 20:16

## 2025-05-11 RX ADMIN — CALCIUM CHLORIDE, MAGNESIUM CHLORIDE, DEXTROSE MONOHYDRATE, LACTIC ACID, SODIUM CHLORIDE, SODIUM BICARBONATE AND POTASSIUM CHLORIDE 12.5 ML/KG/HR: 5.15; 2.03; 22; 5.4; 6.46; 3.09; .157 INJECTION INTRAVENOUS at 01:49

## 2025-05-11 RX ADMIN — CALCIUM CHLORIDE, MAGNESIUM CHLORIDE, SODIUM CHLORIDE, SODIUM BICARBONATE, POTASSIUM CHLORIDE AND SODIUM PHOSPHATE DIBASIC DIHYDRATE 12.5 ML/KG/HR: 3.68; 3.05; 6.34; 3.09; .314; .187 INJECTION INTRAVENOUS at 05:54

## 2025-05-11 RX ADMIN — CALCIUM CHLORIDE, MAGNESIUM CHLORIDE, SODIUM CHLORIDE, SODIUM BICARBONATE, POTASSIUM CHLORIDE AND SODIUM PHOSPHATE DIBASIC DIHYDRATE 12.5 ML/KG/HR: 3.68; 3.05; 6.34; 3.09; .314; .187 INJECTION INTRAVENOUS at 16:11

## 2025-05-11 RX ADMIN — METHOCARBAMOL 500 MG: 500 TABLET ORAL at 11:46

## 2025-05-11 RX ADMIN — DEXTROSE 1000 ML: 10 SOLUTION INTRAVENOUS at 14:39

## 2025-05-11 RX ADMIN — MAGNESIUM SULFATE HEPTAHYDRATE 2 G: 2 INJECTION, SOLUTION INTRAVENOUS at 05:23

## 2025-05-11 RX ADMIN — MIDAZOLAM HYDROCHLORIDE 12 MG/HR: 1 INJECTION, SOLUTION INTRAVENOUS at 03:20

## 2025-05-11 RX ADMIN — PIPERACILLIN AND TAZOBACTAM 4.5 G: 4; .5 INJECTION, POWDER, LYOPHILIZED, FOR SOLUTION INTRAVENOUS at 09:40

## 2025-05-11 RX ADMIN — CALCIUM CHLORIDE, MAGNESIUM CHLORIDE, SODIUM CHLORIDE, SODIUM BICARBONATE, POTASSIUM CHLORIDE AND SODIUM PHOSPHATE DIBASIC DIHYDRATE 12.5 ML/KG/HR: 3.68; 3.05; 6.34; 3.09; .314; .187 INJECTION INTRAVENOUS at 11:04

## 2025-05-11 RX ADMIN — Medication 500 MG: at 19:36

## 2025-05-11 RX ADMIN — POLYETHYLENE GLYCOL 3350 17 G: 17 POWDER, FOR SOLUTION ORAL at 19:36

## 2025-05-11 RX ADMIN — METHOCARBAMOL 500 MG: 500 TABLET ORAL at 19:36

## 2025-05-11 RX ADMIN — CALCIUM CHLORIDE, MAGNESIUM CHLORIDE, SODIUM CHLORIDE, SODIUM BICARBONATE, POTASSIUM CHLORIDE AND SODIUM PHOSPHATE DIBASIC DIHYDRATE 12.5 ML/KG/HR: 3.68; 3.05; 6.34; 3.09; .314; .187 INJECTION INTRAVENOUS at 21:04

## 2025-05-11 RX ADMIN — PIPERACILLIN AND TAZOBACTAM 4.5 G: 4; .5 INJECTION, POWDER, LYOPHILIZED, FOR SOLUTION INTRAVENOUS at 03:13

## 2025-05-11 RX ADMIN — CALCIUM CHLORIDE, MAGNESIUM CHLORIDE, SODIUM CHLORIDE, SODIUM BICARBONATE, POTASSIUM CHLORIDE AND SODIUM PHOSPHATE DIBASIC DIHYDRATE 12.5 ML/KG/HR: 3.68; 3.05; 6.34; 3.09; .314; .187 INJECTION INTRAVENOUS at 21:06

## 2025-05-11 RX ADMIN — Medication 5 MG: at 19:36

## 2025-05-11 RX ADMIN — HYDROMORPHONE HYDROCHLORIDE 0.4 MG: 0.2 INJECTION, SOLUTION INTRAMUSCULAR; INTRAVENOUS; SUBCUTANEOUS at 21:16

## 2025-05-11 RX ADMIN — PROPOFOL 40 MCG/KG/MIN: 10 INJECTION, EMULSION INTRAVENOUS at 07:59

## 2025-05-11 RX ADMIN — OXYCODONE HYDROCHLORIDE 15 MG: 10 TABLET ORAL at 13:11

## 2025-05-11 RX ADMIN — OXYCODONE HYDROCHLORIDE 10 MG: 10 TABLET ORAL at 15:32

## 2025-05-11 RX ADMIN — CALCIUM CHLORIDE, MAGNESIUM CHLORIDE, SODIUM CHLORIDE, SODIUM BICARBONATE, POTASSIUM CHLORIDE AND SODIUM PHOSPHATE DIBASIC DIHYDRATE: 3.68; 3.05; 6.34; 3.09; .314; .187 INJECTION INTRAVENOUS at 05:54

## 2025-05-11 RX ADMIN — OXYCODONE HYDROCHLORIDE 15 MG: 10 TABLET ORAL at 17:11

## 2025-05-11 RX ADMIN — PROPOFOL 40 MCG/KG/MIN: 10 INJECTION, EMULSION INTRAVENOUS at 01:43

## 2025-05-11 RX ADMIN — OXYCODONE HYDROCHLORIDE 15 MG: 10 TABLET ORAL at 09:18

## 2025-05-11 RX ADMIN — DEXTROSE 1000 ML: 10 SOLUTION INTRAVENOUS at 00:21

## 2025-05-11 RX ADMIN — Medication: at 00:19

## 2025-05-11 RX ADMIN — METHOCARBAMOL 500 MG: 500 TABLET ORAL at 15:32

## 2025-05-11 RX ADMIN — Medication: at 06:46

## 2025-05-11 RX ADMIN — Medication 40 MG: at 09:18

## 2025-05-11 RX ADMIN — QUETIAPINE FUMARATE 50 MG: 50 TABLET ORAL at 22:34

## 2025-05-11 RX ADMIN — CUPRIC CHLORIDE 2 MG: 0.4 INJECTION, SOLUTION INTRAVENOUS at 11:46

## 2025-05-11 RX ADMIN — ACETAMINOPHEN 975 MG: 325 TABLET ORAL at 17:11

## 2025-05-11 RX ADMIN — OXYCODONE HYDROCHLORIDE 15 MG: 10 TABLET ORAL at 22:34

## 2025-05-11 RX ADMIN — SENNOSIDES AND DOCUSATE SODIUM 2 TABLET: 50; 8.6 TABLET ORAL at 19:36

## 2025-05-11 RX ADMIN — CALCIUM CHLORIDE, MAGNESIUM CHLORIDE, SODIUM CHLORIDE, SODIUM BICARBONATE, POTASSIUM CHLORIDE AND SODIUM PHOSPHATE DIBASIC DIHYDRATE 12.5 ML/KG/HR: 3.68; 3.05; 6.34; 3.09; .314; .187 INJECTION INTRAVENOUS at 16:10

## 2025-05-11 ASSESSMENT — ACTIVITIES OF DAILY LIVING (ADL)
ADLS_ACUITY_SCORE: 56
ADLS_ACUITY_SCORE: 56
ADLS_ACUITY_SCORE: 58
ADLS_ACUITY_SCORE: 54
ADLS_ACUITY_SCORE: 58
ADLS_ACUITY_SCORE: 54
ADLS_ACUITY_SCORE: 58
ADLS_ACUITY_SCORE: 58
ADLS_ACUITY_SCORE: 54
ADLS_ACUITY_SCORE: 58
ADLS_ACUITY_SCORE: 54
ADLS_ACUITY_SCORE: 58
ADLS_ACUITY_SCORE: 54
ADLS_ACUITY_SCORE: 58

## 2025-05-11 NOTE — PLAN OF CARE
Goal Outcome Evaluation:  ICU End of Shift Summary. See flowsheets for vital signs and detailed assessment.    Changes this shift: back to MICU from IR procedure and MTP x2 (see charting for volume totals) @2040. Bleeding stopped. Pt returned from IR on dilaudid gtt, stopped and wasted upon return to MICU. K shifted x1 and changed to 2k bath on CRRT. Concern for abdominal compartment syndrome, pt sedated with versed and prop, paralyzed w/cis, shore placed. Obtaining abd pressures q1. Remains on levo. Full vent support. NG placed to LIS, g-tube to gravity drainage. Copious sanguinous, dark red drainage from all drains, and from abdominal surgical site. Restarted 4k bath @0600. Meeting I/o goals. Ca++ replaced x1. Mag replaced x1.    Plan: continue to support hemodynamics. Meet CRRT goals as able.        Plan of Care Reviewed With: patient, family    Overall Patient Progress: decliningOverall Patient Progress: declining    Outcome Evaluation: see note

## 2025-05-11 NOTE — PROGRESS NOTES
Nephrology Progress Note  05/11/2025       Sebastián Rodas is a 29 yom with Bipolar disorder, ETOH use c/b necrotizing pancreatitis admitted 3/30/25 to [facility] for ETOH withdrawal and abdominal pain, found to have acute pancreatitis which has progressed to necrotizing pancreatitis complicated by SARAHI.  Seen by Nephrology at [place], started on CRRT 4/1.  Had periods of stability enough for iHD but back to CRRT on 4/21 until tx to [facility] for further surgical interventions.       Interval History :   -Remains critically ill in the ICU  -On norepi 0.02  -Net positive about 7.8L yesterday  -Large GI bleed. IR embolization and MTP. Significant abd distension  -Na 135, K 4.2, Bicarb 20, Calcium 8.7, Phos 3.9, Albumin 3.1    Assessment & Recommendations:   SARAHI-Baseline Cr 0.8 as recently as March 2025 before acute events, was started on CRRT emergently on 4/1 in setting of septic shock. Had ~2 weeks of stability enough to manage with iHD but back on CRRT 4/21 until tx to [facility] on 4/30. Running fevers with intraabdominal sepsis.  Continuing RRT from OSH, restarted CRRT on 5/2 after OR.                  -No need for new consent, continuing RRT started last month at [facility].                 -Access is tunneled RIJ from 4/21.                  -CRRT 2K, 25cc/kg/hr, 0-150cc/hr net negative as tolerated     Volume-Total body volume overloaded (even more so after MTP). Will try for net negative 0-150cc/hr as tolerated     Electrolytes-K 4.2 on all 2k baths, bicarb 20     BMD- Calcium 8.7, Phos 3.9     Anemia-Hgb 13.9 (MTP)     Nutrition- Pivot 1.5     Time spent: 30 minutes on this date of encounter for chart review, physical exam, medical decision making and co-ordination of care.     Physical Exam:   I/O last 3 completed shifts:  In: 99670.24 [I.V.:3909.24; Other:6716; NG/GT:335]  Out: 4620 [Urine:150; Emesis/NG output:350; Drains:3952; Other:168]   /57   Pulse (!) 123   Temp 100  F (37.8  C)   Resp 17   Ht  "1.727 m (5' 7.99\")   Wt 82.5 kg (181 lb 14.1 oz)   SpO2 99%   BMI 27.66 kg/m       GENERAL APPEARANCE: Vent via trach  CV: Tachycardic   - Edema +1 LE  Abd: Significant distension  : + Garcia  SKIN: no rash, warm, dry    Labs:   All labs reviewed by me  Electrolytes/Renal -   Recent Labs   Lab Test 05/11/25  1208 05/11/25  1205 05/11/25  0757 05/11/25  0326 05/11/25  0322 05/10/25  2357 05/10/25  2347   NA  --  135  --   --  137  --  136   POTASSIUM  --  4.2  --   --  3.9  --  4.2   CHLORIDE  --  101  --   --  103  --  102   CO2  --  20*  --   --  20*  --  20*   BUN  --  22.3*  --   --  27.8*  --  31.9*   CR  --  0.97  --   --  0.99  --  1.06   GLC 81 90 130*   < > 122*   < > 99   KARINA  --  8.7*  --   --  9.2  --  9.3   MAG  --  2.6*  --   --  1.9  --  2.0   PHOS  --  3.9  --   --  4.4  --  5.3*    < > = values in this interval not displayed.       CBC -   Recent Labs   Lab Test 05/11/25  0942 05/11/25  0322 05/10/25  2347 05/10/25  1952   WBC  --  11.9* 10.1 8.1   HGB 13.9 13.7 13.3 13.5   PLT  --  119* 107* 124*       LFTs -   Recent Labs   Lab Test 05/11/25  1205 05/11/25  0322 05/10/25  2347 05/10/25  1952   ALKPHOS  --  117 114 109   BILITOTAL  --  3.3* 3.5* 3.1*   ALT  --  23 23 27   AST  --  33 31 33   PROTTOTAL  --  6.1* 6.0* 6.3*   ALBUMIN 3.1* 3.5 3.6 3.9       Iron Panel - No lab results found.        Current Medications:  Current Facility-Administered Medications   Medication Dose Route Frequency Provider Last Rate Last Admin    acetaminophen (TYLENOL) tablet 975 mg  975 mg Oral Q8H Eugneia Zavala MD   975 mg at 05/10/25 1028    [Held by provider] cloNIDine (CATAPRES) tablet 0.1 mg  0.1 mg Oral Q8H Roxi Alston MD   0.1 mg at 05/10/25 0800    copper chloride 2 mg in sodium chloride 0.9 % 115 mL intermittent infusion  2 mg Intravenous Daily Jolie Dexter, APRN CNP 57.5 mL/hr at 05/11/25 1146 2 mg at 05/11/25 1146    folic acid (FOLVITE) tablet 1 mg  1 mg Per Feeding Tube Daily " Jolie Dexter APRN CNP   1 mg at 05/10/25 0800    [Held by provider] heparin ANTICOAGULANT injection 5,000 Units  5,000 Units Subcutaneous Q8H [facility] Ganesh Griffiths MD   5,000 Units at 05/09/25 1155    insulin aspart (NovoLOG) injection (RAPID ACTING)  1-12 Units Subcutaneous Q4H Brendon Haas DO   1 Units at 05/10/25 1135    [Held by provider] insulin glargine (LANTUS PEN) injection 20 Units  20 Units Subcutaneous Q24H Roxi Alston MD   20 Units at 05/10/25 0818    melatonin tablet 5 mg  5 mg Oral or Feeding Tube QPM Roxi Alston MD        methocarbamol (ROBAXIN) tablet 500 mg  500 mg Oral 4x Daily Eugenia Zavala MD   500 mg at 05/11/25 1146    multivitamin w/minerals (THERA-VIT-M) tablet 1 tablet  1 tablet Oral or Feeding Tube Daily Jolie Dexter APRN CNP   1 tablet at 05/10/25 0800    oxyCODONE (ROXICODONE) tablet 15 mg  15 mg Oral or Feeding Tube Q4H Eugenia Zavala MD   15 mg at 05/11/25 1311    pantoprazole (PROTONIX) 2 mg/mL suspension 40 mg  40 mg Oral or Feeding Tube QAM AC Brendon Haas,    40 mg at 05/11/25 0918    polyethylene glycol (MIRALAX) Packet 17 g  17 g Oral BID Roxi Alston MD   17 g at 05/10/25 0800    QUEtiapine (SEROquel) tablet 50 mg  50 mg Oral At Bedtime Roxi Alston MD   50 mg at 05/09/25 2202    senna-docusate (SENOKOT-S/PERICOLACE) 8.6-50 MG per tablet 2 tablet  2 tablet Oral BID Roxi Alston MD   2 tablet at 05/10/25 0800    sodium chloride (PF) 0.9% PF flush 3 mL  3 mL Intracatheter Q8H Ganesh Griffiths MD   3 mL at 05/09/25 0645    thiamine (B-1) tablet 100 mg  100 mg Per Feeding Tube Daily Jolie Dexter APRN CNP   100 mg at 05/10/25 0800    vitamin C (ASCORBIC ACID) liquid 500 mg  500 mg Per Feeding Tube BID Jolie Dexter APRN CNP   500 mg at 05/10/25 2148     Current Facility-Administered Medications   Medication Dose Route Frequency Provider Last Rate Last Admin    dexmedeTOMIDine  (PRECEDEX) 4 mcg/mL in sodium chloride 0.9 % 100 mL infusion  0.1-1.2 mcg/kg/hr (Dosing Weight) Intravenous Continuous Ganesh Griffiths MD   Stopped at 05/10/25 1345    dextrose 10% infusion   Intravenous Continuous PRN Mireya López,  60 mL/hr at 05/11/25 0021 1,000 mL at 05/11/25 0021    dialysate for CVVHD & CVVHDF (Phoxillum BK4/2.5)  12.5 mL/kg/hr CRRT Continuous Tico Spain MD 1,000 mL/hr at 05/11/25 1104 12.5 mL/kg/hr at 05/11/25 1104    midazolam (VERSED) 100 mg/100 mL NS infusion - ADULT  1-6 mg/hr Intravenous Continuous Roxi Alston MD   Stopped at 05/11/25 1345    No heparin required   Does not apply Continuous PRN Tico Spain MD        norepinephrine (LEVOPHED) 16 mg in  mL infusion MAX CONC CENTRAL LINE  0.01-0.6 mcg/kg/min (Dosing Weight) Intravenous Continuous Ganesh Griffiths MD 1.3 mL/hr at 05/11/25 1330 0.02 mcg/kg/min at 05/11/25 1330    POST-filter replacement solution for CVVHD & CVVHDF (Phoxillum BK4/2.5)   CRRT Continuous Tico Spain  mL/hr at 05/11/25 0554 New Bag at 05/11/25 0554    PRE-filter replacement solution for CVVHD & CVVHDF (Phoxillum BK4/2.5)  12.5 mL/kg/hr CRRT Continuous Tico Spain MD 1,000 mL/hr at 05/11/25 1104 12.5 mL/kg/hr at 05/11/25 1104

## 2025-05-11 NOTE — PLAN OF CARE
ICU End of Shift Summary. See flowsheets for vital signs and detailed assessment.    Changes this shift: Pt weaned off paralytic this AM & sedation weaned off. Utilizing scheduled & PRN pain medications for management. Now following intermittent simple commands; RASS -1 to -2. Requiring minimal levophed to keep MAP > 65. Bladder pressure at 0800 was 16, order to discontinue monitoring. OK to use J-port for meds. CRRT goal increased 0-150/hr net negative. Remains on D10 while off TF. Mother updated at bedside.    Plan:  Continue plan of care. Monitoring drain outputs & dressings closely.

## 2025-05-11 NOTE — PROCEDURES
Brief Procedure Note    Name: Sebastián Rodas   Admission Date: 2025  MRN: [id number]  Sex: male  : 1995   Age: 29 year old    Attending Physician: Dr. 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  The bleeding branch was embolized with coils and Obsidio.  No further extravasation seen.        For the final procedural/operative note, please look under: Chart Review > Imaging    Madison Stanford MD

## 2025-05-11 NOTE — PROGRESS NOTES
CRRT STATUS NOTE    DATA:  Time:  2:49 PM  Pressures WNL:  YES  Filter Status:  WDL    Problems Reported/Alarms Noted:  No issues noted.    Supplies Present:  YES    ASSESSMENT:  Patient Net Fluid Balance: (-) 1747.69 ml at 1800 since MN.      Vital Signs: Temp: 100  F (37.8  C) Temp src: Bladder   Pulse: (!) 134   Resp: 22 SpO2: 99 % O2 Device: Mechanical Ventilator Oxygen Delivery: 15 LPM /60 (82)    Labs: K+ 4.2 Mag 2.6 Phos 3.9 iCal 4.5 Lactic 3.3 Hgb 13.9    Goals of Therapy: 0-150 ml/hr    INTERVENTIONS:   Weight changed in patient's daily order, changed within the CRRT machine.    PLAN:  Pull fluid toward goal as able. Monitor circuit and change q72hr and PRN. Please notify CRRT resource with any questions or concerns.

## 2025-05-11 NOTE — PROGRESS NOTES
Interval SICU Note:    Patient returned from IR after receiving 11 units PRBCs, 10 units FPP, and 3 units of platelets. Potassium 6.2. Skin mottled and abdomen was distended. Patient had oozing from his SHANNON drains and from underneath his bridging mesh. Immediately upon return the patient was started back on CRRT and shifted. Nephrology was contacted and they changed the K bath for the CRRT. Repeat labs revealed Hgb 13.5, platelets 124, INR 1.17, fibrinogen 337, and lactate 4.4 (down from 5.9).     Patient's mother, Marbella, was at bedside and I discussed with her the IR procedure and next steps moving forward. Dr. Gonzalez also talked with Marbella and reiterated Sebastián's overall prognosis. Marbella would like to continue Sebastián being full code with restorative treatments.     Patient's abdomen continued to become distended. The decision was made to paralyze him and assess bladder pressures with concern for compartment syndrome. Initial bladder pressure was read as 5. Repeat was 7. Will plan to continue to monitor the patient's bladder pressures q4h throughout the night.     Mireya López,   General Surgery, PGY-2    Update: Bladder pressure at 1 am are 19. Discussed with ICU attending and chief resident. Will start hourly bladder pressure checks. Place OG/NG to LIS for further decompression.

## 2025-05-11 NOTE — PLAN OF CARE
ICU End of Shift Summary. See flowsheets for vital signs and detailed assessment.    Changes this shift: RASS -1 to 1. PRN Dilaudid and scheduled oxy 15mg utilized for pain. Follows commands. Intermittent confusion. Sinus tach. Trach dome 40%/40L. Soft BP requiring initiation of second pressor. Increased bloody output from all SHANNON sites and ostomy site. 1.5 L albumin 5%. 250cc LR. STAT CTA showing arterial bleed; 3 unit PRBcs given and FFP; then MTP initiated. Pt emergently sent to IR. MTP stopped. Second MTP started around 1730. On cooler number 4 at time of writing note. Pt placed back on vent support (pressure support) in IR. Lactic rising.     Plan:  IR intervention. MTP. Wean pressors as able.   Goal Outcome Evaluation:      Plan of Care Reviewed With: patient, parent    Overall Patient Progress: decliningOverall Patient Progress: declining    Outcome Evaluation: increased pressor requirements; MTP x 2. to IR for emergent coiling

## 2025-05-11 NOTE — PROGRESS NOTES
Surgery Progress Note  05/11/2025       Subjective:  Yesterday evening patient had increasing pressor requirements. CTA revealed large area of active arterial extravasation in the upper abdomen. MTP was started. Patient is now s/p embolization of small pancreatic branch off of the distal celiac artery with IR on 5/10. Overnight he had increased abdominal distension likely secondary to large volume of blood products given during MTP. Due to concern for abdominal compartment syndrome he was paralyzed and bladder pressures were obtained. Most recent this AM were 21.     Objective:  Temp:  [92.1  F (33.4  C)-100.2  F (37.9  C)] 100.2  F (37.9  C)  Pulse:  [100-160] 118  Resp:  [10-37] 16  MAP:  [40 mmHg-151 mmHg] 69 mmHg  Arterial Line BP: ()/(40-94) 117/46  FiO2 (%):  [30 %-100 %] 60 %  SpO2:  [89 %-100 %] 98 %    I/O last 3 completed shifts:  In: 26494.24 [I.V.:3909.24; Other:6716; NG/GT:335]  Out: 4620 [Urine:150; Emesis/NG output:350; Drains:3952; Other:168]      Gen: sedated and paralyzed   Resp: Ventilated, trach  Abd: Abdomen is taut but compressible, drains with dark sanguinous output, [place] with succus, midline abdominal dressing taken down, moderate amounts of blood clot were evacuated and dressing changed.    Ext: bilateral lower extremities appear mottled, warm to the touch, edematous      Labs:  Recent Labs   Lab 05/11/25  0322 05/10/25  2347 05/10/25  1952   WBC 11.9* 10.1 8.1   HGB 13.7 13.3 13.5   * 107* 124*       Recent Labs   Lab 05/11/25  0603 05/11/25  0326 05/11/25  0322 05/10/25  2357 05/10/25  2347 05/10/25  2038 05/10/25  2036 05/10/25  2022 05/10/25  1952 05/10/25  1841 05/10/25  1715   NA  --   --  137  --  136  --   --   --  139   < > 134*   POTASSIUM  --   --  3.9  --  4.2  --  5.0  --  5.9*  5.9*   < > 5.6*  5.6*   CHLORIDE  --   --  103  --  102  --   --   --  103   < > 105   CO2  --   --  20*  --  20*  --   --   --  20*   < > 17*   BUN  --   --  27.8*  --  31.9*  --   --    --  33.4*   < > 37.2*   CR  --   --  0.99  --  1.06  --   --   --  1.18*   < > 1.20*   * 123* 122*   < > 99   < >  --    < > 206*   < > 153*   KARINA  --   --  9.2  --  9.3  --   --   --  9.1   < > 10.0   MAG  --   --  1.9  --  2.0  --   --   --   --   --  2.1   PHOS  --   --  4.4  --  5.3*  --   --   --   --   --  6.5*    < > = values in this interval not displayed.       Imaging:  CTAP OS 04/27:    Impression    1.  Worsening sequela of necrotizing pancreatitis. Dominant peripancreatic collection has increased in size, now measuring 19 x 13 cm, previously 17 x 11 cm; increasing gas within this collection is worrisome for infection. Large volume ascites and fat necrosis elsewhere throughout the abdomen and pelvis has also increased from prior.  2.  New presumed blood products in the dominant peripancreatic collection, as well as layering within the left paracolic gutter, suspicious for interval bleeding. Recommend trending hemoglobin levels; CTA abdomen and pelvis could be considered if there is concern for active bleeding.  3.  Mild small bowel wall thickening, possibly reactive or sequela of enteritis.  4.  Similar right lower lobe consolidation.     Assessment/Plan:   Sebastián Rodas is a 29-year-old male with a history of alcohol use disorder, anxiety, and bipolar disorder who was admitted on 3/30/2025 for alcohol withdrawal following a recent binge, presenting with diffuse abdominal pain. Initial workup revealed leukocytosis and elevated lipase concerning for acute pancreatitis. He developed acute encephalopathy and was transferred to the ICU on 3/31, requiring intubation and vasopressors by 4/1 due to shock. Hospital course has been complicated by acute renal failure requiring CRRT, cardiomyopathy (initial LVEF 20-25%, improved to 65-70% by 4/14), and necrotizing pancreatitis with unorganized fluid collections. He completed a 14-day course of meropenem but remains intermittently febrile and critically  "ill, requiring ongoing dialysis and vasopressor support. On 4/27, after clinical deterioration and imaging consistent with a likely perforated viscus, following family discussion, he underwent emergent exploratory laparotomy, necrosectomy, transverse colectomy, abdominal washout, and drain placement. Patient was transferred to [facility] on 4/30/25 for further surgical management. Went to OR 5/1 for re-open laparotomy, I&D, placement of colostomy tube in presumed previous colectomy staple line, necrosectomy, and Abthera placement. On 5/2, increasing sanguineous output from drains concerning for bleeding from pancreatic bed. Family care conference held 5/2, plan for restorative cares at that time. Take back to OR twice (5/4 and 5/7) for abdominal washout. Per nursing note: 5/9 morning after he saw a picture of his abdomen that he \"desires to speak to team about updating goals of care, expressed desire to pull back on cares\". Goals of care conference on 5/10 with continuation of full code and restorative cares. CTA on 5/10 revealed large area of active arterial extravasation in the upper abdomen. MTP was started. Patient is now s/p embolization of small pancreatic branch off of the distal celiac artery with IR on 5/10.     Plan:  - No further surgical options for any intraabdominal pathology   - Surgery team will change dressings during AM rounds, nursing to change in the pm and PRN during the day if copious drainage during the day. Please place Xeroform over the bridging mesh prior to placing Kerlix  - Would recommend holding tube feeds  - NG tube to LIS  - Appreciate WOC cares  - Would favor not starting anticoagulants given the high risk of bleeding.  - Other cares per SICU, we appreciate excellent cares.     Seen, examined, and discussed with chief resident, who will discuss with staff.    Mireya López, DO  General Surgery, PGY-2  "

## 2025-05-11 NOTE — PROGRESS NOTES
CRRT STATUS NOTE    DATA:  Problems Reported/Alarms Noted:  none  Supplies Present:  YES    ASSESSMENT:    Patient Net Fluid Balance:  +[phone] net since midnight       Intake/Output Summary (Last 24 hours) at 5/10/2025 1946  Last data filed at 5/10/2025 1900  Gross per 24 hour   Intake 26275.25 ml   Output 3645 ml   Net 7418.25 ml       Vital Signs: Temp:  [97.6  F (36.4  C)-100.9  F (38.3  C)] 98.5  F (36.9  C)  Pulse:  [] 147  Resp:  [12-37] 28  MAP:  [40 mmHg-151 mmHg] 107 mmHg  Arterial Line BP: ()/(34-93) 159/82  FiO2 (%):  [30 %-100 %] 100 %  SpO2:  [89 %-100 %] 93 %       Most Recent BMP's:  Recent Labs   Lab Test 05/10/25  1841 05/10/25  1715 05/10/25  0320 05/09/25  1622 05/09/25  0412 05/08/25  1419    134* 137 135 136 137  137   POTASSIUM 6.2* 5.6*  5.6* 4.8 4.3 4.1 3.8  3.8   CHLORIDE 109* 105 106 104 104 104  104   CO2 17* 17* 19* 22 21* 21*  21*   BUN 34.2* 37.2* 45.9* 46.3* 44.3* 43.3*  43.3*   CR 1.18* 1.20* 1.27* 1.15 1.17 1.12  1.12   ANIONGAP 14 12 12 9 11 12  12   KARINA 8.7* 10.0 7.5* 7.1* 7.7* 7.7*  7.7*     Most Recent CBC's:  Recent Labs   Lab Test 05/10/25  1841 05/10/25  1715 05/10/25  1130 05/10/25  0320   WBC 9.9 13.3* 17.6* 10.9   HGB 9.3*  9.3* 7.3* 7.7* 8.9*   MCV 92 94 89 90   PLT 73* 101* 253 164     Most Recent ABG's:  Recent Labs   Lab Test 05/10/25  1228 05/06/25  0347 05/05/25  0339   PH 7.43 7.38 7.35   PO2 86 96 135*   PCO2 30* 37 40   HCO3 20* 22 22   MARGOTH -3.4* -2.7 -3.2*       Goals of Therapy:  I=O     INTERVENTIONS:  Pt currently in IR for emergent visceral angiogram/possible embolization, MTP in progress. Charting reviewed. Rounding completed. Treatment plan discussed with bedside nurse.     PLAN:  Continue with current plan of care please contact CRRT resource with any questions or concerns via [employer].

## 2025-05-11 NOTE — PROGRESS NOTES
SURGICAL ICU PROGRESS NOTE  05/11/2025    Date of Service (when I saw the patient): 05/11/2025    ASSESSMENT:  Sebastián Rodas is a 29M with alcohol abuse, anxiety, and bipolar disorder, who was admitted 3/30/25 for alcohol withdrawal and abdominal pain. Workup showed leukocytosis and elevated lipase, consistent with acute pancreatitis. He developed encephalopathy and shock, requiring ICU care, intubation, vasopressors, and CRRT by 4/1. His course was complicated by cardiomyopathy (EF 20-25%, improved to 65-70% by 4/14), necrotizing pancreatitis, and persistent infection despite 14 days of meropenem. On 4/27, imaging showed likely perforated viscus; he underwent emergent laparotomy, necrosectomy, and colectomy. Transferred to [facility] SICU on 4/30. On 5/1, reoperation revealed colonic staple line breakdown, necrotic pancreas, vessel thrombosis, and adhesions. Colostomy with malankot drain and temporary abdominal closure performed. Prognosis is poor; palliative care involved. Care conference 5/2 with family to talk goals of care. Full code, family acknowledged that poor prognosis is to be expected. 5/4 s/p exploratory surgery, additional necrotic pancreas remove with no obvious spillage or sign of bleeding at the time. Went back to the OR 5/7 for abdominal wash out.     Significant overnight events:  Acute increase in pressor requirements and copious bloody drain output. Repeat CT Abdomen 5/10 demonstrated significant active extravasation on arterial phase imaging, MTP initiated, IR arterial embolization of pancreatic vessels performed. Lactate peaked at 5.9, potassium 6.2. on return from IR, was noted to have skin mottling and abdominal retention. CRRT restarted. Per discussion at care conference 5/10, and discussion with mother at bedside post IR procedure, Sebastián's overall prognosis was reiterated. With the understanding that surgical intervention for bleeding would not be offered if he suffers a catastrophic bleed  as it would be considered futile, mother (Marbella) wishes to continue full restorative cares in the interim.  The decision was made to paralyze him and assess bladder pressures with concern for abdominal compartment syndrome.    PRODUCTS RECEIVED:  11 units prbc  10 units ffp  3 units of platelets    CHANGES and MAJOR THINGS TODAY:  - Wean paralytics and sedatives  - RASS goal 0 to -1  - Wean Vent as able  - Ok to use the J-tube for meds, keep the G portion to gravity   - NGT to LIS  - stop intra abdominal pressure monitoring, as no further surgical intervention planned or considered beneficial  - stop TXA  - Hold Lantus, continue HSSI until tube feeds reinstituted  - Pull fluids on CRRT, Fluid balance goal: net negative 1-2 L    PLAN:    Neurological:  # Acute pain   # Encephalopathy  - Monitor neurological status. Delirium preventions and precautions.   # Pain: PO Robaxin, IV dilaudid PRN, oxycodone 15mg Q4, Tylenol Q8. Wean dilaudid gtt  # Sedation: Versed 12 mg/hr, Propofol 40 mcg/kg/min, Precedex gtt held  # Paralytic: Cisatracurium 3 mcg/kg/min  - Wean sedatives and paralytic, RASS goal: 0 to -1    # Anxiety   - Clonidine 0.1mg Q8 for anxiety and tachycardia  - Seroquel 50 mg at bedtime     Pulmonary:   # Acute hypoxic respiratory support  # S/p tracheostomy placement   - FiO2 (%): 40 %, Resp: 26, Vent Mode: VC/AC, Resp Rate (Set): 16 breaths/min, Tidal Volume (Set, mL): 420 mL, PEEP (cm H2O): 5 cmH2O, Resp Rate (Set): 16 breaths/min, Tidal Volume (Set, mL): 420 mL, PEEP (cm H2O): 5 cmH2O   - Wean vent   - ENT trach site evaluation 5/5/25 recommending usual trach cares, regular dressing changes, and keeping ties snug.   - Chest Xray 5/6 showed stable right basilar opacities and stable small bilateral pleural effusions.   - Repeat chest x-ray 5/8 showed no major changes.  - CT PE 5/9 showed no evidence of pulmonary embolism    Cardiovascular:    # Cardiomyopathy   - Initial LVEF 20-25%, improved to 65-70%  #  Shock-distributive (septic)  # Hemorrhagic shock   # Sinus Tachycardia  - Monitor hemodynamic status. MP goal > 65.   - Levophed @ 0.02 mcg/kg/min, Vasopressin off since 5/10  - EKG 5/8, 5/9 showed sinus tachycardia with no ectopy     Gastroenterology/Nutrition:  # S/p emergent exploratory laparotomy, necrosectomy, transverse colectomy, abdominal washout, and drain placement 5/1 and 5/4  # S/p Pancreatic branch vessel embolization with IR 5/10  # Severe necrotizing pancreatitis  # Splenic vein thrombosis  - will defer management of dressings and drain removal per EGS   - on PPI  - U/S 5/09 revealed no visualization of splenic vein.   - CT abdomen 5/9 showed a small focus of active bleed along the expected site of the necrosectomy immediately superior to the pancreatic neck. Large volume complex abdominal ascites, complex retroperitoneal fluid and extensive fat necrosis throughout the abdomen/pelvis. Thickened/enhancing peritoneum throughout the abdomen/pelvis, compatible with peritonitis, with reactive small and large bowel thickening  - Repeat CT 5/10: large area of active arterial extravasation in the upper abdomen, with the source vessel appearing to arise anterior to the celiac trunk. Large amount of hemoperitoneum.  - IR embolization using intra procedural CTA and angiograms for vessel identification  - Intra- abdominal pressure: 16 (peaked at 21)  - Stop IAP monitoring  - Ongoing copious dark bloody drain output, CTM  - NG tube placed for decompression, 350 ml initial bilious output. Keep to LIS    # Severe Protein calorie deficit malnutrition due to critical illness  - Tube feeds held. Ok for meds via J tube  - Copper replacement ordered by nutrition  - RD consult. Appreciate cares and recommendations.     Renal/Fluids/Electrolytes:   # AGMA likely 2/2 Hypoperfusion, SARAHI  # Lactic acidosis  # Hyperkalemia resolved  # Fluid overload  # Acute kidney injury on CCRT  Baseline Cr 0.7-0.8. Presented with Cr 0.96 on  3/30. Rapidly markos to 5.2 on 4/1. Oliguric. Garcia UA with proteinuria, hematuria, pyuria, moderate bacteria.  Kidneys unremarkable on CT. Has severe ATN in the setting of shock, ADHF, intravascular hypovolemia/3rd spacing from pancreatitis.   - Lactate downtrending 2.9 --> 2.5 (Peaked @ 5.9)  - On CRRT   - 150 ml urine output overnight  - Nephrology following  - FB goal: net neg 1-2 L, pull fluids on CRRT if no increased pressor requirements or uptrending lactate  - Continue to monitor intake and output    Endocrine:   # Stress hyperglycemia    - hgb A1c 5.3, no hx of DM   - Sliding scale for glucose management. Hold Lantus  - Goal to keep BG< 180 for optimal wound healing      ID:  # Leukocytosis  # pancreatitis, with infected pancreatitic necrosis   WBC Count   Date Value Ref Range Status   05/11/2025 11.9 (H) 4.0 - 11.0 10e3/uL Final     - Completed 14 days course of meropenem and caspofungin.   - Zosyn added 5/2/25--5/11. Last dose today.  - Discontinued micafungin 5/3     cultures:  - 4/30 blood cultures- NGTD   - 5/1 blood cultures ordered - NGTD     Heme:     # Acute blood loss anemia due   # Anemia of critical illness   # Thrombosed splenic vein   - Transfuse if hgb <7.0 or signs/symptoms of hypoperfusion. Monitor and trend  Hemoglobin   Date Value Ref Range Status   05/11/2025 13.7 13.3 - 17.7 g/dL Final     - MTP 5/10, transfused 11 units prbc, 10 units ffp and 3 units of platelets  - Coags WNL   - Continue holding SQH  - TXA infusion commenced 5/10, hold today  - CBC q8, monitor drain output     Musculoskeletal:   # Deconditioning and weakness due to critical illness   - Physical and occupational therapy consult      Skin:  # Pressure Ulcers - Buttocks/rectal Area   - Barrier cream with liberal application. Continue fecal management system   - WOC consult      #Pressure Ulcers- Left Heel  Pressure Injury Location: Left heel   Wound type: Pressure Injury     Pressure Injury Stage: Deep Tissue Pressure  Injury (DTPI), present on admission     General Cares/Prophylaxis:    DVT Prophylaxis: SQH held  GI Prophylaxis: PPI  Restraints: Restraints for medical healing needed: NO     Lines/ tubes/ drains:  - HD line--  Right IJ  - PICC line- left   - Right radial A line  - PIV x 3  - Trach  - G-J  - NG for decompression  - Shore   - Abthera   - SHANNON x 4  - Drain 4 - Colostomy drain     Disposition:  - Surgical ICU       Patient seen, findings and plan discussed with surgical ICU staff  Dr. Alban Euceda MD, PGY-1    ====================================  INTERVAL HISTORY:  Course reviewed. See overnight as discussed above.     OBJECTIVE:   1. VITAL SIGNS:   Temp:  [92.1  F (33.4  C)-100.4  F (38  C)] 100.4  F (38  C)  Pulse:  [100-160] 119  Resp:  [10-37] 26  MAP:  [40 mmHg-151 mmHg] 62 mmHg  Arterial Line BP: ()/(40-94) 99/40  FiO2 (%):  [30 %-100 %] 40 %  SpO2:  [89 %-100 %] 98 %  FiO2 (%): 40 %, Resp: 26, Vent Mode: VC/AC, Resp Rate (Set): 16 breaths/min, Tidal Volume (Set, mL): 420 mL, PEEP (cm H2O): 5 cmH2O, Resp Rate (Set): 16 breaths/min, Tidal Volume (Set, mL): 420 mL, PEEP (cm H2O): 5 cmH2O    2. INTAKE/ OUTPUT:   I/O last 3 completed shifts:  In: 66636.24 [I.V.:3909.24; Other:6716; NG/GT:335]  Out: 4620 [Urine:150; Emesis/NG output:350; Drains:3952; Other:168]    3. PHYSICAL EXAMINATION:  General: laying in bed, paralyzed   HEENT: PERRLA. Trach present and secure, site dressed.   Pulm/Resp: Clear breath sounds bilaterally, lungs coarse but clear.  CV: RRR, S1/S2   Abdomen: globally distended and taut. Dark bloody output noted to all drains. G-J tube in place, site secured, abdomen with abthera in place, suction intact.   :  shore in place, minimal clear yellow urine noted in reservoir   MSK/Extremities: generalized edema in extremities    4. INVESTIGATIONS:   Arterial Blood Gases   Recent Labs   Lab 05/11/25  0604 05/10/25  1228 05/06/25  0347 05/05/25  0339   PH 7.33* 7.43 7.38 7.35    PCO2 43 30* 37 40   PO2 89 86 96 135*   HCO3 23 20* 22 22     Complete Blood Count   Recent Labs   Lab 05/11/25  0322 05/10/25  2347 05/10/25  1952 05/10/25  1841   WBC 11.9* 10.1 8.1 9.9   HGB 13.7 13.3 13.5 9.3*  9.3*   * 107* 124* 73*     Basic Metabolic Panel  Recent Labs   Lab 05/11/25  0757 05/11/25  0603 05/11/25  0326 05/11/25  0322 05/10/25  2357 05/10/25  2347 05/10/25  2038 05/10/25  2036 05/10/25  2022 05/10/25  1952 05/10/25  1841   NA  --   --   --  137  --  136  --   --   --  139 140   POTASSIUM  --   --   --  3.9  --  4.2  --  5.0  --  5.9*  5.9* 6.2*   CHLORIDE  --   --   --  103  --  102  --   --   --  103 109*   CO2  --   --   --  20*  --  20*  --   --   --  20* 17*   BUN  --   --   --  27.8*  --  31.9*  --   --   --  33.4* 34.2*   CR  --   --   --  0.99  --  1.06  --   --   --  1.18* 1.18*   * 123* 123* 122*   < > 99   < >  --    < > 206* 198*    < > = values in this interval not displayed.     Liver Function Tests  Recent Labs   Lab 05/11/25  0322 05/10/25  2347 05/10/25  1952 05/10/25  1715 05/10/25  1353 05/10/25  0320   AST 33 31 33  --   --  30   ALT 23 23 27  --   --  17   ALKPHOS 117 114 109  --   --  194*   BILITOTAL 3.3* 3.5* 3.1*  --   --  1.5*   ALBUMIN 3.5 3.6 3.9 2.4*  --  1.9*   INR  --  1.13 1.17* 1.65* 1.28*  --      Pancreatic Enzymes  No lab results found in last 7 days.    Coagulation Profile  Recent Labs   Lab 05/10/25  2347 05/10/25  1952 05/10/25  1715 05/10/25  1353   INR 1.13 1.17* 1.65* 1.28*   PTT 29 30 37 33         5. RADIOLOGY:   Recent Results (from the past 24 hours)   CTA GI Bleed    Narrative    Exam: Computed tomographic angiography of the abdomen and pelvis  without and with contrast, including 3D reformations dated 5/10/2025  3:51 PM    Clinical information: active bleeding at necrosectomy site on previous  imaging. increasing bloody drain output and pressor requirements.  interval exam    Comparison study: Abdomen ultrasound with Doppler  5/9/2025.  Abdomen/pelvis CT with contrast 5/9/2025.     Technique: Helical scans through the abdomen and pelvis obtained  before the administration of intravenous contrast media and following  the injection of contrast media in the early arterial phase and then  in the venous phase.    Contrast: 110ml Isovue 370.    FINDINGS:      Much of the pancreatic parenchyma does not enhance which is suggestive  of necrosis. There is significant active arterial extravasation of  contrast in the pancreatic bed which extends anteriorly into the  mesentery/peritoneum towards the anterior abdominal wall surgical  defect. This demonstrates blooming on the venous phase and is  significantly worsened versus more conspicuous compared to 5/9/2025.  The source vessel is not definitively identified on this study but the  densest focus of arterial extravasation is just anterior to the celiac  trunk, suggesting the source being in this region. No discrete  arterial pseudoaneurysm. Intermediate density fluid is present  throughout the abdomen and pelvis, consistent with intra-abdominal  hemorrhage. Additional fluid in the retroperitoneal compartment.    Intra-abdominal arteries, systemic veins and mesenteric veins are  diffusely diminutive likely due to hemorrhagic shock and vasopressors.  Abdominal aorta, celiac, superior mesenteric, inferior mesenteric,  both renal, and both iliac arteries are patent. No aortic aneurysm or  dissection. Right corona mortis. Portal and superior mesenteric veins  are diminutive but patent. Splenic vein is not visualized. Renal  veins, veins and inferior vena cava are diminutive but patent.    Postoperative changes of pancreatic necrosectomy and abdominal washout  with multiple surgical drains in place, surgical abdomen remains open.  Multifocal complex. Pancreatic fluid collections and inflammatory  changes are present, many of which are consistent with hemorrhage.  Excreted contrast in collecting system  and urinary bladder from  yesterday's CTA. Vicarious excretion of contrast in the gallbladder.  Periportal edema is present. Patchy enhancement along the inferior  aspect of the liver. Mild gallbladder wall thickening. Normal size of  the spleen. Adrenal glands, kidneys and urinary bladder are  unremarkable. Small and large bowel wall thickening, likely reactive.  Status post transverse colectomy. Fluid tracks into the inguinal  canals.    Lung bases:   Partially visualized right central line. Right greater than left lower  lobe consolidations with air bronchograms. Filling defects in the  right lower lobe airways. Linear scar in the lingula and left lower  lobe.    Bones/soft tissues:   No aggressive osseous lesion.      Impression    IMPRESSION:    1. Large area of active arterial extravasation in the upper abdomen,  with the source vessel not clearly identified but appears to arise  anterior to the celiac trunk. Associated large amount of  hemoperitoneum extending into the open abdominal wound. The arterial  and venous vasculature is diffusely diminutive, likely secondary to  hemorrhagic shock and vasopressors.    2. Changes of pancreatitis with pancreatic necrosis and nonvisualized  splenic vein that is presumably thrombosed.    3. Mild gallbladder wall thickening may be reactive to the adjacent  surrounding intraperitoneal hemorrhage. Correlate clinically for  cholecystitis.    I have personally reviewed the examination and initial interpretation  and I agree with the findings.    MAEGAN RIDLEY MD         SYSTEM ID:  [id number]   IR Visceral Angiogram    Narrative    PROCEDURES 5/10/2025 7:44 PM:  1. Ultrasound-guided right common femoral arterial access.  2. Diagnostic celiac artery angiogram.  3. Selective catheterization of the gastroduodenal artery diagnostic  angiography.  4. Intraprocedural CT angiogram through our base catheter.  5. Selective catheterization of pancreatic branch vessel arising off  the  distal celiac artery and diagnostic angiography of this vessel.  6. Embolization of pancreatic vessel using coils and Obsidio.  7. Device assisted closure of right common femoral arteriotomy.    CLINICAL HISTORY: massive extravasation on cta. patient with  necoritizing pancreatitis.    COMPARISONS: 5/10/25    ATTENDING RADIOLOGIST: AKIL Stanford MD    FELLOW/RESIDENT: CELINA Zamarripa MD    MEDICATIONS: The patient was placed on continuous monitoring. Vital  signs and sedation were monitored by the Interventional Radiology  nurse under the Attending Physician's supervision. Sedation  medications administered: 400 mcg fentanyl, 8 mg midazolam. The  patient remained stable throughout the procedure.    ATTENDING FACE-TO-FACE SEDATION TIME: 166 minutes    FLUOROSCOPY TIME: 32.3 minutes    DOSE: 5077 mGy.    PROCEDURE:   The patient understood the limitations, alternatives, and risks of the  procedure and requested the procedure be performed. Both written and  verbal consent were obtained from the patient's mother via telephone  prior to the procedure.    The patient's bilateral groins are prepped and draped in the usual  sterile fashion. A timeout was performed. Preprocedural ultrasound of  the right groin demonstrated patent common femoral artery. Local  anesthesia was administered. Under sonographic guidance a 21-gauge  micropuncture needle was advanced into the vessel. Image documenting  needle and the vessel was archive. Introducer wire advanced and the  needle and exchange for an introducer sheath. Bentson wire advanced  through the introducer sheath which was then exchanged for a 5 [place]  vascular sheath. The celiac artery was catheterized with a Sos base  catheter. Celiac artery angiogram was performed in multiple  obliquities. There is a large amount of puddling of extravasated  contrast immediately anterior to the celiac artery which could be seen  blooming out. It was difficult to identify source vessel. Vasospasm  is  apparent in the splenic artery, but without evidence of extravasation.  A source vessel could not be identified despite angiograms in multiple  obliquities. The gastroduodenal artery was then catheterized with a  2.8 [place] Progreat catheter. Diagnostic angiogram performed. No  extravasation was identified from the GDA.    Microcatheter was then removed. Through the base catheter, an  intraprocedural CT angiogram was performed. CT demonstrated  extravasation as previously seen. On CT, however, the culprit blood  vessel was successfully identified as a pancreatic branch arising  directly off of the distal celiac artery. We really engage the celiac  artery using a C2 glide catheter. The pancreatic vessel was then  catheterized with a Fathom microwire and Truselect microcatheter. This  pancreatic branch vessel was then embolized using several 2 mm  Concerto detachable coils, starting distal to the site of injury and  coiling back. Once the coil pack at been deployed across the level of  vessel injury microcatheter access became very unstable. We intended  to extend our coil pack further proximally, however, due to the  instability of the microcatheter in this vessel, we could not fully  deploy a coil in it. After losing access into this vessel on 2  occasions we aborted further attempt at coiling the remainder of the  vessel. Instead, with a 2.4 [place] angled Progreat microcatheter nosed  up to our coil pack we injected 0.2 mL of Obsidio in order to seal off  the front door of the bleed.    Completion angiogram demonstrated successful embolization. There is no  residual extravasation identified. The common hepatic and splenic  artery are patent. Residual areas of vasospasm persists in the splenic  artery.    Catheter removed. Limited right iliofemoral angiography was performed  demonstrating a satisfactory puncture. Sheath removed and hemostasis  achieved using a 6 [place] Angio-Seal.      Impression     IMPRESSION:  1. Large volume active extravasation identified arising from a  pancreatic branch vessel which originated directly off the distal  celiac artery. Although extravasation was readily identified, numerous  angiograms and an intraprocedural CTA were needed to identify the  culprit vessel.  2. Successful embolization of bleeding pancreatic vessel arising  directly off of the celiac artery using a combination of coils and  Obsidio.   XR Abdomen Port 1 View    Narrative    EXAMINATION:  XR ABDOMEN PORT 1 VIEW 5/11/2025 2:55 AM.    COMPARISON: CT 5/10/2025    HISTORY:  S/p placement of NG tube    FINDINGS:   Enteric tube tip and sidehole project over the stomach. Nonspecific  relative paucity of bowel gas. No pneumatosis or portal venous gas.  Air component of the intra-abdominal fluid collections is better  appreciated on CT 5/10/2025. Multiple abdominal drains present.  Embolization coils in the central abdomen also demonstrated. No acute  osseous abnormality. Persistent hazy greater than left basilar  opacities. Catheter in the thorax. Please see prior chest imaging.      Impression    IMPRESSION:   Enteric tube tip and sidehole project over the stomach.    I have personally reviewed the examination and initial interpretation  and I agree with the findings.    GLENN KATZ MD         SYSTEM ID:  [id number]       =========================================

## 2025-05-11 NOTE — PROGRESS NOTES
CRRT STATUS NOTE    DATA:  Time:  6:00 AM  Pressures WNL:  YES  Filter Status:  WDL    Problems Reported/Alarms Noted:  None.    Supplies Present:  YES    ASSESSMENT:  Patient Net Fluid Balance:  Net +8440 ml @ midnight, -468 ml @ 0600.  Vital Signs:  , /54, MAP 75  Labs:  K 3.9, Mg 1.9, Phos 4.4, iCa 5, Hgb 13.7, Plt 119  Goals of Therapy:  I=O    INTERVENTIONS:   None.    PLAN:  Continue to monitor circuit daily and change set q72 hours or PRN for clotting/clogging. Please call CRRT RN with any questions/problems.

## 2025-05-12 LAB
ALBUMIN SERPL BCG-MCNC: 2.8 G/DL (ref 3.5–5.2)
ALBUMIN SERPL BCG-MCNC: 2.9 G/DL (ref 3.5–5.2)
ALBUMIN SERPL BCG-MCNC: 3.1 G/DL (ref 3.5–5.2)
ALP SERPL-CCNC: 165 U/L (ref 40–150)
ALT SERPL W P-5'-P-CCNC: 19 U/L (ref 0–70)
ANION GAP SERPL CALCULATED.3IONS-SCNC: 12 MMOL/L (ref 7–15)
ANION GAP SERPL CALCULATED.3IONS-SCNC: 13 MMOL/L (ref 7–15)
ANION GAP SERPL CALCULATED.3IONS-SCNC: 13 MMOL/L (ref 7–15)
ANION GAP SERPL CALCULATED.3IONS-SCNC: 15 MMOL/L (ref 7–15)
AST SERPL W P-5'-P-CCNC: 28 U/L (ref 0–45)
BILIRUB DIRECT SERPL-MCNC: 3.19 MG/DL (ref 0–0.3)
BILIRUB SERPL-MCNC: 4 MG/DL
BUN SERPL-MCNC: 17.7 MG/DL (ref 6–20)
BUN SERPL-MCNC: 17.8 MG/DL (ref 6–20)
BUN SERPL-MCNC: 18 MG/DL (ref 6–20)
BUN SERPL-MCNC: 19.1 MG/DL (ref 6–20)
CA-I BLD-MCNC: 4.3 MG/DL (ref 4.4–5.2)
CA-I BLD-MCNC: 4.5 MG/DL (ref 4.4–5.2)
CA-I BLD-MCNC: 4.5 MG/DL (ref 4.4–5.2)
CALCIUM SERPL-MCNC: 8.1 MG/DL (ref 8.8–10.4)
CALCIUM SERPL-MCNC: 8.6 MG/DL (ref 8.8–10.4)
CALCIUM SERPL-MCNC: 8.6 MG/DL (ref 8.8–10.4)
CALCIUM SERPL-MCNC: 8.7 MG/DL (ref 8.8–10.4)
CHLORIDE SERPL-SCNC: 100 MMOL/L (ref 98–107)
CHLORIDE SERPL-SCNC: 101 MMOL/L (ref 98–107)
CHLORIDE SERPL-SCNC: 102 MMOL/L (ref 98–107)
CHLORIDE SERPL-SCNC: 102 MMOL/L (ref 98–107)
CREAT SERPL-MCNC: 0.95 MG/DL (ref 0.67–1.17)
CREAT SERPL-MCNC: 0.95 MG/DL (ref 0.67–1.17)
CREAT SERPL-MCNC: 0.96 MG/DL (ref 0.67–1.17)
CREAT SERPL-MCNC: 0.97 MG/DL (ref 0.67–1.17)
EGFRCR SERPLBLD CKD-EPI 2021: >90 ML/MIN/1.73M2
ERYTHROCYTE [DISTWIDTH] IN BLOOD BY AUTOMATED COUNT: 16.1 % (ref 10–15)
ERYTHROCYTE [DISTWIDTH] IN BLOOD BY AUTOMATED COUNT: 16.1 % (ref 10–15)
ERYTHROCYTE [DISTWIDTH] IN BLOOD BY AUTOMATED COUNT: 16.3 % (ref 10–15)
ERYTHROCYTE [DISTWIDTH] IN BLOOD BY AUTOMATED COUNT: 16.6 % (ref 10–15)
GLUCOSE BLDC GLUCOMTR-MCNC: 106 MG/DL (ref 70–99)
GLUCOSE BLDC GLUCOMTR-MCNC: 126 MG/DL (ref 70–99)
GLUCOSE BLDC GLUCOMTR-MCNC: 65 MG/DL (ref 70–99)
GLUCOSE BLDC GLUCOMTR-MCNC: 88 MG/DL (ref 70–99)
GLUCOSE BLDC GLUCOMTR-MCNC: 91 MG/DL (ref 70–99)
GLUCOSE BLDC GLUCOMTR-MCNC: 92 MG/DL (ref 70–99)
GLUCOSE SERPL-MCNC: 104 MG/DL (ref 70–99)
GLUCOSE SERPL-MCNC: 71 MG/DL (ref 70–99)
GLUCOSE SERPL-MCNC: 84 MG/DL (ref 70–99)
GLUCOSE SERPL-MCNC: 92 MG/DL (ref 70–99)
HCO3 SERPL-SCNC: 19 MMOL/L (ref 22–29)
HCO3 SERPL-SCNC: 20 MMOL/L (ref 22–29)
HCO3 SERPL-SCNC: 21 MMOL/L (ref 22–29)
HCO3 SERPL-SCNC: 21 MMOL/L (ref 22–29)
HCT VFR BLD AUTO: 37.2 % (ref 40–53)
HCT VFR BLD AUTO: 38 % (ref 40–53)
HCT VFR BLD AUTO: 38.3 % (ref 40–53)
HCT VFR BLD AUTO: 38.5 % (ref 40–53)
HGB BLD-MCNC: 12.4 G/DL (ref 13.3–17.7)
HGB BLD-MCNC: 12.8 G/DL (ref 13.3–17.7)
HGB BLD-MCNC: 12.8 G/DL (ref 13.3–17.7)
HGB BLD-MCNC: 13.1 G/DL (ref 13.3–17.7)
HGB BLD-MCNC: 13.1 G/DL (ref 13.3–17.7)
HGB BLD-MCNC: 13.2 G/DL (ref 13.3–17.7)
LACTATE SERPL-SCNC: 2 MMOL/L (ref 0.7–2)
LACTATE SERPL-SCNC: 2.7 MMOL/L (ref 0.7–2)
LACTATE SERPL-SCNC: 3.2 MMOL/L (ref 0.7–2)
MAGNESIUM SERPL-MCNC: 2.3 MG/DL (ref 1.7–2.3)
MAGNESIUM SERPL-MCNC: 2.5 MG/DL (ref 1.7–2.3)
MAGNESIUM SERPL-MCNC: 2.5 MG/DL (ref 1.7–2.3)
MCH RBC QN AUTO: 29.7 PG (ref 26.5–33)
MCH RBC QN AUTO: 29.8 PG (ref 26.5–33)
MCH RBC QN AUTO: 29.9 PG (ref 26.5–33)
MCH RBC QN AUTO: 30.1 PG (ref 26.5–33)
MCHC RBC AUTO-ENTMCNC: 33.7 G/DL (ref 31.5–36.5)
MCHC RBC AUTO-ENTMCNC: 34.2 G/DL (ref 31.5–36.5)
MCHC RBC AUTO-ENTMCNC: 34.3 G/DL (ref 31.5–36.5)
MCHC RBC AUTO-ENTMCNC: 34.4 G/DL (ref 31.5–36.5)
MCV RBC AUTO: 87 FL (ref 78–100)
MCV RBC AUTO: 87 FL (ref 78–100)
MCV RBC AUTO: 88 FL (ref 78–100)
MCV RBC AUTO: 88 FL (ref 78–100)
PHOSPHATE SERPL-MCNC: 4.2 MG/DL (ref 2.5–4.5)
PHOSPHATE SERPL-MCNC: 4.7 MG/DL (ref 2.5–4.5)
PHOSPHATE SERPL-MCNC: 4.8 MG/DL (ref 2.5–4.5)
PLATELET # BLD AUTO: 138 10E3/UL (ref 150–450)
PLATELET # BLD AUTO: 143 10E3/UL (ref 150–450)
PLATELET # BLD AUTO: 155 10E3/UL (ref 150–450)
PLATELET # BLD AUTO: 185 10E3/UL (ref 150–450)
POTASSIUM SERPL-SCNC: 4.4 MMOL/L (ref 3.4–5.3)
POTASSIUM SERPL-SCNC: 4.6 MMOL/L (ref 3.4–5.3)
POTASSIUM SERPL-SCNC: 4.7 MMOL/L (ref 3.4–5.3)
POTASSIUM SERPL-SCNC: 4.8 MMOL/L (ref 3.4–5.3)
PROT SERPL-MCNC: 6.1 G/DL (ref 6.4–8.3)
RBC # BLD AUTO: 4.28 10E6/UL (ref 4.4–5.9)
RBC # BLD AUTO: 4.3 10E6/UL (ref 4.4–5.9)
RBC # BLD AUTO: 4.35 10E6/UL (ref 4.4–5.9)
RBC # BLD AUTO: 4.45 10E6/UL (ref 4.4–5.9)
SODIUM SERPL-SCNC: 134 MMOL/L (ref 135–145)
SODIUM SERPL-SCNC: 135 MMOL/L (ref 135–145)
TRIGL SERPL-MCNC: 386 MG/DL
WBC # BLD AUTO: 13.7 10E3/UL (ref 4–11)
WBC # BLD AUTO: 14.6 10E3/UL (ref 4–11)
WBC # BLD AUTO: 15.1 10E3/UL (ref 4–11)
WBC # BLD AUTO: 15.7 10E3/UL (ref 4–11)

## 2025-05-12 PROCEDURE — 250N000011 HC RX IP 250 OP 636: Mod: JZ

## 2025-05-12 PROCEDURE — 83735 ASSAY OF MAGNESIUM: CPT | Performed by: STUDENT IN AN ORGANIZED HEALTH CARE EDUCATION/TRAINING PROGRAM

## 2025-05-12 PROCEDURE — 999N000253 HC STATISTIC WEANING TRIALS

## 2025-05-12 PROCEDURE — 250N000013 HC RX MED GY IP 250 OP 250 PS 637: Performed by: STUDENT IN AN ORGANIZED HEALTH CARE EDUCATION/TRAINING PROGRAM

## 2025-05-12 PROCEDURE — 84450 TRANSFERASE (AST) (SGOT): CPT | Performed by: STUDENT IN AN ORGANIZED HEALTH CARE EDUCATION/TRAINING PROGRAM

## 2025-05-12 PROCEDURE — 250N000013 HC RX MED GY IP 250 OP 250 PS 637

## 2025-05-12 PROCEDURE — 82248 BILIRUBIN DIRECT: CPT | Performed by: STUDENT IN AN ORGANIZED HEALTH CARE EDUCATION/TRAINING PROGRAM

## 2025-05-12 PROCEDURE — 99233 SBSQ HOSP IP/OBS HIGH 50: CPT | Mod: GC | Performed by: CLINICAL NURSE SPECIALIST

## 2025-05-12 PROCEDURE — 82330 ASSAY OF CALCIUM: CPT | Performed by: STUDENT IN AN ORGANIZED HEALTH CARE EDUCATION/TRAINING PROGRAM

## 2025-05-12 PROCEDURE — 250N000009 HC RX 250: Performed by: STUDENT IN AN ORGANIZED HEALTH CARE EDUCATION/TRAINING PROGRAM

## 2025-05-12 PROCEDURE — 84478 ASSAY OF TRIGLYCERIDES: CPT | Performed by: STUDENT IN AN ORGANIZED HEALTH CARE EDUCATION/TRAINING PROGRAM

## 2025-05-12 PROCEDURE — 258N000001 HC RX 258: Performed by: PHYSICIAN ASSISTANT

## 2025-05-12 PROCEDURE — 999N000157 HC STATISTIC RCP TIME EA 10 MIN

## 2025-05-12 PROCEDURE — 87040 BLOOD CULTURE FOR BACTERIA: CPT

## 2025-05-12 PROCEDURE — 250N000009 HC RX 250

## 2025-05-12 PROCEDURE — 250N000013 HC RX MED GY IP 250 OP 250 PS 637: Performed by: SURGERY

## 2025-05-12 PROCEDURE — 82565 ASSAY OF CREATININE: CPT | Performed by: STUDENT IN AN ORGANIZED HEALTH CARE EDUCATION/TRAINING PROGRAM

## 2025-05-12 PROCEDURE — 94003 VENT MGMT INPAT SUBQ DAY: CPT

## 2025-05-12 PROCEDURE — 85041 AUTOMATED RBC COUNT: CPT

## 2025-05-12 PROCEDURE — 85027 COMPLETE CBC AUTOMATED: CPT | Performed by: STUDENT IN AN ORGANIZED HEALTH CARE EDUCATION/TRAINING PROGRAM

## 2025-05-12 PROCEDURE — 80069 RENAL FUNCTION PANEL: CPT | Performed by: STUDENT IN AN ORGANIZED HEALTH CARE EDUCATION/TRAINING PROGRAM

## 2025-05-12 PROCEDURE — 82435 ASSAY OF BLOOD CHLORIDE: CPT | Performed by: STUDENT IN AN ORGANIZED HEALTH CARE EDUCATION/TRAINING PROGRAM

## 2025-05-12 PROCEDURE — 82247 BILIRUBIN TOTAL: CPT | Performed by: STUDENT IN AN ORGANIZED HEALTH CARE EDUCATION/TRAINING PROGRAM

## 2025-05-12 PROCEDURE — 84075 ASSAY ALKALINE PHOSPHATASE: CPT | Performed by: STUDENT IN AN ORGANIZED HEALTH CARE EDUCATION/TRAINING PROGRAM

## 2025-05-12 PROCEDURE — 83605 ASSAY OF LACTIC ACID: CPT | Performed by: STUDENT IN AN ORGANIZED HEALTH CARE EDUCATION/TRAINING PROGRAM

## 2025-05-12 PROCEDURE — G0463 HOSPITAL OUTPT CLINIC VISIT: HCPCS

## 2025-05-12 PROCEDURE — 85018 HEMOGLOBIN: CPT | Performed by: STUDENT IN AN ORGANIZED HEALTH CARE EDUCATION/TRAINING PROGRAM

## 2025-05-12 PROCEDURE — 250N000011 HC RX IP 250 OP 636

## 2025-05-12 PROCEDURE — 90947 DIALYSIS REPEATED EVAL: CPT

## 2025-05-12 PROCEDURE — 36415 COLL VENOUS BLD VENIPUNCTURE: CPT

## 2025-05-12 PROCEDURE — 85041 AUTOMATED RBC COUNT: CPT | Performed by: STUDENT IN AN ORGANIZED HEALTH CARE EDUCATION/TRAINING PROGRAM

## 2025-05-12 PROCEDURE — 200N000002 HC R&B ICU UMMC

## 2025-05-12 PROCEDURE — 258N000003 HC RX IP 258 OP 636

## 2025-05-12 PROCEDURE — 250N000011 HC RX IP 250 OP 636: Performed by: STUDENT IN AN ORGANIZED HEALTH CARE EDUCATION/TRAINING PROGRAM

## 2025-05-12 PROCEDURE — 85018 HEMOGLOBIN: CPT

## 2025-05-12 PROCEDURE — 84460 ALANINE AMINO (ALT) (SGPT): CPT | Performed by: STUDENT IN AN ORGANIZED HEALTH CARE EDUCATION/TRAINING PROGRAM

## 2025-05-12 PROCEDURE — 84100 ASSAY OF PHOSPHORUS: CPT | Performed by: STUDENT IN AN ORGANIZED HEALTH CARE EDUCATION/TRAINING PROGRAM

## 2025-05-12 RX ORDER — HYDROMORPHONE HCL IN WATER/PF 6 MG/30 ML
0.4 PATIENT CONTROLLED ANALGESIA SYRINGE INTRAVENOUS
Status: DISCONTINUED | OUTPATIENT
Start: 2025-05-12 | End: 2025-05-13

## 2025-05-12 RX ORDER — HYDROMORPHONE HCL IN WATER/PF 6 MG/30 ML
0.2 PATIENT CONTROLLED ANALGESIA SYRINGE INTRAVENOUS
Status: DISCONTINUED | OUTPATIENT
Start: 2025-05-12 | End: 2025-05-13

## 2025-05-12 RX ORDER — DEXTROSE MONOHYDRATE 100 MG/ML
INJECTION, SOLUTION INTRAVENOUS CONTINUOUS PRN
Status: DISCONTINUED | OUTPATIENT
Start: 2025-05-12 | End: 2025-06-03 | Stop reason: HOSPADM

## 2025-05-12 RX ADMIN — CALCIUM CHLORIDE, MAGNESIUM CHLORIDE, SODIUM CHLORIDE, SODIUM BICARBONATE, POTASSIUM CHLORIDE AND SODIUM PHOSPHATE DIBASIC DIHYDRATE 12.5 ML/KG/HR: 3.68; 3.05; 6.34; 3.09; .314; .187 INJECTION INTRAVENOUS at 02:08

## 2025-05-12 RX ADMIN — METHOCARBAMOL 500 MG: 500 TABLET ORAL at 07:01

## 2025-05-12 RX ADMIN — FOLIC ACID 1 MG: 1 TABLET ORAL at 07:01

## 2025-05-12 RX ADMIN — HYDROMORPHONE HYDROCHLORIDE 0.4 MG: 0.2 INJECTION, SOLUTION INTRAMUSCULAR; INTRAVENOUS; SUBCUTANEOUS at 04:36

## 2025-05-12 RX ADMIN — CALCIUM CHLORIDE, MAGNESIUM CHLORIDE, SODIUM CHLORIDE, SODIUM BICARBONATE, POTASSIUM CHLORIDE AND SODIUM PHOSPHATE DIBASIC DIHYDRATE 12.5 ML/KG/HR: 3.68; 3.05; 6.34; 3.09; .314; .187 INJECTION INTRAVENOUS at 16:19

## 2025-05-12 RX ADMIN — Medication 5 MG: at 20:26

## 2025-05-12 RX ADMIN — OXYCODONE HYDROCHLORIDE 15 MG: 10 TABLET ORAL at 17:20

## 2025-05-12 RX ADMIN — CUPRIC CHLORIDE 2 MG: 0.4 INJECTION, SOLUTION INTRAVENOUS at 12:43

## 2025-05-12 RX ADMIN — CALCIUM CHLORIDE, MAGNESIUM CHLORIDE, SODIUM CHLORIDE, SODIUM BICARBONATE, POTASSIUM CHLORIDE AND SODIUM PHOSPHATE DIBASIC DIHYDRATE 12.5 ML/KG/HR: 3.68; 3.05; 6.34; 3.09; .314; .187 INJECTION INTRAVENOUS at 22:05

## 2025-05-12 RX ADMIN — HEPARIN SODIUM 5000 UNITS: 5000 INJECTION, SOLUTION INTRAVENOUS; SUBCUTANEOUS at 12:27

## 2025-05-12 RX ADMIN — CALCIUM CHLORIDE, MAGNESIUM CHLORIDE, SODIUM CHLORIDE, SODIUM BICARBONATE, POTASSIUM CHLORIDE AND SODIUM PHOSPHATE DIBASIC DIHYDRATE 12.5 ML/KG/HR: 3.68; 3.05; 6.34; 3.09; .314; .187 INJECTION INTRAVENOUS at 06:09

## 2025-05-12 RX ADMIN — Medication 500 MG: at 20:26

## 2025-05-12 RX ADMIN — HYDROMORPHONE HYDROCHLORIDE 0.2 MG: 0.2 INJECTION, SOLUTION INTRAMUSCULAR; INTRAVENOUS; SUBCUTANEOUS at 17:18

## 2025-05-12 RX ADMIN — SENNOSIDES AND DOCUSATE SODIUM 2 TABLET: 50; 8.6 TABLET ORAL at 07:01

## 2025-05-12 RX ADMIN — POLYETHYLENE GLYCOL 3350 17 G: 17 POWDER, FOR SOLUTION ORAL at 07:01

## 2025-05-12 RX ADMIN — CALCIUM GLUCONATE 2 G: 20 INJECTION, SOLUTION INTRAVENOUS at 15:06

## 2025-05-12 RX ADMIN — Medication 500 MG: at 07:02

## 2025-05-12 RX ADMIN — HEPARIN SODIUM 5000 UNITS: 5000 INJECTION, SOLUTION INTRAVENOUS; SUBCUTANEOUS at 20:26

## 2025-05-12 RX ADMIN — Medication 1 TABLET: at 07:01

## 2025-05-12 RX ADMIN — Medication 40 MG: at 07:02

## 2025-05-12 RX ADMIN — METHOCARBAMOL 500 MG: 500 TABLET ORAL at 12:27

## 2025-05-12 RX ADMIN — DEXTROSE MONOHYDRATE 25 ML: 25 INJECTION, SOLUTION INTRAVENOUS at 16:19

## 2025-05-12 RX ADMIN — METHOCARBAMOL 500 MG: 500 TABLET ORAL at 20:26

## 2025-05-12 RX ADMIN — METHOCARBAMOL 500 MG: 500 TABLET ORAL at 15:04

## 2025-05-12 RX ADMIN — CALCIUM CHLORIDE, MAGNESIUM CHLORIDE, SODIUM CHLORIDE, SODIUM BICARBONATE, POTASSIUM CHLORIDE AND SODIUM PHOSPHATE DIBASIC DIHYDRATE 12.5 ML/KG/HR: 3.68; 3.05; 6.34; 3.09; .314; .187 INJECTION INTRAVENOUS at 11:59

## 2025-05-12 RX ADMIN — HYDROMORPHONE HYDROCHLORIDE 0.4 MG: 0.2 INJECTION, SOLUTION INTRAMUSCULAR; INTRAVENOUS; SUBCUTANEOUS at 23:16

## 2025-05-12 RX ADMIN — POLYETHYLENE GLYCOL 3350 17 G: 17 POWDER, FOR SOLUTION ORAL at 20:25

## 2025-05-12 RX ADMIN — SODIUM CHLORIDE, SODIUM LACTATE, POTASSIUM CHLORIDE, AND CALCIUM CHLORIDE 500 ML: .6; .31; .03; .02 INJECTION, SOLUTION INTRAVENOUS at 04:15

## 2025-05-12 RX ADMIN — CALCIUM CHLORIDE, MAGNESIUM CHLORIDE, SODIUM CHLORIDE, SODIUM BICARBONATE, POTASSIUM CHLORIDE AND SODIUM PHOSPHATE DIBASIC DIHYDRATE: 3.68; 3.05; 6.34; 3.09; .314; .187 INJECTION INTRAVENOUS at 06:09

## 2025-05-12 RX ADMIN — THIAMINE HCL TAB 100 MG 100 MG: 100 TAB at 07:01

## 2025-05-12 RX ADMIN — HYDROMORPHONE HYDROCHLORIDE 0.2 MG: 0.2 INJECTION, SOLUTION INTRAMUSCULAR; INTRAVENOUS; SUBCUTANEOUS at 05:51

## 2025-05-12 RX ADMIN — NOREPINEPHRINE BITARTRATE 0.11 MCG/KG/MIN: 0.06 INJECTION, SOLUTION INTRAVENOUS at 04:26

## 2025-05-12 RX ADMIN — OXYCODONE HYDROCHLORIDE 15 MG: 10 TABLET ORAL at 09:53

## 2025-05-12 RX ADMIN — OXYCODONE HYDROCHLORIDE 15 MG: 10 TABLET ORAL at 12:27

## 2025-05-12 RX ADMIN — OXYCODONE HYDROCHLORIDE 15 MG: 10 TABLET ORAL at 01:22

## 2025-05-12 RX ADMIN — OXYCODONE HYDROCHLORIDE 15 MG: 10 TABLET ORAL at 20:28

## 2025-05-12 RX ADMIN — QUETIAPINE FUMARATE 50 MG: 50 TABLET ORAL at 22:08

## 2025-05-12 RX ADMIN — ACETAMINOPHEN 975 MG: 325 TABLET ORAL at 17:20

## 2025-05-12 RX ADMIN — ACETAMINOPHEN 975 MG: 325 TABLET ORAL at 01:22

## 2025-05-12 RX ADMIN — SENNOSIDES AND DOCUSATE SODIUM 2 TABLET: 50; 8.6 TABLET ORAL at 20:26

## 2025-05-12 RX ADMIN — OXYCODONE HYDROCHLORIDE 15 MG: 10 TABLET ORAL at 04:16

## 2025-05-12 RX ADMIN — ACETAMINOPHEN 975 MG: 325 TABLET ORAL at 09:53

## 2025-05-12 ASSESSMENT — ACTIVITIES OF DAILY LIVING (ADL)
ADLS_ACUITY_SCORE: 59
ADLS_ACUITY_SCORE: 54
ADLS_ACUITY_SCORE: 59
ADLS_ACUITY_SCORE: 54
ADLS_ACUITY_SCORE: 59
ADLS_ACUITY_SCORE: 54
ADLS_ACUITY_SCORE: 54
ADLS_ACUITY_SCORE: 59
ADLS_ACUITY_SCORE: 54
ADLS_ACUITY_SCORE: 59
ADLS_ACUITY_SCORE: 54
ADLS_ACUITY_SCORE: 59
ADLS_ACUITY_SCORE: 54
ADLS_ACUITY_SCORE: 59
ADLS_ACUITY_SCORE: 54
ADLS_ACUITY_SCORE: 59
ADLS_ACUITY_SCORE: 54

## 2025-05-12 NOTE — PROGRESS NOTES
Surgery Progress Note  05/12/2025       Subjective:  Yesterday evening patient had fluctuating pressor requirements, 0.07 to 0.11. R Gastric Nare was not emptying due to tube blockage. Largest output was in LLQ, with pancreas drain decreased to 55. Antibiotics were stopped 5/11 am. On ventilation support. Precedex stopped 5/10 afternoon.      Objective:  Temp:  [98.6  F (37  C)-100.9  F (38.3  C)] 99.5  F (37.5  C)  Pulse:  [102-135] 126  Resp:  [10-34] 18  MAP:  [59 mmHg-264 mmHg] 59 mmHg  Arterial Line BP: ()/() 102/35  FiO2 (%):  [40 %] 40 %  SpO2:  [96 %-100 %] 100 %    I/O last 3 completed shifts:  In: 2970.78 [I.V.:2150.78; NG/GT:320; IV Piggyback:500]  Out: 4899.3 [Urine:61; Emesis/NG output:910; Drains:1391; Other:2537.3]      Gen: Eyes open but not responsive. Off sedation (Precedex stopped 5/10);   Resp: Ventilated, trach  Abd: Abdomen is taut but compressible, drains with dark sanguinous output, Malecot with succus, midline abdominal dressing with wound manager, LLQ drain with sanguinous output    Ext: bilateral lower extremities appear mottled, warm to the touch, edematous      Labs:  Recent Labs   Lab 05/12/25  0557 05/12/25  0311 05/11/25 1941 05/11/25  0942 05/11/25  0322   WBC 14.6* 15.1*  --   --  11.9*   HGB 12.8* 13.2* 14.3   < > 13.7   * 143*  --   --  119*    < > = values in this interval not displayed.       Recent Labs   Lab 05/12/25  0713 05/12/25  0316 05/12/25  0311 05/11/25 1945 05/11/25 1941 05/11/25  1600 05/11/25  1555 05/11/25  1208 05/11/25  1205   NA  --   --  135  --  134*  --  135  --  135   POTASSIUM  --   --  4.6  --  4.6  --  4.2  --  4.2   CHLORIDE  --   --  101  --  100  --  101  --  101   CO2  --   --  19*  --  21*  --  20*  --  20*   BUN  --   --  19.1  --  20.8*  --  21.7*  --  22.3*   CR  --   --  0.95  --  0.94  --  0.93  --  0.97   GLC 92 106* 104*   < > 96   < > 96   < > 90   KARINA  --   --  8.6*  --  8.7*  --  8.6*  --  8.7*   MAG  --   --  2.5*  --   2.5*  --   --   --  2.6*   PHOS  --   --  4.8*  --  4.4  --   --   --  3.9    < > = values in this interval not displayed.       Imaging:  CTAP OSH 04/27:    Impression    1.  Worsening sequela of necrotizing pancreatitis. Dominant peripancreatic collection has increased in size, now measuring 19 x 13 cm, previously 17 x 11 cm; increasing gas within this collection is worrisome for infection. Large volume ascites and fat necrosis elsewhere throughout the abdomen and pelvis has also increased from prior.  2.  New presumed blood products in the dominant peripancreatic collection, as well as layering within the left paracolic gutter, suspicious for interval bleeding. Recommend trending hemoglobin levels; CTA abdomen and pelvis could be considered if there is concern for active bleeding.  3.  Mild small bowel wall thickening, possibly reactive or sequela of enteritis.  4.  Similar right lower lobe consolidation.     Assessment/Plan:   Sebastián Rodas is a 29-year-old male with a history of alcohol use disorder, anxiety, and bipolar disorder who was admitted on 3/30/2025 for alcohol withdrawal following a recent binge, presenting with diffuse abdominal pain. Initial workup revealed leukocytosis and elevated lipase concerning for acute pancreatitis. He developed acute encephalopathy and was transferred to the ICU on 3/31, requiring intubation and vasopressors by 4/1 due to shock. Hospital course has been complicated by acute renal failure requiring CRRT, cardiomyopathy (initial LVEF 20-25%, improved to 65-70% by 4/14), and necrotizing pancreatitis with unorganized fluid collections. He completed a 14-day course of meropenem but remains intermittently febrile and critically ill, requiring ongoing dialysis and vasopressor support. On 4/27, after clinical deterioration and imaging consistent with a likely perforated viscus, following family discussion, he underwent emergent exploratory laparotomy, necrosectomy, transverse  "colectomy, abdominal washout, and drain placement. Patient was transferred to [facility] on 4/30/25 for further surgical management. Went to OR 5/1 for re-open laparotomy, I&D, placement of colostomy tube in presumed previous colectomy staple line, necrosectomy, and Abthera placement. On 5/2, increasing sanguineous output from drains concerning for bleeding from pancreatic bed. Family care conference held 5/2, plan for restorative cares at that time. Take back to OR twice (5/4 and 5/7) for abdominal washout. Per nursing note: 5/9 morning after he saw a picture of his abdomen that he \"desires to speak to team about updating goals of care, expressed desire to pull back on cares\". Goals of care conference on 5/10 with continuation of full code and restorative cares. CTA on 5/10 revealed large area of active arterial extravasation in the upper abdomen. MTP was started. Patient is now s/p embolization of small pancreatic branch off of the distal celiac artery with IR on 5/10.    Plan:  - No further surgical options for any intraabdominal pathology   - Nursing to change dressings in the am, pm, and PRN during the day if copious drainage during the day. Please place Xeroform over the bridging mesh prior to placing Kerlix  - Appreciate WOC cares  - Would favor not starting anticoagulants given the high risk of bleeding.  - Other cares per SICU, we appreciate excellent cares.     Patient was seen and discussed with resident and chief resident.     Anish Dickson  Medical Student, Class of 2027  [facility]  Emergency General Surgery Service    Resident/Fellow Attestation   I, Casimiro Chirinos MD, was present with the medical/SANTOS student who participated in the service and in the documentation of the note.  I have verified the history and personally performed the physical exam and medical decision making.  I agree with the assessment and plan of care as documented in the note.      Casimiro Chirinos MD  PGY1  Date of " Service (when I saw the patient): 05/12/25

## 2025-05-12 NOTE — PROGRESS NOTES
CRRT STATUS NOTE    DATA:  Time:  1725  Pressures WNL:  YES  Filter Status:  WDL    Problems Reported/Alarms Noted:  Filter changed d/t clotting    Supplies Present:  YES    ASSESSMENT:  Patient Net Fluid Balance:   + 261 ml since midnight  Intake/Output Summary (Last 24 hours) at 5/12/2025 1725  Last data filed at 5/12/2025 1700  Gross per 24 hour   Intake 2929.89 ml   Output 3307 ml   Net -377.11 ml                                                    Date 05/12/25 0700 - 05/13/25 0659   Shift 2554-3508 1055-7201 8673-0652 24 Hour Total   INTAKE   I.V. 468.78 172.01  640.79   NG/ 80  310   Enteral 100 100  200   Shift Total(mL/kg) 798.78(10.06) 352.01(4.43)  1150.79(14.49)   OUTPUT   Urine 11   11   Emesis/NG output 250 45  295   Drains 168 25  193   Other 0 189  189   Shift Total(mL/kg) 429(5.4) 259(3.26)  688(8.67)   Weight (kg) 79.4 79.4 79.4 79.4                  I/O this shift:  In: 352.01 [I.V.:172.01; NG/GT:80]  Out: 259 [Emesis/NG output:45; Drains:25; Other:189]      Vital Signs:  Temp: 98.2  F (36.8  C) Temp src: Axillary   Pulse: (!) 130   Resp: (!) 9 SpO2: 98 % O2 Device: Mechanical Ventilator            Labs: K 4.7 (05/12 1613), Mag 2.3 (05/12 1148), Phos 4.2 (05/12 1148), Hgb 13.1 (05/12 1613), ICa 4.3        Goals of Therapy:  I=O - achieving goal    INTERVENTIONS:   Filter changed during shift.    PLAN:  Continue CRRT as per MD order. Notify CRRT Resource RN with any questions or concerns.

## 2025-05-12 NOTE — PLAN OF CARE
Goal Outcome Evaluation:  ICU End of Shift Summary. See flowsheets for vital signs and detailed assessment.    Changes this shift: pt intermittently following commands. CPOT 6 + RASS +2 intermittently between pain medication availability. Pt weeping, grimacing, baring teeth, rigid, and guarding with repositions and cares. RASS -2 and low Bps requiring increased pressor requirement directly post oxy administration. Resident notified, instructed to continue with current pain regiment. PRN oxy given x2, PRN dilaudid given x3. Fluctuating pressor requirement ovnt. Bloody o/p from all drain/incision sites. Oozing blood around 4/4 SHANNON drains, only LL SHANNON putting out well. Full vent support ovnt. Tube feed remained held, d10 continued. Able to meet CRRT I/o goals. Stopped pulling per SICU resident @0300, keeping I = O. 500mL bolus given. Hgb trending down, lactic trending up.    Plan: assess pain management plan for effectiveness. Wean pressor as tolerated. Pull on crrt as tolerated        Plan of Care Reviewed With: patient, family    Overall Patient Progress: no changeOverall Patient Progress: no change    Outcome Evaluation: see note

## 2025-05-12 NOTE — PROGRESS NOTES
[facility] Nurse Inpatient Assessment     Consulted for: Tracheostomy site, sacrum, left heel  5/9: New consult for midline wound draining heavily    [facility] nurse follow-up plan: daily    Patient History (according to provider note(s):      Sebastián Rodas is a 29-year-old male with a history of alcohol abuse, anxiety, and bipolar disorder, admitted on 3/30/2025 for alcohol withdrawal after a binge, presenting with abdominal pain. Initial workup revealed leukocytosis and elevated lipase, suggestive of acute pancreatitis. He developed encephalopathy and required ICU transfer on 3/31, intubation, and vasopressors by 4/1 due to shock. His hospital course was complicated by acute renal failure (requiring CRRT), cardiomyopathy (LVEF 20-25%, improved to 65-70% by 4/14), and necrotizing pancreatitis with unorganized fluid collections. He completed a 14-day course of meropenem but remained febrile and critically ill, requiring ongoing dialysis and vasopressor support. On 4/27, imaging indicated likely perforated viscus, and after family discussion, he underwent emergent laparotomy, necrosectomy, transverse colectomy, abdominal washout, and drain placement. He was transferred to the SICU at [facility] on 4/30 for further monitoring. On 5/1, he underwent a second laparotomy with irrigation and debridement, colostomy tube placement, temporary abdominal closure, and further necrosectomy due to breakdown of the colonic resection staple line, necrotic pancreas, thrombosed middle colic vessels, fat necrosis, and dense interloop adhesions. He has a poor prognosis given the OR findings, palliative care consulted.     Assessment:      Areas visualized during today's visit: Focused: and Abdomen    Wound location: Abdomen      Last photo: 5/12  Wound due to: Surgical Wound  Wound history/plan of care: On 4/27, imaging showed likely perforated viscus; he underwent emergent laparotomy,  necrosectomy, and colectomy. Transferred to [facility] SICU on 4/30. On 5/1, reoperation revealed colonic staple line breakdown, necrotic pancreas, vessel thrombosis, and adhesions. Colostomy with malankot drain and temporary abdominal closure performed. Prognosis is poor; palliative care involved. Care conference 5/2 with family to talk goasl of care. Full code, family acknowledged that poor prognosis is to be expected. Today 5/4 s/p exploratory surgery, additional necrotic pancreas remove with no obvious spillage or sign of bleeding   5/12: Abdomen is much more distended today. The superior side of the wound bed had a large black clot loosely adhered to the mesh. There was pooling of darker thick liquid in the inferior side of the wound bed.   Wound base: 70 % surgical mesh, 30 % Slough and Adipose tissue     Palpation of the wound bed: fluctuance      Drainage: copious     Description of drainage: serosanguinous, bloody, and black     Measurements (length x width x depth, in cm): 31  x 15  x  3 cm      Tunneling: N/A     Undermining: N/A  Periwound skin: Intact      Color: normal and consistent with surrounding tissue      Temperature: normal   Odor: none  Pain: facial expression of distress, sharp  Pain interventions prior to dressing change: IV dilaudid and slow and gentle cares   Treatment goal: Drainage control and Infection control/prevention  STATUS: stable  Supplies ordered: at bedside    Pressure Injury Location: Left heel - Assessed on Fridays        Last photo: 5/9  Wound type: Pressure Injury     Pressure Injury Stage: Deep Tissue Pressure Injury (DTPI), present on admission       Wound history/plan of care:   Pt admitted from OSH on 4/31. Wound present on admission. There is an intact blister over a deeper maroon discoloration.    Wound base: 100 % Maroon, Blister, and Epidermis     Palpation of the wound bed: boggy      Drainage: none     Description of drainage: none     Measurements (length x width x  depth, in cm) 2  x 1.5  x  0 cm      Tunneling N/A     Undermining N/A  Periwound skin: Intact      Color: normal and consistent with surrounding tissue      Temperature: normal   Odor: none  Pain: unable to assess due to  sedation , none  Pain intervention prior to dressing change: slow and gentle cares   Treatment goal: Heal  and Protection  STATUS: stable  Supplies ordered: at bedside    My PI Risk Assessment     Sensory Perception: 2 - Very Limited     Moisture: 2 - Very moist      Activity: 1 - Bedfast      Mobility: 2 - Very limited     Nutrition: 2 - Probably inadequate      Friction/Shear: 1 - Problem     TOTAL: 10    Pressure Injury Location: coccyx - Assessed on Fridays    Zoomed out                                              Zoomed in      Last photo: 5/9  Wound type: Pressure Injury, Incontinence Associated Dermatitis (IAD), and Moisture Associated Skin Damage (MASD)     Pressure Injury Stage: 2, present on admission        Wound history/plan of care:   Pt transferred from OSH on 4/31. Wound present on admission. There is MASD/IAD to the bilateral buttocks with exposed dermis. There is further breakdown over the Pt's coccyx that is fibrin vs slough. Wound is at least a stage 2. The wound will need to evolve further before it can be definitively staged.   5/9: Wound continues to improve. Wound is a stage 2 pressure injury over coccyx with surrounding MASD and exposed dermis.    Wound base: 80 % Dermis, 20 % Fibrin     Palpation of the wound bed: normal      Drainage: moderate     Description of drainage: serosanguinous     Measurements (length x width x depth, in cm) 6  x 6  x  0.2 cm - central wound, 1.5 x 1.5 x 0.1 cm left buttock     Tunneling N/A     Undermining N/A  Periwound skin: Edematous and Skin stripping      Color: pink and red      Temperature: normal   Odor: none  Pain: unable to assess due to  sedation , none  Pain intervention prior to dressing change: slow and gentle cares   Treatment  goal: Heal  and Protection  STATUS: improving and stable  Supplies ordered: at bedside    My PI Risk Assessment     Sensory Perception: 2 - Very Limited     Moisture: 2 - Very moist      Activity: 1 - Bedfast      Mobility: 2 - Very limited     Nutrition: 2 - Probably inadequate      Friction/Shear: 1 - Problem     TOTAL: 10    Wound location: Trach - Assessed on Fridays          Last photo: 5/9  Wound due to: Surgical Wound  Wound history/plan of care:  Pt transferred from OSH on 4/31. Pt had trach placed at OSH. There are areas of fibrin from previous trach suture locations which are mostly healed. There is a linear incision on the left side of Trach cannula from insertion. The wound base wass difficult to visualize, appears to be fibrin and adipose tissue marbled together.  5/5: Called by bedside RN with concerns for skin breakdown near trach. On assessment Trach incision appears to have opened up significantly from prior assessment. Moderate amount of respiratory secretions present. ENT consulted by primary team to assess trach site as well.  Wound base: 100 % Fibrin and Adipose tissue     Palpation of the wound bed: normal      Drainage: moderate     Description of drainage: yellow - respiratory secretions     Measurements (length x width x depth, in cm): 2  x 3  x  0.8 cm      Tunneling: N/A     Undermining: N/A  Periwound skin: Intact      Color: normal and consistent with surrounding tissue      Temperature: normal   Odor: none  Pain: unable to assess due to  sedation , none  Pain interventions prior to dressing change: slow and gentle cares   Treatment goal: Heal  and Protection  STATUS: stable  Supplies ordered: at bedside        Treatment Plan:     Left heel wound(s): Every 3 days Cleanse with saline and pat dry. Conform a sacral Mepilex around Pt's heel. Place in Prevalon boots. Float heels off of support surface with pillows under calves.    Buttocks/coccyx wound(s): Every other day   Cleanse with wound  "cleanser and pat dry.   Apply no sting barrier film to analilia-wound area and allow to dry.   Cover open wound with Vaseline gauze.  Cover with Sacral Mepilex.    If wound requiring more than daily dressing changes switch to the below wound cares:    BID and PRN:  Cleanse the area with Mimi cleanse and protect, very gently with soft cloth.  Apply ostomy powder (#607308) on all open and denuded skin.  Apply thin layer of Triad paste (835008) on top of it.  With repeat application, do not scrub the paste, only remove soiled paste and reapply.  If complete removal of paste is necessary use baby oil/mineral oil (#295954) and soft wash cloth.  Ensure pt has chair cushion (#360267) while sitting up in the chair.  Use only one blue pad in between mattress and pt. No brief in bed.      Trach wound: Perform trach cares per unit routine. Place a fenestrated optifoam between trach faceplate and Pt's skin.    Abdominal wound: Every 2 hours and PRN. Remove soiled Kerlix. Gently absorb as much drainage from wound base as possible. Cover exposed mesh with Xeroform gauze. (Change Xerform daily) Fluff Kerlix into wound base to help absorb drainage. Keep wound manager to gravity drainage. WOC will assess wound manager daily M-F.     If wound manager fails, place wet to dry dressing with Xeroform gauze covered by Vashe moistened Kerlix Covered by Mextra Superabsorbant pads. Change Q2H and PRN.    Drain tube cares: Every 2 hours and PRN for saturation. Cleanse analilia-tubular skin with Vashe and pat dry. Apply no sting barrier film to analilia-tubular area and allow to dry. Fenestrate a 5x5 Mextra super absorbant pad (692161) and place around tube to absorb drainage.     Pressure Injury Prevention (PIP) Plan:  If patient is declining pressure injury prevention interventions: Explore reason why and address patient's concerns, Educate on pressure injury risk and prevention intervention(s), If patient is still declining, document \"informed refusal\" " , and Ensure Care team is aware ( provider, charge nurse, etc)  Mattress: Follow bed algorithm, add Low Air Loss (Air+) mattress pump if skin is very moist or constantly moist.   HOB: Maintain at or below 30 degrees, unless contraindicated  Repositioning in bed: Every 1-2 hours , Left/right positioning; avoid supine, Raise foot of bed prior to raising head of bed, to reduce patient sliding down (shear), and Frequent microturns using positioning wedges, as patient tolerates  Heels: Pillows under calves and Heel lift boots  Protective Dressing: Sacral Mepilex for prevention (#758287),  especially for the agitated patient   Positioning Equipment:Positioning wedges (#157336) to help maintain 30 degree side lying position   Chair positioning: Chair cushion (#[id number]) , Assist patient to reposition hourly, and Do NOT use a donut for sitting (this increases pressure to smaller area and creates a higher potential for injury)    If patient has a buttock pressure injury, or high risk for PI use chair cushion or SPS.  Moisture Management: Perineal cleansing /protection: Follow Incontinence Protocol, Avoid brief in bed, Clean and dry skin folds with bathing , Consider InterDry (#314152) between folds, and Moisturize dry skin  Under Devices: Inspect skin under all medical devices during skin inspection , Ensure tubes are stabilized without tension, and Ensure patient is not lying on medical devices or equipment when repositioned  Ask provider to discontinue device when no longer needed.     Orders: Reviewed and Updated    RECOMMEND PRIMARY TEAM ORDER: None, at this time  Education provided: plan of care  Discussed plan of care with: Nurse  Notify [facility] if wound(s) deteriorate.  Nursing to notify the Provider(s) and re-consult the WO Nurse if new skin concern.    DATA:     Current support surface: Standard  Low air loss (ISABELL pump, Isolibrium, Pulsate)  Containment of urine/stool: Incontinent pad in bed, Indwelling catheter, and  Internal fecal management  BMI: Body mass index is 26.62 kg/m .   Active diet order: None     Output: I/O last 3 completed shifts:  In: 2970.78 [I.V.:2150.78; NG/GT:320; IV Piggyback:500]  Out: 4899.3 [Urine:61; Emesis/NG output:910; Drains:1391; Other:2537.3]     Labs:   Recent Labs   Lab 05/12/25  0557 05/12/25  0311 05/11/25  0322 05/10/25  2347   ALBUMIN  --  3.1*   < > 3.6   HGB 12.8* 13.2*   < > 13.3   INR  --   --   --  1.13   WBC 14.6* 15.1*   < > 10.1    < > = values in this interval not displayed.     Pressure injury risk assessment:   Sensory Perception: 2-->very limited  Moisture: 2-->very moist  Activity: 1-->bedfast  Mobility: 2-->very limited  Nutrition: 1-->very poor  Friction and Shear: 1-->problem  Dick Score: 9    Stephen Tilley RN, CWOCN  Pager no longer in use, please contact through [employer]   Tiffanie group: [facility] Nurse [place]    Dept. Office Number: [phone]

## 2025-05-12 NOTE — PROGRESS NOTES
INTENSIVIST FACULTY NOTE:  No major issues overnight, although now oozing old blood / ascites fluid through a tract in his scrotum    Exam awake, sometimes agitated / anxious  Follows most commands, but CAM-ICU positive  NGT in place for gastric decompression, but nothing coming out  colostomy tube seems to be draining appropriately (80ml out yesterday, and volumes seem to be increasing)  Drains serosanguinous, nothing suggesting active bleed    Labs notable for normal electrolytes on CRRT  Transaminases are stable  Lactate is climbing just slightly  Triglycerides >300    Imaging reviewed    This is a 29M hx bipolar and EtOH, with a prolonged hospitalization that began with an admission for alcohol withdrawal after a binge, but subsequent development of necrotizing pancreatitis which in turn led to a whole host of complications including cardiomyopathy (now recovered), SARAHI requiring renal replacement therapy, a perforated viscous requiring transverse colectomy, and an intra-abdominal bleed requiring IR embolization of a pancreatic branch off the distal celiac artery.  He has a tracheostomy and GJ tube.  His abdomen has been closed with vicryl mesh, and he has a frozen abdomen, so is unlikely to be offered additional surgical interventions if he develops another intra-abdominal complication.    Today we can restart enteral feeds via the J tube, remove his decompressive NGT.  Will restart an aggressive bowel regimen given the colostomy tube.  Can restart sliding scale insulin for now, with a low threshold to increase back to an infusion if he gets hyperglycemic with his tube feeds.      He can restart SQH today (he has a known splenic vein thrombosis), with a plan to probably fully start therapeutic anticoagulation in three days or so if there are no further signs of bleeding.      Given inguinal hernias, his scrotum is filling up with ascites fluid, and he somehow apppears to have developed a tract that is now  draining old clotted blood and ascites.  I am very worried about this becoming infected; for now we are trying to control this by placing a large ostomy bag over the entire genital region.      METABOLIC ENCEPHALOPATHY / DELIRIUM:  -due to residual sedatives / pain meds, critical illness; monitoring with clinical exam  -melatonin + seroquel for sleep; Precedex for sedation  -robaxin, scheduled oxycodone, scheduled tylenol for analgesia for now.  May consider starting nurse-administered PCA if it seems like he needs a lot of IV narcotics overngiht again   -using non-pharmacologic methods as much as possible, but has sometimes needed restraints to keep from pulling at lines    ACUTE HYPOXIC RESPIRATORY FAILURE  -with PaO2 < 120 on >40% FiO2, P/F ratio is <300.  Monitoring with continuous pulse oximetry and intermittent ABGs.  -has a tracheostomy.  Volume overload, infection  -empiric antibiotics have completed  -lung protective ventilator settings with Vt <8ml/kg IBW (in this case, at least <500ml)  -no current need for high dose steroids   -targeting net even to net negative with CRRT    HEMORRHAGIC SHOCK:  -resolved.  Drains not giving evidence of rebleed.  Did lose about a unit of blood when filter on dialysis circuit went down, so Hgb likely to drop, but this is not a marker of ongoing hemorrhage    COMBINED SHOCK:  -lactate >2; SIRS criteria / hypotension; originally a hemorrhagic component; should be adeqautely resuscitated now, although labs in the context of CRRT are not consistent - decreased total protein, dropping Hgb and WBC could be seen as consistent with volume overload, although did a good job of getting 2L off with CRRT yesterday.  -distributive shock from pancreatitis, intra-abdominal inflammation likely an ongoing contributor  -no current antibiotics, as above  -norepinephrine as primary pressor as per guidelines.  May consider starting enteral droxidopa in a day or so once it's definitively clear  that this is mostly distributive shock and not ongoing hypovolemic shock    NECROTIZING PANCREATITIS:  -no definite signs of active infection at this time  -on appropriate nutrients    ACUTE KIDNEY INJURY:  -on CRRT currently.  He is young enough that I think he has a reasonable chance of renal recovery.  -due to hypoperfusion from his distributive shock and hemorrhage; POCUS did not demonstrate significant hydronephrosis  -monitoring with strict I/Os and serial Cr checks    INTRA-ABDOMINAL HYPERTENSION:  -felt to not meet criteria for abdominal compartment syndrome over the weekend.  -now that having ostomy tube output and abdominal distention down, ok to restart enteral feeds    INADEQUATE ORAL INTAKE:  -tube feeds are ramping back up today    DM2 / HYPERGLYCEMIA:  -sliding scale insulin can restart today    MISC:  -Code status is full code  -family updated by medical student and resident  -SQH can start today; PPI for mechanical ventilation and pancreatitis  -lines: RIJ tunnelled dialysis line  -shore can be removed today, with a plan for daily straight-cath.  Decompressive NGT can also be removed.  -anticipate discharge to LTACH in ~1 week; he is at high risk of decompensation and death during this hospitalization    Billing statement (shared visit with SANTOS): 34min of critical care time spent at the bedside, and on the critical care unit, evaluating the patient, directing care and reviewing laboratory values and radiologic reports as described above. I personally examined and evaluated the patient today, as part of a shared critical care visit with the SANTOS. The patient is in critical condition today due to ongoing shock.  All physician orders and treatments were placed at my direction. I personally managed all factors described above, and key decisions made by me today are summarized above.    DONNY Rodas MD  Clinical   Anesthesia / Critical Care  *[phone]

## 2025-05-12 NOTE — PROGRESS NOTES
"CLINICAL NUTRITION SERVICES - BRIEF NOTE     Reason for RD note: resume / advance Jtube feeds    New Findings/Chart Review:  Feeds held 5/10 due to bleeding / abd distension, Ok to resume and advance per discussion in round    GI: per discussion in rounds, patient is stooling via thinner rubber colostomy tube - titrating aggressive bowel regimen to keep stool thinner / avoid back up.    Labs:   K+ 4.4 (WNL), with hyperkalemia up to 6.2 noted 5/10.  Hoping to keep patient on semi elemental formula (vs change to renal formula) in the setting of pancreatitis requiring relizorb.  CRRT ongoing with adjusted K+ bath.    5/10 labs:  Se 56.2 (WNL)  Zn 95.9 (WNL)  Thiamine pending  Vitamin B6 pending   (H)    Day 4 of 5 of 2mg Cu infusion (for low Cu level of 41.6 on 5/6)    Interventions:  Resume EN @ 20 ml/hr, adv by 20ml q 6 hours to goal: Pivot 1.5 Luis (or equivalent) @ goal of  60ml/hr  (1440ml/day) provides: 2160 kcals, 135 g PRO, 1080 ml free H20, 248 g CHO, and 10 g fiber daily, + Relizorb.    Future/Additional Recommendations:  If hyperkalemia recurs, Novasource Renal (or equivalent) @ 45 ml/hr (1080 ml) + 3 pkts prosource TF20 provides 2400 kcal (34 kcal/kgor 96% MREE from 5/5), 158 g pro (2.3 g/kg), 198 g CHO, 774 ml free water, and 0 g fiber daily.     Re check Copper level 5/14    Nutrition will continue to follow per protocol.    Maryann Cobb, RD, LD, CNSC  \"4E Clinical Dietitian\" on Vocera  Weekend/Holiday RD - \"Weekend Clinical Dietitian\" on vocera    "

## 2025-05-12 NOTE — PROGRESS NOTES
Nephrology Progress Note  05/12/2025       Sebastián Rodas is a 29 yom with Bipolar disorder, ETOH use c/b necrotizing pancreatitis admitted 3/30/25 to [facility] for ETOH withdrawal and abdominal pain, found to have acute pancreatitis which has progressed to necrotizing pancreatitis complicated by SARAHI.  Seen by Nephrology at [place], started on CRRT 4/1.  Had periods of stability enough for iHD but back to CRRT on 4/21 until tx to [facility] for further surgical interventions.       Interval History :   Mr Rodas continues on CRRT, had bleeding this weekend requiring MTP, still with multiple intraabdominal drains.  Continuing CRRT, labs stable on 4k baths despite bleeding over the weekend, planning I=O today.     Assessment & Recommendations:   SARAHI-Baseline Cr 0.8 as recently as March 2025 before acute events, was started on CRRT emergently on 4/1 in setting of septic shock. Had ~2 weeks of stability enough to manage with iHD but back on CRRT 4/21 until tx to [facility] on 4/30. Running fevers with intraabdominal sepsis.  Continuing RRT from OSH, restarted CRRT on 5/2 after OR.                  -No need for new consent, continuing RRT started last month at [facility].                 -Access is tunneled RIJ from 4/21.                  -CRRT, changed to all 4k baths, ordered for I=O.     Volume-Total body volume overloaded but much of it 3rd spaced.  Making small amount of UOP but would be surprised if this improves with current BP's.       Electrolytes-K 4.4 on all 4k baths, bicarb 21, ABG 7.33/43/89/23.      BMD-No acute issues.      Anemia-Hgb 13.1 after MTP 5/10, stable in the short term, acute management per team.      Nutrition-TPN started 5/1     Time spent: 50 minutes on this date of encounter for chart review, physical exam, medical decision making and co-ordination of care.      Discussed with Dr Newby     Recommendations were communicated to primary team via verbal communication.        ASIF Roger  "CNS  Clinical Nurse Specialist  [phone]    Review of Systems:   I reviewed the following systems:  ROS not done due to vent/sedation.     Physical Exam:   I/O last 3 completed shifts:  In: 2970.78 [I.V.:2150.78; NG/GT:320; IV Piggyback:500]  Out: 4899.3 [Urine:61; Emesis/NG output:910; Drains:1391; Other:2537.3]   /57   Pulse (!) 133   Temp 98.4  F (36.9  C) (Axillary)   Resp 12   Ht 1.727 m (5' 7.99\")   Wt 79.4 kg (175 lb 0.7 oz)   SpO2 98%   BMI 26.62 kg/m       GENERAL APPEARANCE: Vent via trach, CRRT running.   Pulmonary: Vent via trach  CV: Regular rhythm, normal rate   - Edema +2 generalized  GI: Surgical dressing in place  MS: no evidence of inflammation in joints, no muscle tenderness  : + Garcia  SKIN: no rash, warm, dry  NEURO: Intubated and sedated.     Labs:   All labs reviewed by me  Electrolytes/Renal -   Recent Labs   Lab Test 05/12/25  1152 05/12/25  1148 05/12/25  0713 05/12/25  0316 05/12/25 0311 05/11/25 1945 05/11/25 1941   NA  --  134*  --   --  135  --  134*   POTASSIUM  --  4.4  --   --  4.6  --  4.6   CHLORIDE  --  100  --   --  101  --  100   CO2  --  21*  --   --  19*  --  21*   BUN  --  17.8  --   --  19.1  --  20.8*   CR  --  0.96  --   --  0.95  --  0.94   GLC 88 84 92   < > 104*   < > 96   KARINA  --  8.1*  --   --  8.6*  --  8.7*   MAG  --  2.3  --   --  2.5*  --  2.5*   PHOS  --  4.2  --   --  4.8*  --  4.4    < > = values in this interval not displayed.       CBC -   Recent Labs   Lab Test 05/12/25  1148 05/12/25  1013 05/12/25  0557 05/12/25 0311   WBC 13.7*  --  14.6* 15.1*   HGB 13.1* 12.4* 12.8* 13.2*     --  138* 143*       LFTs -   Recent Labs   Lab Test 05/12/25  1148 05/12/25  0311 05/11/25  1941 05/11/25  1205 05/11/25  0322 05/10/25  2347   ALKPHOS  --  165*  --   --  117 114   BILITOTAL  --  4.0*  --   --  3.3* 3.5*   ALT  --  19  --   --  23 23   AST  --  28  --   --  33 31   PROTTOTAL  --  6.1*  --   --  6.1* 6.0*   ALBUMIN 2.8* 3.1* 3.2*   < > " 3.5 3.6    < > = values in this interval not displayed.       Iron Panel - No lab results found.        Current Medications:  Current Facility-Administered Medications   Medication Dose Route Frequency Provider Last Rate Last Admin    acetaminophen (TYLENOL) tablet 975 mg  975 mg Oral Q8H Eugenia Zavala MD   975 mg at 05/12/25 0953    [Held by provider] cloNIDine (CATAPRES) tablet 0.1 mg  0.1 mg Oral Q8H Roxi Alston MD   0.1 mg at 05/10/25 0800    copper chloride 2 mg in sodium chloride 0.9 % 115 mL intermittent infusion  2 mg Intravenous Daily Jolie Dexter APRN CNP 57.5 mL/hr at 05/12/25 1243 2 mg at 05/12/25 1243    folic acid (FOLVITE) tablet 1 mg  1 mg Per Feeding Tube Daily Jolie Dexter APRN CNP   1 mg at 05/12/25 0701    heparin ANTICOAGULANT injection 5,000 Units  5,000 Units Subcutaneous Q8H [facility] Marina Zamarripa MD   5,000 Units at 05/12/25 1227    insulin aspart (NovoLOG) injection (RAPID ACTING)  1-12 Units Subcutaneous Q4H Brendon Haas DO   1 Units at 05/10/25 1135    [Held by provider] insulin glargine (LANTUS PEN) injection 20 Units  20 Units Subcutaneous Q24H Roxi Alston MD   20 Units at 05/10/25 0818    melatonin tablet 5 mg  5 mg Oral or Feeding Tube QPM Roxi Alston MD   5 mg at 05/11/25 1936    methocarbamol (ROBAXIN) tablet 500 mg  500 mg Oral 4x Daily Eugenia Zavala MD   500 mg at 05/12/25 1227    multivitamin w/minerals (THERA-VIT-M) tablet 1 tablet  1 tablet Oral or Feeding Tube Daily Jolie Dexter APRN CNP   1 tablet at 05/12/25 0701    oxyCODONE (ROXICODONE) tablet 15 mg  15 mg Oral or Feeding Tube Q4H Eugenia Zavala MD   15 mg at 05/12/25 1227    pantoprazole (PROTONIX) 2 mg/mL suspension 40 mg  40 mg Oral or Feeding Tube QAM  Brendon Haas DO   40 mg at 05/12/25 0702    polyethylene glycol (MIRALAX) Packet 17 g  17 g Oral BID Roxi Alston MD   17 g at 05/12/25 0701    QUEtiapine (SEROquel) tablet 50 mg   50 mg Oral At Bedtime Roxi Alston MD   50 mg at 05/11/25 2234    senna-docusate (SENOKOT-S/PERICOLACE) 8.6-50 MG per tablet 2 tablet  2 tablet Oral BID Roxi Alston MD   2 tablet at 05/12/25 0701    sodium chloride (PF) 0.9% PF flush 3 mL  3 mL Intracatheter Q8H Ganesh Griffiths MD   3 mL at 05/11/25 1532    thiamine (B-1) tablet 100 mg  100 mg Per Feeding Tube Daily Jolie Dexter APRN CNP   100 mg at 05/12/25 0701    vitamin C (ASCORBIC ACID) liquid 500 mg  500 mg Per Feeding Tube BID Jolie Dexter APRN CNP   500 mg at 05/12/25 0702     Current Facility-Administered Medications   Medication Dose Route Frequency Provider Last Rate Last Admin    dexmedeTOMIDine (PRECEDEX) 4 mcg/mL in sodium chloride 0.9 % 100 mL infusion  0.1-1.2 mcg/kg/hr (Dosing Weight) Intravenous Continuous Ganesh Griffiths MD   Stopped at 05/10/25 1345    dialysate for CVVHD & CVVHDF (Phoxillum BK4/2.5)  12.5 mL/kg/hr CRRT Continuous Tico Spain MD 1,000 mL/hr at 05/12/25 1159 12.5 mL/kg/hr at 05/12/25 1159    No heparin required   Does not apply Continuous PRN Tico Spain MD        norepinephrine (LEVOPHED) 16 mg in  mL infusion MAX CONC CENTRAL LINE  0.01-0.6 mcg/kg/min (Dosing Weight) Intravenous Continuous Ganesh Griffiths MD 6.5 mL/hr at 05/12/25 1200 0.1 mcg/kg/min at 05/12/25 1200    POST-filter replacement solution for CVVHD & CVVHDF (Phoxillum BK4/2.5)   CRRT Continuous Tico Spain  mL/hr at 05/12/25 0609 New Bag at 05/12/25 0609    PRE-filter replacement solution for CVVHD & CVVHDF (Phoxillum BK4/2.5)  12.5 mL/kg/hr CRRT Continuous Tico Spain MD 1,000 mL/hr at 05/12/25 1159 12.5 mL/kg/hr at 05/12/25 115

## 2025-05-12 NOTE — PLAN OF CARE
ICU End of Shift Summary. See flowsheets for vital signs and detailed assessment.    Changes this shift: Febrile 100.6 via bladder probe, -151 continues on levophed to maintain MAP>65, denies pain one bump of prn hydromorphone given this shift. On minimal vent settings FIO2 40% SBT 5/5 a Tv 450-525 and all other vital signs stable.Dextrose drip off Tube feed restarted at 20, now infusing at 40 hypoglycemic at 65 1/2 amp of D50 given before increasing tube feeds.  NG and shore removed. SHANNON drain #2 draining every 2 hours  ml of bloody drainage and G-J tube to gravity draining bloody output, as well. All drains have bloody  insertion site, but only the two drains mentioned above actually have been having output. Found to have a new puncture site on left anterior scrotum, MD notified, ostomy bag placed on site drained 60 ml of bloody drainage.Continues to tolerate fluid removal via CRRT  iCa replaced once.  Plan:Trending hgb every 6 hours and CRRT labs every 8 hours, increase TF to goal at 2200, prn analgesic available hourly. Abdominal drg changes every 2 hours, continue with fluid removal via CRRT as tolerated and notify the team of any changes in status.       Plan of Care Reviewed With: patient    Overall Patient Progress: no changeOverall Patient Progress: no change    Outcome Evaluation: Consistently follwoing commands .

## 2025-05-12 NOTE — PROGRESS NOTES
"CRRT STATUS NOTE    DATA:  Time: 3:46 AM   Pressures WNL:  YES  Filter Status:  WDL    Problems Reported/Alarms Noted:  None      Supplies Present:  YES    ASSESSMENT:  Patient Net Fluid Balance:  At midnight -2238mL at 0600 -87mL    Vital Signs:    Arterial Line BP:  105/59 (73) mmHg   Pulse 105   Temp 99.1  F (37.3  C)   Resp 18   Ht 1.727 m (5' 7.99\")   Wt 79.4 kg (175 lb 0.7 oz)   SpO2 99%   BMI 26.62 kg/m         Labs:   Na: 135  K:  4.6  Cr: 0.95  M.5  Phos: 4.8   iCal: 4.5 lactic 2.7        Goals of Therapy:  Net neg 0-150mL/hr    INTERVENTIONS:   None     PLAN:  Continue with treatment goal. Call Resource RN with questions and concerns. [facility] [phone] CRRT resource  "

## 2025-05-12 NOTE — PROGRESS NOTES
SURGICAL ICU PROGRESS NOTE  05/12/2025    Date of Service (when I saw the patient): 05/12/2025    ASSESSMENT:  Sebastián Rodas is a 29M with alcohol abuse, anxiety, and bipolar disorder, who was admitted 3/30/25 for alcohol withdrawal and abdominal pain. Workup showed leukocytosis and elevated lipase, consistent with acute pancreatitis. He developed encephalopathy and shock, requiring ICU care, intubation, vasopressors, and CRRT by 4/1. His course was complicated by cardiomyopathy (EF 20-25%, improved to 65-70% by 4/14), necrotizing pancreatitis, and persistent infection despite 14 days of meropenem. On 4/27, imaging showed likely perforated viscus; he underwent emergent laparotomy, necrosectomy, and colectomy. [facility]red [facility] SICU on 4/30. On 5/1, reoperation revealed colonic staple line breakdown, necrotic pancreas, vessel thrombosis, and adhesions. Colostomy with malankot drain and temporary abdominal closure performed. Prognosis is poor; palliative care involved. Care conference 5/2 with family to talk goals of care. Full code, family acknowledged that poor prognosis is to be expected. 5/4 s/p exploratory surgery, additional necrotic pancreas remove with no obvious spillage or sign of bleeding at the time. Went back to the OR 5/7 for abdominal wash out. CT abdomen 5/10 showed extravasation on arterial phase imaging. MTP initiated, IR arterial embolization of pancreatic vessels performed. On return from IR, had abdominal retention and skin mottling. For concern of abdominal compartment syndrome, bladder pressures were measured at 16 and then discontinued. Today, we will restart enteral feeds via the J tube, remove his decompressive NGT.  We will restart an aggressive bowel regimen given the colostomy tube. Sliding scale insulin for now. SQH restarted. Family present at bedside and  aware of the poor prognosis.       CHANGES and MAJOR THINGS TODAY:    - restart sliding scale insulin  - stop D10  - restart  tube feeds  - restart SQH  - shore out  - NG tube out  - RASS goal 0 to -1  - Wean Vent as able- pressure support today  - Trend lactate: increasing 2.5-> 2.7 -> 3.1  - Trend HgB  - Net even I/O    PLAN:    Neurological:  # Acute pain   # Encephalopathy  - Monitor neurological status. Delirium preventions and precautions.   # Pain: PO Robaxin, IV dilaudid PRN, oxycodone 15mg Q4, Tylenol Q8.  # Sedation: Precedex gtt held    # Anxiety   - Clonidine stopped  - Seroquel 50 mg at bedtime     Pulmonary:   # Acute hypoxic respiratory support  # S/p tracheostomy placement   - FiO2 (%): 40 %, Resp: 20, Vent Mode: VC/AC, Resp Rate (Set): 16 breaths/min, Tidal Volume (Set, mL): 420 mL, PEEP (cm H2O): 5 cmH2O, Resp Rate (Set): 16 breaths/min, Tidal Volume (Set, mL): 420 mL, PEEP (cm H2O): 5 cmH2O   - Full vent settings since IR procedure, move to pressure support and then trach valve.   - ENT trach site evaluation 5/5/25 recommending usual trach cares, regular dressing changes, and keeping ties snug.   - Chest Xray 5/6 showed stable right basilar opacities and stable small bilateral pleural effusions.   - Repeat chest x-ray 5/8 showed no major changes.  - CT PE 5/9 showed no evidence of pulmonary embolism  - Pressure support trial 5/12, trach dome later in the day if tolerated    Cardiovascular:    # Cardiomyopathy   - Initial LVEF 20-25%, improved to 65-70%  # Shock-distributive (septic)  # Hemorrhagic shock   - monitor fluid status and HgB  # Sinus Tachycardia  - Monitor hemodynamic status. MP goal > 65.   - Levophed fluctuating, Vasopressin off since 5/10  - EKG 5/8, 5/9 showed sinus tachycardia with no ectopy  - Trend lactate     Gastroenterology/Nutrition:  # S/p emergent exploratory laparotomy, necrosectomy, transverse colectomy, abdominal washout, and drain placement 5/1 and 5/4  # S/p Pancreatic branch vessel embolization with IR 5/10  # Severe necrotizing pancreatitis  # Splenic vein thrombosis  - will defer  management of dressings and drain removal per EGS   - on PPI  - U/S  revealed no visualization of splenic vein.   - CT abdomen  showed a small focus of active bleed along the expected site of the necrosectomy immediately superior to the pancreatic neck. Large volume complex abdominal ascites, complex retroperitoneal fluid and extensive fat necrosis throughout the abdomen/pelvis. Thickened/enhancing peritoneum throughout the abdomen/pelvis, compatible with peritonitis, with reactive small and large bowel thickening  - Repeat CT 5/10: large area of active arterial extravasation in the upper abdomen, with the source vessel appearing to arise anterior to the celiac trunk. Large amount of hemoperitoneum.  - IR embolization using intra procedural CTA and angiograms for vessel identification  - Intra- abdominal pressure: 16 (peaked at 21)  - Stop IAP monitoring  - Ongoing copious dark bloody drain output, CTM    # Severe Protein calorie deficit malnutrition due to critical illness  - Restart tube feeds  - NG tube out  - Copper replacement ordered by nutrition  - RD consult. Appreciate cares and recommendations.     Renal/Fluids/Electrolytes:   # AGMA likely 2/2 Hypoperfusion, SARAHI  # Lactic acidosis  # Hyperkalemia resolved  # Fluid overload  # Acute kidney injury on CCRT  Baseline Cr 0.7-0.8. Presented with Cr 0.96 on 3/30. Rapidly markos to 5.2 on . Oliguric. Garcia UA with proteinuria, hematuria, pyuria, moderate bacteria.  Kidneys unremarkable on CT. Has severe ATN in the setting of shock, ADHF, intravascular hypovolemia/3rd spacing from pancreatitis.   - Lactate uptrendin.5 -> 2.7 -> 3.2  - Stopped pulling via CRRT  - 500mL bolus LR given overnight.   - On CRRT   - Nephrology following  - FB goal: net even , pull fluids on CRRT if no increased pressor requirements or uptrending lactate  - Continue to monitor intake and output    Endocrine:   # Stress hyperglycemia    - hgb A1c 5.3, no hx of DM   -  Sliding scale for glucose management. Hold Lantus  - Goal to keep BG< 180 for optimal wound healing      ID:  # Leukocytosis  # pancreatitis, with infected pancreatitic necrosis   WBC Count   Date Value Ref Range Status   05/12/2025 14.6 (H) 4.0 - 11.0 10e3/uL Final     - Completed 14 days course of meropenem and caspofungin.   - Zosyn completed: 5/2/25--5/11.   - Discontinued micafungin 5/3     cultures:  - 4/30 blood cultures- NGTD   - 5/1 blood cultures ordered - NGTD     Heme:     # Acute blood loss anemia due   # Anemia of critical illness   # Thrombosed splenic vein   - Transfuse if hgb <7.0 or signs/symptoms of hypoperfusion. Monitor and trend  Hemoglobin   Date Value Ref Range Status   05/12/2025 12.8 (L) 13.3 - 17.7 g/dL Final     - MTP 5/10, transfused 11 units prbc, 10 units ffp and 3 units of platelets  - Coags WNL   - restart SQH  - TXA infusion commenced 5/10, hold today  - CBC q8, monitor drain output     Musculoskeletal:   # Deconditioning and weakness due to critical illness   - Physical and occupational therapy consult      Skin:  # Pressure Ulcers - Buttocks/rectal area  - Barrier cream with liberal application. Continue fecal management system   - WOC consult      #Pressure Ulcers- Left Heel  Pressure Injury Location: Left heel   Wound type: Pressure Injury     Pressure Injury Stage: Deep Tissue Pressure Injury (DTPI), present on admission     General Cares/Prophylaxis:    DVT Prophylaxis: SQH   GI Prophylaxis: PPI  Restraints: Restraints for medical healing needed: NO     Lines/ tubes/ drains:  - HD line--  Right IJ  - PICC line- left   - Right radial A line  - PIV x 3  - Trach  - G-J  - Abthera   - SHANNON x 4  - Drain 4 - Colostomy drain     Disposition:  - Surgical ICU    Saud Harrington, MS4  [employer]    Resident/Fellow Attestation   I, Jean Paul Pierre MD, was present with the medical/SANTOS student who participated in the service and in the documentation of the note.  I  have verified the history and personally performed the physical exam and medical decision making.  I agree with the assessment and plan of care as documented in the note.      Jean Paul Pierre MD  PGY1  Date of Service: 05/12/25    ====================================  INTERVAL HISTORY:  Course reviewed. See overnight as discussed above.     OBJECTIVE:   1. VITAL SIGNS:   Temp:  [98.6  F (37  C)-100.9  F (38.3  C)] 100.4  F (38  C)  Pulse:  [102-135] 119  Resp:  [10-34] 20  MAP:  [59 mmHg-264 mmHg] 65 mmHg  Arterial Line BP: ()/() 101/45  FiO2 (%):  [40 %] 40 %  SpO2:  [97 %-100 %] 99 %  FiO2 (%): 40 %, Resp: 20, Vent Mode: VC/AC, Resp Rate (Set): 16 breaths/min, Tidal Volume (Set, mL): 420 mL, PEEP (cm H2O): 5 cmH2O, Resp Rate (Set): 16 breaths/min, Tidal Volume (Set, mL): 420 mL, PEEP (cm H2O): 5 cmH2O    2. INTAKE/ OUTPUT:   I/O last 3 completed shifts:  In: 2970.78 [I.V.:2150.78; NG/GT:320; IV Piggyback:500]  Out: 4899.3 [Urine:61; Emesis/NG output:910; Drains:1391; Other:2537.3]    3. PHYSICAL EXAMINATION:  General: laying in bed, minimally responsive to voice.  HEENT: PERRLA. Trach present and secure, site dressed.   Pulm/Resp: Clear breath sounds bilaterally, lungs coarse but clear.  CV: RRR, S1/S2   Abdomen: Somewhat distended. Dark bloody output noted to all drains. G-J tube in place, site secured, abdomen with abthera in place, suction intact.   MSK/Extremities: generalized edema in extremities    4. INVESTIGATIONS:   Arterial Blood Gases   Recent Labs   Lab 05/11/25  0604 05/10/25  1228 05/06/25  0347   PH 7.33* 7.43 7.38   PCO2 43 30* 37   PO2 89 86 96   HCO3 23 20* 22     Complete Blood Count   Recent Labs   Lab 05/12/25  0557 05/12/25  0311 05/11/25  1941 05/11/25  1555 05/11/25  0942 05/11/25  0322 05/10/25  2347   WBC 14.6* 15.1*  --   --   --  11.9* 10.1   HGB 12.8* 13.2* 14.3 14.1   < > 13.7 13.3   * 143*  --   --   --  119* 107*    < > = values in this interval not displayed.      Basic Metabolic Panel  Recent Labs   Lab 05/12/25  0713 05/12/25 0316 05/12/25 0311 05/11/25  2308 05/11/25 1945 05/11/25 1941 05/11/25  1600 05/11/25  1555 05/11/25 1208 05/11/25 1205   NA  --   --  135  --   --  134*  --  135  --  135   POTASSIUM  --   --  4.6  --   --  4.6  --  4.2  --  4.2   CHLORIDE  --   --  101  --   --  100  --  101  --  101   CO2  --   --  19*  --   --  21*  --  20*  --  20*   BUN  --   --  19.1  --   --  20.8*  --  21.7*  --  22.3*   CR  --   --  0.95  --   --  0.94  --  0.93  --  0.97   GLC 92 106* 104* 92   < > 96   < > 96   < > 90    < > = values in this interval not displayed.     Liver Function Tests  Recent Labs   Lab 05/12/25 0311 05/11/25 1941 05/11/25 1205 05/11/25  0322 05/10/25  2347 05/10/25  1952 05/10/25  1715 05/10/25  1353   AST 28  --   --  33 31 33  --   --    ALT 19  --   --  23 23 27  --   --    ALKPHOS 165*  --   --  117 114 109  --   --    BILITOTAL 4.0*  --   --  3.3* 3.5* 3.1*  --   --    ALBUMIN 3.1* 3.2* 3.1* 3.5 3.6 3.9 2.4*  --    INR  --   --   --   --  1.13 1.17* 1.65* 1.28*     Pancreatic Enzymes  No lab results found in last 7 days.    Coagulation Profile  Recent Labs   Lab 05/10/25  2347 05/10/25  1952 05/10/25  1715 05/10/25  1353   INR 1.13 1.17* 1.65* 1.28*   PTT 29 30 37 33         5. RADIOLOGY:   No results found for this or any previous visit (from the past 24 hours).      =========================================

## 2025-05-13 ENCOUNTER — APPOINTMENT (OUTPATIENT)
Dept: GENERAL RADIOLOGY | Facility: CLINIC | Age: 30
End: 2025-05-13
Attending: NURSE PRACTITIONER
Payer: COMMERCIAL

## 2025-05-13 LAB
ALBUMIN SERPL BCG-MCNC: 2.6 G/DL (ref 3.5–5.2)
ALBUMIN SERPL BCG-MCNC: 2.7 G/DL (ref 3.5–5.2)
ALBUMIN SERPL BCG-MCNC: 2.7 G/DL (ref 3.5–5.2)
ALP SERPL-CCNC: 205 U/L (ref 40–150)
ALT SERPL W P-5'-P-CCNC: 18 U/L (ref 0–70)
ANION GAP SERPL CALCULATED.3IONS-SCNC: 13 MMOL/L (ref 7–15)
ANION GAP SERPL CALCULATED.3IONS-SCNC: 13 MMOL/L (ref 7–15)
ANION GAP SERPL CALCULATED.3IONS-SCNC: 14 MMOL/L (ref 7–15)
ANION GAP SERPL CALCULATED.3IONS-SCNC: 14 MMOL/L (ref 7–15)
AST SERPL W P-5'-P-CCNC: 25 U/L (ref 0–45)
BILIRUB DIRECT SERPL-MCNC: 2.4 MG/DL (ref 0–0.3)
BILIRUB SERPL-MCNC: 2.9 MG/DL
BUN SERPL-MCNC: 18.3 MG/DL (ref 6–20)
BUN SERPL-MCNC: 19.5 MG/DL (ref 6–20)
BUN SERPL-MCNC: 20.4 MG/DL (ref 6–20)
BUN SERPL-MCNC: 22.3 MG/DL (ref 6–20)
CA-I BLD-MCNC: 4.5 MG/DL (ref 4.4–5.2)
CA-I BLD-MCNC: 4.6 MG/DL (ref 4.4–5.2)
CA-I BLD-MCNC: 4.7 MG/DL (ref 4.4–5.2)
CALCIUM SERPL-MCNC: 7.9 MG/DL (ref 8.8–10.4)
CALCIUM SERPL-MCNC: 8.2 MG/DL (ref 8.8–10.4)
CALCIUM SERPL-MCNC: 8.2 MG/DL (ref 8.8–10.4)
CALCIUM SERPL-MCNC: 8.5 MG/DL (ref 8.8–10.4)
CHLORIDE SERPL-SCNC: 102 MMOL/L (ref 98–107)
CHLORIDE SERPL-SCNC: 103 MMOL/L (ref 98–107)
CPR GENES ISLT/SPM QL: NEGATIVE
CREAT SERPL-MCNC: 0.87 MG/DL (ref 0.67–1.17)
CREAT SERPL-MCNC: 0.9 MG/DL (ref 0.67–1.17)
CREAT SERPL-MCNC: 0.91 MG/DL (ref 0.67–1.17)
CREAT SERPL-MCNC: 0.94 MG/DL (ref 0.67–1.17)
EGFRCR SERPLBLD CKD-EPI 2021: >90 ML/MIN/1.73M2
ERYTHROCYTE [DISTWIDTH] IN BLOOD BY AUTOMATED COUNT: 16.5 % (ref 10–15)
ERYTHROCYTE [DISTWIDTH] IN BLOOD BY AUTOMATED COUNT: 16.6 % (ref 10–15)
ERYTHROCYTE [DISTWIDTH] IN BLOOD BY AUTOMATED COUNT: 16.8 % (ref 10–15)
GLUCOSE BLDC GLUCOMTR-MCNC: 118 MG/DL (ref 70–99)
GLUCOSE BLDC GLUCOMTR-MCNC: 125 MG/DL (ref 70–99)
GLUCOSE BLDC GLUCOMTR-MCNC: 128 MG/DL (ref 70–99)
GLUCOSE BLDC GLUCOMTR-MCNC: 131 MG/DL (ref 70–99)
GLUCOSE BLDC GLUCOMTR-MCNC: 150 MG/DL (ref 70–99)
GLUCOSE BLDC GLUCOMTR-MCNC: 154 MG/DL (ref 70–99)
GLUCOSE SERPL-MCNC: 127 MG/DL (ref 70–99)
GLUCOSE SERPL-MCNC: 130 MG/DL (ref 70–99)
GLUCOSE SERPL-MCNC: 131 MG/DL (ref 70–99)
GLUCOSE SERPL-MCNC: 151 MG/DL (ref 70–99)
HCO3 SERPL-SCNC: 19 MMOL/L (ref 22–29)
HCO3 SERPL-SCNC: 20 MMOL/L (ref 22–29)
HCO3 SERPL-SCNC: 20 MMOL/L (ref 22–29)
HCO3 SERPL-SCNC: 21 MMOL/L (ref 22–29)
HCT VFR BLD AUTO: 37.1 % (ref 40–53)
HCT VFR BLD AUTO: 37.3 % (ref 40–53)
HCT VFR BLD AUTO: 37.8 % (ref 40–53)
HGB BLD-MCNC: 11.9 G/DL (ref 13.3–17.7)
HGB BLD-MCNC: 12.1 G/DL (ref 13.3–17.7)
HGB BLD-MCNC: 12.4 G/DL (ref 13.3–17.7)
HGB BLD-MCNC: 12.8 G/DL (ref 13.3–17.7)
LACTATE SERPL-SCNC: 2.1 MMOL/L (ref 0.7–2)
MAGNESIUM SERPL-MCNC: 2.6 MG/DL (ref 1.7–2.3)
MAGNESIUM SERPL-MCNC: 2.6 MG/DL (ref 1.7–2.3)
MAGNESIUM SERPL-MCNC: 2.7 MG/DL (ref 1.7–2.3)
MCH RBC QN AUTO: 30 PG (ref 26.5–33)
MCH RBC QN AUTO: 30 PG (ref 26.5–33)
MCH RBC QN AUTO: 30.2 PG (ref 26.5–33)
MCHC RBC AUTO-ENTMCNC: 32 G/DL (ref 31.5–36.5)
MCHC RBC AUTO-ENTMCNC: 32.1 G/DL (ref 31.5–36.5)
MCHC RBC AUTO-ENTMCNC: 33.2 G/DL (ref 31.5–36.5)
MCV RBC AUTO: 91 FL (ref 78–100)
MCV RBC AUTO: 94 FL (ref 78–100)
MCV RBC AUTO: 94 FL (ref 78–100)
PHOSPHATE SERPL-MCNC: 3.9 MG/DL (ref 2.5–4.5)
PHOSPHATE SERPL-MCNC: 4.4 MG/DL (ref 2.5–4.5)
PHOSPHATE SERPL-MCNC: 4.7 MG/DL (ref 2.5–4.5)
PLATELET # BLD AUTO: 161 10E3/UL (ref 150–450)
PLATELET # BLD AUTO: 164 10E3/UL (ref 150–450)
PLATELET # BLD AUTO: 167 10E3/UL (ref 150–450)
POTASSIUM SERPL-SCNC: 4.6 MMOL/L (ref 3.4–5.3)
POTASSIUM SERPL-SCNC: 4.6 MMOL/L (ref 3.4–5.3)
POTASSIUM SERPL-SCNC: 4.7 MMOL/L (ref 3.4–5.3)
POTASSIUM SERPL-SCNC: 4.7 MMOL/L (ref 3.4–5.3)
PROT SERPL-MCNC: 5.7 G/DL (ref 6.4–8.3)
RBC # BLD AUTO: 3.97 10E6/UL (ref 4.4–5.9)
RBC # BLD AUTO: 4.03 10E6/UL (ref 4.4–5.9)
RBC # BLD AUTO: 4.11 10E6/UL (ref 4.4–5.9)
SODIUM SERPL-SCNC: 135 MMOL/L (ref 135–145)
SODIUM SERPL-SCNC: 135 MMOL/L (ref 135–145)
SODIUM SERPL-SCNC: 137 MMOL/L (ref 135–145)
SODIUM SERPL-SCNC: 138 MMOL/L (ref 135–145)
SPECIMEN TYPE: NORMAL
WBC # BLD AUTO: 13.7 10E3/UL (ref 4–11)
WBC # BLD AUTO: 15.8 10E3/UL (ref 4–11)
WBC # BLD AUTO: 16.1 10E3/UL (ref 4–11)

## 2025-05-13 PROCEDURE — 82330 ASSAY OF CALCIUM: CPT | Performed by: STUDENT IN AN ORGANIZED HEALTH CARE EDUCATION/TRAINING PROGRAM

## 2025-05-13 PROCEDURE — 82374 ASSAY BLOOD CARBON DIOXIDE: CPT | Performed by: STUDENT IN AN ORGANIZED HEALTH CARE EDUCATION/TRAINING PROGRAM

## 2025-05-13 PROCEDURE — 999N000157 HC STATISTIC RCP TIME EA 10 MIN

## 2025-05-13 PROCEDURE — 84460 ALANINE AMINO (ALT) (SGPT): CPT | Performed by: STUDENT IN AN ORGANIZED HEALTH CARE EDUCATION/TRAINING PROGRAM

## 2025-05-13 PROCEDURE — 99232 SBSQ HOSP IP/OBS MODERATE 35: CPT | Mod: GC | Performed by: INTERNAL MEDICINE

## 2025-05-13 PROCEDURE — 200N000002 HC R&B ICU UMMC

## 2025-05-13 PROCEDURE — 99291 CRITICAL CARE FIRST HOUR: CPT | Mod: 24 | Performed by: ANESTHESIOLOGY

## 2025-05-13 PROCEDURE — 250N000013 HC RX MED GY IP 250 OP 250 PS 637: Performed by: STUDENT IN AN ORGANIZED HEALTH CARE EDUCATION/TRAINING PROGRAM

## 2025-05-13 PROCEDURE — 83605 ASSAY OF LACTIC ACID: CPT

## 2025-05-13 PROCEDURE — 85027 COMPLETE CBC AUTOMATED: CPT | Performed by: STUDENT IN AN ORGANIZED HEALTH CARE EDUCATION/TRAINING PROGRAM

## 2025-05-13 PROCEDURE — 250N000013 HC RX MED GY IP 250 OP 250 PS 637

## 2025-05-13 PROCEDURE — 250N000011 HC RX IP 250 OP 636: Mod: JZ

## 2025-05-13 PROCEDURE — 83735 ASSAY OF MAGNESIUM: CPT | Performed by: STUDENT IN AN ORGANIZED HEALTH CARE EDUCATION/TRAINING PROGRAM

## 2025-05-13 PROCEDURE — 74018 RADEX ABDOMEN 1 VIEW: CPT | Mod: 26 | Performed by: RADIOLOGY

## 2025-05-13 PROCEDURE — 84100 ASSAY OF PHOSPHORUS: CPT | Performed by: STUDENT IN AN ORGANIZED HEALTH CARE EDUCATION/TRAINING PROGRAM

## 2025-05-13 PROCEDURE — 85041 AUTOMATED RBC COUNT: CPT | Performed by: STUDENT IN AN ORGANIZED HEALTH CARE EDUCATION/TRAINING PROGRAM

## 2025-05-13 PROCEDURE — 258N000003 HC RX IP 258 OP 636

## 2025-05-13 PROCEDURE — 85018 HEMOGLOBIN: CPT | Performed by: STUDENT IN AN ORGANIZED HEALTH CARE EDUCATION/TRAINING PROGRAM

## 2025-05-13 PROCEDURE — 90947 DIALYSIS REPEATED EVAL: CPT

## 2025-05-13 PROCEDURE — 250N000011 HC RX IP 250 OP 636: Performed by: NURSE PRACTITIONER

## 2025-05-13 PROCEDURE — 84155 ASSAY OF PROTEIN SERUM: CPT | Performed by: STUDENT IN AN ORGANIZED HEALTH CARE EDUCATION/TRAINING PROGRAM

## 2025-05-13 PROCEDURE — 84075 ASSAY ALKALINE PHOSPHATASE: CPT | Performed by: STUDENT IN AN ORGANIZED HEALTH CARE EDUCATION/TRAINING PROGRAM

## 2025-05-13 PROCEDURE — 85018 HEMOGLOBIN: CPT

## 2025-05-13 PROCEDURE — P9045 ALBUMIN (HUMAN), 5%, 250 ML: HCPCS | Performed by: NURSE PRACTITIONER

## 2025-05-13 PROCEDURE — 250N000009 HC RX 250

## 2025-05-13 PROCEDURE — 258N000003 HC RX IP 258 OP 636: Performed by: NURSE PRACTITIONER

## 2025-05-13 PROCEDURE — 74018 RADEX ABDOMEN 1 VIEW: CPT

## 2025-05-13 PROCEDURE — 82248 BILIRUBIN DIRECT: CPT | Performed by: STUDENT IN AN ORGANIZED HEALTH CARE EDUCATION/TRAINING PROGRAM

## 2025-05-13 PROCEDURE — 250N000011 HC RX IP 250 OP 636

## 2025-05-13 PROCEDURE — 250N000009 HC RX 250: Performed by: STUDENT IN AN ORGANIZED HEALTH CARE EDUCATION/TRAINING PROGRAM

## 2025-05-13 PROCEDURE — 84450 TRANSFERASE (AST) (SGOT): CPT | Performed by: STUDENT IN AN ORGANIZED HEALTH CARE EDUCATION/TRAINING PROGRAM

## 2025-05-13 PROCEDURE — 82040 ASSAY OF SERUM ALBUMIN: CPT | Performed by: STUDENT IN AN ORGANIZED HEALTH CARE EDUCATION/TRAINING PROGRAM

## 2025-05-13 PROCEDURE — 99292 CRITICAL CARE ADDL 30 MIN: CPT | Mod: 24 | Performed by: ANESTHESIOLOGY

## 2025-05-13 PROCEDURE — 82310 ASSAY OF CALCIUM: CPT | Performed by: STUDENT IN AN ORGANIZED HEALTH CARE EDUCATION/TRAINING PROGRAM

## 2025-05-13 PROCEDURE — G0463 HOSPITAL OUTPT CLINIC VISIT: HCPCS

## 2025-05-13 PROCEDURE — 250N000013 HC RX MED GY IP 250 OP 250 PS 637: Performed by: SURGERY

## 2025-05-13 RX ORDER — AMOXICILLIN 250 MG
2 CAPSULE ORAL 2 TIMES DAILY
Status: DISCONTINUED | OUTPATIENT
Start: 2025-05-13 | End: 2025-05-15

## 2025-05-13 RX ORDER — HYDROMORPHONE HYDROCHLORIDE 1 MG/ML
0.5 INJECTION, SOLUTION INTRAMUSCULAR; INTRAVENOUS; SUBCUTANEOUS
Status: DISCONTINUED | OUTPATIENT
Start: 2025-05-13 | End: 2025-05-17

## 2025-05-13 RX ORDER — DROXIDOPA 100 MG/1
600 CAPSULE ORAL EVERY 8 HOURS
Status: DISCONTINUED | OUTPATIENT
Start: 2025-05-13 | End: 2025-05-14

## 2025-05-13 RX ORDER — TRAZODONE HYDROCHLORIDE 50 MG/1
50 TABLET ORAL EVERY EVENING
Status: DISCONTINUED | OUTPATIENT
Start: 2025-05-13 | End: 2025-05-14

## 2025-05-13 RX ORDER — METHOCARBAMOL 500 MG/1
500 TABLET, FILM COATED ORAL 4 TIMES DAILY
Status: DISCONTINUED | OUTPATIENT
Start: 2025-05-13 | End: 2025-05-13

## 2025-05-13 RX ORDER — ACETAMINOPHEN 325 MG/1
975 TABLET ORAL EVERY 8 HOURS
Status: DISCONTINUED | OUTPATIENT
Start: 2025-05-13 | End: 2025-05-18

## 2025-05-13 RX ORDER — POLYETHYLENE GLYCOL 3350 17 G/17G
17 POWDER, FOR SOLUTION ORAL 2 TIMES DAILY
Status: DISCONTINUED | OUTPATIENT
Start: 2025-05-13 | End: 2025-05-15

## 2025-05-13 RX ORDER — METHOCARBAMOL 500 MG/1
500 TABLET, FILM COATED ORAL EVERY 6 HOURS
Status: DISCONTINUED | OUTPATIENT
Start: 2025-05-13 | End: 2025-05-16

## 2025-05-13 RX ORDER — TRAZODONE HYDROCHLORIDE 50 MG/1
50 TABLET ORAL AT BEDTIME
Status: DISCONTINUED | OUTPATIENT
Start: 2025-05-13 | End: 2025-05-13

## 2025-05-13 RX ADMIN — OXYCODONE HYDROCHLORIDE 15 MG: 10 TABLET ORAL at 20:58

## 2025-05-13 RX ADMIN — CALCIUM CHLORIDE, MAGNESIUM CHLORIDE, SODIUM CHLORIDE, SODIUM BICARBONATE, POTASSIUM CHLORIDE AND SODIUM PHOSPHATE DIBASIC DIHYDRATE 12.5 ML/KG/HR: 3.68; 3.05; 6.34; 3.09; .314; .187 INJECTION INTRAVENOUS at 07:47

## 2025-05-13 RX ADMIN — CUPRIC CHLORIDE 2 MG: 0.4 INJECTION, SOLUTION INTRAVENOUS at 11:14

## 2025-05-13 RX ADMIN — Medication 40 MG: at 07:47

## 2025-05-13 RX ADMIN — CALCIUM CHLORIDE, MAGNESIUM CHLORIDE, SODIUM CHLORIDE, SODIUM BICARBONATE, POTASSIUM CHLORIDE AND SODIUM PHOSPHATE DIBASIC DIHYDRATE 12.5 ML/KG/HR: 3.68; 3.05; 6.34; 3.09; .314; .187 INJECTION INTRAVENOUS at 23:22

## 2025-05-13 RX ADMIN — HYDROMORPHONE HYDROCHLORIDE 0.4 MG: 0.2 INJECTION, SOLUTION INTRAMUSCULAR; INTRAVENOUS; SUBCUTANEOUS at 00:06

## 2025-05-13 RX ADMIN — HYDROMORPHONE HYDROCHLORIDE 1 MG: 1 INJECTION, SOLUTION INTRAMUSCULAR; INTRAVENOUS; SUBCUTANEOUS at 11:37

## 2025-05-13 RX ADMIN — SODIUM CHLORIDE 2.4 UNITS/HR: 9 INJECTION, SOLUTION INTRAVENOUS at 09:57

## 2025-05-13 RX ADMIN — HYDROMORPHONE HYDROCHLORIDE 1 MG: 1 INJECTION, SOLUTION INTRAMUSCULAR; INTRAVENOUS; SUBCUTANEOUS at 21:51

## 2025-05-13 RX ADMIN — HEPARIN SODIUM 5000 UNITS: 5000 INJECTION, SOLUTION INTRAVENOUS; SUBCUTANEOUS at 12:04

## 2025-05-13 RX ADMIN — METHOCARBAMOL 500 MG: 500 TABLET ORAL at 19:28

## 2025-05-13 RX ADMIN — SENNOSIDES AND DOCUSATE SODIUM 2 TABLET: 50; 8.6 TABLET ORAL at 07:47

## 2025-05-13 RX ADMIN — HEPARIN SODIUM 5000 UNITS: 5000 INJECTION, SOLUTION INTRAVENOUS; SUBCUTANEOUS at 05:11

## 2025-05-13 RX ADMIN — SODIUM CHLORIDE 2.4 UNITS/HR: 9 INJECTION, SOLUTION INTRAVENOUS at 19:18

## 2025-05-13 RX ADMIN — OXYCODONE HYDROCHLORIDE 15 MG: 10 TABLET ORAL at 04:57

## 2025-05-13 RX ADMIN — HYDROMORPHONE HYDROCHLORIDE 0.5 MG: 1 INJECTION, SOLUTION INTRAMUSCULAR; INTRAVENOUS; SUBCUTANEOUS at 18:20

## 2025-05-13 RX ADMIN — CALCIUM CHLORIDE, MAGNESIUM CHLORIDE, SODIUM CHLORIDE, SODIUM BICARBONATE, POTASSIUM CHLORIDE AND SODIUM PHOSPHATE DIBASIC DIHYDRATE 12.5 ML/KG/HR: 3.68; 3.05; 6.34; 3.09; .314; .187 INJECTION INTRAVENOUS at 23:21

## 2025-05-13 RX ADMIN — HYDROMORPHONE HYDROCHLORIDE 0.5 MG: 1 INJECTION, SOLUTION INTRAMUSCULAR; INTRAVENOUS; SUBCUTANEOUS at 19:28

## 2025-05-13 RX ADMIN — CALCIUM CHLORIDE, MAGNESIUM CHLORIDE, SODIUM CHLORIDE, SODIUM BICARBONATE, POTASSIUM CHLORIDE AND SODIUM PHOSPHATE DIBASIC DIHYDRATE: 3.68; 3.05; 6.34; 3.09; .314; .187 INJECTION INTRAVENOUS at 07:47

## 2025-05-13 RX ADMIN — CALCIUM CHLORIDE, MAGNESIUM CHLORIDE, SODIUM CHLORIDE, SODIUM BICARBONATE, POTASSIUM CHLORIDE AND SODIUM PHOSPHATE DIBASIC DIHYDRATE 12.5 ML/KG/HR: 3.68; 3.05; 6.34; 3.09; .314; .187 INJECTION INTRAVENOUS at 18:15

## 2025-05-13 RX ADMIN — METHOCARBAMOL 500 MG: 500 TABLET ORAL at 14:14

## 2025-05-13 RX ADMIN — CALCIUM CHLORIDE, MAGNESIUM CHLORIDE, SODIUM CHLORIDE, SODIUM BICARBONATE, POTASSIUM CHLORIDE AND SODIUM PHOSPHATE DIBASIC DIHYDRATE 12.5 ML/KG/HR: 3.68; 3.05; 6.34; 3.09; .314; .187 INJECTION INTRAVENOUS at 13:12

## 2025-05-13 RX ADMIN — Medication 500 MG: at 08:46

## 2025-05-13 RX ADMIN — HYDROMORPHONE HYDROCHLORIDE 1 MG: 1 INJECTION, SOLUTION INTRAMUSCULAR; INTRAVENOUS; SUBCUTANEOUS at 23:06

## 2025-05-13 RX ADMIN — OXYCODONE HYDROCHLORIDE 15 MG: 10 TABLET ORAL at 01:03

## 2025-05-13 RX ADMIN — TRAZODONE HYDROCHLORIDE 50 MG: 50 TABLET ORAL at 19:28

## 2025-05-13 RX ADMIN — SODIUM CHLORIDE, SODIUM LACTATE, POTASSIUM CHLORIDE, AND CALCIUM CHLORIDE 295 ML: .6; .31; .03; .02 INJECTION, SOLUTION INTRAVENOUS at 22:04

## 2025-05-13 RX ADMIN — HYDROMORPHONE HYDROCHLORIDE 0.4 MG: 0.2 INJECTION, SOLUTION INTRAMUSCULAR; INTRAVENOUS; SUBCUTANEOUS at 06:25

## 2025-05-13 RX ADMIN — HYDROMORPHONE HYDROCHLORIDE 0.4 MG: 0.2 INJECTION, SOLUTION INTRAMUSCULAR; INTRAVENOUS; SUBCUTANEOUS at 07:44

## 2025-05-13 RX ADMIN — POLYETHYLENE GLYCOL 3350 17 G: 17 POWDER, FOR SOLUTION ORAL at 07:47

## 2025-05-13 RX ADMIN — HYDROMORPHONE HYDROCHLORIDE 0.2 MG: 0.2 INJECTION, SOLUTION INTRAMUSCULAR; INTRAVENOUS; SUBCUTANEOUS at 04:30

## 2025-05-13 RX ADMIN — HYDROMORPHONE HYDROCHLORIDE 0.2 MG: 0.2 INJECTION, SOLUTION INTRAMUSCULAR; INTRAVENOUS; SUBCUTANEOUS at 04:15

## 2025-05-13 RX ADMIN — CALCIUM CHLORIDE, MAGNESIUM CHLORIDE, SODIUM CHLORIDE, SODIUM BICARBONATE, POTASSIUM CHLORIDE AND SODIUM PHOSPHATE DIBASIC DIHYDRATE 12.5 ML/KG/HR: 3.68; 3.05; 6.34; 3.09; .314; .187 INJECTION INTRAVENOUS at 03:10

## 2025-05-13 RX ADMIN — POLYETHYLENE GLYCOL 3350 17 G: 17 POWDER, FOR SOLUTION ORAL at 19:28

## 2025-05-13 RX ADMIN — SODIUM CHLORIDE, SODIUM LACTATE, POTASSIUM CHLORIDE, AND CALCIUM CHLORIDE 465 ML: .6; .31; .03; .02 INJECTION, SOLUTION INTRAVENOUS at 17:00

## 2025-05-13 RX ADMIN — FOLIC ACID 1 MG: 1 TABLET ORAL at 07:47

## 2025-05-13 RX ADMIN — ACETAMINOPHEN 975 MG: 325 TABLET ORAL at 01:03

## 2025-05-13 RX ADMIN — HYDROMORPHONE HYDROCHLORIDE 0.5 MG: 1 INJECTION, SOLUTION INTRAMUSCULAR; INTRAVENOUS; SUBCUTANEOUS at 15:26

## 2025-05-13 RX ADMIN — Medication 5 MG: at 19:28

## 2025-05-13 RX ADMIN — METHOCARBAMOL 500 MG: 500 TABLET ORAL at 07:46

## 2025-05-13 RX ADMIN — NOREPINEPHRINE BITARTRATE 0.04 MCG/KG/MIN: 0.06 INJECTION, SOLUTION INTRAVENOUS at 14:41

## 2025-05-13 RX ADMIN — OXYCODONE HYDROCHLORIDE 15 MG: 10 TABLET ORAL at 08:40

## 2025-05-13 RX ADMIN — OXYCODONE HYDROCHLORIDE 15 MG: 10 TABLET ORAL at 14:10

## 2025-05-13 RX ADMIN — SENNOSIDES AND DOCUSATE SODIUM 2 TABLET: 50; 8.6 TABLET ORAL at 19:28

## 2025-05-13 RX ADMIN — OXYCODONE HYDROCHLORIDE 10 MG: 10 TABLET ORAL at 11:47

## 2025-05-13 RX ADMIN — ACETAMINOPHEN 975 MG: 325 TABLET ORAL at 08:40

## 2025-05-13 RX ADMIN — DROXIDOPA 600 MG: 100 CAPSULE ORAL at 11:47

## 2025-05-13 RX ADMIN — THIAMINE HCL TAB 100 MG 100 MG: 100 TAB at 07:47

## 2025-05-13 RX ADMIN — DROXIDOPA 600 MG: 100 CAPSULE ORAL at 19:29

## 2025-05-13 RX ADMIN — ACETAMINOPHEN 975 MG: 325 TABLET ORAL at 15:26

## 2025-05-13 RX ADMIN — OXYCODONE HYDROCHLORIDE 15 MG: 10 TABLET ORAL at 16:57

## 2025-05-13 RX ADMIN — HEPARIN SODIUM 5000 UNITS: 5000 INJECTION, SOLUTION INTRAVENOUS; SUBCUTANEOUS at 19:28

## 2025-05-13 RX ADMIN — ACETAMINOPHEN 975 MG: 325 TABLET ORAL at 23:42

## 2025-05-13 RX ADMIN — Medication 500 MG: at 19:28

## 2025-05-13 RX ADMIN — Medication 1 TABLET: at 07:46

## 2025-05-13 RX ADMIN — ALBUMIN HUMAN 12.5 G: 0.05 INJECTION, SOLUTION INTRAVENOUS at 14:10

## 2025-05-13 ASSESSMENT — ACTIVITIES OF DAILY LIVING (ADL)
ADLS_ACUITY_SCORE: 56
ADLS_ACUITY_SCORE: 61
ADLS_ACUITY_SCORE: 61
ADLS_ACUITY_SCORE: 57
ADLS_ACUITY_SCORE: 57
ADLS_ACUITY_SCORE: 59
ADLS_ACUITY_SCORE: 63
ADLS_ACUITY_SCORE: 61
ADLS_ACUITY_SCORE: 56
ADLS_ACUITY_SCORE: 59
ADLS_ACUITY_SCORE: 61
ADLS_ACUITY_SCORE: 57
ADLS_ACUITY_SCORE: 56
ADLS_ACUITY_SCORE: 57
ADLS_ACUITY_SCORE: 61
ADLS_ACUITY_SCORE: 56
ADLS_ACUITY_SCORE: 61
ADLS_ACUITY_SCORE: 63
ADLS_ACUITY_SCORE: 63

## 2025-05-13 NOTE — PROGRESS NOTES
INTENSIVIST FACULTY NOTE:  Blood pressure dropped when sleeping / after seroquel  Tolerating CRRT otherwise    Exam anxious, occasionally agitated, although this morning did pretty well  Abdominal distention maybe a little less than it had been  Drains all still clot-stained ascitic fluid  Scrotum remains distended.  He is very tender over the inguinal canals, and continues to ooze a lot of fluid (that looks the same as what comes out of the SHANNON drains) through the hold in his scrotum  Tolerating prolonged pressure support, and I anticipate he should be able to go fully to trach dome  Line sites look fine  Still diffuse anasarca    Labs look fine on CRRT  Hgb stable, even after losing some blood when the filter clotted yesterday    Imaging reviewed.  KUB shows bowel well-decompressed    This is a 29M hx bipolar and EtOH, with a prolonged hospitalization that began with an admission for alcohol withdrawal after a binge, but subsequent development of necrotizing pancreatitis which in turn led to a whole host of complications including cardiomyopathy (now recovered), SARAHI requiring renal replacement therapy, a perforated viscous requiring transverse colectomy, and an intra-abdominal bleed requiring IR embolization of a pancreatic branch off the distal celiac artery.  He has a tracheostomy and GJ tube.  His abdomen has been closed with vicryl mesh, and he has a frozen abdomen, so is unlikely to be offered additional surgical interventions if he develops another intra-abdominal complication.    His sleep aid was switched from seroquel to trazodone, hoping that will have less adverse hemodynamic effects.  We can increase the available dose of prn dilaudid, as nursing has some concerns that he is inadequately treated after having been on such high doses for such a long time.  Add vasopressin, and will see if droxidopa has any significant effect on him.  If norepi comes off after a fluid bolus and the vasopressin, will stop  droxidopa tomorrow.    METABOLIC ENCEPHALOPATHY / DELIRIUM:  -due to residual sedatives / pain meds, critical illness; monitoring with clinical exam  -melatonin + trazodone for sleep; nothing for sedation  -robaxin, scheduled oxycodone, scheduled tylenol for analgesia for now.  Increase the range of dilaudid available, and will still consider starting nurse-administered PCA if it seems like he needs a lot of IV narcotics overngiht again     ACUTE HYPOXIC RESPIRATORY FAILURE  -improving.  Indefinite pressure support today, and can do trach dome trials  -targeting net even to net negative with CRRT    HEMORRHAGIC SHOCK:  -resolved.  No need for transfusion.      COMBINED SHOCK:  -at this point, I am presuming that distributive shock is the primary .  Related to pancreatitis, intra-abdominal inflammation  -norepinephrine as primary pressor as per guidelines.  Can try adding vasopressin today given that now presuming inflammation and distributive shock is his primary issue.  Will likely start enteral droxidopa now since no definite evidence that he has ongoing hypovolemic shock, under the presumption that he has depleted catecholamine stores.  ADDENDUM 14:30pm - POCUS showed a hyperdynamic LV and collapsible, underfilled LV.  Will try a fluid bolus.      NECROTIZING PANCREATITIS:  -no definite signs of active infection at this time  -on appropriate nutrients    ACUTE KIDNEY INJURY:  -on CRRT currently.  He is young enough that I think he has a reasonable chance of renal recovery.  Continue with a target of net even.    INTRA-ABDOMINAL HYPERTENSION:  -resolving.  Intra-abdominal volume is coming down through drainage across his Jps and scrotum      INADEQUATE ORAL INTAKE:  -tube feeds at goal    DM2 / HYPERGLYCEMIA:  -sliding scale insulin    MISC:  -Code status is full code  -family updated by medical student and resident  -[facility]; possible full anticoagulation for splenic vein thrombus in two days or so. PPI for  mechanical ventilation and pancreatitis  -lines: RIJ tunnelled dialysis line  -no shore  -anticipate discharge to LTACH in ~1 week; he is at high risk of decompensation and death during this hospitalization    Billing statement (shared visit with SANTOS): 34min of critical care time spent at the bedside, and on the critical care unit, evaluating the patient, directing care and reviewing laboratory values and radiologic reports as described above. I personally examined and evaluated the patient today, as part of a shared critical care visit with the SANTOS. The patient is in critical condition today due to ongoing shock.  All physician orders and treatments were placed at my direction. I personally managed all factors described above, and key decisions made by me today are summarized above.    DONNY Rodas MD  Clinical   Anesthesia / Critical Care  *[phone]

## 2025-05-13 NOTE — INTERIM SUMMARY
Palliative Care Interim Summary  GOC/POC are clearly established, no symptom management input needed from our team.  Per d/w SICU, we'll sign off.  Thanks for asking us to be involved; please re-consult if we can be of assistance in the future.    Brendon Fairbanks MD  Palliative Medicine Consult Team  Contact vis Vocera

## 2025-05-13 NOTE — PROGRESS NOTES
SPIRITUAL HEALTH SERVICES Follow Up Note (Palliative)  [facility] 4C     Summary: Discussion with Palliative Care team and unit chaplain resident regarding spiritual support needs for patient Sebastián Rodas and family; Palliative Care is signing off. Unit  to follow.     Plan: Chaplains will follow for spiritual support while Sebastián is admitted.     Alba Hunter M.Div., [facility]     To reach Spiritual Health, securely message with the [employer] Web Console or enter an ASAP/STAT consult in [facility], which will also page the on-call .

## 2025-05-13 NOTE — PROGRESS NOTES
Nephrology Progress Note  05/13/2025       Sebastián Rodas is a 29 yom with Bipolar disorder, ETOH use c/b necrotizing pancreatitis admitted 3/30/25 to [facility] for ETOH withdrawal and abdominal pain, found to have acute pancreatitis which has progressed to necrotizing pancreatitis complicated by SARAHI.  Seen by Nephrology at [facility], started on CRRT 4/1.  Had periods of stability enough for iHD but back to CRRT on 4/21 until tx to [facility] for further surgical interventions.       Interval History :   Mr Rodas continues on CRRT, stable over the past day, able to be slightly negative yesterday and is essentially achieving this without UF due to large abd drain output, labs stable on 4k baths.  Continuing to support, still high risk of further intraabdominal bleeding.     Assessment & Recommendations:   SARAHI-Baseline Cr 0.8 as recently as March 2025 before acute events, was started on CRRT emergently on 4/1 in setting of septic shock. Had ~2 weeks of stability enough to manage with iHD but back on CRRT 4/21 until tx to [facility] on 4/30. Running fevers with intraabdominal sepsis.  Continuing RRT from OSH, restarted CRRT on 5/2 after OR.                  -No need for new consent, continuing RRT started last month at [facility].                 -Access is tunneled RIJ from 4/21.                  -CRRT,  all 4k baths, ordered for I=O but already negative with drain output.       Volume-Total body volume overloaded but much of it 3rd spaced.  Making small amount of UOP but would be surprised if this improves with current BP's.       Electrolytes-K 4.7 on all 4k baths, bicarb 20.      BMD-No acute issues.      Anemia-Hgb 12.1 after MTP 5/10, stable in the short term, acute management per team.      Nutrition-TPN started 5/1     Time spent: 50 minutes on this date of encounter for chart review, physical exam, medical decision making and co-ordination of care.      Discussed with Dr Newby     Recommendations were  "communicated to primary team via verbal communication.        ASIF Roger CNS  Clinical Nurse Specialist  [phone]    Review of Systems:   I reviewed the following systems:  ROS not done due to vent/sedation.     Physical Exam:   I/O last 3 completed shifts:  In: 2867.58 [I.V.:1012.58; NG/GT:955]  Out: 3235 [Urine:11; Emesis/NG output:605; Drains:1528; Other:1091]   /57   Pulse (!) 132   Temp 98.6  F (37  C) (Axillary)   Resp 17   Ht 1.727 m (5' 7.99\")   Wt 77.8 kg (171 lb 8.3 oz)   SpO2 96%   BMI 26.09 kg/m       GENERAL APPEARANCE: Vent via trach, CRRT running.   Pulmonary: Vent via trach  CV: Regular rhythm, normal rate   - Edema +2 generalized  GI: Surgical dressing in place  MS: no evidence of inflammation in joints, no muscle tenderness  : + Garcia  SKIN: no rash, warm, dry  NEURO: Intubated and sedated.     Labs:   All labs reviewed by me  Electrolytes/Renal -   Recent Labs   Lab Test 05/13/25  1152 05/13/25  1141 05/13/25  0825 05/13/25  0444 05/13/25 0441 05/13/25  0018 05/12/25 2015   NA  --  137  --   --  135  --  135   POTASSIUM  --  4.7  --   --  4.6  --  4.8   CHLORIDE  --  103  --   --  103  --  102   CO2  --  20*  --   --  19*  --  20*   BUN  --  19.5  --   --  18.3  --  18.0   CR  --  0.94  --   --  0.91  --  0.95   * 131* 154*   < > 127*   < > 92   KARINA  --  8.5*  --   --  8.2*  --  8.6*   MAG  --  2.6*  --   --  2.6*  --  2.5*   PHOS  --  4.7*  --   --  3.9  --  4.7*    < > = values in this interval not displayed.       CBC -   Recent Labs   Lab Test 05/13/25  1141 05/13/25 0441 05/13/25  0059 05/12/25 2015   WBC 15.8* 13.7*  --  15.7*   HGB 12.1* 12.4* 12.8* 12.8*    167  --  185       LFTs -   Recent Labs   Lab Test 05/13/25  1141 05/13/25 0441 05/12/25 2015 05/12/25  1148 05/12/25  0311 05/11/25  1205 05/11/25  0322   ALKPHOS  --  205*  --   --  165*  --  117   BILITOTAL  --  2.9*  --   --  4.0*  --  3.3*   ALT  --  18  --   --  19  --  23   AST  --  " 25  --   --  28  --  33   PROTTOTAL  --  5.7*  --   --  6.1*  --  6.1*   ALBUMIN 2.6* 2.7* 2.9*   < > 3.1*   < > 3.5    < > = values in this interval not displayed.       Iron Panel - No lab results found.        Current Medications:  Current Facility-Administered Medications   Medication Dose Route Frequency Provider Last Rate Last Admin    acetaminophen (TYLENOL) tablet 975 mg  975 mg Oral or Feeding Tube Q8H Brendon Haas DO        droxidopa (NORTHERA) capsule 600 mg  600 mg Oral Q8H Eugenia Zavala MD   600 mg at 05/13/25 1147    folic acid (FOLVITE) tablet 1 mg  1 mg Per Feeding Tube Daily Jolie Dexter APRN CNP   1 mg at 05/13/25 0747    heparin ANTICOAGULANT injection 5,000 Units  5,000 Units Subcutaneous Q8H [facility] Marina Zamarripa MD   5,000 Units at 05/13/25 1204    insulin aspart (NovoLOG) injection (RAPID ACTING)  1-12 Units Subcutaneous Q4H Brendon Haas DO   1 Units at 05/13/25 0826    melatonin tablet 5 mg  5 mg Oral or Feeding Tube QPM Roxi Alston MD   5 mg at 05/12/25 2026    methocarbamol (ROBAXIN) tablet 500 mg  500 mg Oral or Feeding Tube Q6H Brendon Haas DO   500 mg at 05/13/25 1414    multivitamin w/minerals (THERA-VIT-M) tablet 1 tablet  1 tablet Oral or Feeding Tube Daily Jolie Dexter APRN CNP   1 tablet at 05/13/25 0746    oxyCODONE (ROXICODONE) tablet 15 mg  15 mg Oral or Feeding Tube Q4H Eugenia Zavala MD   15 mg at 05/13/25 1410    pantoprazole (PROTONIX) 2 mg/mL suspension 40 mg  40 mg Oral or Feeding Tube QAM AC Brendno Haas DO   40 mg at 05/13/25 0747    polyethylene glycol (MIRALAX) Packet 17 g  17 g Oral or Feeding Tube BID Brendon Haas DO        senna-docusate (SENOKOT-S/PERICOLACE) 8.6-50 MG per tablet 2 tablet  2 tablet Oral or Feeding Tube BID Brendon Haas,         sodium chloride (PF) 0.9% PF flush 3 mL  3 mL Intracatheter Q8H Aye,  MD Ganesh   3 mL at 05/13/25 0748    thiamine (B-1) tablet 100 mg  100 mg Per Feeding Tube Daily Jolie Dexter APRN CNP   100 mg at 05/13/25 0747    traZODone (DESYREL) tablet 50 mg  50 mg Oral or Feeding Tube QPM Brendon Haas,         vitamin C (ASCORBIC ACID) liquid 500 mg  500 mg Per Feeding Tube BID Jolie Dexter APRN CNP   500 mg at 05/13/25 0846     Current Facility-Administered Medications   Medication Dose Route Frequency Provider Last Rate Last Admin    dextrose 10% infusion   Intravenous Continuous PRN Brendon Haas DO        dialysate for CVVHD & CVVHDF (Phoxillum BK4/2.5)  12.5 mL/kg/hr CRRT Continuous Tico Spain MD 1,000 mL/hr at 05/13/25 1312 12.5 mL/kg/hr at 05/13/25 1312    No heparin required   Does not apply Continuous PRN Tico Spain MD        norepinephrine (LEVOPHED) 16 mg in  mL infusion MAX CONC CENTRAL LINE  0.01-0.6 mcg/kg/min (Dosing Weight) Intravenous Continuous Ganesh Griffiths MD 3.9 mL/hr at 05/13/25 1300 0.06 mcg/kg/min at 05/13/25 1300    POST-filter replacement solution for CVVHD & CVVHDF (Phoxillum BK4/2.5)   CRRT Continuous Tico Spain  mL/hr at 05/13/25 0747 New Bag at 05/13/25 0747    PRE-filter replacement solution for CVVHD & CVVHDF (Phoxillum BK4/2.5)  12.5 mL/kg/hr CRRT Continuous Tico Spain MD 1,000 mL/hr at 05/13/25 1312 12.5 mL/kg/hr at 05/13/25 1312    vasopressin 1 unit/mL MAX Conc (PITRESSIN) infusion  2.4 Units/hr Intravenous Continuous Eugenia Zavala MD 2.4 mL/hr at 05/13/25 1300 2.4 Units/hr at 05/13/25 1300

## 2025-05-13 NOTE — PROGRESS NOTES
"CRRT STATUS NOTE    DATA:  Time:  5:40 PM  Pressures WNL:  YES  Filter Status:  WDL    Problems Reported/Alarms Noted:  None reported    Supplies Present:  YES    ASSESSMENT:  Patient Net Fluid Balance:    Intake/Output Summary (Last 24 hours) at 5/13/2025 1740  Last data filed at 5/13/2025 1700  Gross per 24 hour   Intake 3811.68 ml   Output 3927 ml   Net -115.32 ml       Vital Signs:  Temp: (!) 96.6  F (35.9  C) Temp src: Axillary   Pulse: (!) 133   Resp: 12 SpO2: 96 % O2 Device: Mechanical Ventilator Oxygen Delivery: 40 LPM    Labs:    Lab Results   Component Value Date    WBC 15.8 05/13/2025     Lab Results   Component Value Date    RBC 4.03 05/13/2025     Lab Results   Component Value Date    HGB 12.1 05/13/2025     Lab Results   Component Value Date    HCT 37.8 05/13/2025     No components found for: \"MCT\"  Lab Results   Component Value Date    MCV 94 05/13/2025     Lab Results   Component Value Date    MCH 30.0 05/13/2025     Lab Results   Component Value Date    MCHC 32.0 05/13/2025     Lab Results   Component Value Date    RDW 16.6 05/13/2025     Lab Results   Component Value Date     05/13/2025     Last Comprehensive Metabolic Panel:  Lab Results   Component Value Date     05/13/2025    POTASSIUM 4.6 05/13/2025    CHLORIDE 103 05/13/2025    CO2 21 (L) 05/13/2025    ANIONGAP 14 05/13/2025     (H) 05/13/2025     (H) 05/13/2025    BUN 20.4 (H) 05/13/2025    CR 0.87 05/13/2025    GFRESTIMATED >90 05/13/2025    KARINA 7.9 (L) 05/13/2025         Goals of Therapy:  I=O    INTERVENTIONS:   Checked in with bedside RN    PLAN:  Continue with treatment goals. Call CRRT RN on vocera with any questions and concerns.       "

## 2025-05-13 NOTE — PROGRESS NOTES
[facility] Nurse Inpatient Assessment     Consulted for: Tracheostomy site, sacrum, left heel  5/9: New consult for midline wound draining heavily    [facility] nurse follow-up plan: daily    Patient History (according to provider note(s):      Sebastián Rodas is a 29-year-old male with a history of alcohol abuse, anxiety, and bipolar disorder, admitted on 3/30/2025 for alcohol withdrawal after a binge, presenting with abdominal pain. Initial workup revealed leukocytosis and elevated lipase, suggestive of acute pancreatitis. He developed encephalopathy and required ICU transfer on 3/31, intubation, and vasopressors by 4/1 due to shock. His hospital course was complicated by acute renal failure (requiring CRRT), cardiomyopathy (LVEF 20-25%, improved to 65-70% by 4/14), and necrotizing pancreatitis with unorganized fluid collections. He completed a 14-day course of meropenem but remained febrile and critically ill, requiring ongoing dialysis and vasopressor support. On 4/27, imaging indicated likely perforated viscus, and after family discussion, he underwent emergent laparotomy, necrosectomy, transverse colectomy, abdominal washout, and drain placement. He was transferred to the SICU at [facility] on 4/30 for further monitoring. On 5/1, he underwent a second laparotomy with irrigation and debridement, colostomy tube placement, temporary abdominal closure, and further necrosectomy due to breakdown of the colonic resection staple line, necrotic pancreas, thrombosed middle colic vessels, fat necrosis, and dense interloop adhesions. He has a poor prognosis given the OR findings, palliative care consulted.     Assessment:      Areas visualized during today's visit: Focused: and Abdomen    Wound location: Abdomen      Last photo: 5/12  Wound due to: Surgical Wound  Wound history/plan of care: On 4/27, imaging showed likely perforated viscus; he underwent emergent laparotomy,  necrosectomy, and colectomy. Transferred to [facility] SICU on 4/30. On 5/1, reoperation revealed colonic staple line breakdown, necrotic pancreas, vessel thrombosis, and adhesions. Colostomy with malankot drain and temporary abdominal closure performed. Prognosis is poor; palliative care involved. Care conference 5/2 with family to talk goasl of care. Full code, family acknowledged that poor prognosis is to be expected. Today 5/4 s/p exploratory surgery, additional necrotic pancreas remove with no obvious spillage or sign of bleeding   5/12: Abdomen is much more distended today. The superior side of the wound bed had a large black clot loosely adhered to the mesh. There was pooling of darker thick liquid in the inferior side of the wound bed.   5/13: Pouch intact. Replaced Kerlix in wound bed.  Wound base: 70 % surgical mesh, 30 % Slough and Adipose tissue     Palpation of the wound bed: fluctuance      Drainage: copious     Description of drainage: serosanguinous, bloody, and black     Measurements (length x width x depth, in cm): 31  x 15  x  3 cm      Tunneling: N/A     Undermining: N/A  Periwound skin: Intact      Color: normal and consistent with surrounding tissue      Temperature: normal   Odor: none  Pain: facial expression of distress, sharp  Pain interventions prior to dressing change: IV dilaudid and slow and gentle cares   Treatment goal: Drainage control and Infection control/prevention  STATUS: stable  Supplies ordered: at bedside    Pressure Injury Location: Left heel - Assessed on Fridays        Last photo: 5/9  Wound type: Pressure Injury     Pressure Injury Stage: Deep Tissue Pressure Injury (DTPI), present on admission       Wound history/plan of care:   Pt admitted from OSH on 4/31. Wound present on admission. There is an intact blister over a deeper maroon discoloration.    Wound base: 100 % Maroon, Blister, and Epidermis     Palpation of the wound bed: boggy      Drainage: none     Description of  drainage: none     Measurements (length x width x depth, in cm) 2  x 1.5  x  0 cm      Tunneling N/A     Undermining N/A  Periwound skin: Intact      Color: normal and consistent with surrounding tissue      Temperature: normal   Odor: none  Pain: unable to assess due to  sedation , none  Pain intervention prior to dressing change: slow and gentle cares   Treatment goal: Heal  and Protection  STATUS: stable  Supplies ordered: at bedside    My PI Risk Assessment     Sensory Perception: 2 - Very Limited     Moisture: 2 - Very moist      Activity: 1 - Bedfast      Mobility: 2 - Very limited     Nutrition: 2 - Probably inadequate      Friction/Shear: 1 - Problem     TOTAL: 10    Pressure Injury Location: coccyx - Assessed on Fridays    Zoomed out                                              Zoomed in      Last photo: 5/9  Wound type: Pressure Injury, Incontinence Associated Dermatitis (IAD), and Moisture Associated Skin Damage (MASD)     Pressure Injury Stage: 2, present on admission        Wound history/plan of care:   Pt transferred from OSH on 4/31. Wound present on admission. There is MASD/IAD to the bilateral buttocks with exposed dermis. There is further breakdown over the Pt's coccyx that is fibrin vs slough. Wound is at least a stage 2. The wound will need to evolve further before it can be definitively staged.   5/9: Wound continues to improve. Wound is a stage 2 pressure injury over coccyx with surrounding MASD and exposed dermis.    Wound base: 80 % Dermis, 20 % Fibrin     Palpation of the wound bed: normal      Drainage: moderate     Description of drainage: serosanguinous     Measurements (length x width x depth, in cm) 6  x 6  x  0.2 cm - central wound, 1.5 x 1.5 x 0.1 cm left buttock     Tunneling N/A     Undermining N/A  Periwound skin: Edematous and Skin stripping      Color: pink and red      Temperature: normal   Odor: none  Pain: unable to assess due to  sedation , none  Pain intervention prior to  dressing change: slow and gentle cares   Treatment goal: Heal  and Protection  STATUS: improving and stable  Supplies ordered: at bedside    My PI Risk Assessment     Sensory Perception: 2 - Very Limited     Moisture: 2 - Very moist      Activity: 1 - Bedfast      Mobility: 2 - Very limited     Nutrition: 2 - Probably inadequate      Friction/Shear: 1 - Problem     TOTAL: 10    Wound location: Trach - Assessed on Fridays          Last photo: 5/9  Wound due to: Surgical Wound  Wound history/plan of care:  Pt transferred from OSH on 4/31. Pt had trach placed at OSH. There are areas of fibrin from previous trach suture locations which are mostly healed. There is a linear incision on the left side of Trach cannula from insertion. The wound base wass difficult to visualize, appears to be fibrin and adipose tissue marbled together.  5/5: Called by bedside RN with concerns for skin breakdown near trach. On assessment Trach incision appears to have opened up significantly from prior assessment. Moderate amount of respiratory secretions present. ENT consulted by primary team to assess trach site as well.  Wound base: 100 % Fibrin and Adipose tissue     Palpation of the wound bed: normal      Drainage: moderate     Description of drainage: yellow - respiratory secretions     Measurements (length x width x depth, in cm): 2  x 3  x  0.8 cm      Tunneling: N/A     Undermining: N/A  Periwound skin: Intact      Color: normal and consistent with surrounding tissue      Temperature: normal   Odor: none  Pain: unable to assess due to  sedation , none  Pain interventions prior to dressing change: slow and gentle cares   Treatment goal: Heal  and Protection  STATUS: stable  Supplies ordered: at bedside      Wound location: Scrotum      Last photo: 5/13  Wound due to: Abscess  Wound history/plan of care: Wound noted during assessment of abdominal wounds 5/12. Surgery at bedside, stated that inguinal canal is patent with the abdominal  cavity and drainage most likely secondary to abdominal cavity contents, and recommended BID gauze dressings. Wound continued to leak copious amounts of blood/stool/intraabdominal fluid. Overnight and was pouched with an ostomy pouch by SICU resident overnight. Pouch continued to leak and was replaced with a primofit male external catheter set to low continuous suction.  Wound base: 100 % Moist scrotal tissue     Palpation of the wound bed: fluctuance      Drainage: copious     Description of drainage: serosanguinous, purulent, bloody, and red     Measurements (length x width x depth, in cm): See photos - 4 small open areas.     Tunneling: N/A     Undermining: N/A  Periwound skin: Indurated      Color: red      Temperature: normal   Odor: none  Pain: moderate, tender  Pain interventions prior to dressing change: slow and gentle cares   Treatment goal: Drainage control  STATUS: initial assessment  Supplies ordered: at bedside       Treatment Plan:     Left heel wound(s): Every 3 days Cleanse with saline and pat dry. Conform a sacral Mepilex around Pt's heel. Place in Prevalon boots. Float heels off of support surface with pillows under calves.    Buttocks/coccyx wound(s): Every other day   Cleanse with wound cleanser and pat dry.   Apply no sting barrier film to analilia-wound area and allow to dry.   Cover open wound with Vaseline gauze.  Cover with Sacral Mepilex.    If wound requiring more than daily dressing changes switch to the below wound cares:    BID and PRN:  Cleanse the area with Mimi cleanse and protect, very gently with soft cloth.  Apply ostomy powder (#720707) on all open and denuded skin.  Apply thin layer of Triad paste (426736) on top of it.  With repeat application, do not scrub the paste, only remove soiled paste and reapply.  If complete removal of paste is necessary use baby oil/mineral oil (#798470) and soft wash cloth.  Ensure pt has chair cushion (#232671) while sitting up in the chair.  Use only  "one blue pad in between mattress and pt. No brief in bed.      Trach wound: Perform trach cares per unit routine. Place a fenestrated optifoam between trach faceplate and Pt's skin.    Abdominal wound: BIDand PRN. Remove soiled Kerlix. Gently absorb as much drainage from wound base as possible. Cover exposed mesh with Xeroform gauze. (Change Xerform daily) Fluff Kerlix into wound base to help absorb drainage. Keep wound manager to gravity drainage. WO will assess wound manager daily M-F.     If wound manager fails, place wet to dry dressing with Xeroform gauze covered by Vashe moistened Kerlix Covered by Mextra Superabsorbant pads. Change PRN.    Drain tube cares: PRN for saturation. Cleanse analilia-tubular skin with Vashe and pat dry. Apply no sting barrier film to analilia-tubular area and allow to dry. Fenestrate a 5x5 Mextra super absorbant pad (616798) and place around tube to absorb drainage.     Scrotal wound: Daily Cleanse with wound cleanser and pat dry. Apply no sting barrier film to analilia-wound area and allow to dry. Cut a slightly larger hople in base of Primofit to allow for open areas and apply to scrotum. Place a folded 2x2 gauze next to Penis. Place to low continuous wall suction approximately 50 mmhg.    If primofit fails repeatedly, cleanse area with wound cleanser BID and apply Triad paste (#952673) to analilia-wound scrotal skin and cover with Mextra super absorbant pad.    Pressure Injury Prevention (PIP) Plan:  If patient is declining pressure injury prevention interventions: Explore reason why and address patient's concerns, Educate on pressure injury risk and prevention intervention(s), If patient is still declining, document \"informed refusal\" , and Ensure Care team is aware ( provider, charge nurse, etc)  Mattress: Follow bed algorithm, add Low Air Loss (Air+) mattress pump if skin is very moist or constantly moist.   HOB: Maintain at or below 30 degrees, unless contraindicated  Repositioning in bed: " Every 1-2 hours , Left/right positioning; avoid supine, Raise foot of bed prior to raising head of bed, to reduce patient sliding down (shear), and Frequent microturns using positioning wedges, as patient tolerates  Heels: Pillows under calves and Heel lift boots  Protective Dressing: Sacral Mepilex for prevention (#916125),  especially for the agitated patient   Positioning Equipment:Positioning wedges (#407691) to help maintain 30 degree side lying position   Chair positioning: Chair cushion (#239577) , Assist patient to reposition hourly, and Do NOT use a donut for sitting (this increases pressure to smaller area and creates a higher potential for injury)    If patient has a buttock pressure injury, or high risk for PI use chair cushion or SPS.  Moisture Management: Perineal cleansing /protection: Follow Incontinence Protocol, Avoid brief in bed, Clean and dry skin folds with bathing , Consider InterDry (#815655) between folds, and Moisturize dry skin  Under Devices: Inspect skin under all medical devices during skin inspection , Ensure tubes are stabilized without tension, and Ensure patient is not lying on medical devices or equipment when repositioned  Ask provider to discontinue device when no longer needed.     Orders: Reviewed and Updated    RECOMMEND PRIMARY TEAM ORDER: None, at this time  Education provided: plan of care  Discussed plan of care with: Nurse  Notify WOC if wound(s) deteriorate.  Nursing to notify the Provider(s) and re-consult the WOC Nurse if new skin concern.    DATA:     Current support surface: Standard  Low air loss (ISABELL pump, Isolibrium, Pulsate)  Containment of urine/stool: Incontinent pad in bed, Indwelling catheter, and Internal fecal management  BMI: Body mass index is 26.09 kg/m .   Active diet order: None     Output: I/O last 3 completed shifts:  In: 2867.58 [I.V.:1012.58; NG/GT:955]  Out: 3235 [Urine:11; Emesis/NG output:605; Drains:1528; Other:1091]     Labs:   Recent Labs    Lab 05/13/25  0441 05/11/25  0322 05/10/25  2347   ALBUMIN 2.7*   < > 3.6   HGB 12.4*   < > 13.3   INR  --   --  1.13   WBC 13.7*   < > 10.1    < > = values in this interval not displayed.     Pressure injury risk assessment:   Sensory Perception: 1-->completely limited  Moisture: 4-->rarely moist  Activity: 1-->bedfast  Mobility: 1-->completely immobile  Nutrition: 1-->very poor  Friction and Shear: 1-->problem  Dick Score: 9    Stephen Tilley RN, CWOCN  Pager no longer in use, please contact through [employer] group: [facility] Nurse [place]    Dept. Office Number: [phone]

## 2025-05-13 NOTE — PROGRESS NOTES
Surgery Progress Note  05/13/2025       Subjective:  Increased pressors requirement to keep MAPs overnight, currently at 0.12 NE. NGT pulled out. On ventilation support. TF increased to 60 per nutrition recs.      Objective:  Temp:  [98.1  F (36.7  C)-100.6  F (38.1  C)] 98.4  F (36.9  C)  Pulse:  [115-156] 133  Resp:  [9-25] 19  MAP:  [52 mmHg-107 mmHg] 79 mmHg  Arterial Line BP: ()/(41-85) 113/62  FiO2 (%):  [40 %] 40 %  SpO2:  [95 %-100 %] 98 %    I/O last 3 completed shifts:  In: 2867.58 [I.V.:1012.58; NG/GT:955]  Out: 3235 [Urine:11; Emesis/NG output:605; Drains:1528; Other:1091]      Gen: Eyes open but not responsive. Off sedation (Precedex stopped 5/10);   Resp: Ventilated, trach  Abd: Abdomen is taut but compressible, drains with dark sanguinous output, Mal[place]ot with succus, midline abdominal dressing with wound manager, LLQ drain with sanguinous output    Ext: bilateral lower extremities appear mottled, warm to the touch, edematous      Labs:  Recent Labs   Lab 05/13/25  0441 05/13/25  0059 05/12/25 2015 05/12/25 1613 05/12/25  1148   WBC 13.7*  --  15.7*  --  13.7*   HGB 12.4* 12.8* 12.8*   < > 13.1*     --  185  --  155    < > = values in this interval not displayed.       Recent Labs   Lab 05/13/25  0444 05/13/25  0441 05/13/25  0018 05/12/25 2015 05/12/25  1615 05/12/25  1613 05/12/25  1152 05/12/25  1148   NA  --  135  --  135  --  135  --  134*   POTASSIUM  --  4.6  --  4.8  --  4.7  --  4.4   CHLORIDE  --  103  --  102  --  102  --  100   CO2  --  19*  --  20*  --  21*  --  21*   BUN  --  18.3  --  18.0  --  17.7  --  17.8   CR  --  0.91  --  0.95  --  0.97  --  0.96   * 127* 131* 92   < > 71   < > 84   KARINA  --  8.2*  --  8.6*  --  8.7*  --  8.1*   MAG  --  2.6*  --  2.5*  --   --   --  2.3   PHOS  --  3.9  --  4.7*  --   --   --  4.2    < > = values in this interval not displayed.       Imaging:  CTAP OS 04/27:    Impression    1.  Worsening sequela of necrotizing  pancreatitis. Dominant peripancreatic collection has increased in size, now measuring 19 x 13 cm, previously 17 x 11 cm; increasing gas within this collection is worrisome for infection. Large volume ascites and fat necrosis elsewhere throughout the abdomen and pelvis has also increased from prior.  2.  New presumed blood products in the dominant peripancreatic collection, as well as layering within the left paracolic gutter, suspicious for interval bleeding. Recommend trending hemoglobin levels; CTA abdomen and pelvis could be considered if there is concern for active bleeding.  3.  Mild small bowel wall thickening, possibly reactive or sequela of enteritis.  4.  Similar right lower lobe consolidation.     Assessment/Plan:   Sebastián Rodas is a 29-year-old male with a history of alcohol use disorder, anxiety, and bipolar disorder who was admitted on 3/30/2025 for alcohol withdrawal following a recent binge, presenting with diffuse abdominal pain. Initial workup revealed leukocytosis and elevated lipase concerning for acute pancreatitis. He developed acute encephalopathy and was transferred to the ICU on 3/31, requiring intubation and vasopressors by 4/1 due to shock. Hospital course has been complicated by acute renal failure requiring CRRT, cardiomyopathy (initial LVEF 20-25%, improved to 65-70% by 4/14), and necrotizing pancreatitis with unorganized fluid collections. He completed a 14-day course of meropenem but remains intermittently febrile and critically ill, requiring ongoing dialysis and vasopressor support. On 4/27, after clinical deterioration and imaging consistent with a likely perforated viscus, following family discussion, he underwent emergent exploratory laparotomy, necrosectomy, transverse colectomy, abdominal washout, and drain placement. Patient was transferred to [facility] on 4/30/25 for further surgical management. Went to OR 5/1 for re-open laparotomy, I&D, placement of colostomy tube in presumed  "previous colectomy staple line, necrosectomy, and Abthera placement. On 5/2, increasing sanguineous output from drains concerning for bleeding from pancreatic bed. Family care conference held 5/2, plan for restorative cares at that time. Take back to OR twice (5/4 and 5/7) for abdominal washout. Per nursing note: 5/9 morning after he saw a picture of his abdomen that he \"desires to speak to team about updating goals of care, expressed desire to pull back on cares\". Goals of care conference on 5/10 with continuation of full code and restorative cares. CTA on 5/10 revealed large area of active arterial extravasation in the upper abdomen. MTP was started. Patient is now s/p embolization of small pancreatic branch off of the distal celiac artery with IR on 5/10. Lac stable around 2 (2.1 today), white counts downtrending on no abx.     Plan:  - No further surgical options for any intraabdominal pathology   - Nursing to change dressings in the am, pm, and PRN during the day if copious drainage during the day. Please place Xeroform over the bridging mesh prior to placing Kerlix  - Appreciate WOC cares  - Other cares per SICU, we appreciate excellent cares.     Patient was seen and discussed with resident and chief resident.    Casimiro Chirinos MD  Urology PGY-1  General surgery service    "

## 2025-05-13 NOTE — PROGRESS NOTES
SURGICAL ICU PROGRESS NOTE  05/13/2025    Date of Service (when I saw the patient): 05/13/2025    ASSESSMENT:  Sebastián Rodas is a 29M with alcohol abuse, anxiety, and bipolar disorder, who was admitted 3/30/25 for alcohol withdrawal and abdominal pain. Workup showed leukocytosis and elevated lipase, consistent with acute pancreatitis. He developed encephalopathy and shock, requiring ICU care, intubation, vasopressors, and CRRT by 4/1. His course was complicated by cardiomyopathy (EF 20-25%, improved to 65-70% by 4/14), necrotizing pancreatitis, and persistent infection despite 14 days of meropenem. On 4/27, imaging showed likely perforated viscus; he underwent emergent laparotomy, necrosectomy, and colectomy. Transferred to [facility] SICU on 4/30. On 5/1, reoperation revealed colonic staple line breakdown, necrotic pancreas, vessel thrombosis, and adhesions. Colostomy with malankot drain and temporary abdominal closure performed. Prognosis is poor; palliative care involved. Care conference 5/2 with family to talk goals of care. Full code, family acknowledged that poor prognosis is to be expected. 5/4 s/p exploratory surgery, additional necrotic pancreas remove with no obvious spillage or sign of bleeding at the time. Went back to the OR 5/7 for abdominal wash out. CT abdomen 5/10 showed extravasation on arterial phase imaging. MTP initiated, IR arterial embolization of pancreatic vessels performed. On return from IR, had abdominal retention and skin mottling. For concern of abdominal compartment syndrome, bladder pressures were measured at 16 and then discontinued.        CHANGES and MAJOR THINGS TODAY:  Stop seroquel  Start trazodone  PST--> Trach dome  Mobilize  AXR  Increase prn hydromprohone  Start Vasopressin   Start Droxidopa 600 q8 hours    PLAN:    Neurological:  # Acute pain   #Encephalopathy- improving  #Insomnia  - Monitor neurological status. Delirium preventions and precautions.   # Pain: Robaxin, IV  dilaudid PRN, oxycodone 15mg Q4, Tylenol Q8. Increase PRN Hydromorphone allowance for mobility and dressing changes.   # Sedation: None   - Seroquel 50 mg at bedtime. Will stop as it contributed to hypotension and he is not delirious or agitated.   - Will add trazodone for sleep. Continue Melatonin     Pulmonary:   # Acute hypoxic respiratory failure  # S/p tracheostomy placement   - FiO2 (%): 40 %, Resp: 18, Vent Mode: VC/AC, Resp Rate (Set): 16 breaths/min, Tidal Volume (Set, mL): 420 mL, PEEP (cm H2O): 5 cmH2O, Pressure Support (cm H2O): 5 cmH2O, Resp Rate (Set): 16 breaths/min, Tidal Volume (Set, mL): 420 mL, PEEP (cm H2O): 5 cmH2O   - On PST this am 5/5. Transition to BID trach dome trials today. Return to PSV upon trial completion.   - CT PE 5/9 showed no evidence of pulmonary embolism  - Pulmonary toilet, mobilize  - Ventilator bundle    Cardiovascular:    # Stress Cardiomyopathy   - Initial LVEF 20-25%, improved to 65-70%  # Shock-distributive (septic)  # Hemorrhagic shock -resolved  # Sinus Tachycardia  - Monitor hemodynamic status. MP goal > 65.   - Norepinephrine at moderate dosing. Given prolonged illness course, will start Vasopressin again and wean Norepi down   - Start Droxidopa 600 every 8 hours  - If remains pressor dependent, could consider stress dose steroids for presumed adrenal insufficiency.   - EKG 5/8, 5/9 showed sinus tachycardia with no ectopy  - Trend lactate, 2.1 this am.   - Persistent ST 140s.     Gastroenterology/Nutrition:  # S/p emergent exploratory laparotomy, necrosectomy, transverse colectomy, abdominal washout, and drain placement 5/1 and 5/4  # S/p Pancreatic branch vessel embolization with IR 5/10  # Severe necrotizing pancreatitis  # Splenic vein thrombosis  - will defer management of dressings and drain removal per EGS   - on PPI  - U/S 5/09 revealed no visualization of splenic vein.   - CT abdomen 5/9 showed a small focus of active bleed along the expected site of the  necrosectomy immediately superior to the pancreatic neck. Large volume complex abdominal ascites, complex retroperitoneal fluid and extensive fat necrosis throughout the abdomen/pelvis. Thickened/enhancing peritoneum throughout the abdomen/pelvis, compatible with peritonitis, with reactive small and large bowel thickening  - Repeat CT 5/10: large area of active arterial extravasation in the upper abdomen, with the source vessel appearing to arise anterior to the celiac trunk. Large amount of hemoperitoneum.  - IR embolization using intra procedural CTA and angiograms for vessel identification  - Ongoing copious dark bloody drain output, CTM  - Cecostomy drain in place, with about 100 ml of stool output. Abdomen distended. AXR unremarkable, looks well decompressed.     # Severe Protein calorie deficit malnutrition due to critical illness  - Restart tube feeds  - NG tube out  - Copper replacement ordered by nutrition  - RD consult. Appreciate cares and recommendations.     Renal/Fluids/Electrolytes:   # SARAHI  # Fluid overload  Baseline Cr 0.7-0.8. Presented with Cr 0.96 on 3/30. Rapidly markos to 5.2 on 4/1. Oliguric. Garcia UA with proteinuria, hematuria, pyuria, moderate bacteria.  Kidneys unremarkable on CT. Has severe ATN in the setting of shock, ADHF, intravascular hypovolemia/3rd spacing from pancreatitis.   - Lactate downtrending  - CRRT with I&Os goal.   - Continue to monitor intake and output    Endocrine:   # Stress hyperglycemia    - hgb A1c 5.3, no hx of DM   - Sliding scale for glucose management.   - Goal to keep BG< 180 for optimal wound healing      ID:  # Leukocytosis  #Necrotizing pancreatitis  WBC Count   Date Value Ref Range Status   05/13/2025 13.7 (H) 4.0 - 11.0 10e3/uL Final     - Completed 14 days course of meropenem and caspofungin.   - Zosyn completed: 5/2/25--5/11.   - Discontinued micafungin 5/3  - T max 100.4, WBC 13.7. Currently off antimicrobials.      cultures:  - 4/30 blood cultures- NGTD    - 5/1 blood cultures ordered - NGTD     Heme:     # Acute blood loss anemia   # Anemia of critical illness   # Thrombosed splenic vein   - Transfuse if hgb <7.0 or signs/symptoms of hypoperfusion. Monitor and trend  Hemoglobin   Date Value Ref Range Status   05/13/2025 12.4 (L) 13.3 - 17.7 g/dL Final     - MTP 5/10, transfused 11 units prbc, 10 units ffp and 3 units of platelets  - Coags WNL   - hemoglobin stable  - Continue subcutaneous heparin     Musculoskeletal:   # Deconditioning and weakness due to critical illness   - Physical and occupational therapy consult      Skin:  # Pressure Ulcers - Buttocks/rectal area  - Barrier cream with liberal application. Continue fecal management system   - WOC consult      #Pressure Ulcers- Left Heel  Pressure Injury Location: Left heel   Wound type: Pressure Injury     Pressure Injury Stage: Deep Tissue Pressure Injury (DTPI), present on admission     General Cares/Prophylaxis:    DVT Prophylaxis: SQH   GI Prophylaxis: PPI  Restraints: Restraints for medical healing needed: NO     Lines/ tubes/ drains:  - HD line--  Right IJ  - PICC line- left   - Right radial A line  - PIV x 3  - Trach  - G-J  - Wound manager  - SHANNON x 4  - Cecostomy drain     Disposition:  - Surgical ICU    [facility]  CC time 50 minutes    ====================================  INTERVAL HISTORY:  Course reviewed. Increased vasopressor needs overnight. Nursing reports it followed seroquel administration. Denies dyspnea, headache. Endorses abdominal pain.     OBJECTIVE:   1. VITAL SIGNS:   Temp:  [98.1  F (36.7  C)-100.6  F (38.1  C)] 98.4  F (36.9  C)  Pulse:  [114-156] 129  Resp:  [9-25] 18  MAP:  [52 mmHg-107 mmHg] 69 mmHg  Arterial Line BP: ()/(35-85) 103/52  FiO2 (%):  [40 %] 40 %  SpO2:  [95 %-100 %] 97 %  FiO2 (%): 40 %, Resp: 18, Vent Mode: VC/AC, Resp Rate (Set): 16 breaths/min, Tidal Volume (Set, mL): 420 mL, PEEP (cm H2O): 5 cmH2O, Pressure Support (cm H2O): 5 cmH2O, Resp Rate (Set):  16 breaths/min, Tidal Volume (Set, mL): 420 mL, PEEP (cm H2O): 5 cmH2O    2. INTAKE/ OUTPUT:   I/O last 3 completed shifts:  In: 3337.71 [I.V.:1457.71; NG/GT:960; IV Piggyback:500]  Out: 3503 [Urine:11; Emesis/NG output:890; Drains:1237; Other:1365]    3. PHYSICAL EXAMINATION:  General: laying in bed, minimally responsive to voice.  HEENT: PERRLA. Trach present and secure, site dressed.   Pulm/Resp: Clear breath sounds bilaterally, lungs coarse but clear.  CV: RRR, S1/S2   Abdomen: Distended. Dark bloody output noted to all drains. G-J tube in place, site secured, abdomen with abthera in place, suction intact.   : scrotal edema, draining dark bloody output.   MSK/Extremities: generalized edema in extremities    4. INVESTIGATIONS:   Arterial Blood Gases   Recent Labs   Lab 05/11/25  0604 05/10/25  1228   PH 7.33* 7.43   PCO2 43 30*   PO2 89 86   HCO3 23 20*     Complete Blood Count   Recent Labs   Lab 05/13/25  0441 05/13/25  0059 05/12/25 2015 05/12/25  1613 05/12/25  1148 05/12/25  1013 05/12/25  0557   WBC 13.7*  --  15.7*  --  13.7*  --  14.6*   HGB 12.4* 12.8* 12.8* 13.1* 13.1*   < > 12.8*     --  185  --  155  --  138*    < > = values in this interval not displayed.     Basic Metabolic Panel  Recent Labs   Lab 05/13/25  0444 05/13/25  0441 05/13/25  0018 05/12/25 2015 05/12/25  1615 05/12/25  1613 05/12/25  1152 05/12/25  1148   NA  --  135  --  135  --  135  --  134*   POTASSIUM  --  4.6  --  4.8  --  4.7  --  4.4   CHLORIDE  --  103  --  102  --  102  --  100   CO2  --  19*  --  20*  --  21*  --  21*   BUN  --  18.3  --  18.0  --  17.7  --  17.8   CR  --  0.91  --  0.95  --  0.97  --  0.96   * 127* 131* 92   < > 71   < > 84    < > = values in this interval not displayed.     Liver Function Tests  Recent Labs   Lab 05/13/25  0441 05/12/25 2015 05/12/25  1148 05/12/25  0311 05/11/25  1205 05/11/25  0322 05/10/25  2347 05/10/25  1952 05/10/25  1715 05/10/25  1353   AST 25  --   --  28  --  33  31 33  --   --    ALT 18  --   --  19  --  23 23 27  --   --    ALKPHOS 205*  --   --  165*  --  117 114 109  --   --    BILITOTAL 2.9*  --   --  4.0*  --  3.3* 3.5* 3.1*  --   --    ALBUMIN 2.7* 2.9* 2.8* 3.1*   < > 3.5 3.6 3.9 2.4*  --    INR  --   --   --   --   --   --  1.13 1.17* 1.65* 1.28*    < > = values in this interval not displayed.     Pancreatic Enzymes  No lab results found in last 7 days.    Coagulation Profile  Recent Labs   Lab 05/10/25  2347 05/10/25  1952 05/10/25  1715 05/10/25  1353   INR 1.13 1.17* 1.65* 1.28*   PTT 29 30 37 33         5. RADIOLOGY:   No results found for this or any previous visit (from the past 24 hours).      =========================================

## 2025-05-13 NOTE — PROGRESS NOTES
CRRT STATUS NOTE    DATA:  Time:  6:31 AM  Pressures WNL:  YES  Filter Status:  WDL    Problems Reported/Alarms Noted: none    Supplies Present:  YES    ASSESSMENT:  Patient Net Fluid Balance: 5/12 -276 ml, -298 ml since midnight  Vital Signs: Temp: 98.4  F (36.9  C) Temp src: Axillary   Pulse: (!) 129   Resp: 18 SpO2: 97 % O2 Device: Mechanical Ventilator     Labs: K 4.6 Mg 2.6 phos 3.9 iCal 4.5 WBC 13.7 Hgb 12.4 plt 167  Goals of Therapy: I=O     INTERVENTIONS:   None needed this shift.    PLAN:  Continue CRRT goals of therapy as tolerated. Contact CRRT resource with questions/concerns.

## 2025-05-13 NOTE — PLAN OF CARE
Goal Outcome Evaluation:       Patient was afebrile, tachycardic 115-150, remains on levo to maintain map greater than 65. X4 SHANNON drains. With intermittent copious output from drain insertion sites depending on side he is laying. Many complaints of pain overnight. Utilized both schedule and PRN analgesics. CRRT with no fluid removal due to drain output. Juju lax/ senna given w/o stool. Patient is notably withdrawn with flat affect.     Rechecked hgb/ lactic which were unremarkable. Electrolytes stable.  Continue Gluc checks. Wound care completed.

## 2025-05-14 LAB
ALBUMIN SERPL BCG-MCNC: 2.4 G/DL (ref 3.5–5.2)
ALBUMIN SERPL BCG-MCNC: 2.4 G/DL (ref 3.5–5.2)
ALBUMIN SERPL BCG-MCNC: 2.6 G/DL (ref 3.5–5.2)
ALP SERPL-CCNC: 243 U/L (ref 40–150)
ALT SERPL W P-5'-P-CCNC: 16 U/L (ref 0–70)
ANION GAP SERPL CALCULATED.3IONS-SCNC: 13 MMOL/L (ref 7–15)
ANION GAP SERPL CALCULATED.3IONS-SCNC: 14 MMOL/L (ref 7–15)
ANION GAP SERPL CALCULATED.3IONS-SCNC: 14 MMOL/L (ref 7–15)
ANION GAP SERPL CALCULATED.3IONS-SCNC: 16 MMOL/L (ref 7–15)
AST SERPL W P-5'-P-CCNC: 29 U/L (ref 0–45)
BILIRUB DIRECT SERPL-MCNC: 1.97 MG/DL (ref 0–0.3)
BILIRUB SERPL-MCNC: 2.6 MG/DL
BUN SERPL-MCNC: 18.8 MG/DL (ref 6–20)
BUN SERPL-MCNC: 19.5 MG/DL (ref 6–20)
BUN SERPL-MCNC: 19.7 MG/DL (ref 6–20)
BUN SERPL-MCNC: 23.8 MG/DL (ref 6–20)
CA-I BLD-MCNC: 4.3 MG/DL (ref 4.4–5.2)
CA-I BLD-MCNC: 4.5 MG/DL (ref 4.4–5.2)
CA-I BLD-MCNC: 4.6 MG/DL (ref 4.4–5.2)
CALCIUM SERPL-MCNC: 8.1 MG/DL (ref 8.8–10.4)
CALCIUM SERPL-MCNC: 8.5 MG/DL (ref 8.8–10.4)
CALCIUM SERPL-MCNC: 8.6 MG/DL (ref 8.8–10.4)
CALCIUM SERPL-MCNC: 8.6 MG/DL (ref 8.8–10.4)
CHLORIDE SERPL-SCNC: 103 MMOL/L (ref 98–107)
CHLORIDE SERPL-SCNC: 104 MMOL/L (ref 98–107)
CREAT SERPL-MCNC: 0.83 MG/DL (ref 0.67–1.17)
CREAT SERPL-MCNC: 0.84 MG/DL (ref 0.67–1.17)
CREAT SERPL-MCNC: 0.86 MG/DL (ref 0.67–1.17)
CREAT SERPL-MCNC: 0.92 MG/DL (ref 0.67–1.17)
CRP SERPL-MCNC: 454 MG/L
EGFRCR SERPLBLD CKD-EPI 2021: >90 ML/MIN/1.73M2
ERYTHROCYTE [DISTWIDTH] IN BLOOD BY AUTOMATED COUNT: 16.6 % (ref 10–15)
ERYTHROCYTE [DISTWIDTH] IN BLOOD BY AUTOMATED COUNT: 16.8 % (ref 10–15)
ERYTHROCYTE [DISTWIDTH] IN BLOOD BY AUTOMATED COUNT: 16.9 % (ref 10–15)
GLUCOSE BLDC GLUCOMTR-MCNC: 124 MG/DL (ref 70–99)
GLUCOSE BLDC GLUCOMTR-MCNC: 140 MG/DL (ref 70–99)
GLUCOSE BLDC GLUCOMTR-MCNC: 164 MG/DL (ref 70–99)
GLUCOSE BLDC GLUCOMTR-MCNC: 63 MG/DL (ref 70–99)
GLUCOSE BLDC GLUCOMTR-MCNC: 68 MG/DL (ref 70–99)
GLUCOSE BLDC GLUCOMTR-MCNC: 71 MG/DL (ref 70–99)
GLUCOSE BLDC GLUCOMTR-MCNC: 76 MG/DL (ref 70–99)
GLUCOSE BLDC GLUCOMTR-MCNC: 81 MG/DL (ref 70–99)
GLUCOSE BLDC GLUCOMTR-MCNC: 82 MG/DL (ref 70–99)
GLUCOSE BLDC GLUCOMTR-MCNC: 83 MG/DL (ref 70–99)
GLUCOSE BLDC GLUCOMTR-MCNC: 89 MG/DL (ref 70–99)
GLUCOSE SERPL-MCNC: 66 MG/DL (ref 70–99)
GLUCOSE SERPL-MCNC: 67 MG/DL (ref 70–99)
GLUCOSE SERPL-MCNC: 71 MG/DL (ref 70–99)
GLUCOSE SERPL-MCNC: 77 MG/DL (ref 70–99)
HCO3 SERPL-SCNC: 18 MMOL/L (ref 22–29)
HCO3 SERPL-SCNC: 19 MMOL/L (ref 22–29)
HCO3 SERPL-SCNC: 20 MMOL/L (ref 22–29)
HCO3 SERPL-SCNC: 20 MMOL/L (ref 22–29)
HCT VFR BLD AUTO: 35.7 % (ref 40–53)
HCT VFR BLD AUTO: 36 % (ref 40–53)
HCT VFR BLD AUTO: 36.6 % (ref 40–53)
HGB BLD-MCNC: 11.5 G/DL (ref 13.3–17.7)
HGB BLD-MCNC: 11.9 G/DL (ref 13.3–17.7)
HGB BLD-MCNC: 12 G/DL (ref 13.3–17.7)
LACTATE SERPL-SCNC: 2.5 MMOL/L (ref 0.7–2)
LACTATE SERPL-SCNC: 2.9 MMOL/L (ref 0.7–2)
LACTATE SERPL-SCNC: 3.1 MMOL/L (ref 0.7–2)
MAGNESIUM SERPL-MCNC: 2.4 MG/DL (ref 1.7–2.3)
MAGNESIUM SERPL-MCNC: 2.4 MG/DL (ref 1.7–2.3)
MAGNESIUM SERPL-MCNC: 2.6 MG/DL (ref 1.7–2.3)
MCH RBC QN AUTO: 30 PG (ref 26.5–33)
MCH RBC QN AUTO: 30 PG (ref 26.5–33)
MCH RBC QN AUTO: 30.2 PG (ref 26.5–33)
MCHC RBC AUTO-ENTMCNC: 32.2 G/DL (ref 31.5–36.5)
MCHC RBC AUTO-ENTMCNC: 32.8 G/DL (ref 31.5–36.5)
MCHC RBC AUTO-ENTMCNC: 33.1 G/DL (ref 31.5–36.5)
MCV RBC AUTO: 91 FL (ref 78–100)
MCV RBC AUTO: 92 FL (ref 78–100)
MCV RBC AUTO: 93 FL (ref 78–100)
PHOSPHATE SERPL-MCNC: 3.5 MG/DL (ref 2.5–4.5)
PHOSPHATE SERPL-MCNC: 3.9 MG/DL (ref 2.5–4.5)
PHOSPHATE SERPL-MCNC: 4 MG/DL (ref 2.5–4.5)
PLATELET # BLD AUTO: 164 10E3/UL (ref 150–450)
PLATELET # BLD AUTO: 166 10E3/UL (ref 150–450)
PLATELET # BLD AUTO: 166 10E3/UL (ref 150–450)
POTASSIUM SERPL-SCNC: 4.7 MMOL/L (ref 3.4–5.3)
POTASSIUM SERPL-SCNC: 4.8 MMOL/L (ref 3.4–5.3)
PROT SERPL-MCNC: 6 G/DL (ref 6.4–8.3)
RBC # BLD AUTO: 3.83 10E6/UL (ref 4.4–5.9)
RBC # BLD AUTO: 3.97 10E6/UL (ref 4.4–5.9)
RBC # BLD AUTO: 3.98 10E6/UL (ref 4.4–5.9)
SODIUM SERPL-SCNC: 137 MMOL/L (ref 135–145)
SODIUM SERPL-SCNC: 138 MMOL/L (ref 135–145)
VIT B1 PYROPHOSHATE BLD-SCNC: 238 NMOL/L
WBC # BLD AUTO: 20 10E3/UL (ref 4–11)
WBC # BLD AUTO: 22.2 10E3/UL (ref 4–11)
WBC # BLD AUTO: 22.6 10E3/UL (ref 4–11)

## 2025-05-14 PROCEDURE — 82248 BILIRUBIN DIRECT: CPT | Performed by: STUDENT IN AN ORGANIZED HEALTH CARE EDUCATION/TRAINING PROGRAM

## 2025-05-14 PROCEDURE — 84295 ASSAY OF SERUM SODIUM: CPT | Performed by: STUDENT IN AN ORGANIZED HEALTH CARE EDUCATION/TRAINING PROGRAM

## 2025-05-14 PROCEDURE — 250N000009 HC RX 250

## 2025-05-14 PROCEDURE — 86140 C-REACTIVE PROTEIN: CPT

## 2025-05-14 PROCEDURE — 84075 ASSAY ALKALINE PHOSPHATASE: CPT | Performed by: STUDENT IN AN ORGANIZED HEALTH CARE EDUCATION/TRAINING PROGRAM

## 2025-05-14 PROCEDURE — 200N000002 HC R&B ICU UMMC

## 2025-05-14 PROCEDURE — 82310 ASSAY OF CALCIUM: CPT | Performed by: STUDENT IN AN ORGANIZED HEALTH CARE EDUCATION/TRAINING PROGRAM

## 2025-05-14 PROCEDURE — 250N000011 HC RX IP 250 OP 636

## 2025-05-14 PROCEDURE — 258N000001 HC RX 258: Performed by: PHYSICIAN ASSISTANT

## 2025-05-14 PROCEDURE — 83735 ASSAY OF MAGNESIUM: CPT | Performed by: STUDENT IN AN ORGANIZED HEALTH CARE EDUCATION/TRAINING PROGRAM

## 2025-05-14 PROCEDURE — 250N000013 HC RX MED GY IP 250 OP 250 PS 637

## 2025-05-14 PROCEDURE — G0463 HOSPITAL OUTPT CLINIC VISIT: HCPCS

## 2025-05-14 PROCEDURE — 85018 HEMOGLOBIN: CPT | Performed by: STUDENT IN AN ORGANIZED HEALTH CARE EDUCATION/TRAINING PROGRAM

## 2025-05-14 PROCEDURE — 82374 ASSAY BLOOD CARBON DIOXIDE: CPT | Performed by: STUDENT IN AN ORGANIZED HEALTH CARE EDUCATION/TRAINING PROGRAM

## 2025-05-14 PROCEDURE — 82525 ASSAY OF COPPER: CPT

## 2025-05-14 PROCEDURE — 82330 ASSAY OF CALCIUM: CPT | Performed by: STUDENT IN AN ORGANIZED HEALTH CARE EDUCATION/TRAINING PROGRAM

## 2025-05-14 PROCEDURE — 84155 ASSAY OF PROTEIN SERUM: CPT | Performed by: STUDENT IN AN ORGANIZED HEALTH CARE EDUCATION/TRAINING PROGRAM

## 2025-05-14 PROCEDURE — 250N000013 HC RX MED GY IP 250 OP 250 PS 637: Performed by: STUDENT IN AN ORGANIZED HEALTH CARE EDUCATION/TRAINING PROGRAM

## 2025-05-14 PROCEDURE — 258N000003 HC RX IP 258 OP 636: Performed by: NURSE PRACTITIONER

## 2025-05-14 PROCEDURE — 258N000003 HC RX IP 258 OP 636

## 2025-05-14 PROCEDURE — 83605 ASSAY OF LACTIC ACID: CPT

## 2025-05-14 PROCEDURE — 999N000157 HC STATISTIC RCP TIME EA 10 MIN

## 2025-05-14 PROCEDURE — 250N000013 HC RX MED GY IP 250 OP 250 PS 637: Performed by: SURGERY

## 2025-05-14 PROCEDURE — 84450 TRANSFERASE (AST) (SGOT): CPT | Performed by: STUDENT IN AN ORGANIZED HEALTH CARE EDUCATION/TRAINING PROGRAM

## 2025-05-14 PROCEDURE — 94003 VENT MGMT INPAT SUBQ DAY: CPT

## 2025-05-14 PROCEDURE — 82435 ASSAY OF BLOOD CHLORIDE: CPT | Performed by: STUDENT IN AN ORGANIZED HEALTH CARE EDUCATION/TRAINING PROGRAM

## 2025-05-14 PROCEDURE — 85027 COMPLETE CBC AUTOMATED: CPT | Performed by: STUDENT IN AN ORGANIZED HEALTH CARE EDUCATION/TRAINING PROGRAM

## 2025-05-14 PROCEDURE — 84100 ASSAY OF PHOSPHORUS: CPT | Performed by: STUDENT IN AN ORGANIZED HEALTH CARE EDUCATION/TRAINING PROGRAM

## 2025-05-14 PROCEDURE — 999N000253 HC STATISTIC WEANING TRIALS

## 2025-05-14 PROCEDURE — 250N000011 HC RX IP 250 OP 636: Mod: JZ | Performed by: STUDENT IN AN ORGANIZED HEALTH CARE EDUCATION/TRAINING PROGRAM

## 2025-05-14 PROCEDURE — 99291 CRITICAL CARE FIRST HOUR: CPT | Mod: 24 | Performed by: ANESTHESIOLOGY

## 2025-05-14 PROCEDURE — 90947 DIALYSIS REPEATED EVAL: CPT

## 2025-05-14 PROCEDURE — 250N000009 HC RX 250: Performed by: STUDENT IN AN ORGANIZED HEALTH CARE EDUCATION/TRAINING PROGRAM

## 2025-05-14 PROCEDURE — 85041 AUTOMATED RBC COUNT: CPT | Performed by: STUDENT IN AN ORGANIZED HEALTH CARE EDUCATION/TRAINING PROGRAM

## 2025-05-14 RX ORDER — PIPERACILLIN SODIUM, TAZOBACTAM SODIUM 4; .5 G/20ML; G/20ML
4.5 INJECTION, POWDER, LYOPHILIZED, FOR SOLUTION INTRAVENOUS EVERY 6 HOURS
Status: DISCONTINUED | OUTPATIENT
Start: 2025-05-14 | End: 2025-05-27

## 2025-05-14 RX ORDER — HYDROMORPHONE HYDROCHLORIDE 1 MG/ML
0.5 INJECTION, SOLUTION INTRAMUSCULAR; INTRAVENOUS; SUBCUTANEOUS ONCE
Status: COMPLETED | OUTPATIENT
Start: 2025-05-14 | End: 2025-05-14

## 2025-05-14 RX ORDER — IOPAMIDOL 755 MG/ML
105 INJECTION, SOLUTION INTRAVASCULAR ONCE
Status: COMPLETED | OUTPATIENT
Start: 2025-05-14 | End: 2025-05-14

## 2025-05-14 RX ORDER — TRAZODONE HYDROCHLORIDE 50 MG/1
100 TABLET ORAL EVERY EVENING
Status: DISCONTINUED | OUTPATIENT
Start: 2025-05-14 | End: 2025-05-16

## 2025-05-14 RX ORDER — DEXTROSE MONOHYDRATE 25 G/50ML
25-50 INJECTION, SOLUTION INTRAVENOUS
Status: DISCONTINUED | OUTPATIENT
Start: 2025-05-14 | End: 2025-05-14

## 2025-05-14 RX ORDER — NICOTINE POLACRILEX 4 MG
15-30 LOZENGE BUCCAL
Status: DISCONTINUED | OUTPATIENT
Start: 2025-05-14 | End: 2025-05-14

## 2025-05-14 RX ORDER — SERTRALINE HYDROCHLORIDE 25 MG/1
50 TABLET, FILM COATED ORAL DAILY
Status: DISCONTINUED | OUTPATIENT
Start: 2025-05-14 | End: 2025-05-18

## 2025-05-14 RX ORDER — DEXTROSE AND SODIUM CHLORIDE 10; .45 G/100ML; G/100ML
INJECTION, SOLUTION INTRAVENOUS CONTINUOUS
Status: DISCONTINUED | OUTPATIENT
Start: 2025-05-14 | End: 2025-05-15

## 2025-05-14 RX ORDER — DEXTROSE MONOHYDRATE 100 MG/ML
INJECTION, SOLUTION INTRAVENOUS CONTINUOUS PRN
Status: DISCONTINUED | OUTPATIENT
Start: 2025-05-14 | End: 2025-05-14

## 2025-05-14 RX ORDER — DEXMEDETOMIDINE HYDROCHLORIDE 4 UG/ML
.1-1.2 INJECTION, SOLUTION INTRAVENOUS CONTINUOUS
Status: DISCONTINUED | OUTPATIENT
Start: 2025-05-14 | End: 2025-06-03 | Stop reason: HOSPADM

## 2025-05-14 RX ADMIN — SENNOSIDES AND DOCUSATE SODIUM 2 TABLET: 50; 8.6 TABLET ORAL at 20:19

## 2025-05-14 RX ADMIN — CALCIUM CHLORIDE, MAGNESIUM CHLORIDE, SODIUM CHLORIDE, SODIUM BICARBONATE, POTASSIUM CHLORIDE AND SODIUM PHOSPHATE DIBASIC DIHYDRATE 12.5 ML/KG/HR: 3.68; 3.05; 6.34; 3.09; .314; .187 INJECTION INTRAVENOUS at 10:07

## 2025-05-14 RX ADMIN — CALCIUM CHLORIDE, MAGNESIUM CHLORIDE, SODIUM CHLORIDE, SODIUM BICARBONATE, POTASSIUM CHLORIDE AND SODIUM PHOSPHATE DIBASIC DIHYDRATE 12.5 ML/KG/HR: 3.68; 3.05; 6.34; 3.09; .314; .187 INJECTION INTRAVENOUS at 04:24

## 2025-05-14 RX ADMIN — CALCIUM GLUCONATE 2 G: 20 INJECTION, SOLUTION INTRAVENOUS at 15:49

## 2025-05-14 RX ADMIN — SENNOSIDES AND DOCUSATE SODIUM 2 TABLET: 50; 8.6 TABLET ORAL at 07:26

## 2025-05-14 RX ADMIN — METHOCARBAMOL 500 MG: 500 TABLET ORAL at 01:35

## 2025-05-14 RX ADMIN — HYDROMORPHONE HYDROCHLORIDE 0.5 MG: 1 INJECTION, SOLUTION INTRAMUSCULAR; INTRAVENOUS; SUBCUTANEOUS at 07:45

## 2025-05-14 RX ADMIN — HEPARIN SODIUM 5000 UNITS: 5000 INJECTION, SOLUTION INTRAVENOUS; SUBCUTANEOUS at 11:39

## 2025-05-14 RX ADMIN — DEXTROSE AND SODIUM CHLORIDE: 10; .45 INJECTION, SOLUTION INTRAVENOUS at 12:52

## 2025-05-14 RX ADMIN — METHOCARBAMOL 500 MG: 500 TABLET ORAL at 13:54

## 2025-05-14 RX ADMIN — HYDROMORPHONE HYDROCHLORIDE 1 MG: 1 INJECTION, SOLUTION INTRAMUSCULAR; INTRAVENOUS; SUBCUTANEOUS at 10:03

## 2025-05-14 RX ADMIN — SODIUM CHLORIDE 2.4 UNITS/HR: 9 INJECTION, SOLUTION INTRAVENOUS at 11:52

## 2025-05-14 RX ADMIN — HEPARIN SODIUM 5000 UNITS: 5000 INJECTION, SOLUTION INTRAVENOUS; SUBCUTANEOUS at 20:19

## 2025-05-14 RX ADMIN — IOPAMIDOL 105 ML: 755 INJECTION, SOLUTION INTRAVENOUS at 20:46

## 2025-05-14 RX ADMIN — Medication 5 MG: at 20:19

## 2025-05-14 RX ADMIN — Medication 500 MG: at 20:23

## 2025-05-14 RX ADMIN — DROXIDOPA 600 MG: 100 CAPSULE ORAL at 04:56

## 2025-05-14 RX ADMIN — OXYCODONE HYDROCHLORIDE 15 MG: 10 TABLET ORAL at 16:08

## 2025-05-14 RX ADMIN — CALCIUM CHLORIDE, MAGNESIUM CHLORIDE, SODIUM CHLORIDE, SODIUM BICARBONATE, POTASSIUM CHLORIDE AND SODIUM PHOSPHATE DIBASIC DIHYDRATE 12.5 ML/KG/HR: 3.68; 3.05; 6.34; 3.09; .314; .187 INJECTION INTRAVENOUS at 21:56

## 2025-05-14 RX ADMIN — HYDROMORPHONE HYDROCHLORIDE 1 MG: 1 INJECTION, SOLUTION INTRAMUSCULAR; INTRAVENOUS; SUBCUTANEOUS at 00:24

## 2025-05-14 RX ADMIN — OXYCODONE HYDROCHLORIDE 10 MG: 10 TABLET ORAL at 10:22

## 2025-05-14 RX ADMIN — OXYCODONE HYDROCHLORIDE 15 MG: 10 TABLET ORAL at 20:22

## 2025-05-14 RX ADMIN — SODIUM CHLORIDE, SODIUM LACTATE, POTASSIUM CHLORIDE, AND CALCIUM CHLORIDE 400 ML: .6; .31; .03; .02 INJECTION, SOLUTION INTRAVENOUS at 04:15

## 2025-05-14 RX ADMIN — FOLIC ACID 1 MG: 1 TABLET ORAL at 07:26

## 2025-05-14 RX ADMIN — Medication 500 MG: at 07:25

## 2025-05-14 RX ADMIN — ACETAMINOPHEN 975 MG: 325 TABLET ORAL at 07:26

## 2025-05-14 RX ADMIN — SODIUM CHLORIDE, SODIUM LACTATE, POTASSIUM CHLORIDE, AND CALCIUM CHLORIDE 340 ML: .6; .31; .03; .02 INJECTION, SOLUTION INTRAVENOUS at 16:32

## 2025-05-14 RX ADMIN — DEXMEDETOMIDINE HYDROCHLORIDE 0.7 MCG/KG/HR: 400 INJECTION INTRAVENOUS at 20:22

## 2025-05-14 RX ADMIN — POLYETHYLENE GLYCOL 3350 17 G: 17 POWDER, FOR SOLUTION ORAL at 07:27

## 2025-05-14 RX ADMIN — HEPARIN SODIUM 5000 UNITS: 5000 INJECTION, SOLUTION INTRAVENOUS; SUBCUTANEOUS at 04:16

## 2025-05-14 RX ADMIN — HYDROMORPHONE HYDROCHLORIDE 1 MG: 1 INJECTION, SOLUTION INTRAMUSCULAR; INTRAVENOUS; SUBCUTANEOUS at 23:37

## 2025-05-14 RX ADMIN — HYDROMORPHONE HYDROCHLORIDE 0.5 MG: 1 INJECTION, SOLUTION INTRAMUSCULAR; INTRAVENOUS; SUBCUTANEOUS at 15:57

## 2025-05-14 RX ADMIN — SODIUM CHLORIDE, SODIUM LACTATE, POTASSIUM CHLORIDE, AND CALCIUM CHLORIDE 450 ML: .6; .31; .03; .02 INJECTION, SOLUTION INTRAVENOUS at 10:30

## 2025-05-14 RX ADMIN — SODIUM CHLORIDE, SODIUM LACTATE, POTASSIUM CHLORIDE, AND CALCIUM CHLORIDE 470 ML: .6; .31; .03; .02 INJECTION, SOLUTION INTRAVENOUS at 22:09

## 2025-05-14 RX ADMIN — METHOCARBAMOL 500 MG: 500 TABLET ORAL at 20:19

## 2025-05-14 RX ADMIN — THIAMINE HCL TAB 100 MG 100 MG: 100 TAB at 07:27

## 2025-05-14 RX ADMIN — HYDROMORPHONE HYDROCHLORIDE 0.5 MG: 1 INJECTION, SOLUTION INTRAMUSCULAR; INTRAVENOUS; SUBCUTANEOUS at 03:11

## 2025-05-14 RX ADMIN — SERTRALINE HYDROCHLORIDE 50 MG: 25 TABLET ORAL at 11:26

## 2025-05-14 RX ADMIN — OXYCODONE HYDROCHLORIDE 15 MG: 10 TABLET ORAL at 08:03

## 2025-05-14 RX ADMIN — CALCIUM CHLORIDE, MAGNESIUM CHLORIDE, SODIUM CHLORIDE, SODIUM BICARBONATE, POTASSIUM CHLORIDE AND SODIUM PHOSPHATE DIBASIC DIHYDRATE: 3.68; 3.05; 6.34; 3.09; .314; .187 INJECTION INTRAVENOUS at 10:09

## 2025-05-14 RX ADMIN — PIPERACILLIN AND TAZOBACTAM 4.5 G: 4; .5 INJECTION, POWDER, LYOPHILIZED, FOR SOLUTION INTRAVENOUS at 16:56

## 2025-05-14 RX ADMIN — DEXMEDETOMIDINE HYDROCHLORIDE 0.3 MCG/KG/HR: 400 INJECTION INTRAVENOUS at 11:37

## 2025-05-14 RX ADMIN — OXYCODONE HYDROCHLORIDE 15 MG: 10 TABLET ORAL at 12:42

## 2025-05-14 RX ADMIN — DEXTROSE MONOHYDRATE 50 ML: 25 INJECTION, SOLUTION INTRAVENOUS at 07:49

## 2025-05-14 RX ADMIN — TRAZODONE HYDROCHLORIDE 100 MG: 50 TABLET ORAL at 20:19

## 2025-05-14 RX ADMIN — CALCIUM CHLORIDE, MAGNESIUM CHLORIDE, SODIUM CHLORIDE, SODIUM BICARBONATE, POTASSIUM CHLORIDE AND SODIUM PHOSPHATE DIBASIC DIHYDRATE 12.5 ML/KG/HR: 3.68; 3.05; 6.34; 3.09; .314; .187 INJECTION INTRAVENOUS at 14:49

## 2025-05-14 RX ADMIN — DEXTROSE MONOHYDRATE 25 ML: 25 INJECTION, SOLUTION INTRAVENOUS at 22:17

## 2025-05-14 RX ADMIN — HYDROMORPHONE HYDROCHLORIDE 0.5 MG: 1 INJECTION, SOLUTION INTRAMUSCULAR; INTRAVENOUS; SUBCUTANEOUS at 10:40

## 2025-05-14 RX ADMIN — POLYETHYLENE GLYCOL 3350 17 G: 17 POWDER, FOR SOLUTION ORAL at 20:19

## 2025-05-14 RX ADMIN — METHOCARBAMOL 500 MG: 500 TABLET ORAL at 07:25

## 2025-05-14 RX ADMIN — OXYCODONE HYDROCHLORIDE 15 MG: 10 TABLET ORAL at 05:28

## 2025-05-14 RX ADMIN — OXYCODONE HYDROCHLORIDE 15 MG: 10 TABLET ORAL at 01:35

## 2025-05-14 RX ADMIN — ACETAMINOPHEN 975 MG: 325 TABLET ORAL at 15:57

## 2025-05-14 RX ADMIN — Medication 40 MG: at 07:25

## 2025-05-14 RX ADMIN — DEXTROSE MONOHYDRATE 50 ML: 25 INJECTION, SOLUTION INTRAVENOUS at 13:07

## 2025-05-14 RX ADMIN — PIPERACILLIN AND TAZOBACTAM 4.5 G: 4; .5 INJECTION, POWDER, LYOPHILIZED, FOR SOLUTION INTRAVENOUS at 11:27

## 2025-05-14 RX ADMIN — Medication 1 TABLET: at 07:26

## 2025-05-14 ASSESSMENT — ACTIVITIES OF DAILY LIVING (ADL)
ADLS_ACUITY_SCORE: 59
ADLS_ACUITY_SCORE: 60
ADLS_ACUITY_SCORE: 59
ADLS_ACUITY_SCORE: 59
ADLS_ACUITY_SCORE: 63
ADLS_ACUITY_SCORE: 59
ADLS_ACUITY_SCORE: 60
ADLS_ACUITY_SCORE: 59
ADLS_ACUITY_SCORE: 60
ADLS_ACUITY_SCORE: 60
ADLS_ACUITY_SCORE: 59
ADLS_ACUITY_SCORE: 60
ADLS_ACUITY_SCORE: 59
ADLS_ACUITY_SCORE: 63
ADLS_ACUITY_SCORE: 60
ADLS_ACUITY_SCORE: 61
ADLS_ACUITY_SCORE: 60
ADLS_ACUITY_SCORE: 59
ADLS_ACUITY_SCORE: 60
ADLS_ACUITY_SCORE: 63
ADLS_ACUITY_SCORE: 60

## 2025-05-14 NOTE — CONSULTS
Interventional Radiology Consult Service Note    IR consulted for possible GJ tube exchange, tube is cracked.    This is a 29 year old male with history of alcohol abuse, anxiety, and bipolar disorder, who was admitted to an outside hospital on 3/30/25 for alcohol withdrawal and abdominal pain. Workup showed leukocytosis and elevated lipase, consistent with acute pancreatitis. He developed encephalopathy and shock, requiring ICU care, intubation, vasopressors, and CRRT by 4/1. His course was complicated by cardiomyopathy (EF 20-25%, improved to 65-70% by 4/14), necrotizing pancreatitis, and persistent infection despite 14 days of meropenem. On 4/27, imaging showed likely perforated viscus; he underwent emergent laparotomy, necrosectomy, and colectomy. Transferred to [facility] SICU on 4/30. On 5/1, reoperation revealed colonic staple line breakdown, necrotic pancreas, vessel thrombosis, and adhesions. Colostomy with malankot drain and temporary abdominal closure performed. Prognosis is poor; palliative care involved. Care conference 5/2 with family to talk goals of care. Full code, family acknowledged that poor prognosis is to be expected. 5/4 s/p exploratory surgery, additional necrotic pancreas remove with no obvious spillage or sign of bleeding at the time. Went back to the OR 5/7 for abdominal wash out. CT abdomen 5/10 showed extravasation on arterial phase imaging. MTP initiated, s/p IR arterial embolization of pancreatic vessels performed. Nursing noted leaking GJ tube and crack in jejunal lumen indicating likely rupture. IR is consulted for exchange.    Case and imaging was reviewed with Dr. Davalos from IR. GJ tube placed at OSH 4/18/25. IR cannot safely exchange freshly placed GJ tubes until they have been in place x6 weeks given risk of losing access into the stomach. Additionally, imaging shows there is no safe pexy between the stomach and abdominal wall. IR may not be able to offer safe exchange even after 6  "weeks. Recommend discussion with surgery or GI for alternative management options. These tubes are intended to last 9-12 months. Recommend discussion regarding medication and tube feeding administration management by bedside team contributing to jejunal lumen clogging and rupture.     Recommendations were reviewed with Dr. Zavala.     Vitals:   /57   Pulse (!) 133   Temp (!) 96.5  F (35.8  C) (Axillary)   Resp 20   Ht 1.727 m (5' 7.99\")   Wt 78.9 kg (173 lb 15.1 oz)   SpO2 98%   BMI 26.45 kg/m      Pertinent Imaging:        Pertinent Labs:     Lab Results   Component Value Date    WBC 20.0 (H) 05/14/2025    WBC 16.1 (H) 05/13/2025    WBC 15.8 (H) 05/13/2025       Lab Results   Component Value Date    HGB 12.0 05/14/2025    HGB 11.9 05/13/2025    HGB 12.1 05/13/2025       Lab Results   Component Value Date     05/14/2025     05/13/2025     05/13/2025       Lab Results   Component Value Date    INR 1.13 05/10/2025    PTT 29 05/10/2025       Lab Results   Component Value Date    POTASSIUM 4.7 05/14/2025        ASIF Davalos CNP  Interventional Radiology   Pager: [phone]    "

## 2025-05-14 NOTE — PROGRESS NOTES
[facility] Nurse Inpatient Assessment     Consulted for: Tracheostomy site, sacrum, left heel  5/9: New consult for midline wound draining heavily    WO nurse follow-up plan: Monday/WednesdayFriday    Patient History (according to provider note(s):      Sebastián Rodas is a 29-year-old male with a history of alcohol abuse, anxiety, and bipolar disorder, admitted on 3/30/2025 for alcohol withdrawal after a binge, presenting with abdominal pain. Initial workup revealed leukocytosis and elevated lipase, suggestive of acute pancreatitis. He developed encephalopathy and required ICU transfer on 3/31, intubation, and vasopressors by 4/1 due to shock. His hospital course was complicated by acute renal failure (requiring CRRT), cardiomyopathy (LVEF 20-25%, improved to 65-70% by 4/14), and necrotizing pancreatitis with unorganized fluid collections. He completed a 14-day course of meropenem but remained febrile and critically ill, requiring ongoing dialysis and vasopressor support. On 4/27, imaging indicated likely perforated viscus, and after family discussion, he underwent emergent laparotomy, necrosectomy, transverse colectomy, abdominal washout, and drain placement. He was transferred to the SICU at [facility] on 4/30 for further monitoring. On 5/1, he underwent a second laparotomy with irrigation and debridement, colostomy tube placement, temporary abdominal closure, and further necrosectomy due to breakdown of the colonic resection staple line, necrotic pancreas, thrombosed middle colic vessels, fat necrosis, and dense interloop adhesions. He has a poor prognosis given the OR findings, palliative care consulted.     Assessment:      Areas visualized during today's visit: Focused: and Abdomen    Wound location: Abdomen      Last photo: 5/12  Wound due to: Surgical Wound  Wound history/plan of care: On 4/27, imaging showed likely perforated viscus; he underwent emergent  laparotomy, necrosectomy, and colectomy. Transferred to [facility] SICU on 4/30. On 5/1, reoperation revealed colonic staple line breakdown, necrotic pancreas, vessel thrombosis, and adhesions. Colostomy with malankot drain and temporary abdominal closure performed. Prognosis is poor; palliative care involved. Care conference 5/2 with family to talk goasl of care. Full code, family acknowledged that poor prognosis is to be expected. Today 5/4 s/p exploratory surgery, additional necrotic pancreas remove with no obvious spillage or sign of bleeding   5/12: Abdomen is much more distended today. The superior side of the wound bed had a large black clot loosely adhered to the mesh. There was pooling of darker thick liquid in the inferior side of the wound bed.   5/13: Pouch intact. Replaced Kerlix in wound bed.  5/14: Pouch replaced due to leaking at the superior side by PEG tube. Most of the slough covering the adipose tissue at the wound margins has fallen off. There is marbled granulation tissue and adipose tissue on the wound margins. Wound smelled more foul today.   Wound base: 70 % surgical mesh, 30 % Granulation tissue, Slough, and Adipose tissue     Palpation of the wound bed: fluctuance      Drainage: copious     Description of drainage: serosanguinous, bloody, and black     Measurements (length x width x depth, in cm): 31  x 15  x  3 cm - measured on Friday's     Tunneling: N/A     Undermining: N/A  Periwound skin: Intact      Color: normal and consistent with surrounding tissue      Temperature: normal   Odor: none  Pain: facial expression of distress, sharp  Pain interventions prior to dressing change: oral oxycodone, IV dilaudid, and slow and gentle cares   Treatment goal: Drainage control and Infection control/prevention  STATUS: stable  Supplies ordered: at bedside    Pressure Injury Location: Left heel - Assessed on Fridays        Last photo: 5/9  Wound type: Pressure Injury     Pressure Injury Stage: Deep  Tissue Pressure Injury (DTPI), present on admission       Wound history/plan of care:   Pt admitted from OSH on 4/31. Wound present on admission. There is an intact blister over a deeper maroon discoloration.    Wound base: 100 % Maroon, Blister, and Epidermis     Palpation of the wound bed: boggy      Drainage: none     Description of drainage: none     Measurements (length x width x depth, in cm) 2  x 1.5  x  0 cm      Tunneling N/A     Undermining N/A  Periwound skin: Intact      Color: normal and consistent with surrounding tissue      Temperature: normal   Odor: none  Pain: unable to assess due to  sedation , none  Pain intervention prior to dressing change: slow and gentle cares   Treatment goal: Heal  and Protection  STATUS: stable  Supplies ordered: at bedside    My PI Risk Assessment     Sensory Perception: 2 - Very Limited     Moisture: 2 - Very moist      Activity: 1 - Bedfast      Mobility: 2 - Very limited     Nutrition: 2 - Probably inadequate      Friction/Shear: 1 - Problem     TOTAL: 10    Pressure Injury Location: coccyx - Assessed on Fridays    Zoomed out                                              Zoomed in      Last photo: 5/9  Wound type: Pressure Injury, Incontinence Associated Dermatitis (IAD), and Moisture Associated Skin Damage (MASD)     Pressure Injury Stage: 2, present on admission        Wound history/plan of care:   Pt transferred from OSH on 4/31. Wound present on admission. There is MASD/IAD to the bilateral buttocks with exposed dermis. There is further breakdown over the Pt's coccyx that is fibrin vs slough. Wound is at least a stage 2. The wound will need to evolve further before it can be definitively staged.   5/9: Wound continues to improve. Wound is a stage 2 pressure injury over coccyx with surrounding MASD and exposed dermis.    Wound base: 80 % Dermis, 20 % Fibrin     Palpation of the wound bed: normal      Drainage: moderate     Description of drainage: serosanguinous      Measurements (length x width x depth, in cm) 6  x 6  x  0.2 cm - central wound, 1.5 x 1.5 x 0.1 cm left buttock     Tunneling N/A     Undermining N/A  Periwound skin: Edematous and Skin stripping      Color: pink and red      Temperature: normal   Odor: none  Pain: unable to assess due to  sedation , none  Pain intervention prior to dressing change: slow and gentle cares   Treatment goal: Heal  and Protection  STATUS: improving and stable  Supplies ordered: at bedside    My PI Risk Assessment     Sensory Perception: 2 - Very Limited     Moisture: 2 - Very moist      Activity: 1 - Bedfast      Mobility: 2 - Very limited     Nutrition: 2 - Probably inadequate      Friction/Shear: 1 - Problem     TOTAL: 10    Wound location: Trach - Assessed on Fridays          Last photo: 5/9  Wound due to: Surgical Wound  Wound history/plan of care:  Pt transferred from OSH on 4/31. Pt had trach placed at OSH. There are areas of fibrin from previous trach suture locations which are mostly healed. There is a linear incision on the left side of Trach cannula from insertion. The wound base wass difficult to visualize, appears to be fibrin and adipose tissue marbled together.  5/5: Called by bedside RN with concerns for skin breakdown near trach. On assessment Trach incision appears to have opened up significantly from prior assessment. Moderate amount of respiratory secretions present. ENT consulted by primary team to assess trach site as well.  Wound base: 100 % Fibrin and Adipose tissue     Palpation of the wound bed: normal      Drainage: moderate     Description of drainage: yellow - respiratory secretions     Measurements (length x width x depth, in cm): 2  x 3  x  0.8 cm      Tunneling: N/A     Undermining: N/A  Periwound skin: Intact      Color: normal and consistent with surrounding tissue      Temperature: normal   Odor: none  Pain: unable to assess due to  sedation , none  Pain interventions prior to dressing change: slow and  gentle cares   Treatment goal: Heal  and Protection  STATUS: stable  Supplies ordered: at bedside      Wound location: Scrotum      Last photo: 5/13  Wound due to: Abscess  Wound history/plan of care: Wound noted during assessment of abdominal wounds 5/12. Surgery at bedside, stated that inguinal canal is patent with the abdominal cavity and drainage most likely secondary to abdominal cavity contents, and recommended BID gauze dressings. Wound continued to leak copious amounts of blood/stool/intraabdominal fluid. Overnight and was pouched with an ostomy pouch by SICU resident overnight. Pouch continued to leak and was replaced with a primofit male external catheter set to low continuous suction.  5/13: Ostomy pouch applied to area to collect drainage. Intact at time of assessment.  Wound base: 100 % Moist scrotal tissue     Palpation of the wound bed: fluctuance      Drainage: copious     Description of drainage: serosanguinous, purulent, bloody, and red     Measurements (length x width x depth, in cm): See photos - 4 small open areas.     Tunneling: N/A     Undermining: N/A  Periwound skin: Indurated      Color: red      Temperature: normal   Odor: none  Pain: moderate, tender  Pain interventions prior to dressing change: slow and gentle cares   Treatment goal: Drainage control  STATUS: stable  Supplies ordered: at bedside       Treatment Plan:     Left heel wound(s): Every 3 days Cleanse with saline and pat dry. Conform a sacral Mepilex around Pt's heel. Place in Prevalon boots. Float heels off of support surface with pillows under calves.    Buttocks/coccyx wound(s): Every other day   Cleanse with wound cleanser and pat dry.   Apply no sting barrier film to analilia-wound area and allow to dry.   Cover open wound with Vaseline gauze.  Cover with Sacral Mepilex.    If wound requiring more than daily dressing changes switch to the below wound cares:    BID and PRN:  Cleanse the area with Mimi cleanse and protect, very  "gently with soft cloth.  Apply ostomy powder (#488467) on all open and denuded skin.  Apply thin layer of Triad paste (835078) on top of it.  With repeat application, do not scrub the paste, only remove soiled paste and reapply.  If complete removal of paste is necessary use baby oil/mineral oil (#059308) and soft wash cloth.  Ensure pt has chair cushion (#741962) while sitting up in the chair.  Use only one blue pad in between mattress and pt. No brief in bed.      Trach wound: Perform trach cares per unit routine. Place a fenestrated optifoam between trach faceplate and Pt's skin.    Abdominal wound: BIDand PRN. Remove soiled Kerlix. Gently absorb as much drainage from wound base as possible. Cover exposed mesh with Xeroform gauze. (Change Xerform daily) Fluff Kerlix into wound base to help absorb drainage. Keep wound manager to gravity drainage. WO will assess wound manager daily M-F.     If wound manager fails, place wet to dry dressing with Xeroform gauze covered by Vashe moistened Kerlix Covered by Mextra Superabsorbant pads. Change PRN.    Drain tube cares: PRN for saturation. Cleanse analilia-tubular skin with Vashe and pat dry. Apply no sting barrier film to analilia-tubular area and allow to dry. Fenestrate a 5x5 Mextra super absorbant pad (156165) and place around tube to absorb drainage.     Scrotal wound: PRN with pouch leakage. Cleanse with wound cleanser and pat dry. Apply no sting barrier film to analilia-wound area and allow to dry. Using a 1 piece ostomy pouch cut a hole in barrier slightly larger than open areas and apply to scrotum.      Pressure Injury Prevention (PIP) Plan:  If patient is declining pressure injury prevention interventions: Explore reason why and address patient's concerns, Educate on pressure injury risk and prevention intervention(s), If patient is still declining, document \"informed refusal\" , and Ensure Care team is aware ( provider, charge nurse, etc)  Mattress: Follow bed algorithm, " add Low Air Loss (Air+) mattress pump if skin is very moist or constantly moist.   HOB: Maintain at or below 30 degrees, unless contraindicated  Repositioning in bed: Every 1-2 hours , Left/right positioning; avoid supine, Raise foot of bed prior to raising head of bed, to reduce patient sliding down (shear), and Frequent microturns using positioning wedges, as patient tolerates  Heels: Pillows under calves and Heel lift boots  Protective Dressing: Sacral Mepilex for prevention (#755157),  especially for the agitated patient   Positioning Equipment:Positioning wedges (#068220) to help maintain 30 degree side lying position   Chair positioning: Chair cushion (#732910) , Assist patient to reposition hourly, and Do NOT use a donut for sitting (this increases pressure to smaller area and creates a higher potential for injury)    If patient has a buttock pressure injury, or high risk for PI use chair cushion or SPS.  Moisture Management: Perineal cleansing /protection: Follow Incontinence Protocol, Avoid brief in bed, Clean and dry skin folds with bathing , Consider InterDry (#925747) between folds, and Moisturize dry skin  Under Devices: Inspect skin under all medical devices during skin inspection , Ensure tubes are stabilized without tension, and Ensure patient is not lying on medical devices or equipment when repositioned  Ask provider to discontinue device when no longer needed.     Orders: Reviewed and Updated    RECOMMEND PRIMARY TEAM ORDER: None, at this time  Education provided: plan of care  Discussed plan of care with: Nurse  Notify WOC if wound(s) deteriorate.  Nursing to notify the Provider(s) and re-consult the WOC Nurse if new skin concern.    DATA:     Current support surface: Standard  Low air loss (ISABELL pump, Isolibrium, Pulsate)  Containment of urine/stool: Incontinent pad in bed, Indwelling catheter, and Internal fecal management  BMI: Body mass index is 26.45 kg/m .   Active diet order: None      Output: I/O last 3 completed shifts:  In: 3942.78 [I.V.:696.28; Other:1.5; NG/GT:695; IV Piggyback:1160]  Out: 2980 [Urine:125; Emesis/NG output:300; Drains:2555]     Labs:   Recent Labs   Lab 05/14/25  0403 05/11/25  0322 05/10/25  2347   ALBUMIN 2.6*   < > 3.6   HGB 12.0*   < > 13.3   INR  --   --  1.13   WBC 20.0*   < > 10.1    < > = values in this interval not displayed.     Pressure injury risk assessment:   Sensory Perception: 1-->completely limited  Moisture: 4-->rarely moist  Activity: 1-->bedfast  Mobility: 1-->completely immobile  Nutrition: 1-->very poor  Friction and Shear: 1-->problem  Dick Score: 9    Stephen Tilley RN, CWOCN  Pager no longer in use, please contact through Tiffanie Moreno group: [facility] Nurse [place]    Dept. Office Number: [phone]

## 2025-05-14 NOTE — PROGRESS NOTES
CRRT STATUS NOTE    DATA:  Time:  6:53 AM  Pressures WNL:  YES  Filter Status:  WDL    Problems Reported/Alarms Noted:  none    Supplies Present:  YES    ASSESSMENT:  Patient Net Fluid Balance:  5/13  +547  5/14 0600 +122    Vital Signs:  Temp: 99.7  F (37.6  C) Temp src: Axillary   Pulse: (!) 128   Resp: 19 SpO2: 99 % O2 Device: Mechanical Ventilator 50% Levo and Vaso to keep MAP >65  Labs:  K 4.7  ICa 4.7  Hgb 12  WBC 20  Goals of Therapy:  I=O, meeting goals of therapy.  No fluid removal on CRRT due to large drain output and replacing drain output with LR boluses.      INTERVENTIONS:   Restart @ 0641 due to 's.    PLAN:  Continue per Renal orders. Call CRRT resource RN via [facility] with concerns.

## 2025-05-14 NOTE — PLAN OF CARE
ICU End of Shift Summary. See flowsheets for vital signs and detailed assessment.    Changes this shift:  . Pt is intravascular dry; albumin 5% 12.5 g given and replacing output from all drains 1ml of output to be replaced with 0.5ml of LR one bolus of  ml given.  Pt remains on CRRT with removal rate set at 0 currently net +500 ml since MN.  No stool output this shift.  All lytes WNL    Plan: Abdominal drsg to be changed BID and prn. Continue with fluid removal via CRRT and titrated levophed down/off as tolerated. Continue w/ goals of care and notify the team of any changes in status.     Goal Outcome Evaluation:      Plan of Care Reviewed With: patient    Overall Patient Progress: no changeOverall Patient Progress: no change       Problem: Adult Inpatient Plan of Care  Goal: Absence of Hospital-Acquired Illness or Injury  Intervention: Prevent Skin Injury  Recent Flowsheet Documentation  Taken 5/13/2025 1800 by Alexis Khan RN  Body Position: turned  Taken 5/13/2025 1600 by Alexis Khan RN  Body Position: turned  Taken 5/13/2025 1200 by Alexis Khan RN  Body Position: turned  Taken 5/13/2025 1000 by Alexis Khan RN  Body Position: turned  Taken 5/13/2025 0800 by Alexis Khan RN  Body Position: turned

## 2025-05-14 NOTE — PROGRESS NOTES
INTENSIVIST FACULTY NOTE:  Decreasing ostomy output    Exam less anxious on precedex  Abdominal distention maybe a little less than it had been  Drains all still clot-stained ascitic fluid  Scrotum less distended.  He is very tender over the inguinal canals, and continues to ooze a lot of fluid (that looks the same as what comes out of the SHANNON drains) through the hole in his scrotum  Tolerating trach dome for ~2h at at time  Line sites look fine  Still diffuse anasarca  Minimal ostomy output  The jejunal feeding tube is somehow fractured  He has been occasionally dramatically dropping his temperature while on CRRT    Labs show stable electrolytes on CRRT, but lactate is climbing, CRP is markedly elevated, there is a spiking leukocytosis    Imaging reviewed.  KUB shows bowel well-decompressed    This is a 29M hx bipolar and EtOH, with a prolonged hospitalization that began with an admission for alcohol withdrawal after a binge, but subsequent development of necrotizing pancreatitis which in turn led to a whole host of complications including cardiomyopathy (now recovered), SARAHI requiring renal replacement therapy, a perforated viscous requiring transverse colectomy, and an intra-abdominal bleed requiring IR embolization of a pancreatic branch off the distal celiac artery.  He has a tracheostomy and GJ tube.  His abdomen has been closed with vicryl mesh, and he has a frozen abdomen, so is unlikely to be offered additional surgical interventions if he develops another intra-abdominal complication.    He has a rising white count, a rising CRP, and temperature instability in the context of being on CRRT, which given the drop in ostomy output is very concerning for new intra-abdominal infection.  Will restart antibiotics and send him for a CT scan, although I am not sure there is anything that can be done if he has an abscess that is not amenable to percutaneous drain placement.     There is also a tear in his jejunal  feeding tube, so will consult IR to have this replaced.  Will start Precedex for his increasing agitation, and schedule time on trach dome as a means to work him toward ventilator liberation.  His droxidopa can be discontinued due to lack of efficacy, but will restart an selective serotonin reuptake inhibitor for his anxiety.    METABOLIC ENCEPHALOPATHY / DELIRIUM:  -due to residual sedatives / pain meds, critical illness; monitoring with clinical exam  -melatonin + trazodone for sleep; Precedex for sedation  -robaxin, scheduled oxycodone, scheduled tylenol for analgesia for now.      ACUTE HYPOXIC RESPIRATORY FAILURE  -resolved.  Trach dome for ~2h, then rest, and will start to slowly increase that duration on trach dome, as his issue seems to be debility and weakness  -targeting net even to net negative with CRRT    CONCERN FOR SEPTIC SHOCK:  -CT abdomen to look for abscess.  Hold tube feeds for now  -norepinephrine + vasopressin  -discontinue droxidopa   -will restart stress steroids if definitively shown to have infectious process.  I do not put much stock in random cortisol levels.    NECROTIZING PANCREATITIS:  -restart empiric antibiotics as above  -on appropriate nutrients    ACUTE KIDNEY INJURY:  -on CRRT currently.  He is young enough that I think he has a reasonable chance of renal recovery.  Continue with a target of net even.    INTRA-ABDOMINAL HYPERTENSION:  -resolved     INADEQUATE ORAL INTAKE:  -hold tube feeds given sudden loss of ostomy output; CT scan as above    DM2 / HYPERGLYCEMIA:  -transition back to insulin infusion given change in tube feeds and possible nw infection    MISC:  -Code status is full code  -family updated by medical student and resident  -[facility]; anticoagulation for splenic vein thrombus is being put on hold again given concern about new intra-abdominal process. PPI for mechanical ventilation and pancreatitis  -lines: RIJ tunnelled dialysis line; arterial line; PICC for  vee  -[facility]  -anticipate discharge to LTACH in >1-2 weeks; he is at high risk of decompensation and death during this hospitalization; we have not been able to get him to a point where he might tolerate IHD, which will be necessary for discharge    Billing statement: 46min of critical care time; spent in an initial review of imaging, labs, physical exam, and discussion of the patient with my own team and the extended care team including the primary service. Based on this patient's presentation / recent intervention and my bedside assessment, I felt there was or is a reasonably high probability of imminent or life-threatening deterioration today or tonight for septic reasons.   My overall critical care time, as described in detail above, includes such things as coordination of care, arrhythmia and hemodynamics management with infusions of medicines, respiratory management, fluid therapy including fluid boluses, and pain and sedation therapy. This time excludes time I spent personally performing or supervising procedures for this patient.    DONNY Rodas MD  Clinical   Anesthesia / Critical Care  *[id number]

## 2025-05-14 NOTE — PROGRESS NOTES
Nephrology Progress Note  05/14/2025       Sebastián Rodas is a 29 yom with Bipolar disorder, ETOH use c/b necrotizing pancreatitis admitted 3/30/25 to [facility] for ETOH withdrawal and abdominal pain, found to have acute pancreatitis which has progressed to necrotizing pancreatitis complicated by SARAHI.  Seen by Nephrology at [place], started on CRRT 4/1.  Had periods of stability enough for iHD but back to CRRT on 4/21 until tx to [facility] for further surgical interventions.       Interval History :   Mr Rodas continues on CRRT, essentially has been negative on his own with drain output so will hold on any UF on CRRT, team is planning to give some volume to account for ongoing drain losses.  Labs stable on 4k baths, continuing to support but has high risk for catastrophic bleeding.     Assessment & Recommendations:   SARAHI-Baseline Cr 0.8 as recently as March 2025 before acute events, was started on CRRT emergently on 4/1 in setting of septic shock. Had ~2 weeks of stability enough to manage with iHD but back on CRRT 4/21 until tx to [facility] on 4/30. Running fevers with intraabdominal sepsis.  Continuing RRT from OSH, restarted CRRT on 5/2 after OR.                  -No need for new consent, continuing RRT started last month at [facility].                 -Access is tunneled RIJ from 4/21.                  -CRRT,  all 4k baths, planning no UF.        Volume-Total body volume overloaded but much of it 3rd spaced.  Making small amount of UOP but would be surprised if this improves with current BP's.       Electrolytes-K 4.7 on all 4k baths, bicarb 20.      BMD-No acute issues.      Anemia-Hgb 11.5 after MTP 5/10, stable in the short term, acute management per team.      Nutrition-TPN started 5/1     Time spent: 50 minutes on this date of encounter for chart review, physical exam, medical decision making and co-ordination of care.      Discussed with Dr Newby     Recommendations were communicated to primary team via  "verbal communication.        Raheem Gabriel, ASIF CNS  Clinical Nurse Specialist  [phone]    Review of Systems:   I reviewed the following systems:  ROS not done due to vent/sedation.     Physical Exam:   I/O last 3 completed shifts:  In: 3942.78 [I.V.:696.28; Other:1.5; NG/GT:695; IV Piggyback:1160]  Out: 2980 [Urine:125; Emesis/NG output:300; Drains:2555]   /57   Pulse (!) 133   Temp 98.3  F (36.8  C) (Axillary)   Resp 27   Ht 1.727 m (5' 7.99\")   Wt 78.9 kg (173 lb 15.1 oz)   SpO2 93%   BMI 26.45 kg/m       GENERAL APPEARANCE: Vent via trach, CRRT running.   Pulmonary: Vent via trach  CV: Regular rhythm, normal rate   - Edema +2 generalized  GI: Surgical dressing in place  MS: no evidence of inflammation in joints, no muscle tenderness  : + Garcia  SKIN: no rash, warm, dry  NEURO: Intubated and sedated.     Labs:   All labs reviewed by me  Electrolytes/Renal -   Recent Labs   Lab Test 05/14/25 1155 05/14/25  1151 05/14/25  0810 05/14/25  0405 05/14/25 0403 05/13/25  1944 05/13/25 1938   NA  --  137  --   --  137  --  135   POTASSIUM  --  4.7  --   --  4.7  --  4.7   CHLORIDE  --  104  --   --  104  --  102   CO2  --  20*  --   --  19*  --  20*   BUN  --  19.7  --   --  23.8*  --  22.3*   CR  --  0.86  --   --  0.92  --  0.90   GLC 71 67* 164*   < > 77   < > 130*   KARINA  --  8.1*  --   --  8.6*  --  8.2*   MAG  --  2.4*  --   --  2.6*  --  2.7*   PHOS  --  3.9  --   --  3.5  --  4.4    < > = values in this interval not displayed.       CBC -   Recent Labs   Lab Test 05/14/25  1151 05/14/25 0403 05/13/25 1938   WBC 22.2* 20.0* 16.1*   HGB 11.5* 12.0* 11.9*    166 161       LFTs -   Recent Labs   Lab Test 05/14/25  1151 05/14/25 0403 05/13/25 1938 05/13/25  1141 05/13/25 0441 05/12/25 1148 05/12/25  0311   ALKPHOS  --  243*  --   --  205*  --  165*   BILITOTAL  --  2.6*  --   --  2.9*  --  4.0*   ALT  --  16  --   --  18  --  19   AST  --  29  --   --  25  --  28   PROTTOTAL  --  " 6.0*  --   --  5.7*  --  6.1*   ALBUMIN 2.4* 2.6* 2.7*   < > 2.7*   < > 3.1*    < > = values in this interval not displayed.       Iron Panel - No lab results found.        Current Medications:  Current Facility-Administered Medications   Medication Dose Route Frequency Provider Last Rate Last Admin    acetaminophen (TYLENOL) tablet 975 mg  975 mg Oral or Feeding Tube Q8H Brendon Haas DO   975 mg at 05/14/25 0726    folic acid (FOLVITE) tablet 1 mg  1 mg Per Feeding Tube Daily Jolie Dexter APRN CNP   1 mg at 05/14/25 0726    heparin ANTICOAGULANT injection 5,000 Units  5,000 Units Subcutaneous Q8H Marina Hood MD   5,000 Units at 05/14/25 1139    lactated ringers BOLUS 0-999 mL  0-999 mL Intravenous Q6H Renee Kwon  mL/hr at 05/14/25 1030 450 mL at 05/14/25 1030    melatonin tablet 5 mg  5 mg Oral or Feeding Tube QPM Roxi Alston MD   5 mg at 05/13/25 1928    methocarbamol (ROBAXIN) tablet 500 mg  500 mg Oral or Feeding Tube Q6H Brendon Haas DO   500 mg at 05/14/25 0725    multivitamin w/minerals (THERA-VIT-M) tablet 1 tablet  1 tablet Oral or Feeding Tube Daily Jolie Dexter APRN CNP   1 tablet at 05/14/25 0726    oxyCODONE (ROXICODONE) tablet 15 mg  15 mg Oral or Feeding Tube Q4H Eugenia Zavala MD   15 mg at 05/14/25 1242    pantoprazole (PROTONIX) 2 mg/mL suspension 40 mg  40 mg Oral or Feeding Tube QAM AC Brendon Haas DO   40 mg at 05/14/25 0725    piperacillin-tazobactam (ZOSYN) 4.5 g vial to attach to  mL bag  4.5 g Intravenous Q6H Eugenia Zavala MD   4.5 g at 05/14/25 1127    polyethylene glycol (MIRALAX) Packet 17 g  17 g Oral or Feeding Tube BID Brendon Haas,    17 g at 05/14/25 0727    senna-docusate (SENOKOT-S/PERICOLACE) 8.6-50 MG per tablet 2 tablet  2 tablet Oral or Feeding Tube BID Brendon Haas,    2 tablet at 05/14/25 0738    sertraline  (ZOLOFT) tablet 50 mg  50 mg Oral or Feeding Tube Daily Eugenia Zavala MD   50 mg at 05/14/25 1126    sodium chloride (PF) 0.9% PF flush 3 mL  3 mL Intracatheter Q8H Ganesh Griffiths MD   3 mL at 05/14/25 0745    thiamine (B-1) tablet 100 mg  100 mg Per Feeding Tube Daily Jolie Dexter APRN CNP   100 mg at 05/14/25 0727    traZODone (DESYREL) tablet 100 mg  100 mg Oral or Feeding Tube QPM Eugenia Zavala MD        vitamin C (ASCORBIC ACID) liquid 500 mg  500 mg Per Feeding Tube BID Jolie Dexter APRN CNP   500 mg at 05/14/25 0725     Current Facility-Administered Medications   Medication Dose Route Frequency Provider Last Rate Last Admin    dexmedeTOMIDine (PRECEDEX) 4 mcg/mL in sodium chloride 0.9 % 100 mL infusion  0.1-0.7 mcg/kg/hr (Dosing Weight) Intravenous Continuous Eugenia Zavala MD 8.7 mL/hr at 05/14/25 1250 0.5 mcg/kg/hr at 05/14/25 1250    dextrose 10% and 0.45% NaCl infusion   Intravenous Continuous Durga Euceda MD 30 mL/hr at 05/14/25 1252 New Bag at 05/14/25 1252    dextrose 10% infusion   Intravenous Continuous PRN Brendon Haas,         dialysate for CVVHD & CVVHDF (Phoxillum BK4/2.5)  12.5 mL/kg/hr CRRT Continuous Tico Spain MD 1,000 mL/hr at 05/14/25 1007 12.5 mL/kg/hr at 05/14/25 1007    insulin regular (MYXREDLIN) 1 unit/mL infusion  0-24 Units/hr Intravenous Continuous Durga Euceda MD   Held at 05/14/25 1300    No heparin required   Does not apply Continuous PRN Tico Spain MD        norepinephrine (LEVOPHED) 16 mg in  mL infusion MAX CONC CENTRAL LINE  0.01-0.6 mcg/kg/min (Dosing Weight) Intravenous Continuous Ganesh Griffiths MD 1.3 mL/hr at 05/14/25 1300 0.02 mcg/kg/min at 05/14/25 1300    POST-filter replacement solution for CVVHD & CVVHDF (Phoxillum BK4/2.5)   CRRT Continuous Tico Spain  mL/hr at 05/14/25 1009 New Bag at 05/14/25 1009    PRE-filter replacement solution for CVVHD &  CVVHDF (Phoxillum BK4/2.5)  12.5 mL/kg/hr CRRT Continuous Tico Spain MD 1,000 mL/hr at 05/14/25 1007 12.5 mL/kg/hr at 05/14/25 1007    Reason statin not prescribed   Does not apply DOES NOT GO TO Rhys Bloom MD        vasopressin 1 unit/mL MAX Conc (PITRESSIN) infusion  2.4 Units/hr Intravenous Continuous Eugenia Zavala MD 2.4 mL/hr at 05/14/25 1300 2.4 Units/hr at 05/14/25 1300

## 2025-05-14 NOTE — PROGRESS NOTES
Bilateral mitt restraints initiated on patient on 5/14/2025 at 2:35 PM    Clinical Justification: Pulling lines, pulling tubes, and pulling equipment  Less Restrictive Alternative: 1:1 patient care, Reorientation, De-escalation, Alarm, Pain management  Attending Provider Notified: Yes, Attending Provider's Name: SICU   Order received: Yes     Family Notification: Parent   Criteria explained to Patient  Patient's Response: No evidence of learning  Restraint care Plan initiated: Yes    Alexis Khan RN

## 2025-05-14 NOTE — PROGRESS NOTES
SURGICAL ICU PROGRESS NOTE  05/14/2025    Date of Service (when I saw the patient): 05/14/2025    ASSESSMENT:  Sebastián Rodas is a 29M with alcohol abuse, anxiety, and bipolar disorder, who was admitted 3/30/25 for alcohol withdrawal and abdominal pain. Workup showed leukocytosis and elevated lipase, consistent with acute pancreatitis. He developed encephalopathy and shock, requiring ICU care, intubation, vasopressors, and CRRT by 4/1. His course was complicated by cardiomyopathy (EF 20-25%, improved to 65-70% by 4/14), necrotizing pancreatitis, and persistent infection despite 14 days of meropenem. On 4/27, imaging showed likely perforated viscus; he underwent emergent laparotomy, necrosectomy, and colectomy. Transferred to [facility] SICU on 4/30. On 5/1, reoperation revealed colonic staple line breakdown, necrotic pancreas, vessel thrombosis, and adhesions. Colostomy with malankot drain and temporary abdominal closure performed. Prognosis is poor; palliative care involved. Care conference 5/2 with family to talk goals of care. Full code, family acknowledged that poor prognosis is to be expected. 5/4 s/p exploratory surgery, additional necrotic pancreas remove with no obvious spillage or sign of bleeding at the time. Went back to the OR 5/7 for abdominal wash out. CT abdomen 5/10 showed extravasation on arterial phase imaging. MTP initiated, IR arterial embolization of pancreatic vessels performed.        CHANGES and MAJOR THINGS TODAY:    - CT abd/pelvis w/ contrast IV + enteral through G tube today  - Hold TF's for CT  - Restart Zosyn due to hypothermia, decreased ostomy output, and increasing leukocytosis  - Restart Precedex  - Start sertraline 50mg  - Stop Doxidropa  - Increase trazodone to 100mg  - AM cortisol to test for adrenal insufficiency  - Repeat lactate today  - IR consult for PEG tube replacement  - Scheduled intermittent trach dome for 2 hours BID  - Start d10 for hypoglycemia  - Ongoing LR bolus to  replace drain output      PLAN:    Neurological:  # Acute pain   #Encephalopathy- improving  #Insomnia  - Monitor neurological status. Delirium preventions and precautions.   # Pain: Robaxin, IV dilaudid PRN, oxycodone 15mg Q4, Tylenol 975mg Q8. Increase PRN Hydromorphone allowance for mobility and dressing changes.   # Sedation: None   - Increased trazodone to 100mg for sleep. Continue Melatonin  # Anxiety  - Restart Sertraline 50mg, discontinue droxidopa     Pulmonary:   # Acute hypoxic respiratory failure  # S/p tracheostomy placement   - FiO2 (%): 50 %, Resp: 19, Vent Mode: VC/AC, Resp Rate (Set): 16 breaths/min, Tidal Volume (Set, mL): 420 mL, PEEP (cm H2O): 5 cmH2O, Pressure Support (cm H2O): 5 cmH2O, Resp Rate (Set): 16 breaths/min, Tidal Volume (Set, mL): 420 mL, PEEP (cm H2O): 5 cmH2O   - On PST this am. Has been having trouble with trach dome. Transition to BID 2 hour intermittent trach dome trials today.   - CT PE 5/9 showed no evidence of pulmonary embolism  - Pulmonary toilet, mobilize  - Ventilator bundle    Cardiovascular:    # Stress Cardiomyopathy   - Initial LVEF 20-25%, improved to 65-70%  # Shock-distributive (septic)  # Hypovolemic shock   # Sinus Tachycardia  - Monitor hemodynamic status. MP goal > 65.   - Norepinephrine at 0.02 mcg/kg/min, Straight rate Vasopressin @ 2.4 units/hr. Wean as tolerated  - If remains pressor dependent, could consider stress dose steroids for presumed adrenal insufficiency.   - AM cortisol level 5/15 to rule out adrenal insufficiency.  - EKG 5/8, 5/9 showed sinus tachycardia with no ectopy  - Trend lactate (2.1)  - Persistent ST 140s.  - POCUS 5/13 showed hyperdynamic LV and collapsible, underfilled jugular vein.      Gastroenterology/Nutrition:  # S/p emergent exploratory laparotomy, necrosectomy, transverse colectomy, abdominal washout, and drain placement 5/1 and 5/4  # S/p Pancreatic branch vessel embolization with IR 5/10  # Severe necrotizing pancreatitis  #  Splenic vein thrombosis  - will defer management of dressings and drain removal per EGS   - U/S 5/09 revealed no visualization of splenic vein.   - CT abdomen 5/10: revealed a large area of active arterial extravasation in the upper abdomen, with the source vessel appearing to arise anterior to the celiac trunk. Large amount of hemoperitoneum.  - IR embolization of the pancreatic branch vessel using intra procedural CTA and angiograms for vessel identification  - Ongoing copious dark bloody drain output, CTM  - Cecostomy drain in place, with about 100 ml of stool output. Abdomen distended. AXR unremarkable, looks well decompressed.   - Bowel regimen with miralax and senokot   - on PPI  - CT abdomen/pelvis with IV + enteral contrast through G tube today     # Severe Protein calorie deficit malnutrition due to critical illness  - PEG tube tear, TF stopped and feeding through G tube.    - IR consulted for PEG exchange    - IR cannot safely exchange freshly placed GJ tubes until they have been in place x6 weeks given risk of losing access into the stomach. Additionally, imaging shows there is no safe pexy between the stomach and abdominal wall. IR may not be able to offer safe exchange even after 6 weeks.   - Review CT images prior to restarting TF  - Multivitamin supplementation ordered by nutrition  - RD consult. Appreciate cares and recommendations.     Renal/Fluids/Electrolytes:   # SARAHI  # Fluid overload  Baseline Cr 0.7-0.8. Presented with Cr 0.96 on 3/30. Rapidly markos to 5.2 on 4/1. Oliguric. Garcia UA with proteinuria, hematuria, pyuria, moderate bacteria.  Kidneys unremarkable on CT. Has severe ATN in the setting of shock, ADHF, intravascular hypovolemia/3rd spacing from pancreatitis.   - Trend lactate (repeat 2.5)  - D5W @ 30 ml/hr while tube feeds held  - CRRT with I&Os goal.   - Continue to monitor intake and output  - LR bolus to replace drain output    Endocrine:   # Stress hyperglycemia    - hgb A1c 5.3, no  hx of DM   - Hold Lantus, switch HSSI to Insulin gtt  - Goal to keep BG< 180 for optimal wound healing   - start D10 for hypoglycemia while tube feeds are held     ID:  # Leukocytosis  #Necrotizing pancreatitis  WBC Count   Date Value Ref Range Status   05/14/2025 20.0 (H) 4.0 - 11.0 10e3/uL Final   -CRP elevated at 454, trend q3 days  - Completed 14 days course of meropenem and caspofungin.   - Restart Zosyn again 5/14 for possible abdominal fecal contamination in light of leukocytosis, hypothermia, and decreased stool output.   - Temperature 96.5F this morning  - Zosyn completed: 5/2/25--5/11.   - Discontinued micafungin 5/3     cultures:  - 4/30 blood cultures- NGTD   - 5/1 blood cultures ordered - NGTD  - Blood culture 5/12 - NGTD     Heme:     # Acute blood loss anemia   # Anemia of critical illness   # Thrombosed splenic vein   - Transfuse if hgb <7.0 or signs/symptoms of hypoperfusion. Monitor and trend  Hemoglobin   Date Value Ref Range Status   05/14/2025 12.0 (L) 13.3 - 17.7 g/dL Final     - hemoglobin stable  - Continue subcutaneous heparin     Musculoskeletal:   # Deconditioning and weakness due to critical illness   - Physical and occupational therapy consult      Skin:  # Pressure Ulcers - Buttocks/rectal area  - Barrier cream with liberal application. Continue fecal management system   - WOC consult      #Pressure Ulcers- Left Heel  Pressure Injury Location: Left heel   Wound type: Pressure Injury     Pressure Injury Stage: Deep Tissue Pressure Injury (DTPI), present on admission     General Cares/Prophylaxis:    DVT Prophylaxis: SQH   GI Prophylaxis: PPI  Restraints: Restraints for medical healing needed: NO     Lines/ tubes/ drains:  - HD line--  Right IJ  - PICC line- left   - Right radial A line  - PIV x 3  - Trach  - G-J  - Wound manager  - SHANNON x 4  - Cecostomy drain     Disposition:  - Surgical ICU      Saud Harrington, MS4  [employer]    I, Durga Euceda MD, saw  the patient with the medical student and have edited the note to reflect our combined findings. I agree with the history, exam, assessment and plan as outlined above, and have discussed this patient with the staff on service.    Durga Euceda MD, PGY-1    ====================================  INTERVAL HISTORY:  Course reviewed. Reporting so-so abdominal pain around his abdomen. He has had difficulty tolerating the trach dome due to increased work of breathing and increased secretions. He had a tear in the J portion of his PEG tube and is now receiving feeds through his G tube. Decreased stool colostomy output. He has not been sleeping well, only 1-2 hours at a time.     OBJECTIVE:   1. VITAL SIGNS:   Temp:  [96.6  F (35.9  C)-99.8  F (37.7  C)] 99.7  F (37.6  C)  Pulse:  [124-149] 128  Resp:  [11-29] 19  MAP:  [55 mmHg-245 mmHg] 73 mmHg  Arterial Line BP: ()/() 99/60  FiO2 (%):  [40 %-50 %] 50 %  SpO2:  [91 %-100 %] 99 %  FiO2 (%): 50 %, Resp: 19, Vent Mode: VC/AC, Resp Rate (Set): 16 breaths/min, Tidal Volume (Set, mL): 420 mL, PEEP (cm H2O): 5 cmH2O, Pressure Support (cm H2O): 5 cmH2O, Resp Rate (Set): 16 breaths/min, Tidal Volume (Set, mL): 420 mL, PEEP (cm H2O): 5 cmH2O    2. INTAKE/ OUTPUT:   I/O last 3 completed shifts:  In: 3825.02 [I.V.:715.02; NG/GT:660; IV Piggyback:760]  Out: 3217 [Urine:125; Emesis/NG output:320; Drains:2721; Other:51]    3. PHYSICAL EXAMINATION:  General: laying in bed, awake and more alert today, able to point to his foot and gesture that it itches.   HEENT: PERRLA. Trach present and secure, site dressed.   Pulm/Resp: Clear breath sounds bilaterally, lungs coarse but clear.  CV: RRR, S1/S2   Abdomen: Distended. Dark bloody output noted to all drains. G-J tube in place, site secured, abdomen with abthera in place, suction intact.   : scrotal edema, draining dark bloody output.   MSK/Extremities: generalized edema in extremities    4. INVESTIGATIONS:   Arterial Blood  Gases   Recent Labs   Lab 05/11/25  0604 05/10/25  1228   PH 7.33* 7.43   PCO2 43 30*   PO2 89 86   HCO3 23 20*     Complete Blood Count   Recent Labs   Lab 05/14/25 0403 05/13/25 1938 05/13/25 1141 05/13/25  0441   WBC 20.0* 16.1* 15.8* 13.7*   HGB 12.0* 11.9* 12.1* 12.4*    161 164 167     Basic Metabolic Panel  Recent Labs   Lab 05/14/25 0405 05/14/25 0403 05/14/25 0314 05/13/25  2353 05/13/25  1944 05/13/25 1938 05/13/25  1618 05/13/25  1152 05/13/25  1141   NA  --  137  --   --   --  135 138  --  137   POTASSIUM  --  4.7  --   --   --  4.7 4.6  --  4.7   CHLORIDE  --  104  --   --   --  102 103  --  103   CO2  --  19*  --   --   --  20* 21*  --  20*   BUN  --  23.8*  --   --   --  22.3* 20.4*  --  19.5   CR  --  0.92  --   --   --  0.90 0.87  --  0.94   GLC 82 77 83 140*   < > 130* 150*  151*   < > 131*    < > = values in this interval not displayed.     Liver Function Tests  Recent Labs   Lab 05/14/25 0403 05/13/25 1938 05/13/25 1141 05/13/25 0441 05/12/25  1148 05/12/25  0311 05/11/25  1205 05/11/25  0322 05/10/25  2347 05/10/25  1952 05/10/25  1715 05/10/25  1353   AST 29  --   --  25  --  28  --  33 31 33  --   --    ALT 16  --   --  18  --  19  --  23 23 27  --   --    ALKPHOS 243*  --   --  205*  --  165*  --  117 114 109  --   --    BILITOTAL 2.6*  --   --  2.9*  --  4.0*  --  3.3* 3.5* 3.1*  --   --    ALBUMIN 2.6* 2.7* 2.6* 2.7*   < > 3.1*   < > 3.5 3.6 3.9 2.4*  --    INR  --   --   --   --   --   --   --   --  1.13 1.17* 1.65* 1.28*    < > = values in this interval not displayed.     Pancreatic Enzymes  No lab results found in last 7 days.    Coagulation Profile  Recent Labs   Lab 05/10/25  2347 05/10/25  1952 05/10/25  1715 05/10/25  1353   INR 1.13 1.17* 1.65* 1.28*   PTT 29 30 37 33         5. RADIOLOGY:   Recent Results (from the past 24 hours)   XR Abdomen Port 1 View    Narrative    Exam: XR ABDOMEN PORT 1 VIEW, 5/13/2025 8:35 AM    Indication: Distention, decreased cecotomy  output    Comparison: 5/11/2025    Findings:   Supine AP view the abdomen obtained. The percutaneous  gastrojejunostomy tube tip projects over the proximal jejunum. Grossly  stable position of numerous percutaneous surgical drains and right  percutaneous nephrostomy tube. Increased heterogeneous lucencies at  the open abdomen site, as well as along the left flank. Paucity of  bowel gas. No air-filled abnormally dilated loops of bowel.        Impression    Impression:   Increased heterogeneous lucencies in the central abdomen and along the  left flank, presumably related to open abdomen and percutaneous  surgical drains. No air-filled abnormally dilated loops of bowel  visualized.     CHEO SAINI MD         SYSTEM ID:  [id number]         =========================================

## 2025-05-14 NOTE — PROGRESS NOTES
Shift Summary:    Pt remained trached with a #6 Shiley XLT proximal cuffed and mechanically ventilated on the following vent settings:    FiO2 (%): 45 %  Vent Mode: VC/AC  Resp Rate (Set): 16 breaths/min  Tidal Volume (Set, mL): 420 mL  PEEP (cm H2O): 5 cmH2O    SBT: This AM pt PS 5/+5 40% for approx 2.5 hrs before being placed on TD 40L 40% and needed increased O2 needs at the end of 2 hr alondra to 45-50%. This afternoon able to TD for another 2 hrs on 40L 45%. Per MD plan going forward to place pt on full vent support when not on TD. Goal to TD BID for 2hrs each time.     05/14/25 0915   Ventilator Settings    Vent Mode CPAP/PS   FiO2 40 %   PEEP (cm H2O) 5 cmH2O   Pressure Support (cm H2O) 5 cmH2O   Vent Humidifier - Heater/HME Heater   Heater Temperature 37.1   Ventilator - Patient    Patient Resp Rate  21 breaths/min   Expiratory Vt (ml) 451   Minute Volume (ml) 8.16 L/min   Peak Inspiratory Pressure (cm H2O) 11 cmH2O   Mean Airway Pressure (cm H2O) 6.2 cmH2O   Ventilator Alarms   High Inspiratory Pressure Alarm (cmH2O) 50 cm H2O   High Respiratory Rate (breaths/min) 50 breaths/min   Minute Ventilation High (L) 20 L/min   Minute Ventilation Low (L) 3 L/min   Vt High (mL) 1200 mL   Vt Low (mL) 200 mL   Apnea Delay (sec) 20 sec   Apnea Rate 16   Apnea Volume (mL) 420 mL   External Alarm Functional and On Yes   Weaning Assessment   Weaning Assessment Complete Y   Safety Screen Weaning Assessment Weaning Assessment meets all criteria   Pulse (!) 135   Spontaneous Respiratory Rate 26 breaths/min   Spontaneous Tidal Volume (mL) 451 mL   Spontaneous Minute Ventilation 8.16 mL   RSBI 57.65   Weaning trial discontinued due to: End of designated trial.  (Placed on trach dome)   Wean Start Time  0915   Wean End Time  1140   Wean Time Calculated (min) 145     Assessment: BS coarse and diminished at the bases bilaterally. RT suctioned moderate amount of thin creamy secretions. Pt has surgical wound at trach site that WOC is  following.    Ambu bag, mask, and valve present at bedside. Emergency trach supplies also present at bedside.    Jessika Hough, RT on 5/14/2025 at 6:07 PM

## 2025-05-14 NOTE — PLAN OF CARE
ICU End of Shift Summary. See flowsheets for vital signs and detailed assessment.    Changes this shift: Alert, oriented to self and place. Intermittently confused. C/o pain, PRN dilaudid given multiples times. SIMV 50% overnight. BP labile - Vaso @ 2.4, Levophed titrated for MAP > 65. Tear in J portion of PEG tube, TF stopped. Per SICU, okay to use G for meds. CRRT continued. No stool from colostomy. Large output from SHANNON drains.     Plan: Continue with plan of care.     Goal Outcome Evaluation:      Plan of Care Reviewed With: patient    Overall Patient Progress: no changeOverall Patient Progress: no change    Outcome Evaluation: Pressors continued

## 2025-05-14 NOTE — PROGRESS NOTES
CRRT STATUS NOTE    DATA:  Time:  1745  Pressures WNL:  YES  Filter Status:  WDL    Problems Reported/Alarms Noted:  none    Supplies Present:  YES    ASSESSMENT:    Patient Net Fluid Balance:  +635 net since midnight       Intake/Output Summary (Last 24 hours) at 5/14/2025 1745  Last data filed at 5/14/2025 1700  Gross per 24 hour   Intake 3904.57 ml   Output 3005 ml   Net 899.57 ml       Vital Signs: Temp:  [96.5  F (35.8  C)-99.8  F (37.7  C)] 97.7  F (36.5  C)  Pulse:  [] 95  Resp:  [13-31] 19  MAP:  [55 mmHg-106 mmHg] 71 mmHg  Arterial Line BP: ()/(38-87) 104/55  FiO2 (%):  [40 %-50 %] 45 %  SpO2:  [88 %-100 %] 94 %       Most Recent BMP's:  Recent Labs   Lab Test 05/14/25  1151 05/14/25  0403 05/13/25 1938 05/13/25  1618 05/13/25  1141 05/13/25  0441    137 135 138 137 135   POTASSIUM 4.7 4.7 4.7 4.6 4.7 4.6   CHLORIDE 104 104 102 103 103 103   CO2 20* 19* 20* 21* 20* 19*   BUN 19.7 23.8* 22.3* 20.4* 19.5 18.3   CR 0.86 0.92 0.90 0.87 0.94 0.91   ANIONGAP 13 14 13 14 14 13   KARINA 8.1* 8.6* 8.2* 7.9* 8.5* 8.2*     Most Recent CBC's:  Recent Labs   Lab Test 05/14/25  1151 05/14/25  0403 05/13/25 1938 05/13/25  1141   WBC 22.2* 20.0* 16.1* 15.8*   HGB 11.5* 12.0* 11.9* 12.1*   MCV 93 92 94 94    166 161 164     Most Recent ABG's:  Recent Labs   Lab Test 05/11/25  0604 05/10/25  1228 05/06/25  0347   PH 7.33* 7.43 7.38   PO2 89 86 96   PCO2 43 30* 37   HCO3 23 20* 22   MARGOTH -3.4* -3.4* -2.7     Lactic Acid 2-3    Goals of Therapy:  Meeting.  No UF due to large outoput from drains.     INTERVENTIONS:     Charting reviewed. Rounding completed. Treatment plan discussed with bedside nurse.     PLAN:  Continue with current plan of care please contact CRRT resource with any questions or concerns via Warp 9.

## 2025-05-14 NOTE — PROGRESS NOTES
Surgery Progress Note  05/14/2025       Subjective:  Overnight increased pressors requirement to keep MAPs > 65. Currently on vaso and levo. Received 250 ml of albumin overnight. On ventilatory support. Tube feeds were stopped overnight due to tear in J-tube portion of his G-J tube. Malecot drain with minimal output.      Objective:  Temp:  [96.5  F (35.8  C)-99.8  F (37.7  C)] 96.5  F (35.8  C)  Pulse:  [124-149] 137  Resp:  [11-29] 25  MAP:  [55 mmHg-245 mmHg] 75 mmHg  Arterial Line BP: ()/() 101/62  FiO2 (%):  [40 %-50 %] 40 %  SpO2:  [91 %-100 %] 99 %    I/O last 3 completed shifts:  In: 3942.78 [I.V.:696.28; Other:1.5; NG/GT:695; IV Piggyback:1160]  Out: 2980 [Urine:125; Emesis/NG output:300; Drains:2555]      Gen: Eyes open, moves head to voice. Off sedation.  Resp: Ventilated on 40% FiO2, PEEP of 5, trach  Abd: Abdomen is taut but compressible, drains with dark sanguinous output, Malecot with minimal succus, midline abdominal dressing with wound manager  : Scrotum with pouch in place with serosanguinous drainage  Ext: bilateral lower extremities appear mottled, warm to the touch, edematous      Labs:  Recent Labs   Lab 05/14/25  0403 05/13/25  1938 05/13/25  1141   WBC 20.0* 16.1* 15.8*   HGB 12.0* 11.9* 12.1*    161 164       Recent Labs   Lab 05/14/25  0810 05/14/25  0744 05/14/25  0405 05/14/25  0403 05/13/25  1944 05/13/25 1938 05/13/25  1618 05/13/25  1152 05/13/25  1141   NA  --   --   --  137  --  135 138  --  137   POTASSIUM  --   --   --  4.7  --  4.7 4.6  --  4.7   CHLORIDE  --   --   --  104  --  102 103  --  103   CO2  --   --   --  19*  --  20* 21*  --  20*   BUN  --   --   --  23.8*  --  22.3* 20.4*  --  19.5   CR  --   --   --  0.92  --  0.90 0.87  --  0.94   * 63* 82 77   < > 130* 150*  151*   < > 131*   KARINA  --   --   --  8.6*  --  8.2* 7.9*  --  8.5*   MAG  --   --   --  2.6*  --  2.7*  --   --  2.6*   PHOS  --   --   --  3.5  --  4.4  --   --  4.7*    < > =  "values in this interval not displayed.     Assessment/Plan:   Sebastián Rodas is a 29-year-old male with a history of alcohol use disorder, anxiety, and bipolar disorder who was admitted on 3/30/2025 for alcohol withdrawal following a recent binge, presenting with diffuse abdominal pain. Initial workup revealed leukocytosis and elevated lipase concerning for acute pancreatitis. He developed acute encephalopathy and was transferred to the ICU on 3/31, requiring intubation and vasopressors by 4/1 due to shock. Hospital course has been complicated by acute renal failure requiring CRRT, cardiomyopathy (initial LVEF 20-25%, improved to 65-70% by 4/14), and necrotizing pancreatitis with unorganized fluid collections. He completed a 14-day course of meropenem but remains intermittently febrile and critically ill, requiring ongoing dialysis and vasopressor support. On 4/27, after clinical deterioration and imaging consistent with a likely perforated viscus, following family discussion, he underwent emergent exploratory laparotomy, necrosectomy, transverse colectomy, abdominal washout, and drain placement. Patient was transferred to [facility] on 4/30/25 for further surgical management. Went to OR 5/1 for re-open laparotomy, I&D, placement of colostomy tube in presumed previous colectomy staple line, necrosectomy, and Abthera placement. On 5/2, increasing sanguineous output from drains concerning for bleeding from pancreatic bed. Family care conference held 5/2, plan for restorative cares at that time. Take back to OR twice (5/4 and 5/7) for abdominal washout. Per nursing note: 5/9 morning after he saw a picture of his abdomen that he \"desires to speak to team about updating goals of care, expressed desire to pull back on cares\". Goals of care conference on 5/10 with continuation of full code and restorative cares. CTA on 5/10 revealed large area of active arterial extravasation in the upper abdomen. MTP was started. Patient is " now s/p embolization of small pancreatic branch off of the distal celiac artery with IR on 5/10.     Plan:  - No further surgical options for any intraabdominal pathology   - Nursing to change dressings in the am, pm, and PRN during the day if copious drainage during the day. Please place Xeroform over the bridging mesh prior to placing Kerlix  - Tube feeds on hold currently due to tear in J-tube  - Appreciate WOC cares  - Other cares per SICU, we appreciate cares     Patient was seen and discussed with my resident and chief resident.     Anish Dickson  Medical Student, Class of 2027  [facility]    Resident Attestation   I, Mireya López DO, was present with the medical student who participated in the service and in the documentation of the note. I have verified the history and personally performed the physical exam and medical decision making. I agree with the assessment and plan of care as documented in the note and have edited where appropriate.      Mireya López DO  General Surgery PGY-2  Date of Service: 05/14/2025

## 2025-05-14 NOTE — PLAN OF CARE
ICU End of Shift Summary. See flowsheets for vital signs and detailed assessment.    Changes this shift: Pressure supported prior to trach dome(cuff deflated) 45% 40L twice for at least 2 hours tolerated well, but pt appears have increase secretions which requires freq sxn. Hypoglycemic x 2 amp of D50 given, pt started  IVMF D10 and 0.45 NS at 30. Pt endorses anxiety and is intermittently confused and restless, pulling at trach and lines. Precedex drip @ 0.3 started, now @0.5(maxed dose 0.7) and bilateral soft limb restraints initiated. On CRRT No UF clearance only, ICa replaced once other lytes wNL. Increasing lactic  2.5-3.1. Continues to have weeping and from SHANNON drains and large output.    Pt's mother was not able to visit today, but will visit tomorrow, but updated via phone.    Plan:  Monitor BG hourly, insulin drip avail for BG >150, Abd & pelvis CT with both IV and PO contrast to be done tonight at 2000. Continue to monitor and with GOC.    Goal Outcome Evaluation: More alert today and consistently following commands  Problem: Adult Inpatient Plan of Care  Goal: Absence of Hospital-Acquired Illness or Injury  Intervention: Prevent Skin Injury  Recent Flowsheet Documentation  Taken 5/14/2025 1600 by Alexis Khan RN  Body Position: turned  Skin Protection: incontinence pads utilized  Taken 5/14/2025 1400 by Alexis Khan RN  Body Position: turned  Taken 5/14/2025 1200 by Alexis Khan RN  Body Position: turned  Skin Protection: incontinence pads utilized  Taken 5/14/2025 1000 by Alexis Khan RN  Body Position: turned  Taken 5/14/2025 0800 by Alexis Khan RN  Body Position: turned  Skin Protection: incontinence pads utilized          Plan of Care Reviewed With: patient    Overall Patient Progress: no changeOverall Patient Progress: no change    Outcome Evaluation: Restless and  endorses anxtiey precedex drip started

## 2025-05-15 ENCOUNTER — APPOINTMENT (OUTPATIENT)
Dept: SPEECH THERAPY | Facility: CLINIC | Age: 30
End: 2025-05-15
Attending: SURGERY
Payer: COMMERCIAL

## 2025-05-15 LAB
ALBUMIN SERPL BCG-MCNC: 2.2 G/DL (ref 3.5–5.2)
ALBUMIN SERPL BCG-MCNC: 2.2 G/DL (ref 3.5–5.2)
ALBUMIN SERPL BCG-MCNC: 2.3 G/DL (ref 3.5–5.2)
ALP SERPL-CCNC: 275 U/L (ref 40–150)
ALT SERPL W P-5'-P-CCNC: 14 U/L (ref 0–70)
ANION GAP SERPL CALCULATED.3IONS-SCNC: 13 MMOL/L (ref 7–15)
ANION GAP SERPL CALCULATED.3IONS-SCNC: 14 MMOL/L (ref 7–15)
AST SERPL W P-5'-P-CCNC: 30 U/L (ref 0–45)
BACTERIA SPEC CULT: NORMAL
BACTERIA SPEC CULT: NORMAL
BILIRUB SERPL-MCNC: 2.6 MG/DL
BILIRUBIN DIRECT (ROCHE PRO & PURE): 1.88 MG/DL (ref 0–0.45)
BUN SERPL-MCNC: 13.4 MG/DL (ref 6–20)
BUN SERPL-MCNC: 14.4 MG/DL (ref 6–20)
BUN SERPL-MCNC: 15.5 MG/DL (ref 6–20)
BUN SERPL-MCNC: 17.5 MG/DL (ref 6–20)
CA-I BLD-MCNC: 4.3 MG/DL (ref 4.4–5.2)
CA-I BLD-MCNC: 4.4 MG/DL (ref 4.4–5.2)
CA-I BLD-MCNC: 4.4 MG/DL (ref 4.4–5.2)
CALCIUM SERPL-MCNC: 7.8 MG/DL (ref 8.8–10.4)
CALCIUM SERPL-MCNC: 7.9 MG/DL (ref 8.8–10.4)
CALCIUM SERPL-MCNC: 8 MG/DL (ref 8.8–10.4)
CALCIUM SERPL-MCNC: 8.2 MG/DL (ref 8.8–10.4)
CHLORIDE SERPL-SCNC: 103 MMOL/L (ref 98–107)
CHLORIDE SERPL-SCNC: 104 MMOL/L (ref 98–107)
COPPER SERPL-MCNC: 77.3 UG/DL
CORTIS SERPL-MCNC: 20.4 UG/DL
CREAT SERPL-MCNC: 0.64 MG/DL (ref 0.67–1.17)
CREAT SERPL-MCNC: 0.69 MG/DL (ref 0.67–1.17)
CREAT SERPL-MCNC: 0.72 MG/DL (ref 0.67–1.17)
CREAT SERPL-MCNC: 0.78 MG/DL (ref 0.67–1.17)
EGFRCR SERPLBLD CKD-EPI 2021: >90 ML/MIN/1.73M2
ERYTHROCYTE [DISTWIDTH] IN BLOOD BY AUTOMATED COUNT: 16.3 % (ref 10–15)
ERYTHROCYTE [DISTWIDTH] IN BLOOD BY AUTOMATED COUNT: 16.5 % (ref 10–15)
ERYTHROCYTE [DISTWIDTH] IN BLOOD BY AUTOMATED COUNT: 16.7 % (ref 10–15)
GLUCOSE BLDC GLUCOMTR-MCNC: 104 MG/DL (ref 70–99)
GLUCOSE BLDC GLUCOMTR-MCNC: 107 MG/DL (ref 70–99)
GLUCOSE BLDC GLUCOMTR-MCNC: 108 MG/DL (ref 70–99)
GLUCOSE BLDC GLUCOMTR-MCNC: 112 MG/DL (ref 70–99)
GLUCOSE BLDC GLUCOMTR-MCNC: 115 MG/DL (ref 70–99)
GLUCOSE BLDC GLUCOMTR-MCNC: 133 MG/DL (ref 70–99)
GLUCOSE BLDC GLUCOMTR-MCNC: 67 MG/DL (ref 70–99)
GLUCOSE BLDC GLUCOMTR-MCNC: 68 MG/DL (ref 70–99)
GLUCOSE BLDC GLUCOMTR-MCNC: 75 MG/DL (ref 70–99)
GLUCOSE BLDC GLUCOMTR-MCNC: 76 MG/DL (ref 70–99)
GLUCOSE BLDC GLUCOMTR-MCNC: 82 MG/DL (ref 70–99)
GLUCOSE BLDC GLUCOMTR-MCNC: 90 MG/DL (ref 70–99)
GLUCOSE BLDC GLUCOMTR-MCNC: 92 MG/DL (ref 70–99)
GLUCOSE SERPL-MCNC: 114 MG/DL (ref 70–99)
GLUCOSE SERPL-MCNC: 128 MG/DL (ref 70–99)
GLUCOSE SERPL-MCNC: 68 MG/DL (ref 70–99)
GLUCOSE SERPL-MCNC: 91 MG/DL (ref 70–99)
HCO3 SERPL-SCNC: 19 MMOL/L (ref 22–29)
HCO3 SERPL-SCNC: 19 MMOL/L (ref 22–29)
HCO3 SERPL-SCNC: 20 MMOL/L (ref 22–29)
HCO3 SERPL-SCNC: 20 MMOL/L (ref 22–29)
HCT VFR BLD AUTO: 33.2 % (ref 40–53)
HCT VFR BLD AUTO: 34.1 % (ref 40–53)
HCT VFR BLD AUTO: 35.2 % (ref 40–53)
HGB BLD-MCNC: 10.9 G/DL (ref 13.3–17.7)
HGB BLD-MCNC: 11.4 G/DL (ref 13.3–17.7)
HGB BLD-MCNC: 11.4 G/DL (ref 13.3–17.7)
LACTATE SERPL-SCNC: 2.8 MMOL/L (ref 0.7–2)
LACTATE SERPL-SCNC: 2.9 MMOL/L (ref 0.7–2)
LACTATE SERPL-SCNC: 2.9 MMOL/L (ref 0.7–2)
LACTATE SERPL-SCNC: 3 MMOL/L (ref 0.7–2)
LACTATE SERPL-SCNC: 3.1 MMOL/L (ref 0.7–2)
LACTATE SERPL-SCNC: 3.6 MMOL/L (ref 0.7–2)
MAGNESIUM SERPL-MCNC: 2.2 MG/DL (ref 1.7–2.3)
MAGNESIUM SERPL-MCNC: 2.3 MG/DL (ref 1.7–2.3)
MAGNESIUM SERPL-MCNC: 2.5 MG/DL (ref 1.7–2.3)
MCH RBC QN AUTO: 29.6 PG (ref 26.5–33)
MCH RBC QN AUTO: 29.9 PG (ref 26.5–33)
MCH RBC QN AUTO: 30.1 PG (ref 26.5–33)
MCHC RBC AUTO-ENTMCNC: 32.4 G/DL (ref 31.5–36.5)
MCHC RBC AUTO-ENTMCNC: 32.8 G/DL (ref 31.5–36.5)
MCHC RBC AUTO-ENTMCNC: 33.4 G/DL (ref 31.5–36.5)
MCV RBC AUTO: 90 FL (ref 78–100)
MCV RBC AUTO: 90 FL (ref 78–100)
MCV RBC AUTO: 93 FL (ref 78–100)
PHOSPHATE SERPL-MCNC: 4.1 MG/DL (ref 2.5–4.5)
PHOSPHATE SERPL-MCNC: 4.2 MG/DL (ref 2.5–4.5)
PHOSPHATE SERPL-MCNC: 4.3 MG/DL (ref 2.5–4.5)
PLATELET # BLD AUTO: 114 10E3/UL (ref 150–450)
PLATELET # BLD AUTO: 137 10E3/UL (ref 150–450)
PLATELET # BLD AUTO: 149 10E3/UL (ref 150–450)
POTASSIUM SERPL-SCNC: 4.3 MMOL/L (ref 3.4–5.3)
POTASSIUM SERPL-SCNC: 4.3 MMOL/L (ref 3.4–5.3)
POTASSIUM SERPL-SCNC: 4.4 MMOL/L (ref 3.4–5.3)
POTASSIUM SERPL-SCNC: 4.4 MMOL/L (ref 3.4–5.3)
PROT SERPL-MCNC: 5.5 G/DL (ref 6.4–8.3)
RBC # BLD AUTO: 3.68 10E6/UL (ref 4.4–5.9)
RBC # BLD AUTO: 3.79 10E6/UL (ref 4.4–5.9)
RBC # BLD AUTO: 3.81 10E6/UL (ref 4.4–5.9)
SODIUM SERPL-SCNC: 136 MMOL/L (ref 135–145)
SODIUM SERPL-SCNC: 136 MMOL/L (ref 135–145)
SODIUM SERPL-SCNC: 137 MMOL/L (ref 135–145)
SODIUM SERPL-SCNC: 137 MMOL/L (ref 135–145)
TRIGL SERPL-MCNC: 284 MG/DL
WBC # BLD AUTO: 22 10E3/UL (ref 4–11)
WBC # BLD AUTO: 24.3 10E3/UL (ref 4–11)
WBC # BLD AUTO: 25.2 10E3/UL (ref 4–11)

## 2025-05-15 PROCEDURE — 250N000011 HC RX IP 250 OP 636: Mod: JZ | Performed by: STUDENT IN AN ORGANIZED HEALTH CARE EDUCATION/TRAINING PROGRAM

## 2025-05-15 PROCEDURE — 99232 SBSQ HOSP IP/OBS MODERATE 35: CPT | Mod: GC | Performed by: INTERNAL MEDICINE

## 2025-05-15 PROCEDURE — 82330 ASSAY OF CALCIUM: CPT | Performed by: STUDENT IN AN ORGANIZED HEALTH CARE EDUCATION/TRAINING PROGRAM

## 2025-05-15 PROCEDURE — 999N000157 HC STATISTIC RCP TIME EA 10 MIN

## 2025-05-15 PROCEDURE — P9045 ALBUMIN (HUMAN), 5%, 250 ML: HCPCS

## 2025-05-15 PROCEDURE — 250N000011 HC RX IP 250 OP 636

## 2025-05-15 PROCEDURE — 84132 ASSAY OF SERUM POTASSIUM: CPT | Performed by: STUDENT IN AN ORGANIZED HEALTH CARE EDUCATION/TRAINING PROGRAM

## 2025-05-15 PROCEDURE — 250N000009 HC RX 250: Performed by: SURGERY

## 2025-05-15 PROCEDURE — 85041 AUTOMATED RBC COUNT: CPT | Performed by: STUDENT IN AN ORGANIZED HEALTH CARE EDUCATION/TRAINING PROGRAM

## 2025-05-15 PROCEDURE — 84155 ASSAY OF PROTEIN SERUM: CPT | Performed by: STUDENT IN AN ORGANIZED HEALTH CARE EDUCATION/TRAINING PROGRAM

## 2025-05-15 PROCEDURE — 999N000254 HC STATISTIC VENTILATOR TRANSFER

## 2025-05-15 PROCEDURE — 90947 DIALYSIS REPEATED EVAL: CPT

## 2025-05-15 PROCEDURE — 258N000003 HC RX IP 258 OP 636

## 2025-05-15 PROCEDURE — 250N000013 HC RX MED GY IP 250 OP 250 PS 637

## 2025-05-15 PROCEDURE — 999N000253 HC STATISTIC WEANING TRIALS

## 2025-05-15 PROCEDURE — 250N000009 HC RX 250: Performed by: STUDENT IN AN ORGANIZED HEALTH CARE EDUCATION/TRAINING PROGRAM

## 2025-05-15 PROCEDURE — 82310 ASSAY OF CALCIUM: CPT | Performed by: STUDENT IN AN ORGANIZED HEALTH CARE EDUCATION/TRAINING PROGRAM

## 2025-05-15 PROCEDURE — 200N000002 HC R&B ICU UMMC

## 2025-05-15 PROCEDURE — 83605 ASSAY OF LACTIC ACID: CPT

## 2025-05-15 PROCEDURE — 250N000011 HC RX IP 250 OP 636: Performed by: NURSE PRACTITIONER

## 2025-05-15 PROCEDURE — 82533 TOTAL CORTISOL: CPT

## 2025-05-15 PROCEDURE — 92507 TX SP LANG VOICE COMM INDIV: CPT | Mod: GN

## 2025-05-15 PROCEDURE — B4185 PARENTERAL SOL 10 GM LIPIDS: HCPCS | Performed by: SURGERY

## 2025-05-15 PROCEDURE — 250N000009 HC RX 250

## 2025-05-15 PROCEDURE — 83735 ASSAY OF MAGNESIUM: CPT | Performed by: STUDENT IN AN ORGANIZED HEALTH CARE EDUCATION/TRAINING PROGRAM

## 2025-05-15 PROCEDURE — 250N000013 HC RX MED GY IP 250 OP 250 PS 637: Performed by: STUDENT IN AN ORGANIZED HEALTH CARE EDUCATION/TRAINING PROGRAM

## 2025-05-15 PROCEDURE — 258N000001 HC RX 258: Performed by: NURSE PRACTITIONER

## 2025-05-15 PROCEDURE — 36415 COLL VENOUS BLD VENIPUNCTURE: CPT | Performed by: ANESTHESIOLOGY

## 2025-05-15 PROCEDURE — 99291 CRITICAL CARE FIRST HOUR: CPT | Mod: 24 | Performed by: ANESTHESIOLOGY

## 2025-05-15 PROCEDURE — 258N000001 HC RX 258: Performed by: PHYSICIAN ASSISTANT

## 2025-05-15 PROCEDURE — 94003 VENT MGMT INPAT SUBQ DAY: CPT

## 2025-05-15 PROCEDURE — 82248 BILIRUBIN DIRECT: CPT | Performed by: STUDENT IN AN ORGANIZED HEALTH CARE EDUCATION/TRAINING PROGRAM

## 2025-05-15 PROCEDURE — 258N000003 HC RX IP 258 OP 636: Performed by: NURSE PRACTITIONER

## 2025-05-15 PROCEDURE — 82435 ASSAY OF BLOOD CHLORIDE: CPT | Performed by: STUDENT IN AN ORGANIZED HEALTH CARE EDUCATION/TRAINING PROGRAM

## 2025-05-15 PROCEDURE — 87040 BLOOD CULTURE FOR BACTERIA: CPT | Performed by: ANESTHESIOLOGY

## 2025-05-15 PROCEDURE — 84478 ASSAY OF TRIGLYCERIDES: CPT

## 2025-05-15 PROCEDURE — 250N000013 HC RX MED GY IP 250 OP 250 PS 637: Performed by: SURGERY

## 2025-05-15 RX ORDER — DEXTROSE 20 G/100ML
INJECTION, SOLUTION INTRAVENOUS CONTINUOUS
Status: DISPENSED | OUTPATIENT
Start: 2025-05-15 | End: 2025-05-15

## 2025-05-15 RX ORDER — HYDROCORTISONE SODIUM SUCCINATE 100 MG/2ML
50 INJECTION INTRAMUSCULAR; INTRAVENOUS EVERY 6 HOURS
Status: DISCONTINUED | OUTPATIENT
Start: 2025-05-15 | End: 2025-05-17

## 2025-05-15 RX ADMIN — Medication 40 MG: at 08:13

## 2025-05-15 RX ADMIN — METHOCARBAMOL 500 MG: 500 TABLET ORAL at 01:09

## 2025-05-15 RX ADMIN — PIPERACILLIN AND TAZOBACTAM 4.5 G: 4; .5 INJECTION, POWDER, LYOPHILIZED, FOR SOLUTION INTRAVENOUS at 17:02

## 2025-05-15 RX ADMIN — PIPERACILLIN AND TAZOBACTAM 4.5 G: 4; .5 INJECTION, POWDER, LYOPHILIZED, FOR SOLUTION INTRAVENOUS at 23:13

## 2025-05-15 RX ADMIN — THIAMINE HCL TAB 100 MG 100 MG: 100 TAB at 08:13

## 2025-05-15 RX ADMIN — CALCIUM CHLORIDE, MAGNESIUM CHLORIDE, SODIUM CHLORIDE, SODIUM BICARBONATE, POTASSIUM CHLORIDE AND SODIUM PHOSPHATE DIBASIC DIHYDRATE 12.5 ML/KG/HR: 3.68; 3.05; 6.34; 3.09; .314; .187 INJECTION INTRAVENOUS at 03:00

## 2025-05-15 RX ADMIN — OXYCODONE HYDROCHLORIDE 15 MG: 10 TABLET ORAL at 04:14

## 2025-05-15 RX ADMIN — DEXTROSE MONOHYDRATE 25 ML: 25 INJECTION, SOLUTION INTRAVENOUS at 04:14

## 2025-05-15 RX ADMIN — DEXMEDETOMIDINE HYDROCHLORIDE 0.7 MCG/KG/HR: 400 INJECTION INTRAVENOUS at 20:04

## 2025-05-15 RX ADMIN — POLYETHYLENE GLYCOL 3350 17 G: 17 POWDER, FOR SOLUTION ORAL at 08:14

## 2025-05-15 RX ADMIN — DEXMEDETOMIDINE HYDROCHLORIDE 0.7 MCG/KG/HR: 400 INJECTION INTRAVENOUS at 08:09

## 2025-05-15 RX ADMIN — HYDROMORPHONE HYDROCHLORIDE 1 MG: 1 INJECTION, SOLUTION INTRAMUSCULAR; INTRAVENOUS; SUBCUTANEOUS at 05:14

## 2025-05-15 RX ADMIN — SODIUM CHLORIDE, SODIUM LACTATE, POTASSIUM CHLORIDE, AND CALCIUM CHLORIDE 550 ML: .6; .31; .03; .02 INJECTION, SOLUTION INTRAVENOUS at 09:43

## 2025-05-15 RX ADMIN — OXYCODONE HYDROCHLORIDE 15 MG: 10 TABLET ORAL at 01:09

## 2025-05-15 RX ADMIN — OXYCODONE HYDROCHLORIDE 15 MG: 10 TABLET ORAL at 22:28

## 2025-05-15 RX ADMIN — METHOCARBAMOL 500 MG: 500 TABLET ORAL at 08:13

## 2025-05-15 RX ADMIN — SENNOSIDES AND DOCUSATE SODIUM 2 TABLET: 50; 8.6 TABLET ORAL at 08:13

## 2025-05-15 RX ADMIN — CALCIUM CHLORIDE, MAGNESIUM CHLORIDE, SODIUM CHLORIDE, SODIUM BICARBONATE, POTASSIUM CHLORIDE AND SODIUM PHOSPHATE DIBASIC DIHYDRATE: 3.68; 3.05; 6.34; 3.09; .314; .187 INJECTION INTRAVENOUS at 13:04

## 2025-05-15 RX ADMIN — OXYCODONE HYDROCHLORIDE 15 MG: 10 TABLET ORAL at 14:17

## 2025-05-15 RX ADMIN — DEXMEDETOMIDINE HYDROCHLORIDE 0.7 MCG/KG/HR: 400 INJECTION INTRAVENOUS at 03:49

## 2025-05-15 RX ADMIN — CALCIUM CHLORIDE, MAGNESIUM CHLORIDE, SODIUM CHLORIDE, SODIUM BICARBONATE, POTASSIUM CHLORIDE AND SODIUM PHOSPHATE DIBASIC DIHYDRATE 12.5 ML/KG/HR: 3.68; 3.05; 6.34; 3.09; .314; .187 INJECTION INTRAVENOUS at 12:20

## 2025-05-15 RX ADMIN — CALCIUM CHLORIDE, MAGNESIUM CHLORIDE, SODIUM CHLORIDE, SODIUM BICARBONATE, POTASSIUM CHLORIDE AND SODIUM PHOSPHATE DIBASIC DIHYDRATE 12.5 ML/KG/HR: 3.68; 3.05; 6.34; 3.09; .314; .187 INJECTION INTRAVENOUS at 08:10

## 2025-05-15 RX ADMIN — DEXTROSE MONOHYDRATE 25 ML: 25 INJECTION, SOLUTION INTRAVENOUS at 08:43

## 2025-05-15 RX ADMIN — METHOCARBAMOL 500 MG: 500 TABLET ORAL at 14:17

## 2025-05-15 RX ADMIN — CALCIUM GLUCONATE 2 G: 20 INJECTION, SOLUTION INTRAVENOUS at 23:57

## 2025-05-15 RX ADMIN — PIPERACILLIN AND TAZOBACTAM 4.5 G: 4; .5 INJECTION, POWDER, LYOPHILIZED, FOR SOLUTION INTRAVENOUS at 04:41

## 2025-05-15 RX ADMIN — ACETAMINOPHEN 975 MG: 325 TABLET ORAL at 00:56

## 2025-05-15 RX ADMIN — SODIUM CHLORIDE, SODIUM LACTATE, POTASSIUM CHLORIDE, AND CALCIUM CHLORIDE 430 ML: .6; .31; .03; .02 INJECTION, SOLUTION INTRAVENOUS at 22:07

## 2025-05-15 RX ADMIN — ACETAMINOPHEN 975 MG: 325 TABLET ORAL at 15:47

## 2025-05-15 RX ADMIN — Medication 500 MG: at 08:14

## 2025-05-15 RX ADMIN — OXYCODONE HYDROCHLORIDE 15 MG: 10 TABLET ORAL at 08:13

## 2025-05-15 RX ADMIN — SMOFLIPID 250 ML: 6; 6; 5; 3 INJECTION, EMULSION INTRAVENOUS at 21:18

## 2025-05-15 RX ADMIN — PIPERACILLIN AND TAZOBACTAM 4.5 G: 4; .5 INJECTION, POWDER, LYOPHILIZED, FOR SOLUTION INTRAVENOUS at 11:15

## 2025-05-15 RX ADMIN — Medication 1 TABLET: at 08:13

## 2025-05-15 RX ADMIN — CALCIUM CHLORIDE, MAGNESIUM CHLORIDE, SODIUM CHLORIDE, SODIUM BICARBONATE, POTASSIUM CHLORIDE AND SODIUM PHOSPHATE DIBASIC DIHYDRATE 12.5 ML/KG/HR: 3.68; 3.05; 6.34; 3.09; .314; .187 INJECTION INTRAVENOUS at 18:09

## 2025-05-15 RX ADMIN — Medication 5 MG: at 20:05

## 2025-05-15 RX ADMIN — SODIUM CHLORIDE, SODIUM LACTATE, POTASSIUM CHLORIDE, AND CALCIUM CHLORIDE 500 ML: .6; .31; .03; .02 INJECTION, SOLUTION INTRAVENOUS at 16:26

## 2025-05-15 RX ADMIN — HYDROMORPHONE HYDROCHLORIDE 1 MG: 1 INJECTION, SOLUTION INTRAMUSCULAR; INTRAVENOUS; SUBCUTANEOUS at 16:09

## 2025-05-15 RX ADMIN — HYDROCORTISONE SODIUM SUCCINATE 50 MG: 100 INJECTION, POWDER, FOR SOLUTION INTRAMUSCULAR; INTRAVENOUS at 10:38

## 2025-05-15 RX ADMIN — FOLIC ACID 1 MG: 1 TABLET ORAL at 08:13

## 2025-05-15 RX ADMIN — SODIUM CHLORIDE, SODIUM LACTATE, POTASSIUM CHLORIDE, AND CALCIUM CHLORIDE 385 ML: .6; .31; .03; .02 INJECTION, SOLUTION INTRAVENOUS at 03:58

## 2025-05-15 RX ADMIN — OXYCODONE HYDROCHLORIDE 15 MG: 10 TABLET ORAL at 18:21

## 2025-05-15 RX ADMIN — ACETAMINOPHEN 975 MG: 325 TABLET ORAL at 23:59

## 2025-05-15 RX ADMIN — METHOCARBAMOL 500 MG: 500 TABLET ORAL at 20:05

## 2025-05-15 RX ADMIN — HYDROCORTISONE SODIUM SUCCINATE 50 MG: 100 INJECTION, POWDER, FOR SOLUTION INTRAMUSCULAR; INTRAVENOUS at 22:28

## 2025-05-15 RX ADMIN — DEXTROSE: 20 INJECTION, SOLUTION INTRAVENOUS at 09:44

## 2025-05-15 RX ADMIN — MAGNESIUM SULFATE HEPTAHYDRATE: 500 INJECTION, SOLUTION INTRAMUSCULAR; INTRAVENOUS at 21:19

## 2025-05-15 RX ADMIN — CALCIUM CHLORIDE, MAGNESIUM CHLORIDE, SODIUM CHLORIDE, SODIUM BICARBONATE, POTASSIUM CHLORIDE AND SODIUM PHOSPHATE DIBASIC DIHYDRATE 12.5 ML/KG/HR: 3.68; 3.05; 6.34; 3.09; .314; .187 INJECTION INTRAVENOUS at 23:09

## 2025-05-15 RX ADMIN — SODIUM CHLORIDE 2.4 UNITS/HR: 9 INJECTION, SOLUTION INTRAVENOUS at 04:43

## 2025-05-15 RX ADMIN — ACETAMINOPHEN 975 MG: 325 TABLET ORAL at 08:12

## 2025-05-15 RX ADMIN — CALCIUM CHLORIDE, MAGNESIUM CHLORIDE, SODIUM CHLORIDE, SODIUM BICARBONATE, POTASSIUM CHLORIDE AND SODIUM PHOSPHATE DIBASIC DIHYDRATE 12.5 ML/KG/HR: 3.68; 3.05; 6.34; 3.09; .314; .187 INJECTION INTRAVENOUS at 23:11

## 2025-05-15 RX ADMIN — HEPARIN SODIUM 5000 UNITS: 5000 INJECTION, SOLUTION INTRAVENOUS; SUBCUTANEOUS at 03:38

## 2025-05-15 RX ADMIN — PIPERACILLIN AND TAZOBACTAM 4.5 G: 4; .5 INJECTION, POWDER, LYOPHILIZED, FOR SOLUTION INTRAVENOUS at 00:56

## 2025-05-15 RX ADMIN — HEPARIN SODIUM 5000 UNITS: 5000 INJECTION, SOLUTION INTRAVENOUS; SUBCUTANEOUS at 20:17

## 2025-05-15 RX ADMIN — HYDROCORTISONE SODIUM SUCCINATE 50 MG: 100 INJECTION, POWDER, FOR SOLUTION INTRAMUSCULAR; INTRAVENOUS at 15:48

## 2025-05-15 RX ADMIN — ALBUMIN HUMAN 12.5 G: 0.05 INJECTION, SOLUTION INTRAVENOUS at 14:17

## 2025-05-15 RX ADMIN — TRAZODONE HYDROCHLORIDE 100 MG: 50 TABLET ORAL at 20:05

## 2025-05-15 RX ADMIN — HEPARIN SODIUM 5000 UNITS: 5000 INJECTION, SOLUTION INTRAVENOUS; SUBCUTANEOUS at 11:16

## 2025-05-15 RX ADMIN — SERTRALINE HYDROCHLORIDE 50 MG: 25 TABLET ORAL at 08:13

## 2025-05-15 ASSESSMENT — ACTIVITIES OF DAILY LIVING (ADL)
ADLS_ACUITY_SCORE: 56
ADLS_ACUITY_SCORE: 60
ADLS_ACUITY_SCORE: 60
ADLS_ACUITY_SCORE: 56
ADLS_ACUITY_SCORE: 60
ADLS_ACUITY_SCORE: 56
ADLS_ACUITY_SCORE: 60
ADLS_ACUITY_SCORE: 56
ADLS_ACUITY_SCORE: 60
ADLS_ACUITY_SCORE: 60

## 2025-05-15 NOTE — PROGRESS NOTES
CLINICAL NUTRITION SERVICES - REASSESSMENT NOTE       Registered Dietitian Interventions:  - Discontinued Relizorb orders (supplies and patient care order) and enteral vitamins: MVI, thiamine, folate, Vit C  - Discussed with SICU stopping D20 with TPN start    TPN change order placed:  Dosing weight:  70 kg  Access: Central    Initial parameters (per day)  Volume:  max concentrated  Dextrose: 150 g  AA: 150 g  Lipids: 250 mL SMOF, 7 days per week if repeat TG <400     Dextrose titration:   Monitor lytes and if within acceptable parameters (Mg++ > or = 1.5, K+ is > or = 3, and PO4 > or = 1.9), increase dextrose by 60 g/day to goal of 270 g dextrose.    Additives: Infuvite, MTE    Goal PN provides 270 g dextrose, 150 g AA, and 250 mL SMOF lipids 7 days per week for total provision of 2018 Kcals (29 Kcals/kg or 80% MREE) , 2.1 g/kg protein, GIR 2.7 mg/kg/minute, and 25% fat kcals on average daily.     Future/Additional Recommendations:  Check Mn level in 7 to 10 days if Dbili >2 (can accumulate).  Continue MTE (with Cu) due to effluent losses with CRRT     INFORMATION OBTAINED  Assessed patient in room.    CURRENT NUTRITION ORDERS  Diet: NPO    Nutrition Support:   EN via J arm of GJ tube 5/7 - 5/14 am: Pivot 1.5 Luis (or equivalent) @ goal of  60ml/hr  (1440ml/day) provides: 2160 kcals (31 kcal/kg or 86 % MREE), 135 g PRO (1.9g/kg), 1080 ml free H20, 248 g CHO, and 10 g fiber daily. + ReliZorb cartridges     TPN until 5/10: 150 g AA, 250 g Dextrose,     CURRENT INTAKE/TOLERANCE  Transitioned from PN to EN 5/10. Multiple EN interruptions over past week (IR procedure, abd distention); EN held 5/14 am - Jtube was cracked.  7 day daily average intake of 692 ml EN + PN + propofol + dextrose IVFs provided 1807 kcals (26 kcal/kg), 108g protein (1.5g/kg) for 90% low end kcal needs, 75% protein needs      NEW FINDINGS  Concern for leak; CT scan showing enteric contents within the abdomen. Frozen abdomen. holding EN and starting  TPN (TPN consult received)  CRRT ongoing    GI symptoms: NPO d/t concerns per above       Skin/wounds: WOCN following for open abd, left heel (stable), coccyx (stage 2, stable), trach site (stable), scrotal abscess (stable)    Nutrition-relevant labs:   Repeat TG from this am 284 (H), down from prior level of 386 5/12  Cu 5/15 77.3 (WNL) <-- 41.9 (L), received 5 days of 2mg Cu infusion; May suggest ongoing deficiency as Cu is a positive acute phase reactant.      Nutrition-relevant medications: norepi @ 0.01 mcg/kg/min, vaso @ 2.4 units /hr; D 20 and enteral vitamins (MVI, thiamine, folate, vit C)    Weight: 79.1 kg, remains up from admit. Continue using dosing weight of 70 kg based on driest weight    ASSESSED NUTRITION NEEDS  Dosing Weight: 70 kg, based on dry wt  ASSESSED NUTRITION NEEDS  Estimated Energy Needs: 9143-9462 kcal/day (equiv to 29-36 kcal/kg/day).  Justification: 80 to 100% MREE from 5/5, aim for lower end with PN  Estimated Protein Needs: 140 - 175 grams protein/day (2 - 2.5 grams of pro/kg)  Justification: Increased needs and Post-op, CRRT  Estimated Fluid Needs: Per provider pending fluid status    MALNUTRITION  % Intake: Decreased intake does not meet criteria  % Weight Loss: None noted  Subcutaneous Fat Loss: Orbital: Moderate, Buccal: Moderate, and Triceps: Mild  Muscle Loss: Temples (temporalis muscle): Moderate, Clavicles (pectoralis and deltoids): Moderate, Shoulders (deltoids): Moderate, and Interosseous muscles: Mild  Fluid Accumulation/Edema: Moderate to severe, 2-4+  Malnutrition Diagnosis: Severe malnutrition in the context of acute illness or injury  Malnutrition Present on Admission: Yes    EVALUATION OF THE PROGRESS TOWARD GOALS   Previous Goals  Total avg nutritional intake to meet a minimum of 29 kcal/kg (80% MREE from 5/5) and 1.5 g PRO/kg daily (per dosing wt 70 kg).   Evaluation: Progressing    Previous Nutrition Diagnosis  Inadequate oral intake related to inability to take PO  "as evidenced by tracheostomy, NPO status, and ongoing need for nutrition support (EN and PN) to meet nutrition needs at this time.   Evaluation: No longer applicable, nutrition diagnosis changed below    NUTRITION DIAGNOSIS  Altered gastrointestinal (GI) function related to concern for leak as evidenced by inability to feed enterally with reliance on TPN to meet 100% nutrition needs    INTERVENTIONS  Collaboration by nutrition professional with other providers  Parenteral nutrition/IV fluid management  Vitamin and mineral supplement therapy    GOALS  Total avg nutritional intake to meet a minimum of 29 kcal/kg or 80% MREE and 2 g PRO/kg daily (per dosing wt 70 kg).     MONITORING/EVALUATION  Progress toward goals will be monitored and evaluated per policy.    Maryann Cobb, RD, LD, CNSC  \"4E Clinical Dietitian\" on Vocera  Weekend/Holiday RD - \"Weekend Clinical Dietitian\" on vocera    "

## 2025-05-15 NOTE — PROGRESS NOTES
ICU End of Shift Summary. See flowsheets for vital signs and detailed assessment.    Changes this shift: RASS 0 to -1, intermittently confused. Mitts in place for pt safety. Precedex gtt increased for agitation. PRN Dilaudid x2. SR 80s - ST 110s. Tmax 100.2. Vaso infusing at straight rate; titrating Levo to maintain MAP goal. 45%, 420, 16, 5. Intermittently tachypneic. Abd CT obtained. No output from ostomy. See flowsheets for abd drain output. TF paused. PEG-G to gravity drainage with bloody output. Team aware. Required D50 x2 for hypoglycemia. D10 0.45% NS infusing. Tolerating CRRT. Voiding spontaneously, unable to measure urine output. Lactic remains elevated. Hgb stable.    Plan: continue plan of care. Continue CRRT. Wean pressors. Trend labs.

## 2025-05-15 NOTE — PROGRESS NOTES
CRRT STATUS NOTE    DATA:  Time:  5:50 PM  Pressures WNL:  YES  Filter Status:  WDL    Problems Reported/Alarms Noted:  none    Supplies Present:  YES    ASSESSMENT:  Patient Net Fluid Balance:  +978cc since midnight  Vital Signs:   Temp: 99.2  F (37.3  C) Temp src: Axillary   Pulse: 77   Resp: 28 SpO2: 98 %      Labs:  K 4.1  lactic 3.0  Goals of Therapy:  no UF, just clearance    INTERVENTIONS:   stocked    PLAN:  Contact CRRT resource with any questions or concerns

## 2025-05-15 NOTE — PROGRESS NOTES
CRRT STATUS NOTE    DATA:  Time:  5:17 AM  Pressures WNL:  YES  Filter Status:  WDL    Problems Reported/Alarms Noted:  None noted per primary RN.    Supplies Present:  YES    ASSESSMENT:  Patient Net Fluid Balance:  +1.5L yesterday/+352mL  Vital Signs:  Temp:  [96.5  F (35.8  C)-100.2  F (37.9  C)] 98.6  F (37  C)  Pulse:  [] 86  Resp:  [15-32] 21  MAP:  [55 mmHg-104 mmHg] 74 mmHg  Arterial Line BP: ()/(38-81) 110/58  FiO2 (%):  [40 %-50 %] 45 %  SpO2:  [88 %-100 %] 97 %  Labs:  Last Renal Panel:  Sodium   Date Value Ref Range Status   05/15/2025 136 135 - 145 mmol/L Final     Potassium   Date Value Ref Range Status   05/15/2025 4.4 3.4 - 5.3 mmol/L Final     Chloride   Date Value Ref Range Status   05/15/2025 103 98 - 107 mmol/L Final     Carbon Dioxide (CO2)   Date Value Ref Range Status   05/15/2025 20 (L) 22 - 29 mmol/L Final     Anion Gap   Date Value Ref Range Status   05/15/2025 13 7 - 15 mmol/L Final     Glucose   Date Value Ref Range Status   05/15/2025 68 (L) 70 - 99 mg/dL Final     GLUCOSE BY METER POCT   Date Value Ref Range Status   05/15/2025 108 (H) 70 - 99 mg/dL Final     Urea Nitrogen   Date Value Ref Range Status   05/15/2025 17.5 6.0 - 20.0 mg/dL Final     Creatinine   Date Value Ref Range Status   05/15/2025 0.78 0.67 - 1.17 mg/dL Final     GFR Estimate   Date Value Ref Range Status   05/15/2025 >90 >60 mL/min/1.73m2 Final     Comment:     eGFR calculated using 2021 CKD-EPI equation.     Calcium   Date Value Ref Range Status   05/15/2025 8.2 (L) 8.8 - 10.4 mg/dL Final     Phosphorus   Date Value Ref Range Status   05/15/2025 4.2 2.5 - 4.5 mg/dL Final     Albumin   Date Value Ref Range Status   05/15/2025 2.2 (L) 3.5 - 5.2 g/dL Final      Goals of Therapy:  No UF, clearance only.    INTERVENTIONS:   Circuit was re-circulated for transport to CT. No issues with reconnecting.    PLAN:  Continue fluid removal as tolerated per goals of therapy.  Check filter daily.  Change filter q 72 hrs  and PRN.  Please contact the CRRT Resource RN on Vocera with any questions/concerns.

## 2025-05-15 NOTE — PROGRESS NOTES
SURGICAL ICU PROGRESS NOTE  05/15/2025    Date of Service (when I saw the patient): 05/15/2025    ASSESSMENT:  Sebastián Rodas is a 29M with alcohol abuse, anxiety, and bipolar disorder, who was admitted 3/30/25 for alcohol withdrawal and abdominal pain. Workup showed leukocytosis and elevated lipase, consistent with acute pancreatitis. He developed encephalopathy and shock, requiring ICU care, intubation, vasopressors, and CRRT by 4/1. His course was complicated by cardiomyopathy (EF 20-25%, improved to 65-70% by 4/14), necrotizing pancreatitis, and persistent infection despite 14 days of meropenem. On 4/27, imaging showed likely perforated viscus; he underwent emergent laparotomy, necrosectomy, and colectomy. Transferred to [facility] SICU on 4/30. On 5/1, reoperation revealed colonic staple line breakdown, necrotic pancreas, vessel thrombosis, and adhesions. Colostomy with malankot drain and temporary abdominal closure performed. Prognosis is poor; palliative care involved. Care conference 5/2 with family to talk goals of care. Full code, family acknowledged that poor prognosis is to be expected. 5/4 s/p exploratory surgery, additional necrotic pancreas remove with no obvious spillage or sign of bleeding at the time. Went back to the OR 5/7 for abdominal wash out. CT abdomen 5/10 showed extravasation on arterial phase imaging. MTP initiated, IR arterial embolization of pancreatic vessels performed.        CHANGES and MAJOR THINGS TODAY:    - Recheck HgB  - Switch d10 to d20  - hold bowel regimen due to no stool output  - Blood cultures  - Start hydrocortisone  - Trach dome intermittent increased from 2 to 4 hours BID  - Repeat lactate  - Ongoing LR bolus to replace drain output  - Trial of color liquid dye in G tube to assess for bowel leakage in drains  - Start TPN    PLAN:    Neurological:  # Acute pain   #Encephalopathy- improving  #Insomnia  - Monitor neurological status. Delirium preventions and precautions.   #  Pain: Robaxin, IV dilaudid PRN, oxycodone 15mg Q4, Tylenol 975mg Q8. Increase PRN Hydromorphone allowance for mobility and dressing changes. Precedex 0.7mcg/kg/hr  # Sedation: None   - Trazodone 100mg for sleep. Continue Melatonin  # Anxiety  - Sertraline 50mg     Pulmonary:   # Acute hypoxic respiratory failure  # S/p tracheostomy placement   - FiO2 (%): 45 %, Resp: 21, Vent Mode: VC/AC, Resp Rate (Set): 16 breaths/min, Tidal Volume (Set, mL): 420 mL, PEEP (cm H2O): 5 cmH2O, Pressure Support (cm H2O): 5 cmH2O, Resp Rate (Set): 16 breaths/min, Tidal Volume (Set, mL): 420 mL, PEEP (cm H2O): 5 cmH2O   - Full vent overnight. Has been having trouble with trach dome. Transitioned to BID 2 hour intermittent trach dome trials and increased now to 4 hour trials.  - CT PE 5/9 showed no evidence of pulmonary embolism  - Pulmonary toilet, mobilize  - Ventilator bundle    Cardiovascular:    # Stress Cardiomyopathy   - Initial LVEF 20-25%, improved to 65-70%  # Shock-distributive (septic)  # Hypovolemic shock   # Sinus Tachycardia  - Monitor hemodynamic status. MP goal > 65.   - Norepinephrine at 0.02-0.03 mcg/kg/min, Straight rate Vasopressin @ 2.4 units/hr. Wean as tolerated  - Hydroxycortisone for stress dose steroid   - AM cortisol WNL at 20.4  - EKG 5/8, 5/9 showed sinus tachycardia with no ectopy  - Trend lactate (2.8)  - POCUS 5/13 showed hyperdynamic LV and collapsible, underfilled jugular vein.      Gastroenterology/Nutrition:  # S/p emergent exploratory laparotomy, necrosectomy, transverse colectomy, abdominal washout, and drain placement 5/1 and 5/4  # S/p Pancreatic branch vessel embolization with IR 5/10  # Severe necrotizing pancreatitis  # Splenic vein thrombosis  - will defer management of dressings and drain removal per EGS   - U/S 5/09 revealed no visualization of splenic vein.   - CT abdomen 5/10: revealed a large area of active arterial extravasation in the upper abdomen, with the source vessel appearing to  arise anterior to the celiac trunk. Large amount of hemoperitoneum. IR embolization.  - Ongoing copious dark bloody drain output, CTM  - Cecostomy drain in place, no stool output. Abdomen distended. AXR unremarkable, looks well decompressed.   - on PPI  - CT abdomen/pelvis with IV + enteral contrast through G tube 5/14: large volume of air and blood products in abdomen.     # Severe Protein calorie deficit malnutrition due to critical illness  - PEG tube tear, TF stopped.  - TPN restarted     - IR consulted for PEG exchange    - IR cannot safely exchange freshly placed GJ tubes until they have been in place x6 weeks given risk of losing access into the stomach. Additionally, imaging shows there is no safe pexy between the stomach and abdominal wall. IR may not be able to offer safe exchange even after 6 weeks.   - Review CT images prior to restarting TF  - Multivitamin supplementation ordered by nutrition  - RD consult. Appreciate cares and recommendations.     Renal/Fluids/Electrolytes:   # SARAHI  # Fluid overload  Baseline Cr 0.7-0.8. Presented with Cr 0.96 on 3/30. Rapidly markos to 5.2 on 4/1. Oliguric. Garcia UA with proteinuria, hematuria, pyuria, moderate bacteria.  Kidneys unremarkable on CT. Has severe ATN in the setting of shock, ADHF, intravascular hypovolemia/3rd spacing from pancreatitis.   - Trend lactate -> uptrending (2.8)  - D5W @ 30 ml/hr while tube feeds held  - CRRT with I&Os goal.   - Continue to monitor intake and output  - LR bolus to replace drain output    Endocrine:   # Stress hyperglycemia    - hgb A1c 5.3, no hx of DM   - Hold Lantus, switch HSSI to Insulin gtt  - Goal to keep BG< 180 for optimal wound healing   - D20 for hypoglycemia while tube feeds are held  Recent Labs   Lab 05/15/25  1125 05/15/25  1030 05/15/25  0858 05/15/25  0830 05/15/25  0608 05/15/25  0439   GLC 92 76 115* 67* 75 108*          ID:  # Leukocytosis  #Necrotizing pancreatitis  WBC Count   Date Value Ref Range Status    05/15/2025 25.2 (H) 4.0 - 11.0 10e3/uL Final   - CRP elevated at 454, trend q3 days  - Restarted Zosyn again 5/14 for possible abdominal fecal contamination in light of leukocytosis, hypothermia, and decreased stool output.   - Blood cultures 5/15  - Temperature 100.2F this morning, 96.5F yesterday  - Zosyn completed: 5/2/25--5/11.   - Discontinued micafungin 5/3  - Completed 14 days course of meropenem and caspofungin.      cultures:  - 4/30 blood cultures- NGTD   - 5/1 blood cultures ordered - NGTD  - Blood culture 5/12 - NGTD     Heme:     # Acute blood loss anemia   # Anemia of critical illness   # Thrombosed splenic vein   - Transfuse if hgb <7.0 or signs/symptoms of hypoperfusion. Monitor and trend  Hemoglobin   Date Value Ref Range Status   05/15/2025 11.4 (L) 13.3 - 17.7 g/dL Final     - hemoglobin stable  - Continue subcutaneous heparin, watch bleeding from drains.     Musculoskeletal:   # Deconditioning and weakness due to critical illness   - Physical and occupational therapy consult      Skin:  # Pressure Ulcers - Buttocks/rectal area  - Barrier cream with liberal application. Continue fecal management system   - WOC consult      #Pressure Ulcers- Left Heel  Pressure Injury Location: Left heel   Wound type: Pressure Injury     Pressure Injury Stage: Deep Tissue Pressure Injury (DTPI), present on admission     General Cares/Prophylaxis:    DVT Prophylaxis: SQH   GI Prophylaxis: PPI  Restraints: Restraints for medical healing needed: YES - mittens     Lines/ tubes/ drains:  - HD line--  Right IJ  - PICC line- left   - Right radial A line  - PIV x 3  - Trach  - G-J  - Wound manager  - SHANNON x 4  - Cecostomy drain     Disposition:  - Surgical ICU      Saud Harrington, MS4  [employer]    Resident/Fellow Attestation   I, Jean Paul Pierre MD, was present with the medical/SANTOS student who participated in the service and in the documentation of the note.  I have verified the history and  personally performed the physical exam and medical decision making.  I agree with the assessment and plan of care as documented in the note.      Jean Paul Pierre MD  PGY1  Date of Service (when I saw the patient): 05/15/25      ====================================  INTERVAL HISTORY:  Course reviewed. He has had difficulty tolerating the trach dome due to increased secretions, currently on timed trach dome trials. He had a tear in the J portion of his G tube and feeds were stopped. Decreased stool colostomy output. Increasing anxiety and pulling at things so mittens were put in place.     OBJECTIVE:   1. VITAL SIGNS:   Temp:  [97.7  F (36.5  C)-100.2  F (37.9  C)] 99.3  F (37.4  C)  Pulse:  [] 99  Resp:  [15-32] 21  MAP:  [56 mmHg-104 mmHg] 73 mmHg  Arterial Line BP: ()/(39-81) 110/56  FiO2 (%):  [45 %-50 %] 45 %  SpO2:  [88 %-100 %] 93 %  FiO2 (%): 45 %, Resp: 21, Vent Mode: VC/AC, Resp Rate (Set): 16 breaths/min, Tidal Volume (Set, mL): 420 mL, PEEP (cm H2O): 5 cmH2O, Pressure Support (cm H2O): 5 cmH2O, Resp Rate (Set): 16 breaths/min, Tidal Volume (Set, mL): 420 mL, PEEP (cm H2O): 5 cmH2O    2. INTAKE/ OUTPUT:   I/O last 3 completed shifts:  In: 4687.3 [I.V.:1536.8; Other:0.5; NG/GT:1270; IV Piggyback:1880]  Out: 3265 [Emesis/NG output:165; Drains:3100]    3. PHYSICAL EXAMINATION:  General: laying in bed, asleep and not responsive to voice.   HEENT: PERRLA. Trach present and secure, site dressed.   Pulm/Resp: Clear breath sounds bilaterally, lungs coarse but clear.  CV: RRR, S1/S2   Abdomen: Distended. Dark bloody output noted to all drains. G-J tube in place, site secured, abdomen with abthera in place, suction intact.   : scrotal edema, draining dark bloody output.   MSK/Extremities: generalized edema in extremities    4. INVESTIGATIONS:   Arterial Blood Gases   Recent Labs   Lab 05/11/25  0604 05/10/25  1228   PH 7.33* 7.43   PCO2 43 30*   PO2 89 86   HCO3 23 20*     Complete Blood Count   Recent  Labs   Lab 05/15/25  1120 05/15/25  0401 05/14/25 2024 05/14/25  1151   WBC 25.2* 24.3* 22.6* 22.2*   HGB 11.4* 11.4* 11.9* 11.5*   * 149* 164 166     Basic Metabolic Panel  Recent Labs   Lab 05/15/25  1125 05/15/25  1030 05/15/25  0858 05/15/25  0830 05/15/25  0411 05/15/25  0401 05/14/25 2029 05/14/25 2024 05/14/25  1613 05/14/25  1608 05/14/25  1155 05/14/25  1151   NA  --   --   --   --   --  136  --  138  --  137  --  137   POTASSIUM  --   --   --   --   --  4.4  --  4.8  --  4.7  --  4.7   CHLORIDE  --   --   --   --   --  103  --  104  --  103  --  104   CO2  --   --   --   --   --  20*  --  18*  --  20*  --  20*   BUN  --   --   --   --   --  17.5  --  18.8  --  19.5  --  19.7   CR  --   --   --   --   --  0.78  --  0.84  --  0.83  --  0.86   GLC 92 76 115* 67*   < > 68*   < > 71   < > 66*   < > 67*    < > = values in this interval not displayed.     Liver Function Tests  Recent Labs   Lab 05/15/25  0401 05/14/25 2024 05/14/25  1151 05/14/25  0403 05/13/25  1141 05/13/25  0441 05/12/25  1148 05/12/25  0311 05/11/25  0322 05/10/25  2347 05/10/25  1952 05/10/25  1715 05/10/25  1353   AST 30  --   --  29  --  25  --  28   < > 31 33  --   --    ALT 14  --   --  16  --  18  --  19   < > 23 27  --   --    ALKPHOS 275*  --   --  243*  --  205*  --  165*   < > 114 109  --   --    BILITOTAL 2.6*  --   --  2.6*  --  2.9*  --  4.0*   < > 3.5* 3.1*  --   --    ALBUMIN 2.2* 2.4* 2.4* 2.6*   < > 2.7*   < > 3.1*   < > 3.6 3.9 2.4*  --    INR  --   --   --   --   --   --   --   --   --  1.13 1.17* 1.65* 1.28*    < > = values in this interval not displayed.     Pancreatic Enzymes  No lab results found in last 7 days.    Coagulation Profile  Recent Labs   Lab 05/10/25  2347 05/10/25  1952 05/10/25  1715 05/10/25  1353   INR 1.13 1.17* 1.65* 1.28*   PTT 29 30 37 33         5. RADIOLOGY:   Recent Results (from the past 24 hours)   CT Abdomen Pelvis w Contrast    Narrative    EXAMINATION: CT ABDOMEN PELVIS W CONTRAST,  5/14/2025 9:10 PM    INDICATION: necrotizing pancreatitis and fluid collections    COMPARISON STUDY: 5/10/2025    TECHNIQUE: CT scan of the abdomen and pelvis was performed on  multidetector CT scanner using volumetric acquisition technique and  images were reconstructed in multiple planes with variable thickness  and reviewed on dedicated workstations.     CONTRAST: [id number]: 105ml & Oral Contrast injected IV without  oral contrast    CT scan radiation dose is optimized to minimum requisite dose using  automated dose modulation techniques.    FINDINGS:    Devices: Enteric tube tip in the proximal jejunum. Abdominal drains  X2. Right upper quadrant colon drain. Percutaneous duodenal  jejunostomy tube with tip terminating along the proximal jejunum. Tip  of a central venous catheter and hemithorax partially visualized.    Postsurgical changes of the abdomen status post necrosectomy and  transverse colectomy with temporary abdominal closure.    Lower thorax: Trace bilateral pleural effusions and adjacent lower  lobe atelectasis/consolidation. Right lower lobe collapsed.    Liver: Several focal areas of hypoattenuation within the hepatic  parenchyma (5/131 and 5/103), similar to prior.    Biliary System: Layering density in the gallbladder. No extrahepatic  biliary ductal dilation.    Pancreas: Areas of devascularization in the neck and proximal body  (4/129) consistent with history of necrotizing pancreatitis.  Postsurgical changes from pancreatic necrosectomy. The area of active  bleeding at the site of surgery superior to the pancreatic neck has  been embolized 5/10/2025. Heterogeneous material at the necrosectomy  site consistent with hematoma.    Adrenal glands: No mass or nodules    Spleen: Normal.    Kidneys: No suspicious mass, obstructing calculus or hydronephrosis.    Gastrointestinal tract : Thickening of the colon of the transverse  colon. Patient is status post transverse colectomy. Bowel loops are  very  difficult to follow due to marked peritoneal and retroperitoneal  findings. Normal caliber small bowel.    Mesentery/peritoneum/retroperitoneum: Open abdomen. Large volume fluid  and loculated air present throughout the abdomen and tracking into the  retroperitoneum. Air in the retroperitoneum is new from prior study.  Scattered mottled air within the anterior abdomen along the incision  has increased from prior study. Inferiorly this fluid is hyperdense.  Peritoneal thickening and enhancement. Portions of the fluid appeared  to be partially loculated/rim-enhancing, for example in the right mid  abdomen. Extensive fat necrosis of the omentum, peritoneum and  retroperitoneum.    Lymph nodes: Prominent mesenteric lymph nodes, likely reactive.    Vasculature: Patent major abdominal vasculature.  The splenic vein is  not visualized, presumably thrombosed. Embolization changes adjacent  to the celiac axis.    Pelvis: Urinary bladder with contrast.  Bilateral hydroceles.    Osseous structures: No aggressive or acute osseous lesion.      Soft tissues: Anasarca.  Large scrotal edema and hydroceles.      Impression    IMPRESSION:   1. Postoperative changes of the abdomen with open abdomen. Large  volume of air and blood products throughout the abdomen and  retroperitoneum. Portions of the fluid appear rim-enhancing. Gas  within the anterior abdomen has increased from prior study and marked  gas throughout the retroperitoneum is new from prior. Gas may  represent extensive progression of fat necrosis versus superimposed  gas-forming organism infection. No definite localizing signs for  bowel/enteric source of gas.  2. Hypoenhancement in the pancreatic neck and proximal tail  representing pancreatic necrosis status post necrosectomy and  embolization for bleed from resection site.  3. Right lower lobe consolidation and trace bilateral pleural  effusions.    I have personally reviewed the examination and initial  interpretation  and I agree with the findings.    GLENN KATZ MD         SYSTEM ID:  [id number]         =========================================

## 2025-05-15 NOTE — PROGRESS NOTES
Nephrology Progress Note  05/15/2025       Sebastián Rodas is a 29 yom with Bipolar disorder, ETOH use c/b necrotizing pancreatitis admitted 3/30/25 to [facility] for ETOH withdrawal and abdominal pain, found to have acute pancreatitis which has progressed to necrotizing pancreatitis complicated by SARAHI.  Seen by Nephrology at [place], started on CRRT 4/1.  Had periods of stability enough for iHD but back to CRRT on 4/21 until tx to [facility] for further surgical interventions.       Interval History :   Mr Rodas continues on CRRT, held on any UF yesterday, ended up net positive due to volume expansion and HR has come down, still needing pressor support.  Labs stable on 4k baths, will continue to provide clearance, will plan no UF again today as he is about even on his own with drain output.      Assessment & Recommendations:   SARAHI-Baseline Cr 0.8 as recently as March 2025 before acute events, was started on CRRT emergently on 4/1 in setting of septic shock. Had ~2 weeks of stability enough to manage with iHD but back on CRRT 4/21 until tx to [facility] on 4/30. Running fevers with intraabdominal sepsis.  Continuing RRT from OSH, restarted CRRT on 5/2 after OR.                  -No need for new consent, continuing RRT started last month at [facility].                 -Access is tunneled RIJ from 4/21.                  -CRRT,  all 4k baths, planning no UF as drain output is significant.         Volume-Total body volume overloaded but much of it 3rd spaced.  Allowed net positive yesterday and HR has come down, still needing pressor support.       Electrolytes-K 4.4 on all 4k baths, bicarb 20.      BMD-No acute issues.      Anemia-Hgb stable at ~11 after MTP 5/10, stable in the short term, acute management per team.      Nutrition-TPN started 5/1     Time spent: 50 minutes on this date of encounter for chart review, physical exam, medical decision making and co-ordination of care.      Discussed with Dr Newby    "  Recommendations were communicated to primary team via verbal communication.        Raheem Gabriel, APRN CNS  Clinical Nurse Specialist  [phone]    Review of Systems:   I reviewed the following systems:  ROS not done due to vent/sedation.     Physical Exam:   I/O last 3 completed shifts:  In: 4687.3 [I.V.:1536.8; Other:0.5; NG/GT:1270; IV Piggyback:1880]  Out: 3265 [Emesis/NG output:165; Drains:3100]   /57   Pulse 81   Temp 99.3  F (37.4  C) (Axillary)   Resp 19   Ht 1.727 m (5' 7.99\")   Wt 79.1 kg (174 lb 6.1 oz)   SpO2 99%   BMI 26.52 kg/m       GENERAL APPEARANCE: Vent via trach, CRRT running.   Pulmonary: Vent via trach  CV: Regular rhythm, normal rate   - Edema +2 generalized  GI: Surgical dressing in place  MS: no evidence of inflammation in joints, no muscle tenderness  : + Garcia  SKIN: no rash, warm, dry  NEURO: Intubated and sedated.     Labs:   All labs reviewed by me  Electrolytes/Renal -   Recent Labs   Lab Test 05/15/25  0830 05/15/25  0608 05/15/25  0439 05/15/25  0411 05/15/25  0401 05/14/25  2029 05/14/25  2024 05/14/25  1613 05/14/25  1608 05/14/25  1155 05/14/25  1151   NA  --   --   --   --  136  --  138  --  137  --  137   POTASSIUM  --   --   --   --  4.4  --  4.8  --  4.7  --  4.7   CHLORIDE  --   --   --   --  103  --  104  --  103  --  104   CO2  --   --   --   --  20*  --  18*  --  20*  --  20*   BUN  --   --   --   --  17.5  --  18.8  --  19.5  --  19.7   CR  --   --   --   --  0.78  --  0.84  --  0.83  --  0.86   GLC 67* 75 108*   < > 68*   < > 71   < > 66*   < > 67*   KARINA  --   --   --   --  8.2*  --  8.5*  --  8.6*  --  8.1*   MAG  --   --   --   --  2.5*  --  2.4*  --   --   --  2.4*   PHOS  --   --   --   --  4.2  --  4.0  --   --   --  3.9    < > = values in this interval not displayed.       CBC -   Recent Labs   Lab Test 05/15/25  0401 05/14/25 2024 05/14/25  1151   WBC 24.3* 22.6* 22.2*   HGB 11.4* 11.9* 11.5*   * 164 166       LFTs -   Recent Labs   Lab " Test 05/15/25  0401 05/14/25  2024 05/14/25  1151 05/14/25  0403 05/13/25  1141 05/13/25  0441   ALKPHOS 275*  --   --  243*  --  205*   BILITOTAL 2.6*  --   --  2.6*  --  2.9*   ALT 14  --   --  16  --  18   AST 30  --   --  29  --  25   PROTTOTAL 5.5*  --   --  6.0*  --  5.7*   ALBUMIN 2.2* 2.4* 2.4* 2.6*   < > 2.7*    < > = values in this interval not displayed.       Iron Panel - No lab results found.        Current Medications:  Current Facility-Administered Medications   Medication Dose Route Frequency Provider Last Rate Last Admin    acetaminophen (TYLENOL) tablet 975 mg  975 mg Oral or Feeding Tube Q8H Brendon Haas,    975 mg at 05/15/25 0812    folic acid (FOLVITE) tablet 1 mg  1 mg Per Feeding Tube Daily Jolie Dexter, ASIF CNP   1 mg at 05/15/25 0813    heparin ANTICOAGULANT injection 5,000 Units  5,000 Units Subcutaneous Q8H Marina Hood MD   5,000 Units at 05/15/25 0338    lactated ringers BOLUS 0-999 mL  0-999 mL Intravenous Q6H Renee Kwon  mL/hr at 05/14/25 1030 385 mL at 05/15/25 0358    melatonin tablet 5 mg  5 mg Oral or Feeding Tube QPM Roxi Alston MD   5 mg at 05/14/25 2019    methocarbamol (ROBAXIN) tablet 500 mg  500 mg Oral or Feeding Tube Q6H Brendon Haas DO   500 mg at 05/15/25 0813    multivitamin w/minerals (THERA-VIT-M) tablet 1 tablet  1 tablet Oral or Feeding Tube Daily Jolie Dexter, ASIF CNP   1 tablet at 05/15/25 0813    oxyCODONE (ROXICODONE) tablet 15 mg  15 mg Oral or Feeding Tube Q4H Eugenia Zavala MD   15 mg at 05/15/25 0813    pantoprazole (PROTONIX) 2 mg/mL suspension 40 mg  40 mg Oral or Feeding Tube QAM AC Brendon Haas DO   40 mg at 05/15/25 0813    piperacillin-tazobactam (ZOSYN) 4.5 g vial to attach to  mL bag  4.5 g Intravenous Q6H Eugenia Zavala MD   4.5 g at 05/15/25 0441    polyethylene glycol (MIRALAX) Packet 17 g  17 g Oral or Feeding Tube  BID Brendon Haas DO   17 g at 05/15/25 0814    senna-docusate (SENOKOT-S/PERICOLACE) 8.6-50 MG per tablet 2 tablet  2 tablet Oral or Feeding Tube BID Berndon Haas DO   2 tablet at 05/15/25 0813    sertraline (ZOLOFT) tablet 50 mg  50 mg Oral or Feeding Tube Daily Eugenia Zavala MD   50 mg at 05/15/25 0813    sodium chloride (PF) 0.9% PF flush 3 mL  3 mL Intracatheter Q8H Ganesh Griffiths MD   3 mL at 05/15/25 0814    thiamine (B-1) tablet 100 mg  100 mg Per Feeding Tube Daily Jolie Dexter APRN CNP   100 mg at 05/15/25 0813    traZODone (DESYREL) tablet 100 mg  100 mg Oral or Feeding Tube QPM Eugenia Zavala MD   100 mg at 05/14/25 2019    vitamin C (ASCORBIC ACID) liquid 500 mg  500 mg Per Feeding Tube BID Jolie Dexter APRN CNP   500 mg at 05/15/25 0814     Current Facility-Administered Medications   Medication Dose Route Frequency Provider Last Rate Last Admin    dexmedeTOMIDine (PRECEDEX) 4 mcg/mL in sodium chloride 0.9 % 100 mL infusion  0.1-0.7 mcg/kg/hr (Dosing Weight) Intravenous Continuous Eugenia Zavala MD 12.2 mL/hr at 05/15/25 0809 0.7 mcg/kg/hr at 05/15/25 0809    dextrose 10% and 0.45% NaCl infusion   Intravenous Continuous Jean Paul Pierre MD 60 mL/hr at 05/15/25 0833 Rate Change at 05/15/25 0833    dextrose 10% infusion   Intravenous Continuous PRN Brendon Haas DO        dialysate for CVVHD & CVVHDF (Phoxillum BK4/2.5)  12.5 mL/kg/hr CRRT Continuous Tico Spain MD 1,000 mL/hr at 05/15/25 0810 12.5 mL/kg/hr at 05/15/25 0810    insulin regular (MYXREDLIN) 1 unit/mL infusion  0-24 Units/hr Intravenous Continuous Durga Euceda MD   Held at 05/14/25 1300    No heparin required   Does not apply Continuous PRN Tico Spain MD        norepinephrine (LEVOPHED) 16 mg in  mL infusion MAX CONC CENTRAL LINE  0.01-0.6 mcg/kg/min (Dosing Weight) Intravenous Continuous Ganesh Griffiths MD 2 mL/hr  at 05/15/25 0700 0.03 mcg/kg/min at 05/15/25 0700    POST-filter replacement solution for CVVHD & CVVHDF (Phoxillum BK4/2.5)   CRRT Continuous Tico Spain  mL/hr at 05/14/25 1009 New Bag at 05/14/25 1009    PRE-filter replacement solution for CVVHD & CVVHDF (Phoxillum BK4/2.5)  12.5 mL/kg/hr CRRT Continuous Tico Spain MD 1,000 mL/hr at 05/15/25 0810 12.5 mL/kg/hr at 05/15/25 0810    Reason statin not prescribed   Does not apply DOES NOT GO TO Rhys Bloom MD        vasopressin 1 unit/mL MAX Conc (PITRESSIN) infusion  2.4 Units/hr Intravenous Continuous Eugenia Zavala MD 2.4 mL/hr at 05/15/25 0700 2.4 Units/hr at 05/15/25 0700

## 2025-05-15 NOTE — PROGRESS NOTES
Surgery Progress Note  05/15/2025       Subjective:  Overnight increased pressors requirement to keep MAPs > 65. Currently on vaso and levo. On ventilatory support with increased fiO2 40->45. Tube feeds were stopped overnight due to tear in J-tube portion of his G-J tube. Malecot drain with minimal output. Lac uptrending 2.8 (2.5 yesterday).      Objective:  Temp:  [96.5  F (35.8  C)-100.2  F (37.9  C)] 98.6  F (37  C)  Pulse:  [] 82  Resp:  [15-32] 20  MAP:  [56 mmHg-104 mmHg] 73 mmHg  Arterial Line BP: ()/(39-81) 106/59  FiO2 (%):  [40 %-50 %] 45 %  SpO2:  [88 %-100 %] 99 %    I/O last 3 completed shifts:  In: 4687.3 [I.V.:1536.8; Other:0.5; NG/GT:1270; IV Piggyback:1880]  Out: 3265 [Emesis/NG output:165; Drains:3100]      Gen: Eyes open, moves head to voice. Off sedation.  Resp: Ventilated on 45% FiO2, PEEP of 5, trach  Abd: Abdomen is taut but compressible, drains with dark sanguinous output, [place]ot with minimal succus, midline abdominal dressing with wound manager  : Scrotum with pouch in place with serosanguinous drainage  Ext: bilateral lower extremities appear mottled, warm to the touch, edematous      Labs:  Recent Labs   Lab 05/15/25  0401 05/14/25 2024 05/14/25  1151   WBC 24.3* 22.6* 22.2*   HGB 11.4* 11.9* 11.5*   * 164 166       Recent Labs   Lab 05/15/25  0608 05/15/25  0439 05/15/25  0411 05/15/25  0401 05/14/25 2029 05/14/25 2024 05/14/25  1613 05/14/25  1608 05/14/25  1155 05/14/25  1151   NA  --   --   --  136  --  138  --  137  --  137   POTASSIUM  --   --   --  4.4  --  4.8  --  4.7  --  4.7   CHLORIDE  --   --   --  103  --  104  --  103  --  104   CO2  --   --   --  20*  --  18*  --  20*  --  20*   BUN  --   --   --  17.5  --  18.8  --  19.5  --  19.7   CR  --   --   --  0.78  --  0.84  --  0.83  --  0.86   GLC 75 108* 68* 68*   < > 71   < > 66*   < > 67*   KARINA  --   --   --  8.2*  --  8.5*  --  8.6*  --  8.1*   MAG  --   --   --  2.5*  --  2.4*  --   --   --  2.4*  "  PHOS  --   --   --  4.2  --  4.0  --   --   --  3.9    < > = values in this interval not displayed.     Assessment/Plan:   Sebastián Rodas is a 29-year-old male with a history of alcohol use disorder, anxiety, and bipolar disorder who was admitted on 3/30/2025 for alcohol withdrawal following a recent binge, presenting with diffuse abdominal pain. Initial workup revealed leukocytosis and elevated lipase concerning for acute pancreatitis. He developed acute encephalopathy and was transferred to the ICU on 3/31, requiring intubation and vasopressors by 4/1 due to shock. Hospital course has been complicated by acute renal failure requiring CRRT, cardiomyopathy (initial LVEF 20-25%, improved to 65-70% by 4/14), and necrotizing pancreatitis with unorganized fluid collections. He completed a 14-day course of meropenem but remains intermittently febrile and critically ill, requiring ongoing dialysis and vasopressor support. On 4/27, after clinical deterioration and imaging consistent with a likely perforated viscus, following family discussion, he underwent emergent exploratory laparotomy, necrosectomy, transverse colectomy, abdominal washout, and drain placement. Patient was transferred to [facility] on 4/30/25 for further surgical management. Went to OR 5/1 for re-open laparotomy, I&D, placement of colostomy tube in presumed previous colectomy staple line, necrosectomy, and Abthera placement. On 5/2, increasing sanguineous output from drains concerning for bleeding from pancreatic bed. Family care conference held 5/2, plan for restorative cares at that time. Take back to OR twice (5/4 and 5/7) for abdominal washout. Per nursing note: 5/9 morning after he saw a picture of his abdomen that he \"desires to speak to team about updating goals of care, expressed desire to pull back on cares\". Goals of care conference on 5/10 with continuation of full code and restorative cares. CTA on 5/10 revealed large area of active arterial " extravasation in the upper abdomen. MTP was started. Patient is now s/p embolization of small pancreatic branch off of the distal celiac artery with IR on 5/10.     Plan:  - No further surgical options for any intraabdominal pathology   - Nursing to change dressings in the am, pm, and PRN during the day if copious drainage during the day. Please place Xeroform over the bridging mesh prior to placing Kerlix  - Tube feeds on hold currently due to tear in J-tube  - Appreciate WOC cares  - Other cares per SICU, we appreciate cares     Patient was seen and discussed with chief resident who will discuss with staff.    Casimiro Chirinos MD  Urology PGY-1  General surgery service.

## 2025-05-15 NOTE — PROGRESS NOTES
INTENSIVIST FACULTY NOTE:  Held tube feeds and sent for CT last ngiht; unfortauntely looks like an abdominal catastrophe  Tolerated pressure support for a decent amount of time    Exam less anxious on precedex  Abdominal distention maybe a little less than it had been  Drains all still clot-stained ascitic fluid; don't necessarily look like succus /stool  Scrotum less distended.  He is very tender over the inguinal canals, and continues to ooze a lot of fluid (that looks the same as what comes out of the SHANNON drains) through the hole in his scrotum  Tolerating trach dome for ~2h at at time  Line sites look fine  Still diffuse anasarca  Minimal ostomy output  The jejunal feeding tube is fractured  G tube port is now showing some bloody output    Electrolytes unremarkable on CRRT  Bilirubin remains somewhat elevated  Lactic acid continues to rise  CBC unchanged    CT scan I think shows poop all over the abdomen, althoguh I don't necessarily see any extraluminal contrast, so I'm not sure of that.  There's a lot of air and evidence of old bleed.  Ongoing pancreatic necrosis  Also with bilateral pleural effusions    This is a 29M hx bipolar and EtOH, with a prolonged hospitalization that began with an admission for alcohol withdrawal after a binge, but subsequent development of necrotizing pancreatitis which in turn led to a whole host of complications including cardiomyopathy (now recovered), SARAHI requiring renal replacement therapy, a perforated viscous requiring transverse colectomy, and an intra-abdominal bleed requiring IR embolization of a pancreatic branch off the distal celiac artery.  He has a tracheostomy and GJ tube.  His abdomen has been closed with vicryl mesh, and he has a frozen abdomen, so cannot undergo surgical exploration.      Will try putting blue dye through the J tube to see if anything comes out the drains, since I think the CT scan looks like leak but I can't definitively see any extraluminal  contrast.  He may need TPN.  Will restart stress steroids given presumptive infection and thus septic shock.  I think he can continue on the current plan for volume.    METABOLIC ENCEPHALOPATHY / DELIRIUM:  -due to residual sedatives / pain meds, critical illness; monitoring with clinical exam  -melatonin + trazodone for sleep; Precedex for sedation  -robaxin, scheduled oxycodone, scheduled tylenol for analgesia for now.    -needs restraints (mitts)    ACUTE HYPOXIC RESPIRATORY FAILURE  -resolved.  Trach dome for ~2h, then rest settings, but increase the amount of time he is on trach dome a little bit each day and will start to slowly increase that duration on trach dome, as his issue seems to be debility and weakness  -targeting net even to net negative with CRRT    CONCERN FOR SEPTIC SHOCK:  -nothing that can be done surgically about his abdomen at this time.  Will try blue dye to better assess for enteral leak; unless radiology definitively calls a leak or we see blue dye come out a drain, can probably restart tube feeds rather than do TPN  -norepinephrine + vasopressin  -restart stress steroids.  -blood cultures  -Empiric zosyn as above    NECROTIZING PANCREATITIS:  -empiric zosyn  -on appropriate nutrients    ACUTE KIDNEY INJURY:  -on CRRT currently.  He is young enough that I think he has a reasonable chance of renal recovery.  Continue with a target of net even.  May even need some volume back today    INADEQUATE ORAL INTAKE:  -hold tube feeds until definitively rule out enteral leak  -start TPN today    DM2 / HYPERGLYCEMIA:  -insulin infusion can probably go back to sliding scale insulin  -will need to increase his IV dextrose infusion since he is actually continuing to get hypoglycemic despite lack of liver failure or long-acting insulin    MISC:  -Code status is full code  -family updated by medical student and resident  -[facility]; anticoagulation for splenic vein thrombus remains on hold. PPI for mechanical  ventilation and pancreatitis  -lines: RIJ tunnelled dialysis line; arterial line; PICC for pressors  -no shore  -anticipate discharge to LTACH in >1-2 weeks if he survives this hospitalization; he is at high risk of decompensation and death during this hospitalization; we have not been able to get him to a point where he might tolerate IHD, which will be necessary for discharge    Billing statement: 51min of critical care time; spent in an initial review of imaging, labs, physical exam, and discussion of the patient with my own team and the extended care team including the primary service. Based on this patient's presentation / recent intervention and my bedside assessment, I felt there was or is a reasonably high probability of imminent or life-threatening deterioration today or tonight for septic reasons.   My overall critical care time, as described in detail above, includes such things as coordination of care, arrhythmia and hemodynamics management with infusions of medicines, respiratory management, fluid therapy including fluid boluses, and pain and sedation therapy. This time excludes time I spent personally performing or supervising procedures for this patient.    DONNY Rodas MD  Clinical   Anesthesia / Critical Care  *[phone]

## 2025-05-15 NOTE — PROGRESS NOTES
Pt vented on precedex for comfort, needing D10 for BGs, levo 0.03 and vaso 2.4.  Large fluid outputs from drains requiring LR replacement of 1 to 0.5mL.     Weaned to TD 40% x4hrs today, well tolerated. PMSV briefly.   1 hypoglycemic episode.  Switched D10 to D20 @40/hr.   LA up to 3.6 with no fluid pull on CRRT and LR given as ordered; 500mL x2. Gave Albumin (5% 250mL) and started stress dose steroids. LA down to 3.0. Levo off. Vaso halved.   Blood cultures sent for fevers overnight.   Mom at bedside, updated. Brother plans to visit tomorrow.     Plan:   continue checking LA q6h overnight.   Plan to TD 4hrs again today if able, vent overnight.   D20 will switch to TPN at 20:00.

## 2025-05-16 VITALS
TEMPERATURE: 99.5 F | OXYGEN SATURATION: 97 % | DIASTOLIC BLOOD PRESSURE: 57 MMHG | HEART RATE: 81 BPM | SYSTOLIC BLOOD PRESSURE: 113 MMHG | WEIGHT: 174.38 LBS | RESPIRATION RATE: 22 BRPM | BODY MASS INDEX: 26.43 KG/M2 | HEIGHT: 68 IN

## 2025-05-16 LAB
ALBUMIN SERPL BCG-MCNC: 2.1 G/DL (ref 3.5–5.2)
ALBUMIN SERPL BCG-MCNC: 2.1 G/DL (ref 3.5–5.2)
ALBUMIN SERPL BCG-MCNC: 2.2 G/DL (ref 3.5–5.2)
ALP SERPL-CCNC: 259 U/L (ref 40–150)
ALT SERPL W P-5'-P-CCNC: 13 U/L (ref 0–70)
ANION GAP SERPL CALCULATED.3IONS-SCNC: 11 MMOL/L (ref 7–15)
ANION GAP SERPL CALCULATED.3IONS-SCNC: 12 MMOL/L (ref 7–15)
ANION GAP SERPL CALCULATED.3IONS-SCNC: 14 MMOL/L (ref 7–15)
AST SERPL W P-5'-P-CCNC: 25 U/L (ref 0–45)
BILIRUB SERPL-MCNC: 2.1 MG/DL
BILIRUBIN DIRECT (ROCHE PRO & PURE): 1.61 MG/DL (ref 0–0.45)
BUN SERPL-MCNC: 17.1 MG/DL (ref 6–20)
BUN SERPL-MCNC: 20.6 MG/DL (ref 6–20)
BUN SERPL-MCNC: 22.9 MG/DL (ref 6–20)
CA-I BLD-MCNC: 4.4 MG/DL (ref 4.4–5.2)
CA-I BLD-MCNC: 4.4 MG/DL (ref 4.4–5.2)
CA-I BLD-MCNC: 4.6 MG/DL (ref 4.4–5.2)
CALCIUM SERPL-MCNC: 7.6 MG/DL (ref 8.8–10.4)
CALCIUM SERPL-MCNC: 7.7 MG/DL (ref 8.8–10.4)
CALCIUM SERPL-MCNC: 8.2 MG/DL (ref 8.8–10.4)
CHLORIDE SERPL-SCNC: 102 MMOL/L (ref 98–107)
CHLORIDE SERPL-SCNC: 103 MMOL/L (ref 98–107)
CHLORIDE SERPL-SCNC: 104 MMOL/L (ref 98–107)
CREAT SERPL-MCNC: 0.63 MG/DL (ref 0.67–1.17)
CREAT SERPL-MCNC: 0.63 MG/DL (ref 0.67–1.17)
CREAT SERPL-MCNC: 0.66 MG/DL (ref 0.67–1.17)
CRP SERPL-MCNC: 344 MG/L
EGFRCR SERPLBLD CKD-EPI 2021: >90 ML/MIN/1.73M2
ERYTHROCYTE [DISTWIDTH] IN BLOOD BY AUTOMATED COUNT: 15.9 % (ref 10–15)
ERYTHROCYTE [DISTWIDTH] IN BLOOD BY AUTOMATED COUNT: 16.2 % (ref 10–15)
ERYTHROCYTE [DISTWIDTH] IN BLOOD BY AUTOMATED COUNT: 16.3 % (ref 10–15)
GLUCOSE BLDC GLUCOMTR-MCNC: 159 MG/DL (ref 70–99)
GLUCOSE BLDC GLUCOMTR-MCNC: 171 MG/DL (ref 70–99)
GLUCOSE BLDC GLUCOMTR-MCNC: 172 MG/DL (ref 70–99)
GLUCOSE BLDC GLUCOMTR-MCNC: 185 MG/DL (ref 70–99)
GLUCOSE BLDC GLUCOMTR-MCNC: 188 MG/DL (ref 70–99)
GLUCOSE BLDC GLUCOMTR-MCNC: 188 MG/DL (ref 70–99)
GLUCOSE SERPL-MCNC: 182 MG/DL (ref 70–99)
GLUCOSE SERPL-MCNC: 185 MG/DL (ref 70–99)
GLUCOSE SERPL-MCNC: 187 MG/DL (ref 70–99)
HCO3 SERPL-SCNC: 19 MMOL/L (ref 22–29)
HCO3 SERPL-SCNC: 21 MMOL/L (ref 22–29)
HCO3 SERPL-SCNC: 21 MMOL/L (ref 22–29)
HCT VFR BLD AUTO: 27 % (ref 40–53)
HCT VFR BLD AUTO: 28.8 % (ref 40–53)
HCT VFR BLD AUTO: 33.1 % (ref 40–53)
HGB BLD-MCNC: 10.7 G/DL (ref 13.3–17.7)
HGB BLD-MCNC: 8.7 G/DL (ref 13.3–17.7)
HGB BLD-MCNC: 9.3 G/DL (ref 13.3–17.7)
LACTATE SERPL-SCNC: 2 MMOL/L (ref 0.7–2)
LACTATE SERPL-SCNC: 2.3 MMOL/L (ref 0.7–2)
LACTATE SERPL-SCNC: 2.5 MMOL/L (ref 0.7–2)
LACTATE SERPL-SCNC: 2.5 MMOL/L (ref 0.7–2)
MAGNESIUM SERPL-MCNC: 2.1 MG/DL (ref 1.7–2.3)
MAGNESIUM SERPL-MCNC: 2.1 MG/DL (ref 1.7–2.3)
MAGNESIUM SERPL-MCNC: 2.3 MG/DL (ref 1.7–2.3)
MCH RBC QN AUTO: 29.7 PG (ref 26.5–33)
MCH RBC QN AUTO: 29.8 PG (ref 26.5–33)
MCH RBC QN AUTO: 29.9 PG (ref 26.5–33)
MCHC RBC AUTO-ENTMCNC: 32.2 G/DL (ref 31.5–36.5)
MCHC RBC AUTO-ENTMCNC: 32.3 G/DL (ref 31.5–36.5)
MCHC RBC AUTO-ENTMCNC: 32.3 G/DL (ref 31.5–36.5)
MCV RBC AUTO: 92 FL (ref 78–100)
MCV RBC AUTO: 93 FL (ref 78–100)
MCV RBC AUTO: 93 FL (ref 78–100)
PHOSPHATE SERPL-MCNC: 3.6 MG/DL (ref 2.5–4.5)
PHOSPHATE SERPL-MCNC: 3.7 MG/DL (ref 2.5–4.5)
PHOSPHATE SERPL-MCNC: 4.5 MG/DL (ref 2.5–4.5)
PLATELET # BLD AUTO: 101 10E3/UL (ref 150–450)
PLATELET # BLD AUTO: 117 10E3/UL (ref 150–450)
PLATELET # BLD AUTO: 120 10E3/UL (ref 150–450)
POTASSIUM SERPL-SCNC: 3.9 MMOL/L (ref 3.4–5.3)
POTASSIUM SERPL-SCNC: 4 MMOL/L (ref 3.4–5.3)
POTASSIUM SERPL-SCNC: 4.1 MMOL/L (ref 3.4–5.3)
PROT SERPL-MCNC: 5.3 G/DL (ref 6.4–8.3)
RBC # BLD AUTO: 2.92 10E6/UL (ref 4.4–5.9)
RBC # BLD AUTO: 3.11 10E6/UL (ref 4.4–5.9)
RBC # BLD AUTO: 3.6 10E6/UL (ref 4.4–5.9)
SODIUM SERPL-SCNC: 135 MMOL/L (ref 135–145)
SODIUM SERPL-SCNC: 135 MMOL/L (ref 135–145)
SODIUM SERPL-SCNC: 137 MMOL/L (ref 135–145)
WBC # BLD AUTO: 22.3 10E3/UL (ref 4–11)
WBC # BLD AUTO: 23.4 10E3/UL (ref 4–11)
WBC # BLD AUTO: 23.5 10E3/UL (ref 4–11)

## 2025-05-16 PROCEDURE — 84460 ALANINE AMINO (ALT) (SGPT): CPT | Performed by: STUDENT IN AN ORGANIZED HEALTH CARE EDUCATION/TRAINING PROGRAM

## 2025-05-16 PROCEDURE — 90947 DIALYSIS REPEATED EVAL: CPT

## 2025-05-16 PROCEDURE — 80051 ELECTROLYTE PANEL: CPT | Performed by: STUDENT IN AN ORGANIZED HEALTH CARE EDUCATION/TRAINING PROGRAM

## 2025-05-16 PROCEDURE — 99291 CRITICAL CARE FIRST HOUR: CPT | Mod: 24 | Performed by: ANESTHESIOLOGY

## 2025-05-16 PROCEDURE — G0463 HOSPITAL OUTPT CLINIC VISIT: HCPCS

## 2025-05-16 PROCEDURE — 250N000009 HC RX 250: Performed by: SURGERY

## 2025-05-16 PROCEDURE — 80053 COMPREHEN METABOLIC PANEL: CPT | Performed by: STUDENT IN AN ORGANIZED HEALTH CARE EDUCATION/TRAINING PROGRAM

## 2025-05-16 PROCEDURE — 250N000011 HC RX IP 250 OP 636: Mod: JZ

## 2025-05-16 PROCEDURE — 84132 ASSAY OF SERUM POTASSIUM: CPT | Performed by: STUDENT IN AN ORGANIZED HEALTH CARE EDUCATION/TRAINING PROGRAM

## 2025-05-16 PROCEDURE — 82330 ASSAY OF CALCIUM: CPT | Performed by: STUDENT IN AN ORGANIZED HEALTH CARE EDUCATION/TRAINING PROGRAM

## 2025-05-16 PROCEDURE — 84100 ASSAY OF PHOSPHORUS: CPT | Performed by: CLINICAL NURSE SPECIALIST

## 2025-05-16 PROCEDURE — 86140 C-REACTIVE PROTEIN: CPT

## 2025-05-16 PROCEDURE — 250N000013 HC RX MED GY IP 250 OP 250 PS 637

## 2025-05-16 PROCEDURE — 258N000003 HC RX IP 258 OP 636

## 2025-05-16 PROCEDURE — 83735 ASSAY OF MAGNESIUM: CPT | Performed by: CLINICAL NURSE SPECIALIST

## 2025-05-16 PROCEDURE — 85014 HEMATOCRIT: CPT | Performed by: CLINICAL NURSE SPECIALIST

## 2025-05-16 PROCEDURE — 250N000011 HC RX IP 250 OP 636

## 2025-05-16 PROCEDURE — B4185 PARENTERAL SOL 10 GM LIPIDS: HCPCS | Performed by: SURGERY

## 2025-05-16 PROCEDURE — 85048 AUTOMATED LEUKOCYTE COUNT: CPT | Performed by: STUDENT IN AN ORGANIZED HEALTH CARE EDUCATION/TRAINING PROGRAM

## 2025-05-16 PROCEDURE — 250N000011 HC RX IP 250 OP 636: Mod: JZ | Performed by: NURSE PRACTITIONER

## 2025-05-16 PROCEDURE — 82248 BILIRUBIN DIRECT: CPT | Performed by: STUDENT IN AN ORGANIZED HEALTH CARE EDUCATION/TRAINING PROGRAM

## 2025-05-16 PROCEDURE — 83735 ASSAY OF MAGNESIUM: CPT | Performed by: STUDENT IN AN ORGANIZED HEALTH CARE EDUCATION/TRAINING PROGRAM

## 2025-05-16 PROCEDURE — 82330 ASSAY OF CALCIUM: CPT | Performed by: CLINICAL NURSE SPECIALIST

## 2025-05-16 PROCEDURE — 99232 SBSQ HOSP IP/OBS MODERATE 35: CPT | Mod: GC | Performed by: INTERNAL MEDICINE

## 2025-05-16 PROCEDURE — 99223 1ST HOSP IP/OBS HIGH 75: CPT | Performed by: PSYCHIATRY & NEUROLOGY

## 2025-05-16 PROCEDURE — 94003 VENT MGMT INPAT SUBQ DAY: CPT

## 2025-05-16 PROCEDURE — 85041 AUTOMATED RBC COUNT: CPT | Performed by: STUDENT IN AN ORGANIZED HEALTH CARE EDUCATION/TRAINING PROGRAM

## 2025-05-16 PROCEDURE — 83605 ASSAY OF LACTIC ACID: CPT

## 2025-05-16 PROCEDURE — 200N000002 HC R&B ICU UMMC

## 2025-05-16 PROCEDURE — 250N000009 HC RX 250: Performed by: STUDENT IN AN ORGANIZED HEALTH CARE EDUCATION/TRAINING PROGRAM

## 2025-05-16 PROCEDURE — 999N000253 HC STATISTIC WEANING TRIALS

## 2025-05-16 PROCEDURE — 85027 COMPLETE CBC AUTOMATED: CPT | Performed by: STUDENT IN AN ORGANIZED HEALTH CARE EDUCATION/TRAINING PROGRAM

## 2025-05-16 PROCEDURE — 999N000157 HC STATISTIC RCP TIME EA 10 MIN

## 2025-05-16 PROCEDURE — 258N000003 HC RX IP 258 OP 636: Performed by: NURSE PRACTITIONER

## 2025-05-16 PROCEDURE — 84100 ASSAY OF PHOSPHORUS: CPT | Performed by: STUDENT IN AN ORGANIZED HEALTH CARE EDUCATION/TRAINING PROGRAM

## 2025-05-16 PROCEDURE — 84155 ASSAY OF PROTEIN SERUM: CPT | Performed by: STUDENT IN AN ORGANIZED HEALTH CARE EDUCATION/TRAINING PROGRAM

## 2025-05-16 PROCEDURE — 250N000009 HC RX 250

## 2025-05-16 PROCEDURE — 250N000013 HC RX MED GY IP 250 OP 250 PS 637: Performed by: SURGERY

## 2025-05-16 RX ORDER — DEXTROSE MONOHYDRATE 25 G/50ML
25-50 INJECTION, SOLUTION INTRAVENOUS
Status: DISCONTINUED | OUTPATIENT
Start: 2025-05-16 | End: 2025-05-18

## 2025-05-16 RX ORDER — NICOTINE POLACRILEX 4 MG
15-30 LOZENGE BUCCAL
Status: DISCONTINUED | OUTPATIENT
Start: 2025-05-16 | End: 2025-05-18

## 2025-05-16 RX ORDER — METHOCARBAMOL 100 MG/ML
500 INJECTION, SOLUTION INTRAMUSCULAR; INTRAVENOUS EVERY 6 HOURS
Status: DISCONTINUED | OUTPATIENT
Start: 2025-05-16 | End: 2025-05-19

## 2025-05-16 RX ADMIN — DEXMEDETOMIDINE HYDROCHLORIDE 0.7 MCG/KG/HR: 400 INJECTION INTRAVENOUS at 03:26

## 2025-05-16 RX ADMIN — ACETAMINOPHEN 975 MG: 325 TABLET ORAL at 08:30

## 2025-05-16 RX ADMIN — CALCIUM CHLORIDE, MAGNESIUM CHLORIDE, SODIUM CHLORIDE, SODIUM BICARBONATE, POTASSIUM CHLORIDE AND SODIUM PHOSPHATE DIBASIC DIHYDRATE 12.5 ML/KG/HR: 3.68; 3.05; 6.34; 3.09; .314; .187 INJECTION INTRAVENOUS at 20:06

## 2025-05-16 RX ADMIN — SODIUM CHLORIDE 1 UNITS/HR: 9 INJECTION, SOLUTION INTRAVENOUS at 00:50

## 2025-05-16 RX ADMIN — HYDROMORPHONE HYDROCHLORIDE 0.5 MG: 1 INJECTION, SOLUTION INTRAMUSCULAR; INTRAVENOUS; SUBCUTANEOUS at 05:19

## 2025-05-16 RX ADMIN — DEXMEDETOMIDINE HYDROCHLORIDE 0.7 MCG/KG/HR: 400 INJECTION INTRAVENOUS at 11:54

## 2025-05-16 RX ADMIN — CALCIUM CHLORIDE, MAGNESIUM CHLORIDE, SODIUM CHLORIDE, SODIUM BICARBONATE, POTASSIUM CHLORIDE AND SODIUM PHOSPHATE DIBASIC DIHYDRATE 12.5 ML/KG/HR: 3.68; 3.05; 6.34; 3.09; .314; .187 INJECTION INTRAVENOUS at 04:12

## 2025-05-16 RX ADMIN — OXYCODONE HYDROCHLORIDE 15 MG: 10 TABLET ORAL at 09:57

## 2025-05-16 RX ADMIN — PIPERACILLIN AND TAZOBACTAM 4.5 G: 4; .5 INJECTION, POWDER, LYOPHILIZED, FOR SOLUTION INTRAVENOUS at 05:09

## 2025-05-16 RX ADMIN — METHOCARBAMOL 500 MG: 100 INJECTION, SOLUTION INTRAMUSCULAR; INTRAVENOUS at 09:57

## 2025-05-16 RX ADMIN — SODIUM CHLORIDE, SODIUM LACTATE, POTASSIUM CHLORIDE, AND CALCIUM CHLORIDE 204 ML: .6; .31; .03; .02 INJECTION, SOLUTION INTRAVENOUS at 10:26

## 2025-05-16 RX ADMIN — SODIUM CHLORIDE, SODIUM LACTATE, POTASSIUM CHLORIDE, AND CALCIUM CHLORIDE 120 ML: .6; .31; .03; .02 INJECTION, SOLUTION INTRAVENOUS at 22:50

## 2025-05-16 RX ADMIN — CALCIUM CHLORIDE, MAGNESIUM CHLORIDE, SODIUM CHLORIDE, SODIUM BICARBONATE, POTASSIUM CHLORIDE AND SODIUM PHOSPHATE DIBASIC DIHYDRATE 12.5 ML/KG/HR: 3.68; 3.05; 6.34; 3.09; .314; .187 INJECTION INTRAVENOUS at 09:55

## 2025-05-16 RX ADMIN — HYDROCORTISONE SODIUM SUCCINATE 50 MG: 100 INJECTION, POWDER, FOR SOLUTION INTRAMUSCULAR; INTRAVENOUS at 17:24

## 2025-05-16 RX ADMIN — CALCIUM CHLORIDE, MAGNESIUM CHLORIDE, SODIUM CHLORIDE, SODIUM BICARBONATE, POTASSIUM CHLORIDE AND SODIUM PHOSPHATE DIBASIC DIHYDRATE 12.5 ML/KG/HR: 3.68; 3.05; 6.34; 3.09; .314; .187 INJECTION INTRAVENOUS at 20:07

## 2025-05-16 RX ADMIN — OXYCODONE HYDROCHLORIDE 15 MG: 10 TABLET ORAL at 15:06

## 2025-05-16 RX ADMIN — PIPERACILLIN AND TAZOBACTAM 4.5 G: 4; .5 INJECTION, POWDER, LYOPHILIZED, FOR SOLUTION INTRAVENOUS at 23:09

## 2025-05-16 RX ADMIN — MAGNESIUM SULFATE HEPTAHYDRATE: 500 INJECTION, SOLUTION INTRAMUSCULAR; INTRAVENOUS at 20:49

## 2025-05-16 RX ADMIN — SERTRALINE HYDROCHLORIDE 50 MG: 25 TABLET ORAL at 08:30

## 2025-05-16 RX ADMIN — METHOCARBAMOL 500 MG: 100 INJECTION, SOLUTION INTRAMUSCULAR; INTRAVENOUS at 15:06

## 2025-05-16 RX ADMIN — HYDROCORTISONE SODIUM SUCCINATE 50 MG: 100 INJECTION, POWDER, FOR SOLUTION INTRAMUSCULAR; INTRAVENOUS at 22:51

## 2025-05-16 RX ADMIN — PIPERACILLIN AND TAZOBACTAM 4.5 G: 4; .5 INJECTION, POWDER, LYOPHILIZED, FOR SOLUTION INTRAVENOUS at 11:54

## 2025-05-16 RX ADMIN — CALCIUM CHLORIDE, MAGNESIUM CHLORIDE, SODIUM CHLORIDE, SODIUM BICARBONATE, POTASSIUM CHLORIDE AND SODIUM PHOSPHATE DIBASIC DIHYDRATE 12.5 ML/KG/HR: 3.68; 3.05; 6.34; 3.09; .314; .187 INJECTION INTRAVENOUS at 15:02

## 2025-05-16 RX ADMIN — OXYCODONE HYDROCHLORIDE 15 MG: 10 TABLET ORAL at 06:25

## 2025-05-16 RX ADMIN — METHOCARBAMOL 500 MG: 500 TABLET ORAL at 01:47

## 2025-05-16 RX ADMIN — HYDROCORTISONE SODIUM SUCCINATE 50 MG: 100 INJECTION, POWDER, FOR SOLUTION INTRAMUSCULAR; INTRAVENOUS at 04:14

## 2025-05-16 RX ADMIN — OXYCODONE HYDROCHLORIDE 15 MG: 10 TABLET ORAL at 01:47

## 2025-05-16 RX ADMIN — CALCIUM CHLORIDE, MAGNESIUM CHLORIDE, SODIUM CHLORIDE, SODIUM BICARBONATE, POTASSIUM CHLORIDE AND SODIUM PHOSPHATE DIBASIC DIHYDRATE 12.5 ML/KG/HR: 3.68; 3.05; 6.34; 3.09; .314; .187 INJECTION INTRAVENOUS at 04:13

## 2025-05-16 RX ADMIN — SODIUM CHLORIDE, SODIUM LACTATE, POTASSIUM CHLORIDE, AND CALCIUM CHLORIDE 293 ML: .6; .31; .03; .02 INJECTION, SOLUTION INTRAVENOUS at 04:43

## 2025-05-16 RX ADMIN — OXYCODONE HYDROCHLORIDE 15 MG: 10 TABLET ORAL at 17:24

## 2025-05-16 RX ADMIN — CALCIUM CHLORIDE, MAGNESIUM CHLORIDE, SODIUM CHLORIDE, SODIUM BICARBONATE, POTASSIUM CHLORIDE AND SODIUM PHOSPHATE DIBASIC DIHYDRATE: 3.68; 3.05; 6.34; 3.09; .314; .187 INJECTION INTRAVENOUS at 14:48

## 2025-05-16 RX ADMIN — DEXMEDETOMIDINE HYDROCHLORIDE 0.7 MCG/KG/HR: 400 INJECTION INTRAVENOUS at 20:09

## 2025-05-16 RX ADMIN — PIPERACILLIN AND TAZOBACTAM 4.5 G: 4; .5 INJECTION, POWDER, LYOPHILIZED, FOR SOLUTION INTRAVENOUS at 17:25

## 2025-05-16 RX ADMIN — SODIUM CHLORIDE, SODIUM LACTATE, POTASSIUM CHLORIDE, AND CALCIUM CHLORIDE 377 ML: .6; .31; .03; .02 INJECTION, SOLUTION INTRAVENOUS at 17:00

## 2025-05-16 RX ADMIN — HYDROMORPHONE HYDROCHLORIDE 0.5 MG: 1 INJECTION, SOLUTION INTRAMUSCULAR; INTRAVENOUS; SUBCUTANEOUS at 10:58

## 2025-05-16 RX ADMIN — ACETAMINOPHEN 975 MG: 325 TABLET ORAL at 17:24

## 2025-05-16 RX ADMIN — PANTOPRAZOLE SODIUM 40 MG: 40 INJECTION, POWDER, FOR SOLUTION INTRAVENOUS at 09:58

## 2025-05-16 RX ADMIN — CALCIUM CHLORIDE, MAGNESIUM CHLORIDE, SODIUM CHLORIDE, SODIUM BICARBONATE, POTASSIUM CHLORIDE AND SODIUM PHOSPHATE DIBASIC DIHYDRATE 12.5 ML/KG/HR: 3.68; 3.05; 6.34; 3.09; .314; .187 INJECTION INTRAVENOUS at 09:56

## 2025-05-16 RX ADMIN — OXYCODONE HYDROCHLORIDE 15 MG: 10 TABLET ORAL at 22:52

## 2025-05-16 RX ADMIN — HEPARIN SODIUM 5000 UNITS: 5000 INJECTION, SOLUTION INTRAVENOUS; SUBCUTANEOUS at 04:14

## 2025-05-16 RX ADMIN — HYDROCORTISONE SODIUM SUCCINATE 50 MG: 100 INJECTION, POWDER, FOR SOLUTION INTRAMUSCULAR; INTRAVENOUS at 09:56

## 2025-05-16 RX ADMIN — METHOCARBAMOL 500 MG: 100 INJECTION, SOLUTION INTRAMUSCULAR; INTRAVENOUS at 20:14

## 2025-05-16 RX ADMIN — SMOFLIPID 250 ML: 6; 6; 5; 3 INJECTION, EMULSION INTRAVENOUS at 20:49

## 2025-05-16 ASSESSMENT — ACTIVITIES OF DAILY LIVING (ADL)
ADLS_ACUITY_SCORE: 60

## 2025-05-16 NOTE — PLAN OF CARE
ICU End of Shift Summary. See flowsheets for vital signs and detailed assessment.    Changes this shift: No neuro changes. Precedex for sedation at 0.7. Vaso infusing at 1. Large outputs from drains. Replaced with 723 mL of LR.     Vent settings unchanged. Unable to trach dome last night due to anxiety, tachypnea, and PIPs in the 40s.    TPN and Lipids started last night. Lactate trending down. CRRT set down this morning before 6 AM    Plan: Continue plan of care.    Goal Outcome Evaluation:    Plan of Care Reviewed With: patient    Overall Patient Progress: no change    Outcome Evaluation: No acute events

## 2025-05-16 NOTE — PROGRESS NOTES
Surgery Progress Note  05/16/2025       Subjective:  Ongoing discussions with patient and family concerning patient's prognosis and care goals. Psychiatry consulted yesterday; patient determined to have capacity. Per ICU, goal to minimize enteral meds. HR in 130-140s this AM. Nurse says HR increases when he is awake and anxious. She also notes he requested to be taken off full vent settings this AM; currently on pressure support. She says he consistently tells staff he would like his trach removed and is very anxious this morning.     Objective:  Temp:  [98.5  F (36.9  C)-99.6  F (37.6  C)] 99.6  F (37.6  C)  Pulse:  [] 96  Resp:  [16-35] 21  MAP:  [58 mmHg-97 mmHg] 85 mmHg  Arterial Line BP: ()/(39-76) 125/65  FiO2 (%):  [40 %-60 %] 60 %  SpO2:  [89 %-100 %] 98 %    I/O last 3 completed shifts:  In: 4121.92 [I.V.:1330.79; Other:1; NG/GT:525; IV Piggyback:1370]  Out: 3302 [Emesis/NG output:205; Drains:3097]      Gen: Eyes open, patient appears extremely anxious during our visit. He attempts to communicate with pen/paper with difficulty. Precedex 0.7 mcg/kg/hr.   Resp: Ventilated on 40% FiO2, PEEP of 5, trach  Abd: Abdomen is taut but compressible, drains with dark sanguinous output, [place] with minimal succus, midline abdominal dressing with wound manager  : Scrotum with pouch in place with serosanguinous drainage  Ext: bilateral lower extremities appear mottled, warm to the touch, edematous      Labs:  Recent Labs   Lab 05/16/25  1223 05/16/25  0319 05/15/25  1952   WBC 23.4* 23.5* 22.0*   HGB 9.3* 10.7* 10.9*   * 117* 114*       Recent Labs   Lab 05/16/25  1223 05/16/25  0820 05/16/25  0319 05/16/25  0014 05/15/25  1952     --  135  --  137   POTASSIUM 4.0  --  4.1  --  4.3   CHLORIDE 104  --  102  --  103   CO2 21*  --  19*  --  20*   BUN 20.6*  --  17.1  --  13.4   CR 0.63*  --  0.66*  --  0.64*   *  185* 188* 187*   < > 128*   KARINA 7.6*  --  8.2*  --  7.8*   MAG 2.1  --  2.3   "--  2.3   PHOS 3.7  --  4.5  --  4.3    < > = values in this interval not displayed.     Assessment/Plan:   Sebastián Rodas is a 29-year-old male with a history of alcohol use disorder, anxiety, and bipolar disorder who was admitted on 3/30/2025 for alcohol withdrawal following a recent binge, presenting with diffuse abdominal pain. Initial workup revealed leukocytosis and elevated lipase concerning for acute pancreatitis. He developed acute encephalopathy and was transferred to the ICU on 3/31, requiring intubation and vasopressors by 4/1 due to shock. Hospital course has been complicated by acute renal failure requiring CRRT, cardiomyopathy (initial LVEF 20-25%, improved to 65-70% by 4/14), and necrotizing pancreatitis with unorganized fluid collections. He completed a 14-day course of meropenem but remains intermittently febrile and critically ill, requiring ongoing dialysis and vasopressor support. On 4/27, after clinical deterioration and imaging consistent with a likely perforated viscus, following family discussion, he underwent emergent exploratory laparotomy, necrosectomy, transverse colectomy, abdominal washout, and drain placement. Patient was transferred to [facility] on 4/30/25 for further surgical management. Went to OR 5/1 for re-open laparotomy, I&D, placement of colostomy tube in presumed previous colectomy staple line, necrosectomy, and Abthera placement. On 5/2, increasing sanguineous output from drains concerning for bleeding from pancreatic bed. Family care conference held 5/2, plan for restorative cares at that time. Take back to OR twice (5/4 and 5/7) for abdominal washout. Per nursing note: 5/9 morning after he saw a picture of his abdomen that he \"desires to speak to team about updating goals of care, expressed desire to pull back on cares\". Goals of care conference on 5/10 with continuation of full code and restorative cares. CTA on 5/10 revealed large area of active arterial extravasation in the " upper abdomen. MTP was started. Patient is now s/p embolization of small pancreatic branch off of the distal celiac artery with IR on 5/10.     Plan:  - No further surgical options for any intraabdominal pathology   - Nursing to change dressings in the am, pm, and PRN during the day if copious drainage during the day. Please place Xeroform over the bridging mesh. Please do not apply xeroform outside the wound bed/over the skin edges but make sure all of the wound bed is covered.   - Tube feeds on hold currently due to tear in J-tube. Receiving TPN and Lipids and Albumin as needed.  - On Zosyn per SICU  - SQH PPX, would not recommend therapeutic dose given the risk of bleeding.   - Appreciate [facility] cares  - Other cares per SICU, we appreciate cares     Patient was seen and discussed with my resident and chief resident.     Leticia Jones, MS3 [facility]  May 17, 2025     Resident/Fellow Attestation   I, Casimiro Chirinos MD, was present with the medical/SANT[employer] student who participated in the service and in the documentation of the note.  I have verified the history and personally performed the physical exam and medical decision making.  I agree with the assessment and plan of care as documented in the note.      Casimiro Chirinos MD  PGY1  Date of Service (when I saw the patient): 05/17/25

## 2025-05-16 NOTE — PROGRESS NOTES
Nephrology Progress Note  05/16/2025       Sebastián Rodas is a 29 yom with Bipolar disorder, ETOH use c/b necrotizing pancreatitis admitted 3/30/25 to [facility] for ETOH withdrawal and abdominal pain, found to have acute pancreatitis which has progressed to necrotizing pancreatitis complicated by SARAHI.  Seen by Nephrology at [facility], started on CRRT 4/1.  Had periods of stability enough for iHD but back to CRRT on 4/21 until tx to [facility] for further surgical interventions.       Interval History :   Mr Rodas continues on CRRT, drain output has been in balance with intake so not pulling any UF labs stable on 4k baths. Will change checks to BID to avoid excessive blood draws.  Hgb down but hemodynamics are fairly stable today on low dose vasopressin.     Continuing to discuss GOC between family and surgical team as there is little to do to prevent intraabdominal bleeding going forward, is unlikely to be able to leave the ICU/hospital.      Assessment & Recommendations:   SARAHI-Baseline Cr 0.8 as recently as March 2025 before acute events, was started on CRRT emergently on 4/1 in setting of septic shock. Had ~2 weeks of stability enough to manage with iHD but back on CRRT 4/21 until tx to [facility] on 4/30. Running fevers with intraabdominal sepsis.  Continuing RRT from OSH, restarted CRRT on 5/2 after OR.                  -No need for new consent, continuing RRT started last month at [facility].                 -Access is tunneled RIJ from 4/21.                  -CRRT,  all 4k baths, planning no UF as drain output is significant.         Volume-Abdomen distended, drain output is essentially in balance with intake, no UF planned.      Electrolytes-K 4.0 on all 4k baths, bicarb 21.      BMD-No acute issues.      Anemia-Hgb 9.3 this am, down from 10.7 yesterday but hemodynamically stable.      Nutrition-TPN      Time spent: 50 minutes on this date of encounter for chart review, physical exam, medical decision making and  "co-ordination of care.      Discussed with Dr Newby     Recommendations were communicated to primary team via verbal communication.      Raheem Gabriel, APRN CNS  Clinical Nurse Specialist  [phone]    Review of Systems:   I reviewed the following systems:  ROS not done due to vent/sedation.     Physical Exam:   I/O last 3 completed shifts:  In: 4121.92 [I.V.:1330.79; Other:1; NG/GT:525; IV Piggyback:1370]  Out: 3302 [Emesis/NG output:205; Drains:3097]   /57   Pulse 118   Temp 99.6  F (37.6  C) (Axillary)   Resp (!) 35   Ht 1.727 m (5' 7.99\")   Wt 79.7 kg (175 lb 11.3 oz)   SpO2 94%   BMI 26.72 kg/m       GENERAL APPEARANCE: Vent via trach, CRRT running.   Pulmonary: Vent via trach  CV: Regular rhythm, normal rate   - Edema +2 generalized  GI: Surgical dressing in place  MS: no evidence of inflammation in joints, no muscle tenderness  : + Garcia  SKIN: no rash, warm, dry  NEURO: Intubated and sedated.     Labs:   All labs reviewed by me  Electrolytes/Renal -   Recent Labs   Lab Test 05/16/25  1223 05/16/25  0820 05/16/25 0319 05/16/25  0014 05/15/25  1952     --  135  --  137   POTASSIUM 4.0  --  4.1  --  4.3   CHLORIDE 104  --  102  --  103   CO2 21*  --  19*  --  20*   BUN 20.6*  --  17.1  --  13.4   CR 0.63*  --  0.66*  --  0.64*   *  185* 188* 187*   < > 128*   KARINA 7.6*  --  8.2*  --  7.8*   MAG 2.1  --  2.3  --  2.3   PHOS 3.7  --  4.5  --  4.3    < > = values in this interval not displayed.       CBC -   Recent Labs   Lab Test 05/16/25  1223 05/16/25  0319 05/15/25  1952   WBC 23.4* 23.5* 22.0*   HGB 9.3* 10.7* 10.9*   * 117* 114*       LFTs -   Recent Labs   Lab Test 05/16/25  1223 05/16/25  0319 05/15/25  1952 05/15/25  1120 05/15/25  0401 05/14/25  1151 05/14/25 0403   ALKPHOS  --  259*  --   --  275*  --  243*   BILITOTAL  --  2.1*  --   --  2.6*  --  2.6*   ALT  --  13  --   --  14  --  16   AST  --  25  --   --  30  --  29   PROTTOTAL  --  5.3*  --   --  5.5*  --  " 6.0*   ALBUMIN 2.1* 2.2* 2.3*   < > 2.2*   < > 2.6*    < > = values in this interval not displayed.       Iron Panel - No lab results found.        Current Medications:  Current Facility-Administered Medications   Medication Dose Route Frequency Provider Last Rate Last Admin    acetaminophen (TYLENOL) tablet 975 mg  975 mg Oral or Feeding Tube Q8H Brendon Haas DO   975 mg at 05/16/25 0830    [Held by provider] heparin ANTICOAGULANT injection 5,000 Units  5,000 Units Subcutaneous Q8H Marina Hood MD   5,000 Units at 05/16/25 0414    hydrocortisone sodium succinate PF (solu-CORTEF) injection 50 mg  50 mg Intravenous Q6H de Renee Marcum CNP   50 mg at 05/16/25 0956    insulin aspart (NovoLOG) injection (RAPID ACTING)  1-12 Units Subcutaneous Q4H Renee Kwon CNP   2 Units at 05/16/25 1225    lactated ringers BOLUS 0-999 mL  0-999 mL Intravenous Q6H Renee Kwon  mL/hr at 05/15/25 0943 204 mL at 05/16/25 1026    lipids 4 oil (SMOFLIPID) 20 % infusion 250 mL  250 mL Intravenous Q24H Brendon Haas DO   Stopped at 05/16/25 0900    methocarbamol (ROBAXIN) injection 500 mg  500 mg Intravenous Q6H Renee Kwon CNP   500 mg at 05/16/25 0957    oxyCODONE (ROXICODONE) tablet 15 mg  15 mg Oral or Feeding Tube Q4H Eugenia Zavala MD   15 mg at 05/16/25 0957    pantoprazole (PROTONIX) IV push injection 40 mg  40 mg Intravenous QAM AC Renee Kwon CNP   40 mg at 05/16/25 0958    piperacillin-tazobactam (ZOSYN) 4.5 g vial to attach to  mL bag  4.5 g Intravenous Q6H Eugenia Zavala MD   4.5 g at 05/16/25 1154    sertraline (ZOLOFT) tablet 50 mg  50 mg Oral or Feeding Tube Daily Eugenia Zavala MD   50 mg at 05/16/25 0830    sodium chloride (PF) 0.9% PF flush 3 mL  3 mL Intracatheter Q8H Ganesh Griffiths MD   3 mL at 05/16/25 0830     Current Facility-Administered Medications    Medication Dose Route Frequency Provider Last Rate Last Admin    dexmedeTOMIDine (PRECEDEX) 4 mcg/mL in sodium chloride 0.9 % 100 mL infusion  0.1-0.7 mcg/kg/hr (Dosing Weight) Intravenous Continuous Eugenia Zavala MD 12.2 mL/hr at 05/16/25 1200 0.7 mcg/kg/hr at 05/16/25 1200    dextrose 10% infusion   Intravenous Continuous PRN Brendon Haas DO        dialysate for CVVHD & CVVHDF (Phoxillum BK4/2.5)  12.5 mL/kg/hr CRRT Continuous Tico Spain MD 1,000 mL/hr at 05/16/25 0956 12.5 mL/kg/hr at 05/16/25 0956    No heparin required   Does not apply Continuous PRN Tico Spain MD        parenteral nutrition - ADULT compounded formula   CENTRAL LINE IV TPN CONTINUOUS Brendon Haas DO        parenteral nutrition - ADULT compounded formula   CENTRAL LINE IV TPN CONTINUOUS Brendon Haas DO 50 mL/hr at 05/16/25 1300 Rate Verify at 05/16/25 1300    POST-filter replacement solution for CVVHD & CVVHDF (Phoxillum BK4/2.5)   CRRT Continuous Tico Spain  mL/hr at 05/15/25 1304 New Bag at 05/15/25 1304    PRE-filter replacement solution for CVVHD & CVVHDF (Phoxillum BK4/2.5)  12.5 mL/kg/hr CRRT Continuous Tico Spain MD 1,000 mL/hr at 05/16/25 0955 12.5 mL/kg/hr at 05/16/25 0955    Reason statin not prescribed   Does not apply DOES NOT GO TO Rhys Bloom MD        vasopressin 1 unit/mL MAX Conc (PITRESSIN) infusion  0.5-4 Units/hr Intravenous Continuous Renee Kwon CNP 2 mL/hr at 05/16/25 1300 2 Units/hr at 05/16/25 1300

## 2025-05-16 NOTE — CONSULTS
"        Initial Psychiatric Consult   Consult date: May 16, 2025         Reason for Consult, requesting source:    Capacity?  Requesting source: Brendon Feliz    Labs and imaging reviewed. Discussed with nursing     Total time spent in chart review, patient interview and coordination of care; 80 min          HPI:   Per surgery progress note 5/15:  \"gal Rodas is a 29-year-old male with a history of alcohol use disorder, anxiety, and bipolar disorder who was admitted on 3/30/2025 for alcohol withdrawal following a recent binge, presenting with diffuse abdominal pain. Initial workup revealed leukocytosis and elevated lipase concerning for acute pancreatitis. He developed acute encephalopathy and was transferred to the ICU on 3/31, requiring intubation and vasopressors by 4/1 due to shock. Hospital course has been complicated by acute renal failure requiring CRRT, cardiomyopathy (initial LVEF 20-25%, improved to 65-70% by 4/14), and necrotizing pancreatitis with unorganized fluid collections. He completed a 14-day course of meropenem but remains intermittently febrile and critically ill, requiring ongoing dialysis and vasopressor support. On 4/27, after clinical deterioration and imaging consistent with a likely perforated viscus, following family discussion, he underwent emergent exploratory laparotomy, necrosectomy, transverse colectomy, abdominal washout, and drain placement. Patient was transferred to [facility] on 4/30/25 for further surgical management. Went to OR 5/1 for re-open laparotomy, I&D, placement of colostomy tube in presumed previous colectomy staple line, necrosectomy, and Abthera placement. On 5/2, increasing sanguineous output from drains concerning for bleeding from pancreatic bed. Family care conference held 5/2, plan for restorative cares at that time. Take back to OR twice (5/4 and 5/7) for abdominal washout. Per nursing note: 5/9 morning after he saw a picture of his abdomen that he " "\"desires to speak to team about updating goals of care, expressed desire to pull back on cares\". Goals of care conference on 5/10 with continuation of full code and restorative cares. CTA on 5/10 revealed large area of active arterial extravasation in the upper abdomen. MTP was started. Patient is now s/p embolization of small pancreatic branch off of the distal celiac artery with IR on 5/10.\"    Psychiatry was asked to see him to assess capacity.   Unfortunately he has not been able to tolerate a speaking valve, so it is difficult to obtain much information from him.  He is alert and attempted to be cooperative. Indicates by nodding that he is depressed, but denies being suicidal. No passive death wish? May be having hallucinations, and has AMS at times.         Past Psychiatric History:   Per review of fill history he was receiving some gabapentin, Vyvanse and Zoloft off and on May through August. Trazodone 100 mg starting in May, last filled 1/21/25 #90 for 30 days by Nayeli Ruff PA-C   According to his mother Marbella he has a long history of ADHD and severe anxiety, maybe autism spectrum.         Substance Use and History:   Heavy alcohol use for at least 12 years.   Per chart, no drug or tobacco use.   Per his mother he has been to 2 CD treatments. I see that he was in treatment with [facility] last November.           Past Medical History:   PAST MEDICAL HISTORY: No past medical history on file.    PAST SURGICAL HISTORY:   Past Surgical History:   Procedure Laterality Date    IR VISCERAL ANGIOGRAM  5/10/2025    IRRIGATION AND DEBRIDEMENT ABDOMEN WASHOUT, COMBINED N/A 5/7/2025    Procedure: ABDOMINAL WASHOUT, CLOSURE WITH BRIDGING VICRYL MESH;  Surgeon: Bertrand Richardson MD;  Location: [facility] OR    LAPAROTOMY SECOND LOOK N/A 5/4/2025    Procedure: Laparotomy Exploratory, Open Pancreatic Necrosectomy, Abdominal Wash Out, Temporary Abdominal Closure;  Surgeon: Brendon Haas DO;  Location: [facility] OR "    LAPAROTOMY, LYSIS ADHESIONS, COMBINED N/A 5/1/2025    Procedure: Reopen Laparotomy, Irrigation and Debriement, placement of colostomy tube, temporary abdominal closure, necrosectomy;  Surgeon: Brendon Haas DO;  Location:  OR             Family History:   FAMILY HISTORY:   Family History   Problem Relation Age of Onset    Hypertension Mother     Coronary Artery Disease Father     Substance Abuse Father     Mental Illness Mother     Mental Illness Father    His father was alcoholic         Social History:   He grew up in the [place] area with 3 brothers, did grad HS. Was living with his mother.   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          Physical ROS:   The 10 point Review of Systems is negative other than noted in the HPI or here.           Medications:     Current Facility-Administered Medications   Medication Dose Route Frequency Provider Last Rate Last Admin    acetaminophen (TYLENOL) tablet 975 mg  975 mg Oral or Feeding Tube Q8H Brendon Haas DO   975 mg at 05/16/25 0830    [Held by provider] heparin ANTICOAGULANT injection 5,000 Units  5,000 Units Subcutaneous Q8H Marina Hood MD   5,000 Units at 05/16/25 0414    hydrocortisone sodium succinate PF (solu-CORTEF) injection 50 mg  50 mg Intravenous Q6H de La Renee James, CNP   50 mg at 05/16/25 0956    insulin aspart (NovoLOG) injection (RAPID ACTING)  1-12 Units Subcutaneous Q4H de La Renee James CNP        lactated ringers BOLUS 0-999 mL  0-999 mL Intravenous Q6H de Renee Marcum  mL/hr at 05/15/25 0943 204 mL at 05/16/25 1026    lipids 4 oil (SMOFLIPID) 20 % infusion 250 mL  250 mL Intravenous Q24H Brendon Haas DO   Stopped at 05/16/25 0900    methocarbamol (ROBAXIN) injection 500 mg  500 mg Intravenous Q6H de [place] Jamesl Michelle, CNP   500 mg at 05/16/25 0957    oxyCODONE (ROXICODONE) tablet 15 mg  15 mg Oral or Feeding Tube Q4H  Eugenia Zavala MD   15 mg at 05/16/25 0957    pantoprazole (PROTONIX) IV push injection 40 mg  40 mg Intravenous QAM Renee Zamora CNP   40 mg at 05/16/25 0958    piperacillin-tazobactam (ZOSYN) 4.5 g vial to attach to  mL bag  4.5 g Intravenous Q6H Eugenia Zavala MD   4.5 g at 05/16/25 0509    sertraline (ZOLOFT) tablet 50 mg  50 mg Oral or Feeding Tube Daily Eugenia Zavala MD   50 mg at 05/16/25 0830    sodium chloride (PF) 0.9% PF flush 3 mL  3 mL Intracatheter Q8H Ganesh Griffiths MD   3 mL at 05/16/25 0830              Allergies:   No Known Allergies       Labs:     Recent Results (from the past 48 hours)   CBC with platelets CRRT    Collection Time: 05/14/25 11:51 AM   Result Value Ref Range    WBC Count 22.2 (H) 4.0 - 11.0 10e3/uL    RBC Count 3.83 (L) 4.40 - 5.90 10e6/uL    Hemoglobin 11.5 (L) 13.3 - 17.7 g/dL    Hematocrit 35.7 (L) 40.0 - 53.0 %    MCV 93 78 - 100 fL    MCH 30.0 26.5 - 33.0 pg    MCHC 32.2 31.5 - 36.5 g/dL    RDW 16.8 (H) 10.0 - 15.0 %    Platelet Count 166 150 - 450 10e3/uL   Calcium Ionized CRRT    Collection Time: 05/14/25 11:51 AM   Result Value Ref Range    Calcium Ionized Whole Blood 4.3 (L) 4.4 - 5.2 mg/dL   Magnesium CRRT    Collection Time: 05/14/25 11:51 AM   Result Value Ref Range    Magnesium 2.4 (H) 1.7 - 2.3 mg/dL   Renal panel CRRT    Collection Time: 05/14/25 11:51 AM   Result Value Ref Range    Sodium 137 135 - 145 mmol/L    Potassium 4.7 3.4 - 5.3 mmol/L    Chloride 104 98 - 107 mmol/L    Carbon Dioxide (CO2) 20 (L) 22 - 29 mmol/L    Anion Gap 13 7 - 15 mmol/L    Glucose 67 (L) 70 - 99 mg/dL    Urea Nitrogen 19.7 6.0 - 20.0 mg/dL    Creatinine 0.86 0.67 - 1.17 mg/dL    GFR Estimate >90 >60 mL/min/1.73m2    Calcium 8.1 (L) 8.8 - 10.4 mg/dL    Albumin 2.4 (L) 3.5 - 5.2 g/dL    Phosphorus 3.9 2.5 - 4.5 mg/dL   Lactic acid whole blood    Collection Time: 05/14/25 11:51 AM   Result Value Ref Range    Lactic Acid 2.5 (H) 0.7 - 2.0 mmol/L    Glucose by meter    Collection Time: 05/14/25 11:55 AM   Result Value Ref Range    GLUCOSE BY METER POCT 71 70 - 99 mg/dL   Glucose by meter    Collection Time: 05/14/25  1:56 PM   Result Value Ref Range    GLUCOSE BY METER POCT 124 (H) 70 - 99 mg/dL   Basic metabolic panel    Collection Time: 05/14/25  4:08 PM   Result Value Ref Range    Sodium 137 135 - 145 mmol/L    Potassium 4.7 3.4 - 5.3 mmol/L    Chloride 103 98 - 107 mmol/L    Carbon Dioxide (CO2) 20 (L) 22 - 29 mmol/L    Anion Gap 14 7 - 15 mmol/L    Urea Nitrogen 19.5 6.0 - 20.0 mg/dL    Creatinine 0.83 0.67 - 1.17 mg/dL    GFR Estimate >90 >60 mL/min/1.73m2    Calcium 8.6 (L) 8.8 - 10.4 mg/dL    Glucose 66 (L) 70 - 99 mg/dL   Lactic acid whole blood    Collection Time: 05/14/25  4:08 PM   Result Value Ref Range    Lactic Acid 3.1 (H) 0.7 - 2.0 mmol/L   Glucose by meter    Collection Time: 05/14/25  4:13 PM   Result Value Ref Range    GLUCOSE BY METER POCT 89 70 - 99 mg/dL   Glucose by meter    Collection Time: 05/14/25  6:16 PM   Result Value Ref Range    GLUCOSE BY METER POCT 76 70 - 99 mg/dL   CBC with platelets CRRT    Collection Time: 05/14/25  8:24 PM   Result Value Ref Range    WBC Count 22.6 (H) 4.0 - 11.0 10e3/uL    RBC Count 3.97 (L) 4.40 - 5.90 10e6/uL    Hemoglobin 11.9 (L) 13.3 - 17.7 g/dL    Hematocrit 36.0 (L) 40.0 - 53.0 %    MCV 91 78 - 100 fL    MCH 30.0 26.5 - 33.0 pg    MCHC 33.1 31.5 - 36.5 g/dL    RDW 16.6 (H) 10.0 - 15.0 %    Platelet Count 164 150 - 450 10e3/uL   Calcium Ionized CRRT    Collection Time: 05/14/25  8:24 PM   Result Value Ref Range    Calcium Ionized Whole Blood 4.5 4.4 - 5.2 mg/dL   Magnesium CRRT    Collection Time: 05/14/25  8:24 PM   Result Value Ref Range    Magnesium 2.4 (H) 1.7 - 2.3 mg/dL   Renal panel CRRT    Collection Time: 05/14/25  8:24 PM   Result Value Ref Range    Sodium 138 135 - 145 mmol/L    Potassium 4.8 3.4 - 5.3 mmol/L    Chloride 104 98 - 107 mmol/L    Carbon Dioxide (CO2) 18 (L) 22 - 29 mmol/L     Anion Gap 16 (H) 7 - 15 mmol/L    Glucose 71 70 - 99 mg/dL    Urea Nitrogen 18.8 6.0 - 20.0 mg/dL    Creatinine 0.84 0.67 - 1.17 mg/dL    GFR Estimate >90 >60 mL/min/1.73m2    Calcium 8.5 (L) 8.8 - 10.4 mg/dL    Albumin 2.4 (L) 3.5 - 5.2 g/dL    Phosphorus 4.0 2.5 - 4.5 mg/dL   Lactic acid whole blood    Collection Time: 05/14/25  8:24 PM   Result Value Ref Range    Lactic Acid 2.9 (H) 0.7 - 2.0 mmol/L   Glucose by meter    Collection Time: 05/14/25  8:29 PM   Result Value Ref Range    GLUCOSE BY METER POCT 81 70 - 99 mg/dL   Glucose by meter    Collection Time: 05/14/25 10:15 PM   Result Value Ref Range    GLUCOSE BY METER POCT 68 (L) 70 - 99 mg/dL   Glucose by meter    Collection Time: 05/14/25 11:02 PM   Result Value Ref Range    GLUCOSE BY METER POCT 90 70 - 99 mg/dL   Lactic acid whole blood    Collection Time: 05/15/25 12:52 AM   Result Value Ref Range    Lactic Acid 2.9 (H) 0.7 - 2.0 mmol/L   Glucose by meter    Collection Time: 05/15/25 12:55 AM   Result Value Ref Range    GLUCOSE BY METER POCT 82 70 - 99 mg/dL   CBC with platelets CRRT    Collection Time: 05/15/25  4:01 AM   Result Value Ref Range    WBC Count 24.3 (H) 4.0 - 11.0 10e3/uL    RBC Count 3.79 (L) 4.40 - 5.90 10e6/uL    Hemoglobin 11.4 (L) 13.3 - 17.7 g/dL    Hematocrit 35.2 (L) 40.0 - 53.0 %    MCV 93 78 - 100 fL    MCH 30.1 26.5 - 33.0 pg    MCHC 32.4 31.5 - 36.5 g/dL    RDW 16.5 (H) 10.0 - 15.0 %    Platelet Count 149 (L) 150 - 450 10e3/uL   Calcium Ionized CRRT    Collection Time: 05/15/25  4:01 AM   Result Value Ref Range    Calcium Ionized Whole Blood 4.4 4.4 - 5.2 mg/dL   Magnesium CRRT    Collection Time: 05/15/25  4:01 AM   Result Value Ref Range    Magnesium 2.5 (H) 1.7 - 2.3 mg/dL   Renal panel CRRT    Collection Time: 05/15/25  4:01 AM   Result Value Ref Range    Sodium 136 135 - 145 mmol/L    Potassium 4.4 3.4 - 5.3 mmol/L    Chloride 103 98 - 107 mmol/L    Carbon Dioxide (CO2) 20 (L) 22 - 29 mmol/L    Anion Gap 13 7 - 15 mmol/L     Glucose 68 (L) 70 - 99 mg/dL    Urea Nitrogen 17.5 6.0 - 20.0 mg/dL    Creatinine 0.78 0.67 - 1.17 mg/dL    GFR Estimate >90 >60 mL/min/1.73m2    Calcium 8.2 (L) 8.8 - 10.4 mg/dL    Albumin 2.2 (L) 3.5 - 5.2 g/dL    Phosphorus 4.2 2.5 - 4.5 mg/dL   Lactic acid whole blood    Collection Time: 05/15/25  4:01 AM   Result Value Ref Range    Lactic Acid 2.8 (H) 0.7 - 2.0 mmol/L   ALT    Collection Time: 05/15/25  4:01 AM   Result Value Ref Range    ALT 14 0 - 70 U/L   AST    Collection Time: 05/15/25  4:01 AM   Result Value Ref Range    AST 30 0 - 45 U/L   Alkaline phosphatase    Collection Time: 05/15/25  4:01 AM   Result Value Ref Range    Alkaline Phosphatase 275 (H) 40 - 150 U/L   Bilirubin direct    Collection Time: 05/15/25  4:01 AM   Result Value Ref Range    Bilirubin Direct 1.88 (H) 0.00 - 0.45 mg/dL   Bilirubin  total    Collection Time: 05/15/25  4:01 AM   Result Value Ref Range    Bilirubin Total 2.6 (H) <=1.2 mg/dL   Protein total    Collection Time: 05/15/25  4:01 AM   Result Value Ref Range    Protein Total 5.5 (L) 6.4 - 8.3 g/dL   Glucose by meter    Collection Time: 05/15/25  4:11 AM   Result Value Ref Range    GLUCOSE BY METER POCT 68 (L) 70 - 99 mg/dL   Glucose by meter    Collection Time: 05/15/25  4:39 AM   Result Value Ref Range    GLUCOSE BY METER POCT 108 (H) 70 - 99 mg/dL   Glucose by meter    Collection Time: 05/15/25  6:08 AM   Result Value Ref Range    GLUCOSE BY METER POCT 75 70 - 99 mg/dL   Cortisol    Collection Time: 05/15/25  8:08 AM   Result Value Ref Range    Cortisol 20.4   ug/dL   Lactic acid whole blood    Collection Time: 05/15/25  8:08 AM   Result Value Ref Range    Lactic Acid 3.1 (H) 0.7 - 2.0 mmol/L   Glucose by meter    Collection Time: 05/15/25  8:30 AM   Result Value Ref Range    GLUCOSE BY METER POCT 67 (L) 70 - 99 mg/dL   Glucose by meter    Collection Time: 05/15/25  8:58 AM   Result Value Ref Range    GLUCOSE BY METER POCT 115 (H) 70 - 99 mg/dL   Glucose by meter     Collection Time: 05/15/25 10:30 AM   Result Value Ref Range    GLUCOSE BY METER POCT 76 70 - 99 mg/dL   Blood Culture Peripheral blood (BC) Hand, Left    Collection Time: 05/15/25 10:48 AM    Specimen: Hand, Left; Peripheral blood (BC)   Result Value Ref Range    Culture No growth after 12 hours    Blood Culture Peripheral blood (BC) Hand, Right    Collection Time: 05/15/25 11:00 AM    Specimen: Hand, Right; Peripheral blood (BC)   Result Value Ref Range    Culture No growth after 12 hours    CBC with platelets CRRT    Collection Time: 05/15/25 11:20 AM   Result Value Ref Range    WBC Count 25.2 (H) 4.0 - 11.0 10e3/uL    RBC Count 3.81 (L) 4.40 - 5.90 10e6/uL    Hemoglobin 11.4 (L) 13.3 - 17.7 g/dL    Hematocrit 34.1 (L) 40.0 - 53.0 %    MCV 90 78 - 100 fL    MCH 29.9 26.5 - 33.0 pg    MCHC 33.4 31.5 - 36.5 g/dL    RDW 16.7 (H) 10.0 - 15.0 %    Platelet Count 137 (L) 150 - 450 10e3/uL   Calcium Ionized CRRT    Collection Time: 05/15/25 11:20 AM   Result Value Ref Range    Calcium Ionized Whole Blood 4.4 4.4 - 5.2 mg/dL   Magnesium CRRT    Collection Time: 05/15/25 11:20 AM   Result Value Ref Range    Magnesium 2.2 1.7 - 2.3 mg/dL   Renal panel CRRT    Collection Time: 05/15/25 11:20 AM   Result Value Ref Range    Sodium 137 135 - 145 mmol/L    Potassium 4.3 3.4 - 5.3 mmol/L    Chloride 104 98 - 107 mmol/L    Carbon Dioxide (CO2) 19 (L) 22 - 29 mmol/L    Anion Gap 14 7 - 15 mmol/L    Glucose 91 70 - 99 mg/dL    Urea Nitrogen 15.5 6.0 - 20.0 mg/dL    Creatinine 0.72 0.67 - 1.17 mg/dL    GFR Estimate >90 >60 mL/min/1.73m2    Calcium 7.9 (L) 8.8 - 10.4 mg/dL    Albumin 2.2 (L) 3.5 - 5.2 g/dL    Phosphorus 4.1 2.5 - 4.5 mg/dL   Lactic acid whole blood    Collection Time: 05/15/25 11:20 AM   Result Value Ref Range    Lactic Acid 3.6 (H) 0.7 - 2.0 mmol/L   Triglycerides    Collection Time: 05/15/25 11:20 AM   Result Value Ref Range    Triglycerides 284 (H) <150 mg/dL   Glucose by meter    Collection Time: 05/15/25 11:25 AM    Result Value Ref Range    GLUCOSE BY METER POCT 92 70 - 99 mg/dL   Glucose by meter    Collection Time: 05/15/25  1:01 PM   Result Value Ref Range    GLUCOSE BY METER POCT 107 (H) 70 - 99 mg/dL   Glucose by meter    Collection Time: 05/15/25  2:11 PM   Result Value Ref Range    GLUCOSE BY METER POCT 112 (H) 70 - 99 mg/dL   Basic metabolic panel    Collection Time: 05/15/25  3:52 PM   Result Value Ref Range    Sodium 136 135 - 145 mmol/L    Potassium 4.4 3.4 - 5.3 mmol/L    Chloride 103 98 - 107 mmol/L    Carbon Dioxide (CO2) 19 (L) 22 - 29 mmol/L    Anion Gap 14 7 - 15 mmol/L    Urea Nitrogen 14.4 6.0 - 20.0 mg/dL    Creatinine 0.69 0.67 - 1.17 mg/dL    GFR Estimate >90 >60 mL/min/1.73m2    Calcium 8.0 (L) 8.8 - 10.4 mg/dL    Glucose 114 (H) 70 - 99 mg/dL   Lactic acid whole blood    Collection Time: 05/15/25  3:52 PM   Result Value Ref Range    Lactic Acid 3.0 (H) 0.7 - 2.0 mmol/L   Glucose by meter    Collection Time: 05/15/25  3:56 PM   Result Value Ref Range    GLUCOSE BY METER POCT 104 (H) 70 - 99 mg/dL   Glucose by meter    Collection Time: 05/15/25  7:51 PM   Result Value Ref Range    GLUCOSE BY METER POCT 133 (H) 70 - 99 mg/dL   CBC with platelets CRRT    Collection Time: 05/15/25  7:52 PM   Result Value Ref Range    WBC Count 22.0 (H) 4.0 - 11.0 10e3/uL    RBC Count 3.68 (L) 4.40 - 5.90 10e6/uL    Hemoglobin 10.9 (L) 13.3 - 17.7 g/dL    Hematocrit 33.2 (L) 40.0 - 53.0 %    MCV 90 78 - 100 fL    MCH 29.6 26.5 - 33.0 pg    MCHC 32.8 31.5 - 36.5 g/dL    RDW 16.3 (H) 10.0 - 15.0 %    Platelet Count 114 (L) 150 - 450 10e3/uL   Calcium Ionized CRRT    Collection Time: 05/15/25  7:52 PM   Result Value Ref Range    Calcium Ionized Whole Blood 4.3 (L) 4.4 - 5.2 mg/dL   Magnesium CRRT    Collection Time: 05/15/25  7:52 PM   Result Value Ref Range    Magnesium 2.3 1.7 - 2.3 mg/dL   Renal panel CRRT    Collection Time: 05/15/25  7:52 PM   Result Value Ref Range    Sodium 137 135 - 145 mmol/L    Potassium 4.3 3.4 -  5.3 mmol/L    Chloride 103 98 - 107 mmol/L    Carbon Dioxide (CO2) 20 (L) 22 - 29 mmol/L    Anion Gap 14 7 - 15 mmol/L    Glucose 128 (H) 70 - 99 mg/dL    Urea Nitrogen 13.4 6.0 - 20.0 mg/dL    Creatinine 0.64 (L) 0.67 - 1.17 mg/dL    GFR Estimate >90 >60 mL/min/1.73m2    Calcium 7.8 (L) 8.8 - 10.4 mg/dL    Albumin 2.3 (L) 3.5 - 5.2 g/dL    Phosphorus 4.3 2.5 - 4.5 mg/dL   Lactic acid whole blood    Collection Time: 05/15/25  7:52 PM   Result Value Ref Range    Lactic Acid 2.9 (H) 0.7 - 2.0 mmol/L   Glucose by meter    Collection Time: 05/16/25 12:14 AM   Result Value Ref Range    GLUCOSE BY METER POCT 172 (H) 70 - 99 mg/dL   Glucose by meter    Collection Time: 05/16/25  3:14 AM   Result Value Ref Range    GLUCOSE BY METER POCT 188 (H) 70 - 99 mg/dL   CBC with platelets CRRT    Collection Time: 05/16/25  3:19 AM   Result Value Ref Range    WBC Count 23.5 (H) 4.0 - 11.0 10e3/uL    RBC Count 3.60 (L) 4.40 - 5.90 10e6/uL    Hemoglobin 10.7 (L) 13.3 - 17.7 g/dL    Hematocrit 33.1 (L) 40.0 - 53.0 %    MCV 92 78 - 100 fL    MCH 29.7 26.5 - 33.0 pg    MCHC 32.3 31.5 - 36.5 g/dL    RDW 16.3 (H) 10.0 - 15.0 %    Platelet Count 117 (L) 150 - 450 10e3/uL   Calcium Ionized CRRT    Collection Time: 05/16/25  3:19 AM   Result Value Ref Range    Calcium Ionized Whole Blood 4.6 4.4 - 5.2 mg/dL   Magnesium CRRT    Collection Time: 05/16/25  3:19 AM   Result Value Ref Range    Magnesium 2.3 1.7 - 2.3 mg/dL   Renal panel CRRT    Collection Time: 05/16/25  3:19 AM   Result Value Ref Range    Sodium 135 135 - 145 mmol/L    Potassium 4.1 3.4 - 5.3 mmol/L    Chloride 102 98 - 107 mmol/L    Carbon Dioxide (CO2) 19 (L) 22 - 29 mmol/L    Anion Gap 14 7 - 15 mmol/L    Glucose 187 (H) 70 - 99 mg/dL    Urea Nitrogen 17.1 6.0 - 20.0 mg/dL    Creatinine 0.66 (L) 0.67 - 1.17 mg/dL    GFR Estimate >90 >60 mL/min/1.73m2    Calcium 8.2 (L) 8.8 - 10.4 mg/dL    Albumin 2.2 (L) 3.5 - 5.2 g/dL    Phosphorus 4.5 2.5 - 4.5 mg/dL   Lactic acid whole blood  "   Collection Time: 05/16/25  3:19 AM   Result Value Ref Range    Lactic Acid 2.5 (H) 0.7 - 2.0 mmol/L   ALT    Collection Time: 05/16/25  3:19 AM   Result Value Ref Range    ALT 13 0 - 70 U/L   AST    Collection Time: 05/16/25  3:19 AM   Result Value Ref Range    AST 25 0 - 45 U/L   Alkaline phosphatase    Collection Time: 05/16/25  3:19 AM   Result Value Ref Range    Alkaline Phosphatase 259 (H) 40 - 150 U/L   Bilirubin direct    Collection Time: 05/16/25  3:19 AM   Result Value Ref Range    Bilirubin Direct 1.61 (H) 0.00 - 0.45 mg/dL   Bilirubin  total    Collection Time: 05/16/25  3:19 AM   Result Value Ref Range    Bilirubin Total 2.1 (H) <=1.2 mg/dL   Protein total    Collection Time: 05/16/25  3:19 AM   Result Value Ref Range    Protein Total 5.3 (L) 6.4 - 8.3 g/dL   CRP inflammation    Collection Time: 05/16/25  3:19 AM   Result Value Ref Range    CRP Inflammation 344.00 (H) <5.00 mg/L   Glucose by meter    Collection Time: 05/16/25  8:20 AM   Result Value Ref Range    GLUCOSE BY METER POCT 188 (H) 70 - 99 mg/dL   Lactic acid whole blood    Collection Time: 05/16/25 10:32 AM   Result Value Ref Range    Lactic Acid 2.5 (H) 0.7 - 2.0 mmol/L          Physical and Psychiatric Examination:     /57   Pulse 98   Temp 98.8  F (37.1  C) (Axillary)   Resp 24   Ht 1.727 m (5' 7.99\")   Wt 79.7 kg (175 lb 11.3 oz)   SpO2 95%   BMI 26.72 kg/m    Weight is 175 lbs 11.31 oz  Body mass index is 26.72 kg/m .    Physical Exam:  I have reviewed the physical exam as documented by by the medical team and agree with findings and assessment and have no additional findings to add at this time.         MSE:     Appearance: awake, alert and adequately groomed  Attitude:  cooperative  Eye Contact:  fair  Mood: indicates depression by nodding    Affect:  : slightly restricted  Speech:  N/a (trach)   Psychomotor Behavior:  no evidence of tardive dyskinesia, dystonia, or tics  Muscle strength and tone: grossly intact " "  Thought Content:  no evidence of suicidal ideation or homicidal ideation, may be having VH.   Insight:  fair  Judgement:  fair   Unable to assess associations, orientation, attention span or memory due to intubation         QTc: 425 ms 5/11          DSM-5 Diagnosis:   311 (F32.9) Unspecified Depressive Disorder   300.23 (F40.10) Social Anxiety Disorder  300.02 (F41.1) Generalized Anxiety Disorder  ADHD by history   Possible delirium           Assessment:   With the trach in place it is quite difficult to assess capacity, but I don't see any evidence of psychiatric conditions that would interfere with capacity; I would assume that he is able to make decisions regarding his care.   In cases with severe depression or delirium we see impaired capacity, but this does not seem to be the case with him.   According to his mother he does have a history of severe anxiety; Zoloft is a good idea.   His mother indicated that goals of care had not be addressed with the family..            Summary of Recommendations:   After 10 days of 50 mg Zoloft I would increase to 100 mg   Seroquel 12.5-25 mg q 6 hours PRN anxiety, 50 mg or more PRN sleep (likely works better than the trazodone he was taking pta)   Haldol 2-5 mg IV q 4 hours for severe agitation.   I would continue to involve palliative care and an ethics consult may be helpful if goals or care with the family cannot be clarified.     Contact me as needed.  Bertrand Hugo M.D.   Consult Liaison Psychiatry   [facility]   Contact on [facility]  If I am not available, then [facility] line ([phone]) should know who   Is covering our consult service.                 \"This dictation was performed with voice recognition software and may contain errors,  omissions and inadvertent word substitution.\"           "

## 2025-05-16 NOTE — PROGRESS NOTES
[facility] Nurse Inpatient Assessment     Consulted for: Tracheostomy site, sacrum, left heel  5/9: New consult for midline wound draining heavily    WO nurse follow-up plan: Monday/WednesdayFriday    Patient History (according to provider note(s):      Sebastián Rodas is a 29-year-old male with a history of alcohol abuse, anxiety, and bipolar disorder, admitted on 3/30/2025 for alcohol withdrawal after a binge, presenting with abdominal pain. Initial workup revealed leukocytosis and elevated lipase, suggestive of acute pancreatitis. He developed encephalopathy and required ICU transfer on 3/31, intubation, and vasopressors by 4/1 due to shock. His hospital course was complicated by acute renal failure (requiring CRRT), cardiomyopathy (LVEF 20-25%, improved to 65-70% by 4/14), and necrotizing pancreatitis with unorganized fluid collections. He completed a 14-day course of meropenem but remained febrile and critically ill, requiring ongoing dialysis and vasopressor support. On 4/27, imaging indicated likely perforated viscus, and after family discussion, he underwent emergent laparotomy, necrosectomy, transverse colectomy, abdominal washout, and drain placement. He was transferred to the SICU at [facility] on 4/30 for further monitoring. On 5/1, he underwent a second laparotomy with irrigation and debridement, colostomy tube placement, temporary abdominal closure, and further necrosectomy due to breakdown of the colonic resection staple line, necrotic pancreas, thrombosed middle colic vessels, fat necrosis, and dense interloop adhesions. He has a poor prognosis given the OR findings, palliative care consulted.     Assessment:      Areas visualized during today's visit: Focused: and Abdomen    Wound location: Abdomen        Last photo: 5/16  Wound due to: Surgical Wound  Wound history/plan of care: On 4/27, imaging showed likely perforated viscus; he underwent emergent  laparotomy, necrosectomy, and colectomy. Transferred to [facility] SICU on 4/30. On 5/1, reoperation revealed colonic staple line breakdown, necrotic pancreas, vessel thrombosis, and adhesions. Colostomy with malankot drain and temporary abdominal closure performed. Prognosis is poor; palliative care involved. Care conference 5/2 with family to talk goasl of care. Full code, family acknowledged that poor prognosis is to be expected. Today 5/4 s/p exploratory surgery, additional necrotic pancreas remove with no obvious spillage or sign of bleeding   5/12: Abdomen is much more distended today. The superior side of the wound bed had a large black clot loosely adhered to the mesh. There was pooling of darker thick liquid in the inferior side of the wound bed.   5/13: Pouch intact. Replaced Kerlix in wound bed.  5/14: Pouch replaced due to leaking at the superior side by PEG tube. Most of the slough covering the adipose tissue at the wound margins has fallen off. There is marbled granulation tissue and adipose tissue on the wound margins. Wound smelled more foul today.   Wound base: 70 % surgical mesh, 30 % Granulation tissue, Slough, and Adipose tissue     Palpation of the wound bed: fluctuance      Drainage: copious     Description of drainage: serosanguinous, bloody, and black     Measurements (length x width x depth, in cm): 21  x 17  x  3 cm - measured on Friday's     Tunneling: N/A     Undermining: N/A  Periwound skin: Intact      Color: normal and consistent with surrounding tissue      Temperature: normal   Odor: none  Pain: facial expression of distress, sharp  Pain interventions prior to dressing change: oral oxycodone, IV dilaudid, and slow and gentle cares   Treatment goal: Drainage control and Infection control/prevention  STATUS: stable  Supplies ordered: at bedside    Pressure Injury Location: Left heel - Assessed on Fridays        Last photo: 5/16  Wound type: Pressure Injury     Pressure Injury Stage: Deep  Tissue Pressure Injury (DTPI), present on admission       Wound history/plan of care:   Pt admitted from OSH on 4/31. Wound present on admission. There is an intact blister over a deeper maroon discoloration.    Wound base: 100 % Maroon, Blister, and Epidermis     Palpation of the wound bed: boggy      Drainage: none     Description of drainage: none     Measurements (length x width x depth, in cm) 2  x 1.5  x  0 cm      Tunneling N/A     Undermining N/A  Periwound skin: Intact      Color: normal and consistent with surrounding tissue      Temperature: normal   Odor: none  Pain: denies , none  Pain intervention prior to dressing change: slow and gentle cares   Treatment goal: Heal  and Protection  STATUS: stable  Supplies ordered: at bedside    My PI Risk Assessment     Sensory Perception: 2 - Very Limited     Moisture: 2 - Very moist      Activity: 1 - Bedfast      Mobility: 2 - Very limited     Nutrition: 2 - Probably inadequate      Friction/Shear: 1 - Problem     TOTAL: 10    Pressure Injury Location: coccyx - Assessed on Fridays    Zoomed out                                              Zoomed in        Last photo: 5/16  Wound type: Pressure Injury, Incontinence Associated Dermatitis (IAD), and Moisture Associated Skin Damage (MASD)     Pressure Injury Stage: 2, present on admission        Wound history/plan of care:   Pt transferred from OSH on 4/31. Wound present on admission. There is MASD/IAD to the bilateral buttocks with exposed dermis. There is further breakdown over the Pt's coccyx that is fibrin vs slough. Wound is at least a stage 2. The wound will need to evolve further before it can be definitively staged.   5/9: Wound continues to improve. Wound is a stage 2 pressure injury over coccyx with surrounding MASD and exposed dermis.  5/16: Dermal buds seen throughout wound base.    Wound base: 80 % Dermis, 20 % Fibrin     Palpation of the wound bed: normal      Drainage: moderate     Description of  drainage: serosanguinous     Measurements (length x width x depth, in cm) 6  x 6  x  0.2 cm - central wound, 1.5 x 1.5 x 0.1 cm left buttock     Tunneling N/A     Undermining N/A  Periwound skin: Edematous and Skin stripping      Color: pink and red      Temperature: normal   Odor: none  Pain: moderate, tender  Pain intervention prior to dressing change: slow and gentle cares   Treatment goal: Heal  and Protection  STATUS: improving and stable  Supplies ordered: at bedside    My PI Risk Assessment     Sensory Perception: 2 - Very Limited     Moisture: 2 - Very moist      Activity: 1 - Bedfast      Mobility: 2 - Very limited     Nutrition: 2 - Probably inadequate      Friction/Shear: 1 - Problem     TOTAL: 10    Wound location: Trach - Assessed on Fridays            Last photo: 5/19  Wound due to: Surgical Wound  Wound history/plan of care:  Pt transferred from OSH on 4/31. Pt had trach placed at OSH. There are areas of fibrin from previous trach suture locations which are mostly healed. There is a linear incision on the left side of Trach cannula from insertion. The wound base wass difficult to visualize, appears to be fibrin and adipose tissue marbled together.  5/5: Called by bedside RN with concerns for skin breakdown near trach. On assessment Trach incision appears to have opened up significantly from prior assessment. Moderate amount of respiratory secretions present. ENT consulted by primary team to assess trach site as well.  5/16: Overall dimensions decreasing. Wound edges stating to heal.   Wound base: 100 % Granulation tissue, Fibrin, and Adipose tissue     Palpation of the wound bed: normal      Drainage: moderate     Description of drainage: yellow - respiratory secretions     Measurements (length x width x depth, in cm): 2  x 3  x  0.8 cm      Tunneling: N/A     Undermining: N/A  Periwound skin: Intact      Color: normal and consistent with surrounding tissue      Temperature: normal   Odor: none  Pain:  moderate, tender  Pain interventions prior to dressing change: slow and gentle cares   Treatment goal: Heal  and Protection  STATUS: granulating and stable  Supplies ordered: at bedside      Wound location: Scrotum      Last photo: 5/13  Wound due to: Abscess  Wound history/plan of care: Wound noted during assessment of abdominal wounds 5/12. Surgery at bedside, stated that inguinal canal is patent with the abdominal cavity and drainage most likely secondary to abdominal cavity contents, and recommended BID gauze dressings. Wound continued to leak copious amounts of blood/stool/intraabdominal fluid. Overnight and was pouched with an ostomy pouch by SICU resident overnight. Pouch continued to leak and was replaced with a primofit male external catheter set to low continuous suction.  5/13: Ostomy pouch applied to area to collect drainage. Intact at time of assessment.  Wound base: 100 % Moist scrotal tissue     Palpation of the wound bed: fluctuance      Drainage: copious     Description of drainage: serosanguinous, purulent, bloody, and red     Measurements (length x width x depth, in cm): See photos - 4 small open areas.     Tunneling: N/A     Undermining: N/A  Periwound skin: Indurated      Color: red      Temperature: normal   Odor: none  Pain: moderate, tender  Pain interventions prior to dressing change: slow and gentle cares   Treatment goal: Drainage control  STATUS: stable  Supplies ordered: at bedside       Treatment Plan:     Left heel wound(s): Every 3 days Cleanse with saline and pat dry. Conform a sacral Mepilex around Pt's heel. Place in Prevalon boots. Float heels off of support surface with pillows under calves.    Buttocks/coccyx wound(s): Every 3 days   Cleanse the area with wound cleanser and pat dry.  Apply No sting film barrier to periwound skin.  Cover wound with Sacral Mepilex (#[id number])  Nursing to peel back dressing to assess wound bed with skin assessment.   Change dressing Q 3 days.  Turn  "and reposition Q 2hrs side to side only.  Ensure pt has Vikram-cushion while sitting up in the chair.  If not in ICU: Ensure air pump on bed and set to Isoflex.  FYI- If pt has constant incontinent loose stools needing dressing changes Q shift please discontinue the Mepilex dressing and apply criticaid barrier paste BID and PRN.     Trach wound: Perform trach cares per unit routine. Place a fenestrated optifoam between trach faceplate and Pt's skin.    Abdominal wound: BIDand PRN. Remove soiled Kerlix. Gently absorb as much drainage from wound base as possible. Cover exposed mesh with Xeroform gauze. (Change Xerform daily) Fluff Kerlix into wound base to help absorb drainage. Keep wound manager to gravity drainage. WOC will assess wound manager daily T/F.     If wound manager fails, place wet to dry dressing with Xeroform gauze covered by Vashe moistened Kerlix Covered by Mextra Superabsorbant pads. Change PRN.    Drain tube cares: PRN for saturation. Cleanse analilia-tubular skin with Vashe and pat dry. Apply no sting barrier film to analilia-tubular area and allow to dry. Fenestrate a 5x5 Mextra super absorbant pad ([id number]) and place around tube to absorb drainage.     Scrotal wound: PRN with pouch leakage. Cleanse with wound cleanser and pat dry. Apply no sting barrier film to analilia-wound area and allow to dry. Using a 1 piece ostomy pouch cut a hole in barrier slightly larger than open areas and apply to scrotum.      Pressure Injury Prevention (PIP) Plan:  If patient is declining pressure injury prevention interventions: Explore reason why and address patient's concerns, Educate on pressure injury risk and prevention intervention(s), If patient is still declining, document \"informed refusal\" , and Ensure Care team is aware ( provider, charge nurse, etc)  Mattress: Follow bed algorithm, add Low Air Loss (Air+) mattress pump if skin is very moist or constantly moist.   HOB: Maintain at or below 30 degrees, unless " contraindicated  Repositioning in bed: Every 1-2 hours , Left/right positioning; avoid supine, Raise foot of bed prior to raising head of bed, to reduce patient sliding down (shear), and Frequent microturns using positioning wedges, as patient tolerates  Heels: Pillows under calves and Heel lift boots  Protective Dressing: Sacral Mepilex for prevention (#[id number]),  especially for the agitated patient   Positioning Equipment:Positioning wedges (#[id number]) to help maintain 30 degree side lying position   Chair positioning: Chair cushion (#[id number]) , Assist patient to reposition hourly, and Do NOT use a donut for sitting (this increases pressure to smaller area and creates a higher potential for injury)    If patient has a buttock pressure injury, or high risk for PI use chair cushion or SPS.  Moisture Management: Perineal cleansing /protection: Follow Incontinence Protocol, Avoid brief in bed, Clean and dry skin folds with bathing , Consider InterDry (#[id number]) between folds, and Moisturize dry skin  Under Devices: Inspect skin under all medical devices during skin inspection , Ensure tubes are stabilized without tension, and Ensure patient is not lying on medical devices or equipment when repositioned  Ask provider to discontinue device when no longer needed.     Orders: Reviewed and Updated    RECOMMEND PRIMARY TEAM ORDER: None, at this time  Education provided: plan of care  Discussed plan of care with: Nurse  Notify [facility] if wound(s) deteriorate.  Nursing to notify the Provider(s) and re-consult the WOC Nurse if new skin concern.    DATA:     Current support surface: Standard  Low air loss (ISABELL pump, Isolibrium, Pulsate)  Containment of urine/stool: Incontinent pad in bed, Indwelling catheter, and Internal fecal management  BMI: Body mass index is 26.72 kg/m .   Active diet order: None     Output: I/O last 3 completed shifts:  In: 4121.92 [I.V.:1330.79; Other:1; NG/GT:525; IV Piggyback:1370]  Out: 3302 [Emesis/NG  output:205; Drains:3097]     Labs:   Recent Labs   Lab 05/16/25  0319 05/11/25  0322 05/10/25  2347   ALBUMIN 2.2*   < > 3.6   HGB 10.7*   < > 13.3   INR  --   --  1.13   WBC 23.5*   < > 10.1    < > = values in this interval not displayed.     Pressure injury risk assessment:   Sensory Perception: 2-->very limited  Moisture: 3-->occasionally moist  Activity: 1-->bedfast  Mobility: 2-->very limited  Nutrition: 1-->very poor  Friction and Shear: 1-->problem  Dick Score: 10    Stephen Tilley RN, CWOCN  Pager no longer in use, please contact through [facility]   Tiffanie group: [facility] Nurse [place]    Dept. Office Number: [phone]

## 2025-05-16 NOTE — PROGRESS NOTES
CRRT STATUS NOTE    DATA:  Time:  4:57 PM  Pressures WNL:  Yes  Filter Status:  WDL    Problems Reported/Alarms Noted:  None    Supplies Present:  Yes    ASSESSMENT:  Patient Net Fluid Balance:  Net IO Since Admission: 13,232.62 mL [05/16/25 1657]     Intake/Output Summary (Last 24 hours) at 5/16/2025 1657  Last data filed at 5/16/2025 1600  Gross per 24 hour   Intake 3408.47 ml   Output 2394 ml   Net 1014.47 ml      Vitals:  Temp:  [98.5  F (36.9  C)-99.6  F (37.6  C)] 99.6  F (37.6  C)  Pulse:  [] 97  Resp:  [16-35] 22  MAP:  [58 mmHg-97 mmHg] 75 mmHg  Arterial Line BP: ()/(39-76) 115/57  FiO2 (%):  [40 %-60 %] 60 %  SpO2:  [89 %-100 %] 96 %   Labs:    Lab Results   Component Value Date     05/16/2025    POTASSIUM 4.0 05/16/2025    CR 0.63 (L) 05/16/2025    BUN 20.6 (H) 05/16/2025    MAG 2.1 05/16/2025    PHOS 3.7 05/16/2025    WBC 23.4 (H) 05/16/2025    HGB 9.3 (L) 05/16/2025    HCT 28.8 (L) 05/16/2025     (L) 05/16/2025     Goals of Therapy: No UF, clearance only.     INTERVENTIONS:   Review flow sheets and discuss plan of care with bedside nurse and nephrology team.    PLAN:  Continue fluid removal as tolerated per goals of therapy.  Check filter daily and change filter q 72 hrs and PRN.  Please contact the CRRT resource RN with any questions/concerns.

## 2025-05-16 NOTE — PROGRESS NOTES
SURGICAL ICU PROGRESS NOTE  05/16/2025    Date of Service (when I saw the patient): 05/16/2025    ASSESSMENT:  Sebastián Rodas is a 29M with alcohol abuse, anxiety, and bipolar disorder, who was admitted 3/30/25 for alcohol withdrawal and abdominal pain. Workup showed leukocytosis and elevated lipase, consistent with acute pancreatitis. He developed encephalopathy and shock, requiring ICU care, intubation, vasopressors, and CRRT by 4/1. His course was complicated by cardiomyopathy (EF 20-25%, improved to 65-70% by 4/14), necrotizing pancreatitis, and persistent infection despite 14 days of meropenem. On 4/27, imaging showed likely perforated viscus; he underwent emergent laparotomy, necrosectomy, and colectomy. Transferred to [facility] SICU on 4/30. On 5/1, reoperation revealed colonic staple line breakdown, necrotic pancreas, vessel thrombosis, and adhesions. Colostomy with malankot drain and temporary abdominal closure performed. Prognosis is poor; palliative care involved. Care conference 5/2 with family to talk goals of care. Full code, family acknowledged that poor prognosis is to be expected. 5/4 s/p exploratory surgery, additional necrotic pancreas remove with no obvious spillage or sign of bleeding at the time. Went back to the OR 5/7 for abdominal wash out. CT abdomen 5/10 showed extravasation on arterial phase imaging. MTP initiated, IR arterial embolization of pancreatic vessels performed. Decreased stool output since 5/13, switched to TPN.        CHANGES and MAJOR THINGS TODAY:  - Now that TPN has started, restart sliding scale insulin, change from medium to high  - Keep vasopressin  - Trend lactate  - Change PPI and Roboxin from oral to IV to decrease enteral medication burden for his decreased ostomy output. Ok to keep Tylenol PO and oxy PO. He is apparently still getting good relief from oxy PO.   - Psych consult, appreciate recommendations.  - Ethics consulted based on discussion with psychiatry, who  reported pt seems to have enough clarity that his wishes should be respected for goal of care  - Continue trach dome trials    PLAN:    Neurological:  # Acute pain   #Encephalopathy- improving  #Insomnia  - Monitor neurological status. Delirium preventions and precautions.   # Pain: IV Robaxin, IV dilaudid PRN, oxycodone 15mg Q4, Tylenol 975mg Q8. Increase PRN Hydromorphone allowance for mobility and dressing changes.  # Sedation: Precedex 0.7mcg/kg/hr  # Anxiety  - Sertraline 50mg     Pulmonary:   # Acute hypoxic respiratory failure  # S/p tracheostomy placement   - FiO2 (%): 40 %, Resp: 24, Vent Mode: VC/AC, Resp Rate (Set): 16 breaths/min, Tidal Volume (Set, mL): 420 mL, PEEP (cm H2O): 5 cmH2O, Pressure Support (cm H2O): 5 cmH2O, Resp Rate (Set): 16 breaths/min, Tidal Volume (Set, mL): 420 mL, PEEP (cm H2O): 5 cmH2O   - Full vent overnight. Has been having trouble with trach dome. Transitioned to BID 2 hour intermittent trach dome trials.  - CT PE 5/9 showed no evidence of pulmonary embolism  - Pulmonary toilet, mobilize  - Ventilator bundle    Cardiovascular:    # Stress Cardiomyopathy   - Initial LVEF 20-25%, improved to 65-70%  # Shock-distributive (septic)  # Hypovolemic shock   # Sinus Tachycardia  - Monitor hemodynamic status. MAP goal > 65.   - Norepinephrine off this morning, Straight rate Vasopressin @ 1 units/hr. Wean as tolerated  - Received Albumin 5% 250ml 5/15  - Hydroxycortisone for stress dose steroid   - EKG 5/8, 5/9 showed sinus tachycardia with no ectopy  - Trend lactate (3.6->3.0->2.5)  - POCUS 5/13 showed hyperdynamic LV and collapsible, underfilled jugular vein.      Gastroenterology/Nutrition:  # S/p emergent exploratory laparotomy, necrosectomy, transverse colectomy, abdominal washout, and drain placement 5/1 and 5/4  # S/p Pancreatic branch vessel embolization with IR 5/10  # Severe necrotizing pancreatitis  # Splenic vein thrombosis  - will defer management of dressings and drain removal  per EGS   - Ongoing copious dark bloody drain output, CTM  - Cecostomy drain in place, no stool output. Abdomen distended. AXR unremarkable, looks well decompressed.   - on PPI IV  - CT abdomen/pelvis with IV + enteral contrast through G tube 5/14: large volume of air and blood products in abdomen.     # Severe Protein calorie deficit malnutrition due to critical illness  - on TPN  - G tube tear in the J portion, TF stopped.  - IR consulted for PEG exchange    - IR cannot safely exchange freshly placed GJ tubes until they have been in place x6 weeks given risk of losing access into the stomach. Additionally, imaging shows there is no safe pexy between the stomach and abdominal wall. IR may not be able to offer safe exchange even after 6 weeks.   - Multivitamin supplementation ordered by nutrition  - RD consult. Appreciate cares and recommendations.     Renal/Fluids/Electrolytes:   # SARAHI  # Fluid overload  Baseline Cr 0.7-0.8. Presented with Cr 0.96 on 3/30. Rapidly markos to 5.2 on 4/1. Oliguric. Garcia UA with proteinuria, hematuria, pyuria, moderate bacteria.  Kidneys unremarkable on CT. Has severe ATN in the setting of shock, ADHF, intravascular hypovolemia/3rd spacing from pancreatitis.   - Trend lactate -> 3.0->2.5   - CRRT with I&Os goal net even  - Continue to monitor intake and output  - LR bolus to replace drain output    Endocrine:   # Stress hyperglycemia    - hgb A1c 5.3, no hx of DM   - Hold Lantus, switch HSSI to Insulin gtt  - Goal to keep BG< 180 for optimal wound healing   Recent Labs   Lab 05/16/25  0319 05/16/25  0314 05/16/25  0014 05/15/25  1952 05/15/25  1951 05/15/25  1556   * 188* 172* 128* 133* 104*          ID:  # Leukocytosis  #Necrotizing pancreatitis  WBC Count   Date Value Ref Range Status   05/16/2025 23.5 (H) 4.0 - 11.0 10e3/uL Final   - CRP elevated at 454->344 , trend q3 days  - Restarted Zosyn again 5/14 for possible abdominal fecal contamination in light of leukocytosis,  hypothermia, and decreased stool output.   - Temperature 100.2F this morning, 96.5F yesterday  - Zosyn completed: 5/2/25--5/11.   - Discontinued micafungin 5/3  - Completed 14 days course of meropenem and caspofungin.      cultures:  - 4/30 blood cultures- NGTD   - 5/1 blood cultures ordered - NGTD  - Blood culture 5/12, 5/15 - NGTD     Heme:     # Acute blood loss anemia   # Anemia of critical illness   # Thrombosed splenic vein   - Transfuse if hgb <7.0 or signs/symptoms of hypoperfusion. Monitor and trend  Hemoglobin   Date Value Ref Range Status   05/16/2025 10.7 (L) 13.3 - 17.7 g/dL Final     - hemoglobin stable  - Continue subcutaneous heparin, watch bleeding from drains.     Musculoskeletal:   # Deconditioning and weakness due to critical illness   - Physical and occupational therapy consult      Skin:  # Pressure Ulcers - Buttocks/rectal area  - Barrier cream with liberal application. Continue fecal management system   - WOC consult      #Pressure Ulcers- Left Heel  Pressure Injury Location: Left heel   Wound type: Pressure Injury     Pressure Injury Stage: Deep Tissue Pressure Injury (DTPI), present on admission     General Cares/Prophylaxis:    DVT Prophylaxis: SQH   GI Prophylaxis: PPI  Restraints: Restraints for medical healing needed: YES - mittens     Lines/ tubes/ drains:  - HD line--  Right IJ  - PICC line- left   - Right radial A line  - PIV x 3  - Trach  - G-J  - Wound manager  - SHANNON x 4  - Cecostomy drain     Disposition:  - Surgical ICU    Saud Harrington, MS4  [employer]    Resident/Fellow Attestation   I, Jean Paul Pierre MD, was present with the medical/SANTOS student who participated in the service and in the documentation of the note.  I have verified the history and personally performed the physical exam and medical decision making.  I agree with the assessment and plan of care as documented in the note.      Jean Paul Pierre MD  PGY1  Date of Service (when I saw the  patient): 05/16/25    ====================================  INTERVAL HISTORY:  Course reviewed. He has had difficulty tolerating the trach dome due to increased secretions, currently on timed trach dome trials. He had a tear in the J portion of his G tube and feeds were stopped and TPN was started. Decreased stool colostomy output.   OBJECTIVE:   1. VITAL SIGNS:   Temp:  [98.5  F (36.9  C)-99.5  F (37.5  C)] 99  F (37.2  C)  Pulse:  [] 100  Resp:  [16-29] 24  MAP:  [59 mmHg-97 mmHg] 81 mmHg  Arterial Line BP: ()/(45-76) 113/62  FiO2 (%):  [40 %-45 %] 40 %  SpO2:  [92 %-100 %] 95 %  FiO2 (%): 40 %, Resp: 24, Vent Mode: VC/AC, Resp Rate (Set): 16 breaths/min, Tidal Volume (Set, mL): 420 mL, PEEP (cm H2O): 5 cmH2O, Pressure Support (cm H2O): 5 cmH2O, Resp Rate (Set): 16 breaths/min, Tidal Volume (Set, mL): 420 mL, PEEP (cm H2O): 5 cmH2O    2. INTAKE/ OUTPUT:   I/O last 3 completed shifts:  In: 4264.67 [I.V.:1569.94; Other:1; NG/GT:510; IV Piggyback:1855]  Out: 3662 [Emesis/NG output:235; Drains:3427]    3. PHYSICAL EXAMINATION:  General: laying in bed, awake and alert requesting a blanket.   HEENT: PERRLA. Trach present and secure, site dressed.   Pulm/Resp: Clear breath sounds bilaterally, lungs coarse but clear.  CV: RRR, S1/S2   Abdomen: Distended. Dark bloody output noted to all drains. G-J tube in place, site secured, abdomen with abthera in place, suction intact.   : scrotal edema, draining dark bloody output.   MSK/Extremities: generalized edema in extremities    4. INVESTIGATIONS:   Arterial Blood Gases   Recent Labs   Lab 05/11/25  0604 05/10/25  1228   PH 7.33* 7.43   PCO2 43 30*   PO2 89 86   HCO3 23 20*     Complete Blood Count   Recent Labs   Lab 05/16/25  0319 05/15/25  1952 05/15/25  1120 05/15/25  0401   WBC 23.5* 22.0* 25.2* 24.3*   HGB 10.7* 10.9* 11.4* 11.4*   * 114* 137* 149*     Basic Metabolic Panel  Recent Labs   Lab 05/16/25 0319 05/16/25 0314 05/16/25  0014 05/15/25  1952  05/15/25  1556 05/15/25  1552 05/15/25  1125 05/15/25  1120     --   --  137  --  136  --  137   POTASSIUM 4.1  --   --  4.3  --  4.4  --  4.3   CHLORIDE 102  --   --  103  --  103  --  104   CO2 19*  --   --  20*  --  19*  --  19*   BUN 17.1  --   --  13.4  --  14.4  --  15.5   CR 0.66*  --   --  0.64*  --  0.69  --  0.72   * 188* 172* 128*   < > 114*   < > 91    < > = values in this interval not displayed.     Liver Function Tests  Recent Labs   Lab 05/16/25  0319 05/15/25  1952 05/15/25  1120 05/15/25  0401 05/14/25  1151 05/14/25  0403 05/13/25  1141 05/13/25  0441 05/11/25  0322 05/10/25  2347 05/10/25  1952 05/10/25  1715 05/10/25  1353   AST 25  --   --  30  --  29  --  25   < > 31 33  --   --    ALT 13  --   --  14  --  16  --  18   < > 23 27  --   --    ALKPHOS 259*  --   --  275*  --  243*  --  205*   < > 114 109  --   --    BILITOTAL 2.1*  --   --  2.6*  --  2.6*  --  2.9*   < > 3.5* 3.1*  --   --    ALBUMIN 2.2* 2.3* 2.2* 2.2*   < > 2.6*   < > 2.7*   < > 3.6 3.9 2.4*  --    INR  --   --   --   --   --   --   --   --   --  1.13 1.17* 1.65* 1.28*    < > = values in this interval not displayed.     Pancreatic Enzymes  No lab results found in last 7 days.    Coagulation Profile  Recent Labs   Lab 05/10/25  2347 05/10/25  1952 05/10/25  1715 05/10/25  1353   INR 1.13 1.17* 1.65* 1.28*   PTT 29 30 37 33     5. RADIOLOGY:   No results found for this or any previous visit (from the past 24 hours).    =========================================

## 2025-05-16 NOTE — PLAN OF CARE
ICU End of Shift Summary. See flowsheets for vital signs and detailed assessment.    Changes this shift: RASS 0/-1, intermittent confusion but follows commands. Dex gtt 0.7 for anxiety. PRN dilaudid given once for dressing change. Titrated Vaso to maintain MAP goal>65. TD 50%/40L for 3 hours, otherwise CMV settings with PEEP 10. Continue CRRT, not pulling fluid. Meds given through G tube then clamped for several hours, minimal drainage when open to gravity.     Plan: Continue plan of care.       Plan of Care Reviewed With: patient, family    Overall Patient Progress: no changeOverall Patient Progress: no change    Outcome Evaluation: No acute events

## 2025-05-16 NOTE — PROGRESS NOTES
CRRT STATUS NOTE    DATA:  Time:  6:00 AM  Pressures WNL:  YES; filter pressures rising.  Filter Status:  WDL    Problems Reported/Alarms Noted:  None.    Supplies Present:  YES    ASSESSMENT:  Patient Net Fluid Balance:  Net +700 ml @ midnight, -15 ml @ 0600.  Vital Signs:  HR 80, /72, MAP 90  Labs:  K 4.1, Mg 2.3, Phos 4.5, iCa 4.6, Hgb 10.7, Plt 117  Goals of Therapy:  Meeting goals of therapy.    INTERVENTIONS:   None.    PLAN:  Continue to monitor circuit daily and change set q72 hours or PRN for clotting/clogging. Please call CRRT RN with any questions/problems.

## 2025-05-16 NOTE — PROGRESS NOTES
"INTENSIVIST FACULTY NOTE:  Able to come down on pressors since restarting stress steroids (seems to have the most response with vasopressin as opposed to norepinephrine)  Consistently articulating to nursing staff that he \"wants this all to end\" and seems to imply that he wants to transition to comfort cares    Technically still CAM-ICU+ on my exam for attention  Exam less anxious on precedex  Abdominal distention maybe a little less than it had been  Drains all still clot-stained ascitic fluid; don't necessarily look like succus /stool  Scrotum less distended.  He is very tender over the inguinal canals, and continues to ooze a lot of fluid (that looks the same as what comes out of the SHANNON drains) through the hole in his scrotum  Tolerating trach dome for ~2h at at time  Line sites look fine  Still diffuse anasarca  Minimal ostomy output  The jejunal feeding tube is fractured  G tube port is still draining when clamped for 2h after med administration (ie, it doesn't look like he's absorbing / passing any meds beyond the stomach)    Lytes fine on dialysis  Albumin still hovering around 2.2  Bilirubin edging back down again  CRP down slightly since starting antibiotics  Lactate has not really cleared      This is a 29M hx bipolar and EtOH, with a prolonged hospitalization that began with an admission for alcohol withdrawal after a binge, but subsequent development of necrotizing pancreatitis which in turn led to a whole host of complications including cardiomyopathy (now recovered), SARAHI requiring renal replacement therapy, a perforated viscous requiring transverse colectomy, and an intra-abdominal bleed requiring IR embolization of a pancreatic branch off the distal celiac artery.  He has a tracheostomy and GJ tube.  His abdomen has been closed with vicryl mesh, and he has a frozen abdomen, so cannot undergo surgical exploration.      We have articulated to the family and patient repeatedly that he has a terminal " "illness and is expected to die at some point during this hospitalization, and he seems to be expressing a desire to transition to comfort cares himself.  However, I am not convinced that he is decisional and understands the implications of what he is asking - he clearly is uncomfortable, unhappy with being stuck in bed, and depressed, but he is also confused and not articulating to me that he understands that \"stopping\" things means \"dying.\"    We will ask for psychiatry and other consulting services to evaluate him as well, if possible.  In the meantime, I think we should try to minimize enteral meds - we can continue his oxycodone, since nursing reports that he does seem to get analgesic relief from this, but other meds I'm not sure how much he is absorbing with his ileus and possible leak.    METABOLIC ENCEPHALOPATHY / DELIRIUM:  -due to residual sedatives / pain meds, critical illness; monitoring with clinical exam  -discontinue melatonin + trazodone given apparently poor enteral transit; Precedex for sedation + sleep  -may need to discontinue robaxin, tylenol, and scheduled oxycodone for the same reason, but will keep for now since nursing reports they see some effect.    -needs restraints (mitts)    ACUTE HYPOXIC RESPIRATORY FAILURE  -resolved, although respiratory mechanics remain poor due to debility and weakness.  Trach dome for ~2h, then rest settings, but increase the amount of time he is on trach dome a little bit each day and will start to slowly increase that duration on trach dome, as his issue seems to be debility and weakness.  PEEP of 10 when on ventilator support for re-recruitment  -targeting net even to net negative with CRRT    CONCERN FOR SEPTIC SHOCK:  -nothing that can be done surgically about his abdomen at this time.    -seems most responsive to vasporessin, so will make that titratable and potentially discontinue norepi  -Empiric zosyn as above  -hydrocortisone 50mg IV q6h to decrease " pressor requirement and for potential mortality benefit as per SILVINA Cordon 2002 and as per APROCHSS trial, [facility] 2019, although unlike in those trials will hold fludrocortisone (and replace with as-needed fluid boluses instead) and will either just discontinue cold-turkey or use a rapid steroid taper (likely halving on a daily basis over 3d) once off pressors.  HE may be someone that needs to stay on low-dose maintenance steroids (eg, prednisone 5mg daily)    NECROTIZING PANCREATITIS:  -empiric zosyn; consider adding an antifungal given renal failure + TPN, but for now I am going to hold off  -on appropriate nutrients    ACUTE KIDNEY INJURY:  -on CRRT currently.  Continue target of net even to slightly net positive today - significant insensible / unmeasured losses with all his drains and wounds.  I am less optimistic now about the chances of renal recovery    SPLENIC VEIN THROMBOSIS:  -repeat imaging showed no extension of thrombus or complications, so there are no plans to ever re-initiate therapeutic anticoagulation    INADEQUATE ORAL INTAKE:  -TPN; no tube feeds until starts having ostomy output again. Also, because his jejunal tube is less than 6 weeks old, interventional radiology is unwilling to replace this one at this time.  He therefore effectively has no good enteral access for feeds for the next month or so (technically could potentially use the G tube)    DM2 / HYPERGLYCEMIA:  -increase to high scale sliding scale insulin.  Off Dextrose infusion    MISC:  -Code status is full code  -family updated by medical student and resident  -[facility]; PPI for mechanical ventilation and pancreatitis  -lines: RIJ tunnelled dialysis line; arterial line; PICC for pressors  -no shore  -I do not anticipate that he will survive this hospitalization, now.  Will try to have more involved conversation with the mother over the weekend    Billing statement: 44min of critical care time; spent in an initial review of imaging, labs,  physical exam, and discussion of the patient with my own team and the extended care team including the primary service. Based on this patient's presentation / recent intervention and my bedside assessment, I felt there was or is a reasonably high probability of imminent or life-threatening deterioration today or tonight for septic reasons.   My overall critical care time, as described in detail above, includes such things as coordination of care, arrhythmia and hemodynamics management with infusions of medicines, respiratory management, fluid therapy including fluid boluses, and pain and sedation therapy. This time excludes time I spent personally performing or supervising procedures for this patient.    DONNY Rodas MD  Clinical   Anesthesia / Critical Care  *[id number]

## 2025-05-16 NOTE — PROGRESS NOTES
Surgery Progress Note  05/16/2025       Subjective:  29M w/ pancreatitis POD9 from ABD washout, POD6 embolization of pancreatic branch of celiac artery. Overnight decreased pressors requirement: norepinephrine stopped and vasopressin weaned to 1. On ventilatory support with fiO2 40. One episode of hypoglycemia to 67, switched from D10 to D20, then to TPN 50mL/hr. Received albumin 280 mL and started stress dose-steroids. Malecot drain with no output. Lac downtrending 2.5 (2.9 yesterday). WBC 23.5 (within the 20-25 range it has stayed in for past 3 days). Hg stable.      Objective:  Temp:  [98.5  F (36.9  C)-99.5  F (37.5  C)] 98.8  F (37.1  C)  Pulse:  [] 98  Resp:  [16-29] 24  MAP:  [58 mmHg-97 mmHg] 58 mmHg  Arterial Line BP: ()/(39-76) 96/39  FiO2 (%):  [40 %-45 %] 40 %  SpO2:  [92 %-100 %] 95 %    I/O last 3 completed shifts:  In: 4121.92 [I.V.:1330.79; Other:1; NG/GT:525; IV Piggyback:1370]  Out: 3302 [Emesis/NG output:205; Drains:3097]      Gen: Eyes open. Precedex 0.7 mcg/kg/hr.   Resp: Ventilated on 40% FiO2, PEEP of 5, trach  Abd: Abdomen is taut but compressible, drains with dark sanguinous output, Malecot with minimal succus, midline abdominal dressing with wound manager  : Scrotum with pouch in place with serosanguinous drainage  Ext: bilateral lower extremities appear mottled, warm to the touch, edematous      Labs:  Recent Labs   Lab 05/16/25  0319 05/15/25  1952 05/15/25  1120   WBC 23.5* 22.0* 25.2*   HGB 10.7* 10.9* 11.4*   * 114* 137*       Recent Labs   Lab 05/16/25  0820 05/16/25  0319 05/16/25  0314 05/16/25  0014 05/15/25  1952 05/15/25  1556 05/15/25  1552 05/15/25  1125 05/15/25  1120   NA  --  135  --   --  137  --  136  --  137   POTASSIUM  --  4.1  --   --  4.3  --  4.4  --  4.3   CHLORIDE  --  102  --   --  103  --  103  --  104   CO2  --  19*  --   --  20*  --  19*  --  19*   BUN  --  17.1  --   --  13.4  --  14.4  --  15.5   CR  --  0.66*  --   --  0.64*  --  0.69   "--  0.72   * 187* 188*   < > 128*   < > 114*   < > 91   KARINA  --  8.2*  --   --  7.8*  --  8.0*  --  7.9*   MAG  --  2.3  --   --  2.3  --   --   --  2.2   PHOS  --  4.5  --   --  4.3  --   --   --  4.1    < > = values in this interval not displayed.     Assessment/Plan:   Sebastián Rodas is a 29-year-old male with a history of alcohol use disorder, anxiety, and bipolar disorder who was admitted on 3/30/2025 for alcohol withdrawal following a recent binge, presenting with diffuse abdominal pain. Initial workup revealed leukocytosis and elevated lipase concerning for acute pancreatitis. He developed acute encephalopathy and was transferred to the ICU on 3/31, requiring intubation and vasopressors by 4/1 due to shock. Hospital course has been complicated by acute renal failure requiring CRRT, cardiomyopathy (initial LVEF 20-25%, improved to 65-70% by 4/14), and necrotizing pancreatitis with unorganized fluid collections. He completed a 14-day course of meropenem but remains intermittently febrile and critically ill, requiring ongoing dialysis and vasopressor support. On 4/27, after clinical deterioration and imaging consistent with a likely perforated viscus, following family discussion, he underwent emergent exploratory laparotomy, necrosectomy, transverse colectomy, abdominal washout, and drain placement. Patient was transferred to [facility] on 4/30/25 for further surgical management. Went to OR 5/1 for re-open laparotomy, I&D, placement of colostomy tube in presumed previous colectomy staple line, necrosectomy, and Abthera placement. On 5/2, increasing sanguineous output from drains concerning for bleeding from pancreatic bed. Family care conference held 5/2, plan for restorative cares at that time. Take back to OR twice (5/4 and 5/7) for abdominal washout. Per nursing note: 5/9 morning after he saw a picture of his abdomen that he \"desires to speak to team about updating goals of care, expressed desire to pull " "back on cares\". Goals of care conference on 5/10 with continuation of full code and restorative cares. CTA on 5/10 revealed large area of active arterial extravasation in the upper abdomen. MTP was started. Patient is now s/p embolization of small pancreatic branch off of the distal celiac artery with IR on 5/10.     Plan:  - No further surgical options for any intraabdominal pathology   - Nursing to change dressings in the am, pm, and PRN during the day if copious drainage during the day. Please place Xeroform over the bridging mesh. Please do not apply xeroform outside the wound bed/over the skin edges but make sure all of the wound bed is covered.   - Tube feeds on hold currently due to tear in J-tube. Receiving TPN and Lipids and Albumin as needed.  - On Zosyn per SICU  - SQH PPX, would not recommend therapeutic dose given the risk of bleeding.   - Appreciate [facility] cares  - Other cares per SICU, we appreciate cares     Patient was seen and discussed with my resident and chief resident.     Anish Dickson  Medical Student, Class of 2027  [employer] [facility]    Resident/Fellow Attestation   I, Casimiro Chirinos MD, was present with the medical/SANTOS student who participated in the service and in the documentation of the note.  I have verified the history and personally performed the physical exam and medical decision making.  I agree with the assessment and plan of care as documented in the note.      Casimiro Chirinos MD  PGY1  Date of Service (when I saw the patient): 05/16/25    "

## 2025-05-17 ENCOUNTER — APPOINTMENT (OUTPATIENT)
Dept: GENERAL RADIOLOGY | Facility: CLINIC | Age: 30
End: 2025-05-17
Attending: SURGERY
Payer: COMMERCIAL

## 2025-05-17 ENCOUNTER — APPOINTMENT (OUTPATIENT)
Dept: CT IMAGING | Facility: CLINIC | Age: 30
End: 2025-05-17
Attending: SURGERY
Payer: COMMERCIAL

## 2025-05-17 LAB
ABO + RH BLD: NORMAL
ALBUMIN SERPL BCG-MCNC: 2.2 G/DL (ref 3.5–5.2)
ALBUMIN SERPL BCG-MCNC: 2.3 G/DL (ref 3.5–5.2)
ALLEN'S TEST: ABNORMAL
ALLEN'S TEST: ABNORMAL
ALP SERPL-CCNC: 364 U/L (ref 40–150)
ALT SERPL W P-5'-P-CCNC: 25 U/L (ref 0–70)
ANION GAP SERPL CALCULATED.3IONS-SCNC: 12 MMOL/L (ref 7–15)
ANION GAP SERPL CALCULATED.3IONS-SCNC: 12 MMOL/L (ref 7–15)
ANION GAP SERPL CALCULATED.3IONS-SCNC: 14 MMOL/L (ref 7–15)
APTT PPP: 30 SECONDS (ref 22–38)
APTT PPP: 40 SECONDS (ref 22–38)
AST SERPL W P-5'-P-CCNC: 42 U/L (ref 0–45)
BACTERIA SPEC CULT: NO GROWTH
BACTERIA SPEC CULT: NO GROWTH
BASE EXCESS BLDA CALC-SCNC: -13.2 MMOL/L (ref -3–3)
BASE EXCESS BLDA CALC-SCNC: -9.1 MMOL/L (ref -3–3)
BILIRUB SERPL-MCNC: 2.4 MG/DL
BILIRUBIN DIRECT (ROCHE PRO & PURE): 1.92 MG/DL (ref 0–0.45)
BLD GP AB SCN SERPL QL: NEGATIVE
BLD PROD TYP BPU: NORMAL
BLOOD COMPONENT TYPE: NORMAL
BUN SERPL-MCNC: 24.7 MG/DL (ref 6–20)
BUN SERPL-MCNC: 25.5 MG/DL (ref 6–20)
BUN SERPL-MCNC: 25.7 MG/DL (ref 6–20)
CA-I BLD-MCNC: 4.4 MG/DL (ref 4.4–5.2)
CA-I BLD-MCNC: 4.8 MG/DL (ref 4.4–5.2)
CALCIUM SERPL-MCNC: 7.7 MG/DL (ref 8.8–10.4)
CALCIUM SERPL-MCNC: 7.7 MG/DL (ref 8.8–10.4)
CALCIUM SERPL-MCNC: 7.8 MG/DL (ref 8.8–10.4)
CHLORIDE SERPL-SCNC: 102 MMOL/L (ref 98–107)
CHLORIDE SERPL-SCNC: 104 MMOL/L (ref 98–107)
CHLORIDE SERPL-SCNC: 104 MMOL/L (ref 98–107)
CODING SYSTEM: NORMAL
CREAT SERPL-MCNC: 0.46 MG/DL (ref 0.67–1.17)
CREAT SERPL-MCNC: 0.51 MG/DL (ref 0.67–1.17)
CREAT SERPL-MCNC: 0.58 MG/DL (ref 0.67–1.17)
CROSSMATCH: NORMAL
EGFRCR SERPLBLD CKD-EPI 2021: >90 ML/MIN/1.73M2
ERYTHROCYTE [DISTWIDTH] IN BLOOD BY AUTOMATED COUNT: 15.8 % (ref 10–15)
ERYTHROCYTE [DISTWIDTH] IN BLOOD BY AUTOMATED COUNT: 15.8 % (ref 10–15)
ERYTHROCYTE [DISTWIDTH] IN BLOOD BY AUTOMATED COUNT: 15.9 % (ref 10–15)
FIBRINOGEN PPP-MCNC: 170 MG/DL (ref 170–510)
FIBRINOGEN PPP-MCNC: 411 MG/DL (ref 170–510)
GGT SERPL-CCNC: 271 U/L (ref 8–61)
GLUCOSE BLDC GLUCOMTR-MCNC: 148 MG/DL (ref 70–99)
GLUCOSE BLDC GLUCOMTR-MCNC: 170 MG/DL (ref 70–99)
GLUCOSE BLDC GLUCOMTR-MCNC: 189 MG/DL (ref 70–99)
GLUCOSE BLDC GLUCOMTR-MCNC: 192 MG/DL (ref 70–99)
GLUCOSE BLDC GLUCOMTR-MCNC: 207 MG/DL (ref 70–99)
GLUCOSE BLDC GLUCOMTR-MCNC: 213 MG/DL (ref 70–99)
GLUCOSE BLDC GLUCOMTR-MCNC: 261 MG/DL (ref 70–99)
GLUCOSE SERPL-MCNC: 153 MG/DL (ref 70–99)
GLUCOSE SERPL-MCNC: 199 MG/DL (ref 70–99)
GLUCOSE SERPL-MCNC: 209 MG/DL (ref 70–99)
HCO3 BLD-SCNC: 14 MMOL/L (ref 21–28)
HCO3 BLD-SCNC: 17 MMOL/L (ref 21–28)
HCO3 SERPL-SCNC: 20 MMOL/L (ref 22–29)
HCO3 SERPL-SCNC: 21 MMOL/L (ref 22–29)
HCO3 SERPL-SCNC: 21 MMOL/L (ref 22–29)
HCT VFR BLD AUTO: 16.9 % (ref 40–53)
HCT VFR BLD AUTO: 24.8 % (ref 40–53)
HCT VFR BLD AUTO: 26.3 % (ref 40–53)
HGB BLD-MCNC: 5.6 G/DL (ref 13.3–17.7)
HGB BLD-MCNC: 7.8 G/DL (ref 13.3–17.7)
HGB BLD-MCNC: 8.1 G/DL (ref 13.3–17.7)
HGB BLD-MCNC: 8.2 G/DL (ref 13.3–17.7)
HGB BLD-MCNC: 8.8 G/DL (ref 13.3–17.7)
INR PPP: 1.18 (ref 0.85–1.15)
INR PPP: 1.54 (ref 0.85–1.15)
ISSUE DATE AND TIME: NORMAL
LACTATE SERPL-SCNC: 10.6 MMOL/L (ref 0.7–2)
LACTATE SERPL-SCNC: 10.6 MMOL/L (ref 0.7–2)
LACTATE SERPL-SCNC: 2 MMOL/L (ref 0.7–2)
LACTATE SERPL-SCNC: 2.1 MMOL/L (ref 0.7–2)
LACTATE SERPL-SCNC: 2.1 MMOL/L (ref 0.7–2)
LACTATE SERPL-SCNC: 2.5 MMOL/L (ref 0.7–2)
LACTATE SERPL-SCNC: 3.6 MMOL/L (ref 0.7–2)
MAGNESIUM SERPL-MCNC: 1.9 MG/DL (ref 1.7–2.3)
MAGNESIUM SERPL-MCNC: 2 MG/DL (ref 1.7–2.3)
MAGNESIUM SERPL-MCNC: 2 MG/DL (ref 1.7–2.3)
MCH RBC QN AUTO: 30.3 PG (ref 26.5–33)
MCH RBC QN AUTO: 30.6 PG (ref 26.5–33)
MCH RBC QN AUTO: 30.9 PG (ref 26.5–33)
MCHC RBC AUTO-ENTMCNC: 33.1 G/DL (ref 31.5–36.5)
MCHC RBC AUTO-ENTMCNC: 33.1 G/DL (ref 31.5–36.5)
MCHC RBC AUTO-ENTMCNC: 33.5 G/DL (ref 31.5–36.5)
MCV RBC AUTO: 91 FL (ref 78–100)
MCV RBC AUTO: 92 FL (ref 78–100)
O2/TOTAL GAS SETTING VFR VENT: 100 %
O2/TOTAL GAS SETTING VFR VENT: 65 %
OXYHGB MFR BLDA: 98 % (ref 92–100)
OXYHGB MFR BLDA: 99 % (ref 92–100)
PCO2 BLD: 36 MM HG (ref 35–45)
PCO2 BLD: 37 MM HG (ref 35–45)
PEEP: 10 CM H2O
PH BLD: 7.19 [PH] (ref 7.35–7.45)
PH BLD: 7.27 [PH] (ref 7.35–7.45)
PHOSPHATE SERPL-MCNC: 3.3 MG/DL (ref 2.5–4.5)
PHOSPHATE SERPL-MCNC: 3.4 MG/DL (ref 2.5–4.5)
PHOSPHATE SERPL-MCNC: 3.5 MG/DL (ref 2.5–4.5)
PLATELET # BLD AUTO: 100 10E3/UL (ref 150–450)
PLATELET # BLD AUTO: 115 10E3/UL (ref 150–450)
PLATELET # BLD AUTO: 85 10E3/UL (ref 150–450)
PO2 BLD: 135 MM HG (ref 80–105)
PO2 BLD: 282 MM HG (ref 80–105)
POTASSIUM SERPL-SCNC: 3.4 MMOL/L (ref 3.4–5.3)
POTASSIUM SERPL-SCNC: 3.8 MMOL/L (ref 3.4–5.3)
POTASSIUM SERPL-SCNC: 3.8 MMOL/L (ref 3.4–5.3)
PROT SERPL-MCNC: 5.4 G/DL (ref 6.4–8.3)
PROTHROMBIN TIME: 15.3 SECONDS (ref 11.8–14.8)
PROTHROMBIN TIME: 18.8 SECONDS (ref 11.8–14.8)
PYRIDOXAL PHOS SERPL-SCNC: 6.9 NMOL/L
RBC # BLD AUTO: 1.83 10E6/UL (ref 4.4–5.9)
RBC # BLD AUTO: 2.71 10E6/UL (ref 4.4–5.9)
RBC # BLD AUTO: 2.85 10E6/UL (ref 4.4–5.9)
SAO2 % BLDA: 99.6 % (ref 96–97)
SAO2 % BLDA: >100 % (ref 96–97)
SODIUM SERPL-SCNC: 135 MMOL/L (ref 135–145)
SODIUM SERPL-SCNC: 137 MMOL/L (ref 135–145)
SODIUM SERPL-SCNC: 138 MMOL/L (ref 135–145)
SPECIMEN EXP DATE BLD: NORMAL
UNIT ABO/RH: NORMAL
UNIT NUMBER: NORMAL
UNIT STATUS: NORMAL
UNIT TYPE ISBT: 2800
UNIT TYPE ISBT: 5100
UNIT TYPE ISBT: 6200
UNIT TYPE ISBT: 9500
WBC # BLD AUTO: 14.8 10E3/UL (ref 4–11)
WBC # BLD AUTO: 14.9 10E3/UL (ref 4–11)
WBC # BLD AUTO: 17.3 10E3/UL (ref 4–11)

## 2025-05-17 PROCEDURE — 258N000003 HC RX IP 258 OP 636

## 2025-05-17 PROCEDURE — 99233 SBSQ HOSP IP/OBS HIGH 50: CPT | Mod: GC | Performed by: INTERNAL MEDICINE

## 2025-05-17 PROCEDURE — 250N000011 HC RX IP 250 OP 636: Performed by: NURSE PRACTITIONER

## 2025-05-17 PROCEDURE — 82248 BILIRUBIN DIRECT: CPT | Performed by: STUDENT IN AN ORGANIZED HEALTH CARE EDUCATION/TRAINING PROGRAM

## 2025-05-17 PROCEDURE — 85730 THROMBOPLASTIN TIME PARTIAL: CPT | Performed by: STUDENT IN AN ORGANIZED HEALTH CARE EDUCATION/TRAINING PROGRAM

## 2025-05-17 PROCEDURE — P9059 PLASMA, FRZ BETWEEN 8-24HOUR: HCPCS

## 2025-05-17 PROCEDURE — 86923 COMPATIBILITY TEST ELECTRIC: CPT

## 2025-05-17 PROCEDURE — 258N000003 HC RX IP 258 OP 636: Performed by: NURSE PRACTITIONER

## 2025-05-17 PROCEDURE — 85041 AUTOMATED RBC COUNT: CPT | Performed by: CLINICAL NURSE SPECIALIST

## 2025-05-17 PROCEDURE — 250N000009 HC RX 250

## 2025-05-17 PROCEDURE — 86900 BLOOD TYPING SEROLOGIC ABO: CPT

## 2025-05-17 PROCEDURE — 999N000157 HC STATISTIC RCP TIME EA 10 MIN

## 2025-05-17 PROCEDURE — 250N000011 HC RX IP 250 OP 636: Mod: JW | Performed by: NURSE PRACTITIONER

## 2025-05-17 PROCEDURE — 250N000011 HC RX IP 250 OP 636: Performed by: STUDENT IN AN ORGANIZED HEALTH CARE EDUCATION/TRAINING PROGRAM

## 2025-05-17 PROCEDURE — 258N000003 HC RX IP 258 OP 636: Performed by: SURGERY

## 2025-05-17 PROCEDURE — 999N000248 HC STATISTIC IV INSERT WITH US BY RN

## 2025-05-17 PROCEDURE — 250N000009 HC RX 250: Performed by: NURSE PRACTITIONER

## 2025-05-17 PROCEDURE — 82040 ASSAY OF SERUM ALBUMIN: CPT | Performed by: CLINICAL NURSE SPECIALIST

## 2025-05-17 PROCEDURE — 83735 ASSAY OF MAGNESIUM: CPT | Performed by: CLINICAL NURSE SPECIALIST

## 2025-05-17 PROCEDURE — 85384 FIBRINOGEN ACTIVITY: CPT

## 2025-05-17 PROCEDURE — P9037 PLATE PHERES LEUKOREDU IRRAD: HCPCS

## 2025-05-17 PROCEDURE — 85384 FIBRINOGEN ACTIVITY: CPT | Performed by: STUDENT IN AN ORGANIZED HEALTH CARE EDUCATION/TRAINING PROGRAM

## 2025-05-17 PROCEDURE — 84100 ASSAY OF PHOSPHORUS: CPT

## 2025-05-17 PROCEDURE — 85018 HEMOGLOBIN: CPT

## 2025-05-17 PROCEDURE — 250N000009 HC RX 250: Performed by: SURGERY

## 2025-05-17 PROCEDURE — 80069 RENAL FUNCTION PANEL: CPT | Performed by: CLINICAL NURSE SPECIALIST

## 2025-05-17 PROCEDURE — 86901 BLOOD TYPING SEROLOGIC RH(D): CPT

## 2025-05-17 PROCEDURE — 83735 ASSAY OF MAGNESIUM: CPT

## 2025-05-17 PROCEDURE — 85610 PROTHROMBIN TIME: CPT | Performed by: STUDENT IN AN ORGANIZED HEALTH CARE EDUCATION/TRAINING PROGRAM

## 2025-05-17 PROCEDURE — 85730 THROMBOPLASTIN TIME PARTIAL: CPT

## 2025-05-17 PROCEDURE — 250N000011 HC RX IP 250 OP 636: Mod: JZ | Performed by: NURSE PRACTITIONER

## 2025-05-17 PROCEDURE — 85018 HEMOGLOBIN: CPT | Performed by: CLINICAL NURSE SPECIALIST

## 2025-05-17 PROCEDURE — P9016 RBC LEUKOCYTES REDUCED: HCPCS

## 2025-05-17 PROCEDURE — 83605 ASSAY OF LACTIC ACID: CPT

## 2025-05-17 PROCEDURE — 74174 CTA ABD&PLVS W/CONTRAST: CPT | Mod: 26 | Performed by: STUDENT IN AN ORGANIZED HEALTH CARE EDUCATION/TRAINING PROGRAM

## 2025-05-17 PROCEDURE — 85018 HEMOGLOBIN: CPT | Performed by: NURSE PRACTITIONER

## 2025-05-17 PROCEDURE — 85610 PROTHROMBIN TIME: CPT

## 2025-05-17 PROCEDURE — 250N000011 HC RX IP 250 OP 636

## 2025-05-17 PROCEDURE — 250N000011 HC RX IP 250 OP 636: Performed by: SURGERY

## 2025-05-17 PROCEDURE — 83605 ASSAY OF LACTIC ACID: CPT | Performed by: NURSE PRACTITIONER

## 2025-05-17 PROCEDURE — 84100 ASSAY OF PHOSPHORUS: CPT | Performed by: CLINICAL NURSE SPECIALIST

## 2025-05-17 PROCEDURE — 85048 AUTOMATED LEUKOCYTE COUNT: CPT | Performed by: CLINICAL NURSE SPECIALIST

## 2025-05-17 PROCEDURE — 90947 DIALYSIS REPEATED EVAL: CPT

## 2025-05-17 PROCEDURE — 82330 ASSAY OF CALCIUM: CPT

## 2025-05-17 PROCEDURE — 84132 ASSAY OF SERUM POTASSIUM: CPT

## 2025-05-17 PROCEDURE — 74018 RADEX ABDOMEN 1 VIEW: CPT

## 2025-05-17 PROCEDURE — 99291 CRITICAL CARE FIRST HOUR: CPT | Mod: 24 | Performed by: ANESTHESIOLOGY

## 2025-05-17 PROCEDURE — B4185 PARENTERAL SOL 10 GM LIPIDS: HCPCS | Performed by: SURGERY

## 2025-05-17 PROCEDURE — 250N000013 HC RX MED GY IP 250 OP 250 PS 637: Performed by: SURGERY

## 2025-05-17 PROCEDURE — 200N000002 HC R&B ICU UMMC

## 2025-05-17 PROCEDURE — 82805 BLOOD GASES W/O2 SATURATION: CPT

## 2025-05-17 PROCEDURE — P9012 CRYOPRECIPITATE EACH UNIT: HCPCS

## 2025-05-17 PROCEDURE — 84450 TRANSFERASE (AST) (SGOT): CPT | Performed by: STUDENT IN AN ORGANIZED HEALTH CARE EDUCATION/TRAINING PROGRAM

## 2025-05-17 PROCEDURE — 82330 ASSAY OF CALCIUM: CPT | Performed by: CLINICAL NURSE SPECIALIST

## 2025-05-17 PROCEDURE — 74174 CTA ABD&PLVS W/CONTRAST: CPT

## 2025-05-17 PROCEDURE — 250N000013 HC RX MED GY IP 250 OP 250 PS 637

## 2025-05-17 PROCEDURE — P9056 BLOOD, L/R, IRRADIATED: HCPCS

## 2025-05-17 PROCEDURE — 250N000009 HC RX 250: Performed by: STUDENT IN AN ORGANIZED HEALTH CARE EDUCATION/TRAINING PROGRAM

## 2025-05-17 PROCEDURE — 82977 ASSAY OF GGT: CPT | Performed by: STUDENT IN AN ORGANIZED HEALTH CARE EDUCATION/TRAINING PROGRAM

## 2025-05-17 PROCEDURE — 74018 RADEX ABDOMEN 1 VIEW: CPT | Mod: 26 | Performed by: STUDENT IN AN ORGANIZED HEALTH CARE EDUCATION/TRAINING PROGRAM

## 2025-05-17 RX ORDER — IOPAMIDOL 755 MG/ML
112 INJECTION, SOLUTION INTRAVASCULAR ONCE
Status: COMPLETED | OUTPATIENT
Start: 2025-05-18 | End: 2025-05-18

## 2025-05-17 RX ORDER — CALCIUM CHLORIDE 100 MG/ML
INJECTION INTRAVENOUS; INTRAVENTRICULAR
Status: COMPLETED
Start: 2025-05-17 | End: 2025-05-17

## 2025-05-17 RX ORDER — HYDROCORTISONE SODIUM SUCCINATE 100 MG/2ML
25 INJECTION INTRAMUSCULAR; INTRAVENOUS EVERY 12 HOURS
Status: DISCONTINUED | OUTPATIENT
Start: 2025-05-18 | End: 2025-05-17

## 2025-05-17 RX ORDER — NOREPINEPHRINE BITARTRATE 0.06 MG/ML
.01-.6 INJECTION, SOLUTION INTRAVENOUS CONTINUOUS
Status: DISCONTINUED | OUTPATIENT
Start: 2025-05-17 | End: 2025-06-03

## 2025-05-17 RX ORDER — HYDROCORTISONE SODIUM SUCCINATE 100 MG/2ML
50 INJECTION INTRAMUSCULAR; INTRAVENOUS EVERY 12 HOURS
Status: COMPLETED | OUTPATIENT
Start: 2025-05-17 | End: 2025-05-17

## 2025-05-17 RX ORDER — OLANZAPINE 10 MG/1
2.5 INJECTION, POWDER, LYOPHILIZED, FOR SOLUTION INTRAMUSCULAR EVERY EVENING
Status: DISCONTINUED | OUTPATIENT
Start: 2025-05-17 | End: 2025-06-03 | Stop reason: HOSPADM

## 2025-05-17 RX ORDER — HYDROCORTISONE SODIUM SUCCINATE 100 MG/2ML
25 INJECTION INTRAMUSCULAR; INTRAVENOUS EVERY 12 HOURS
Status: DISCONTINUED | OUTPATIENT
Start: 2025-05-18 | End: 2025-05-18

## 2025-05-17 RX ORDER — DIATRIZOATE MEGLUMINE AND DIATRIZOATE SODIUM 660; 100 MG/ML; MG/ML
30 SOLUTION ORAL; RECTAL ONCE
Status: COMPLETED | OUTPATIENT
Start: 2025-05-17 | End: 2025-05-17

## 2025-05-17 RX ORDER — HYDROCORTISONE SODIUM SUCCINATE 100 MG/2ML
50 INJECTION INTRAMUSCULAR; INTRAVENOUS EVERY 12 HOURS
Status: DISCONTINUED | OUTPATIENT
Start: 2025-05-17 | End: 2025-05-17

## 2025-05-17 RX ADMIN — SODIUM CHLORIDE 2.4 UNITS/HR: 9 INJECTION, SOLUTION INTRAVENOUS at 21:04

## 2025-05-17 RX ADMIN — HEPARIN SODIUM 5000 UNITS: 5000 INJECTION, SOLUTION INTRAVENOUS; SUBCUTANEOUS at 11:25

## 2025-05-17 RX ADMIN — SODIUM CHLORIDE, SODIUM LACTATE, POTASSIUM CHLORIDE, AND CALCIUM CHLORIDE 1000 ML: .6; .31; .03; .02 INJECTION, SOLUTION INTRAVENOUS at 22:50

## 2025-05-17 RX ADMIN — SODIUM CHLORIDE, SODIUM LACTATE, POTASSIUM CHLORIDE, AND CALCIUM CHLORIDE 500 ML: .6; .31; .03; .02 INJECTION, SOLUTION INTRAVENOUS at 08:12

## 2025-05-17 RX ADMIN — HYDROCORTISONE SODIUM SUCCINATE 50 MG: 100 INJECTION, POWDER, FOR SOLUTION INTRAMUSCULAR; INTRAVENOUS at 03:56

## 2025-05-17 RX ADMIN — CALCIUM CHLORIDE, MAGNESIUM CHLORIDE, SODIUM CHLORIDE, SODIUM BICARBONATE, POTASSIUM CHLORIDE AND SODIUM PHOSPHATE DIBASIC DIHYDRATE: 3.68; 3.05; 6.34; 3.09; .314; .187 INJECTION INTRAVENOUS at 16:09

## 2025-05-17 RX ADMIN — DEXMEDETOMIDINE HYDROCHLORIDE 1.2 MCG/KG/HR: 400 INJECTION INTRAVENOUS at 20:08

## 2025-05-17 RX ADMIN — CALCIUM CHLORIDE, MAGNESIUM CHLORIDE, SODIUM CHLORIDE, SODIUM BICARBONATE, POTASSIUM CHLORIDE AND SODIUM PHOSPHATE DIBASIC DIHYDRATE 12.5 ML/KG/HR: 3.68; 3.05; 6.34; 3.09; .314; .187 INJECTION INTRAVENOUS at 11:24

## 2025-05-17 RX ADMIN — OXYCODONE HYDROCHLORIDE 15 MG: 10 TABLET ORAL at 06:45

## 2025-05-17 RX ADMIN — CALCIUM CHLORIDE, MAGNESIUM CHLORIDE, SODIUM CHLORIDE, SODIUM BICARBONATE, POTASSIUM CHLORIDE AND SODIUM PHOSPHATE DIBASIC DIHYDRATE 12.5 ML/KG/HR: 3.68; 3.05; 6.34; 3.09; .314; .187 INJECTION INTRAVENOUS at 16:12

## 2025-05-17 RX ADMIN — METHOCARBAMOL 500 MG: 100 INJECTION, SOLUTION INTRAMUSCULAR; INTRAVENOUS at 08:26

## 2025-05-17 RX ADMIN — SERTRALINE HYDROCHLORIDE 50 MG: 25 TABLET ORAL at 08:15

## 2025-05-17 RX ADMIN — OXYCODONE HYDROCHLORIDE 15 MG: 10 TABLET ORAL at 02:17

## 2025-05-17 RX ADMIN — Medication 50 MCG/HR: at 13:58

## 2025-05-17 RX ADMIN — PIPERACILLIN AND TAZOBACTAM 4.5 G: 4; .5 INJECTION, POWDER, LYOPHILIZED, FOR SOLUTION INTRAVENOUS at 04:53

## 2025-05-17 RX ADMIN — METHOCARBAMOL 500 MG: 100 INJECTION, SOLUTION INTRAMUSCULAR; INTRAVENOUS at 14:07

## 2025-05-17 RX ADMIN — OLANZAPINE 2.5 MG: 10 INJECTION, POWDER, FOR SOLUTION INTRAMUSCULAR at 20:26

## 2025-05-17 RX ADMIN — DEXMEDETOMIDINE HYDROCHLORIDE 1.2 MCG/KG/HR: 400 INJECTION INTRAVENOUS at 11:47

## 2025-05-17 RX ADMIN — OXYCODONE HYDROCHLORIDE 15 MG: 10 TABLET ORAL at 10:45

## 2025-05-17 RX ADMIN — DEXMEDETOMIDINE HYDROCHLORIDE 0.7 MCG/KG/HR: 400 INJECTION INTRAVENOUS at 03:32

## 2025-05-17 RX ADMIN — SODIUM CHLORIDE, SODIUM LACTATE, POTASSIUM CHLORIDE, AND CALCIUM CHLORIDE 519 ML: .6; .31; .03; .02 INJECTION, SOLUTION INTRAVENOUS at 16:42

## 2025-05-17 RX ADMIN — CALCIUM CHLORIDE, MAGNESIUM CHLORIDE, SODIUM CHLORIDE, SODIUM BICARBONATE, POTASSIUM CHLORIDE AND SODIUM PHOSPHATE DIBASIC DIHYDRATE 12.5 ML/KG/HR: 3.68; 3.05; 6.34; 3.09; .314; .187 INJECTION INTRAVENOUS at 21:45

## 2025-05-17 RX ADMIN — SODIUM CHLORIDE, SODIUM LACTATE, POTASSIUM CHLORIDE, AND CALCIUM CHLORIDE 167 ML: .6; .31; .03; .02 INJECTION, SOLUTION INTRAVENOUS at 10:47

## 2025-05-17 RX ADMIN — MAGNESIUM SULFATE HEPTAHYDRATE: 500 INJECTION, SOLUTION INTRAMUSCULAR; INTRAVENOUS at 20:27

## 2025-05-17 RX ADMIN — ACETAMINOPHEN 975 MG: 325 TABLET ORAL at 08:15

## 2025-05-17 RX ADMIN — METHOCARBAMOL 500 MG: 100 INJECTION, SOLUTION INTRAMUSCULAR; INTRAVENOUS at 02:18

## 2025-05-17 RX ADMIN — SMOFLIPID 250 ML: 6; 6; 5; 3 INJECTION, EMULSION INTRAVENOUS at 20:23

## 2025-05-17 RX ADMIN — HYDROMORPHONE HYDROCHLORIDE 1 MG: 1 INJECTION, SOLUTION INTRAMUSCULAR; INTRAVENOUS; SUBCUTANEOUS at 00:39

## 2025-05-17 RX ADMIN — SODIUM CHLORIDE, SODIUM LACTATE, POTASSIUM CHLORIDE, AND CALCIUM CHLORIDE 90 ML: .6; .31; .03; .02 INJECTION, SOLUTION INTRAVENOUS at 04:51

## 2025-05-17 RX ADMIN — CALCIUM CHLORIDE, MAGNESIUM CHLORIDE, SODIUM CHLORIDE, SODIUM BICARBONATE, POTASSIUM CHLORIDE AND SODIUM PHOSPHATE DIBASIC DIHYDRATE 12.5 ML/KG/HR: 3.68; 3.05; 6.34; 3.09; .314; .187 INJECTION INTRAVENOUS at 01:11

## 2025-05-17 RX ADMIN — PIPERACILLIN AND TAZOBACTAM 4.5 G: 4; .5 INJECTION, POWDER, LYOPHILIZED, FOR SOLUTION INTRAVENOUS at 10:45

## 2025-05-17 RX ADMIN — HYDROMORPHONE HYDROCHLORIDE 1 MG: 1 INJECTION, SOLUTION INTRAMUSCULAR; INTRAVENOUS; SUBCUTANEOUS at 13:46

## 2025-05-17 RX ADMIN — CALCIUM CHLORIDE, MAGNESIUM CHLORIDE, SODIUM CHLORIDE, SODIUM BICARBONATE, POTASSIUM CHLORIDE AND SODIUM PHOSPHATE DIBASIC DIHYDRATE 12.5 ML/KG/HR: 3.68; 3.05; 6.34; 3.09; .314; .187 INJECTION INTRAVENOUS at 01:14

## 2025-05-17 RX ADMIN — PIPERACILLIN AND TAZOBACTAM 4.5 G: 4; .5 INJECTION, POWDER, LYOPHILIZED, FOR SOLUTION INTRAVENOUS at 16:44

## 2025-05-17 RX ADMIN — CALCIUM CHLORIDE, MAGNESIUM CHLORIDE, SODIUM CHLORIDE, SODIUM BICARBONATE, POTASSIUM CHLORIDE AND SODIUM PHOSPHATE DIBASIC DIHYDRATE 12.5 ML/KG/HR: 3.68; 3.05; 6.34; 3.09; .314; .187 INJECTION INTRAVENOUS at 06:20

## 2025-05-17 RX ADMIN — CALCIUM CHLORIDE 2 G: 100 INJECTION INTRAVENOUS; INTRAVENTRICULAR at 22:57

## 2025-05-17 RX ADMIN — DEXMEDETOMIDINE HYDROCHLORIDE 1.2 MCG/KG/HR: 400 INJECTION INTRAVENOUS at 15:33

## 2025-05-17 RX ADMIN — HYDROCORTISONE SODIUM SUCCINATE 50 MG: 100 INJECTION, POWDER, FOR SOLUTION INTRAMUSCULAR; INTRAVENOUS at 17:15

## 2025-05-17 RX ADMIN — CALCIUM CHLORIDE, MAGNESIUM CHLORIDE, SODIUM CHLORIDE, SODIUM BICARBONATE, POTASSIUM CHLORIDE AND SODIUM PHOSPHATE DIBASIC DIHYDRATE 12.5 ML/KG/HR: 3.68; 3.05; 6.34; 3.09; .314; .187 INJECTION INTRAVENOUS at 21:46

## 2025-05-17 RX ADMIN — HEPARIN SODIUM 5000 UNITS: 5000 INJECTION, SOLUTION INTRAVENOUS; SUBCUTANEOUS at 20:25

## 2025-05-17 RX ADMIN — PANTOPRAZOLE SODIUM 40 MG: 40 INJECTION, POWDER, FOR SOLUTION INTRAVENOUS at 08:15

## 2025-05-17 RX ADMIN — METHOCARBAMOL 500 MG: 100 INJECTION, SOLUTION INTRAMUSCULAR; INTRAVENOUS at 20:21

## 2025-05-17 RX ADMIN — ACETAMINOPHEN 975 MG: 325 TABLET ORAL at 00:42

## 2025-05-17 RX ADMIN — POTASSIUM CHLORIDE 20 MEQ: 29.8 INJECTION, SOLUTION INTRAVENOUS at 18:26

## 2025-05-17 RX ADMIN — NOREPINEPHRINE BITARTRATE 0.03 MCG/KG/MIN: 0.06 INJECTION, SOLUTION INTRAVENOUS at 21:33

## 2025-05-17 RX ADMIN — HYDROMORPHONE HYDROCHLORIDE 1 MG: 1 INJECTION, SOLUTION INTRAMUSCULAR; INTRAVENOUS; SUBCUTANEOUS at 04:26

## 2025-05-17 RX ADMIN — HYDROMORPHONE HYDROCHLORIDE 1 MG: 1 INJECTION, SOLUTION INTRAMUSCULAR; INTRAVENOUS; SUBCUTANEOUS at 11:46

## 2025-05-17 RX ADMIN — CALCIUM CHLORIDE, MAGNESIUM CHLORIDE, SODIUM CHLORIDE, SODIUM BICARBONATE, POTASSIUM CHLORIDE AND SODIUM PHOSPHATE DIBASIC DIHYDRATE 12.5 ML/KG/HR: 3.68; 3.05; 6.34; 3.09; .314; .187 INJECTION INTRAVENOUS at 11:23

## 2025-05-17 RX ADMIN — HYDROMORPHONE HYDROCHLORIDE 0.5 MG: 1 INJECTION, SOLUTION INTRAMUSCULAR; INTRAVENOUS; SUBCUTANEOUS at 10:46

## 2025-05-17 ASSESSMENT — ACTIVITIES OF DAILY LIVING (ADL)
ADLS_ACUITY_SCORE: 60

## 2025-05-17 NOTE — PROGRESS NOTES
CRRT STATUS NOTE    DATA:  Time:  5:56 PM  Pressures WNL:  Yes  Filter Status:  WDL    Problems Reported/Alarms Noted:  None    Supplies Present:  Yes    ASSESSMENT:  Patient Net Fluid Balance:  Net IO Since Admission: 15,631.8 mL [05/17/25 1756]     Intake/Output Summary (Last 24 hours) at 5/17/2025 1756  Last data filed at 5/17/2025 1700  Gross per 24 hour   Intake 4266.98 ml   Output 2176 ml   Net 2090.98 ml      Vitals:  Temp:  [98.2  F (36.8  C)-100.9  F (38.3  C)] 99.9  F (37.7  C)  Pulse:  [] 127  Resp:  [18-37] 28  MAP:  [70 mmHg-112 mmHg] 95 mmHg  Arterial Line BP: (101-158)/(53-88) 131/75  FiO2 (%):  [40 %-80 %] 60 %  SpO2:  [89 %-100 %] 95 %   Labs:    Lab Results   Component Value Date     05/17/2025    POTASSIUM 3.4 05/17/2025    CR 0.51 (L) 05/17/2025    BUN 25.5 (H) 05/17/2025    MAG 2.0 05/17/2025    PHOS 3.4 05/17/2025    WBC 14.8 (H) 05/17/2025    HGB 8.2 (L) 05/17/2025    HCT 24.8 (L) 05/17/2025     (L) 05/17/2025     Goals of Therapy: No UF, clearance only.     INTERVENTIONS:   Review flow sheets and discuss plan of care with bedside nurse and nephrology team.    PLAN:  Continue fluid removal as tolerated per goals of therapy.  Check filter daily and change filter q 72 hrs and PRN.  Please contact the CRRT resource RN with any questions/concerns.

## 2025-05-17 NOTE — PROGRESS NOTES
SURGICAL ICU PROGRESS NOTE  05/17/2025    Date of Service (when I saw the patient): 05/17/2025    ASSESSMENT:  Sebastián Rodas is a 29M with alcohol abuse, anxiety, and bipolar disorder, who was admitted 3/30/25 for alcohol withdrawal and abdominal pain. Workup showed leukocytosis and elevated lipase, consistent with acute pancreatitis. He developed encephalopathy and shock, requiring ICU care, intubation, vasopressors, and CRRT by 4/1. His course was complicated by cardiomyopathy (EF 20-25%, improved to 65-70% by 4/14), necrotizing pancreatitis, and persistent infection despite 14 days of meropenem. On 4/27, imaging showed likely perforated viscus; he underwent emergent laparotomy, necrosectomy, and colectomy. Transferred to [facility] SICU on 4/30. On 5/1, reoperation revealed colonic staple line breakdown, necrotic pancreas, vessel thrombosis, and adhesions. Colostomy with malankot drain and temporary abdominal closure performed. Prognosis is poor; palliative care involved. Care conference 5/2 with family to talk goals of care. Full code, family acknowledged that poor prognosis is to be expected. 5/4 s/p exploratory surgery, additional necrotic pancreas remove with no obvious spillage or sign of bleeding at the time. Went back to the OR 5/7 for abdominal wash out. CT abdomen 5/10 showed extravasation on arterial phase imaging. MTP initiated, IR arterial embolization of pancreatic vessels performed. Decreased stool output since 5/13, switched to TPN.        CHANGES and MAJOR THINGS TODAY:  - IV haldol at bedtime   - increased IV Dilaudid and increase precedex   - increased drainage from procedural sites   - trach dome, PEEP of 10 when ventilator support  - off vaso, giving IVF bolus   - restarted lantus 6 units   - lactate q6h    PLAN:    Neurological:  # Acute pain   #Encephalopathy- improving  #Insomnia  - Monitor neurological status. Delirium preventions and precautions.   # Pain: IV Robaxin, IV dilaudid PRN,  oxycodone 15mg Q4, Tylenol 975mg Q8. Increase PRN Hydromorphone allowance for mobility and dressing changes.  # Sedation: Precedex 0.7mcg/kg/hr  # Anxiety  - Sertraline 50mg  -IV haldol at bedtime     Pulmonary:   # Acute hypoxic respiratory failure  # S/p tracheostomy placement   - FiO2 (%): 60 %, Resp: 28, Vent Mode: VC/AC, Resp Rate (Set): 16 breaths/min, Tidal Volume (Set, mL): 420 mL, PEEP (cm H2O): 10 cmH2O, Pressure Support (cm H2O): 5 cmH2O, Resp Rate (Set): 16 breaths/min, Tidal Volume (Set, mL): 420 mL, PEEP (cm H2O): 10 cmH2O   - Full vent overnight. Has been having trouble with trach dome. Transitioned to BID 2 hour intermittent trach dome trials.  - CT PE 5/9 showed no evidence of pulmonary embolism  - Pulmonary toilet, mobilize  - Ventilator bundle    Cardiovascular:    # Stress Cardiomyopathy   - Initial LVEF 20-25%, improved to 65-70%  # Shock-distributive (septic)  # Hypovolemic shock   # Sinus Tachycardia  - Monitor hemodynamic status. MAP goal > 65.   - Norepinephrine off this morning, Straight rate Vasopressin @ 1 units/hr. Wean as tolerated  - Received Albumin 5% 250ml 5/15  - Hydroxycortisone for stress dose steroid   - EKG 5/8, 5/9 showed sinus tachycardia with no ectopy  - Trend lactate (3.6->3.0->2.5)  - POCUS 5/13 showed hyperdynamic LV and collapsible, underfilled jugular vein.      Gastroenterology/Nutrition:  # S/p emergent exploratory laparotomy, necrosectomy, transverse colectomy, abdominal washout, and drain placement 5/1 and 5/4  # S/p Pancreatic branch vessel embolization with IR 5/10  # Severe necrotizing pancreatitis  # Splenic vein thrombosis  - will defer management of dressings and drain removal per EGS   - Ongoing copious dark bloody drain output, CTM  - Cecostomy drain in place, no stool output. Abdomen distended. AXR unremarkable, looks well decompressed.   - on PPI IV  - CT abdomen/pelvis with IV + enteral contrast through G tube 5/14: large volume of air and blood products  in abdomen.     # Severe Protein calorie deficit malnutrition due to critical illness  - on TPN  - G tube tear in the J portion, TF stopped.  - IR consulted for PEG exchange    - IR cannot safely exchange freshly placed GJ tubes until they have been in place x6 weeks given risk of losing access into the stomach. Additionally, imaging shows there is no safe pexy between the stomach and abdominal wall. IR may not be able to offer safe exchange even after 6 weeks.   - Multivitamin supplementation ordered by nutrition  - RD consult. Appreciate cares and recommendations.     Renal/Fluids/Electrolytes:   # SARAHI  # Fluid overload  Baseline Cr 0.7-0.8. Presented with Cr 0.96 on 3/30. Rapidly markos to 5.2 on 4/1. Oliguric. Garcia UA with proteinuria, hematuria, pyuria, moderate bacteria.  Kidneys unremarkable on CT. Has severe ATN in the setting of shock, ADHF, intravascular hypovolemia/3rd spacing from pancreatitis.   - Trend lactate -> 3.0->2.5   - CRRT with I&Os goal net even  - Continue to monitor intake and output  - LR bolus to replace drain output    Endocrine:   # Stress hyperglycemia    - hgb A1c 5.3, no hx of DM   - Hold Lantus, switch HSSI to Insulin gtt  - Goal to keep BG< 180 for optimal wound healing   Recent Labs   Lab 05/17/25  1528 05/17/25  1526 05/17/25  1103 05/17/25  0803 05/17/25  0408   * 199* 170* 207* 213*  209*          ID:  # Leukocytosis  #Necrotizing pancreatitis  WBC Count   Date Value Ref Range Status   05/17/2025 14.8 (H) 4.0 - 11.0 10e3/uL Final   - CRP elevated at 454->344 , trend q3 days  - Restarted Zosyn again 5/14 for possible abdominal fecal contamination in light of leukocytosis, hypothermia, and decreased stool output.   - Temperature 100.2F this morning, 96.5F yesterday  - Zosyn completed: 5/2/25--5/11.   - Discontinued micafungin 5/3  - Completed 14 days course of meropenem and caspofungin.      cultures:  - 4/30 blood cultures- NGTD   - 5/1 blood cultures ordered - NGTD  -  Blood culture 5/12, 5/15 - NGTD     Heme:     # Acute blood loss anemia   # Anemia of critical illness   # Thrombosed splenic vein   - Transfuse if hgb <7.0 or signs/symptoms of hypoperfusion. Monitor and trend  Hemoglobin   Date Value Ref Range Status   05/17/2025 8.2 (L) 13.3 - 17.7 g/dL Final     - hemoglobin stable  - Continue subcutaneous heparin, watch bleeding from drains.     Musculoskeletal:   # Deconditioning and weakness due to critical illness   - Physical and occupational therapy consult      Skin:  # Pressure Ulcers - Buttocks/rectal area  - Barrier cream with liberal application. Continue fecal management system   - WOC consult      #Pressure Ulcers- Left Heel  Pressure Injury Location: Left heel   Wound type: Pressure Injury     Pressure Injury Stage: Deep Tissue Pressure Injury (DTPI), present on admission     General Cares/Prophylaxis:    DVT Prophylaxis: SQH   GI Prophylaxis: PPI  Restraints: Restraints for medical healing needed: YES - mittens     Lines/ tubes/ drains:  - HD line--  Right IJ  - PICC line- left   - Right radial A line  - PIV x 3  - Trach  - G-J  - Wound manager  - SHANNON x 4  - Cecostomy drain     Disposition:  - Surgical ICU    Saud Harrington, MS4  [employer]    Resident/Fellow Attestation   I, Jean Paul Pierre MD, was present with the medical/SANTOS student who participated in the service and in the documentation of the note.  I have verified the history and personally performed the physical exam and medical decision making.  I agree with the assessment and plan of care as documented in the note.      Jean Paul Pierre MD  PGY1  Date of Service (when I saw the patient): 05/16/25    ====================================  INTERVAL HISTORY:  Course reviewed. He has had difficulty tolerating the trach dome due to increased secretions, currently on timed trach dome trials. He had a tear in the J portion of his G tube and feeds were stopped and TPN was started.  Decreased stool colostomy output.   OBJECTIVE:   1. VITAL SIGNS:   Temp:  [98.2  F (36.8  C)-100.9  F (38.3  C)] 99.9  F (37.7  C)  Pulse:  [] 127  Resp:  [17-37] 28  MAP:  [70 mmHg-112 mmHg] 95 mmHg  Arterial Line BP: (101-158)/(53-88) 131/75  FiO2 (%):  [40 %-80 %] 60 %  SpO2:  [89 %-100 %] 95 %  FiO2 (%): 60 %, Resp: 28, Vent Mode: VC/AC, Resp Rate (Set): 16 breaths/min, Tidal Volume (Set, mL): 420 mL, PEEP (cm H2O): 10 cmH2O, Pressure Support (cm H2O): 5 cmH2O, Resp Rate (Set): 16 breaths/min, Tidal Volume (Set, mL): 420 mL, PEEP (cm H2O): 10 cmH2O    2. INTAKE/ OUTPUT:   I/O last 3 completed shifts:  In: 4142.88 [I.V.:1185.47; NG/GT:250; IV Piggyback:1254]  Out: 1893 [Urine:575; Emesis/NG output:75; Drains:1243]    3. PHYSICAL EXAMINATION:  General: laying in bed, awake and alert requesting a blanket.   HEENT: PERRLA. Trach present and secure, site dressed.   Pulm/Resp: Clear breath sounds bilaterally, lungs coarse but clear.  CV: RRR, S1/S2   Abdomen: Distended. Dark bloody output noted to all drains. G-J tube in place, site secured, abdomen with abthera in place, suction intact.   : scrotal edema, draining dark bloody output.   MSK/Extremities: generalized edema in extremities    4. INVESTIGATIONS:   Arterial Blood Gases   Recent Labs   Lab 05/11/25  0604   PH 7.33*   PCO2 43   PO2 89   HCO3 23     Complete Blood Count   Recent Labs   Lab 05/17/25  1526 05/17/25  1101 05/17/25  0408 05/16/25  1714 05/16/25  1223   WBC 14.8*  --  17.3* 22.3* 23.4*   HGB 8.2* 8.1* 8.8* 8.7* 9.3*   *  --  115* 101* 120*     Basic Metabolic Panel  Recent Labs   Lab 05/17/25  1528 05/17/25  1526 05/17/25  1103 05/17/25  0803 05/17/25  0408 05/16/25 2021 05/16/25  1714 05/16/25  1223   NA  --  135  --   --  138  --  135 137   POTASSIUM  --  3.4  --   --  3.8  --  3.9 4.0   CHLORIDE  --  102  --   --  104  --  103 104   CO2  --  21*  --   --  20*  --  21* 21*   BUN  --  25.5*  --   --  25.7*  --  22.9* 20.6*   CR  --   0.51*  --   --  0.58*  --  0.63* 0.63*   * 199* 170* 207* 213*  209*   < > 171*  182* 185*  185*    < > = values in this interval not displayed.     Liver Function Tests  Recent Labs   Lab 05/17/25  1526 05/17/25  1417 05/17/25  0408 05/16/25  1714 05/16/25  1223 05/16/25  0319 05/15/25  1120 05/15/25  0401 05/14/25  1151 05/14/25  0403 05/11/25  0322 05/10/25  2347 05/10/25  1952   AST  --   --  42  --   --  25  --  30  --  29   < > 31 33   ALT  --   --  25  --   --  13  --  14  --  16   < > 23 27   ALKPHOS  --   --  364*  --   --  259*  --  275*  --  243*   < > 114 109   BILITOTAL  --   --  2.4*  --   --  2.1*  --  2.6*  --  2.6*   < > 3.5* 3.1*   ALBUMIN 2.3*  --  2.2* 2.1* 2.1* 2.2*   < > 2.2*   < > 2.6*   < > 3.6 3.9   INR  --  1.18*  --   --   --   --   --   --   --   --   --  1.13 1.17*    < > = values in this interval not displayed.     Pancreatic Enzymes  No lab results found in last 7 days.    Coagulation Profile  Recent Labs   Lab 05/17/25  1417 05/10/25  2347 05/10/25  1952   INR 1.18* 1.13 1.17*   PTT 30 29 30     5. RADIOLOGY:   Recent Results (from the past 24 hours)   XR Abdomen Port 1 View    Narrative    EXAMINATION:  XR ABDOMEN PORT 1 VIEW 5/17/2025 1:42 PM.    COMPARISON: CT 5/14/2025, radiograph 5/13/2025.    HISTORY:  existing GJ tube balloon broken, eval placement    FINDINGS:   Multiple abdominal drains in place. The percutaneous gastrojejunostomy  tube appears to terminate along the left lateral abdominal wall with  contrast consolidated adjacent to the suspected tip. Bubbly appearance  throughout the abdomen likely corresponds to scattered mottled air on  recent CT scan. No acute osseous abnormality. Small bilateral pleural  effusions.      Impression    IMPRESSION:   Percutaneous gastrojejunostomy tube appears to terminate along the  left lateral abdominal wall with contrast consolidated adjacent to the  suspected tip, though not necessarily conforming to contour the  bowel.  Consider CT scan to evaluate for extraluminal contrast and possible  leak..    I have personally reviewed the examination and initial interpretation  and I agree with the findings.    KAELYN PATRICK DO         SYSTEM ID:  [id number]       =========================================

## 2025-05-17 NOTE — PROGRESS NOTES
INTENSIVIST FACULTY NOTE:  No sleep last night after we stopped his enteral meds  Drain output is looking much bloodier    CAM-ICU positive; even trying ot get him to speak with a finger over his tracheostomy, all he really does is say his name to me.  Tachycardic with a sinusoidal arterial line tracing suggesting volume depletion  Abdominal distention back up, and now everything is draining dark to bright red bloody stuff   Scrotum less distended, and less thin fluid is coming out of his scrotal leak.  He is very tender over the inguinal canals, and continues to ooze a lot of fluid (that looks the same as what comes out of the SHANNON drains) through the hole in his scrotum  Tolerating trach dome for ~2h at at time  Line sites look fine  Still diffuse anasarca  Minimal ostomy output  The jejunal feeding tube is fractured  G tube balloon won't hold any air, and the skin disc has retracted.  Because of that long jejunal tail, I don't have concern that the G tube has fully retracted out, but if there are concerns about the stomach not being in durable opposition to the abdominal wall, we may well have a (controlled) gastric leak now    Labs show normal electgrolytes on CRRT and adeqaute albumin levels  Bilirubin is back up a bit  Lactate is hovering aroud 2  Leukocytosis is coming down  Hgb hasn't plummeted yet, but I am sure it will based on the bloody drain output    This is a 29M hx bipolar and EtOH, with a prolonged hospitalization that began with an admission for alcohol withdrawal after a binge, but subsequent development of necrotizing pancreatitis which in turn led to a whole host of complications including cardiomyopathy (now recovered), SARAHI requiring renal replacement therapy, a perforated viscous requiring transverse colectomy, and an intra-abdominal bleed requiring IR embolization of a pancreatic branch off the distal celiac artery.  He has a tracheostomy and GJ tube.  His abdomen has been closed with vicryl  "mesh, and he has a frozen abdomen, so cannot undergo surgical exploration.      I am concerned that he is actively dying.  If the Hgb falls, will get a CTA and re-consult IR, but as before, there is nothing that can be done surgically.  With the fratured G tube balloon, there is a chance that the stomach is not holding opposition to the abdominal wall, and thus taht we have a gastric leak as well as wherever his other leak is.  As before, we have articulated to the family and patient repeatedly that he has a terminal illness and is expected to die at some point during this hospitalization, and nursing consistently reports that he seems to be expressing a desire to transition to comfort cares himself.  However, I am not convinced that he is decisional and understands the implications of what he is asking - he clearly is uncomfortable, unhappy with being stuck in bed, and depressed, but he is also confused and not articulating to me that he understands that \"stopping\" things means \"dying.\"  Unfortunately, he does not tolerate a speaking valve.    Today will increase his precedex dosing, do serial Hgb checks, and consider repeat CTA for bleed as above.  I still want him to get trach dome for prolonged periods despite being on PEEP of 10 when on rest settings.  We can start 6U of lantus, but expect that he will need to be increased even further.    METABOLIC ENCEPHALOPATHY / DELIRIUM:  -due to residual sedatives / pain meds, critical illness; monitoring with clinical exam  -IV haldol at bedtime; Precedex for sedation + sleep max dose can be increased  -discontinue all enteral meds now that G tube not holding air  -needs restraints (mitts)    ACUTE HYPOXIC RESPIRATORY FAILURE  -resolved, although respiratory mechanics remain poor due to debility and weakness, and now he more quickly desaturates than he had been doing in the last few days.  Still want to try trach dome for ~2h, then rest settings, but he may no longer " tolerate that.  PEEP of 10 when on ventilator support for re-recruitment  -targeting net even to net negative with CRRT    CONCERN FOR SEPTIC SHOCK:  -nothing that can be done surgically about his abdomen at this time.    -off pressors  -Empiric zosyn as above  -hydrocortisone 50mg IV q6h can be weaned over 3d (ie, to 50bid today, then 25bid, then 25 once, then he may be someone that needs to stay on low-dose maintenance steroids (eg, prednisone 5mg daily)    NECROTIZING PANCREATITIS:  -empiric zosyn; holding off on an antifungal, but if he develops severe septic shock again, then should strongly concsider given steroids, renal failure, and TPN.    -on appropriate nutrients    ACUTE KIDNEY INJURY:  -on CRRT currently.  Continue target of net even to slightly net positive today - significant insensible / unmeasured losses with all his drains and wounds, and based on bloody drain output, it sure seems like he is oozing from somewhere, even though drain output volumes have not increased.  I am less optimistic now about the chances of renal recovery    SPLENIC VEIN THROMBOSIS:  -repeat imaging showed no extension of thrombus or complications, so there are no plans to ever re-initiate therapeutic anticoagulation    INADEQUATE ORAL INTAKE:  -TPN; no tube feeds until starts having ostomy output again. Also, because his jejunal tube is less than 6 weeks old, interventional radiology is unwilling to replace this one at this time.  Given that now the G tube balloon won't even hold air, will have to talk with surgery and IR about whether there is anything that can be done to ensure the stomach is tacked up, or if we just have to accept the possibility of a gastric leak and not use his enteral tubes at all    DM2 / HYPERGLYCEMIA:  -restart lantus.  high scale sliding scale insulin.      MISC:  -Code status is full code  -mother updated by phone   -[facility]; PPI for mechanical ventilation and pancreatitis  -lines: [facility] solorio  dialysis line; arterial line; PICC for pressors  -no shore  -I do not anticipate that he will survive this hospitalization.  Will try to have more involved conversation with the mother this afternoon, as she is supposedly coming into the hospital this afternoon    Billing statement: 49min of critical care time; spent in an initial review of imaging, labs, physical exam, and discussion of the patient with my own team and the extended care team including the primary service. Based on this patient's presentation / recent intervention and my bedside assessment, I felt there was or is a reasonably high probability of imminent or life-threatening deterioration today or tonight for septic reasons.   My overall critical care time, as described in detail above, includes such things as coordination of care, arrhythmia and hemodynamics management with infusions of medicines, respiratory management, fluid therapy including fluid boluses, and pain and sedation therapy. This time excludes time I spent personally performing or supervising procedures for this patient.    DONNY Rodas MD  Clinical   Anesthesia / Critical Care  *[phone]

## 2025-05-17 NOTE — PLAN OF CARE
ICU End of Shift Summary. See flowsheets for vital signs and detailed assessment.    Changes this shift: RASS -1 to +2, Fent gtt started for increased pain and anxiety, continued Precedex gtt. Sinus tach up to 150's, 500ml LR bolus given with little effect, MAP wnl, t.max 100.9. Failed TD trial d/t dessating to 83%, PS for 3 hours. G tube displaced with copious amounts of drainage - MD called to bedside to attempt to inflate balloon and contrast study done, kept G to gravity but no PO meds. Abdominal wound with copious amounts of red output - MD at bedside, trending Hgb. K replaced.     Plan:  Continue plan of care.       Plan of Care Reviewed With: patient, family    Overall Patient Progress: decliningOverall Patient Progress: declining    Outcome Evaluation: Failed TD. Feeding tube dislodged, strict NPO. Abd wound with copious amounts of drainage.

## 2025-05-17 NOTE — PLAN OF CARE
ICU End of Shift Summary. See flowsheets for vital signs and detailed assessment.     Changes this shift: Intermittently delirious and confused. Increased anxiety with sleepless night. Precedex 0.7 with minimal effect. Oxy, Robaxin, Tylenol and PRN dilaudid for pain. Patient attempted to pull trach and drains- needed constant redirection. PS at 5, Peep 10, Fio2 40% since 0538. Tachy with HR up to 150's with increased anxiety. Vaso off at 0655. Replaced out with 200 cc of LR.   - CRRT: 4K bath, no UF.   -Labs: no replacement needed     Plan: Continue plan of care.                Goal Outcome Evaluation:      Plan of Care Reviewed With: patient    Overall Patient Progress: no changeOverall Patient Progress: no change    Outcome Evaluation: Increased anxiety, intermittent tach 120's-150's. Vaso gtt. Vent support

## 2025-05-17 NOTE — PROGRESS NOTES
"CRRT STATUS NOTE    DATA:  Time:  5:56 AM  Pressures WNL:  YES  Filter Status:  WDL    Problems Reported/Alarms Noted:  None    Supplies Present:  YES    ASSESSMENT:  Patient Net Fluid Balance: Positive 1430 ml yesterday, positive 470 ml since midnight  Vital Signs: /57   Pulse (!) 125   Temp 98.2  F (36.8  C) (Oral)   Resp 24   Ht 1.727 m (5' 7.99\")   Wt 83.3 kg (183 lb 10.3 oz)   SpO2 93%   BMI 27.93 kg/m    Labs: K+ 3.8, Cr 0.568, ICA 4.4, Lactate 2.1, Hgb 8.8  Goals of Therapy: No UF, clearance only    INTERVENTIONS:   None    PLAN:  Continue plan of care per CRRT orders. No fluid removal at this time.  Notify CRRT RN with concerns and questions.    "

## 2025-05-17 NOTE — PROGRESS NOTES
Nephrology Progress Note  05/17/2025       Sebastián Rodas is a 29 yom with Bipolar disorder, ETOH use c/b necrotizing pancreatitis admitted 3/30/25 to [facility] for ETOH withdrawal and abdominal pain, found to have acute pancreatitis which has progressed to necrotizing pancreatitis complicated by SARAHI.  Seen by Nephrology at [place], started on CRRT 4/1.  Had periods of stability enough for iHD but back to CRRT on 4/21 until tx to [facility] for further surgical interventions.       Interval History :   Mr Rodas continues on CRRT, drain output has been in balance with intake so not pulling any UF labs stable on 4k baths. Will change checks to BID to avoid excessive blood draws.     Off vaso, but HR is quite a bit higher and FiO2 variable. May evaluate switch to iHD Monday but for the weekend will continue CRRT.      Assessment & Recommendations:   SARAHI-Baseline Cr 0.8 as recently as March 2025 before acute events, was started on CRRT emergently on 4/1 in setting of septic shock. Had ~2 weeks of stability enough to manage with iHD but back on CRRT 4/21 until tx to [facility] on 4/30. Running fevers with intraabdominal sepsis.  Continuing RRT from OSH, restarted CRRT on 5/2 after OR.                  -No need for new consent, continuing RRT started last month at [facility].                 -Access is tunneled RIJ from 4/21.                  -CRRT,  all 4k baths, planning no UF as drain output is significant.         Volume-Abdomen distended, drain output is essentially in balance with intake, no UF planned.      Electrolytes-K 4.0 on all 4k baths, bicarb 21.      BMD-No acute issues.      Anemia-Hgb 9.3 this am, down from 10.7 yesterday but hemodynamically stable.      Nutrition-TPN      Discussed with Dr Samuel    Recommendations were communicated to primary team via verbal communication.      Lan Whiteside MD      Review of Systems:   I reviewed the following systems:  ROS not done due to vent/sedation.  "    Physical Exam:   I/O last 3 completed shifts:  In: 3704.69 [I.V.:1163.28; NG/GT:297; IV Piggyback:791]  Out: 1805 [Urine:475; Emesis/NG output:105; Drains:1225]   /57   Pulse (!) 134   Temp 98.9  F (37.2  C) (Axillary)   Resp 19   Ht 1.727 m (5' 7.99\")   Wt 83.3 kg (183 lb 10.3 oz)   SpO2 92%   BMI 27.93 kg/m       GENERAL APPEARANCE: Vent via trach, CRRT running.   Pulmonary: Vent via trach  CV: Regular rhythm, normal rate   - Edema +2 generalized  GI: Surgical dressing in place  MS: no evidence of inflammation in joints, no muscle tenderness  : + Garcia  SKIN: no rash, warm, dry  NEURO: Intubated and sedated.     Labs:   All labs reviewed by me  Electrolytes/Renal -   Recent Labs   Lab Test 05/17/25  0803 05/17/25  0408 05/16/25 2021 05/16/25 1714 05/16/25  1223   NA  --  138  --  135 137   POTASSIUM  --  3.8  --  3.9 4.0   CHLORIDE  --  104  --  103 104   CO2  --  20*  --  21* 21*   BUN  --  25.7*  --  22.9* 20.6*   CR  --  0.58*  --  0.63* 0.63*   * 213*  209*   < > 171*  182* 185*  185*   KARINA  --  7.8*  --  7.7* 7.6*   MAG  --  2.0  --  2.1 2.1   PHOS  --  3.5  --  3.6 3.7    < > = values in this interval not displayed.       CBC -   Recent Labs   Lab Test 05/17/25 0408 05/16/25 1714 05/16/25  1223   WBC 17.3* 22.3* 23.4*   HGB 8.8* 8.7* 9.3*   * 101* 120*       LFTs -   Recent Labs   Lab Test 05/17/25  0408 05/16/25  1714 05/16/25  1223 05/16/25  0319 05/15/25  1120 05/15/25  0401   ALKPHOS 364*  --   --  259*  --  275*   BILITOTAL 2.4*  --   --  2.1*  --  2.6*   ALT 25  --   --  13  --  14   AST 42  --   --  25  --  30   PROTTOTAL 5.4*  --   --  5.3*  --  5.5*   ALBUMIN 2.2* 2.1* 2.1* 2.2*   < > 2.2*    < > = values in this interval not displayed.       Iron Panel - No lab results found.        Current Medications:  Current Facility-Administered Medications   Medication Dose Route Frequency Provider Last Rate Last Admin    acetaminophen (TYLENOL) tablet 975 mg  975 mg " Oral or Feeding Tube Q8H Brendon Haas DO   975 mg at 05/17/25 0815    heparin ANTICOAGULANT injection 5,000 Units  5,000 Units Subcutaneous Q8H Roxi Garnica MD   5,000 Units at 05/16/25 0414    hydrocortisone sodium succinate PF (solu-CORTEF) injection 50 mg  50 mg Intravenous Q12H Brendon Haas DO        And    [START ON 5/18/2025] hydrocortisone sodium succinate PF (solu-CORTEF) injection 25 mg  25 mg Intravenous Q12H Brendon Haas DO        insulin aspart (NovoLOG) injection (RAPID ACTING)  1-12 Units Subcutaneous Q4H Renee Kwon CNP   3 Units at 05/17/25 0816    insulin glargine (LANTUS PEN) injection 6 Units  6 Units Subcutaneous Q24H Sonia Moore, NP        lactated ringers BOLUS 0-999 mL  0-999 mL Intravenous Q6H Renee Kwon  mL/hr at 05/15/25 0943 90 mL at 05/17/25 0451    lipids 4 oil (SMOFLIPID) 20 % infusion 250 mL  250 mL Intravenous Q24H Brendon Haas DO 20.8 mL/hr at 05/17/25 0800 Rate Verify at 05/17/25 0800    methocarbamol (ROBAXIN) injection 500 mg  500 mg Intravenous Q6H Renee Kwon CNP   500 mg at 05/17/25 0826    OLANZapine (zyPREXA) IV injection 2.5 mg  2.5 mg Intravenous QPM Sonia Moore, NP        oxyCODONE (ROXICODONE) tablet 15 mg  15 mg Oral or Feeding Tube Q4H Eugenia Zavala MD   15 mg at 05/17/25 0645    pantoprazole (PROTONIX) IV push injection 40 mg  40 mg Intravenous QAM AC Renee Kwon CNP   40 mg at 05/17/25 0815    piperacillin-tazobactam (ZOSYN) 4.5 g vial to attach to  mL bag  4.5 g Intravenous Q6H Eugenia Zavala MD   4.5 g at 05/17/25 0453    sertraline (ZOLOFT) tablet 50 mg  50 mg Oral or Feeding Tube Daily Eugenia Zavala MD   50 mg at 05/17/25 0815    sodium chloride (PF) 0.9% PF flush 3 mL  3 mL Intracatheter Q8H Ganesh Griffiths MD   3 mL at 05/17/25 0816     Current Facility-Administered  Medications   Medication Dose Route Frequency Provider Last Rate Last Admin    dexmedeTOMIDine (PRECEDEX) 4 mcg/mL in sodium chloride 0.9 % 100 mL infusion  0.1-1.2 mcg/kg/hr (Dosing Weight) Intravenous Continuous Sonia Moore NP 12.2 mL/hr at 05/17/25 0900 0.7 mcg/kg/hr at 05/17/25 0900    dextrose 10% infusion   Intravenous Continuous PRN Brendon Haas DO        dialysate for CVVHD & CVVHDF (Phoxillum BK4/2.5)  12.5 mL/kg/hr CRRT Continuous Tico Spain MD 1,000 mL/hr at 05/17/25 0620 12.5 mL/kg/hr at 05/17/25 0620    No heparin required   Does not apply Continuous PRN Tico Spain MD        parenteral nutrition - ADULT compounded formula   CENTRAL LINE IV TPN CONTINUOUS rBendon Haas DO        parenteral nutrition - ADULT compounded formula   CENTRAL LINE IV TPN CONTINUOUS Brendon Haas DO 50 mL/hr at 05/17/25 0800 Rate Verify at 05/17/25 0800    POST-filter replacement solution for CVVHD & CVVHDF (Phoxillum BK4/2.5)   CRRT Continuous Tico Spain  mL/hr at 05/16/25 1448 New Bag at 05/16/25 1448    PRE-filter replacement solution for CVVHD & CVVHDF (Phoxillum BK4/2.5)  12.5 mL/kg/hr CRRT Continuous Tico Spain MD 1,000 mL/hr at 05/17/25 0620 12.5 mL/kg/hr at 05/17/25 0620    Reason statin not prescribed   Does not apply DOES NOT GO TO Rhys Bloom MD        vasopressin 1 unit/mL MAX Conc (PITRESSIN) infusion  0.5-4 Units/hr Intravenous Continuous Renee Kwon CNP   Stopped at 05/17/25 0655

## 2025-05-18 ENCOUNTER — APPOINTMENT (OUTPATIENT)
Dept: CT IMAGING | Facility: CLINIC | Age: 30
End: 2025-05-18
Attending: INTERNAL MEDICINE
Payer: COMMERCIAL

## 2025-05-18 ENCOUNTER — APPOINTMENT (OUTPATIENT)
Dept: INTERVENTIONAL RADIOLOGY/VASCULAR | Facility: CLINIC | Age: 30
End: 2025-05-18
Attending: STUDENT IN AN ORGANIZED HEALTH CARE EDUCATION/TRAINING PROGRAM
Payer: COMMERCIAL

## 2025-05-18 LAB
ALBUMIN SERPL BCG-MCNC: 2.8 G/DL (ref 3.5–5.2)
ALBUMIN SERPL BCG-MCNC: 2.9 G/DL (ref 3.5–5.2)
ALLEN'S TEST: ABNORMAL
ALLEN'S TEST: NO
ALP SERPL-CCNC: 289 U/L (ref 40–150)
ALT SERPL W P-5'-P-CCNC: 36 U/L (ref 0–70)
ANION GAP SERPL CALCULATED.3IONS-SCNC: 14 MMOL/L (ref 7–15)
ANION GAP SERPL CALCULATED.3IONS-SCNC: 17 MMOL/L (ref 7–15)
ANION GAP SERPL CALCULATED.3IONS-SCNC: 22 MMOL/L (ref 7–15)
APTT PPP: 31 SECONDS (ref 22–38)
AST SERPL W P-5'-P-CCNC: 71 U/L (ref 0–45)
BASE EXCESS BLDA CALC-SCNC: -3.7 MMOL/L (ref -3–3)
BASE EXCESS BLDA CALC-SCNC: -5.7 MMOL/L (ref -3–3)
BILIRUB SERPL-MCNC: 2.6 MG/DL
BILIRUBIN DIRECT (ROCHE PRO & PURE): 1.9 MG/DL (ref 0–0.45)
BLD PROD TYP BPU: NORMAL
BLOOD COMPONENT TYPE: NORMAL
BUN SERPL-MCNC: 28.3 MG/DL (ref 6–20)
BUN SERPL-MCNC: 28.5 MG/DL (ref 6–20)
BUN SERPL-MCNC: 31 MG/DL (ref 6–20)
CA-I BLD-MCNC: 4.4 MG/DL (ref 4.4–5.2)
CA-I BLD-MCNC: 4.4 MG/DL (ref 4.4–5.2)
CA-I BLD-MCNC: 4.6 MG/DL (ref 4.4–5.2)
CALCIUM SERPL-MCNC: 7.9 MG/DL (ref 8.8–10.4)
CALCIUM SERPL-MCNC: 8.3 MG/DL (ref 8.8–10.4)
CALCIUM SERPL-MCNC: 8.6 MG/DL (ref 8.8–10.4)
CHLORIDE SERPL-SCNC: 105 MMOL/L (ref 98–107)
CODING SYSTEM: NORMAL
CREAT SERPL-MCNC: 0.5 MG/DL (ref 0.67–1.17)
CREAT SERPL-MCNC: 0.54 MG/DL (ref 0.67–1.17)
CREAT SERPL-MCNC: 0.63 MG/DL (ref 0.67–1.17)
CROSSMATCH: NORMAL
EGFRCR SERPLBLD CKD-EPI 2021: >90 ML/MIN/1.73M2
ERYTHROCYTE [DISTWIDTH] IN BLOOD BY AUTOMATED COUNT: 13.9 % (ref 10–15)
ERYTHROCYTE [DISTWIDTH] IN BLOOD BY AUTOMATED COUNT: 14.2 % (ref 10–15)
ERYTHROCYTE [DISTWIDTH] IN BLOOD BY AUTOMATED COUNT: 14.6 % (ref 10–15)
ERYTHROCYTE [DISTWIDTH] IN BLOOD BY AUTOMATED COUNT: 14.9 % (ref 10–15)
ERYTHROCYTE [DISTWIDTH] IN BLOOD BY AUTOMATED COUNT: 14.9 % (ref 10–15)
ERYTHROCYTE [DISTWIDTH] IN BLOOD BY AUTOMATED COUNT: 15.4 % (ref 10–15)
FIBRINOGEN PPP-MCNC: 307 MG/DL (ref 170–510)
GLUCOSE BLDC GLUCOMTR-MCNC: 117 MG/DL (ref 70–99)
GLUCOSE BLDC GLUCOMTR-MCNC: 130 MG/DL (ref 70–99)
GLUCOSE BLDC GLUCOMTR-MCNC: 131 MG/DL (ref 70–99)
GLUCOSE BLDC GLUCOMTR-MCNC: 137 MG/DL (ref 70–99)
GLUCOSE BLDC GLUCOMTR-MCNC: 140 MG/DL (ref 70–99)
GLUCOSE BLDC GLUCOMTR-MCNC: 143 MG/DL (ref 70–99)
GLUCOSE BLDC GLUCOMTR-MCNC: 151 MG/DL (ref 70–99)
GLUCOSE BLDC GLUCOMTR-MCNC: 176 MG/DL (ref 70–99)
GLUCOSE BLDC GLUCOMTR-MCNC: 207 MG/DL (ref 70–99)
GLUCOSE BLDC GLUCOMTR-MCNC: 254 MG/DL (ref 70–99)
GLUCOSE BLDC GLUCOMTR-MCNC: 279 MG/DL (ref 70–99)
GLUCOSE BLDC GLUCOMTR-MCNC: 286 MG/DL (ref 70–99)
GLUCOSE BLDC GLUCOMTR-MCNC: 309 MG/DL (ref 70–99)
GLUCOSE BLDC GLUCOMTR-MCNC: 315 MG/DL (ref 70–99)
GLUCOSE SERPL-MCNC: 127 MG/DL (ref 70–99)
GLUCOSE SERPL-MCNC: 133 MG/DL (ref 70–99)
GLUCOSE SERPL-MCNC: 319 MG/DL (ref 70–99)
HCO3 BLD-SCNC: 19 MMOL/L (ref 21–28)
HCO3 BLD-SCNC: 22 MMOL/L (ref 21–28)
HCO3 SERPL-SCNC: 19 MMOL/L (ref 22–29)
HCO3 SERPL-SCNC: 20 MMOL/L (ref 22–29)
HCO3 SERPL-SCNC: 21 MMOL/L (ref 22–29)
HCT VFR BLD AUTO: 26.2 % (ref 40–53)
HCT VFR BLD AUTO: 29.1 % (ref 40–53)
HCT VFR BLD AUTO: 31.1 % (ref 40–53)
HCT VFR BLD AUTO: 34.7 % (ref 40–53)
HCT VFR BLD AUTO: 37.2 % (ref 40–53)
HCT VFR BLD AUTO: 37.3 % (ref 40–53)
HGB BLD-MCNC: 11 G/DL (ref 13.3–17.7)
HGB BLD-MCNC: 12 G/DL (ref 13.3–17.7)
HGB BLD-MCNC: 13 G/DL (ref 13.3–17.7)
HGB BLD-MCNC: 13.1 G/DL (ref 13.3–17.7)
HGB BLD-MCNC: 9 G/DL (ref 13.3–17.7)
HGB BLD-MCNC: 9.7 G/DL (ref 13.3–17.7)
INR PPP: 1.21 (ref 0.85–1.15)
INR PPP: 1.63 (ref 0.85–1.15)
ISSUE DATE AND TIME: NORMAL
LACTATE SERPL-SCNC: 4.1 MMOL/L (ref 0.7–2)
LACTATE SERPL-SCNC: 5.5 MMOL/L (ref 0.7–2)
LACTATE SERPL-SCNC: 6.4 MMOL/L (ref 0.7–2)
LACTATE SERPL-SCNC: 6.7 MMOL/L (ref 0.7–2)
LACTATE SERPL-SCNC: 9.1 MMOL/L (ref 0.7–2)
MAGNESIUM SERPL-MCNC: 1.6 MG/DL (ref 1.7–2.3)
MAGNESIUM SERPL-MCNC: 2.2 MG/DL (ref 1.7–2.3)
MAGNESIUM SERPL-MCNC: 2.2 MG/DL (ref 1.7–2.3)
MCH RBC QN AUTO: 29.4 PG (ref 26.5–33)
MCH RBC QN AUTO: 29.8 PG (ref 26.5–33)
MCH RBC QN AUTO: 30 PG (ref 26.5–33)
MCH RBC QN AUTO: 30.1 PG (ref 26.5–33)
MCH RBC QN AUTO: 30.2 PG (ref 26.5–33)
MCH RBC QN AUTO: 32 PG (ref 26.5–33)
MCHC RBC AUTO-ENTMCNC: 33.3 G/DL (ref 31.5–36.5)
MCHC RBC AUTO-ENTMCNC: 34.4 G/DL (ref 31.5–36.5)
MCHC RBC AUTO-ENTMCNC: 34.6 G/DL (ref 31.5–36.5)
MCHC RBC AUTO-ENTMCNC: 34.9 G/DL (ref 31.5–36.5)
MCHC RBC AUTO-ENTMCNC: 35.1 G/DL (ref 31.5–36.5)
MCHC RBC AUTO-ENTMCNC: 35.4 G/DL (ref 31.5–36.5)
MCV RBC AUTO: 84 FL (ref 78–100)
MCV RBC AUTO: 85 FL (ref 78–100)
MCV RBC AUTO: 86 FL (ref 78–100)
MCV RBC AUTO: 86 FL (ref 78–100)
MCV RBC AUTO: 90 FL (ref 78–100)
MCV RBC AUTO: 93 FL (ref 78–100)
MTP TRACKING ORDER: NORMAL
O2/TOTAL GAS SETTING VFR VENT: 100 %
O2/TOTAL GAS SETTING VFR VENT: 21 %
OXYHGB MFR BLDA: 97 % (ref 92–100)
OXYHGB MFR BLDA: 99 % (ref 92–100)
PCO2 BLD: 33 MM HG (ref 35–45)
PCO2 BLD: 41 MM HG (ref 35–45)
PEEP: 10 CM H2O
PH BLD: 7.34 [PH] (ref 7.35–7.45)
PH BLD: 7.37 [PH] (ref 7.35–7.45)
PHOSPHATE SERPL-MCNC: 3.1 MG/DL (ref 2.5–4.5)
PHOSPHATE SERPL-MCNC: 3.1 MG/DL (ref 2.5–4.5)
PHOSPHATE SERPL-MCNC: 5.2 MG/DL (ref 2.5–4.5)
PLATELET # BLD AUTO: 120 10E3/UL (ref 150–450)
PLATELET # BLD AUTO: 132 10E3/UL (ref 150–450)
PLATELET # BLD AUTO: 63 10E3/UL (ref 150–450)
PLATELET # BLD AUTO: 86 10E3/UL (ref 150–450)
PLATELET # BLD AUTO: 97 10E3/UL (ref 150–450)
PLATELET # BLD AUTO: 99 10E3/UL (ref 150–450)
PO2 BLD: 187 MM HG (ref 80–105)
PO2 BLD: 90 MM HG (ref 80–105)
POTASSIUM SERPL-SCNC: 3.7 MMOL/L (ref 3.4–5.3)
POTASSIUM SERPL-SCNC: 3.7 MMOL/L (ref 3.4–5.3)
POTASSIUM SERPL-SCNC: 4.3 MMOL/L (ref 3.4–5.3)
POTASSIUM SERPL-SCNC: 5.2 MMOL/L (ref 3.4–5.3)
PROT SERPL-MCNC: 5.2 G/DL (ref 6.4–8.3)
PROTHROMBIN TIME: 15.6 SECONDS (ref 11.8–14.8)
PROTHROMBIN TIME: 19.5 SECONDS (ref 11.8–14.8)
RADIOLOGIST FLAGS: ABNORMAL
RBC # BLD AUTO: 2.81 10E6/UL (ref 4.4–5.9)
RBC # BLD AUTO: 3.22 10E6/UL (ref 4.4–5.9)
RBC # BLD AUTO: 3.69 10E6/UL (ref 4.4–5.9)
RBC # BLD AUTO: 4.08 10E6/UL (ref 4.4–5.9)
RBC # BLD AUTO: 4.33 10E6/UL (ref 4.4–5.9)
RBC # BLD AUTO: 4.34 10E6/UL (ref 4.4–5.9)
SAO2 % BLDA: 98.3 % (ref 96–97)
SAO2 % BLDA: >100 % (ref 96–97)
SODIUM SERPL-SCNC: 140 MMOL/L (ref 135–145)
SODIUM SERPL-SCNC: 141 MMOL/L (ref 135–145)
SODIUM SERPL-SCNC: 147 MMOL/L (ref 135–145)
UNIT ABO/RH: NORMAL
UNIT NUMBER: NORMAL
UNIT STATUS: NORMAL
UNIT TYPE ISBT: 5100
UNIT TYPE ISBT: 6200
UNIT TYPE ISBT: 7300
UNIT TYPE ISBT: 9500
UNIT TYPE ISBT: 9500
WBC # BLD AUTO: 15.7 10E3/UL (ref 4–11)
WBC # BLD AUTO: 23.7 10E3/UL (ref 4–11)
WBC # BLD AUTO: 25.5 10E3/UL (ref 4–11)
WBC # BLD AUTO: 28.8 10E3/UL (ref 4–11)
WBC # BLD AUTO: 36 10E3/UL (ref 4–11)
WBC # BLD AUTO: 41.1 10E3/UL (ref 4–11)

## 2025-05-18 PROCEDURE — 84100 ASSAY OF PHOSPHORUS: CPT | Performed by: CLINICAL NURSE SPECIALIST

## 2025-05-18 PROCEDURE — P9037 PLATE PHERES LEUKOREDU IRRAD: HCPCS

## 2025-05-18 PROCEDURE — 93005 ELECTROCARDIOGRAM TRACING: CPT

## 2025-05-18 PROCEDURE — 80053 COMPREHEN METABOLIC PANEL: CPT | Performed by: STUDENT IN AN ORGANIZED HEALTH CARE EDUCATION/TRAINING PROGRAM

## 2025-05-18 PROCEDURE — 85014 HEMATOCRIT: CPT | Performed by: CLINICAL NURSE SPECIALIST

## 2025-05-18 PROCEDURE — 85384 FIBRINOGEN ACTIVITY: CPT

## 2025-05-18 PROCEDURE — C1887 CATHETER, GUIDING: HCPCS

## 2025-05-18 PROCEDURE — 84100 ASSAY OF PHOSPHORUS: CPT

## 2025-05-18 PROCEDURE — 250N000009 HC RX 250

## 2025-05-18 PROCEDURE — 250N000011 HC RX IP 250 OP 636: Mod: JZ | Performed by: SURGERY

## 2025-05-18 PROCEDURE — 94003 VENT MGMT INPAT SUBQ DAY: CPT

## 2025-05-18 PROCEDURE — P9016 RBC LEUKOCYTES REDUCED: HCPCS

## 2025-05-18 PROCEDURE — C1889 IMPLANT/INSERT DEVICE, NOC: HCPCS

## 2025-05-18 PROCEDURE — 36247 INS CATH ABD/L-EXT ART 3RD: CPT

## 2025-05-18 PROCEDURE — 85027 COMPLETE CBC AUTOMATED: CPT

## 2025-05-18 PROCEDURE — 84450 TRANSFERASE (AST) (SGOT): CPT | Performed by: STUDENT IN AN ORGANIZED HEALTH CARE EDUCATION/TRAINING PROGRAM

## 2025-05-18 PROCEDURE — 71275 CT ANGIOGRAPHY CHEST: CPT | Mod: 26 | Performed by: STUDENT IN AN ORGANIZED HEALTH CARE EDUCATION/TRAINING PROGRAM

## 2025-05-18 PROCEDURE — 250N000009 HC RX 250: Performed by: STUDENT IN AN ORGANIZED HEALTH CARE EDUCATION/TRAINING PROGRAM

## 2025-05-18 PROCEDURE — 90947 DIALYSIS REPEATED EVAL: CPT

## 2025-05-18 PROCEDURE — B4185 PARENTERAL SOL 10 GM LIPIDS: HCPCS | Performed by: SURGERY

## 2025-05-18 PROCEDURE — 83735 ASSAY OF MAGNESIUM: CPT

## 2025-05-18 PROCEDURE — C1760 CLOSURE DEV, VASC: HCPCS

## 2025-05-18 PROCEDURE — 37244 VASC EMBOLIZE/OCCLUDE BLEED: CPT | Mod: GC | Performed by: STUDENT IN AN ORGANIZED HEALTH CARE EDUCATION/TRAINING PROGRAM

## 2025-05-18 PROCEDURE — 71275 CT ANGIOGRAPHY CHEST: CPT

## 2025-05-18 PROCEDURE — 82330 ASSAY OF CALCIUM: CPT | Performed by: CLINICAL NURSE SPECIALIST

## 2025-05-18 PROCEDURE — 85018 HEMOGLOBIN: CPT

## 2025-05-18 PROCEDURE — 250N000011 HC RX IP 250 OP 636: Performed by: NURSE PRACTITIONER

## 2025-05-18 PROCEDURE — 99152 MOD SED SAME PHYS/QHP 5/>YRS: CPT

## 2025-05-18 PROCEDURE — 82374 ASSAY BLOOD CARBON DIOXIDE: CPT

## 2025-05-18 PROCEDURE — 250N000011 HC RX IP 250 OP 636: Performed by: STUDENT IN AN ORGANIZED HEALTH CARE EDUCATION/TRAINING PROGRAM

## 2025-05-18 PROCEDURE — 84075 ASSAY ALKALINE PHOSPHATASE: CPT | Performed by: STUDENT IN AN ORGANIZED HEALTH CARE EDUCATION/TRAINING PROGRAM

## 2025-05-18 PROCEDURE — 84460 ALANINE AMINO (ALT) (SGPT): CPT | Performed by: STUDENT IN AN ORGANIZED HEALTH CARE EDUCATION/TRAINING PROGRAM

## 2025-05-18 PROCEDURE — 272N000143 HC KIT CR3

## 2025-05-18 PROCEDURE — P9012 CRYOPRECIPITATE EACH UNIT: HCPCS

## 2025-05-18 PROCEDURE — 82040 ASSAY OF SERUM ALBUMIN: CPT | Performed by: CLINICAL NURSE SPECIALIST

## 2025-05-18 PROCEDURE — C1769 GUIDE WIRE: HCPCS

## 2025-05-18 PROCEDURE — 82330 ASSAY OF CALCIUM: CPT

## 2025-05-18 PROCEDURE — 999N000185 HC STATISTIC TRANSPORT TIME EA 15 MIN

## 2025-05-18 PROCEDURE — 99152 MOD SED SAME PHYS/QHP 5/>YRS: CPT | Mod: GC | Performed by: STUDENT IN AN ORGANIZED HEALTH CARE EDUCATION/TRAINING PROGRAM

## 2025-05-18 PROCEDURE — 76937 US GUIDE VASCULAR ACCESS: CPT | Mod: 26 | Performed by: STUDENT IN AN ORGANIZED HEALTH CARE EDUCATION/TRAINING PROGRAM

## 2025-05-18 PROCEDURE — 85041 AUTOMATED RBC COUNT: CPT

## 2025-05-18 PROCEDURE — 82248 BILIRUBIN DIRECT: CPT | Performed by: STUDENT IN AN ORGANIZED HEALTH CARE EDUCATION/TRAINING PROGRAM

## 2025-05-18 PROCEDURE — 83605 ASSAY OF LACTIC ACID: CPT

## 2025-05-18 PROCEDURE — 250N000011 HC RX IP 250 OP 636: Performed by: SURGERY

## 2025-05-18 PROCEDURE — 272N000192 HC ACCESSORY CR2

## 2025-05-18 PROCEDURE — 83735 ASSAY OF MAGNESIUM: CPT | Performed by: CLINICAL NURSE SPECIALIST

## 2025-05-18 PROCEDURE — 82805 BLOOD GASES W/O2 SATURATION: CPT

## 2025-05-18 PROCEDURE — 250N000009 HC RX 250: Performed by: NURSE PRACTITIONER

## 2025-05-18 PROCEDURE — 250N000011 HC RX IP 250 OP 636

## 2025-05-18 PROCEDURE — 36246 INS CATH ABD/L-EXT ART 2ND: CPT | Mod: 26 | Performed by: STUDENT IN AN ORGANIZED HEALTH CARE EDUCATION/TRAINING PROGRAM

## 2025-05-18 PROCEDURE — 04L43DZ OCCLUSION OF SPLENIC ARTERY WITH INTRALUMINAL DEVICE, PERCUTANEOUS APPROACH: ICD-10-PCS | Performed by: STUDENT IN AN ORGANIZED HEALTH CARE EDUCATION/TRAINING PROGRAM

## 2025-05-18 PROCEDURE — 255N000002 HC RX 255 OP 636: Performed by: STUDENT IN AN ORGANIZED HEALTH CARE EDUCATION/TRAINING PROGRAM

## 2025-05-18 PROCEDURE — 999N000157 HC STATISTIC RCP TIME EA 10 MIN

## 2025-05-18 PROCEDURE — 85610 PROTHROMBIN TIME: CPT

## 2025-05-18 PROCEDURE — 258N000003 HC RX IP 258 OP 636: Performed by: SURGERY

## 2025-05-18 PROCEDURE — 99233 SBSQ HOSP IP/OBS HIGH 50: CPT | Mod: 24 | Performed by: INTERNAL MEDICINE

## 2025-05-18 PROCEDURE — 85730 THROMBOPLASTIN TIME PARTIAL: CPT

## 2025-05-18 PROCEDURE — 84155 ASSAY OF PROTEIN SERUM: CPT | Performed by: STUDENT IN AN ORGANIZED HEALTH CARE EDUCATION/TRAINING PROGRAM

## 2025-05-18 PROCEDURE — 82247 BILIRUBIN TOTAL: CPT | Performed by: STUDENT IN AN ORGANIZED HEALTH CARE EDUCATION/TRAINING PROGRAM

## 2025-05-18 PROCEDURE — 200N000002 HC R&B ICU UMMC

## 2025-05-18 PROCEDURE — 82805 BLOOD GASES W/O2 SATURATION: CPT | Performed by: SURGERY

## 2025-05-18 PROCEDURE — 272N000504 HC NEEDLE CR4

## 2025-05-18 PROCEDURE — P9059 PLASMA, FRZ BETWEEN 8-24HOUR: HCPCS

## 2025-05-18 PROCEDURE — 250N000011 HC RX IP 250 OP 636: Mod: JZ | Performed by: NURSE PRACTITIONER

## 2025-05-18 PROCEDURE — 250N000009 HC RX 250: Performed by: SURGERY

## 2025-05-18 PROCEDURE — 99291 CRITICAL CARE FIRST HOUR: CPT | Mod: 24 | Performed by: ANESTHESIOLOGY

## 2025-05-18 PROCEDURE — 250N000011 HC RX IP 250 OP 636: Performed by: INTERNAL MEDICINE

## 2025-05-18 PROCEDURE — 82310 ASSAY OF CALCIUM: CPT

## 2025-05-18 PROCEDURE — 37244 VASC EMBOLIZE/OCCLUDE BLEED: CPT

## 2025-05-18 PROCEDURE — 272N000566 HC SHEATH CR3

## 2025-05-18 PROCEDURE — 272N000187 HC ACCESSORY CR11

## 2025-05-18 PROCEDURE — 250N000011 HC RX IP 250 OP 636: Mod: JZ | Performed by: STUDENT IN AN ORGANIZED HEALTH CARE EDUCATION/TRAINING PROGRAM

## 2025-05-18 RX ORDER — FENTANYL CITRATE 50 UG/ML
25-50 INJECTION, SOLUTION INTRAMUSCULAR; INTRAVENOUS EVERY 5 MIN PRN
Refills: 0 | Status: DISCONTINUED | OUTPATIENT
Start: 2025-05-18 | End: 2025-05-18 | Stop reason: HOSPADM

## 2025-05-18 RX ORDER — IOPAMIDOL 755 MG/ML
90 INJECTION, SOLUTION INTRAVASCULAR ONCE
Status: COMPLETED | OUTPATIENT
Start: 2025-05-18 | End: 2025-05-18

## 2025-05-18 RX ORDER — NALOXONE HYDROCHLORIDE 0.4 MG/ML
0.4 INJECTION, SOLUTION INTRAMUSCULAR; INTRAVENOUS; SUBCUTANEOUS
Status: DISCONTINUED | OUTPATIENT
Start: 2025-05-18 | End: 2025-05-18 | Stop reason: HOSPADM

## 2025-05-18 RX ORDER — HEPARIN SODIUM 200 [USP'U]/100ML
2 INJECTION, SOLUTION INTRAVENOUS EVERY 5 MIN PRN
Status: DISCONTINUED | OUTPATIENT
Start: 2025-05-18 | End: 2025-05-18

## 2025-05-18 RX ORDER — HYDROCORTISONE SODIUM SUCCINATE 100 MG/2ML
25 INJECTION INTRAMUSCULAR; INTRAVENOUS EVERY 12 HOURS
Status: COMPLETED | OUTPATIENT
Start: 2025-05-18 | End: 2025-05-18

## 2025-05-18 RX ORDER — CALCIUM CHLORIDE 100 MG/ML
INJECTION INTRAVENOUS; INTRAVENTRICULAR
Status: DISCONTINUED
Start: 2025-05-18 | End: 2025-05-18 | Stop reason: HOSPADM

## 2025-05-18 RX ORDER — NALOXONE HYDROCHLORIDE 0.4 MG/ML
0.2 INJECTION, SOLUTION INTRAMUSCULAR; INTRAVENOUS; SUBCUTANEOUS
Status: DISCONTINUED | OUTPATIENT
Start: 2025-05-18 | End: 2025-05-18 | Stop reason: HOSPADM

## 2025-05-18 RX ORDER — INDOMETHACIN 25 MG/1
CAPSULE ORAL
Status: DISCONTINUED
Start: 2025-05-18 | End: 2025-05-18 | Stop reason: HOSPADM

## 2025-05-18 RX ORDER — CALCIUM CHLORIDE 100 MG/ML
1 INJECTION INTRAVENOUS; INTRAVENTRICULAR
Status: DISCONTINUED | OUTPATIENT
Start: 2025-05-18 | End: 2025-05-18

## 2025-05-18 RX ORDER — DEXTROSE MONOHYDRATE 25 G/50ML
25-50 INJECTION, SOLUTION INTRAVENOUS
Status: DISCONTINUED | OUTPATIENT
Start: 2025-05-18 | End: 2025-05-20

## 2025-05-18 RX ORDER — NICOTINE POLACRILEX 4 MG
15-30 LOZENGE BUCCAL
Status: DISCONTINUED | OUTPATIENT
Start: 2025-05-18 | End: 2025-05-20

## 2025-05-18 RX ORDER — HYDROCORTISONE SODIUM SUCCINATE 100 MG/2ML
25 INJECTION INTRAMUSCULAR; INTRAVENOUS DAILY
Status: DISCONTINUED | OUTPATIENT
Start: 2025-05-19 | End: 2025-05-23

## 2025-05-18 RX ORDER — IODIXANOL 320 MG/ML
150 INJECTION, SOLUTION INTRAVASCULAR ONCE
Status: COMPLETED | OUTPATIENT
Start: 2025-05-18 | End: 2025-05-18

## 2025-05-18 RX ORDER — FLUMAZENIL 0.1 MG/ML
0.2 INJECTION, SOLUTION INTRAVENOUS
Status: DISCONTINUED | OUTPATIENT
Start: 2025-05-18 | End: 2025-05-18 | Stop reason: HOSPADM

## 2025-05-18 RX ORDER — DEXTROSE MONOHYDRATE 100 MG/ML
INJECTION, SOLUTION INTRAVENOUS CONTINUOUS PRN
Status: DISCONTINUED | OUTPATIENT
Start: 2025-05-18 | End: 2025-05-20

## 2025-05-18 RX ADMIN — FENTANYL CITRATE 50 MCG: 50 INJECTION, SOLUTION INTRAMUSCULAR; INTRAVENOUS at 01:50

## 2025-05-18 RX ADMIN — CALCIUM CHLORIDE, MAGNESIUM CHLORIDE, SODIUM CHLORIDE, SODIUM BICARBONATE, POTASSIUM CHLORIDE AND SODIUM PHOSPHATE DIBASIC DIHYDRATE 12.5 ML/KG/HR: 3.68; 3.05; 6.34; 3.09; .314; .187 INJECTION INTRAVENOUS at 08:49

## 2025-05-18 RX ADMIN — FENTANYL CITRATE 50 MCG: 50 INJECTION, SOLUTION INTRAMUSCULAR; INTRAVENOUS at 02:25

## 2025-05-18 RX ADMIN — DEXMEDETOMIDINE HYDROCHLORIDE 1 MCG/KG/HR: 400 INJECTION INTRAVENOUS at 16:52

## 2025-05-18 RX ADMIN — MAGNESIUM SULFATE HEPTAHYDRATE: 500 INJECTION, SOLUTION INTRAMUSCULAR; INTRAVENOUS at 19:55

## 2025-05-18 RX ADMIN — SMOFLIPID 250 ML: 6; 6; 5; 3 INJECTION, EMULSION INTRAVENOUS at 19:55

## 2025-05-18 RX ADMIN — FENTANYL CITRATE 50 MCG: 50 INJECTION, SOLUTION INTRAMUSCULAR; INTRAVENOUS at 02:41

## 2025-05-18 RX ADMIN — PIPERACILLIN AND TAZOBACTAM 4.5 G: 4; .5 INJECTION, POWDER, LYOPHILIZED, FOR SOLUTION INTRAVENOUS at 23:38

## 2025-05-18 RX ADMIN — MAGNESIUM SULFATE HEPTAHYDRATE 2 G: 2 INJECTION, SOLUTION INTRAVENOUS at 12:30

## 2025-05-18 RX ADMIN — CALCIUM CHLORIDE, MAGNESIUM CHLORIDE, SODIUM CHLORIDE, SODIUM BICARBONATE, POTASSIUM CHLORIDE AND SODIUM PHOSPHATE DIBASIC DIHYDRATE 12.5 ML/KG/HR: 3.68; 3.05; 6.34; 3.09; .314; .187 INJECTION INTRAVENOUS at 23:59

## 2025-05-18 RX ADMIN — SODIUM CHLORIDE 4 UNITS/HR: 9 INJECTION, SOLUTION INTRAVENOUS at 09:54

## 2025-05-18 RX ADMIN — CALCIUM CHLORIDE, MAGNESIUM CHLORIDE, SODIUM CHLORIDE, SODIUM BICARBONATE, POTASSIUM CHLORIDE AND SODIUM PHOSPHATE DIBASIC DIHYDRATE 12.5 ML/KG/HR: 3.68; 3.05; 6.34; 3.09; .314; .187 INJECTION INTRAVENOUS at 08:48

## 2025-05-18 RX ADMIN — METHOCARBAMOL 500 MG: 100 INJECTION, SOLUTION INTRAMUSCULAR; INTRAVENOUS at 23:38

## 2025-05-18 RX ADMIN — IOPAMIDOL 112 ML: 755 INJECTION, SOLUTION INTRAVENOUS at 00:27

## 2025-05-18 RX ADMIN — MIDAZOLAM 1 MG: 1 INJECTION INTRAMUSCULAR; INTRAVENOUS at 03:09

## 2025-05-18 RX ADMIN — CALCIUM CHLORIDE, MAGNESIUM CHLORIDE, SODIUM CHLORIDE, SODIUM BICARBONATE, POTASSIUM CHLORIDE AND SODIUM PHOSPHATE DIBASIC DIHYDRATE 12.5 ML/KG/HR: 3.68; 3.05; 6.34; 3.09; .314; .187 INJECTION INTRAVENOUS at 18:38

## 2025-05-18 RX ADMIN — FENTANYL CITRATE 50 MCG: 50 INJECTION, SOLUTION INTRAMUSCULAR; INTRAVENOUS at 02:02

## 2025-05-18 RX ADMIN — PIPERACILLIN AND TAZOBACTAM 4.5 G: 4; .5 INJECTION, POWDER, LYOPHILIZED, FOR SOLUTION INTRAVENOUS at 05:49

## 2025-05-18 RX ADMIN — Medication 25 MCG: at 11:25

## 2025-05-18 RX ADMIN — CALCIUM CHLORIDE, MAGNESIUM CHLORIDE, SODIUM CHLORIDE, SODIUM BICARBONATE, POTASSIUM CHLORIDE AND SODIUM PHOSPHATE DIBASIC DIHYDRATE 12.5 ML/KG/HR: 3.68; 3.05; 6.34; 3.09; .314; .187 INJECTION INTRAVENOUS at 13:50

## 2025-05-18 RX ADMIN — MIDAZOLAM 1 MG: 1 INJECTION INTRAMUSCULAR; INTRAVENOUS at 01:50

## 2025-05-18 RX ADMIN — HEPARIN SODIUM 5000 UNITS: 5000 INJECTION, SOLUTION INTRAVENOUS; SUBCUTANEOUS at 19:53

## 2025-05-18 RX ADMIN — HEPARIN SODIUM 5000 UNITS: 5000 INJECTION, SOLUTION INTRAVENOUS; SUBCUTANEOUS at 12:29

## 2025-05-18 RX ADMIN — MIDAZOLAM 1 MG: 1 INJECTION INTRAMUSCULAR; INTRAVENOUS at 02:53

## 2025-05-18 RX ADMIN — HEPARIN SODIUM IN SODIUM CHLORIDE 2 BAG: 200 INJECTION INTRAVENOUS at 01:45

## 2025-05-18 RX ADMIN — METHOCARBAMOL 500 MG: 100 INJECTION, SOLUTION INTRAMUSCULAR; INTRAVENOUS at 05:50

## 2025-05-18 RX ADMIN — MIDAZOLAM 1 MG: 1 INJECTION INTRAMUSCULAR; INTRAVENOUS at 03:21

## 2025-05-18 RX ADMIN — CALCIUM CHLORIDE INJECTION 1 G: 100 INJECTION, SOLUTION INTRAVENOUS at 00:45

## 2025-05-18 RX ADMIN — SODIUM CHLORIDE 4 UNITS/HR: 9 INJECTION, SOLUTION INTRAVENOUS at 01:18

## 2025-05-18 RX ADMIN — MIDAZOLAM 2 MG: 1 INJECTION INTRAMUSCULAR; INTRAVENOUS at 02:23

## 2025-05-18 RX ADMIN — DEXMEDETOMIDINE HYDROCHLORIDE 0.4 MCG/KG/HR: 400 INJECTION INTRAVENOUS at 10:52

## 2025-05-18 RX ADMIN — DEXMEDETOMIDINE HYDROCHLORIDE 1 MCG/KG/HR: 400 INJECTION INTRAVENOUS at 22:20

## 2025-05-18 RX ADMIN — FENTANYL CITRATE 50 MCG: 50 INJECTION, SOLUTION INTRAMUSCULAR; INTRAVENOUS at 01:55

## 2025-05-18 RX ADMIN — METHOCARBAMOL 500 MG: 100 INJECTION, SOLUTION INTRAMUSCULAR; INTRAVENOUS at 12:29

## 2025-05-18 RX ADMIN — FENTANYL CITRATE 50 MCG: 50 INJECTION, SOLUTION INTRAMUSCULAR; INTRAVENOUS at 02:54

## 2025-05-18 RX ADMIN — HYDROCORTISONE SODIUM SUCCINATE 25 MG: 100 INJECTION, POWDER, FOR SOLUTION INTRAMUSCULAR; INTRAVENOUS at 19:51

## 2025-05-18 RX ADMIN — IOPAMIDOL 90 ML: 755 INJECTION, SOLUTION INTRAVENOUS at 04:05

## 2025-05-18 RX ADMIN — PIPERACILLIN AND TAZOBACTAM 4.5 G: 4; .5 INJECTION, POWDER, LYOPHILIZED, FOR SOLUTION INTRAVENOUS at 12:29

## 2025-05-18 RX ADMIN — FENTANYL CITRATE 50 MCG: 50 INJECTION, SOLUTION INTRAMUSCULAR; INTRAVENOUS at 03:21

## 2025-05-18 RX ADMIN — IODIXANOL 100 ML: 320 INJECTION, SOLUTION INTRAVASCULAR at 03:27

## 2025-05-18 RX ADMIN — METHOCARBAMOL 500 MG: 100 INJECTION, SOLUTION INTRAMUSCULAR; INTRAVENOUS at 18:18

## 2025-05-18 RX ADMIN — Medication 100 MCG/HR: at 10:51

## 2025-05-18 RX ADMIN — INSULIN HUMAN 3.5 UNITS/HR: 1 INJECTION, SOLUTION INTRAVENOUS at 10:53

## 2025-05-18 RX ADMIN — FENTANYL CITRATE 50 MCG: 50 INJECTION, SOLUTION INTRAMUSCULAR; INTRAVENOUS at 02:03

## 2025-05-18 RX ADMIN — MIDAZOLAM 2 MG: 1 INJECTION INTRAMUSCULAR; INTRAVENOUS at 02:02

## 2025-05-18 RX ADMIN — MIDAZOLAM 1 MG: 1 INJECTION INTRAMUSCULAR; INTRAVENOUS at 01:55

## 2025-05-18 RX ADMIN — SODIUM CHLORIDE 4 UNITS/HR: 9 INJECTION, SOLUTION INTRAVENOUS at 16:52

## 2025-05-18 RX ADMIN — OLANZAPINE 2.5 MG: 10 INJECTION, POWDER, FOR SOLUTION INTRAMUSCULAR at 19:53

## 2025-05-18 RX ADMIN — PANTOPRAZOLE SODIUM 40 MG: 40 INJECTION, POWDER, FOR SOLUTION INTRAVENOUS at 07:17

## 2025-05-18 RX ADMIN — FENTANYL CITRATE 50 MCG: 50 INJECTION, SOLUTION INTRAMUSCULAR; INTRAVENOUS at 02:22

## 2025-05-18 RX ADMIN — FENTANYL CITRATE 50 MCG: 50 INJECTION, SOLUTION INTRAMUSCULAR; INTRAVENOUS at 03:09

## 2025-05-18 RX ADMIN — LIDOCAINE HYDROCHLORIDE 30 ML: 10 INJECTION, SOLUTION EPIDURAL; INFILTRATION; INTRACAUDAL; PERINEURAL at 01:50

## 2025-05-18 RX ADMIN — HYDROCORTISONE SODIUM SUCCINATE 25 MG: 100 INJECTION, POWDER, FOR SOLUTION INTRAMUSCULAR; INTRAVENOUS at 07:17

## 2025-05-18 RX ADMIN — CALCIUM CHLORIDE, MAGNESIUM CHLORIDE, SODIUM CHLORIDE, SODIUM BICARBONATE, POTASSIUM CHLORIDE AND SODIUM PHOSPHATE DIBASIC DIHYDRATE: 3.68; 3.05; 6.34; 3.09; .314; .187 INJECTION INTRAVENOUS at 23:34

## 2025-05-18 RX ADMIN — MIDAZOLAM 1 MG: 1 INJECTION INTRAMUSCULAR; INTRAVENOUS at 02:41

## 2025-05-18 RX ADMIN — PIPERACILLIN AND TAZOBACTAM 4.5 G: 4; .5 INJECTION, POWDER, LYOPHILIZED, FOR SOLUTION INTRAVENOUS at 18:18

## 2025-05-18 ASSESSMENT — ACTIVITIES OF DAILY LIVING (ADL)
ADLS_ACUITY_SCORE: 60

## 2025-05-18 NOTE — PROGRESS NOTES
Nephrology Progress Note  05/18/2025       Sebastián Rodas is a 29 yom with Bipolar disorder, ETOH use c/b necrotizing pancreatitis admitted 3/30/25 to [facility] for ETOH withdrawal and abdominal pain, found to have acute pancreatitis which has progressed to necrotizing pancreatitis complicated by SARAHI.  Seen by Nephrology at [facility], started on CRRT 4/1.  Had periods of stability enough for iHD but back to CRRT on 4/21 until tx to [facility] for further surgical interventions.       Interval History :   Splenic artery bleed overnight. MTP activated - recorded 9.2 L worth of blood products since yesterday afternoon.. Embolized overnight by IR.    CRRT continues. Nephrology is available for a care conference should one occur. Jamul for renal recovery is negligible.    Assessment & Recommendations:   SARAHI-Baseline Cr 0.8 as recently as March 2025 before acute events, was started on CRRT emergently on 4/1 in setting of septic shock. Had ~2 weeks of stability enough to manage with iHD but back on CRRT 4/21 until tx to [facility] on 4/30. Running fevers with intraabdominal sepsis.  Continuing RRT from OSH, restarted CRRT on 5/2 after OR.                  -No need for new consent, continuing RRT started last month at [facility].                 -Access is tunneled RIJ from 4/21.                  -CRRT,  all 4k baths, planning no UF as drain output is significant.         Volume-Abdomen distended, drain output is essentially in balance with intake, no UF planned.      Electrolytes-K 4.0 on all 4k baths, bicarb 21.      BMD-No acute issues.      Anemia-Hgb 9.3 this am, down from 10.7 yesterday but hemodynamically stable.      Nutrition-TPN      Discussed with Dr Samuel    Recommendations were communicated to primary team via verbal communication.      Lan Whiteside MD      Review of Systems:   I reviewed the following systems:  ROS not done due to vent/sedation.     Physical Exam:   I/O last 3 completed  "shifts:  In: 81272.85 [I.V.:1005.45; NG/GT:45; IV Piggyback:1186]  Out: 6498 [Urine:100; Emesis/NG output:1885; Drains:4513]   /57   Pulse (!) 149   Temp 99.6  F (37.6  C) (Axillary)   Resp (!) 35   Ht 1.727 m (5' 7.99\")   Wt 87 kg (191 lb 12.8 oz)   SpO2 97%   BMI 29.17 kg/m       GENERAL APPEARANCE: Vent via trach, CRRT running.   Pulmonary: Vent via trach  CV: Regular rhythm, normal rate   - Edema +2 generalized  GI: Surgical dressing in place  MS: no evidence of inflammation in joints, no muscle tenderness  : + Garcia  SKIN: no rash, warm, dry  NEURO: Intubated and sedated.     Labs:   All labs reviewed by me  Electrolytes/Renal -   Recent Labs   Lab Test 05/18/25  0728 05/18/25  0555 05/18/25  0530 05/18/25  0046 05/17/25  2343 05/17/25 2014 05/17/25 2007 05/17/25  1528 05/17/25  1526   NA  --   --  147*  --   --   --  137  --  135   POTASSIUM  --   --  4.3  --  5.2  --  3.8  --  3.4   CHLORIDE  --   --  105  --   --   --  104  --  102   CO2  --   --  20*  --   --   --  21*  --  21*   BUN  --   --  31.0*  --   --   --  24.7*  --  25.5*   CR  --   --  0.63*  --   --   --  0.46*  --  0.51*   * 315* 319*   < > 261*   < > 153*   < > 199*   KARINA  --   --  8.6*  --   --   --  7.7*  --  7.7*   MAG  --   --  1.6*  --   --   --  1.9  --  2.0   PHOS  --   --  5.2*  --   --   --  3.3  --  3.4    < > = values in this interval not displayed.       CBC -   Recent Labs   Lab Test 05/18/25 0530 05/18/25  0045 05/17/25  2343   WBC 23.7* 15.7* 25.5*   HGB 13.1* 9.7* 9.0*   * 99* 63*       LFTs -   Recent Labs   Lab Test 05/18/25  0530 05/17/25  1526 05/17/25  0408 05/16/25  1223 05/16/25  0319   ALKPHOS 289*  --  364*  --  259*   BILITOTAL 2.6*  --  2.4*  --  2.1*   ALT 36  --  25  --  13   AST 71*  --  42  --  25   PROTTOTAL 5.2*  --  5.4*  --  5.3*   ALBUMIN 2.9* 2.3* 2.2*   < > 2.2*    < > = values in this interval not displayed.       Iron Panel - No lab results found.        Current " Medications:  Current Facility-Administered Medications   Medication Dose Route Frequency Provider Last Rate Last Admin    [Held by provider] acetaminophen (TYLENOL) tablet 975 mg  975 mg Oral or Feeding Tube Q8H Brendon Haas DO   975 mg at 05/17/25 0815    [Held by provider] heparin ANTICOAGULANT injection 5,000 Units  5,000 Units Subcutaneous Q8H Roxi Garnica MD   5,000 Units at 05/17/25 2025    hydrocortisone sodium succinate PF (solu-CORTEF) injection 25 mg  25 mg Intravenous Q12H Brendon Haas DO   25 mg at 05/18/25 0717    insulin aspart (NovoLOG) injection (RAPID ACTING)  1-12 Units Subcutaneous Q4H Renee Kwon CNP   6 Units at 05/18/25 0732    insulin glargine (LANTUS PEN) injection 6 Units  6 Units Subcutaneous Q24H Sonia Moore NP   6 Units at 05/17/25 1048    lactated ringers BOLUS 0-999 mL  0-999 mL Intravenous Q6H Renee Kwon  mL/hr at 05/15/25 0943 519 mL at 05/17/25 1642    lipids 4 oil (SMOFLIPID) 20 % infusion 250 mL  250 mL Intravenous Q24H Brendon Haas DO 20.8 mL/hr at 05/17/25 2023 250 mL at 05/17/25 2023    methocarbamol (ROBAXIN) injection 500 mg  500 mg Intravenous Q6H Renee Kwon CNP   500 mg at 05/18/25 0550    OLANZapine (zyPREXA) IV injection 2.5 mg  2.5 mg Intravenous QPM Sonia Moore NP   2.5 mg at 05/17/25 2026    [Held by provider] oxyCODONE (ROXICODONE) tablet 15 mg  15 mg Oral or Feeding Tube Q4H Euegnia Zavala MD   15 mg at 05/17/25 1045    pantoprazole (PROTONIX) IV push injection 40 mg  40 mg Intravenous QAM AC Renee Kwon CNP   40 mg at 05/18/25 0717    piperacillin-tazobactam (ZOSYN) 4.5 g vial to attach to  mL bag  4.5 g Intravenous Q6H Eugenia Zavala MD   4.5 g at 05/18/25 0549    [Held by provider] sertraline (ZOLOFT) tablet 50 mg  50 mg Oral or Feeding Tube Daily Eugenia Zavala MD   50 mg at 05/17/25 5903     sodium chloride (PF) 0.9% PF flush 3 mL  3 mL Intracatheter Q8H Ganesh Griffiths MD   3 mL at 05/18/25 0630     Current Facility-Administered Medications   Medication Dose Route Frequency Provider Last Rate Last Admin    dexmedeTOMIDine (PRECEDEX) 4 mcg/mL in sodium chloride 0.9 % 100 mL infusion  0.1-1.2 mcg/kg/hr (Dosing Weight) Intravenous Continuous Sonia Moore NP   Stopped at 05/17/25 2241    dextrose 10% infusion   Intravenous Continuous PRN Brendon Haas DO        dialysate for CVVHD & CVVHDF (Phoxillum BK4/2.5)  12.5 mL/kg/hr CRRT Continuous Tico Spain MD 1,000 mL/hr at 05/17/25 2145 12.5 mL/kg/hr at 05/17/25 2145    fentaNYL (SUBLIMAZE) infusion   mcg/hr Intravenous Continuous Roxi Alston MD 2 mL/hr at 05/18/25 0700 100 mcg/hr at 05/18/25 0700    No heparin required   Does not apply Continuous PRN Tico Spain MD        norepinephrine (LEVOPHED) 16 mg in  mL infusion MAX CONC CENTRAL LINE  0.01-0.6 mcg/kg/min (Dosing Weight) Intravenous Continuous Celso Ambrose MD   Stopped at 05/18/25 0400    parenteral nutrition - ADULT compounded formula   CENTRAL LINE IV TPN CONTINUOUS Brendon Haas DO 50 mL/hr at 05/17/25 2027 New Bag at 05/17/25 2027    POST-filter replacement solution for CVVHD & CVVHDF (Phoxillum BK4/2.5)   CRRT Continuous Tico Spain  mL/hr at 05/17/25 1609 New Bag at 05/17/25 1609    PRE-filter replacement solution for CVVHD & CVVHDF (Phoxillum BK4/2.5)  12.5 mL/kg/hr CRRT Continuous Tico Spain MD 1,000 mL/hr at 05/17/25 2146 12.5 mL/kg/hr at 05/17/25 2146    Reason statin not prescribed   Does not apply DOES NOT GO TO Rhys Bloom MD        vasopressin 1 unit/mL MAX Conc (PITRESSIN) infusion  0.5-4 Units/hr Intravenous Continuous Brendon Haas DO 4 mL/hr at 05/18/25 0142 4 Units/hr at 05/18/25 0142

## 2025-05-18 NOTE — IR NOTE
Patient Name: Sebastián Rodas  Medical Record Number: [id number]  Today's Date: 5/18/2025    Procedure: Visceral Embolization  Proceduralist: MD Mike Staples DO    Procedure Start: 0146  Procedure end: 0322  Sedation medications administered: 10 mg Versed and 500 mcg Fentanyl     5 fr sheath removed from RFA closed with angioseal  2 hour flat bedrest from 0320 - 0520   Report given to: 4C, RN    Other Notes: Pt arrived to IR room 1 from . Consent reviewed. Pt denies any questions or concerns regarding procedure. Pt positioned supine and monitored per protocol. Pt tolerated procedure without any noted complications. Pt transferred back to .

## 2025-05-18 NOTE — PROCEDURES
[facility]    Procedure: IR Procedure Note    Date/Time: 5/18/2025 3:24 AM    Performed by: Kim Staples MD  Authorized by: Kim Staples MD  IR Fellow Physician:    Pre Procedure Diagnosis: Necrotizing pancreatitis  Post Procedure Diagnosis: same    UNIVERSAL PROTOCOL   Site Marked: NA  Prior Images Obtained and Reviewed:  Yes  Required items: Required blood products, implants, devices and special equipment available    Patient identity confirmed:  Arm band, provided demographic data, hospital-assigned identification number and verbally with patient  Patient was reevaluated immediately before administering moderate or deep sedation or anesthesia  Confirmation Checklist:  Correct equipment/implants were available, procedure was appropriate and matched the consent or emergent situation, relevant allergies and patient's identity using two indicators  Time out: Immediately prior to the procedure a time out was called    Universal Protocol: the Joint Commission Universal Protocol was followed    Preparation: Patient was prepped and draped in usual sterile fashion    ESBL (mL):  5     ANESTHESIA    Anesthesia:  Local infiltration  Local Anesthetic:  Lidocaine 1% without epinephrine  Anesthetic Total (mL):  5      SEDATION  Patient Sedated: Yes    Sedation Type:  Moderate (conscious) sedation  Sedation:  Fentanyl and midazolam  Vital signs: Vital signs monitored during sedation    See dictated procedure note for full details.  Findings: Right CFA access. Splenic artery embolized. Right CFA access closed with AngioSeal. No immediate complications.     Specimens: none    Procedural Complications: None    Condition: Stable    Plan: -2 hour bedrest      PROCEDURE    Patient Tolerance:  Patient tolerated the procedure well with no immediate complications  Length of time physician/provider present for 1:1 monitoring during sedation:  83-97 min

## 2025-05-18 NOTE — PLAN OF CARE
ICU End of Shift Summary. See flowsheets for vital signs and detailed assessment.    Changes this shift: Pt was noted having acute increase of bloody output with multiple clots to his mid abdominal wound. Pt blood pressure also dropped with MAPs into low 60's and 50's. Team was notified and vaso was started. Team ordered Levo as second pressor which was titrated to keep MAP>65. 3Units of PRBC was ordered STAT but  continued to be unstable and MTP was activated. Pt had a total of :18 units pRBC,11 units plasma, 1 unit platelet, 4 g Ca Gluc and 4 amp bicarb. Pt was taken to CTA and then to IR where he had embolization and coiling. Bleeding subsided after IR and Levo titrated off. Pt wounds were changed and he received his scheduled IV abx. Pt remains ST with HR in 150'-180's with T max of 100.2. Pt awake, anxious and mouths his needs. Precedex was turned off last night when pt became hypotensive but remains on Fentanyl gtt.        Plan: Continue with current POC    Goal Outcome Evaluation:      Plan of Care Reviewed With: patient    Overall Patient Progress: decliningOverall Patient Progress: declining    Outcome Evaluation: Pt had low BPs with hemorrhage reqauiring MTP

## 2025-05-18 NOTE — PROGRESS NOTES
Surgery Progress Note  05/18/2025       Subjective:  Patient had active bleeding from drain sites last night, work-up positive for active extravasation from celiac trunk/splenic artery branches which were embolized with IR after discussion with family. MTP was initiated and patient received 18U of pRBCs, 11U plasma ad 1U of PLT.  HR in 150s range since IR intervetion, on Vasopressin but off of NE this am. PEEP increased to 10 overnight otherwise no change to vent settings. Lac 9,1 this am.     Objective:  Temp:  [98  F (36.7  C)-100.9  F (38.3  C)] 99.6  F (37.6  C)  Pulse:  [] 149  Resp:  [11-40] 35  MAP:  [0 mmHg-128 mmHg] 73 mmHg  Arterial Line BP: ()/() 100/54  FiO2 (%):  [60 %-100 %] 100 %  SpO2:  [87 %-100 %] 97 %    I/O last 3 completed shifts:  In: 80590.85 [I.V.:1005.45; NG/GT:45; IV Piggyback:1186]  Out: 6498 [Urine:100; Emesis/NG output:1885; Drains:4513]      Gen: Eyes open, patient appears extremely anxious during our visit. He attempts to communicate with pen/paper with difficulty. Precedex 0.7 mcg/kg/hr.   Resp: Ventilated on 40% FiO2, PEEP of 5, trach  Abd: Abdomen is taut but compressible, drains with dark bloody output, [place] with minimal succus, midline abdominal dressing with wound manager with dark bloody output around.   : Scrotum with pouch in place with serosanguinous drainage  Ext: bilateral lower extremities appear mottled, warm to the touch, edematous      Labs:  Recent Labs   Lab 05/18/25  0530 05/18/25  0045 05/17/25  2343   WBC 23.7* 15.7* 25.5*   HGB 13.1* 9.7* 9.0*   * 99* 63*       Recent Labs   Lab 05/18/25  0728 05/18/25  0555 05/18/25  0530 05/18/25  0046 05/17/25  2343 05/17/25  2014 05/17/25 2007 05/17/25 1528 05/17/25  1526   NA  --   --  147*  --   --   --  137  --  135   POTASSIUM  --   --  4.3  --  5.2  --  3.8  --  3.4   CHLORIDE  --   --  105  --   --   --  104  --  102   CO2  --   --  20*  --   --   --  21*  --  21*   BUN  --   --  31.0*  --    --   --  24.7*  --  25.5*   CR  --   --  0.63*  --   --   --  0.46*  --  0.51*   * 315* 319*   < > 261*   < > 153*   < > 199*   KARINA  --   --  8.6*  --   --   --  7.7*  --  7.7*   MAG  --   --  1.6*  --   --   --  1.9  --  2.0   PHOS  --   --  5.2*  --   --   --  3.3  --  3.4    < > = values in this interval not displayed.     Assessment/Plan:   Sebastián Rodas is a 29-year-old male with a history of alcohol use disorder, anxiety, and bipolar disorder who was admitted on 3/30/2025 for alcohol withdrawal following a recent binge, presenting with diffuse abdominal pain. Initial workup revealed leukocytosis and elevated lipase concerning for acute pancreatitis. He developed acute encephalopathy and was transferred to the ICU on 3/31, requiring intubation and vasopressors by 4/1 due to shock. Hospital course has been complicated by acute renal failure requiring CRRT, cardiomyopathy (initial LVEF 20-25%, improved to 65-70% by 4/14), and necrotizing pancreatitis with unorganized fluid collections. He completed a 14-day course of meropenem but remains intermittently febrile and critically ill, requiring ongoing dialysis and vasopressor support. On 4/27, after clinical deterioration and imaging consistent with a likely perforated viscus, following family discussion, he underwent emergent exploratory laparotomy, necrosectomy, transverse colectomy, abdominal washout, and drain placement. Patient was transferred to [facility] on 4/30/25 for further surgical management. Went to OR 5/1 for re-open laparotomy, I&D, placement of colostomy tube in presumed previous colectomy staple line, necrosectomy, and Abthera placement. On 5/2, increasing sanguineous output from drains concerning for bleeding from pancreatic bed. Family care conference held 5/2, plan for restorative cares at that time. Take back to OR twice (5/4 and 5/7) for abdominal washout. Per nursing note: 5/9 morning after he saw a picture of his abdomen that he  "\"desires to speak to team about updating goals of care, expressed desire to pull back on cares\". Goals of care conference on 5/10 with continuation of full code and restorative cares. CTA on 5/10 revealed large area of active arterial extravasation in the upper abdomen. MTP was started. Patient is now s/p embolization of small pancreatic branch off of the distal celiac artery with IR on 5/10.     Plan:  - No further surgical options for any intraabdominal pathology   - Nursing to change dressings in the am, pm, and PRN during the day if copious drainage during the day. Please place Xeroform over the bridging mesh. Please do not apply xeroform outside the wound bed/over the skin edges but make sure all of the wound bed is covered.   - Tube feeds on hold currently due to tear in J-tube. Receiving TPN and Lipids and Albumin as needed.  - On Zosyn per SICU  - SQH PPX, would not recommend therapeutic dose given the risk of bleeding.   - Appreciate WO cares  - Other cares per SICU, we appreciate cares     Patient was seen and discussed with my resident and chief resident.     Leticia Jones, MS3 [facility]  May 17, 2025     Resident/Fellow Attestation   I, Casimiro Chirinos MD, was present with the medical/SANTOS student who participated in the service and in the documentation of the note.  I have verified the history and personally performed the physical exam and medical decision making.  I agree with the assessment and plan of care as documented in the note.      Casimiro Chirinos MD  PGY1  Date of Service (when I saw the patient): 05/17/25        "

## 2025-05-18 NOTE — PROGRESS NOTES
CRRT STATUS NOTE    DATA:  Time:  6:10 PM  Pressures WNL:  YES  Filter Status:  WDL    Problems Reported/Alarms Noted:  None    Supplies Present:  YES    ASSESSMENT:  Patient Net Fluid Balance:  3.7 L  Vital Signs:  see chart  Labs:  K 3.7, Mg 2.2, Phos 3.1, LA 5.5, WBC 36, hgb 12, plts 97  Goals of Therapy:  no UF, clearance only    INTERVENTIONS:   None.    PLAN:  Continue to monitor. Notify providers of changes/concerns.

## 2025-05-18 NOTE — PLAN OF CARE
Brief Surgical ICU Update Note     Was notified around 9 pm on 5/17/25 of new bloody output and clots into patient's midabdominal bag. He also acutely had rising pressor requirements rapidly to 0.2 of levophed and 4 of vasopressin. Notable bright red bleeding was noted from all drainage sites concerning for bleeding from within the abdominal cavity.     3 units of pRBC were ordered STAT. The patient remained awake and alert throughout this time. However, pressor requirements worsened and he was increased to 0.3 levophed. Mom was contacted and expressed wanting to proceed with all interventions including MTP should her son continue to decline.     The patient at this time was too unstable to move to the CT scan to evaluate for source of bleeding.     MTP was activated.     Around midnight, he was maintaining blood pressures with MAPs>65, ongoing sinus tachycardia between 150 and as high as 190. We were able to transport him to CT where CTA of the A/P demonstrated concern for ongoing active extravasation from celiac trunk/splenic artery. He was taken emergently to IR for embolization.     Post IR embolization, he is now off pressors, normotensive but remains tachycardic to the 150s.     Total amount of product administered:   18 units pRBC  11 units plasma   1 unit platelet   4 g Ca Gluc   4 amp bicarb     Celso Ambrose MD PGY3  General Surgery Resident

## 2025-05-18 NOTE — PROGRESS NOTES
"Mr. Rodas is a 29-year-old gentleman with history of alcohol abuse was admitted on March 30, 2025 with alcohol withdrawal and abdominal pain.  He was diagnosed with acute pancreatitis which led to encephalopathy and shock requiring intubation and vasopressors along with CRRT by April 1.  He also had cardiomyopathy which had resolved.    His main issue has been necrotizing pancreatitis with persistent infections.  He has undergone emergent laparotomy and necrosectomy along with colectomy in April 27.    Repeat surgical approach on May 1 showed significant problems with necrotic pancreas vessel thrombosis and adhesions  He essentially has a \"frozen\" abdomen.  His overall prognosis is poor.    Family does know that his prognosis is poor.    He had need for massive transfusion on May 10 which resolved with embolization of the pancreatic blood vessels.    Today at around 10 PM patient started having significant issues with hypotension and persistent bleeding from various sites.  Hence massive transfusion protocol was again initiated.  We continue to assess the patient over the next couple of hours and were able to obtain CTA of the abdomen (I was present with pt during transport and while getting CTA to help manage MTP). The CTA showed significant blooding from the splenic artery.    He was taken down to IR on May 18th and the splenic artery was embolized.  There is obviously significant increased risk of infected embolization but given the limited options and acute decompensation to hemorrhagic shock we went ahead with it.    Throughout his MTP he has received more than 15 to 16 units of blood 4 units of plasma couple of units of platelets and 3 of 4 A of bicarb and 3 or 4 A of calcium chloride.    I was present throughout this.  This started from 10 PM on May this 17th to 3 PM May 18th.    Total Crit care time: 120mins (5/17) and 180mins on (5/18).    Kris Victoria MD.  Pulmonary and Critical " Care.  05/18/2025  Vocera Web Console

## 2025-05-18 NOTE — PLAN OF CARE
ICU End of Shift Summary. See flowsheets for vital signs and detailed assessment.    Changes this shift: RASS -1 to 2, increased confusions. Started Precedex and continued Fent gtt. Requiring 100% fiO2  and PEEP 10 to maintain SpO2>90. HR sinus tach 130-150, vasovagal episode with suctioning, HR decreased to 55. Vaso and Levo titrated to maintain MAP goal. Insulin gtt started. Poor hemodynamic tolerance with turns.     Plan: Continue plan of care.       Plan of Care Reviewed With: patient, family    Overall Patient Progress: no changeOverall Patient Progress: no change    Outcome Evaluation: Continues to require pressors. Insulin gtt started.

## 2025-05-18 NOTE — CONSULTS
See prior note 5/17/2025:    -CTA abdomen/pelvis demonstrates active extravasation likely from branch of celiac trunk/splenic artery.     Plan:  -IR visceral angiogram and embolization.     Gwendolyn Mckeon DO  Interventional Radiology  PGY-6  [phone]

## 2025-05-18 NOTE — PROGRESS NOTES
CRRT STATUS NOTE     DATA:  Time:  6:00 AM  Pressures WNL:  YES  Filter Status:  WDL     Problems Reported/Alarms Noted:  None.     Supplies Present:  YES     ASSESSMENT:  Patient Net Fluid Balance:  Net +2820 ml @ midnight, +3100 ml @ 0600.  Vital Signs:  , //63, MAP 87  Labs:  K 4.3, Mg 1.6, Phos 5.2, iCa 4.6, Hgb 13.1, Plt 132  Goals of Therapy:  No UF, clearance only     INTERVENTIONS:   None.     PLAN:  Continue to monitor circuit daily and change set q72 hours or PRN for clotting/clogging. Please call CRRT RN with any questions/problems.

## 2025-05-18 NOTE — PROGRESS NOTES
SURGICAL ICU PROGRESS NOTE  05/18/2025    Date of Service (when I saw the patient): 05/18/2025    ASSESSMENT:  Sebastián Rodas is a 29M with alcohol abuse, anxiety, and bipolar disorder, who was admitted 3/30/25 for alcohol withdrawal and abdominal pain. Workup showed leukocytosis and elevated lipase, consistent with acute pancreatitis. He developed encephalopathy and shock, requiring ICU care, intubation, vasopressors, and CRRT by 4/1. His course was complicated by cardiomyopathy (EF 20-25%, improved to 65-70% by 4/14), necrotizing pancreatitis, and persistent infection despite 14 days of meropenem. On 4/27, imaging showed likely perforated viscus; he underwent emergent laparotomy, necrosectomy, and colectomy. Transferred to [facility] on 4/30. On 5/1, reoperation revealed colonic staple line breakdown, necrotic pancreas, vessel thrombosis, and adhesions. Colostomy with malankot drain and temporary abdominal closure performed. Prognosis is poor; palliative care involved. Care conference 5/2 with family to talk goals of care. Full code, family acknowledged that poor prognosis is to be expected. 5/4 s/p exploratory surgery, additional necrotic pancreas remove with no obvious spillage or sign of bleeding at the time. Went back to the OR 5/7 for abdominal wash out. CT abdomen 5/10 showed extravasation on arterial phase imaging. MTP initiated, IR arterial embolization of pancreatic vessels performed. Decreased stool output since 5/13, switched to TPN. Hemorraghic shock  overnight. MTP 5/18, IR embolization 5/18.      CHANGES and MAJOR THINGS TODAY:  - decompensated overnight, hemorrhagic shock triggering MTP, currently on 2 pressors  - s/p IR embolization of the celiac trunk/splenic artery branches  - restart precedex for agitation and anxiety  - continue q 4 labs + lactate  - assess volume responsiveness  - transition to insulin gtt for tighter glycemic control, increase lantus to 10u   - continue hydrocort @ 25 mg  bid for now    PLAN:    Neurological:  # Acute pain   #Encephalopathy- improving  #Insomnia  - Monitor neurological status. Delirium preventions and precautions.   # Pain: IV Robaxin, IV dilaudid PRN, oxycodone 15mg Q4, Tylenol 975mg Q8. Increase PRN Hydromorphone allowance for mobility and dressing changes.  # Sedation: Precedex 0.7mcg/kg/hr  # Anxiety  - Olanzapine  - Precedex     Pulmonary:   # Acute hypoxic respiratory failure  # S/p tracheostomy placement   - FiO2 (%): 100 %, Resp: 25, Vent Mode: VC/AC, Resp Rate (Set): 16 breaths/min, Tidal Volume (Set, mL): 420 mL, PEEP (cm H2O): 10 cmH2O, Pressure Support (cm H2O): 5 cmH2O, Resp Rate (Set): 16 breaths/min, Tidal Volume (Set, mL): 420 mL, PEEP (cm H2O): 10 cmH2O   - Full vent overnight. Has been having trouble with trach dome. Transitioned to BID 2 hour intermittent trach dome trials.  - CT PE 5/9 showed no evidence of pulmonary embolism  - Pulmonary toilet, mobilize  - Ventilator bundle  - Wane FIO2 as tolerated    Cardiovascular:    # Stress Cardiomyopathy   - Initial LVEF 20-25%, improved to 65-70%  # Shock-distributive (septic)  # Hypovolemic shock  (hemorraghic)  # Sinus Tachycardia  - Monitor hemodynamic status. MAP goal > 65.   - Hydroxycortisone for stress dose steroid   - EKG 5/8, 5/9, 5/18 showed sinus tachycardia with no ectopy  - MTP 5/17  - Continue q 4 labs + lactate     Gastroenterology/Nutrition:  # S/p emergent exploratory laparotomy, necrosectomy, transverse colectomy, abdominal washout, and drain placement 5/1 and 5/4  # S/p Pancreatic branch vessel embolization with IR 5/10, 5/18  # Severe necrotizing pancreatitis  # Splenic vein thrombosis  - will defer management of dressings and drain removal per EGS   - Ongoing copious dark bloody drain output, CTM  - Cecostomy drain in place, no stool output. Abdomen distended. AXR unremarkable, looks well decompressed.   - on PPI IV  - CT abdomen/pelvis with IV + enteral contrast through G tube 5/14:  large volume of air and blood products in abdomen.   - Hold all Oral Meds and Feeds, strict NPO    # Severe Protein calorie deficit malnutrition due to critical illness  - on TPN  - G tube tear in the J portion, TF stopped.  - IR consulted for PEG exchange    - IR cannot safely exchange freshly placed GJ tubes until they have been in place x6 weeks given risk of losing access into the stomach. Additionally, imaging shows there is no safe pexy between the stomach and abdominal wall. IR may not be able to offer safe exchange even after 6 weeks.   - Multivitamin supplementation ordered by nutrition  - RD consult. Appreciate cares and recommendations.     Renal/Fluids/Electrolytes:   # SARAHI  # Fluid overload  Baseline Cr 0.7-0.8. Presented with Cr 0.96 on 3/30. Rapidly markos to 5.2 on 4/1. Oliguric. Garcia UA with proteinuria, hematuria, pyuria, moderate bacteria.  Kidneys unremarkable on CT. Has severe ATN in the setting of shock, ADHF, intravascular hypovolemia/3rd spacing from pancreatitis.   - Trend lactate   - CRRT with I&Os goal net even  - Continue to monitor intake and output  - Volume assessment, replace with LR    Endocrine:   # Stress hyperglycemia    - hgb A1c 5.3, no hx of DM   - Continue Lantus, switch to insulin gtt  - Goal to keep BG< 180 for optimal wound healing   Recent Labs   Lab 05/18/25  1054 05/18/25  0728 05/18/25  0555 05/18/25  0530 05/18/25  0046 05/17/25  2343   * 279* 315* 319* 309* 261*        ID:  # Leukocytosis  #Necrotizing pancreatitis  WBC Count   Date Value Ref Range Status   05/18/2025 23.7 (H) 4.0 - 11.0 10e3/uL Final   - CRP elevated at 454->344 , trend q3 days  - Restarted Zosyn again 5/14 for possible abdominal fecal contamination in light of leukocytosis, hypothermia, and decreased stool output.   - Temperature 100.2F this morning, 96.5F yesterday  - Zosyn completed: 5/2/25--5/11.   - Discontinued micafungin 5/3  - Completed 14 days course of meropenem and caspofungin.       cultures:  - 4/30 blood cultures- NGTD   - 5/1 blood cultures ordered - NGTD  - Blood culture 5/12, 5/15 - NGTD     Heme:     # Acute blood loss anemia   # Anemia of critical illness   # Thrombosed splenic vein   - Transfuse if hgb <7.0 or signs/symptoms of hypoperfusion. Monitor and trend  Hemoglobin   Date Value Ref Range Status   05/18/2025 13.1 (L) 13.3 - 17.7 g/dL Final     - MTP overnight  - hemoglobin stable, keeping on trending  - Continue subcutaneous heparin, watch bleeding from drains.     Musculoskeletal:   # Deconditioning and weakness due to critical illness   - Physical and occupational therapy consult      Skin:  # Pressure Ulcers - Buttocks/rectal area  - Barrier cream with liberal application. Continue fecal management system   - WOC consult      #Pressure Ulcers- Left Heel  Pressure Injury Location: Left heel   Wound type: Pressure Injury     Pressure Injury Stage: Deep Tissue Pressure Injury (DTPI), present on admission     General Cares/Prophylaxis:    DVT Prophylaxis: SQH   GI Prophylaxis: PPI  Restraints: Restraints for medical healing needed: YES - mittens     Lines/ tubes/ drains:  - HD line--  Right IJ  - PICC line- left   - Right radial A line  - PIV x 3  - Trach  - G-J  - Wound manager  - SHANNON x 4  - Cecostomy drain     Disposition:  - Surgical ICU    Jean Paul Pierre MD  PGY1  Date of Service (when I saw the patient): 05/18/25    ====================================  INTERVAL HISTORY:  Course reviewed. He has had difficulty tolerating the trach dome due to increased secretions, currently on timed trach dome trials. He had a tear in the J portion of his G tube and feeds were stopped and TPN was started. Decreased stool colostomy output. Hemorrhage on 5/18. MTP and IR embolization.     OBJECTIVE:   1. VITAL SIGNS:   Temp:  [98  F (36.7  C)-100.9  F (38.3  C)] 99.6  F (37.6  C)  Pulse:  [] 154  Resp:  [11-40] 25  MAP:  [0 mmHg-128 mmHg] 61 mmHg  Arterial Line BP: ()/()  81/47  FiO2 (%):  [60 %-100 %] 100 %  SpO2:  [87 %-100 %] 95 %  FiO2 (%): 100 %, Resp: 25, Vent Mode: VC/AC, Resp Rate (Set): 16 breaths/min, Tidal Volume (Set, mL): 420 mL, PEEP (cm H2O): 10 cmH2O, Pressure Support (cm H2O): 5 cmH2O, Resp Rate (Set): 16 breaths/min, Tidal Volume (Set, mL): 420 mL, PEEP (cm H2O): 10 cmH2O    2. INTAKE/ OUTPUT:   I/O last 3 completed shifts:  In: 00742.85 [I.V.:1005.45; NG/GT:45; IV Piggyback:1186]  Out: 6498 [Urine:100; Emesis/NG output:1885; Drains:4513]    3. PHYSICAL EXAMINATION:  General: laying in bed, awake and alert requesting a blanket.   HEENT: PERRLA. Trach present and secure, site dressed.   Pulm/Resp: Clear breath sounds bilaterally, lungs coarse but clear.  CV: RRR, S1/S2   Abdomen: Distended. Dark bloody output noted to all drains. G-J tube in place, site secured, abdomen with abthera in place, suction intact.   : scrotal edema, draining dark bloody output.   MSK/Extremities: generalized edema in extremities    4. INVESTIGATIONS:   Arterial Blood Gases   Recent Labs   Lab 05/18/25  0537 05/18/25  0055 05/17/25  2338 05/17/25  2302   PH 7.34* 7.37 7.27* 7.19*   PCO2 41 33* 37 36   PO2 90 187* 282* 135*   HCO3 22 19* 17* 14*     Complete Blood Count   Recent Labs   Lab 05/18/25  0530 05/18/25  0045 05/17/25  2343 05/17/25  2141   WBC 23.7* 15.7* 25.5* 14.9*   HGB 13.1* 9.7* 9.0* 5.6*   * 99* 63* 85*     Basic Metabolic Panel  Recent Labs   Lab 05/18/25  1054 05/18/25  0728 05/18/25  0555 05/18/25  0530 05/18/25  0046 05/17/25  2343 05/17/25 2014 05/17/25 2007 05/17/25  1528 05/17/25  1526 05/17/25  0803 05/17/25  0408   NA  --   --   --  147*  --   --   --  137  --  135  --  138   POTASSIUM  --   --   --  4.3  --  5.2  --  3.8  --  3.4  --  3.8   CHLORIDE  --   --   --  105  --   --   --  104  --  102  --  104   CO2  --   --   --  20*  --   --   --  21*  --  21*  --  20*   BUN  --   --   --  31.0*  --   --   --  24.7*  --  25.5*  --  25.7*   CR  --   --   --   0.63*  --   --   --  0.46*  --  0.51*  --  0.58*   * 279* 315* 319*   < > 261*   < > 153*   < > 199*   < > 213*  209*    < > = values in this interval not displayed.     Liver Function Tests  Recent Labs   Lab 05/18/25  0530 05/18/25  0047 05/17/25  2343 05/17/25  1526 05/17/25  1417 05/17/25  0408 05/16/25  1714 05/16/25  1223 05/16/25  0319 05/15/25  1120 05/15/25  0401   AST 71*  --   --   --   --  42  --   --  25  --  30   ALT 36  --   --   --   --  25  --   --  13  --  14   ALKPHOS 289*  --   --   --   --  364*  --   --  259*  --  275*   BILITOTAL 2.6*  --   --   --   --  2.4*  --   --  2.1*  --  2.6*   ALBUMIN 2.9*  --   --  2.3*  --  2.2* 2.1*   < > 2.2*   < > 2.2*   INR 1.21* 1.63* 1.54*  --  1.18*  --   --   --   --   --   --     < > = values in this interval not displayed.     Pancreatic Enzymes  No lab results found in last 7 days.    Coagulation Profile  Recent Labs   Lab 05/18/25  0530 05/18/25  0047 05/17/25  2343 05/17/25  1417   INR 1.21* 1.63* 1.54* 1.18*   PTT 31  --  40* 30     5. RADIOLOGY:   Recent Results (from the past 24 hours)   XR Abdomen Port 1 View    Narrative    EXAMINATION:  XR ABDOMEN PORT 1 VIEW 5/17/2025 1:42 PM.    COMPARISON: CT 5/14/2025, radiograph 5/13/2025.    HISTORY:  existing GJ tube balloon broken, eval placement    FINDINGS:   Multiple abdominal drains in place. The percutaneous gastrojejunostomy  tube appears to terminate along the left lateral abdominal wall with  contrast consolidated adjacent to the suspected tip. Bubbly appearance  throughout the abdomen likely corresponds to scattered mottled air on  recent CT scan. No acute osseous abnormality. Small bilateral pleural  effusions.      Impression    IMPRESSION:   Percutaneous gastrojejunostomy tube appears to terminate along the  left lateral abdominal wall with contrast consolidated adjacent to the  suspected tip, though not necessarily conforming to contour the bowel.  Consider CT scan to evaluate for  extraluminal contrast and possible  leak..    I have personally reviewed the examination and initial interpretation  and I agree with the findings.    KAELYN PATRICK DO         SYSTEM ID:  [id number]   CTA Abdomen Pelvis with Contrast   Result Value    Radiologist flags Active arterial contrast extravasation (AA)    Narrative    Exam: Computed tomographic angiography of the abdomen and pelvis  without and with contrast, including 3D reformations dated 5/18/2025    HISTORY: Active bleeding requiring MTP, severe pancreatitis,  complicated by necrotic colon, status post necrosectomy and colectomy.  Prior embolization of pancreatic vessels 5/10/2025.     Technique: Helical scans through the abdomen and pelvis obtained  before the administration of intravenous contrast media and following  the injection of contrast media in the arterial and portal venous  phases. Source images reviewed as well as multi-planar reconstructions    COMPARISON: CT abdomen and pelvis 5/14/2025, CTA and pelvis 5/10/2025,  IR embolization 5/10/2025.    FINDINGS:  Vascular:   Normal course and caliber of the abdominal aorta. Patent celiac, SMA,  AJ, bilateral single renal arteries, and iliac arteries.     There is a large volume of contrast extravasation in the upper abdomen  tracking along the left anterior and right lateral peritoneal cavity  and extends up into the right upper quadrant. The contrast appears  partially contained within the anterior peripancreatic region with  close proximity to the abnormal appearing vasospastic splenic artery,  likely source of bleeding. On delayed images there is significant  contrast pooling.    Nonvascular:  There is extensive volume of hemoperitoneum and loculated fluid  throughout the peritoneal cavity extending into the retroperitoneum.  There is increased mass effect along the anterior liver due to  perihepatic collection/hematoma.The pancreatic head and body are  nonenhancing with prior necrosectomy,  similar to 5/14/2025. There is  some enhancement of the pancreatic tail. Postprocedural changes of  prior coil embolization.     Scattered amount of air within the retroperitoneum is slightly  increased from the prior CT abdomen pelvis. Air within the anterior  peritoneal cavity is decreased from prior. Peritoneal thickening and  enhancement diffusely throughout the peritoneal cavity. Open abdomen  with packing material on the anterior abdominal wall. Left anterior  approach surgical drain terminates in the mid anterior peritoneal  cavity. Right anterior approach colonic mushroom  drain. Right lateral  approach surgical drain terminates in the right upper quadrant.    Trace bilateral pleural effusions right greater than left scattered  basilar atelectasis. Vicarious excretion of contrast in the  gallbladder. Extensive mesenteric lymphadenopathy. Moderate anasarca.    No other significant interval change compared to 5/14/2025      Impression    IMPRESSION:  1. Large intra-abdominal blood products and air throughout the  peritoneum and retroperitoneum with brisk active arterial  extravasation from the splenic artery. Associated increased mass  effect noted, most pronounced along the anterior liver from adjacent  perihepatic collection/hematoma.   2  Gas within the retroperitoneum has increased in extension from the  most recent CT abdomen and pelvis dated 5/14/25, could represent  worsening fat necrosis versus gas forming infection.   3. Otherwise similar exam compared to 5/14/25 with sequela of  necrotizing pancreatitis and changes of necrosectomy with open  abdomen.     [Critical Result: Active arterial contrast extravasation]  Finding was identified on 5/18/2025 12:28 AM.   Dr. Mckeon was contacted by Dr. Mcnulty at 5/18/2025 12:28 AM and  verbalized understanding of the critical finding.      I have personally reviewed the examination and initial interpretation  and I agree with the findings.    DAIVD MUNIZ MD          SYSTEM ID:  [id number]   CTA Chest with Contrast    Impression    RESIDENT PRELIMINARY INTERPRETATION  Impression:    1. Diffuse nodular and groundglass centrilobular opacities with  interlobular septal thickening, trace bilateral pleural effusions and  diffuse bronchial wall thickening suggests pulmonary edema and/or  aspiration/infection.   2. Small volume of simple fluid surrounding the tracheostomy tube,  possibly aspirated fluid.       =========================================

## 2025-05-18 NOTE — PROGRESS NOTES
Brief IR Note:  HPI: Sebastián Rodas is a 28 yo M w/ pmh alcohol use disorder, anxiety, and bipolar disorder, admitted 3/30/2025 with alcohol withdrawal and abdominal pain, FTH necrotizing pancreatitis. He has had complicated hospital course with cardiomyopathy, necrotizing pancreatitis, perforated viscus, s/p colectomy and necrosectomy. He developed active extravasation into the abdomen on 5/10, and underwent IR embolization of a pancreatic artery originating from the celiac axis.     He is now bleeding again from his surgical drains, with Hgb drop from 7.8 to 5.6. and is now on norepinephrine and vasopressin. He is tachycardic in the 150s and MAP in 50-60s.     A/P  -Activate MTP. Patient must be stabilized prior to any IR intervention.   -3 phase CTA abdomen/pelvis would be performed prior to IR intervention to identify possible targets for embolization.    Patient was discussed with Dr. Staples.    Gwendolyn Mckeon,   Interventional Radiology  PGY-6  [phone]

## 2025-05-18 NOTE — PRE-PROCEDURE
GENERAL PRE-PROCEDURE:   Procedure:  Visceral angiogram    Written consent obtained?: Yes    Risks and benefits: Risks, benefits and alternatives were discussed    Consent given by:  Parent  Patient states understanding of procedure being performed: Yes    Patient's understanding of procedure matches consent: Yes    Procedure consent matches procedure scheduled: Yes    Expected level of sedation:  Moderate  Appropriately NPO:  Yes  ASA Class:  4  Mallampati  :  N/A- Alternate secured airway  Heart:  Sinus tach  History & Physical reviewed:  History and physical reviewed and no updates needed  Statement of review:  I have reviewed the lab findings, diagnostic data, medications, and the plan for sedation

## 2025-05-18 NOTE — PROGRESS NOTES
"INTENSIVIST FACULTY NOTE:  Massive bleed last night  To IR for embolization of splenic artery; expecting pancreas to be all necrosed now  \"Stabilized\" since then    Dark, purulent fluid from trach site.  Doesn't seem to be blood, and not coming out of trachea itself  CAM-ICU positive; anxious and twitchy  Appears very uncomfortable  Sinus tachycardia to the 150s (confirmed sinus by EKG) if not on Precedex  Abdominal distention back up, and there is digested-looking blood, what potentially is gastric contents, and other unclear fluids draining from various places.  Nothing so frankly bloody as yesterday, though   Scrotum less distended, and less thin fluid is coming out of his scrotal leak.  He is very tender over the inguinal canals, and continues to ooze a lot of fluid (that looks the same as what comes out of the SHANNON drains) through the hole in his scrotum  High FiO2 requirements   Line sites look fine  Still diffuse anasarca  Minimal ostomy output  The jejunal feeding tube is fractured  As before, the G tube balloon won't hold any air, and the skin disc has retracted.  Because of that long jejunal tail, I don't have concern that the G tube has fully retracted out, but if there are concerns about the stomach not being in durable opposition to the abdominal wall, we may well have a (controlled) gastric leak now.  Neither a KUB with contrast nor last night's CTA scan were definitive as to whether or not contrast we pushed through the G tube was intra-luminal or snuck out extraluminal     Labs notable for a soidum that spiked to 147 with his resuscitation  Lactate peaked last night, but is now getting better  ABG poor oxygenation.  P/F ratio <100 now    CTA chest shows probable aspiration as well as pulmonary edema.  No good explanation for what the fluid leaking around the tracheostomy might be.  EKG sinus tachycardia, no atrial fibrillation    This is a 29M hx bipolar and EtOH, with a prolonged hospitalization that " "began with an admission for alcohol withdrawal after a binge, but subsequent development of necrotizing pancreatitis which in turn led to a whole host of complications including cardiomyopathy (now recovered), SARAHI requiring renal replacement therapy, a perforated viscous requiring transverse colectomy, and two intra-abdominal bleeds requiring most recently IR embolization of most of the splenic artery, which means most of the pancreas is at risk of further necrosis and hypoperfusion.  He has a tracheostomy and GJ tube which is non-functional, and cannot be safely exchanged, and probably is not holding the stomach in apposition to the abdominal wall.  His abdomen has been closed with vicryl mesh, and he has a frozen abdomen, so cannot undergo surgical exploration.      Multiple providers have told the family that ongoing care appears to be contributing to prolonged suffering without providing any meaningful chance of recovery to independence.  From a practical standpoint, he will be unable to get any enteral nutrition; he will be unable to come off CRRT; he is likely to develop a new infectious or hemorrhagic complication; and he will not be a candidate for long-term things like pancreas transplant, kidney transplant, ostomy takedown, etc.  The mother is obviously heartbroken at his prognosis, but also frustrated by her perception that some providers have \"given up on him.\"  I have assured her that we are doing everything possible for him - and probably even going above and beyond what many would think reasonable, and that we are not \"giving up,\" but are rather experiencing emotional distress in that we see his daily suffering and don't have a therapeutic strategy that can provide durable relief of suffering.      I think he has demonstrated that there is not really a significant concurrent distributive (infectious) component to his shock other than probable tertiary adrenal insufficiency (he was off pressors before " bleeding once we started hydrocortisone, without doing anything else different), and presumably he is no longer briskly bleeding, so it should just be hypovolemic /hemorrhagic shock that we are dealing with today.  I mean, with his pancreatitis and probable diffuse enteric leaks and the crazy amount of air/fluidin his abdomen, he's obviously in a very inflammatory state, so there's got to be some capillary leak contributing to some degree of distributive shock, but to be so stable off pressors until the bleeding really opened up suggests to me we don't need to broaden antimicrobials for now, and that we can see where he settles out from a volume standpoint.  Will therefore stop the scheduled abthera volume replacement and then decide based on I/Os whether to set MIVF beyond the TPN, and whether to schedule fluid replacement for his drain and abthera losses.      Other minor things: will increase his lantus and restart insulin infusion.    METABOLIC ENCEPHALOPATHY / DELIRIUM:  -critical illness, massive hemorrhage and volume overload; monitoring with clinical exam  -IV haldol at bedtime; restart Precedex for sedation  -needs restraints (mitts)    ACUTE HYPOXIC RESPIRATORY FAILURE  -due to TACO/TRALI, volume overload with massive resuscitation.  Lung mechanics were poor with debility and weakness previously,   -lung protective settings, will again target net even to net negative with CRRT  - no plans to try pressure support trials until he is doing bettter after this most recent hemorrhagic event    CONCERN FOR SEPTIC SHOCK:  -nothing that can be done surgically about his abdomen at this time.    -off pressors  -Empiric zosyn as above; no end date  -hydrocortisone wean can continue through tomorrow; will leave him on low-dose maintenance steroids (eg, hydrocortisone 25mg daily) since he clearly was responsive to steroids before    NECROTIZING PANCREATITIS:  -empiric zosyn; holding off on an antifungal, but if he  "develops severe septic shock again, then should strongly concsider given steroids, renal failure, and TPN.    -on appropriate nutrients    ACUTE KIDNEY INJURY:  -Target of net even to slightly net negative today if hemodynamics allow - significant insensible / unmeasured losses with all his drains and wounds.  I think any renal recovery is highly unlikely at this point    SPLENIC VEIN THROMBOSIS:  -repeat imaging showed no extension of thrombus or complications, so there are no plans to ever re-initiate therapeutic anticoagulation    INADEQUATE ORAL INTAKE:  -TPN; no tube feeds until starts having ostomy output again and until we can get his GJ tube replaced. Because his jejunal tube is less than 6 weeks old, interventional radiology is unwilling to replace this one at this time.  Can't tell radiographically whether or not contrast given through the G tube stays intraluminal, so I think we just have to accept the possibility of a gastric leak and not use his enteral tubes at all.      DM2 / HYPERGLYCEMIA:  -lantus 10u daily, restart insulin infusion.      MISC:  -Code status is full code  -mother updated by phone   -[facility] can restart now that embolized; PPI for mechanical ventilation and pancreatitis  -lines: RIJ tunnelled dialysis line; arterial line; PICC for pressors  -no shore  -I do not anticipate that he will survive this hospitalization.  Family is insistent that he remain \"full code,\" but are open to another formal family conference for ongoing Goals of Care conversations.  -Palliative Care has been involved    Billing statement: 59min of critical care time; spent in an initial review of imaging, labs, physical exam, and discussion of the patient with my own team and the extended care team including the primary service. Based on this patient's presentation / recent intervention and my bedside assessment, I felt there was or is a reasonably high probability of imminent or life-threatening deterioration today or " tonight for septic reasons.   My overall critical care time, as described in detail above, includes such things as coordination of care, arrhythmia and hemodynamics management with infusions of medicines, respiratory management, fluid therapy including fluid boluses, and pain and sedation therapy. This time excludes time I spent personally performing or supervising procedures for this patient.    DONNY Rodas MD  Clinical   Anesthesia / Critical Care  *[phone]

## 2025-05-19 LAB
ALBUMIN SERPL BCG-MCNC: 2.5 G/DL (ref 3.5–5.2)
ALBUMIN SERPL BCG-MCNC: 3 G/DL (ref 3.5–5.2)
ALLEN'S TEST: ABNORMAL
ALP SERPL-CCNC: 234 U/L (ref 40–150)
ALT SERPL W P-5'-P-CCNC: 70 U/L (ref 0–70)
ANION GAP SERPL CALCULATED.3IONS-SCNC: 13 MMOL/L (ref 7–15)
ANION GAP SERPL CALCULATED.3IONS-SCNC: 14 MMOL/L (ref 7–15)
AST SERPL W P-5'-P-CCNC: 128 U/L (ref 0–45)
ATRIAL RATE - MUSE: 148 BPM
BASE EXCESS BLDA CALC-SCNC: -0.2 MMOL/L (ref -3–3)
BILIRUB SERPL-MCNC: 1.3 MG/DL
BILIRUBIN DIRECT (ROCHE PRO & PURE): 0.96 MG/DL (ref 0–0.45)
BUN SERPL-MCNC: 24.7 MG/DL (ref 6–20)
BUN SERPL-MCNC: 26.3 MG/DL (ref 6–20)
CA-I BLD-MCNC: 4.2 MG/DL (ref 4.4–5.2)
CA-I BLD-MCNC: 4.4 MG/DL (ref 4.4–5.2)
CALCIUM SERPL-MCNC: 7.9 MG/DL (ref 8.8–10.4)
CALCIUM SERPL-MCNC: 8.3 MG/DL (ref 8.8–10.4)
CHLORIDE SERPL-SCNC: 105 MMOL/L (ref 98–107)
CHLORIDE SERPL-SCNC: 106 MMOL/L (ref 98–107)
CREAT SERPL-MCNC: 0.4 MG/DL (ref 0.67–1.17)
CREAT SERPL-MCNC: 0.43 MG/DL (ref 0.67–1.17)
CRP SERPL-MCNC: 210 MG/L
DIASTOLIC BLOOD PRESSURE - MUSE: NORMAL MMHG
EGFRCR SERPLBLD CKD-EPI 2021: >90 ML/MIN/1.73M2
EGFRCR SERPLBLD CKD-EPI 2021: >90 ML/MIN/1.73M2
ERYTHROCYTE [DISTWIDTH] IN BLOOD BY AUTOMATED COUNT: 15.4 % (ref 10–15)
ERYTHROCYTE [DISTWIDTH] IN BLOOD BY AUTOMATED COUNT: 15.6 % (ref 10–15)
GLUCOSE BLDC GLUCOMTR-MCNC: 104 MG/DL (ref 70–99)
GLUCOSE BLDC GLUCOMTR-MCNC: 108 MG/DL (ref 70–99)
GLUCOSE BLDC GLUCOMTR-MCNC: 109 MG/DL (ref 70–99)
GLUCOSE BLDC GLUCOMTR-MCNC: 111 MG/DL (ref 70–99)
GLUCOSE BLDC GLUCOMTR-MCNC: 115 MG/DL (ref 70–99)
GLUCOSE BLDC GLUCOMTR-MCNC: 116 MG/DL (ref 70–99)
GLUCOSE BLDC GLUCOMTR-MCNC: 120 MG/DL (ref 70–99)
GLUCOSE BLDC GLUCOMTR-MCNC: 122 MG/DL (ref 70–99)
GLUCOSE BLDC GLUCOMTR-MCNC: 125 MG/DL (ref 70–99)
GLUCOSE BLDC GLUCOMTR-MCNC: 131 MG/DL (ref 70–99)
GLUCOSE BLDC GLUCOMTR-MCNC: 132 MG/DL (ref 70–99)
GLUCOSE BLDC GLUCOMTR-MCNC: 138 MG/DL (ref 70–99)
GLUCOSE BLDC GLUCOMTR-MCNC: 142 MG/DL (ref 70–99)
GLUCOSE BLDC GLUCOMTR-MCNC: 147 MG/DL (ref 70–99)
GLUCOSE BLDC GLUCOMTR-MCNC: 148 MG/DL (ref 70–99)
GLUCOSE BLDC GLUCOMTR-MCNC: 156 MG/DL (ref 70–99)
GLUCOSE BLDC GLUCOMTR-MCNC: 98 MG/DL (ref 70–99)
GLUCOSE SERPL-MCNC: 117 MG/DL (ref 70–99)
GLUCOSE SERPL-MCNC: 155 MG/DL (ref 70–99)
HCO3 BLD-SCNC: 24 MMOL/L (ref 21–28)
HCO3 SERPL-SCNC: 21 MMOL/L (ref 22–29)
HCO3 SERPL-SCNC: 21 MMOL/L (ref 22–29)
HCT VFR BLD AUTO: 29.3 % (ref 40–53)
HCT VFR BLD AUTO: 30.1 % (ref 40–53)
HGB BLD-MCNC: 10.2 G/DL (ref 13.3–17.7)
HGB BLD-MCNC: 10.3 G/DL (ref 13.3–17.7)
INTERPRETATION ECG - MUSE: NORMAL
LACTATE SERPL-SCNC: 3.3 MMOL/L (ref 0.7–2)
LACTATE SERPL-SCNC: 3.4 MMOL/L (ref 0.7–2)
LACTATE SERPL-SCNC: 3.5 MMOL/L (ref 0.7–2)
LACTATE SERPL-SCNC: 3.6 MMOL/L (ref 0.7–2)
MAGNESIUM SERPL-MCNC: 2 MG/DL (ref 1.7–2.3)
MAGNESIUM SERPL-MCNC: 2 MG/DL (ref 1.7–2.3)
MCH RBC QN AUTO: 30.4 PG (ref 26.5–33)
MCH RBC QN AUTO: 30.6 PG (ref 26.5–33)
MCHC RBC AUTO-ENTMCNC: 34.2 G/DL (ref 31.5–36.5)
MCHC RBC AUTO-ENTMCNC: 34.8 G/DL (ref 31.5–36.5)
MCV RBC AUTO: 87 FL (ref 78–100)
MCV RBC AUTO: 89 FL (ref 78–100)
O2/TOTAL GAS SETTING VFR VENT: 100 %
OXYHGB MFR BLDA: 98 % (ref 92–100)
P AXIS - MUSE: 49 DEGREES
PCO2 BLD: 38 MM HG (ref 35–45)
PEEP: 10 CM H2O
PH BLD: 7.41 [PH] (ref 7.35–7.45)
PHOSPHATE SERPL-MCNC: 2.9 MG/DL (ref 2.5–4.5)
PHOSPHATE SERPL-MCNC: 3.1 MG/DL (ref 2.5–4.5)
PLATELET # BLD AUTO: 123 10E3/UL (ref 150–450)
PLATELET # BLD AUTO: 92 10E3/UL (ref 150–450)
PO2 BLD: 150 MM HG (ref 80–105)
POTASSIUM SERPL-SCNC: 3.7 MMOL/L (ref 3.4–5.3)
POTASSIUM SERPL-SCNC: 3.8 MMOL/L (ref 3.4–5.3)
PR INTERVAL - MUSE: 126 MS
PROT SERPL-MCNC: 5.1 G/DL (ref 6.4–8.3)
QRS DURATION - MUSE: 86 MS
QT - MUSE: 262 MS
QTC - MUSE: 411 MS
R AXIS - MUSE: 54 DEGREES
RBC # BLD AUTO: 3.36 10E6/UL (ref 4.4–5.9)
RBC # BLD AUTO: 3.37 10E6/UL (ref 4.4–5.9)
SAO2 % BLDA: 99.7 % (ref 96–97)
SODIUM SERPL-SCNC: 140 MMOL/L (ref 135–145)
SODIUM SERPL-SCNC: 140 MMOL/L (ref 135–145)
SYSTOLIC BLOOD PRESSURE - MUSE: NORMAL MMHG
T AXIS - MUSE: 226 DEGREES
TRIGL SERPL-MCNC: 551 MG/DL
VENTRICULAR RATE- MUSE: 148 BPM
WBC # BLD AUTO: 18.9 10E3/UL (ref 4–11)
WBC # BLD AUTO: 23.8 10E3/UL (ref 4–11)

## 2025-05-19 PROCEDURE — 82330 ASSAY OF CALCIUM: CPT | Performed by: CLINICAL NURSE SPECIALIST

## 2025-05-19 PROCEDURE — 83605 ASSAY OF LACTIC ACID: CPT

## 2025-05-19 PROCEDURE — 84460 ALANINE AMINO (ALT) (SGPT): CPT | Performed by: STUDENT IN AN ORGANIZED HEALTH CARE EDUCATION/TRAINING PROGRAM

## 2025-05-19 PROCEDURE — 250N000009 HC RX 250: Performed by: SURGERY

## 2025-05-19 PROCEDURE — 84478 ASSAY OF TRIGLYCERIDES: CPT | Performed by: SURGERY

## 2025-05-19 PROCEDURE — 99291 CRITICAL CARE FIRST HOUR: CPT | Mod: 24 | Performed by: SURGERY

## 2025-05-19 PROCEDURE — 99233 SBSQ HOSP IP/OBS HIGH 50: CPT | Mod: 24 | Performed by: CLINICAL NURSE SPECIALIST

## 2025-05-19 PROCEDURE — 250N000011 HC RX IP 250 OP 636: Performed by: NURSE PRACTITIONER

## 2025-05-19 PROCEDURE — 84075 ASSAY ALKALINE PHOSPHATASE: CPT | Performed by: STUDENT IN AN ORGANIZED HEALTH CARE EDUCATION/TRAINING PROGRAM

## 2025-05-19 PROCEDURE — 258N000003 HC RX IP 258 OP 636: Performed by: SURGERY

## 2025-05-19 PROCEDURE — 83735 ASSAY OF MAGNESIUM: CPT | Performed by: CLINICAL NURSE SPECIALIST

## 2025-05-19 PROCEDURE — 82565 ASSAY OF CREATININE: CPT | Performed by: CLINICAL NURSE SPECIALIST

## 2025-05-19 PROCEDURE — 250N000011 HC RX IP 250 OP 636: Mod: JZ | Performed by: NURSE PRACTITIONER

## 2025-05-19 PROCEDURE — 999N000157 HC STATISTIC RCP TIME EA 10 MIN

## 2025-05-19 PROCEDURE — 250N000011 HC RX IP 250 OP 636: Mod: JZ | Performed by: STUDENT IN AN ORGANIZED HEALTH CARE EDUCATION/TRAINING PROGRAM

## 2025-05-19 PROCEDURE — 94003 VENT MGMT INPAT SUBQ DAY: CPT

## 2025-05-19 PROCEDURE — 82805 BLOOD GASES W/O2 SATURATION: CPT

## 2025-05-19 PROCEDURE — 250N000011 HC RX IP 250 OP 636: Mod: JZ | Performed by: SURGERY

## 2025-05-19 PROCEDURE — 82435 ASSAY OF BLOOD CHLORIDE: CPT | Performed by: CLINICAL NURSE SPECIALIST

## 2025-05-19 PROCEDURE — 90947 DIALYSIS REPEATED EVAL: CPT

## 2025-05-19 PROCEDURE — 82248 BILIRUBIN DIRECT: CPT | Performed by: STUDENT IN AN ORGANIZED HEALTH CARE EDUCATION/TRAINING PROGRAM

## 2025-05-19 PROCEDURE — 84450 TRANSFERASE (AST) (SGOT): CPT | Performed by: STUDENT IN AN ORGANIZED HEALTH CARE EDUCATION/TRAINING PROGRAM

## 2025-05-19 PROCEDURE — 85027 COMPLETE CBC AUTOMATED: CPT | Performed by: CLINICAL NURSE SPECIALIST

## 2025-05-19 PROCEDURE — P9047 ALBUMIN (HUMAN), 25%, 50ML: HCPCS | Mod: JZ | Performed by: NURSE PRACTITIONER

## 2025-05-19 PROCEDURE — 250N000009 HC RX 250: Performed by: STUDENT IN AN ORGANIZED HEALTH CARE EDUCATION/TRAINING PROGRAM

## 2025-05-19 PROCEDURE — 86140 C-REACTIVE PROTEIN: CPT

## 2025-05-19 PROCEDURE — 250N000009 HC RX 250: Performed by: NURSE PRACTITIONER

## 2025-05-19 PROCEDURE — 80053 COMPREHEN METABOLIC PANEL: CPT | Performed by: STUDENT IN AN ORGANIZED HEALTH CARE EDUCATION/TRAINING PROGRAM

## 2025-05-19 PROCEDURE — 258N000003 HC RX IP 258 OP 636

## 2025-05-19 PROCEDURE — 250N000011 HC RX IP 250 OP 636: Performed by: STUDENT IN AN ORGANIZED HEALTH CARE EDUCATION/TRAINING PROGRAM

## 2025-05-19 PROCEDURE — 83605 ASSAY OF LACTIC ACID: CPT | Performed by: NURSE PRACTITIONER

## 2025-05-19 PROCEDURE — 200N000002 HC R&B ICU UMMC

## 2025-05-19 PROCEDURE — 99418 PROLNG IP/OBS E/M EA 15 MIN: CPT | Performed by: CLINICAL NURSE SPECIALIST

## 2025-05-19 PROCEDURE — 82247 BILIRUBIN TOTAL: CPT | Performed by: STUDENT IN AN ORGANIZED HEALTH CARE EDUCATION/TRAINING PROGRAM

## 2025-05-19 PROCEDURE — 250N000011 HC RX IP 250 OP 636

## 2025-05-19 RX ORDER — ALBUMIN (HUMAN) 12.5 G/50ML
25 SOLUTION INTRAVENOUS EVERY 6 HOURS
Status: COMPLETED | OUTPATIENT
Start: 2025-05-19 | End: 2025-05-19

## 2025-05-19 RX ADMIN — CALCIUM GLUCONATE 2 G: 20 INJECTION, SOLUTION INTRAVENOUS at 05:43

## 2025-05-19 RX ADMIN — ALBUMIN HUMAN 25 G: 0.25 SOLUTION INTRAVENOUS at 16:18

## 2025-05-19 RX ADMIN — PIPERACILLIN AND TAZOBACTAM 4.5 G: 4; .5 INJECTION, POWDER, LYOPHILIZED, FOR SOLUTION INTRAVENOUS at 18:24

## 2025-05-19 RX ADMIN — DEXMEDETOMIDINE HYDROCHLORIDE 0.8 MCG/KG/HR: 400 INJECTION INTRAVENOUS at 14:32

## 2025-05-19 RX ADMIN — CALCIUM CHLORIDE, MAGNESIUM CHLORIDE, SODIUM CHLORIDE, SODIUM BICARBONATE, POTASSIUM CHLORIDE AND SODIUM PHOSPHATE DIBASIC DIHYDRATE 12.5 ML/KG/HR: 3.68; 3.05; 6.34; 3.09; .314; .187 INJECTION INTRAVENOUS at 05:04

## 2025-05-19 RX ADMIN — HEPARIN SODIUM 5000 UNITS: 5000 INJECTION, SOLUTION INTRAVENOUS; SUBCUTANEOUS at 20:24

## 2025-05-19 RX ADMIN — Medication 25 MCG: at 22:37

## 2025-05-19 RX ADMIN — HEPARIN SODIUM 5000 UNITS: 5000 INJECTION, SOLUTION INTRAVENOUS; SUBCUTANEOUS at 12:12

## 2025-05-19 RX ADMIN — DEXMEDETOMIDINE HYDROCHLORIDE 1 MCG/KG/HR: 400 INJECTION INTRAVENOUS at 02:58

## 2025-05-19 RX ADMIN — HEPARIN SODIUM 5000 UNITS: 5000 INJECTION, SOLUTION INTRAVENOUS; SUBCUTANEOUS at 03:06

## 2025-05-19 RX ADMIN — Medication 25 MCG: at 14:32

## 2025-05-19 RX ADMIN — CALCIUM CHLORIDE, MAGNESIUM CHLORIDE, SODIUM CHLORIDE, SODIUM BICARBONATE, POTASSIUM CHLORIDE AND SODIUM PHOSPHATE DIBASIC DIHYDRATE 12.5 ML/KG/HR: 3.68; 3.05; 6.34; 3.09; .314; .187 INJECTION INTRAVENOUS at 20:18

## 2025-05-19 RX ADMIN — SODIUM CHLORIDE 2 UNITS/HR: 9 INJECTION, SOLUTION INTRAVENOUS at 03:06

## 2025-05-19 RX ADMIN — HYDROCORTISONE SODIUM SUCCINATE 25 MG: 100 INJECTION, POWDER, FOR SOLUTION INTRAMUSCULAR; INTRAVENOUS at 08:04

## 2025-05-19 RX ADMIN — ALBUMIN HUMAN 25 G: 0.25 SOLUTION INTRAVENOUS at 12:09

## 2025-05-19 RX ADMIN — CALCIUM CHLORIDE, MAGNESIUM CHLORIDE, SODIUM CHLORIDE, SODIUM BICARBONATE, POTASSIUM CHLORIDE AND SODIUM PHOSPHATE DIBASIC DIHYDRATE 12.5 ML/KG/HR: 3.68; 3.05; 6.34; 3.09; .314; .187 INJECTION INTRAVENOUS at 05:05

## 2025-05-19 RX ADMIN — MAGNESIUM SULFATE HEPTAHYDRATE: 500 INJECTION, SOLUTION INTRAMUSCULAR; INTRAVENOUS at 20:11

## 2025-05-19 RX ADMIN — PIPERACILLIN AND TAZOBACTAM 4.5 G: 4; .5 INJECTION, POWDER, LYOPHILIZED, FOR SOLUTION INTRAVENOUS at 05:43

## 2025-05-19 RX ADMIN — CALCIUM CHLORIDE, MAGNESIUM CHLORIDE, SODIUM CHLORIDE, SODIUM BICARBONATE, POTASSIUM CHLORIDE AND SODIUM PHOSPHATE DIBASIC DIHYDRATE 12.5 ML/KG/HR: 3.68; 3.05; 6.34; 3.09; .314; .187 INJECTION INTRAVENOUS at 10:16

## 2025-05-19 RX ADMIN — Medication 100 MCG/HR: at 08:33

## 2025-05-19 RX ADMIN — DEXMEDETOMIDINE HYDROCHLORIDE 1.2 MCG/KG/HR: 400 INJECTION INTRAVENOUS at 08:05

## 2025-05-19 RX ADMIN — ONDANSETRON 4 MG: 2 INJECTION, SOLUTION INTRAMUSCULAR; INTRAVENOUS at 16:34

## 2025-05-19 RX ADMIN — CALCIUM CHLORIDE, MAGNESIUM CHLORIDE, SODIUM CHLORIDE, SODIUM BICARBONATE, POTASSIUM CHLORIDE AND SODIUM PHOSPHATE DIBASIC DIHYDRATE 12.5 ML/KG/HR: 3.68; 3.05; 6.34; 3.09; .314; .187 INJECTION INTRAVENOUS at 20:20

## 2025-05-19 RX ADMIN — CALCIUM CHLORIDE, MAGNESIUM CHLORIDE, SODIUM CHLORIDE, SODIUM BICARBONATE, POTASSIUM CHLORIDE AND SODIUM PHOSPHATE DIBASIC DIHYDRATE 12.5 ML/KG/HR: 3.68; 3.05; 6.34; 3.09; .314; .187 INJECTION INTRAVENOUS at 16:17

## 2025-05-19 RX ADMIN — OLANZAPINE 2.5 MG: 10 INJECTION, POWDER, FOR SOLUTION INTRAMUSCULAR at 20:24

## 2025-05-19 RX ADMIN — PIPERACILLIN AND TAZOBACTAM 4.5 G: 4; .5 INJECTION, POWDER, LYOPHILIZED, FOR SOLUTION INTRAVENOUS at 12:12

## 2025-05-19 RX ADMIN — Medication 25 MCG: at 12:05

## 2025-05-19 RX ADMIN — CALCIUM CHLORIDE, MAGNESIUM CHLORIDE, SODIUM CHLORIDE, SODIUM BICARBONATE, POTASSIUM CHLORIDE AND SODIUM PHOSPHATE DIBASIC DIHYDRATE 12.5 ML/KG/HR: 3.68; 3.05; 6.34; 3.09; .314; .187 INJECTION INTRAVENOUS at 00:01

## 2025-05-19 RX ADMIN — DEXMEDETOMIDINE HYDROCHLORIDE 1.2 MCG/KG/HR: 400 INJECTION INTRAVENOUS at 20:58

## 2025-05-19 RX ADMIN — Medication 25 MCG: at 08:35

## 2025-05-19 RX ADMIN — PANTOPRAZOLE SODIUM 40 MG: 40 INJECTION, POWDER, FOR SOLUTION INTRAVENOUS at 08:05

## 2025-05-19 RX ADMIN — SODIUM CHLORIDE, SODIUM LACTATE, POTASSIUM CHLORIDE, AND CALCIUM CHLORIDE 500 ML: .6; .31; .03; .02 INJECTION, SOLUTION INTRAVENOUS at 08:17

## 2025-05-19 ASSESSMENT — ACTIVITIES OF DAILY LIVING (ADL)
ADLS_ACUITY_SCORE: 60
ADLS_ACUITY_SCORE: 62
ADLS_ACUITY_SCORE: 60
ADLS_ACUITY_SCORE: 60
ADLS_ACUITY_SCORE: 58
ADLS_ACUITY_SCORE: 60
ADLS_ACUITY_SCORE: 62
ADLS_ACUITY_SCORE: 60

## 2025-05-19 NOTE — PROGRESS NOTES
"Surgery Progress Note  05/19/2025       Subjective:  Patient s/p celiac trunk/splenic artery branches embolization for active extravasation over weekend. Per psych note on 5/16, \"with trach in place, it is quite difficult to assess capacity, but I don't see any evidence of psychiatric conditions that would interfere with capacity; I would assume that he is able to make decisions regarding his care.\" Per ICU note on 5/18, family wants patient to remain \"full code\". During patient interview/exam, patient attempted to communicate but couldn't write. Patient disconnected his ventilation tube from his trach two times, and when connecting it back, patient shook his head. EGS team suctioned at bedside, RN notified and came into room to assist. Patient unable to elaborate beyond this action. HR has been labile. Weaned off pressors. PEEP increased to 10 over weekend. WBC and Lac downtrending. ALT and AST acutely rising, with Alkaline Phosphatase and bilirubin dropping.      Objective:  Temp:  [97.4  F (36.3  C)-100.1  F (37.8  C)] 99.7  F (37.6  C)  Pulse:  [] 100  Resp:  [17-42] 26  MAP:  [58 mmHg-104 mmHg] 63 mmHg  Arterial Line BP: ()/(41-86) 106/41  FiO2 (%):  [70 %-100 %] 70 %  SpO2:  [90 %-100 %] 97 %    I/O last 3 completed shifts:  In: 3445.31 [I.V.:1295.65]  Out: 2794 [Emesis/NG output:22; Drains:2772]      Gen: Eyes open, patient appears extremely anxious during our visit. He attempts to communicate with pen/paper with difficulty. Pulled ventilation tubing from his trach 2 times. Precedex 1.2 mcg/kg/hr.   Resp: Ventilated on 70% FiO2, PEEP of 10, trach  Abd: Abdomen is taut but compressible, drains with dark bloody output, Malecot with minimal succus, midline abdominal dressing with wound manager with dark bloody output around.   : Scrotum with pouch in place with serosanguinous drainage  Ext: bilateral lower extremities appear mottled, warm to the touch, edematous      Labs:  Recent Labs   Lab " 05/19/25 0407 05/18/25 1958 05/18/25  1548   WBC 23.8* 28.8* 36.0*   HGB 10.2* 11.0* 12.0*   PLT 92* 86* 97*       Recent Labs   Lab 05/19/25  0815 05/19/25  0655 05/19/25  0557 05/19/25  0415 05/19/25 0407 05/18/25 2004 05/18/25 1958 05/18/25  1707 05/18/25  1548   NA  --   --   --   --  140  --  140  --  141   POTASSIUM  --   --   --   --  3.7  --  3.7  --  3.7   CHLORIDE  --   --   --   --  106  --  105  --  105   CO2  --   --   --   --  21*  --  21*  --  19*   BUN  --   --   --   --  26.3*  --  28.3*  --  28.5*   CR  --   --   --   --  0.43*  --  0.50*  --  0.54*   * 132* 138*   < > 155*   < > 133*   < > 127*   KARINA  --   --   --   --  7.9*  --  7.9*  --  8.3*   MAG  --   --   --   --  2.0  --  2.2  --  2.2   PHOS  --   --   --   --  3.1  --  3.1  --  3.1    < > = values in this interval not displayed.     Assessment/Plan:   Sebastián Rodas is a 29-year-old male with a history of alcohol use disorder, anxiety, and bipolar disorder who was admitted on 3/30/2025 for alcohol withdrawal following a recent binge, presenting with diffuse abdominal pain. Initial workup revealed leukocytosis and elevated lipase concerning for acute pancreatitis. He developed acute encephalopathy and was transferred to the ICU on 3/31, requiring intubation and vasopressors by 4/1 due to shock. Hospital course has been complicated by acute renal failure requiring CRRT, cardiomyopathy (initial LVEF 20-25%, improved to 65-70% by 4/14), and necrotizing pancreatitis with unorganized fluid collections. He completed a 14-day course of meropenem but remains intermittently febrile and critically ill, requiring ongoing dialysis and vasopressor support. On 4/27, after clinical deterioration and imaging consistent with a likely perforated viscus, following family discussion, he underwent emergent exploratory laparotomy, necrosectomy, transverse colectomy, abdominal washout, and drain placement. Patient was transferred to [facility] on 4/30/25  "for further surgical management. Went to OR 5/1 for re-open laparotomy, I&D, placement of colostomy tube in presumed previous colectomy staple line, necrosectomy, and Abthera placement. On 5/2, increasing sanguineous output from drains concerning for bleeding from pancreatic bed. Family care conference held 5/2, plan for restorative cares at that time. Take back to OR twice (5/4 and 5/7) for abdominal washout. Per nursing note: 5/9 morning after he saw a picture of his abdomen that he \"desires to speak to team about updating goals of care, expressed desire to pull back on cares\". Goals of care conference on 5/10 with continuation of full code and restorative cares. CTA on 5/10 revealed large area of active arterial extravasation in the upper abdomen. MTP was started. Patient is now s/p embolization of small pancreatic branch off of the distal celiac artery with IR on 5/10. Malecot drainage slowed and eventually stopped, with significant drainage through other drains and open into dressings. 5/17 night patient had active extravasation, with Hgb dropping from 7.8 to 5.6 in 1.5 hours. Lactate spiked from 3.6 to 10.6, with WBC 14.9 to 25.5. Family was consulted, MTP was started, and IR embolized the celiac trunk/splenic artery branches.    Plan:  - No further surgical options for any intraabdominal pathology   - Nursing to change dressings in the am, pm, and PRN during the day if copious drainage during the day. Please place Xeroform over the bridging mesh. Please do not apply xeroform outside the wound bed/over the skin edges but make sure all of the wound bed is covered.   - Tube feeds on hold currently due to tear in J-tube. Receiving TPN and Lipids and Albumin as needed.  - On Zosyn per SICU  - SQH PPX, would not recommend therapeutic dose given the risk of bleeding.   - Appreciate WO cares  - Other cares per SICU, we appreciate cares     Patient was seen and discussed with my resident and chief resident.   "   Anish Dickson  Medical Student, Class of 2027  [employer] [facility]    Resident/Fellow Attestation   I, Vanita Saldana MD, was present with the medical/SANTOS student who participated in the service and in the documentation of the note.  I have verified the history and personally performed the physical exam and medical decision making.  I agree with the assessment and plan of care as documented in the note.      - Patient would benefit from care conference. Will plan to discuss with SICU to coordinate care teams. No surgical options available for patient.     Vanita Saldana MD  PGY1  Date of Service (when I saw the patient): 05/19/25

## 2025-05-19 NOTE — PROGRESS NOTES
CRRT STATUS NOTE    DATA:  Time:  0610  Pressures WNL:  YES  Filter Status:  WDL    Problems Reported/Alarms Noted:  none    Supplies Present:  YES    ASSESSMENT:    Patient Net Fluid Balance:  -217ml net since midnight       Intake/Output Summary (Last 24 hours) at 5/19/2025 0610  Last data filed at 5/19/2025 0600  Gross per 24 hour   Intake 3082.71 ml   Output 2794 ml   Net 288.71 ml       Vital Signs: Temp:  [97.4  F (36.3  C)-100.1  F (37.8  C)] 97.4  F (36.3  C)  Pulse:  [] 128  Resp:  [17-42] 28  MAP:  [58 mmHg-104 mmHg] 84 mmHg  Arterial Line BP: ()/(45-86) 127/63  FiO2 (%):  [80 %-100 %] 80 %  SpO2:  [90 %-100 %] 94 %       Most Recent BMP's:  Recent Labs   Lab Test 05/19/25 0407 05/18/25 1958 05/18/25 1548 05/18/25 0530 05/17/25  2343 05/17/25 2007 05/17/25  1526    140 141 147*  --  137 135   POTASSIUM 3.7 3.7 3.7 4.3 5.2 3.8 3.4   CHLORIDE 106 105 105 105  --  104 102   CO2 21* 21* 19* 20*  --  21* 21*   BUN 26.3* 28.3* 28.5* 31.0*  --  24.7* 25.5*   CR 0.43* 0.50* 0.54* 0.63*  --  0.46* 0.51*   ANIONGAP 13 14 17* 22*  --  12 12   KARINA 7.9* 7.9* 8.3* 8.6*  --  7.7* 7.7*     Most Recent CBC's:  Recent Labs   Lab Test 05/19/25 0407 05/18/25 1958 05/18/25 1548 05/18/25  1219   WBC 23.8* 28.8* 36.0* 41.1*   HGB 10.2* 11.0* 12.0* 13.0*   MCV 87 84 85 86   PLT 92* 86* 97* 120*     Most Recent ABG's:  Recent Labs   Lab Test 05/19/25  0407 05/18/25  0537 05/18/25  0055   PH 7.41 7.34* 7.37   PO2 150* 90 187*   PCO2 38 41 33*   HCO3 24 22 19*   MARGOTH -0.2 -3.7* -5.7*       Goals of Therapy:  no UF only clearance.      INTERVENTIONS:   No interventions overnight by CRRT resource RN.    Charting reviewed. Rounding completed. Treatment plan discussed with bedside nurse.     PLAN:  Continue with current plan of care please contact CRRT resource with any questions or concerns via [employer].

## 2025-05-19 NOTE — PLAN OF CARE
22ICU End of Shift Summary. See flowsheets for vital signs and detailed assessment.    Changes this shift: Pt A&O x4 and follows commands. Off pressors since last night with MAP over 65. CMV 60 % peep of 12.   Drains with bloody fluid but Hgb remains stable. On CRRT I=O. Albumin of 500 ml of LR given today to  allow for fluid removal.  Wound manager changed x2..    Plan:  Care conference tomorrow at 2pm      Problem: Adult Inpatient Plan of Care  Goal: Plan of Care Review  Description: The Plan of Care Review/Shift note should be completed every shift.  The Outcome Evaluation is a brief statement about your assessment that the patient is improving, declining, or no change.  This information will be displayed automatically on your shiftnote.  Outcome: Progressing  Flowsheets (Taken 5/19/2025 1840)  Plan of Care Reviewed With: patient  Overall Patient Progress: no change

## 2025-05-19 NOTE — PROGRESS NOTES
"SPIRITUAL HEALTH SERVICES  SPIRITUAL ASSESSMENT Progress Note  [facility] ([place] 4C     REFERRAL SOURCE/REASON FOR VISIT: Follow Up    Visit with Sebastián Rodas, his mother, brother, and another family member. I introduced myself to them. They declined a need for spiritual/emotional support at this moment, stating, \"we just got here.\" They will request further visits if needed/desired.     PLAN: Chaplains will continue to follow while Sebastián is admitted.     Judy Esposito MDiv  Chaplain Resident    Spiritual Health Services is available 24/7 for emergent requests and consults, either by paging the on-call  or by entering an ASAP/STAT consult in [employer], which will also page the on-call .    "

## 2025-05-19 NOTE — PLAN OF CARE
ICU End of Shift Summary. See flowsheets for vital signs and detailed assessment.    Changes this shift: Rass 1/-2, slept around 1AM, Precedex and fentanyl on for sedation and comfort. Afebrile, SR/ST with HR of 's. MAP goal >65 with Levo off and Vaso off. Pt on continuous TPN with overnight Lipids and Insulin gtt in place with wnl BG. Abd drains continue to collect bloody output. CRRT in place without pulling fluid per renal.Hgb drop noted, SICU team aware. Pt received replacement for his low Calcium.     Plan: Continue with current POC    Goal Outcome Evaluation:      Plan of Care Reviewed With: patient    Overall Patient Progress: improvingOverall Patient Progress: improving    Outcome Evaluation: Tolerating pressor titration and remain alert and mouthing his needs

## 2025-05-19 NOTE — PROGRESS NOTES
CRRT STATUS NOTE    DATA:  Time:  4:59 PM  Pressures WNL:  YES  Filter Status:  WDL    Problems Reported/Alarms Noted:  None    Supplies Present:  YES    ASSESSMENT:  Patient Net Fluid Balance:  + 800 ml since m.n.  Vital Signs:  VSS  Labs:  K 3.8, Mg 2, Phos 2.9, WBC 18.9, hgb 10.3, plts 123, LA 3.4  Goals of Therapy:  I = O    INTERVENTIONS:   None    PLAN:  Continue to monitor. Notify providers of changes/concerns.

## 2025-05-19 NOTE — CARE CONFERENCE
Care Conference:     Per SICU resident, Care conference requested by Sebastián for continued conversation around goals of care, with his family present. Writer reached out to mother, who indicated that she will be here today, but additional family, as requested by Sebastián, would likely not be available until tomorrow. Writer confirmed with brother, Rony, that he will be available to come in person for a bedside care conference tomorrow, 5/20.     Time and Location:   1:45pm pre-conference in  IDT workroom   2:00pm- bedside care conference      Invitees:   SICU: Dr. Felipa Lancaster MD (confirmed by Dr. Pierre)  General Surgery: Dr. Leah Jamison MD (Confirmed)  Palliative: Ronda Bacon PA-C (confirmed)  Ethics: Dr. Delphine Crowley MD, MPH (confirmed)  Family: Mother (Marbella-confirmed), Brothers (Rony-confirmed he will be present and his spouse Ny is a maybe), Uncle (Dustin)-will be here in 5/20 in AM, unsure if he can come back at 2pm      JESENIA Morales, LICSW  [facility] ICU Clinical - 4C/4E  Phone: [phone]  4C Vocera, & 4E Vocera

## 2025-05-19 NOTE — PROGRESS NOTES
SPIRITUAL HEALTH SERVICES Follow Up Note (Palliative)  [facility] ([place]) 4C     Summary: I consulted with both chaplain resident and Palliative Care team in assessment of need and coordination of spiritual care for patient Sebastián Rodas. I did not see Sebastián or mother Marbella today.     Plan: Chaplains will follow for spiritual support while Sebastián is admitted.     Alba Hunter M.Div., [facility]     To reach Spiritual Health, securely message with the [employer] or enter an ASAP/STAT consult in [employer], which will also page the on-call .

## 2025-05-19 NOTE — PROGRESS NOTES
SURGICAL ICU PROGRESS NOTE  05/19/2025    Date of Service (when I saw the patient): 05/19/2025    ASSESSMENT:  Sebastián Rodas is a 29M with alcohol abuse, anxiety, and bipolar disorder, who was admitted 3/30/25 for alcohol withdrawal and abdominal pain. Workup showed leukocytosis and elevated lipase, consistent with acute pancreatitis. He developed encephalopathy and shock, requiring ICU care, intubation, vasopressors, and CRRT by 4/1. His course was complicated by cardiomyopathy (EF 20-25%, improved to 65-70% by 4/14), necrotizing pancreatitis, and persistent infection despite 14 days of meropenem. On 4/27, imaging showed likely perforated viscus; he underwent emergent laparotomy, necrosectomy, and colectomy. Transferred to [facility] SICU on 4/30. On 5/1, reoperation revealed colonic staple line breakdown, necrotic pancreas, vessel thrombosis, and adhesions. Colostomy with malankot drain and temporary abdominal closure performed. Prognosis is poor; palliative care involved. Care conference 5/2 with family to talk goals of care. Full code, family acknowledged that poor prognosis is to be expected. 5/4 s/p exploratory surgery, additional necrotic pancreas remove with no obvious spillage or sign of bleeding at the time. Went back to the OR 5/7 for abdominal wash out. CT abdomen 5/10 showed extravasation on arterial phase imaging. MTP initiated, IR arterial embolization of pancreatic vessels performed. Decreased stool output since 5/13, switched to TPN. Hemorraghic shock  overnight. MTP 5/18, IR embolization 5/18.      CHANGES and MAJOR THINGS TODAY:  - continue hydrocort @ 25 mg daily  - hold the lipid portion of TPN in light of uptrending triglycerides  - pull fluids on CRRT, FB goal: net - 500 to 1L  - 25% albumin bolus x 2 for better tolerability of volume removal  - continue lantus @ 10u + HSSI  - plan for family conference at the end of the week  - PEEP increased to 12    PLAN:    Neurological:  # Acute pain    -Fentanyl gtt, fentanyl bolus  #Encephalopathy- improving  #Insomnia  - Monitor neurological status. Delirium preventions and precautions.   # Pain:   # Sedation: Precedex 1.2mcg/kg/hr  # Anxiety  - Olanzapine 2.5  - Precedex     Pulmonary:   # Acute hypoxic respiratory failure  # S/p tracheostomy placement   - FiO2 (%): 70 %, Resp: 24, Vent Mode: VC/AC, Resp Rate (Set): 16 breaths/min, Tidal Volume (Set, mL): 420 mL, PEEP (cm H2O): 10 cmH2O, Resp Rate (Set): 16 breaths/min, Tidal Volume (Set, mL): 420 mL, PEEP (cm H2O): 10 cmH2O   - Full vent overnight. Has been having trouble with trach dome. Transitioned to BID 2 hour intermittent trach dome trials.  - CT PE 5/9 showed no evidence of pulmonary embolism  - Pulmonary toilet, mobilize  - Ventilator bundle  - Wane FIO2 as tolerated, PEEP 12    Cardiovascular:    # Stress Cardiomyopathy   - Initial LVEF 20-25%, improved to 65-70%  # Shock-distributive (septic)  # Hypovolemic shock  (hemorraghic)  # Sinus Tachycardia  - Monitor hemodynamic status. MAP goal > 65.   - Hydroxycortisone for stress dose steroid   - EKG 5/8, 5/9, 5/18 showed sinus tachycardia with no ectopy  - MTP 5/17  - Continue trend lactate     Gastroenterology/Nutrition:  # S/p emergent exploratory laparotomy, necrosectomy, transverse colectomy, abdominal washout, and drain placement 5/1 and 5/4  # S/p Pancreatic branch vessel embolization with IR 5/10, 5/18  # Severe necrotizing pancreatitis  # Splenic vein thrombosis  - will defer management of dressings and drain removal per EGS   - Ongoing copious dark bloody drain output, CTM  - Cecostomy drain in place, no stool output. Abdomen distended. AXR unremarkable, looks well decompressed.   - on PPI IV  - CT abdomen/pelvis with IV + enteral contrast through G tube 5/14: large volume of air and blood products in abdomen.   - Hold all Oral Meds and Feeds, strict NPO  -TPN hold lipids    # Severe Protein calorie deficit malnutrition due to critical  illness  - on TPN  - G tube tear in the J portion, TF stopped.  - IR consulted for PEG exchange    - IR cannot safely exchange freshly placed GJ tubes until they have been in place x6 weeks given risk of losing access into the stomach. Additionally, imaging shows there is no safe pexy between the stomach and abdominal wall. IR may not be able to offer safe exchange even after 6 weeks.   - Multivitamin supplementation ordered by nutrition  - RD consult. Appreciate cares and recommendations.     Renal/Fluids/Electrolytes:   # SARAHI  # Fluid overload  Baseline Cr 0.7-0.8. Presented with Cr 0.96 on 3/30. Rapidly markos to 5.2 on 4/1. Oliguric. Garcia UA with proteinuria, hematuria, pyuria, moderate bacteria.  Kidneys unremarkable on CT. Has severe ATN in the setting of shock, ADHF, intravascular hypovolemia/3rd spacing from pancreatitis.   - Trend lactate   - CRRT with I&Os goal -500cc to -1000cc  - Continue to monitor intake and output  - Volume assessment, BID 25% albumin today to mobilize fluids    Endocrine:   # Stress hyperglycemia    - hgb A1c 5.3, no hx of DM   - Continue Lantus, continue insulin gtt  - Goal to keep BG< 180 for optimal wound healing   Recent Labs   Lab 05/19/25  1012 05/19/25  0815 05/19/25  0655 05/19/25  0557 05/19/25  0531 05/19/25  0415   * 125* 132* 138* 156* 142*        ID:  # Leukocytosis  #Necrotizing pancreatitis  WBC Count   Date Value Ref Range Status   05/19/2025 23.8 (H) 4.0 - 11.0 10e3/uL Final   - CRP elevated at 454->344 >210, trend q3 days  - Completed 14 days course of meropenem and caspofungin and zosyn previously.   - Continue Zosyn for ongoing intra-abdominal infection     cultures:  - 4/30 blood cultures- NGTD   - 5/1 blood cultures ordered - NGTD  - Blood culture 5/12, 5/15 - NGTD     Heme:     # Acute blood loss anemia   # Anemia of critical illness   # Thrombosed splenic vein   - Transfuse if hgb <7.0 or signs/symptoms of hypoperfusion. Monitor and trend  Hemoglobin    Date Value Ref Range Status   05/19/2025 10.2 (L) 13.3 - 17.7 g/dL Final     - MTP 5/10, 5/18  - hemoglobin stable, keeping on trending  - Continue subcutaneous heparin, watch bleeding from drains.     Musculoskeletal:   # Deconditioning and weakness due to critical illness   - Physical and occupational therapy consult      Skin:  # Pressure Ulcers - Buttocks/rectal area  - Barrier cream with liberal application. Continue fecal management system   - WOC consult      #Pressure Ulcers- Left Heel  Pressure Injury Location: Left heel   Wound type: Pressure Injury     Pressure Injury Stage: Deep Tissue Pressure Injury (DTPI), present on admission     General Cares/Prophylaxis:    DVT Prophylaxis: SQH   GI Prophylaxis: PPI  Restraints: Restraints for medical healing needed: YES - mittens     Lines/ tubes/ drains:  - HD line--  Right IJ  - PICC line- left   - Right radial A line  - PIV x 3  - Trach  - G-J  - Wound manager  - SHANNON x 4  - Cecostomy drain     Disposition:  - Surgical ICU    Jean Paul Pierre MD  PGY1  Date of Service (when I saw the patient): 05/18/25    ====================================  INTERVAL HISTORY:  Course reviewed. He has had difficulty tolerating the trach dome due to increased secretions, currently on timed trach dome trials. He had a tear in the J portion of his G tube and feeds were stopped and TPN was started. Decreased stool colostomy output. Hemorrhage on 5/18. MTP and IR embolization.     OBJECTIVE:   1. VITAL SIGNS:   Temp:  [97.4  F (36.3  C)-100.1  F (37.8  C)] 99.7  F (37.6  C)  Pulse:  [] 99  Resp:  [17-39] 24  MAP:  [58 mmHg-104 mmHg] 74 mmHg  Arterial Line BP: ()/(41-86) 123/53  FiO2 (%):  [70 %-100 %] 70 %  SpO2:  [90 %-100 %] 95 %  FiO2 (%): 70 %, Resp: 24, Vent Mode: VC/AC, Resp Rate (Set): 16 breaths/min, Tidal Volume (Set, mL): 420 mL, PEEP (cm H2O): 10 cmH2O, Resp Rate (Set): 16 breaths/min, Tidal Volume (Set, mL): 420 mL, PEEP (cm H2O): 10 cmH2O    2. INTAKE/ OUTPUT:    I/O last 3 completed shifts:  In: 3445.31 [I.V.:1295.65]  Out: 2794 [Emesis/NG output:22; Drains:2772]    3. PHYSICAL EXAMINATION:  General: laying in bed, awake and alert requesting a blanket.   HEENT: PERRLA. Trach present and secure, site dressed.   Pulm/Resp: Clear breath sounds bilaterally, lungs coarse but clear.  CV: RRR, S1/S2   Abdomen: Distended. Dark bloody output noted to all drains. G-J tube in place, site secured, abdomen with abthera in place, suction intact.   : scrotal edema, draining dark bloody output.   MSK/Extremities: generalized edema in extremities    4. INVESTIGATIONS:   Arterial Blood Gases   Recent Labs   Lab 05/19/25  0407 05/18/25  0537 05/18/25  0055 05/17/25  2338   PH 7.41 7.34* 7.37 7.27*   PCO2 38 41 33* 37   PO2 150* 90 187* 282*   HCO3 24 22 19* 17*     Complete Blood Count   Recent Labs   Lab 05/19/25  0407 05/18/25 1958 05/18/25  1548 05/18/25  1219   WBC 23.8* 28.8* 36.0* 41.1*   HGB 10.2* 11.0* 12.0* 13.0*   PLT 92* 86* 97* 120*     Basic Metabolic Panel  Recent Labs   Lab 05/19/25  1012 05/19/25  0815 05/19/25  0655 05/19/25  0557 05/19/25  0415 05/19/25  0407 05/18/25  2004 05/18/25 1958 05/18/25  1707 05/18/25  1548 05/18/25  0555 05/18/25  0530   NA  --   --   --   --   --  140  --  140  --  141  --  147*   POTASSIUM  --   --   --   --   --  3.7  --  3.7  --  3.7  --  4.3   CHLORIDE  --   --   --   --   --  106  --  105  --  105  --  105   CO2  --   --   --   --   --  21*  --  21*  --  19*  --  20*   BUN  --   --   --   --   --  26.3*  --  28.3*  --  28.5*  --  31.0*   CR  --   --   --   --   --  0.43*  --  0.50*  --  0.54*  --  0.63*   * 125* 132* 138*   < > 155*   < > 133*   < > 127*   < > 319*    < > = values in this interval not displayed.     Liver Function Tests  Recent Labs   Lab 05/19/25  0407 05/18/25  1548 05/18/25  0530 05/18/25  0047 05/17/25  2343 05/17/25  1526 05/17/25  1417 05/17/25  0408 05/16/25  1223 05/16/25  0319   *  --  71*  --    --   --   --  42  --  25   ALT 70  --  36  --   --   --   --  25  --  13   ALKPHOS 234*  --  289*  --   --   --   --  364*  --  259*   BILITOTAL 1.3*  --  2.6*  --   --   --   --  2.4*  --  2.1*   ALBUMIN 2.5* 2.8* 2.9*  --   --  2.3*  --  2.2*   < > 2.2*   INR  --   --  1.21* 1.63* 1.54*  --  1.18*  --   --   --     < > = values in this interval not displayed.     Pancreatic Enzymes  No lab results found in last 7 days.    Coagulation Profile  Recent Labs   Lab 05/18/25  0530 05/18/25  0047 05/17/25  2343 05/17/25  1417   INR 1.21* 1.63* 1.54* 1.18*   PTT 31  --  40* 30     5. RADIOLOGY:   No results found for this or any previous visit (from the past 24 hours).      =========================================

## 2025-05-19 NOTE — PROGRESS NOTES
Nephrology Progress Note  05/19/2025       Sebastián Rodas is a 29 yom with Bipolar disorder, ETOH use c/b necrotizing pancreatitis admitted 3/30/25 to [facility] for ETOH withdrawal and abdominal pain, found to have acute pancreatitis which has progressed to necrotizing pancreatitis complicated by SARAHI.  Seen by Nephrology at [place], started on CRRT 4/1.  Had periods of stability enough for iHD but back to CRRT on 4/21 until tx to [facility] for further surgical interventions.       Interval History :   Mr Rodas continues on CRRT, had bleeding requiring ~9L of blood products over the weekend.  Continuing to support with CRRT while we discuss GOC as he is unlikely to ever be stable enough from intraabdominal bleeding standpoint to leave the ICU.  Labs stable on 4k baths, ordered for I=O but mainly achieving this on his own with large drain output.     Assessment & Recommendations:   ASRAHI-Baseline Cr 0.8 as recently as March 2025 before acute events, was started on CRRT emergently on 4/1 in setting of septic shock. Had ~2 weeks of stability enough to manage with iHD but back on CRRT 4/21 until tx to [facility] on 4/30. Running fevers with intraabdominal sepsis.  Continuing RRT from OSH, restarted CRRT on 5/2 after OR.                  -No need for new consent, continuing RRT started last month at [facility].                 -Access is tunneled RIJ from 4/21.                  -CRRT,  all 4k baths, planning I=O but mainly achieving this on his own with drain output.      Volume-Wt is up over the weekend after bleeding, ordered for I=O but mainly is achieving this with drain output.      Electrolytes-K 3.7 on all 4k baths, bicarb 21.      BMD-No acute issues.      Anemia-Hgb 10.2 this am, down from 10.7 yesterday but hemodynamically stable.      Nutrition-On hold with bleeding.      Time spent: 50 minutes on this date of encounter for chart review, physical exam, medical decision making and co-ordination of care.     "  Discussed with Dr Samuel     Recommendations were communicated to primary team via verbal communication.      ASIF Roger CNS  Clinical Nurse Specialist  [phone]    Review of Systems:   I reviewed the following systems:  ROS not done due to vent/sedation.     Physical Exam:   I/O last 3 completed shifts:  In: 3445.31 [I.V.:1295.65]  Out: 2794 [Emesis/NG output:22; Drains:2772]   /57   Pulse 100   Temp 99.7  F (37.6  C) (Axillary)   Resp 24   Ht 1.727 m (5' 7.99\")   Wt 85.1 kg (187 lb 11.2 oz)   SpO2 94%   BMI 28.55 kg/m       GENERAL APPEARANCE: Vent via trach, CRRT running.   Pulmonary: Vent via trach  CV: Regular rhythm, normal rate   - Edema +2 generalized  GI: Surgical dressing in place  MS: no evidence of inflammation in joints, no muscle tenderness  : + Garcia  SKIN: no rash, warm, dry  NEURO: Intubated and sedated.     Labs:   All labs reviewed by me  Electrolytes/Renal -   Recent Labs   Lab Test 05/19/25  0815 05/19/25  0655 05/19/25  0557 05/19/25  0415 05/19/25  0407 05/18/25  2004 05/18/25  1958 05/18/25  1707 05/18/25  1548   NA  --   --   --   --  140  --  140  --  141   POTASSIUM  --   --   --   --  3.7  --  3.7  --  3.7   CHLORIDE  --   --   --   --  106  --  105  --  105   CO2  --   --   --   --  21*  --  21*  --  19*   BUN  --   --   --   --  26.3*  --  28.3*  --  28.5*   CR  --   --   --   --  0.43*  --  0.50*  --  0.54*   * 132* 138*   < > 155*   < > 133*   < > 127*   KARINA  --   --   --   --  7.9*  --  7.9*  --  8.3*   MAG  --   --   --   --  2.0  --  2.2  --  2.2   PHOS  --   --   --   --  3.1  --  3.1  --  3.1    < > = values in this interval not displayed.       CBC -   Recent Labs   Lab Test 05/19/25 0407 05/18/25  1958 05/18/25  1548   WBC 23.8* 28.8* 36.0*   HGB 10.2* 11.0* 12.0*   PLT 92* 86* 97*       LFTs -   Recent Labs   Lab Test 05/19/25 0407 05/18/25  1548 05/18/25  0530 05/17/25  1526 05/17/25  0408   ALKPHOS 234*  --  289*  --  364*   BILITOTAL " 1.3*  --  2.6*  --  2.4*   ALT 70  --  36  --  25   *  --  71*  --  42   PROTTOTAL 5.1*  --  5.2*  --  5.4*   ALBUMIN 2.5* 2.8* 2.9*   < > 2.2*    < > = values in this interval not displayed.       Iron Panel - No lab results found.        Current Medications:  Current Facility-Administered Medications   Medication Dose Route Frequency Provider Last Rate Last Admin    heparin ANTICOAGULANT injection 5,000 Units  5,000 Units Subcutaneous Q8H [facility] Sonia Moore NP   5,000 Units at 05/19/25 0306    hydrocortisone sodium succinate PF (solu-CORTEF) injection 25 mg  25 mg Intravenous Daily Sonia Moore NP   25 mg at 05/19/25 0804    insulin glargine (LANTUS PEN) injection 10 Units  10 Units Subcutaneous Q24H Sonia Moore NP   10 Units at 05/18/25 1107    lipids 4 oil (SMOFLIPID) 20 % infusion 250 mL  250 mL Intravenous Q24H Brendon Haas,    Stopped at 05/19/25 0825    OLANZapine (zyPREXA) IV injection 2.5 mg  2.5 mg Intravenous QPM Sonia Moore NP   2.5 mg at 05/18/25 1953    pantoprazole (PROTONIX) IV push injection 40 mg  40 mg Intravenous QAM AC de La MaterRenee, CNP   40 mg at 05/19/25 0805    piperacillin-tazobactam (ZOSYN) 4.5 g vial to attach to  mL bag  4.5 g Intravenous Q6H Eugenia Zavala MD   4.5 g at 05/19/25 0543    sodium chloride (PF) 0.9% PF flush 3 mL  3 mL Intracatheter Q8H Ganesh Griffiths MD   3 mL at 05/19/25 0544     Current Facility-Administered Medications   Medication Dose Route Frequency Provider Last Rate Last Admin    dexmedeTOMIDine (PRECEDEX) 4 mcg/mL in sodium chloride 0.9 % 100 mL infusion  0.1-1.2 mcg/kg/hr (Dosing Weight) Intravenous Continuous Sonia Moore NP 20.9 mL/hr at 05/19/25 0900 1.2 mcg/kg/hr at 05/19/25 0900    dextrose 10% infusion   Intravenous Continuous PRN Sonia Moore, NP        dextrose 10% infusion   Intravenous Continuous PRN Brendon Haas,         dialysate for  CVVHD & CVVHDF (Phoxillum BK4/2.5)  12.5 mL/kg/hr CRRT Continuous Tico Spain MD 1,000 mL/hr at 05/19/25 0504 12.5 mL/kg/hr at 05/19/25 0504    fentaNYL (SUBLIMAZE) infusion   mcg/hr Intravenous Continuous Roxi Alston MD 2 mL/hr at 05/19/25 0900 100 mcg/hr at 05/19/25 0900    insulin regular (MYXREDLIN) 1 unit/mL infusion  0-24 Units/hr Intravenous Continuous Sonia Moore NP 2 mL/hr at 05/19/25 0900 2 Units/hr at 05/19/25 0900    No heparin required   Does not apply Continuous PRN Tico Spain MD        norepinephrine (LEVOPHED) 16 mg in  mL infusion MAX CONC CENTRAL LINE  0.01-0.6 mcg/kg/min (Dosing Weight) Intravenous Continuous Celso Ambrose MD   Stopped at 05/18/25 1700    parenteral nutrition - ADULT compounded formula   CENTRAL LINE IV TPN CONTINUOUS Brendon Haas DO 50 mL/hr at 05/19/25 0800 Rate Verify at 05/19/25 0800    POST-filter replacement solution for CVVHD & CVVHDF (Phoxillum BK4/2.5)   CRRT Continuous Tico Spain  mL/hr at 05/18/25 2334 New Bag at 05/18/25 2334    PRE-filter replacement solution for CVVHD & CVVHDF (Phoxillum BK4/2.5)  12.5 mL/kg/hr CRRT Continuous Tico Spain MD 1,000 mL/hr at 05/19/25 0505 12.5 mL/kg/hr at 05/19/25 0505    Reason statin not prescribed   Does not apply DOES NOT GO TO Rhys Bloom MD        vasopressin 1 unit/mL MAX Conc (PITRESSIN) infusion  0.5-4 Units/hr Intravenous Continuous Brendon Haas DO   Stopped at 05/19/25 0532

## 2025-05-20 LAB
ALBUMIN SERPL BCG-MCNC: 2.4 G/DL (ref 3.5–5.2)
ALBUMIN SERPL BCG-MCNC: 2.7 G/DL (ref 3.5–5.2)
ALLEN'S TEST: ABNORMAL
ALP SERPL-CCNC: 217 U/L (ref 40–150)
ALT SERPL W P-5'-P-CCNC: 83 U/L (ref 0–70)
ANION GAP SERPL CALCULATED.3IONS-SCNC: 14 MMOL/L (ref 7–15)
ANION GAP SERPL CALCULATED.3IONS-SCNC: 14 MMOL/L (ref 7–15)
AST SERPL W P-5'-P-CCNC: 86 U/L (ref 0–45)
BACTERIA SPEC CULT: NO GROWTH
BACTERIA SPEC CULT: NO GROWTH
BASE EXCESS BLDA CALC-SCNC: -0.5 MMOL/L (ref -3–3)
BILIRUB SERPL-MCNC: 2 MG/DL
BILIRUBIN DIRECT (ROCHE PRO & PURE): 1.53 MG/DL (ref 0–0.45)
BUN SERPL-MCNC: 26.3 MG/DL (ref 6–20)
BUN SERPL-MCNC: 26.8 MG/DL (ref 6–20)
CA-I BLD-MCNC: 4.4 MG/DL (ref 4.4–5.2)
CA-I BLD-MCNC: 4.5 MG/DL (ref 4.4–5.2)
CALCIUM SERPL-MCNC: 7.9 MG/DL (ref 8.8–10.4)
CALCIUM SERPL-MCNC: 8.1 MG/DL (ref 8.8–10.4)
CHLORIDE SERPL-SCNC: 105 MMOL/L (ref 98–107)
CHLORIDE SERPL-SCNC: 106 MMOL/L (ref 98–107)
CREAT SERPL-MCNC: 0.4 MG/DL (ref 0.67–1.17)
CREAT SERPL-MCNC: 0.44 MG/DL (ref 0.67–1.17)
EGFRCR SERPLBLD CKD-EPI 2021: >90 ML/MIN/1.73M2
EGFRCR SERPLBLD CKD-EPI 2021: >90 ML/MIN/1.73M2
ERYTHROCYTE [DISTWIDTH] IN BLOOD BY AUTOMATED COUNT: 15.8 % (ref 10–15)
ERYTHROCYTE [DISTWIDTH] IN BLOOD BY AUTOMATED COUNT: 16.3 % (ref 10–15)
GLUCOSE BLDC GLUCOMTR-MCNC: 102 MG/DL (ref 70–99)
GLUCOSE BLDC GLUCOMTR-MCNC: 117 MG/DL (ref 70–99)
GLUCOSE BLDC GLUCOMTR-MCNC: 125 MG/DL (ref 70–99)
GLUCOSE BLDC GLUCOMTR-MCNC: 126 MG/DL (ref 70–99)
GLUCOSE BLDC GLUCOMTR-MCNC: 126 MG/DL (ref 70–99)
GLUCOSE BLDC GLUCOMTR-MCNC: 128 MG/DL (ref 70–99)
GLUCOSE BLDC GLUCOMTR-MCNC: 135 MG/DL (ref 70–99)
GLUCOSE BLDC GLUCOMTR-MCNC: 140 MG/DL (ref 70–99)
GLUCOSE BLDC GLUCOMTR-MCNC: 141 MG/DL (ref 70–99)
GLUCOSE BLDC GLUCOMTR-MCNC: 147 MG/DL (ref 70–99)
GLUCOSE BLDC GLUCOMTR-MCNC: 96 MG/DL (ref 70–99)
GLUCOSE SERPL-MCNC: 123 MG/DL (ref 70–99)
GLUCOSE SERPL-MCNC: 140 MG/DL (ref 70–99)
HCO3 BLD-SCNC: 23 MMOL/L (ref 21–28)
HCO3 SERPL-SCNC: 20 MMOL/L (ref 22–29)
HCO3 SERPL-SCNC: 21 MMOL/L (ref 22–29)
HCT VFR BLD AUTO: 27.1 % (ref 40–53)
HCT VFR BLD AUTO: 28.4 % (ref 40–53)
HGB BLD-MCNC: 9.2 G/DL (ref 13.3–17.7)
HGB BLD-MCNC: 9.7 G/DL (ref 13.3–17.7)
LACTATE SERPL-SCNC: 3.6 MMOL/L (ref 0.7–2)
LACTATE SERPL-SCNC: 3.8 MMOL/L (ref 0.7–2)
MAGNESIUM SERPL-MCNC: 1.9 MG/DL (ref 1.7–2.3)
MAGNESIUM SERPL-MCNC: 2 MG/DL (ref 1.7–2.3)
MCH RBC QN AUTO: 30.6 PG (ref 26.5–33)
MCH RBC QN AUTO: 30.8 PG (ref 26.5–33)
MCHC RBC AUTO-ENTMCNC: 33.9 G/DL (ref 31.5–36.5)
MCHC RBC AUTO-ENTMCNC: 34.2 G/DL (ref 31.5–36.5)
MCV RBC AUTO: 90 FL (ref 78–100)
MCV RBC AUTO: 90 FL (ref 78–100)
O2/TOTAL GAS SETTING VFR VENT: 60 %
OXYHGB MFR BLDA: 95 % (ref 92–100)
PCO2 BLD: 33 MM HG (ref 35–45)
PEEP: 12 CM H2O
PH BLD: 7.45 [PH] (ref 7.35–7.45)
PHOSPHATE SERPL-MCNC: 2.7 MG/DL (ref 2.5–4.5)
PHOSPHATE SERPL-MCNC: 2.8 MG/DL (ref 2.5–4.5)
PLATELET # BLD AUTO: 132 10E3/UL (ref 150–450)
PLATELET # BLD AUTO: 157 10E3/UL (ref 150–450)
PO2 BLD: 74 MM HG (ref 80–105)
POTASSIUM SERPL-SCNC: 3.8 MMOL/L (ref 3.4–5.3)
POTASSIUM SERPL-SCNC: 3.9 MMOL/L (ref 3.4–5.3)
PROT SERPL-MCNC: 5.4 G/DL (ref 6.4–8.3)
RBC # BLD AUTO: 3.01 10E6/UL (ref 4.4–5.9)
RBC # BLD AUTO: 3.15 10E6/UL (ref 4.4–5.9)
SAO2 % BLDA: 96.5 % (ref 96–97)
SODIUM SERPL-SCNC: 140 MMOL/L (ref 135–145)
SODIUM SERPL-SCNC: 140 MMOL/L (ref 135–145)
WBC # BLD AUTO: 14.3 10E3/UL (ref 4–11)
WBC # BLD AUTO: 15.5 10E3/UL (ref 4–11)

## 2025-05-20 PROCEDURE — 200N000002 HC R&B ICU UMMC

## 2025-05-20 PROCEDURE — 83735 ASSAY OF MAGNESIUM: CPT | Performed by: CLINICAL NURSE SPECIALIST

## 2025-05-20 PROCEDURE — 250N000011 HC RX IP 250 OP 636: Performed by: NURSE PRACTITIONER

## 2025-05-20 PROCEDURE — 250N000009 HC RX 250: Performed by: STUDENT IN AN ORGANIZED HEALTH CARE EDUCATION/TRAINING PROGRAM

## 2025-05-20 PROCEDURE — 82330 ASSAY OF CALCIUM: CPT | Performed by: CLINICAL NURSE SPECIALIST

## 2025-05-20 PROCEDURE — 83605 ASSAY OF LACTIC ACID: CPT | Performed by: NURSE PRACTITIONER

## 2025-05-20 PROCEDURE — 250N000009 HC RX 250: Performed by: CLINICAL NURSE SPECIALIST

## 2025-05-20 PROCEDURE — 258N000003 HC RX IP 258 OP 636

## 2025-05-20 PROCEDURE — 90947 DIALYSIS REPEATED EVAL: CPT

## 2025-05-20 PROCEDURE — 84450 TRANSFERASE (AST) (SGOT): CPT | Performed by: STUDENT IN AN ORGANIZED HEALTH CARE EDUCATION/TRAINING PROGRAM

## 2025-05-20 PROCEDURE — 84075 ASSAY ALKALINE PHOSPHATASE: CPT | Performed by: STUDENT IN AN ORGANIZED HEALTH CARE EDUCATION/TRAINING PROGRAM

## 2025-05-20 PROCEDURE — 94003 VENT MGMT INPAT SUBQ DAY: CPT

## 2025-05-20 PROCEDURE — 85027 COMPLETE CBC AUTOMATED: CPT | Performed by: CLINICAL NURSE SPECIALIST

## 2025-05-20 PROCEDURE — 250N000011 HC RX IP 250 OP 636: Performed by: STUDENT IN AN ORGANIZED HEALTH CARE EDUCATION/TRAINING PROGRAM

## 2025-05-20 PROCEDURE — 250N000009 HC RX 250: Performed by: NURSE PRACTITIONER

## 2025-05-20 PROCEDURE — 84100 ASSAY OF PHOSPHORUS: CPT | Performed by: CLINICAL NURSE SPECIALIST

## 2025-05-20 PROCEDURE — 99291 CRITICAL CARE FIRST HOUR: CPT | Mod: 24 | Performed by: NURSE PRACTITIONER

## 2025-05-20 PROCEDURE — 82248 BILIRUBIN DIRECT: CPT | Performed by: STUDENT IN AN ORGANIZED HEALTH CARE EDUCATION/TRAINING PROGRAM

## 2025-05-20 PROCEDURE — 99233 SBSQ HOSP IP/OBS HIGH 50: CPT | Mod: 24 | Performed by: PHYSICIAN ASSISTANT

## 2025-05-20 PROCEDURE — 85018 HEMOGLOBIN: CPT | Performed by: CLINICAL NURSE SPECIALIST

## 2025-05-20 PROCEDURE — 250N000011 HC RX IP 250 OP 636

## 2025-05-20 PROCEDURE — 999N000157 HC STATISTIC RCP TIME EA 10 MIN

## 2025-05-20 PROCEDURE — 85041 AUTOMATED RBC COUNT: CPT | Performed by: CLINICAL NURSE SPECIALIST

## 2025-05-20 PROCEDURE — 250N000009 HC RX 250: Performed by: SURGERY

## 2025-05-20 PROCEDURE — 84460 ALANINE AMINO (ALT) (SGPT): CPT | Performed by: STUDENT IN AN ORGANIZED HEALTH CARE EDUCATION/TRAINING PROGRAM

## 2025-05-20 PROCEDURE — 99233 SBSQ HOSP IP/OBS HIGH 50: CPT | Mod: 24 | Performed by: CLINICAL NURSE SPECIALIST

## 2025-05-20 PROCEDURE — G0463 HOSPITAL OUTPT CLINIC VISIT: HCPCS

## 2025-05-20 PROCEDURE — 82310 ASSAY OF CALCIUM: CPT | Performed by: CLINICAL NURSE SPECIALIST

## 2025-05-20 PROCEDURE — 84132 ASSAY OF SERUM POTASSIUM: CPT | Performed by: CLINICAL NURSE SPECIALIST

## 2025-05-20 PROCEDURE — 82805 BLOOD GASES W/O2 SATURATION: CPT

## 2025-05-20 RX ORDER — DEXTROSE MONOHYDRATE 25 G/50ML
25-50 INJECTION, SOLUTION INTRAVENOUS
Status: DISCONTINUED | OUTPATIENT
Start: 2025-05-20 | End: 2025-05-20

## 2025-05-20 RX ORDER — DEXTROSE MONOHYDRATE 25 G/50ML
25-50 INJECTION, SOLUTION INTRAVENOUS
Status: DISCONTINUED | OUTPATIENT
Start: 2025-05-20 | End: 2025-05-27

## 2025-05-20 RX ORDER — NICOTINE POLACRILEX 4 MG
15-30 LOZENGE BUCCAL
Status: DISCONTINUED | OUTPATIENT
Start: 2025-05-20 | End: 2025-05-27

## 2025-05-20 RX ORDER — NICOTINE POLACRILEX 4 MG
15-30 LOZENGE BUCCAL
Status: DISCONTINUED | OUTPATIENT
Start: 2025-05-20 | End: 2025-05-20

## 2025-05-20 RX ADMIN — INSULIN HUMAN 0.5 UNITS/HR: 1 INJECTION, SOLUTION INTRAVENOUS at 04:41

## 2025-05-20 RX ADMIN — MAGNESIUM SULFATE HEPTAHYDRATE: 500 INJECTION, SOLUTION INTRAMUSCULAR; INTRAVENOUS at 20:02

## 2025-05-20 RX ADMIN — HEPARIN SODIUM 5000 UNITS: 5000 INJECTION, SOLUTION INTRAVENOUS; SUBCUTANEOUS at 20:01

## 2025-05-20 RX ADMIN — CALCIUM CHLORIDE, MAGNESIUM CHLORIDE, SODIUM CHLORIDE, SODIUM BICARBONATE, POTASSIUM CHLORIDE AND SODIUM PHOSPHATE DIBASIC DIHYDRATE 10 ML/KG/HR: 3.68; 3.05; 6.34; 3.09; .314; .187 INJECTION INTRAVENOUS at 16:43

## 2025-05-20 RX ADMIN — Medication 25 MCG: at 12:22

## 2025-05-20 RX ADMIN — PIPERACILLIN AND TAZOBACTAM 4.5 G: 4; .5 INJECTION, POWDER, LYOPHILIZED, FOR SOLUTION INTRAVENOUS at 12:15

## 2025-05-20 RX ADMIN — CALCIUM CHLORIDE, MAGNESIUM CHLORIDE, SODIUM CHLORIDE, SODIUM BICARBONATE, POTASSIUM CHLORIDE AND SODIUM PHOSPHATE DIBASIC DIHYDRATE 12.5 ML/KG/HR: 3.68; 3.05; 6.34; 3.09; .314; .187 INJECTION INTRAVENOUS at 01:19

## 2025-05-20 RX ADMIN — CALCIUM CHLORIDE, MAGNESIUM CHLORIDE, SODIUM CHLORIDE, SODIUM BICARBONATE, POTASSIUM CHLORIDE AND SODIUM PHOSPHATE DIBASIC DIHYDRATE 12.5 ML/KG/HR: 3.68; 3.05; 6.34; 3.09; .314; .187 INJECTION INTRAVENOUS at 06:59

## 2025-05-20 RX ADMIN — CALCIUM CHLORIDE, MAGNESIUM CHLORIDE, SODIUM CHLORIDE, SODIUM BICARBONATE, POTASSIUM CHLORIDE AND SODIUM PHOSPHATE DIBASIC DIHYDRATE: 3.68; 3.05; 6.34; 3.09; .314; .187 INJECTION INTRAVENOUS at 00:49

## 2025-05-20 RX ADMIN — OLANZAPINE 2.5 MG: 10 INJECTION, POWDER, FOR SOLUTION INTRAMUSCULAR at 20:01

## 2025-05-20 RX ADMIN — DEXMEDETOMIDINE HYDROCHLORIDE 1.2 MCG/KG/HR: 400 INJECTION INTRAVENOUS at 10:05

## 2025-05-20 RX ADMIN — PANTOPRAZOLE SODIUM 40 MG: 40 INJECTION, POWDER, FOR SOLUTION INTRAVENOUS at 08:52

## 2025-05-20 RX ADMIN — DEXMEDETOMIDINE HYDROCHLORIDE 1.2 MCG/KG/HR: 400 INJECTION INTRAVENOUS at 06:07

## 2025-05-20 RX ADMIN — PIPERACILLIN AND TAZOBACTAM 4.5 G: 4; .5 INJECTION, POWDER, LYOPHILIZED, FOR SOLUTION INTRAVENOUS at 06:11

## 2025-05-20 RX ADMIN — CALCIUM CHLORIDE, MAGNESIUM CHLORIDE, SODIUM CHLORIDE, SODIUM BICARBONATE, POTASSIUM CHLORIDE AND SODIUM PHOSPHATE DIBASIC DIHYDRATE 10 ML/KG/HR: 3.68; 3.05; 6.34; 3.09; .314; .187 INJECTION INTRAVENOUS at 19:13

## 2025-05-20 RX ADMIN — HEPARIN SODIUM 5000 UNITS: 5000 INJECTION, SOLUTION INTRAVENOUS; SUBCUTANEOUS at 04:17

## 2025-05-20 RX ADMIN — DEXMEDETOMIDINE HYDROCHLORIDE 0.9 MCG/KG/HR: 400 INJECTION INTRAVENOUS at 16:51

## 2025-05-20 RX ADMIN — HEPARIN SODIUM 5000 UNITS: 5000 INJECTION, SOLUTION INTRAVENOUS; SUBCUTANEOUS at 12:11

## 2025-05-20 RX ADMIN — PIPERACILLIN AND TAZOBACTAM 4.5 G: 4; .5 INJECTION, POWDER, LYOPHILIZED, FOR SOLUTION INTRAVENOUS at 23:54

## 2025-05-20 RX ADMIN — MAGNESIUM SULFATE HEPTAHYDRATE 2 G: 2 INJECTION, SOLUTION INTRAVENOUS at 04:58

## 2025-05-20 RX ADMIN — DEXMEDETOMIDINE HYDROCHLORIDE 1 MCG/KG/HR: 400 INJECTION INTRAVENOUS at 22:34

## 2025-05-20 RX ADMIN — Medication 25 MCG: at 11:00

## 2025-05-20 RX ADMIN — SODIUM CHLORIDE, SODIUM LACTATE, POTASSIUM CHLORIDE, AND CALCIUM CHLORIDE 500 ML: .6; .31; .03; .02 INJECTION, SOLUTION INTRAVENOUS at 04:55

## 2025-05-20 RX ADMIN — DEXMEDETOMIDINE HYDROCHLORIDE 1.2 MCG/KG/HR: 400 INJECTION INTRAVENOUS at 01:18

## 2025-05-20 RX ADMIN — PIPERACILLIN AND TAZOBACTAM 4.5 G: 4; .5 INJECTION, POWDER, LYOPHILIZED, FOR SOLUTION INTRAVENOUS at 00:48

## 2025-05-20 RX ADMIN — Medication 100 MCG/HR: at 04:44

## 2025-05-20 RX ADMIN — HYDROCORTISONE SODIUM SUCCINATE 25 MG: 100 INJECTION, POWDER, FOR SOLUTION INTRAMUSCULAR; INTRAVENOUS at 08:52

## 2025-05-20 RX ADMIN — PIPERACILLIN AND TAZOBACTAM 4.5 G: 4; .5 INJECTION, POWDER, LYOPHILIZED, FOR SOLUTION INTRAVENOUS at 17:36

## 2025-05-20 RX ADMIN — Medication 25 MCG: at 02:30

## 2025-05-20 ASSESSMENT — ACTIVITIES OF DAILY LIVING (ADL)
ADLS_ACUITY_SCORE: 62
ADLS_ACUITY_SCORE: 56
ADLS_ACUITY_SCORE: 62
ADLS_ACUITY_SCORE: 58
ADLS_ACUITY_SCORE: 62
ADLS_ACUITY_SCORE: 56
ADLS_ACUITY_SCORE: 62
ADLS_ACUITY_SCORE: 56
ADLS_ACUITY_SCORE: 62
ADLS_ACUITY_SCORE: 58
ADLS_ACUITY_SCORE: 62
ADLS_ACUITY_SCORE: 56
ADLS_ACUITY_SCORE: 62
ADLS_ACUITY_SCORE: 56
ADLS_ACUITY_SCORE: 56
ADLS_ACUITY_SCORE: 62
ADLS_ACUITY_SCORE: 62
ADLS_ACUITY_SCORE: 56

## 2025-05-20 NOTE — PLAN OF CARE
ICU End of Shift Summary. See flowsheets for vital signs and detailed assessment.    Changes this shift: RASS -1 to +1. Sedated and pain management with precedex and fentanyl gtt. Answers Y/N questions appropriately, anxious at times. OTERO, generalized weakness. PERRLA +3. Afebrile. SR/ST 80-100s, no ectopy. MAP goal > 65. Vaso in use intermittently between 0.5-1. Lactic this AM 3.6, 500LR bolus given. BLE edema +3-4. CMV RR 16 /   / PEEP 12, FiO2 increased this AM from 60 to 70% d/t PaO2 74. LS course, small thick cloudy secretions. TPN infusing, lipids on hold. BS faint, no output to ostomy bag. Multiple wounds with bloody drainage, see LDA. CRRT I=O, 4K baths, TMP pressures increased throughout shift, filter clogged @ 0556, unable to return blood at that time. Restarted by CRRT resource. Net +596 since midnight. 2g mag given this AM.     Plan: Care conference today 2pm.     Plan of Care Reviewed With: patient  Overall Patient Progress: no changeOverall Patient Progress: no change  Outcome Evaluation: remains on full vent support with increased FiO2 requirements and high PEEP, intermittent pressor needs, CRRT continued

## 2025-05-20 NOTE — PLAN OF CARE
ICU End of Shift Summary. See flowsheets for vital signs and detailed assessment.    Changes this shift: Pt A&O, on 0.01 levo to maintain MAP of 65. On 65% of FiO2 and peep of 10.   WOC not able to place the wound manager back. Pt has new dressing orders as needed.     Care conference cancelled by family. Team will try to meet with family tomorrow     Plan: Continue to remove fluid as tolerated.     Problem: Adult Inpatient Plan of Care  Goal: Plan of Care Review  Description: The Plan of Care Review/Shift note should be completed every shift.  The Outcome Evaluation is a brief statement about your assessment that the patient is improving, declining, or no change.  This information will be displayed automatically on your shiftnote.  Outcome: Not Progressing  Flowsheets (Taken 5/20/2025 1845)  Plan of Care Reviewed With: patient  Overall Patient Progress: no change

## 2025-05-20 NOTE — PROGRESS NOTES
SPIRITUAL HEALTH SERVICES Follow Up Note (Palliative)  [facility] 4C     Summary: After care conference was cancelled at patient Sebastián Rodas's request, discussed support needs for Sebastián and family with SICU, Surgery, Palliative Care, and Ethics.      Plan: Chaplains will follow for spiritual support while Sebastián is admitted.     Alba Hunter M.Div., [facility]     To reach Spiritual Health, securely message with the [employer] Web Console or enter an ASAP/STAT consult in [employer], which will also page the on-call .

## 2025-05-20 NOTE — PROGRESS NOTES
CRRT STATUS NOTE    DATA:  Pressures WNL:  YES  Filter Status:  WDL    Problems Reported/Alarms Noted:  none    Supplies Present:  YES    ASSESSMENT:    Patient Net Fluid Balance:  +29 net since midnight       Intake/Output Summary (Last 24 hours) at 5/20/2025 1828  Last data filed at 5/20/2025 1800  Gross per 24 hour   Intake 2766.41 ml   Output 3248.5 ml   Net -482.09 ml       Vital Signs: Temp:  [98.1  F (36.7  C)-100.8  F (38.2  C)] 100.8  F (38.2  C)  Pulse:  [] 83  Resp:  [18-33] 23  MAP:  [56 mmHg-93 mmHg] 79 mmHg  Arterial Line BP: ()/(35-74) 131/61  FiO2 (%):  [40 %-70 %] 60 %  SpO2:  [89 %-100 %] 98 %       Most Recent BMP's:  Recent Labs   Lab Test 05/20/25 1610 05/20/25 0344 05/19/25 1612 05/19/25 0407 05/18/25 1958 05/18/25  1548    140 140 140 140 141   POTASSIUM 3.9 3.8 3.8 3.7 3.7 3.7   CHLORIDE 105 106 105 106 105 105   CO2 21* 20* 21* 21* 21* 19*   BUN 26.3* 26.8* 24.7* 26.3* 28.3* 28.5*   CR 0.44* 0.40* 0.40* 0.43* 0.50* 0.54*   ANIONGAP 14 14 14 13 14 17*   KARINA 7.9* 8.1* 8.3* 7.9* 7.9* 8.3*     Most Recent CBC's:  Recent Labs   Lab Test 05/20/25 1610 05/20/25 0344 05/19/25 1612 05/19/25 0407   WBC 14.3* 15.5* 18.9* 23.8*   HGB 9.2* 9.7* 10.3* 10.2*   MCV 90 90 89 87    132* 123* 92*     Most Recent ABG's:  Recent Labs   Lab Test 05/20/25 0344 05/19/25 0407 05/18/25  0537   PH 7.45 7.41 7.34*   PO2 74* 150* 90   PCO2 33* 38 41   HCO3 23 24 22   MARGOTH -0.5 -0.2 -3.7*       Goals of Therapy:  0-50cc/hr     INTERVENTIONS:   Charting reviewed. Rounding completed. Treatment plan discussed with bedside nurse.     PLAN:  Continue with current plan of care please contact CRRT resource with any questions or concerns via [employer].

## 2025-05-20 NOTE — PROGRESS NOTES
Nephrology Progress Note  05/20/2025       Sebastián Rodas is a 29 yom with Bipolar disorder, ETOH use c/b necrotizing pancreatitis admitted 3/30/25 to [facility] for ETOH withdrawal and abdominal pain, found to have acute pancreatitis which has progressed to necrotizing pancreatitis complicated by SARAHI.  Seen by Nephrology at [place], started on CRRT 4/1.  Had periods of stability enough for iHD but back to CRRT on 4/21 until tx to [facility] for further surgical interventions.       Interval History :   Mr Rodas continues on CRRT, labs stable.  Having care conference today with continued intraabdominal bleeding without a long term option for intervention.  Continuing CRRT to support GOC discussions.  Ordered for 50cc/h, labs stable on 4k baths.      Assessment & Recommendations:   SARAHI-Baseline Cr 0.8 as recently as March 2025 before acute events, was started on CRRT emergently on 4/1 in setting of septic shock. Had ~2 weeks of stability enough to manage with iHD but back on CRRT 4/21 until tx to [facility] on 4/30. Running fevers with intraabdominal sepsis.  Continuing RRT from OSH, restarted CRRT on 5/2 after OR.                  -No need for new consent, continuing RRT started last month at [facility].                 -Access is tunneled RIJ from 4/21.                  -CRRT, dose of 20ml/kg/h with low Cr.  All 4k baths, planning I=O but mainly achieving this on his own with drain output.      Volume-Wt is up over the weekend after bleeding, ordered for 0-50cc/h.      Electrolytes-K 3.8 on all 4k baths, bicarb 20.      BMD-No acute issues.      Anemia-Hgb 9.7, ongoing need for PRBC's.      Nutrition-On hold with bleeding.      Time spent: 50 minutes on this date of encounter for chart review, physical exam, medical decision making and co-ordination of care.      Discussed with Dr Samuel     Recommendations were communicated to primary team via verbal communication.      ASIF Roger CNS  Clinical Nurse  "Specialist  [phone]    Review of Systems:   I reviewed the following systems:  ROS not done due to vent/sedation.     Physical Exam:   I/O last 3 completed shifts:  In: 3573.6 [I.V.:1613.33; IV Piggyback:610]  Out: 3007.9 [Emesis/NG output:30; Drains:2052; Other:925.9]   /57   Pulse 86   Temp 99.4  F (37.4  C) (Oral)   Resp 22   Ht 1.727 m (5' 7.99\")   Wt 84.4 kg (186 lb 1.1 oz)   SpO2 100%   BMI 28.30 kg/m       GENERAL APPEARANCE: Vent via trach, CRRT running.   Pulmonary: Vent via trach  CV: Regular rhythm, normal rate   - Edema +2 generalized  GI: Surgical dressing in place  MS: no evidence of inflammation in joints, no muscle tenderness  : + Garcia  SKIN: no rash, warm, dry  NEURO: Intubated and sedated.     Labs:   All labs reviewed by me  Electrolytes/Renal -   Recent Labs   Lab Test 05/20/25  0610 05/20/25  0441 05/20/25 0344 05/19/25 1616 05/19/25 1612 05/19/25  0415 05/19/25  0407   NA  --   --  140  --  140  --  140   POTASSIUM  --   --  3.8  --  3.8  --  3.7   CHLORIDE  --   --  106  --  105  --  106   CO2  --   --  20*  --  21*  --  21*   BUN  --   --  26.8*  --  24.7*  --  26.3*   CR  --   --  0.40*  --  0.40*  --  0.43*   * 125* 117*  123*   < > 117*   < > 155*   KARINA  --   --  8.1*  --  8.3*  --  7.9*   MAG  --   --  1.9  --  2.0  --  2.0   PHOS  --   --  2.8  --  2.9  --  3.1    < > = values in this interval not displayed.       CBC -   Recent Labs   Lab Test 05/20/25 0344 05/19/25 1612 05/19/25 0407   WBC 15.5* 18.9* 23.8*   HGB 9.7* 10.3* 10.2*   * 123* 92*       LFTs -   Recent Labs   Lab Test 05/20/25  0344 05/19/25  1612 05/19/25  0407 05/18/25  1548 05/18/25  0530   ALKPHOS 217*  --  234*  --  289*   BILITOTAL 2.0*  --  1.3*  --  2.6*   ALT 83*  --  70  --  36   AST 86*  --  128*  --  71*   PROTTOTAL 5.4*  --  5.1*  --  5.2*   ALBUMIN 2.7* 3.0* 2.5*   < > 2.9*    < > = values in this interval not displayed.       Iron Panel - No lab results " found.        Current Medications:  Current Facility-Administered Medications   Medication Dose Route Frequency Provider Last Rate Last Admin    heparin ANTICOAGULANT injection 5,000 Units  5,000 Units Subcutaneous Q8H [facility] Sonia Moore NP   5,000 Units at 05/20/25 0417    hydrocortisone sodium succinate PF (solu-CORTEF) injection 25 mg  25 mg Intravenous Daily Sonia Moore NP   25 mg at 05/19/25 0804    insulin glargine (LANTUS PEN) injection 10 Units  10 Units Subcutaneous Q24H Sonia Moore NP   10 Units at 05/19/25 1016    [Held by provider] lipids 4 oil (SMOFLIPID) 20 % infusion 250 mL  250 mL Intravenous Q24H Brendon Haas DO   Stopped at 05/19/25 0825    OLANZapine (zyPREXA) IV injection 2.5 mg  2.5 mg Intravenous QPM Sonia Moore NP   2.5 mg at 05/19/25 2024    pantoprazole (PROTONIX) IV push injection 40 mg  40 mg Intravenous QAM AC de La MaterRenee, CNP   40 mg at 05/19/25 0805    piperacillin-tazobactam (ZOSYN) 4.5 g vial to attach to  mL bag  4.5 g Intravenous Q6H Eugenia Zavala MD   4.5 g at 05/20/25 0611    sodium chloride (PF) 0.9% PF flush 3 mL  3 mL Intracatheter Q8H Ganesh Griffiths MD   3 mL at 05/19/25 1436     Current Facility-Administered Medications   Medication Dose Route Frequency Provider Last Rate Last Admin    dexmedeTOMIDine (PRECEDEX) 4 mcg/mL in sodium chloride 0.9 % 100 mL infusion  0.1-1.2 mcg/kg/hr (Dosing Weight) Intravenous Continuous Sonia Moore NP 20.9 mL/hr at 05/20/25 0800 1.2 mcg/kg/hr at 05/20/25 0800    dextrose 10% infusion   Intravenous Continuous PRN Sonia Moore NP        dextrose 10% infusion   Intravenous Continuous PRN Brendon Haas DO        dialysate for CVVHD & CVVHDF (Phoxillum BK4/2.5)  10 mL/kg/hr CRRT Continuous Raheem Gabriel, APRN CNS 1,000 mL/hr at 05/20/25 0659 12.5 mL/kg/hr at 05/20/25 0659    fentaNYL (SUBLIMAZE) infusion   mcg/hr Intravenous  Continuous Roxi Alston MD 2 mL/hr at 05/20/25 0800 100 mcg/hr at 05/20/25 0800    insulin regular (MYXREDLIN) 1 unit/mL infusion  0-24 Units/hr Intravenous Continuous Sonia Moore NP 0.5 mL/hr at 05/20/25 0800 0.5 Units/hr at 05/20/25 0800    No heparin required   Does not apply Continuous PRN Tico Spain MD        norepinephrine (LEVOPHED) 16 mg in  mL infusion MAX CONC CENTRAL LINE  0.01-0.6 mcg/kg/min (Dosing Weight) Intravenous Continuous Celso Ambrose MD   Stopped at 05/19/25 2023    parenteral nutrition - ADULT compounded formula   CENTRAL LINE IV TPN CONTINUOUS Brendon Haas DO 50 mL/hr at 05/20/25 0700 Rate Verify at 05/20/25 0700    POST-filter replacement solution for CVVHD & CVVHDF (Phoxillum BK4/2.5)   CRRT Continuous Tico Spain  mL/hr at 05/20/25 0049 New Bag at 05/20/25 0049    PRE-filter replacement solution for CVVHD & CVVHDF (Phoxillum BK4/2.5)  10 mL/kg/hr CRRT Continuous Raheem Gabriel APRN CNS 1,000 mL/hr at 05/20/25 0659 12.5 mL/kg/hr at 05/20/25 0659    Reason statin not prescribed   Does not apply DOES NOT GO TO Rhys Bloom MD        vasopressin 1 unit/mL MAX Conc (PITRESSIN) infusion  0.5-4 Units/hr Intravenous Continuous Brendon Haas DO   Stopped at 05/20/25 0515

## 2025-05-20 NOTE — PROGRESS NOTES
PALLIATIVE CARE PROGRESS NOTE  [facility]     Patient Name: Sebastián Rodas  Date of Admission: 4/30/2025   Today the patient was seen for: care conference     Recommendations & Counseling     GOALS OF CARE:   Currently life-prolonging without limits  Planned for care conference today, patient decided to postpone until tomorrow with fewer providers. Palliative plans to attend and will coordinate with SICU.  Attended provider-only conference with SICU, EGS, Ethics, Chaplains and RNCC. Discussed Sebastián's clinical condition, limited treatment options and prognosis. Greatly appreciate Ethics input.  Decision making preferences: Clarified that Sebastián prefers to have family present (mom, uncle and/or brother) when receiving medical updates and facing medical decisions. Sebastián wants to have an active role in making decisions about his own care, though he also values input from family. For tomorrow, he would prefer to receive information and then take time to process with family before making any decisions.    ADVANCE CARE PLANNING:  No health care directive on file. Per system policy, Surrogate Decision-makers for Patients With Diminished Decision-making Capacity offers guidance on possible decision-makers. Marbella (mother) has been identified as a surrogate decision maker.   There is no POLST form on file, defer to patient and/or next of kin for decisions   Code status: Full Code    MEDICAL MANAGEMENT:   We are not actively managing symptoms at this time.    PSYCHOSOCIAL/SPIRITUAL:  Family - supported by Marbella (mom), Dustin (uncle), brother and sister-in-law  Friends  Kathy - [employer], appreciate ongoing  support    Palliative Care will continue to follow.     Ronda Persaud PA-C  [facility], Palliative Care  Securely message with the Vocera Web Console (learn more here) or  Text page via [facility] Paging/Directory      Assessment          Sebastián Rodas is a 29-year-old male  "with a history of alcohol use disorder, anxiety, and bipolar disorder who was admitted on 3/30/2025 to OSH for alcohol withdrawal following a recent binge, presenting with diffuse abdominal pain. Initial workup revealed leukocytosis and elevated lipase concerning for acute pancreatitis.   On 4/27, after clinical deterioration and imaging consistent with a likely perforated viscus, he underwent emergent exploratory laparotomy, necrosectomy, transverse colectomy, abdominal washout, drain placement, and abthera placement.    Started on CRRT.       Patient was transferred to [facility] on 4/30/25 for further surgical management.  Reexploration of belly here at [facility] reveals no possibility of creating diverting colostomy. Course complicated by recurrent intra-abdominal bleeding requiring MTP and IR embolization x2 (5/10 and 5/18). Palliative following for goals of care conversations.      Interval History:     Multidisciplinary collaboration:  Discussed with SICU 5/19, plan for care conference today 5/20  Reviewed notes from SICU, Renal, Surgery  Per Surgery notes, \"No further surgical options for any intraabdominal pathology\"    Patient/family narrative  Met Sebastián briefly this afternoon, he was accompanied by his brother and sister in law. Sebastián communicated by gestures and mouthing words. Brother showed me he had been trying to write but difficult to read his writing; he has requested a typing device to better communicate.    Physical Exam:   Temp:  [98.1  F (36.7  C)-99.8  F (37.7  C)] 99.5  F (37.5  C)  Pulse:  [] 90  Resp:  [18-33] 20  MAP:  [56 mmHg-93 mmHg] 71 mmHg  Arterial Line BP: ()/(35-74) 126/49  FiO2 (%):  [40 %-70 %] 40 %  SpO2:  [90 %-100 %] 98 %  186 lbs 1.09 oz    Physical Exam  General: laying in bed, NAD  Lungs: trach in place, remains on vent support  Neuro: eyes open and tracking, engaged with family, communicates by mouthing words or gestures, attempts to write        Data Reviewed:     CTA " A/P 5/18  IMPRESSION:  1. Large intra-abdominal blood products and air throughout the  peritoneum and retroperitoneum with brisk active arterial  extravasation from the splenic artery. Associated increased mass  effect noted, most pronounced along the anterior liver from adjacent  perihepatic collection/hematoma.   2  Gas within the retroperitoneum has increased in extension from the  most recent CT abdomen and pelvis dated 5/14/25, could represent  worsening fat necrosis versus gas forming infection.   3. Otherwise similar exam compared to 5/14/25 with sequela of  necrotizing pancreatitis and changes of necrosectomy with open  abdomen.     Medical Decision Making       60 MINUTES SPENT BY ME on the date of service doing chart review, history, exam, documentation & further activities per the note.

## 2025-05-20 NOTE — CONSULTS
The purpose of this note, including any accompanying recommendation, is advisory only concerning ethical principles and considerations related to this case. Determination of appropriate patient care decisions is the responsibility of the clinical team in consultation with patients and their decision makers and relevant institutional policy. Legal and policy questions should be addressed with Risk Management.     This note is to document initial ethics involvement in the care of Mr Rodas.  Ethics will attend the family care conference today to address goals of care.  It is my understanding that the patient has capacity (as evidenced by 2 psychiatry consults) and has had consistent expressions of a desire to transition to comfort care.  His family has been opposed.  Additionally, it is the consensus of the medical team that he likely does not have survivable disease.  The role of surrogate exists to inform patient preferences when they are unable to express them themselves.  If a patient has capacity for medical decisions, respect for autonomy demands our support of their goals and preferences. While it is understood that his family is deeply involved in his life, if he has capacity, his requests and preferences should be respected.    Will attend care conference today at 2pm.    Delphine Duenas MD, MPH, [facility]  Co-chair, [facility] Ethics Committee and Consult Service  Co-lead, [facility] Clinical Ethics Service

## 2025-05-20 NOTE — PROGRESS NOTES
CRRT STATUS NOTE    DATA:  Time:  6:00 AM  Pressures WNL:  YES  Filter Status:  WDL    Problems Reported/Alarms Noted:  None.    Supplies Present:  YES    ASSESSMENT:  Patient Net Fluid Balance:  Net +212 ml @ midnight, +573 ml @ 0600.  Vital Signs:  HR 85, /49, MAP 70  Labs:  K 3.8, Mg 1.9, Phos 2.8, iCa 4.4, Hgb 9.7, Plt 132  Goals of Therapy:  I=O    INTERVENTIONS:   Circuit clotted/clogged @ 0556, bedside nurse unable to return blood. Circuit restarted @ 0639    PLAN:  Continue to monitor circuit daily and change set q72 hours or PRN for clotting/clogging. Please call CRRT RN with any questions/problems.      Karlie Cazares RN on 5/20/2025 at 6:51 AM

## 2025-05-20 NOTE — PROGRESS NOTES
SURGICAL ICU PROGRESS NOTE  05/20/2025    Date of Service (when I saw the patient): 05/20/2025    ASSESSMENT:  Sebastián Rodas is a 29M with alcohol abuse, anxiety, and bipolar disorder, who was admitted 3/30/25 for alcohol withdrawal and abdominal pain. Workup showed leukocytosis and elevated lipase, consistent with acute pancreatitis. He developed encephalopathy and shock, requiring ICU care, intubation, vasopressors, and CRRT by 4/1. His course was complicated by cardiomyopathy (EF 20-25%, improved to 65-70% by 4/14), necrotizing pancreatitis, and persistent infection despite 14 days of meropenem. On 4/27, imaging showed likely perforated viscus; he underwent emergent laparotomy, necrosectomy, and colectomy. Transferred to [facility] SICU on 4/30. On 5/1, reoperation revealed colonic staple line breakdown, necrotic pancreas, vessel thrombosis, and adhesions. Colostomy with malankot drain and temporary abdominal closure performed. Prognosis is poor; palliative care involved. Care conference 5/2 with family to talk goals of care. Full code, family acknowledged that poor prognosis is to be expected. 5/4 s/p exploratory surgery, additional necrotic pancreas remove with no obvious spillage or sign of bleeding at the time. Went back to the OR 5/7 for abdominal wash out. CT abdomen 5/10 showed extravasation on arterial phase imaging. MTP initiated, IR arterial embolization of pancreatic vessels performed. Decreased stool output since 5/13, switched to TPN. Hemorraghic shock.  MTP 5/18, IR embolization 5/18.      CHANGES and MAJOR THINGS TODAY:  - off pressors   - goal net negative 500ml  - Family meeting at 1400 today   - Wean FiO2 based off of SpO2 goal to be greater than 92%  - transition for Insulin drip to HSSI and lantus       PLAN:    Neurological:  # Acute pain   -Fentanyl gtt, fentanyl bolus  #Encephalopathy- improving  #Insomnia  - Monitor neurological status. Delirium preventions and precautions.   # Pain:   #  Sedation: Precedex   # Anxiety  - Olanzapine 2.5  - Precedex     Pulmonary:   # Acute hypoxic respiratory failure  # S/p tracheostomy placement   - FiO2 (%): 60 %, Resp: 26, Vent Mode: VC/AC, Resp Rate (Set): 16 breaths/min, Tidal Volume (Set, mL): 420 mL, PEEP (cm H2O): 12 cmH2O, Resp Rate (Set): 16 breaths/min, Tidal Volume (Set, mL): 420 mL, PEEP (cm H2O): 12 cmH2O   - Full vent overnight. Has been having trouble with trach dome.  -Transitioned to BID 2 hour intermittent trach dome trials.  - CT PE 5/9 showed no evidence of pulmonary embolism  - Pulmonary toilet, mobilize  - Ventilator bundle  - Wean FIO2 as tolerated, PEEP 12    Cardiovascular:    # Stress Cardiomyopathy   - Initial LVEF 20-25%, improved to 65-70%  # Shock-distributive (septic)  # Hypovolemic shock  (hemorraghic)  # Sinus Tachycardia  - Monitor hemodynamic status. MAP goal > 65.   - Hydroxycortisone for stress dose steroid, currently 25mg Daily   - EKG 5/8, 5/9, 5/18 showed sinus tachycardia with no ectopy  - MTP 5/17  - Continue trend lactate 3.6      Gastroenterology/Nutrition:  # S/p emergent exploratory laparotomy, necrosectomy, transverse colectomy, abdominal washout, and drain placement 5/1 and 5/4  # S/p Pancreatic branch vessel embolization with IR 5/10, 5/18  # Severe necrotizing pancreatitis  # Splenic vein thrombosis  - will defer management of dressings and drain removal per EGS   - Ongoing copious dark bloody drain output, CTM  - Cecostomy drain in place, no stool output. Abdomen distended. AXR unremarkable, looks well decompressed.   - on PPI IV  - CT abdomen/pelvis with IV + enteral contrast through G tube 5/14: large volume of air and blood products in abdomen.   - Hold all Oral Meds and Feeds, strict NPO  -TPN hold lipids    # Severe Protein calorie deficit malnutrition due to critical illness  - on TPN  - G tube tear in the J portion, TF stopped.  - IR consulted for PEG exchange    - IR cannot safely exchange freshly placed GJ  tubes until they have been in place x6 weeks given risk of losing access into the stomach. Additionally, imaging shows there is no safe pexy between the stomach and abdominal wall. IR may not be able to offer safe exchange even after 6 weeks.   - Multivitamin supplementation ordered by nutrition  - RD consult. Appreciate cares and recommendations.     Renal/Fluids/Electrolytes:   # SARAHI  # Fluid overload  Baseline Cr 0.7-0.8. Presented with Cr 0.96 on 3/30. Rapidly markos to 5.2 on 4/1. Oliguric. Garcia UA with proteinuria, hematuria, pyuria, moderate bacteria.  Kidneys unremarkable on CT. Has severe ATN in the setting of shock, ADHF, intravascular hypovolemia/3rd spacing from pancreatitis.   - Trend lactate   - CRRT with I&Os goal -500cc to -1000cc  - Continue to monitor intake and output  - Volume assessment, BID 25% albumin today to mobilize fluids    Endocrine:   # Stress hyperglycemia    - hgb A1c 5.3, no hx of DM   - Transition from insulin drip to sliding scale insulin and lantus. Increased lantus to 20units daily and HSSI   - Goal to keep BG< 180 for optimal wound healing     # Leukocytosis  #Necrotizing pancreatitis  WBC Count   Date Value Ref Range Status   05/20/2025 15.5 (H) 4.0 - 11.0 10e3/uL Final   - CRP elevated at 454->344 >210, trend q3 days  - Completed 14 days course of meropenem and caspofungin and zosyn previously.   - Continue Zosyn for ongoing intra-abdominal infection     cultures:  - 4/30 blood cultures- NGTD   - 5/1 blood cultures ordered - NGTD  - Blood culture 5/12, 5/15 - NGTD     Heme:     # Acute blood loss anemia   # Anemia of critical illness   # Thrombosed splenic vein   - Transfuse if hgb <7.0 or signs/symptoms of hypoperfusion. Monitor and trend  Hemoglobin   Date Value Ref Range Status   05/20/2025 9.7 (L) 13.3 - 17.7 g/dL Final     - MTP 5/10, 5/18  - hemoglobin stable, keeping on trending  - Continue subcutaneous heparin, watch bleeding from drains.     Musculoskeletal:   #  Deconditioning and weakness due to critical illness   - Physical and occupational therapy consult      Skin:  # Pressure Ulcers - Buttocks/rectal area  - Barrier cream with liberal application. Continue fecal management system   - WOC consult      #Pressure Ulcers- Left Heel  Pressure Injury Location: Left heel   Wound type: Pressure Injury     Pressure Injury Stage: Deep Tissue Pressure Injury (DTPI), present on admission     General Cares/Prophylaxis:    DVT Prophylaxis: SQH   GI Prophylaxis: PPI     Lines/ tubes/ drains:  - HD line--  Right IJ  - PICC line- left   - Right radial A line  - PIV x 3  - Trach  - G-J  - Wound manager  - SHANNON x 3  - Cecostomy drain     Disposition:  - Surgical ICU    Sonia DEEP Moore      ====================================  INTERVAL HISTORY:  Course reviewed. He has had difficulty tolerating the trach dome due to increased secretions, currently on timed trach dome trials. He had a tear in the J portion of his G tube and feeds were stopped and TPN was started. Decreased stool colostomy output. Hemorrhage on 5/18. MTP and IR embolization.     OBJECTIVE:   1. VITAL SIGNS:   Temp:  [98.1  F (36.7  C)-99.8  F (37.7  C)] 99.4  F (37.4  C)  Pulse:  [] 86  Resp:  [17-33] 26  MAP:  [56 mmHg-93 mmHg] 81 mmHg  Arterial Line BP: ()/(35-74) 133/63  FiO2 (%):  [60 %-70 %] 60 %  SpO2:  [90 %-100 %] 99 %  FiO2 (%): 60 %, Resp: 26, Vent Mode: VC/AC, Resp Rate (Set): 16 breaths/min, Tidal Volume (Set, mL): 420 mL, PEEP (cm H2O): 12 cmH2O, Resp Rate (Set): 16 breaths/min, Tidal Volume (Set, mL): 420 mL, PEEP (cm H2O): 12 cmH2O    2. INTAKE/ OUTPUT:   I/O last 3 completed shifts:  In: 3311.39 [I.V.:1684.72; IV Piggyback:110]  Out: 3078.3 [Emesis/NG output:5; Drains:2413; Other:660.3]    3. PHYSICAL EXAMINATION:  General: laying in bed, awake and alert   HEENT: PERRLA. Trach present and secure, site dressed.   Pulm/Resp: Clear breath sounds bilaterally, lungs coarse but clear.  CV: RRR,  S1/S2   Abdomen: Distended. Dark bloody output noted to all drains. G-J tube in place, site secured, abdomen with abthera in place, suction intact.   : scrotal edema, draining dark bloody output.   MSK/Extremities: generalized edema in extremities    4. INVESTIGATIONS:   Arterial Blood Gases   Recent Labs   Lab 05/20/25  0344 05/19/25  0407 05/18/25  0537 05/18/25  0055   PH 7.45 7.41 7.34* 7.37   PCO2 33* 38 41 33*   PO2 74* 150* 90 187*   HCO3 23 24 22 19*     Complete Blood Count   Recent Labs   Lab 05/20/25 0344 05/19/25  1612 05/19/25  0407 05/18/25 1958   WBC 15.5* 18.9* 23.8* 28.8*   HGB 9.7* 10.3* 10.2* 11.0*   * 123* 92* 86*     Basic Metabolic Panel  Recent Labs   Lab 05/20/25  0441 05/20/25  0344 05/20/25  0207 05/19/25  1616 05/19/25  1612 05/19/25  0415 05/19/25  0407 05/18/25  2004 05/18/25 1958   NA  --  140  --   --  140  --  140  --  140   POTASSIUM  --  3.8  --   --  3.8  --  3.7  --  3.7   CHLORIDE  --  106  --   --  105  --  106  --  105   CO2  --  20*  --   --  21*  --  21*  --  21*   BUN  --  26.8*  --   --  24.7*  --  26.3*  --  28.3*   CR  --  0.40*  --   --  0.40*  --  0.43*  --  0.50*   * 117*  123* 96   < > 117*   < > 155*   < > 133*    < > = values in this interval not displayed.     Liver Function Tests  Recent Labs   Lab 05/20/25  0344 05/19/25  1612 05/19/25  0407 05/18/25  1548 05/18/25  0530 05/18/25  0047 05/17/25  2343 05/17/25  1526 05/17/25  1417 05/17/25  0408   AST 86*  --  128*  --  71*  --   --   --   --  42   ALT 83*  --  70  --  36  --   --   --   --  25   ALKPHOS 217*  --  234*  --  289*  --   --   --   --  364*   BILITOTAL 2.0*  --  1.3*  --  2.6*  --   --   --   --  2.4*   ALBUMIN 2.7* 3.0* 2.5* 2.8* 2.9*  --   --    < >  --  2.2*   INR  --   --   --   --  1.21* 1.63* 1.54*  --  1.18*  --     < > = values in this interval not displayed.     Pancreatic Enzymes  No lab results found in last 7 days.    Coagulation Profile  Recent Labs   Lab  05/18/25  0530 05/18/25  0047 05/17/25  2343 05/17/25  1417   INR 1.21* 1.63* 1.54* 1.18*   PTT 31  --  40* 30     5. RADIOLOGY:   No results found for this or any previous visit (from the past 24 hours).      =========================================

## 2025-05-20 NOTE — PROGRESS NOTES
Surgery Progress Note  2025       Subjective:  No acute events overnight. Continues on precedex gtt. Weaned off pressors overnight. Per nurse report, patient answers Y/N questions appropriately.     Objective:  Temp:  [98.1  F (36.7  C)-99.8  F (37.7  C)] 99.5  F (37.5  C)  Pulse:  [] 109  Resp:  [18-33] 23  MAP:  [56 mmHg-93 mmHg] 69 mmHg  Arterial Line BP: ()/(35-74) 116/45  FiO2 (%):  [60 %-70 %] 60 %  SpO2:  [90 %-100 %] 93 %    I/O last 3 completed shifts:  In: 3573.6 [I.V.:1613.33; IV Piggyback:610]  Out: 3007.9 [Emesis/NG output:30; Drains:; Other:925.9]      Gen: Resting comfortably in bed  Resp: Ventilated  Abd: Abdomen is taut but compressible, drains with dark bloody output, Malecot with minimal succus, midline abdominal dressing with wound manager with dark bloody output around.   : Scrotum with pouch in place with serosanguinous drainage  Ext: bilateral lower extremities appear mottled, warm to the touch, edematous     Labs:  Recent Labs   Lab 25  0344 25  16125  0407   WBC 15.5* 18.9* 23.8*   HGB 9.7* 10.3* 10.2*   * 123* 92*       Recent Labs   Lab 25  0841 25  0610 25  0441 25  0344 25  1616 25  1612 25  0415 25  0407   NA  --   --   --  140  --  140  --  140   POTASSIUM  --   --   --  3.8  --  3.8  --  3.7   CHLORIDE  --   --   --  106  --  105  --  106   CO2  --   --   --  20*  --  21*  --  21*   BUN  --   --   --  26.8*  --  24.7*  --  26.3*   CR  --   --   --  0.40*  --  0.40*  --  0.43*   * 126* 125* 117*  123*   < > 117*   < > 155*   KARINA  --   --   --  8.1*  --  8.3*  --  7.9*   MAG  --   --   --  1.9  --  2.0  --  2.0   PHOS  --   --   --  2.8  --  2.9  --  3.1    < > = values in this interval not displayed.     Imagin/18 CTA Chest w/Contrast  Impression:  1. Small amount of fluid in the trachea along the tracheostomy tube,  presumably secretions. No definite evidence of active  bleeding in the  chest.   2. Interval splenic artery embolization with diffuse hemoperitoneum  and small foci of layering contrast along the right anterior and left  abdomen, likely residual contrast staining from recent embolization.  3. Moderate pulmonary edema with trace bilateral effusions. Right  greater than left lower lobe atelectasis. Additional diffuse nodular  and groundglass centrilobular opacities, could represent  aspiration/infection.      Assessment/Plan:   Sebastián Rodas is a 29-year-old male with a history of alcohol use disorder, anxiety, and bipolar disorder who was admitted on 3/30/2025 for alcohol withdrawal following a recent binge, presenting with diffuse abdominal pain. Initial workup revealed leukocytosis and elevated lipase concerning for acute pancreatitis. He developed acute encephalopathy and was transferred to the ICU on 3/31, requiring intubation and vasopressors by 4/1 due to shock. Hospital course has been complicated by acute renal failure requiring CRRT, cardiomyopathy (initial LVEF 20-25%, improved to 65-70% by 4/14), and necrotizing pancreatitis with unorganized fluid collections. He completed a 14-day course of meropenem but remains intermittently febrile and critically ill, requiring ongoing dialysis and vasopressor support. On 4/27, after clinical deterioration and imaging consistent with a likely perforated viscus, following family discussion, he underwent emergent exploratory laparotomy, necrosectomy, transverse colectomy, abdominal washout, and drain placement. Patient was transferred to [facility] on 4/30/25 for further surgical management. Went to OR 5/1 for re-open laparotomy, I&D, placement of colostomy tube in presumed previous colectomy staple line, necrosectomy, and Abthera placement. On 5/2, increasing sanguineous output from drains concerning for bleeding from pancreatic bed. Family care conference held 5/2, plan for restorative cares at that time. Take back to OR twice  "(5/4 and 5/7) for abdominal washout. Per nursing note: 5/9 morning after he saw a picture of his abdomen that he \"desires to speak to team about updating goals of care, expressed desire to pull back on cares\". Goals of care conference on 5/10 with continuation of full code and restorative cares. CTA on 5/10 revealed large area of active arterial extravasation in the upper abdomen. MTP was started. Patient is now s/p embolization of small pancreatic branch off of the distal celiac artery with IR on 5/10. Malecot drainage slowed and eventually stopped, with significant drainage through other drains and open into dressings. 5/17 night patient had active extravasation, with Hgb dropping from 7.8 to 5.6 in 1.5 hours. Lactate spiked from 3.6 to 10.6, with WBC 14.9 to 25.5. Family was consulted, MTP was started, and IR embolized the celiac trunk/splenic artery branches. Patient requested bedside goals of care conference: will take place on 5/20 at 2:00pm including the patient, family, ethics, palliative, EGS, and SICU.    5/20 Updates  - No further surgical options for any intraabdominal pathology  - Bedside care conference with SICU, EGS, Palliative, Ethics, Family, and patient scheduled for today (5/20), 2:00 pm, with pre-conference at 1:45 pm  - Nursing to change dressings in the am, pm, and PRN during the day if copious drainage during the day. Please place Xeroform over the bridging mesh. Please do not apply xeroform outside the wound bed/over the skin edges but make sure all of the wound bed is covered.   - Tube feeds on hold currently due to tear in J-tube. Receiving TPN (50 mL/hr), and Lipids & Albumin as needed.  - On Zosyn per SICU  - SQH PPX, would not recommend therapeutic dose given the risk of bleeding.   - Appreciate WO cares  - Other cares per SICU, we appreciate cares      Seen, examined, and discussed with chief resident, who will discuss with staff.  - - - - - - - - - - - - - - - - - -  Xavi Farias " MD  General Surgery, PGY-1  Emergency General Surgery    &    Anish Dickson, MS3  Emergency General Surgery    Resident Attestation   I, Mireya López DO, was present with the medical student who participated in the service and in the documentation of the note. I have verified the history and personally performed the physical exam and medical decision making. I agree with the assessment and plan of care as documented in the note and have edited where appropriate.      Mireya López DO  General Surgery PGY-2  Date of Service: 05/20/2025

## 2025-05-20 NOTE — PROGRESS NOTES
[facility] Nurse Inpatient Assessment     Consulted for: Tracheostomy site, sacrum, left heel  5/9: New consult for midline wound draining heavily    WO nurse follow-up plan: Tuesday/Friday    Patient History (according to provider note(s):      Sebastián Rodas is a 29-year-old male with a history of alcohol abuse, anxiety, and bipolar disorder, admitted on 3/30/2025 for alcohol withdrawal after a binge, presenting with abdominal pain. Initial workup revealed leukocytosis and elevated lipase, suggestive of acute pancreatitis. He developed encephalopathy and required ICU transfer on 3/31, intubation, and vasopressors by 4/1 due to shock. His hospital course was complicated by acute renal failure (requiring CRRT), cardiomyopathy (LVEF 20-25%, improved to 65-70% by 4/14), and necrotizing pancreatitis with unorganized fluid collections. He completed a 14-day course of meropenem but remained febrile and critically ill, requiring ongoing dialysis and vasopressor support. On 4/27, imaging indicated likely perforated viscus, and after family discussion, he underwent emergent laparotomy, necrosectomy, transverse colectomy, abdominal washout, and drain placement. He was transferred to the SICU at [facility] on 4/30 for further monitoring. On 5/1, he underwent a second laparotomy with irrigation and debridement, colostomy tube placement, temporary abdominal closure, and further necrosectomy due to breakdown of the colonic resection staple line, necrotic pancreas, thrombosed middle colic vessels, fat necrosis, and dense interloop adhesions. He has a poor prognosis given the OR findings, palliative care consulted.     Assessment:      Areas visualized during today's visit: Focused: and Abdomen    Wound location: Abdomen          Superior side of wound with displaced mesh    Last photo: 5/120  Wound due to: Surgical Wound  Wound history/plan of care: On 4/27, imaging showed likely  perforated viscus; he underwent emergent laparotomy, necrosectomy, and colectomy. Transferred to [facility] SICU on 4/30. On 5/1, reoperation revealed colonic staple line breakdown, necrotic pancreas, vessel thrombosis, and adhesions. Colostomy with malankot drain and temporary abdominal closure performed. Prognosis is poor; palliative care involved. Care conference 5/2 with family to talk goasl of care. Full code, family acknowledged that poor prognosis is to be expected. Today 5/4 s/p exploratory surgery, additional necrotic pancreas remove with no obvious spillage or sign of bleeding   5/12: Abdomen is much more distended today. The superior side of the wound bed had a large black clot loosely adhered to the mesh. There was pooling of darker thick liquid in the inferior side of the wound bed.   5/13: Pouch intact. Replaced Kerlix in wound bed.  5/14: Pouch replaced due to leaking at the superior side by PEG tube. Most of the slough covering the adipose tissue at the wound margins has fallen off. There is marbled granulation tissue and adipose tissue on the wound margins. Wound smelled more foul today.   5/20: Wound dimensions have increased markedly. The bowel under the mesh is much more distended and the mesh at the superior end of wound has displaced and is no longer attached. There is a large amount of blood clot and stool in this location. The wound manager was unable to be replaced today due to the increased wound dimensions as well as erosion of the various tubes around the analilia-wound area making pouching impossible.   Wound base: 70 % surgical mesh, 30 % Granulation tissue, Slough, and Adipose tissue     Palpation of the wound bed: fluctuance      Drainage: copious     Description of drainage: serosanguinous, bloody, and black     Measurements (length x width x depth, in cm): 21  x 17  x  3 cm - measured on Friday's     Tunneling: N/A     Undermining: N/A  Periwound skin: Intact      Color: normal and consistent  with surrounding tissue      Temperature: normal   Odor: none  Pain: facial expression of distress, sharp  Pain interventions prior to dressing change: oral oxycodone, IV fentanyl, and slow and gentle cares   Treatment goal: Drainage control and Infection control/prevention  STATUS: deteriorating  Supplies ordered: at bedside    Pressure Injury Location: Left heel - Assessed on Fridays        Last photo: 5/16  Wound type: Pressure Injury     Pressure Injury Stage: Deep Tissue Pressure Injury (DTPI), present on admission       Wound history/plan of care:   Pt admitted from OSH on 4/31. Wound present on admission. There is an intact blister over a deeper maroon discoloration.    Wound base: 100 % Maroon, Blister, and Epidermis     Palpation of the wound bed: boggy      Drainage: none     Description of drainage: none     Measurements (length x width x depth, in cm) 2  x 1.5  x  0 cm      Tunneling N/A     Undermining N/A  Periwound skin: Intact      Color: normal and consistent with surrounding tissue      Temperature: normal   Odor: none  Pain: denies , none  Pain intervention prior to dressing change: slow and gentle cares   Treatment goal: Heal  and Protection  STATUS: stable  Supplies ordered: at bedside    My PI Risk Assessment     Sensory Perception: 2 - Very Limited     Moisture: 2 - Very moist      Activity: 1 - Bedfast      Mobility: 2 - Very limited     Nutrition: 2 - Probably inadequate      Friction/Shear: 1 - Problem     TOTAL: 10    Pressure Injury Location: coccyx - Assessed on Fridays    Zoomed out                                              Zoomed in        Last photo: 5/16  Wound type: Pressure Injury, Incontinence Associated Dermatitis (IAD), and Moisture Associated Skin Damage (MASD)     Pressure Injury Stage: 2, present on admission        Wound history/plan of care:   Pt transferred from OSH on 4/31. Wound present on admission. There is MASD/IAD to the bilateral buttocks with exposed dermis.  There is further breakdown over the Pt's coccyx that is fibrin vs slough. Wound is at least a stage 2. The wound will need to evolve further before it can be definitively staged.   5/9: Wound continues to improve. Wound is a stage 2 pressure injury over coccyx with surrounding MASD and exposed dermis.  5/16: Dermal buds seen throughout wound base.    Wound base: 80 % Dermis, 20 % Fibrin     Palpation of the wound bed: normal      Drainage: moderate     Description of drainage: serosanguinous     Measurements (length x width x depth, in cm) 6  x 6  x  0.2 cm - central wound, 1.5 x 1.5 x 0.1 cm left buttock     Tunneling N/A     Undermining N/A  Periwound skin: Edematous and Skin stripping      Color: pink and red      Temperature: normal   Odor: none  Pain: moderate, tender  Pain intervention prior to dressing change: slow and gentle cares   Treatment goal: Heal  and Protection  STATUS: improving and stable  Supplies ordered: at bedside    My PI Risk Assessment     Sensory Perception: 2 - Very Limited     Moisture: 2 - Very moist      Activity: 1 - Bedfast      Mobility: 2 - Very limited     Nutrition: 2 - Probably inadequate      Friction/Shear: 1 - Problem     TOTAL: 10    Wound location: Trach - Assessed on Fridays            Last photo: 5/19  Wound due to: Surgical Wound  Wound history/plan of care:  Pt transferred from OSH on 4/31. Pt had trach placed at OSH. There are areas of fibrin from previous trach suture locations which are mostly healed. There is a linear incision on the left side of Trach cannula from insertion. The wound base wass difficult to visualize, appears to be fibrin and adipose tissue marbled together.  5/5: Called by bedside RN with concerns for skin breakdown near trach. On assessment Trach incision appears to have opened up significantly from prior assessment. Moderate amount of respiratory secretions present. ENT consulted by primary team to assess trach site as well.  5/16: Overall  dimensions decreasing. Wound edges stating to heal.   Wound base: 100 % Granulation tissue, Fibrin, and Adipose tissue     Palpation of the wound bed: normal      Drainage: moderate     Description of drainage: yellow - respiratory secretions     Measurements (length x width x depth, in cm): 2  x 3  x  0.8 cm      Tunneling: N/A     Undermining: N/A  Periwound skin: Intact      Color: normal and consistent with surrounding tissue      Temperature: normal   Odor: none  Pain: moderate, tender  Pain interventions prior to dressing change: slow and gentle cares   Treatment goal: Heal  and Protection  STATUS: granulating and stable  Supplies ordered: at bedside      Wound location: Scrotum      Last photo: 5/13  Wound due to: Abscess  Wound history/plan of care: Wound noted during assessment of abdominal wounds 5/12. Surgery at bedside, stated that inguinal canal is patent with the abdominal cavity and drainage most likely secondary to abdominal cavity contents, and recommended BID gauze dressings. Wound continued to leak copious amounts of blood/stool/intraabdominal fluid. Overnight and was pouched with an ostomy pouch by SICU resident overnight. Pouch continued to leak and was replaced with a primofit male external catheter set to low continuous suction.  5/13: Ostomy pouch applied to area to collect drainage. Intact at time of assessment.  Wound base: 100 % Moist scrotal tissue     Palpation of the wound bed: fluctuance      Drainage: copious     Description of drainage: serosanguinous, purulent, bloody, and red     Measurements (length x width x depth, in cm): See photos - 4 small open areas.     Tunneling: N/A     Undermining: N/A  Periwound skin: Indurated      Color: red      Temperature: normal   Odor: none  Pain: moderate, tender  Pain interventions prior to dressing change: slow and gentle cares   Treatment goal: Drainage control  STATUS: stable  Supplies ordered: at bedside       Treatment Plan:     Left heel  wound(s): Every 3 days Cleanse with saline and pat dry. Conform a sacral Mepilex around Pt's heel. Place in Prevalon boots. Float heels off of support surface with pillows under calves.    Buttocks/coccyx wound(s): Every 3 days   Cleanse the area with wound cleanser and pat dry.  Apply No sting film barrier to periwound skin.  Cover wound with Sacral Mepilex (#052345)  Nursing to peel back dressing to assess wound bed with skin assessment.   Change dressing Q 3 days.  Turn and reposition Q 2hrs side to side only.  Ensure pt has Vikram-cushion while sitting up in the chair.  If not in ICU: Ensure air pump on bed and set to Isoflex.  FYI- If pt has constant incontinent loose stools needing dressing changes Q shift please discontinue the Mepilex dressing and apply criticaid barrier paste BID and PRN.     Trach wound: Perform trach cares per unit routine. Place a fenestrated optifoam between trach faceplate and Pt's skin.    Abdominal wound: Daily and PRN. Remove soiled Kerlix. Gently absorb as much drainage from wound base as possible. Lightly dust analilia-wound skin with ostomy powder and apply a thin layer of Triad paste (#146336) to the analilia-wound area. Cover exposed mesh with Xeroform gauze. (Change Xerform daily) Fluff Kerlix into superior and inferior wound base to help absorb drainage. Keep wound manager to gravity drainage. WOC will assess wound manager daily T/F.     If wound manager fails, place wet to dry dressing with Xeroform gauze covered by Vashe moistened Kerlix Covered by Mextra Superabsorbant pads. Change PRN.    Drain tube cares: PRN for saturation. Cleanse analilia-tubular skin with Vashe and pat dry. Apply no sting barrier film to analilia-tubular area and allow to dry. Fenestrate a 5x5 Mextra super absorbant pad ([id number]) and place around tube to absorb drainage.     Scrotal wound: PRN with pouch leakage. Cleanse with wound cleanser and pat dry. Apply no sting barrier film to analilia-wound area and allow to dry.  "Using a 1 piece ostomy pouch cut a hole in barrier slightly larger than open areas and apply to scrotum.      Pressure Injury Prevention (PIP) Plan:  If patient is declining pressure injury prevention interventions: Explore reason why and address patient's concerns, Educate on pressure injury risk and prevention intervention(s), If patient is still declining, document \"informed refusal\" , and Ensure Care team is aware ( provider, charge nurse, etc)  Mattress: Follow bed algorithm, add Low Air Loss (Air+) mattress pump if skin is very moist or constantly moist.   HOB: Maintain at or below 30 degrees, unless contraindicated  Repositioning in bed: Every 1-2 hours , Left/right positioning; avoid supine, Raise foot of bed prior to raising head of bed, to reduce patient sliding down (shear), and Frequent microturns using positioning wedges, as patient tolerates  Heels: Pillows under calves and Heel lift boots  Protective Dressing: Sacral Mepilex for prevention (#103702),  especially for the agitated patient   Positioning Equipment:Positioning wedges (#798485) to help maintain 30 degree side lying position   Chair positioning: Chair cushion (#560425) , Assist patient to reposition hourly, and Do NOT use a donut for sitting (this increases pressure to smaller area and creates a higher potential for injury)    If patient has a buttock pressure injury, or high risk for PI use chair cushion or SPS.  Moisture Management: Perineal cleansing /protection: Follow Incontinence Protocol, Avoid brief in bed, Clean and dry skin folds with bathing , Consider InterDry (#848251) between folds, and Moisturize dry skin  Under Devices: Inspect skin under all medical devices during skin inspection , Ensure tubes are stabilized without tension, and Ensure patient is not lying on medical devices or equipment when repositioned  Ask provider to discontinue device when no longer needed.     Orders: Reviewed and Updated    RECOMMEND PRIMARY TEAM " ORDER: None, at this time  Education provided: plan of care  Discussed plan of care with: Nurse  Notify [facility] if wound(s) deteriorate.  Nursing to notify the Provider(s) and re-consult the [facility] Nurse if new skin concern.    DATA:     Current support surface: Standard  Low air loss (ISABELL pump, Isolibrium, Pulsate)  Containment of urine/stool: Incontinent pad in bed, Indwelling catheter, and Internal fecal management  BMI: Body mass index is 28.3 kg/m .   Active diet order: None     Output: I/O last 3 completed shifts:  In: 3573.6 [I.V.:1613.33; IV Piggyback:610]  Out: 3007.9 [Emesis/NG output:30; Drains:2052; Other:925.9]     Labs:   Recent Labs   Lab 05/20/25  0344 05/18/25  1219 05/18/25  0530   ALBUMIN 2.7*   < > 2.9*   HGB 9.7*   < > 13.1*   INR  --   --  1.21*   WBC 15.5*   < > 23.7*    < > = values in this interval not displayed.     Pressure injury risk assessment:   Sensory Perception: 3-->slightly limited  Moisture: 1-->constantly moist  Activity: 1-->bedfast  Mobility: 2-->very limited  Nutrition: 2-->probably inadequate  Friction and Shear: 1-->problem  Dick Score: 10    Stephen Tilley RN, CWOCN  Pager no longer in use, please contact through [employer]   Tiffanie group: [facility] Nurse [place]    Dept. Office Number: [phone]

## 2025-05-20 NOTE — ETHICS CONSULT
The purpose of this note, including any accompanying recommendation, is advisory only concerning ethical principles and considerations related to this case. Determination of appropriate patient care decisions is the responsibility of the clinical team in consultation with patients and their decision makers and relevant institutional policy. Legal and policy questions should be addressed with Risk Management.    Attended provider-only care conference as patient requested the meeting with team and his family be tomorrow.  In short, Sebastián has severe disease for which he is not expected to survive to discharge.  He had a life-threatening bleed on Saturday and the team is concerned about his risk for another one given his intraabdominal pathology.      Sebastián has intermittently asked questions about comfort care and what death would look like.  His mother believes he is not capable of making a decision to transition to comfort care given his history and current medical fragility.  He has been seen by psychiatry and while his exam is difficult because of his inability to speak, was determined to have capacity.    I met Sebastián and his brother in his room.  His mother had left for the day.  He requested a way to type his communication as it is hard to understand him by lip reading or writing.  Toward the end of my short visit, it was clear that Sebastián       Medical team discussion about non-beneficial treatment and the efficacy of ongoing interventions for bleeding. Competing ethical obligations justify either withholding or withdrawal of interventions that have no reasonable likelihood of leading to a patient ever surviving outside an acute care setting. The competing ethical considerations include that the burdens and risks of the intervention will not change the outcome, and thus either directly cause harm or cause increased opportunity for harm with no benefit (meaning survival outside an acute care setting.) Patients  have an interest in not being harmed; professionals also have an interest in not being compelled to violate professional judgment and perform interventions they believe to be inappropriate or wrong.    Non-escalation is ethically appropriate in following medical judgment and fair process.  Medical judgment, meaning consensus judgment by physicians that there is no reasonable expectation that the patient will improve to survive outside an acute care setting.  Medical judgment also taking into account patient s interests in not bearing the burdens of ineffective treatment, and medical professionals obligations to not cause harm without proportionate benefit.  Fair process, meaning notice to the patient s surrogate, an impartial second opinion, and opportunity for transfer.    Here, non-escalation may include further intervention for hemorrhage.    Recommendations:  1) Obtain a communication device- he is very troubled by not being able to communicate  2) Continue to assess patients goals of care- allowing Sebastián to share his feelings about his goals may be helpful for both him and his mother  3) Determination about further medical interventions as beneficial vs non-beneficial.  It is ethically appropriate to limit non-beneficial interventions that cause more harm than benefit.    Ethics will continue to follow.    Delphine Duenas MD, MPH, [facility]  Co-chair, [facility] Ethics Committee and Consult Service  Co-lead, [facility] Clinical Ethics Service

## 2025-05-21 LAB
ALBUMIN SERPL BCG-MCNC: 2.3 G/DL (ref 3.5–5.2)
ALBUMIN SERPL BCG-MCNC: 2.3 G/DL (ref 3.5–5.2)
ALP SERPL-CCNC: 175 U/L (ref 40–150)
ALT SERPL W P-5'-P-CCNC: 68 U/L (ref 0–70)
ANION GAP SERPL CALCULATED.3IONS-SCNC: 13 MMOL/L (ref 7–15)
ANION GAP SERPL CALCULATED.3IONS-SCNC: 13 MMOL/L (ref 7–15)
AST SERPL W P-5'-P-CCNC: 44 U/L (ref 0–45)
BILIRUB SERPL-MCNC: 3.3 MG/DL
BILIRUBIN DIRECT (ROCHE PRO & PURE): 2.67 MG/DL (ref 0–0.45)
BUN SERPL-MCNC: 27.2 MG/DL (ref 6–20)
BUN SERPL-MCNC: 27.7 MG/DL (ref 6–20)
CA-I BLD-MCNC: 4.3 MG/DL (ref 4.4–5.2)
CA-I BLD-MCNC: 4.5 MG/DL (ref 4.4–5.2)
CALCIUM SERPL-MCNC: 8 MG/DL (ref 8.8–10.4)
CALCIUM SERPL-MCNC: 8 MG/DL (ref 8.8–10.4)
CHLORIDE SERPL-SCNC: 106 MMOL/L (ref 98–107)
CHLORIDE SERPL-SCNC: 106 MMOL/L (ref 98–107)
CREAT SERPL-MCNC: 0.36 MG/DL (ref 0.67–1.17)
CREAT SERPL-MCNC: 0.37 MG/DL (ref 0.67–1.17)
EGFRCR SERPLBLD CKD-EPI 2021: >90 ML/MIN/1.73M2
EGFRCR SERPLBLD CKD-EPI 2021: >90 ML/MIN/1.73M2
ERYTHROCYTE [DISTWIDTH] IN BLOOD BY AUTOMATED COUNT: 16.5 % (ref 10–15)
ERYTHROCYTE [DISTWIDTH] IN BLOOD BY AUTOMATED COUNT: 16.6 % (ref 10–15)
GLUCOSE BLDC GLUCOMTR-MCNC: 107 MG/DL (ref 70–99)
GLUCOSE BLDC GLUCOMTR-MCNC: 136 MG/DL (ref 70–99)
GLUCOSE BLDC GLUCOMTR-MCNC: 138 MG/DL (ref 70–99)
GLUCOSE BLDC GLUCOMTR-MCNC: 146 MG/DL (ref 70–99)
GLUCOSE BLDC GLUCOMTR-MCNC: 153 MG/DL (ref 70–99)
GLUCOSE BLDC GLUCOMTR-MCNC: 83 MG/DL (ref 70–99)
GLUCOSE SERPL-MCNC: 148 MG/DL (ref 70–99)
GLUCOSE SERPL-MCNC: 156 MG/DL (ref 70–99)
HCO3 SERPL-SCNC: 20 MMOL/L (ref 22–29)
HCO3 SERPL-SCNC: 21 MMOL/L (ref 22–29)
HCT VFR BLD AUTO: 27.2 % (ref 40–53)
HCT VFR BLD AUTO: 27.6 % (ref 40–53)
HGB BLD-MCNC: 8.7 G/DL (ref 13.3–17.7)
HGB BLD-MCNC: 8.8 G/DL (ref 13.3–17.7)
LACTATE SERPL-SCNC: 4.2 MMOL/L (ref 0.7–2)
LACTATE SERPL-SCNC: 4.6 MMOL/L (ref 0.7–2)
MAGNESIUM SERPL-MCNC: 1.9 MG/DL (ref 1.7–2.3)
MAGNESIUM SERPL-MCNC: 2.2 MG/DL (ref 1.7–2.3)
MCH RBC QN AUTO: 29.4 PG (ref 26.5–33)
MCH RBC QN AUTO: 29.9 PG (ref 26.5–33)
MCHC RBC AUTO-ENTMCNC: 31.5 G/DL (ref 31.5–36.5)
MCHC RBC AUTO-ENTMCNC: 32.4 G/DL (ref 31.5–36.5)
MCV RBC AUTO: 93 FL (ref 78–100)
MCV RBC AUTO: 93 FL (ref 78–100)
PHOSPHATE SERPL-MCNC: 2.8 MG/DL (ref 2.5–4.5)
PHOSPHATE SERPL-MCNC: 2.9 MG/DL (ref 2.5–4.5)
PLATELET # BLD AUTO: 192 10E3/UL (ref 150–450)
PLATELET # BLD AUTO: 230 10E3/UL (ref 150–450)
POTASSIUM SERPL-SCNC: 3.5 MMOL/L (ref 3.4–5.3)
POTASSIUM SERPL-SCNC: 4 MMOL/L (ref 3.4–5.3)
PROT SERPL-MCNC: 5.2 G/DL (ref 6.4–8.3)
RBC # BLD AUTO: 2.94 10E6/UL (ref 4.4–5.9)
RBC # BLD AUTO: 2.96 10E6/UL (ref 4.4–5.9)
SODIUM SERPL-SCNC: 139 MMOL/L (ref 135–145)
SODIUM SERPL-SCNC: 140 MMOL/L (ref 135–145)
TRIGL SERPL-MCNC: 268 MG/DL
WBC # BLD AUTO: 12.7 10E3/UL (ref 4–11)
WBC # BLD AUTO: 16.8 10E3/UL (ref 4–11)

## 2025-05-21 PROCEDURE — 84075 ASSAY ALKALINE PHOSPHATASE: CPT | Performed by: STUDENT IN AN ORGANIZED HEALTH CARE EDUCATION/TRAINING PROGRAM

## 2025-05-21 PROCEDURE — 82040 ASSAY OF SERUM ALBUMIN: CPT | Performed by: CLINICAL NURSE SPECIALIST

## 2025-05-21 PROCEDURE — 94003 VENT MGMT INPAT SUBQ DAY: CPT

## 2025-05-21 PROCEDURE — 83735 ASSAY OF MAGNESIUM: CPT | Performed by: CLINICAL NURSE SPECIALIST

## 2025-05-21 PROCEDURE — 250N000009 HC RX 250: Performed by: CLINICAL NURSE SPECIALIST

## 2025-05-21 PROCEDURE — 250N000011 HC RX IP 250 OP 636: Performed by: NURSE PRACTITIONER

## 2025-05-21 PROCEDURE — 99233 SBSQ HOSP IP/OBS HIGH 50: CPT | Mod: 24 | Performed by: PHYSICIAN ASSISTANT

## 2025-05-21 PROCEDURE — 200N000002 HC R&B ICU UMMC

## 2025-05-21 PROCEDURE — 82247 BILIRUBIN TOTAL: CPT | Performed by: STUDENT IN AN ORGANIZED HEALTH CARE EDUCATION/TRAINING PROGRAM

## 2025-05-21 PROCEDURE — 250N000009 HC RX 250: Performed by: NURSE PRACTITIONER

## 2025-05-21 PROCEDURE — 250N000011 HC RX IP 250 OP 636: Performed by: STUDENT IN AN ORGANIZED HEALTH CARE EDUCATION/TRAINING PROGRAM

## 2025-05-21 PROCEDURE — 85018 HEMOGLOBIN: CPT | Performed by: CLINICAL NURSE SPECIALIST

## 2025-05-21 PROCEDURE — 83605 ASSAY OF LACTIC ACID: CPT | Performed by: NURSE PRACTITIONER

## 2025-05-21 PROCEDURE — 99291 CRITICAL CARE FIRST HOUR: CPT | Mod: 24 | Performed by: SURGERY

## 2025-05-21 PROCEDURE — 250N000009 HC RX 250: Performed by: STUDENT IN AN ORGANIZED HEALTH CARE EDUCATION/TRAINING PROGRAM

## 2025-05-21 PROCEDURE — 80053 COMPREHEN METABOLIC PANEL: CPT | Performed by: STUDENT IN AN ORGANIZED HEALTH CARE EDUCATION/TRAINING PROGRAM

## 2025-05-21 PROCEDURE — 250N000011 HC RX IP 250 OP 636

## 2025-05-21 PROCEDURE — 85014 HEMATOCRIT: CPT | Performed by: CLINICAL NURSE SPECIALIST

## 2025-05-21 PROCEDURE — 84478 ASSAY OF TRIGLYCERIDES: CPT | Performed by: STUDENT IN AN ORGANIZED HEALTH CARE EDUCATION/TRAINING PROGRAM

## 2025-05-21 PROCEDURE — 90947 DIALYSIS REPEATED EVAL: CPT

## 2025-05-21 PROCEDURE — 82248 BILIRUBIN DIRECT: CPT | Performed by: STUDENT IN AN ORGANIZED HEALTH CARE EDUCATION/TRAINING PROGRAM

## 2025-05-21 PROCEDURE — 250N000011 HC RX IP 250 OP 636: Mod: JW | Performed by: NURSE PRACTITIONER

## 2025-05-21 PROCEDURE — 999N000157 HC STATISTIC RCP TIME EA 10 MIN

## 2025-05-21 PROCEDURE — 80069 RENAL FUNCTION PANEL: CPT | Performed by: CLINICAL NURSE SPECIALIST

## 2025-05-21 PROCEDURE — 84100 ASSAY OF PHOSPHORUS: CPT | Performed by: CLINICAL NURSE SPECIALIST

## 2025-05-21 PROCEDURE — 84450 TRANSFERASE (AST) (SGOT): CPT | Performed by: STUDENT IN AN ORGANIZED HEALTH CARE EDUCATION/TRAINING PROGRAM

## 2025-05-21 PROCEDURE — 82330 ASSAY OF CALCIUM: CPT | Performed by: CLINICAL NURSE SPECIALIST

## 2025-05-21 PROCEDURE — 250N000009 HC RX 250: Performed by: SURGERY

## 2025-05-21 PROCEDURE — 999N000248 HC STATISTIC IV INSERT WITH US BY RN

## 2025-05-21 PROCEDURE — 84460 ALANINE AMINO (ALT) (SGPT): CPT | Performed by: STUDENT IN AN ORGANIZED HEALTH CARE EDUCATION/TRAINING PROGRAM

## 2025-05-21 RX ORDER — OLANZAPINE 10 MG/2ML
5 INJECTION, POWDER, FOR SOLUTION INTRAMUSCULAR EVERY 6 HOURS PRN
Status: DISCONTINUED | OUTPATIENT
Start: 2025-05-21 | End: 2025-05-22

## 2025-05-21 RX ADMIN — CALCIUM CHLORIDE, MAGNESIUM CHLORIDE, SODIUM CHLORIDE, SODIUM BICARBONATE, POTASSIUM CHLORIDE AND SODIUM PHOSPHATE DIBASIC DIHYDRATE 10 ML/KG/HR: 3.68; 3.05; 6.34; 3.09; .314; .187 INJECTION INTRAVENOUS at 15:20

## 2025-05-21 RX ADMIN — HYDROCORTISONE SODIUM SUCCINATE 25 MG: 100 INJECTION, POWDER, FOR SOLUTION INTRAMUSCULAR; INTRAVENOUS at 08:10

## 2025-05-21 RX ADMIN — CALCIUM GLUCONATE 2 G: 20 INJECTION, SOLUTION INTRAVENOUS at 17:06

## 2025-05-21 RX ADMIN — PIPERACILLIN AND TAZOBACTAM 4.5 G: 4; .5 INJECTION, POWDER, LYOPHILIZED, FOR SOLUTION INTRAVENOUS at 06:00

## 2025-05-21 RX ADMIN — CALCIUM CHLORIDE, MAGNESIUM CHLORIDE, SODIUM CHLORIDE, SODIUM BICARBONATE, POTASSIUM CHLORIDE AND SODIUM PHOSPHATE DIBASIC DIHYDRATE 10 ML/KG/HR: 3.68; 3.05; 6.34; 3.09; .314; .187 INJECTION INTRAVENOUS at 01:43

## 2025-05-21 RX ADMIN — PIPERACILLIN AND TAZOBACTAM 4.5 G: 4; .5 INJECTION, POWDER, LYOPHILIZED, FOR SOLUTION INTRAVENOUS at 18:06

## 2025-05-21 RX ADMIN — CALCIUM CHLORIDE, MAGNESIUM CHLORIDE, SODIUM CHLORIDE, SODIUM BICARBONATE, POTASSIUM CHLORIDE AND SODIUM PHOSPHATE DIBASIC DIHYDRATE 10 ML/KG/HR: 3.68; 3.05; 6.34; 3.09; .314; .187 INJECTION INTRAVENOUS at 21:36

## 2025-05-21 RX ADMIN — Medication 100 MCG/HR: at 03:10

## 2025-05-21 RX ADMIN — CALCIUM CHLORIDE, MAGNESIUM CHLORIDE, SODIUM CHLORIDE, SODIUM BICARBONATE, POTASSIUM CHLORIDE AND SODIUM PHOSPHATE DIBASIC DIHYDRATE 10 ML/KG/HR: 3.68; 3.05; 6.34; 3.09; .314; .187 INJECTION INTRAVENOUS at 08:08

## 2025-05-21 RX ADMIN — DEXMEDETOMIDINE HYDROCHLORIDE 1 MCG/KG/HR: 400 INJECTION INTRAVENOUS at 08:09

## 2025-05-21 RX ADMIN — PIPERACILLIN AND TAZOBACTAM 4.5 G: 4; .5 INJECTION, POWDER, LYOPHILIZED, FOR SOLUTION INTRAVENOUS at 23:46

## 2025-05-21 RX ADMIN — HEPARIN SODIUM 5000 UNITS: 5000 INJECTION, SOLUTION INTRAVENOUS; SUBCUTANEOUS at 11:44

## 2025-05-21 RX ADMIN — MAGNESIUM SULFATE HEPTAHYDRATE: 500 INJECTION, SOLUTION INTRAMUSCULAR; INTRAVENOUS at 19:48

## 2025-05-21 RX ADMIN — DEXMEDETOMIDINE HYDROCHLORIDE 1 MCG/KG/HR: 400 INJECTION INTRAVENOUS at 22:56

## 2025-05-21 RX ADMIN — DEXMEDETOMIDINE HYDROCHLORIDE 1.2 MCG/KG/HR: 400 INJECTION INTRAVENOUS at 04:09

## 2025-05-21 RX ADMIN — POTASSIUM CHLORIDE 20 MEQ: 29.8 INJECTION, SOLUTION INTRAVENOUS at 06:43

## 2025-05-21 RX ADMIN — CALCIUM CHLORIDE, MAGNESIUM CHLORIDE, SODIUM CHLORIDE, SODIUM BICARBONATE, POTASSIUM CHLORIDE AND SODIUM PHOSPHATE DIBASIC DIHYDRATE: 3.68; 3.05; 6.34; 3.09; .314; .187 INJECTION INTRAVENOUS at 03:02

## 2025-05-21 RX ADMIN — DEXMEDETOMIDINE HYDROCHLORIDE 1 MCG/KG/HR: 400 INJECTION INTRAVENOUS at 14:42

## 2025-05-21 RX ADMIN — ONDANSETRON 4 MG: 2 INJECTION, SOLUTION INTRAMUSCULAR; INTRAVENOUS at 22:16

## 2025-05-21 RX ADMIN — OLANZAPINE 2.5 MG: 10 INJECTION, POWDER, FOR SOLUTION INTRAMUSCULAR at 19:40

## 2025-05-21 RX ADMIN — MAGNESIUM SULFATE HEPTAHYDRATE 2 G: 2 INJECTION, SOLUTION INTRAVENOUS at 05:30

## 2025-05-21 RX ADMIN — HEPARIN SODIUM 5000 UNITS: 5000 INJECTION, SOLUTION INTRAVENOUS; SUBCUTANEOUS at 19:43

## 2025-05-21 RX ADMIN — PIPERACILLIN AND TAZOBACTAM 4.5 G: 4; .5 INJECTION, POWDER, LYOPHILIZED, FOR SOLUTION INTRAVENOUS at 11:44

## 2025-05-21 RX ADMIN — PANTOPRAZOLE SODIUM 40 MG: 40 INJECTION, POWDER, FOR SOLUTION INTRAVENOUS at 08:10

## 2025-05-21 ASSESSMENT — ACTIVITIES OF DAILY LIVING (ADL)
ADLS_ACUITY_SCORE: 56
ADLS_ACUITY_SCORE: 52
ADLS_ACUITY_SCORE: 56
ADLS_ACUITY_SCORE: 54
ADLS_ACUITY_SCORE: 56
ADLS_ACUITY_SCORE: 56
ADLS_ACUITY_SCORE: 52
ADLS_ACUITY_SCORE: 56
ADLS_ACUITY_SCORE: 56
ADLS_ACUITY_SCORE: 54
ADLS_ACUITY_SCORE: 56
ADLS_ACUITY_SCORE: 52
ADLS_ACUITY_SCORE: 56
ADLS_ACUITY_SCORE: 56
ADLS_ACUITY_SCORE: 54
ADLS_ACUITY_SCORE: 54
ADLS_ACUITY_SCORE: 56

## 2025-05-21 NOTE — PROGRESS NOTES
"CRRT STATUS NOTE    DATA:  Time:  06:11 AM  Pressures WNL:  YES  Filter Status:  WDL    Problems Reported/Alarms Noted:  none    Supplies Present:  YES    ASSESSMENT:  Patient Net Fluid Balance:  Pt was net -259 ml at midnight for 5/20/25 and is + 184 ml so far today.  Pt is + 17 L since admission.     Vital Signs:  /57   Pulse 77   Temp 99.5  F (37.5  C) (Axillary)   Resp 18   Ht 1.727 m (5' 7.99\")   Wt 84.4 kg (186 lb 1.1 oz)   SpO2 99%   BMI 28.30 kg/m      Labs:K 3.5, Magnesium 1.9, Lactic acid 4.2, WBC 12.7        Goals of Therapy:   0-50 ml/hr, meeting    INTERVENTIONS:   Restarted set x 1    PLAN:  Continue to monitor and contact CRRT resource RN on margarita with questions and concerns.  Remove fluid per orders as tolerated and exchange set q 72 hrs and PRN.     "

## 2025-05-21 NOTE — PROGRESS NOTES
"  PALLIATIVE CARE PROGRESS NOTE  [facility]     Patient Name: Sebastián Rodas  Date of Admission: 4/30/2025   Today the patient was seen for: care conference     Recommendations & Counseling     GOALS OF CARE:   Restorative and life-prolonging without limits  DNR again recommended today, follow up with Sebastián and family in coming days after they have discussed  Recommend continuing to center Sebastián in medical updates and decision making while ensuring he has family support at bedside.    Met with Marbella Lowery (mom), and SICU fellow at bedside this afternoon.  Key takeaways:  Reviewed with Sebastián that he remains critically ill and at ongoing risk for worsening bleeding or infection in his abdomen. No further surgery is recommended as it would carry more risk than benefit. Sebastián indicated understanding and that this was not new information for him.  Sebastián said \"I'm ready\" for \"whatever happens.\" He confirmed he has been asking providers about death and what this might look like for him. When mom asked \"do you want to die?\" Sebastián shook his head \"no.\" Sebastián nodded yes when his mom asked if he wants to \"keep fighting to get better.\" Providers aligned with patient/family's goals to while acknowledging significant challenges and limitations we face.  Discussed code status, which Sebastián and Marbella are familiar with from prior conversations. Explained that if Sebastián were to lose pulses in setting of another bleed, CPR would likely not be successful and could cause significant suffering. SICU team provided recommendation for DNR. Sebastián nodded to indicate understanding, plan for him to talk further with Marbella before making any decision.  Information sharing/decision making preferences:  Marbella repeatedly expressed concerns about providers speaking directly with Sebastián about his condition and treatment options. She reports Sebastián was being evaluated for Autism Spectrum Disorder " prior to admission and struggled to understand things even before this hospitalization and fluctuating mentation; she also worries about his anxiety.   We explained that Sebastián has consistently been demonstrating improved mentation and has been asking questions about his condition. Emphasized importance of respecting Sebastián's autonomy and our duty as providers to share information with him.   Sebastián prefers to have family present (mom, uncle and/or brother) when receiving medical updates and facing medical decisions. Sebastián wants to have an active role in making decisions about his own care, though he also values input from family.  Sebastián is easily overwhelmed by multiple providers in the room and/or difficult conversations with multiple different provider teams in one day.  Recommend continuing to center Sebastián in medical updates and decision making while ensuring he has family support at bedside. When possible, limit these conversations to no more than once a day and 1-2 providers.    ADVANCE CARE PLANNING:  No health care directive on file. Per system policy, Surrogate Decision-makers for Patients With Diminished Decision-making Capacity offers guidance on possible decision-makers. Marbella (mother) has been identified as a surrogate decision maker.   There is no POLST form on file, defer to patient and/or next of kin for decisions   Code status: Full Code    MEDICAL MANAGEMENT:   We are not actively managing symptoms at this time.    PSYCHOSOCIAL/SPIRITUAL:  Family - supported by Marbella (mom), Dustin (uncle), 3 siblings. Lives with youngest sibling and his cat.  Friends  Kathy - Muslim, appreciate ongoing  support    Palliative Care will continue to follow.     Ronda Persaud PA-C  [facility], Palliative Care  Securely message with the [employer] Web Console (learn more here) or  Text page via [employer] Paging/Directory      Assessment          Sebastián Rodas is a 29-year-old male with a history of alcohol use  "disorder, anxiety, and bipolar disorder who was admitted on 3/30/2025 to OSH for alcohol withdrawal following a recent binge, presenting with diffuse abdominal pain. Initial workup revealed leukocytosis and elevated lipase concerning for acute pancreatitis.   On 4/27, after clinical deterioration and imaging consistent with a likely perforated viscus, he underwent emergent exploratory laparotomy, necrosectomy, transverse colectomy, abdominal washout, drain placement, and abthera placement.    Started on CRRT.       Patient was transferred to [facility] on 4/30/25 for further surgical management.  Reexploration of belly here at [facility] reveals no possibility of creating diverting colostomy. Course complicated by recurrent intra-abdominal bleeding requiring MTP and IR embolization x2 (5/10 and 5/18). Palliative following for goals of care conversations.      Interval History:     Multidisciplinary collaboration:  Reviewed notes from SICU, Renal, Ethics  Per nursing notes, increased bleeding overnight when pulling with CRRT, goals changed to I=O    Patient/family narrative  Seen for care conference as above. Sebastián was initially sleepy, just waking up. Paused precedex during meeting and Sebastián was then more alert and engaged. He struggled to communicate by writing and drawing a picture, at one point wrote \"charade rules\" to indicate he wanted us to guess what he was trying to say. He was ultimately able to use letterboard to spell \"ask me questions\" and did well with yes/no questions. He can also mouth words though difficult for us to understand at times. He denied any pain, asked to be suctioned at end of my visit.    Physical Exam:   Temp:  [98.2  F (36.8  C)-100.8  F (38.2  C)] 98.2  F (36.8  C)  Pulse:  [] 83  Resp:  [13-28] 18  MAP:  [60 mmHg-94 mmHg] 73 mmHg  Arterial Line BP: (106-164)/(39-72) 124/54  FiO2 (%):  [40 %-60 %] 50 %  SpO2:  [89 %-100 %] 99 %  185 lbs 6.51 oz    Physical Exam  General: laying in bed, " NAD  Lungs: trach in place, remains on vent support  Neuro: eyes open and tracking, engaged with family, communicates by mouthing words or gestures or letterboard, difficulty with writing legibly due to weakness        Data Reviewed:     CMP  Recent Labs   Lab 05/21/25  0807 05/21/25 0415 05/20/25  1615 05/20/25  1610 05/20/25  0441 05/20/25  0344   NA  --  140  --  140  --  140   POTASSIUM  --  3.5  --  3.9  --  3.8   CHLORIDE  --  106  --  105  --  106   CO2  --  21*  --  21*  --  20*   ANIONGAP  --  13  --  14  --  14   * 156*   < > 140*   < > 117*  123*   BUN  --  27.2*  --  26.3*  --  26.8*   CR  --  0.37*  --  0.44*  --  0.40*   GFRESTIMATED  --  >90  --  >90  --  >90   KARINA  --  8.0*  --  7.9*  --  8.1*   MAG  --  1.9  --  2.0  --  1.9   PHOS  --  2.8  --  2.7  --  2.8   PROTTOTAL  --  5.2*  --   --   --  5.4*   ALBUMIN  --  2.3*  --  2.4*  --  2.7*   BILITOTAL  --  3.3*  --   --   --  2.0*   ALKPHOS  --  175*  --   --   --  217*   AST  --  44  --   --   --  86*   ALT  --  68  --   --   --  83*    < > = values in this interval not displayed.     CBC  Recent Labs   Lab 05/21/25 0415 05/20/25 1610   WBC 12.7* 14.3*   RBC 2.94* 3.01*   HGB 8.8* 9.2*   HCT 27.2* 27.1*   MCV 93 90   MCH 29.9 30.6   MCHC 32.4 33.9   RDW 16.5* 16.3*    157     Medical Decision Making       60 MINUTES SPENT BY ME on the date of service doing chart review, history, exam, documentation & further activities per the note.

## 2025-05-21 NOTE — PROGRESS NOTES
SURGICAL ICU PROGRESS NOTE  05/21/2025    Date of Service (when I saw the patient): 05/21/2025    ASSESSMENT:  Sebastián Rodas is a 29M with alcohol abuse, anxiety, and bipolar disorder, who was admitted 3/30/25 for alcohol withdrawal and abdominal pain. Workup showed leukocytosis and elevated lipase, consistent with acute pancreatitis. He developed encephalopathy and shock, requiring ICU care, intubation, vasopressors, and CRRT by 4/1. His course was complicated by cardiomyopathy (EF 20-25%, improved to 65-70% by 4/14), necrotizing pancreatitis, and persistent infection despite 14 days of meropenem. On 4/27, imaging showed likely perforated viscus; he underwent emergent laparotomy, necrosectomy, and colectomy. Transferred to [facility] SICU on 4/30. On 5/1, reoperation revealed colonic staple line breakdown, necrotic pancreas, vessel thrombosis, and adhesions. Colostomy with malankot drain and temporary abdominal closure performed. Prognosis is poor; palliative care involved. Care conference 5/2 with family to talk goals of care. Full code, family acknowledged that poor prognosis is to be expected. 5/4 s/p exploratory surgery, additional necrotic pancreas remove with no obvious spillage or sign of bleeding at the time. Went back to the OR 5/7 for abdominal wash out. CT abdomen 5/10 showed extravasation on arterial phase imaging. MTP initiated, IR arterial embolization of pancreatic vessels performed. Decreased stool output since 5/13, switched to TPN. Hemorraghic shock.  MTP 5/18, IR embolization 5/18.      CHANGES and MAJOR THINGS TODAY:  - Remains on 0.01 levo  - Net ~  neg 250 cc yest, goal net neg 500 cc yest  - Family meeting postponed at patient request, instead proceeded with provider only meeting.   - Will attempt to reschedule family meeting to this afternoon, 5/21  - Glucoses remain controlled on HSSI and lantus  - zyprexa 5mg q6h prn    PLAN:    Neurological:  # Acute pain   -Fentanyl gtt, fentanyl  bolus  #Encephalopathy- improving  #Insomnia  - Monitor neurological status. Delirium preventions and precautions.   # Pain:   # Sedation: Precedex, weaning as able  # Anxiety  - Olanzapine incr to 5 q6 prn, with goal of getting off precedex     Pulmonary:   # Acute hypoxic respiratory failure  # S/p tracheostomy placement   - FiO2 (%): 60 %, Resp: 19, Vent Mode: VC/AC, Resp Rate (Set): 16 breaths/min, Tidal Volume (Set, mL): 420 mL, PEEP (cm H2O): 10 cmH2O, Resp Rate (Set): 16 breaths/min, Tidal Volume (Set, mL): 420 mL, PEEP (cm H2O): 10 cmH2O   - PST as able  - CT PE 5/9 showed no evidence of pulmonary embolism  - Pulmonary toilet, mobilize  - Ventilator bundle  - Wean FIO2 as tolerated, PEEP 10    Cardiovascular:    # Stress Cardiomyopathy   - Initial LVEF 20-25%, improved to 65-70%  # Shock-distributive (septic)  # Hypovolemic shock  (hemorraghic)  # Sinus Tachycardia  - Monitor hemodynamic status. MAP goal > 65.   - Hydroxycortisone for stress dose steroid, currently 25mg Daily   - EKG 5/8, 5/9, 5/18 showed sinus tachycardia with no ectopy  - MTP 5/17  - Continue trend lactate, 5/21 4.2     Gastroenterology/Nutrition:  # S/p emergent exploratory laparotomy, necrosectomy, transverse colectomy, abdominal washout, and drain placement 5/1 and 5/4  # S/p Pancreatic branch vessel embolization with IR 5/10, 5/18  # Severe necrotizing pancreatitis  # Splenic vein thrombosis  - will defer management of dressings and drain removal per EGS   - Ongoing copious dark bloody drain output, CTM  - Cecostomy drain in place, no stool output. Abdomen distended.  - on PPI IV  - CT abdomen/pelvis with IV + enteral contrast through G tube 5/14: large volume of air and blood products in abdomen.   - Hold all Oral Meds and Feeds, strict NPO  -TPN hold lipids    # Severe Protein calorie deficit malnutrition due to critical illness  - on TPN  - G tube tear in the J portion, TF stopped.  - IR consulted for PEG exchange    - IR cannot  safely exchange freshly placed GJ tubes until they have been in place x6 weeks given risk of losing access into the stomach. Additionally, imaging shows there is no safe pexy between the stomach and abdominal wall. IR may not be able to offer safe exchange even after 6 weeks.   - Multivitamin supplementation ordered by nutrition  - RD consult. Appreciate cares and recommendations.     Renal/Fluids/Electrolytes:   # SARAHI  # Fluid overload  Baseline Cr 0.7-0.8. Presented with Cr 0.96 on 3/30. Rapidly markos to 5.2 on 4/1. Oliguric. Garcia UA with proteinuria, hematuria, pyuria, moderate bacteria.  Kidneys unremarkable on CT. Has severe ATN in the setting of shock, ADHF, intravascular hypovolemia/3rd spacing from pancreatitis.   - Trend lactate   - CRRT with I&Os goal -1000cc  - Continue to monitor intake and output  - Volume assessment daily    Endocrine:   # Stress hyperglycemia    - hgb A1c 5.3, no hx of DM   - Transition from insulin drip to sliding scale insulin and lantus. Increased lantus on 5/20 to 20units daily and HSSI   - Goal to keep BG< 180 for optimal wound healing     # Leukocytosis  #Necrotizing pancreatitis  WBC Count   Date Value Ref Range Status   05/21/2025 12.7 (H) 4.0 - 11.0 10e3/uL Final   - CRP elevated at 454->344 >210, trend q3 days  - Completed 14 days course of meropenem and caspofungin and zosyn previously.   - Continue Zosyn for ongoing intra-abdominal infection     cultures:  - 4/30 blood cultures- NGTD   - 5/1 blood cultures ordered - NGTD  - Blood culture 5/12, 5/15 - NGTD     Heme:     # Acute blood loss anemia   # Anemia of critical illness   # Thrombosed splenic vein   - Transfuse if hgb <7.0 or signs/symptoms of hypoperfusion. Monitor and trend  Hemoglobin   Date Value Ref Range Status   05/21/2025 8.8 (L) 13.3 - 17.7 g/dL Final     - MTP 5/10, 5/18  - hemoglobin stable, keeping on trending  - Continue subcutaneous heparin, watch bleeding from drains.     Musculoskeletal:   #  Deconditioning and weakness due to critical illness   - Physical and occupational therapy consult      Skin:  # Pressure Ulcers - Buttocks/rectal area  - Barrier cream with liberal application. Continue fecal management system   - WOC consult      #Pressure Ulcers- Left Heel  Pressure Injury Location: Left heel   Wound type: Pressure Injury     Pressure Injury Stage: Deep Tissue Pressure Injury (DTPI), present on admission     General Cares/Prophylaxis:    DVT Prophylaxis: SQH   GI Prophylaxis: PPI     Lines/ tubes/ drains:  - HD line--  Right IJ  - PICC line- left   - Right radial A line  - PIV x 3  - Trach  - G-J  - Wound manager  - SHANNON x 3  - Cecostomy drain     Disposition:  - Surgical ICU    MD Eugenia Partida MD  Neurosurgery PGY-1  Voc[employer]      ====================================    OBJECTIVE:   1. VITAL SIGNS:   Temp:  [98.2  F (36.8  C)-100.8  F (38.2  C)] 98.5  F (36.9  C)  Pulse:  [] 74  Resp:  [13-28] 19  MAP:  [60 mmHg-94 mmHg] 71 mmHg  Arterial Line BP: (106-164)/(39-72) 124/51  FiO2 (%):  [40 %-60 %] 60 %  SpO2:  [89 %-100 %] 100 %  FiO2 (%): 60 %, Resp: 19, Vent Mode: VC/AC, Resp Rate (Set): 16 breaths/min, Tidal Volume (Set, mL): 420 mL, PEEP (cm H2O): 10 cmH2O, Resp Rate (Set): 16 breaths/min, Tidal Volume (Set, mL): 420 mL, PEEP (cm H2O): 10 cmH2O    2. INTAKE/ OUTPUT:   I/O last 3 completed shifts:  In: 2400.05 [I.V.:1000.05; IV Piggyback:200]  Out: 2739.9 [Emesis/NG output:5; Drains:1652; Other:1082.9]    3. PHYSICAL EXAMINATION:  General: laying in bed, awake and alert   HEENT: PERRLA. Trach present and secure, site dressed.   Pulm/Resp: Clear breath sounds bilaterally, lungs coarse but clear.  CV: RRR, S1/S2   Abdomen: Distended. Dark bloody output noted to all drains. G-J tube in place, site secured, abdomen with dressing in place  : scrotal edema, draining dark bloody output.   MSK/Extremities: generalized edema in extremities    4. INVESTIGATIONS:   Arterial Blood  Gases   Recent Labs   Lab 05/20/25 0344 05/19/25 0407 05/18/25  0537 05/18/25  0055   PH 7.45 7.41 7.34* 7.37   PCO2 33* 38 41 33*   PO2 74* 150* 90 187*   HCO3 23 24 22 19*     Complete Blood Count   Recent Labs   Lab 05/21/25 0415 05/20/25 1610 05/20/25 0344 05/19/25  1612   WBC 12.7* 14.3* 15.5* 18.9*   HGB 8.8* 9.2* 9.7* 10.3*    157 132* 123*     Basic Metabolic Panel  Recent Labs   Lab 05/21/25 0415 05/21/25 0413 05/20/25 2343 05/20/25 1952 05/20/25 1615 05/20/25 1610 05/20/25 0441 05/20/25 0344 05/19/25 1616 05/19/25  1612     --   --   --   --  140  --  140  --  140   POTASSIUM 3.5  --   --   --   --  3.9  --  3.8  --  3.8   CHLORIDE 106  --   --   --   --  105  --  106  --  105   CO2 21*  --   --   --   --  21*  --  20*  --  21*   BUN 27.2*  --   --   --   --  26.3*  --  26.8*  --  24.7*   CR 0.37*  --   --   --   --  0.44*  --  0.40*  --  0.40*   * 146* 140* 147*   < > 140*   < > 117*  123*   < > 117*    < > = values in this interval not displayed.     Liver Function Tests  Recent Labs   Lab 05/21/25 0415 05/20/25 1610 05/20/25 0344 05/19/25 1612 05/19/25 0407 05/18/25  1548 05/18/25  0530 05/18/25  0047 05/17/25  2343 05/17/25  1526 05/17/25  1417   AST 44  --  86*  --  128*  --  71*  --   --   --   --    ALT 68  --  83*  --  70  --  36  --   --   --   --    ALKPHOS 175*  --  217*  --  234*  --  289*  --   --   --   --    BILITOTAL 3.3*  --  2.0*  --  1.3*  --  2.6*  --   --   --   --    ALBUMIN 2.3* 2.4* 2.7* 3.0* 2.5*   < > 2.9*  --   --    < >  --    INR  --   --   --   --   --   --  1.21* 1.63* 1.54*  --  1.18*    < > = values in this interval not displayed.     Pancreatic Enzymes  No lab results found in last 7 days.    Coagulation Profile  Recent Labs   Lab 05/18/25  0530 05/18/25  0047 05/17/25  2343 05/17/25  1417   INR 1.21* 1.63* 1.54* 1.18*   PTT 31  --  40* 30     5. RADIOLOGY:   No results found for this or any previous visit (from the past 24  hours).      =========================================

## 2025-05-21 NOTE — PLAN OF CARE
ICU End of Shift Summary. See flowsheets for vital signs and detailed assessment.    [id number]    Changes this shift: Pt alert and oriented x4. Fentanyl gtt infusing at 100. Anxiety noted, alleviated with therapeutic communication and medication.     Levo infusing at 0.01. Stopped pulling from CRRT due to increased drainage. Bleeding reduced significantly when I turned it down to 0, and I was able to stop Levo. Abdominal dressings saturated, changed. SICU notified. Pt refused repositioning. Reinforced education on benefits of offloading and turns. Verbalized understanding but insists he feels fine without repositioning.    Plan: Continue plan of care. Report given to the next RN.    Goal Outcome Evaluation:    Plan of Care Reviewed With: patient    Overall Patient Progress: no change    Outcome Evaluation: No acute events.

## 2025-05-21 NOTE — PLAN OF CARE
ICU End of Shift Summary. See flowsheets for vital signs and detailed assessment.  23:00 - 07:00  Changes this shift: Pt uncooperative at times through the night; refusing repositioning, skin assessments, and other cares. Precedex gtt was increased due to increasing anxiety after which the patient was able to sleep.   Previous nurse had updated SICU re: increased bleeding when pulling off fluid with CRRT, they came to assess him at ~01:45. I was instructed by SICU to try to pull again and inform them how it went. Within an hour the patients dressing on his surgical incision was saturated. The team then said to aim for I=O if patient could tolerate.    Plan: Continue CRRT, continue POC, monitor midline incision and change dressing PRN, family care conference (?)    Problem: Adult Inpatient Plan of Care  Goal: Absence of Hospital-Acquired Illness or Injury  Intervention: Identify and Manage Fall Risk  Recent Flowsheet Documentation  Taken 5/21/2025 0000 by Regi Tavarez RN  Safety Promotion/Fall Prevention:   room door open   room near nurse's station   safety round/check completed   treat underlying cause   activity supervised   patient and family education  Intervention: Prevent Skin Injury  Recent Flowsheet Documentation  Taken 5/21/2025 0200 by Regi Tavarez RN  Body Position:   turned   heels elevated   upper extremity elevated  Taken 5/21/2025 0000 by Regi Tavarez RN  Body Position: refuses positioning  Skin Protection:   adhesive use limited   incontinence pads utilized   pulse oximeter probe site changed   silicone foam dressing in place   tubing/devices free from skin contact   skin sealant/moisture barrier applied  Intervention: Prevent and Manage VTE (Venous Thromboembolism) Risk  Recent Flowsheet Documentation  Taken 5/21/2025 0000 by Regi Tavarez RN  VTE Prevention/Management: SCDs off (sequential compression devices)  Goal: Optimal Comfort and Wellbeing  Intervention: Monitor Pain and  Promote Comfort  Recent Flowsheet Documentation  Taken 5/21/2025 0000 by Regi Tavarez RN  Pain Management Interventions:   around-the-clock dosing utilized   pain management plan reviewed with patient/caregiver     Problem: Risk for Delirium  Goal: Improved Behavioral Control  Intervention: Minimize Safety Risk  Recent Flowsheet Documentation  Taken 5/21/2025 0000 by Regi Tavarez RN  Enhanced Safety Measures:   monitor patients coagulation values   pain management   review medications for side effects with activity     Problem: Restraint, Nonviolent  Goal: Absence of Harm or Injury  Intervention: Protect Skin and Joint Integrity  Recent Flowsheet Documentation  Taken 5/21/2025 0200 by Regi Tavarez RN  Body Position:   turned   heels elevated   upper extremity elevated  Taken 5/21/2025 0000 by Regi Tavarez RN  Body Position: refuses positioning  Skin Protection:   adhesive use limited   incontinence pads utilized   pulse oximeter probe site changed   silicone foam dressing in place   tubing/devices free from skin contact   skin sealant/moisture barrier applied     Problem: Gas Exchange Impaired  Goal: Optimal Gas Exchange  Intervention: Optimize Oxygenation and Ventilation  Recent Flowsheet Documentation  Taken 5/21/2025 0200 by Regi Tavarez RN  Head of Bed (HOB) Positioning: HOB at 30-45 degrees  Taken 5/21/2025 0000 by Regi Tavarez RN  Airway/Ventilation Management:   pulmonary hygiene promoted   airway patency maintained   calming measures promoted     Problem: Infection  Goal: Absence of Infection Signs and Symptoms  Intervention: Prevent or Manage Infection  Recent Flowsheet Documentation  Taken 5/21/2025 0000 by Regi Tavarez RN  Infection Management: aseptic technique maintained   Goal Outcome Evaluation:  Not progressing

## 2025-05-21 NOTE — PROGRESS NOTES
Surgery Progress Note  05/21/2025       Subjective:  Overnight patient had increased drain output when attempting to pull on CRRT. Goals changed to I=O. Resting comfortably in bed this morning.      Objective:  Temp:  [98.2  F (36.8  C)-100.8  F (38.2  C)] 98.2  F (36.8  C)  Pulse:  [] 80  Resp:  [13-28] 20  MAP:  [60 mmHg-94 mmHg] 68 mmHg  Arterial Line BP: (106-164)/(39-72) 125/49  FiO2 (%):  [40 %-60 %] 60 %  SpO2:  [89 %-100 %] 100 %    I/O last 3 completed shifts:  In: 2400.05 [I.V.:1000.05; IV Piggyback:200]  Out: 2739.9 [Emesis/NG output:5; Drains:1652; Other:1082.9]      Gen: Resting comfortably in bed  Resp: Ventilated  Abd: Abdomen is taut but compressible, drains with dark bloody output, Malecot with minimal succus, midline abdominal dressing with wound manager with dark bloody output around.   : Scrotum with pouch in place with serosanguinous drainage  Ext: bilateral lower extremities appear mottled, warm to the touch, edematous     Labs:  Recent Labs   Lab 05/21/25  0415 05/20/25  1610 05/20/25  0344   WBC 12.7* 14.3* 15.5*   HGB 8.8* 9.2* 9.7*    157 132*       Recent Labs   Lab 05/21/25  0807 05/21/25  0415 05/21/25  0413 05/20/25  1615 05/20/25  1610 05/20/25  0441 05/20/25  0344   NA  --  140  --   --  140  --  140   POTASSIUM  --  3.5  --   --  3.9  --  3.8   CHLORIDE  --  106  --   --  105  --  106   CO2  --  21*  --   --  21*  --  20*   BUN  --  27.2*  --   --  26.3*  --  26.8*   CR  --  0.37*  --   --  0.44*  --  0.40*   * 156* 146*   < > 140*   < > 117*  123*   KARINA  --  8.0*  --   --  7.9*  --  8.1*   MAG  --  1.9  --   --  2.0  --  1.9   PHOS  --  2.8  --   --  2.7  --  2.8    < > = values in this interval not displayed.     Assessment/Plan:   Sebastián Rodas is a 29-year-old male with a history of alcohol use disorder, anxiety, and bipolar disorder who was admitted on 3/30/2025 for alcohol withdrawal following a recent binge, presenting with diffuse abdominal pain.  "Initial workup revealed leukocytosis and elevated lipase concerning for acute pancreatitis. He developed acute encephalopathy and was transferred to the ICU on 3/31, requiring intubation and vasopressors by 4/1 due to shock. Hospital course has been complicated by acute renal failure requiring CRRT, cardiomyopathy (initial LVEF 20-25%, improved to 65-70% by 4/14), and necrotizing pancreatitis with unorganized fluid collections. He completed a 14-day course of meropenem but remains intermittently febrile and critically ill, requiring ongoing dialysis and vasopressor support. On 4/27, after clinical deterioration and imaging consistent with a likely perforated viscus, following family discussion, he underwent emergent exploratory laparotomy, necrosectomy, transverse colectomy, abdominal washout, and drain placement. Patient was transferred to [facility] on 4/30/25 for further surgical management. Went to OR 5/1 for re-open laparotomy, I&D, placement of colostomy tube in presumed previous colectomy staple line, necrosectomy, and Abthera placement. On 5/2, increasing sanguineous output from drains concerning for bleeding from pancreatic bed. Family care conference held 5/2, plan for restorative cares at that time. Take back to OR twice (5/4 and 5/7) for abdominal washout. Per nursing note: 5/9 morning after he saw a picture of his abdomen that he \"desires to speak to team about updating goals of care, expressed desire to pull back on cares\". Goals of care conference on 5/10 with continuation of full code and restorative cares. CTA on 5/10 revealed large area of active arterial extravasation in the upper abdomen. MTP was started. Patient is now s/p embolization of small pancreatic branch off of the distal celiac artery with IR on 5/10. Malecot drainage slowed and eventually stopped, with significant drainage through other drains and open into dressings. 5/17 night patient had active extravasation, with Hgb dropping from 7.8 to " 5.6 in 1.5 hours. Lactate spiked from 3.6 to 10.6, with WBC 14.9 to 25.5. Family was consulted, MTP was started, and IR embolized the celiac trunk/splenic artery branches. Patient requested bedside goals of care conference: will take place..    - No further surgical options for any intraabdominal pathology  - Nursing to change dressings in the am, pm, and PRN during the day if copious drainage during the day. Please place Xeroform over the bridging mesh. Please do not apply xeroform outside the wound bed/over the skin edges but make sure all of the wound bed is covered.   - Tube feeds on hold currently due to tear in J-tube. Receiving TPN (50 mL/hr), and Lipids & Albumin as needed.  - On Zosyn per SICU  - SQH PPX, would not recommend therapeutic dose given the risk of bleeding.   - Appreciate WOC cares  - Other cares per SICU, we appreciate cares      Seen, examined, and discussed with chief resident, who will discuss with staff.    Mireya López, DO  General Surgery, PGY-2

## 2025-05-21 NOTE — PROGRESS NOTES
Nephrology Progress Note  05/21/2025       Sebastián Rodas is a 29 yom with Bipolar disorder, ETOH use c/b necrotizing pancreatitis admitted 3/30/25 to [facility] for ETOH withdrawal and abdominal pain, found to have acute pancreatitis which has progressed to necrotizing pancreatitis complicated by SARAHI.  Seen by Nephrology at [place], started on CRRT 4/1.  Had periods of stability enough for iHD but back to CRRT on 4/21 until tx to [facility] for further surgical interventions.       Interval History :   Mr Rodas continues on CRRT, labs stable. Difficult situation remains with intraabdominal bleeding not amenable to intervention, continuing to support while GOC are discussed.  Ordered for 0-50cc/h, 4k baths again today.     Assessment & Recommendations:   SARAHI-Baseline Cr 0.8 as recently as March 2025 before acute events, was started on CRRT emergently on 4/1 in setting of septic shock. Had ~2 weeks of stability enough to manage with iHD but back on CRRT 4/21 until tx to [facility] on 4/30. Running fevers with intraabdominal sepsis.  Continuing RRT from OSH, restarted CRRT on 5/2 after OR.                  -No need for new consent, continuing RRT started last month at [facility].                 -Access is tunneled RIJ from 4/21.                  -CRRT, dose of 20ml/kg/h with low Cr.  All 4k baths, planning I=O but mainly achieving this on his own with drain output.      Volume-Wt is up over the weekend after bleeding, ordered for 0-50cc/h.      Electrolytes-K 3.5 on all 4k baths, bicarb 21.      BMD-No acute issues.      Anemia-Hgb 8.8, ongoing need for PRBC's.      Nutrition-On hold with bleeding, back on TPN.      Time spent: 50 minutes on this date of encounter for chart review, physical exam, medical decision making and co-ordination of care.      Discussed with Dr Samuel     Recommendations were communicated to primary team via verbal communication.      ASIF Roger CNS  Clinical Nurse  "Specialist  [phone]    Review of Systems:   I reviewed the following systems:  Gen: No fevers or chills  CV: No CP at rest  Resp: No SOB at rest  GI: No N/V    Physical Exam:   I/O last 3 completed shifts:  In: 2400.05 [I.V.:1000.05; IV Piggyback:200]  Out: 2739.9 [Emesis/NG output:5; Drains:1652; Other:1082.9]   /57   Pulse 84   Temp 98.2  F (36.8  C) (Oral)   Resp 20   Ht 1.727 m (5' 7.99\")   Wt 84.1 kg (185 lb 6.5 oz)   SpO2 95%   BMI 28.20 kg/m       GENERAL APPEARANCE: Vent via trach, CRRT running.   Pulmonary: Vent via trach  CV: Regular rhythm, normal rate   - Edema +2 generalized  GI: Surgical dressing in place  MS: no evidence of inflammation in joints, no muscle tenderness  : + Garcia  SKIN: no rash, warm, dry  NEURO: Intubated and sedated.     Labs:   All labs reviewed by me  Electrolytes/Renal -   Recent Labs   Lab Test 05/21/25  0807 05/21/25 0415 05/21/25  0413 05/20/25  1615 05/20/25  1610 05/20/25  0441 05/20/25  0344   NA  --  140  --   --  140  --  140   POTASSIUM  --  3.5  --   --  3.9  --  3.8   CHLORIDE  --  106  --   --  105  --  106   CO2  --  21*  --   --  21*  --  20*   BUN  --  27.2*  --   --  26.3*  --  26.8*   CR  --  0.37*  --   --  0.44*  --  0.40*   * 156* 146*   < > 140*   < > 117*  123*   KARINA  --  8.0*  --   --  7.9*  --  8.1*   MAG  --  1.9  --   --  2.0  --  1.9   PHOS  --  2.8  --   --  2.7  --  2.8    < > = values in this interval not displayed.       CBC -   Recent Labs   Lab Test 05/21/25 0415 05/20/25  1610 05/20/25  0344   WBC 12.7* 14.3* 15.5*   HGB 8.8* 9.2* 9.7*    157 132*       LFTs -   Recent Labs   Lab Test 05/21/25  0415 05/20/25  1610 05/20/25  0344 05/19/25 1612 05/19/25  0407   ALKPHOS 175*  --  217*  --  234*   BILITOTAL 3.3*  --  2.0*  --  1.3*   ALT 68  --  83*  --  70   AST 44  --  86*  --  128*   PROTTOTAL 5.2*  --  5.4*  --  5.1*   ALBUMIN 2.3* 2.4* 2.7*   < > 2.5*    < > = values in this interval not displayed.       Iron " Panel - No lab results found.        Current Medications:  Current Facility-Administered Medications   Medication Dose Route Frequency Provider Last Rate Last Admin    heparin ANTICOAGULANT injection 5,000 Units  5,000 Units Subcutaneous Q8H [facility] Sonia Moore NP   5,000 Units at 05/20/25 2001    hydrocortisone sodium succinate PF (solu-CORTEF) injection 25 mg  25 mg Intravenous Daily Sonia Moore NP   25 mg at 05/21/25 0810    insulin aspart (NovoLOG) injection (RAPID ACTING)  1-12 Units Subcutaneous Q4H Jessica Lancaster MD   1 Units at 05/21/25 0418    insulin glargine (LANTUS PEN) injection 20 Units  20 Units Subcutaneous Q24H Jessica Lancaster MD   20 Units at 05/20/25 1056    [Held by provider] lipids 4 oil (SMOFLIPID) 20 % infusion 250 mL  250 mL Intravenous Q24H Brendon Haas DO   Stopped at 05/19/25 0825    OLANZapine (zyPREXA) IV injection 2.5 mg  2.5 mg Intravenous QPM Sonia Moore NP   2.5 mg at 05/20/25 2001    pantoprazole (PROTONIX) IV push injection 40 mg  40 mg Intravenous QAM AC de La Mater, Renee Michelle, CNP   40 mg at 05/21/25 0810    piperacillin-tazobactam (ZOSYN) 4.5 g vial to attach to  mL bag  4.5 g Intravenous Q6H Eugenia Zavala MD   4.5 g at 05/21/25 0600    sodium chloride (PF) 0.9% PF flush 3 mL  3 mL Intracatheter Q8H Ganesh Griffiths MD   3 mL at 05/21/25 0643     Current Facility-Administered Medications   Medication Dose Route Frequency Provider Last Rate Last Admin    dexmedeTOMIDine (PRECEDEX) 4 mcg/mL in sodium chloride 0.9 % 100 mL infusion  0.1-1.2 mcg/kg/hr (Dosing Weight) Intravenous Continuous Sonia Moore NP 17.4 mL/hr at 05/21/25 1045 1 mcg/kg/hr at 05/21/25 1045    dextrose 10% infusion   Intravenous Continuous PRN Brendon Haas DO        dialysate for CVVHD & CVVHDF (Phoxillum BK4/2.5)  10 mL/kg/hr CRRT Continuous Raheem Gabriel, APRN  mL/hr at 05/21/25 0808 10  mL/kg/hr at 05/21/25 0808    fentaNYL (SUBLIMAZE) infusion   mcg/hr Intravenous Continuous Roxi Alston MD 1.5 mL/hr at 05/21/25 1000 75 mcg/hr at 05/21/25 1000    No heparin required   Does not apply Continuous PRN Tico Spain MD        norepinephrine (LEVOPHED) 16 mg in  mL infusion MAX CONC CENTRAL LINE  0.01-0.6 mcg/kg/min (Dosing Weight) Intravenous Continuous Celso Ambrose MD 0.7 mL/hr at 05/21/25 1056 0.01 mcg/kg/min at 05/21/25 1056    parenteral nutrition - ADULT compounded formula   CENTRAL LINE IV TPN CONTINUOUS Brendon Haas DO        parenteral nutrition - ADULT compounded formula   CENTRAL LINE IV TPN CONTINUOUS Brendon Haas DO 50 mL/hr at 05/21/25 0800 Rate Verify at 05/21/25 0800    POST-filter replacement solution for CVVHD & CVVHDF (Phoxillum BK4/2.5)   CRRT Continuous Tico Spain  mL/hr at 05/21/25 0302 New Bag at 05/21/25 0302    PRE-filter replacement solution for CVVHD & CVVHDF (Phoxillum BK4/2.5)  10 mL/kg/hr CRRT Continuous Raheem Gabriel APRN  mL/hr at 05/21/25 0808 10 mL/kg/hr at 05/21/25 0808    Reason statin not prescribed   Does not apply DOES NOT GO TO Rhys Bloom MD

## 2025-05-21 NOTE — PROGRESS NOTES
CRRT STATUS NOTE    DATA:  Time:  5:39 PM  Pressures WNL:  Yes  Filter Status:  WDL    Problems Reported/Alarms Noted:  None    Supplies Present:  Yes    ASSESSMENT:  Patient Net Fluid Balance:  Net IO Since Admission: 20,940.83 mL [05/21/25 1739]     Intake/Output Summary (Last 24 hours) at 5/21/2025 1739  Last data filed at 5/21/2025 1700  Gross per 24 hour   Intake 2365.89 ml   Output 2467.4 ml   Net -101.51 ml      Vitals:  Temp:  [97.9  F (36.6  C)-99.5  F (37.5  C)] 97.9  F (36.6  C)  Pulse:  [] 91  Resp:  [13-30] 25  MAP:  [61 mmHg-145 mmHg] 71 mmHg  Arterial Line BP: (108-164)/(41-72) 122/51  FiO2 (%):  [50 %-60 %] 50 %  SpO2:  [93 %-100 %] 100 %   Labs:    Lab Results   Component Value Date     05/21/2025    POTASSIUM 4.0 05/21/2025    CR 0.36 (L) 05/21/2025    BUN 27.7 (H) 05/21/2025    MAG 2.2 05/21/2025    PHOS 2.9 05/21/2025    WBC 16.8 (H) 05/21/2025    HGB 8.7 (L) 05/21/2025    HCT 27.6 (L) 05/21/2025     05/21/2025           Goals of Therapy:  0-50      INTERVENTIONS:   Review flow sheets and discuss plan of care with bedside nurse and nephrology team.    PLAN:  Continue fluid removal as tolerated per goals of therapy.  Check filter daily and change filter q 72 hrs and PRN.  Please contact the CRRT resource RN at [phone] with any questions/concerns.

## 2025-05-21 NOTE — PLAN OF CARE
ICU End of Shift Summary. See flowsheets for vital signs and detailed assessment.    Changes this shift: Pt A/O x4, on dex and fent gtts, communicating with pen/paper and typing on laptop. SR 80s, levo on/off to maintain MAP>65. On 50% PEEP 10, abdominal dressing with large amount of sanguinous secretions, changed x3 this shift, MD aware. Tolerating pulling on CRRT. Care conference at bedside with palliative and SICU fellow, see note. Hgb stable at 8.7, Lactic trending up to 4.6.    Plan: continue plan of care, update team with changes.

## 2025-05-22 ENCOUNTER — APPOINTMENT (OUTPATIENT)
Dept: GENERAL RADIOLOGY | Facility: CLINIC | Age: 30
End: 2025-05-22
Payer: COMMERCIAL

## 2025-05-22 LAB
ALBUMIN SERPL BCG-MCNC: 2.2 G/DL (ref 3.5–5.2)
ALBUMIN SERPL BCG-MCNC: 2.2 G/DL (ref 3.5–5.2)
ALP SERPL-CCNC: 239 U/L (ref 40–150)
ALT SERPL W P-5'-P-CCNC: 62 U/L (ref 0–70)
ANION GAP SERPL CALCULATED.3IONS-SCNC: 13 MMOL/L (ref 7–15)
ANION GAP SERPL CALCULATED.3IONS-SCNC: 14 MMOL/L (ref 7–15)
AST SERPL W P-5'-P-CCNC: 56 U/L (ref 0–45)
BILIRUB SERPL-MCNC: 3.4 MG/DL
BILIRUBIN DIRECT (ROCHE PRO & PURE): 2.81 MG/DL (ref 0–0.45)
BUN SERPL-MCNC: 28.5 MG/DL (ref 6–20)
BUN SERPL-MCNC: 32.7 MG/DL (ref 6–20)
CA-I BLD-MCNC: 4.4 MG/DL (ref 4.4–5.2)
CA-I BLD-MCNC: 4.6 MG/DL (ref 4.4–5.2)
CALCIUM SERPL-MCNC: 8 MG/DL (ref 8.8–10.4)
CALCIUM SERPL-MCNC: 8.3 MG/DL (ref 8.8–10.4)
CHLORIDE SERPL-SCNC: 106 MMOL/L (ref 98–107)
CHLORIDE SERPL-SCNC: 107 MMOL/L (ref 98–107)
CREAT SERPL-MCNC: 0.5 MG/DL (ref 0.67–1.17)
CREAT SERPL-MCNC: 0.53 MG/DL (ref 0.67–1.17)
CRP SERPL-MCNC: 276 MG/L
EGFRCR SERPLBLD CKD-EPI 2021: >90 ML/MIN/1.73M2
EGFRCR SERPLBLD CKD-EPI 2021: >90 ML/MIN/1.73M2
ERYTHROCYTE [DISTWIDTH] IN BLOOD BY AUTOMATED COUNT: 16.4 % (ref 10–15)
ERYTHROCYTE [DISTWIDTH] IN BLOOD BY AUTOMATED COUNT: 16.6 % (ref 10–15)
ERYTHROCYTE [DISTWIDTH] IN BLOOD BY AUTOMATED COUNT: 16.7 % (ref 10–15)
GLUCOSE BLDC GLUCOMTR-MCNC: 140 MG/DL (ref 70–99)
GLUCOSE BLDC GLUCOMTR-MCNC: 155 MG/DL (ref 70–99)
GLUCOSE BLDC GLUCOMTR-MCNC: 170 MG/DL (ref 70–99)
GLUCOSE BLDC GLUCOMTR-MCNC: 77 MG/DL (ref 70–99)
GLUCOSE BLDC GLUCOMTR-MCNC: 97 MG/DL (ref 70–99)
GLUCOSE SERPL-MCNC: 194 MG/DL (ref 70–99)
GLUCOSE SERPL-MCNC: 81 MG/DL (ref 70–99)
GRAM STAIN RESULT: ABNORMAL
GRAM STAIN RESULT: ABNORMAL
HCO3 SERPL-SCNC: 20 MMOL/L (ref 22–29)
HCO3 SERPL-SCNC: 20 MMOL/L (ref 22–29)
HCT VFR BLD AUTO: 26.4 % (ref 40–53)
HCT VFR BLD AUTO: 26.6 % (ref 40–53)
HCT VFR BLD AUTO: 27.3 % (ref 40–53)
HGB BLD-MCNC: 8.6 G/DL (ref 13.3–17.7)
HGB BLD-MCNC: 8.7 G/DL (ref 13.3–17.7)
HGB BLD-MCNC: 8.8 G/DL (ref 13.3–17.7)
INR PPP: 1.18 (ref 0.85–1.15)
INR PPP: 1.22 (ref 0.85–1.15)
LACTATE SERPL-SCNC: 3.9 MMOL/L (ref 0.7–2)
LACTATE SERPL-SCNC: 4 MMOL/L (ref 0.7–2)
LACTATE SERPL-SCNC: 4.3 MMOL/L (ref 0.7–2)
MAGNESIUM SERPL-MCNC: 2.2 MG/DL (ref 1.7–2.3)
MAGNESIUM SERPL-MCNC: 2.2 MG/DL (ref 1.7–2.3)
MCH RBC QN AUTO: 29.7 PG (ref 26.5–33)
MCH RBC QN AUTO: 29.9 PG (ref 26.5–33)
MCH RBC QN AUTO: 30.2 PG (ref 26.5–33)
MCHC RBC AUTO-ENTMCNC: 32.2 G/DL (ref 31.5–36.5)
MCHC RBC AUTO-ENTMCNC: 32.6 G/DL (ref 31.5–36.5)
MCHC RBC AUTO-ENTMCNC: 32.7 G/DL (ref 31.5–36.5)
MCV RBC AUTO: 91 FL (ref 78–100)
MCV RBC AUTO: 92 FL (ref 78–100)
MCV RBC AUTO: 93 FL (ref 78–100)
PHOSPHATE SERPL-MCNC: 2.5 MG/DL (ref 2.5–4.5)
PHOSPHATE SERPL-MCNC: 3.1 MG/DL (ref 2.5–4.5)
PLATELET # BLD AUTO: 269 10E3/UL (ref 150–450)
PLATELET # BLD AUTO: 302 10E3/UL (ref 150–450)
PLATELET # BLD AUTO: 306 10E3/UL (ref 150–450)
POTASSIUM SERPL-SCNC: 3.6 MMOL/L (ref 3.4–5.3)
POTASSIUM SERPL-SCNC: 4 MMOL/L (ref 3.4–5.3)
PROT SERPL-MCNC: 5.5 G/DL (ref 6.4–8.3)
PROTHROMBIN TIME: 15.3 SECONDS (ref 11.8–14.8)
PROTHROMBIN TIME: 15.7 SECONDS (ref 11.8–14.8)
RBC # BLD AUTO: 2.85 10E6/UL (ref 4.4–5.9)
RBC # BLD AUTO: 2.91 10E6/UL (ref 4.4–5.9)
RBC # BLD AUTO: 2.96 10E6/UL (ref 4.4–5.9)
SODIUM SERPL-SCNC: 139 MMOL/L (ref 135–145)
SODIUM SERPL-SCNC: 141 MMOL/L (ref 135–145)
WBC # BLD AUTO: 18.8 10E3/UL (ref 4–11)
WBC # BLD AUTO: 21.8 10E3/UL (ref 4–11)
WBC # BLD AUTO: 25.5 10E3/UL (ref 4–11)

## 2025-05-22 PROCEDURE — 87070 CULTURE OTHR SPECIMN AEROBIC: CPT

## 2025-05-22 PROCEDURE — 258N000001 HC RX 258: Performed by: SURGERY

## 2025-05-22 PROCEDURE — 84460 ALANINE AMINO (ALT) (SGPT): CPT | Performed by: STUDENT IN AN ORGANIZED HEALTH CARE EDUCATION/TRAINING PROGRAM

## 2025-05-22 PROCEDURE — 84100 ASSAY OF PHOSPHORUS: CPT | Performed by: CLINICAL NURSE SPECIALIST

## 2025-05-22 PROCEDURE — 250N000011 HC RX IP 250 OP 636: Performed by: STUDENT IN AN ORGANIZED HEALTH CARE EDUCATION/TRAINING PROGRAM

## 2025-05-22 PROCEDURE — 94003 VENT MGMT INPAT SUBQ DAY: CPT

## 2025-05-22 PROCEDURE — 82248 BILIRUBIN DIRECT: CPT | Performed by: STUDENT IN AN ORGANIZED HEALTH CARE EDUCATION/TRAINING PROGRAM

## 2025-05-22 PROCEDURE — 99418 PROLNG IP/OBS E/M EA 15 MIN: CPT | Performed by: INTERNAL MEDICINE

## 2025-05-22 PROCEDURE — 83605 ASSAY OF LACTIC ACID: CPT | Performed by: NURSE PRACTITIONER

## 2025-05-22 PROCEDURE — 83735 ASSAY OF MAGNESIUM: CPT | Performed by: CLINICAL NURSE SPECIALIST

## 2025-05-22 PROCEDURE — 85014 HEMATOCRIT: CPT | Performed by: CLINICAL NURSE SPECIALIST

## 2025-05-22 PROCEDURE — 250N000011 HC RX IP 250 OP 636: Performed by: NURSE PRACTITIONER

## 2025-05-22 PROCEDURE — 85018 HEMOGLOBIN: CPT

## 2025-05-22 PROCEDURE — 84075 ASSAY ALKALINE PHOSPHATASE: CPT | Performed by: STUDENT IN AN ORGANIZED HEALTH CARE EDUCATION/TRAINING PROGRAM

## 2025-05-22 PROCEDURE — 999N000157 HC STATISTIC RCP TIME EA 10 MIN

## 2025-05-22 PROCEDURE — 99291 CRITICAL CARE FIRST HOUR: CPT | Mod: 24 | Performed by: SURGERY

## 2025-05-22 PROCEDURE — 85027 COMPLETE CBC AUTOMATED: CPT | Performed by: CLINICAL NURSE SPECIALIST

## 2025-05-22 PROCEDURE — 71045 X-RAY EXAM CHEST 1 VIEW: CPT

## 2025-05-22 PROCEDURE — B4185 PARENTERAL SOL 10 GM LIPIDS: HCPCS | Performed by: SURGERY

## 2025-05-22 PROCEDURE — 86140 C-REACTIVE PROTEIN: CPT

## 2025-05-22 PROCEDURE — 87205 SMEAR GRAM STAIN: CPT

## 2025-05-22 PROCEDURE — 90947 DIALYSIS REPEATED EVAL: CPT

## 2025-05-22 PROCEDURE — 250N000011 HC RX IP 250 OP 636

## 2025-05-22 PROCEDURE — 85018 HEMOGLOBIN: CPT | Performed by: CLINICAL NURSE SPECIALIST

## 2025-05-22 PROCEDURE — 84450 TRANSFERASE (AST) (SGOT): CPT | Performed by: STUDENT IN AN ORGANIZED HEALTH CARE EDUCATION/TRAINING PROGRAM

## 2025-05-22 PROCEDURE — 82330 ASSAY OF CALCIUM: CPT | Performed by: CLINICAL NURSE SPECIALIST

## 2025-05-22 PROCEDURE — 999N000253 HC STATISTIC WEANING TRIALS

## 2025-05-22 PROCEDURE — 71045 X-RAY EXAM CHEST 1 VIEW: CPT | Mod: 26 | Performed by: RADIOLOGY

## 2025-05-22 PROCEDURE — 250N000009 HC RX 250: Performed by: NURSE PRACTITIONER

## 2025-05-22 PROCEDURE — 85610 PROTHROMBIN TIME: CPT

## 2025-05-22 PROCEDURE — 83605 ASSAY OF LACTIC ACID: CPT

## 2025-05-22 PROCEDURE — 250N000009 HC RX 250: Performed by: SURGERY

## 2025-05-22 PROCEDURE — 36415 COLL VENOUS BLD VENIPUNCTURE: CPT | Performed by: SURGERY

## 2025-05-22 PROCEDURE — 80053 COMPREHEN METABOLIC PANEL: CPT | Performed by: CLINICAL NURSE SPECIALIST

## 2025-05-22 PROCEDURE — 85041 AUTOMATED RBC COUNT: CPT

## 2025-05-22 PROCEDURE — 99233 SBSQ HOSP IP/OBS HIGH 50: CPT | Mod: 24 | Performed by: INTERNAL MEDICINE

## 2025-05-22 PROCEDURE — 85041 AUTOMATED RBC COUNT: CPT | Performed by: CLINICAL NURSE SPECIALIST

## 2025-05-22 PROCEDURE — 250N000009 HC RX 250: Performed by: STUDENT IN AN ORGANIZED HEALTH CARE EDUCATION/TRAINING PROGRAM

## 2025-05-22 PROCEDURE — 200N000002 HC R&B ICU UMMC

## 2025-05-22 PROCEDURE — 87040 BLOOD CULTURE FOR BACTERIA: CPT | Performed by: SURGERY

## 2025-05-22 PROCEDURE — 250N000009 HC RX 250: Performed by: CLINICAL NURSE SPECIALIST

## 2025-05-22 RX ORDER — OLANZAPINE 10 MG/1
5 INJECTION, POWDER, LYOPHILIZED, FOR SOLUTION INTRAMUSCULAR EVERY 6 HOURS PRN
Status: DISCONTINUED | OUTPATIENT
Start: 2025-05-22 | End: 2025-06-03 | Stop reason: HOSPADM

## 2025-05-22 RX ADMIN — DEXMEDETOMIDINE HYDROCHLORIDE 0.6 MCG/KG/HR: 400 INJECTION INTRAVENOUS at 22:56

## 2025-05-22 RX ADMIN — CALCIUM CHLORIDE, MAGNESIUM CHLORIDE, SODIUM CHLORIDE, SODIUM BICARBONATE, POTASSIUM CHLORIDE AND SODIUM PHOSPHATE DIBASIC DIHYDRATE 10 ML/KG/HR: 3.68; 3.05; 6.34; 3.09; .314; .187 INJECTION INTRAVENOUS at 12:13

## 2025-05-22 RX ADMIN — OLANZAPINE 5 MG: 10 INJECTION, POWDER, FOR SOLUTION INTRAMUSCULAR at 22:56

## 2025-05-22 RX ADMIN — PIPERACILLIN AND TAZOBACTAM 4.5 G: 4; .5 INJECTION, POWDER, LYOPHILIZED, FOR SOLUTION INTRAVENOUS at 17:44

## 2025-05-22 RX ADMIN — PIPERACILLIN AND TAZOBACTAM 4.5 G: 4; .5 INJECTION, POWDER, LYOPHILIZED, FOR SOLUTION INTRAVENOUS at 23:33

## 2025-05-22 RX ADMIN — PANTOPRAZOLE SODIUM 40 MG: 40 INJECTION, POWDER, FOR SOLUTION INTRAVENOUS at 07:49

## 2025-05-22 RX ADMIN — HEPARIN SODIUM 5000 UNITS: 5000 INJECTION, SOLUTION INTRAVENOUS; SUBCUTANEOUS at 19:35

## 2025-05-22 RX ADMIN — CALCIUM CHLORIDE, MAGNESIUM CHLORIDE, SODIUM CHLORIDE, SODIUM BICARBONATE, POTASSIUM CHLORIDE AND SODIUM PHOSPHATE DIBASIC DIHYDRATE 10 ML/KG/HR: 3.68; 3.05; 6.34; 3.09; .314; .187 INJECTION INTRAVENOUS at 04:05

## 2025-05-22 RX ADMIN — ONDANSETRON 4 MG: 2 INJECTION, SOLUTION INTRAMUSCULAR; INTRAVENOUS at 20:08

## 2025-05-22 RX ADMIN — CALCIUM CHLORIDE, MAGNESIUM CHLORIDE, SODIUM CHLORIDE, SODIUM BICARBONATE, POTASSIUM CHLORIDE AND SODIUM PHOSPHATE DIBASIC DIHYDRATE: 3.68; 3.05; 6.34; 3.09; .314; .187 INJECTION INTRAVENOUS at 04:55

## 2025-05-22 RX ADMIN — OLANZAPINE 5 MG: 10 INJECTION, POWDER, FOR SOLUTION INTRAMUSCULAR at 12:28

## 2025-05-22 RX ADMIN — Medication 25 MCG: at 18:31

## 2025-05-22 RX ADMIN — MAGNESIUM SULFATE HEPTAHYDRATE: 500 INJECTION, SOLUTION INTRAMUSCULAR; INTRAVENOUS at 19:36

## 2025-05-22 RX ADMIN — PROCHLORPERAZINE EDISYLATE 10 MG: 5 INJECTION INTRAMUSCULAR; INTRAVENOUS at 04:05

## 2025-05-22 RX ADMIN — PIPERACILLIN AND TAZOBACTAM 4.5 G: 4; .5 INJECTION, POWDER, LYOPHILIZED, FOR SOLUTION INTRAVENOUS at 12:01

## 2025-05-22 RX ADMIN — DEXMEDETOMIDINE HYDROCHLORIDE 1.2 MCG/KG/HR: 400 INJECTION INTRAVENOUS at 04:05

## 2025-05-22 RX ADMIN — Medication 100 MCG/HR: at 05:51

## 2025-05-22 RX ADMIN — HEPARIN SODIUM 5000 UNITS: 5000 INJECTION, SOLUTION INTRAVENOUS; SUBCUTANEOUS at 04:49

## 2025-05-22 RX ADMIN — HYDROCORTISONE SODIUM SUCCINATE 25 MG: 100 INJECTION, POWDER, FOR SOLUTION INTRAMUSCULAR; INTRAVENOUS at 07:52

## 2025-05-22 RX ADMIN — PIPERACILLIN AND TAZOBACTAM 4.5 G: 4; .5 INJECTION, POWDER, LYOPHILIZED, FOR SOLUTION INTRAVENOUS at 05:39

## 2025-05-22 RX ADMIN — CALCIUM CHLORIDE, MAGNESIUM CHLORIDE, SODIUM CHLORIDE, SODIUM BICARBONATE, POTASSIUM CHLORIDE AND SODIUM PHOSPHATE DIBASIC DIHYDRATE 10 ML/KG/HR: 3.68; 3.05; 6.34; 3.09; .314; .187 INJECTION INTRAVENOUS at 18:25

## 2025-05-22 RX ADMIN — CALCIUM CHLORIDE, MAGNESIUM CHLORIDE, SODIUM CHLORIDE, SODIUM BICARBONATE, POTASSIUM CHLORIDE AND SODIUM PHOSPHATE DIBASIC DIHYDRATE 10 ML/KG/HR: 3.68; 3.05; 6.34; 3.09; .314; .187 INJECTION INTRAVENOUS at 18:26

## 2025-05-22 RX ADMIN — HEPARIN SODIUM 5000 UNITS: 5000 INJECTION, SOLUTION INTRAVENOUS; SUBCUTANEOUS at 11:51

## 2025-05-22 RX ADMIN — DEXTROSE 1000 ML: 10 SOLUTION INTRAVENOUS at 05:39

## 2025-05-22 RX ADMIN — OLANZAPINE 2.5 MG: 10 INJECTION, POWDER, FOR SOLUTION INTRAMUSCULAR at 19:35

## 2025-05-22 RX ADMIN — SMOFLIPID 250 ML: 6; 6; 5; 3 INJECTION, EMULSION INTRAVENOUS at 19:35

## 2025-05-22 ASSESSMENT — ACTIVITIES OF DAILY LIVING (ADL)
ADLS_ACUITY_SCORE: 52
ADLS_ACUITY_SCORE: 49
ADLS_ACUITY_SCORE: 52
ADLS_ACUITY_SCORE: 51
ADLS_ACUITY_SCORE: 52
ADLS_ACUITY_SCORE: 49
ADLS_ACUITY_SCORE: 52
ADLS_ACUITY_SCORE: 49

## 2025-05-22 NOTE — PROGRESS NOTES
Shift Summary:    Pt remained intubated and mechanically ventilated on the following vent settings:     Vent Mode: (Volume Control-Assist Control)  Flow Type: Autoflow  Resp Rate (Set): 16 breaths/min  Tidal Volume (Set, mL): 420 mL   FiO2: 40 %   PEEP (cm H2O): 10 cmH2O   Pressure Support (cm H2O): 12 cmH2O        Inspiratory Time (sec): 0.85 sec  Sensitivity Flow Triggered (L/min): 2 L/min      SBT:  Weaning Assessment Complete: Yes  Safety Screen Weaning Assessment: PEEP >8 cm H2O;PRSR on pressors (may be on Dobutamine <5 mcg/kg/min  Spontaneous Respiratory Rate: 14 breaths/min  Spontaneous Tidal Volume (mL): 469 mL   Spontaneous Minute Ventilation: 5.73 mL   RSBI: 29.85   Weaning trial discontinued due to:: End of designated trial.   Wean Start Time : 0927  Wean End Time : 1136  Wean Time Calculated (min): 129      Assessment: BS clear all lobes. RT suctioned moderate amount of thin cloudy secretions.    Major Respiratory Related Events: No    Plan: PST as tolerated.      Ambu bag, mask, and valve present at bedside.    RT will continue to follow and monitor pt as appropriate.     Kalia Gomez, RT on 5/22/2025 at 5:24 PM

## 2025-05-22 NOTE — PLAN OF CARE
Goal Outcome Evaluation:  ICU End of Shift Summary. See flowsheets for vital signs and detailed assessment.    Changes this shift: pt remains a/o. Remains on fent and dex gtt. Pt refusing regular repositioning, agreed to turn when prompted a couple of times this shift. Pt complained of nausea x1, zofran given x1. Pt refusing regular bathing and dressing changes, agreed to intermittent cares and bath tonight. Remains sinus w/rare pvc/pac. Remains on levo for map>65. Bgs trending down, team notified, d10 started. Able to meet CRRT goals. Set went down at 0715.     Intake/Output Summary (Last 24 hours) at 5/22/2025 0728  Last data filed at 5/22/2025 0700  Gross per 24 hour   Intake 2392.56 ml   Output 3581.4 ml   Net -1188.84 ml         Plan: monitor for bleeding. Monitor hemodynamic status closely. Manage pain and anxiety.        Plan of Care Reviewed With: patient    Overall Patient Progress: no changeOverall Patient Progress: no change    Outcome Evaluation: see note

## 2025-05-22 NOTE — PROGRESS NOTES
CLINICAL NUTRITION SERVICES - REASSESSMENT NOTE     Registered Dietitian Interventions:  TPN change order:  Resume SMOF lipids 250 mL daily. Writer will reorder TG check for Sunday @ 1200. If TG recheck >350, hold lipid.   Goal Macros: 150 g AA, 270 g dextrose daily, and 250 mL SMOF lipids daily = 2018 kcal/day (80% MREE from 5/5 which is adequate [goal % MREE]) and 2.1 g pro/kg    Ordered TG lab for draw on 5/25 (Sunday) @ 1200 to time ~4 hours after lipid infusion    Writer introduced self to pt and reviewed role in care and briefly explained ongoing TPN to meet his nutrition needs    Future/Additional Recommendations:  Monitor TPN-related lab values and adjust macros/micros as indicated.  Monitor ability to resume EN support via GJ tube -- TFs on hold currently d/t tear in J-tube.      INFORMATION OBTAINED  Assessed patient in room.    CURRENT NUTRITION ORDERS  Diet: None (effectively NPO)    Nutrition Support: TPN currently    Parenteral:   5/1 - 5/9: TPN. Goal PN provides 250 g dextrose, 150 g AA, and 250 mL SMOF lipids 7 days per wk     5/1: 150 g dex   5/2: 200 g dex (since TPN started earlier than 8pm on 5/1, PharmD adding more overfill to 5/2 bag get to start time of 8pm on 5/3)   5/3: 250 g dex     *Resumed 5/15 - Current: TPN: 270 g dextrose, 150g AA, daily SMOF   5/15: 150 g Dex   5/16: 210 g Dex   5/17: 270 g Dex (goal)     5/19: Holding IV lipids d/t elevated TG >500     Enteral (G/J):   5/5 - 5/7: Pivot @ 10 ml/hr, with relizorb.    5/7-4/14: Pivot 1.5 Luis (or equivalent) @ goal of  60ml/hr  (1440ml/day) provides: 2160 kcals (31 kcal/kg or 86 % MREE), 135 g PRO (1.9g/kg), 1080 ml free H20, 248 g CHO, and 10 g fiber daily. ReliZorb     CURRENT INTAKE/TOLERANCE  Reached goal dextrose on 5/17 and receiving goal nutrition through 5/19 until lipids held d/t elevated TG >500. Since then, TPN meeting ~75% minimum assessed kcal needs, but full protein needs.     NEW FINDINGS  GI symptoms: Reviewed.  Cecostomy drain in place - no stool output recently (stool output decreased since TPN).    Skin/wounds: Reviewed. Per most recent WOCN note on 5/20:  -Abdominal surgical wound (open abdomen with mesh). Status: Deteriorating.   -DTPI to L heel. Status: Stable  -Stage 2 pressure injury, MASD, IAD to coccyx. Status: Improving and stable.  -Surgical wound to trach site. Status: Granulating as stable  -Scrotal wound d/t abscess. Status: Stable. Has ostomy pouch to collect drainage.    Nutrition-relevant labs: Reviewed;  (H, improved), CRP inflammation 276 (H), T bili 3.4 (H), direct bili 2.81 (H)    Nutrition-relevant medications: Reviewed; high dose sliding scale insulin, norepi gtt    Weight: Continue dosing wt of 70 kg  Date/Time Weight Weight Method   05/22/25 0500 84.2 kg (185 lb 10 oz) Bed scale   05/21/25 0600 84.1 kg (185 lb 6.5 oz) Bed scale   05/20/25 0000 84.4 kg (186 lb 1.1 oz) Bed scale   05/19/25 0445 85.1 kg (187 lb 11.2 oz) Bed scale   05/18/25 0600 87 kg (191 lb 12.8 oz) Bed scale   05/17/25 0100 83.3 kg (183 lb 10.3 oz) --   05/17/25 0000 83.3 kg (183 lb 10.3 oz) Bed scale   05/16/25 0400 79.7 kg (175 lb 11.3 oz) Bed scale   05/15/25 0000 79.1 kg (174 lb 6.1 oz) Bed scale   05/14/25 0400 78.9 kg (173 lb 15.1 oz) Bed scale   05/13/25 0400 77.8 kg (171 lb 8.3 oz) Bed scale   05/12/25 0009 79.4 kg (175 lb 0.7 oz) Bed scale   05/11/25 0100 82.5 kg (181 lb 14.1 oz) Bed scale   05/10/25 0100 70.1 kg (154 lb 8.7 oz) Bed scale   05/09/25 0551 70.9 kg (156 lb 4.9 oz) Bed scale   05/08/25 1600 73.9 kg (162 lb 14.7 oz) --   05/08/25 0600 72.4 kg (159 lb 9.8 oz) Bed scale   05/07/25 0500 75.6 kg (166 lb 10.7 oz) Bed scale   05/06/25 0400 82.1 kg (181 lb) Bed scale   05/05/25 0100 73.7 kg (162 lb 7.7 oz) --   05/04/25 0400 73.3 kg (161 lb 9.6 oz) --   05/03/25 0100 72.3 kg (159 lb 6.3 oz) Bed scale   05/02/25 0600 70.5 kg (155 lb 6.8 oz) Bed scale   05/01/25 0000 69.7 kg (153 lb 10.6 oz) Bed scale      MALNUTRITION  % Intake: Decreased intake does not meet criteria  % Weight Loss: None noted  Subcutaneous Fat Loss: Orbital: Moderate, Buccal: Moderate, and Triceps: Mild  Muscle Loss: Temples (temporalis muscle): Moderate, Clavicles (pectoralis and deltoids): Moderate, and Shoulders (deltoids): Moderate - did not assess BLE to allow pt to remain drifting back to sleep (awoke during writer's visit and seemed a little stressed [eyes widened, higher RR])  Fluid Accumulation/Edema: Severe, 4+ - lymphedema consult placed  Malnutrition Diagnosis: Severe malnutrition in the context of acute illness or injury  Malnutrition Present on Admission: Yes    EVALUATION OF THE PROGRESS TOWARD GOALS   Previous Goals  Total avg nutritional intake to meet a minimum of 29 kcal/kg or 80% MREE and 2 g PRO/kg daily (per dosing wt 70 kg).   Evaluation: Progressing    Previous Nutrition Diagnosis  Altered gastrointestinal (GI) function related to concern for leak as evidenced by inability to feed enterally with reliance on TPN to meet 100% nutrition needs   Evaluation: No change    NUTRITION DIAGNOSIS  Altered gastrointestinal (GI) function related to concern for leak as evidenced by inability to feed enterally with reliance on TPN to meet 100% nutrition needs     INTERVENTIONS  Collaboration by nutrition professional with other providers  Nutrition education content  Parenteral nutrition/IV fluid management    GOALS  Total avg nutritional intake to meet a minimum of 29 kcal/kg or 80% MREE and 2 g PRO/kg daily (per dosing wt 70 kg).      MONITORING/EVALUATION  Progress toward goals will be monitored and evaluated per policy.    Tennille Conn RD, LD  Available on Vocera - can search by name or unit Dietitian  **Clinical Nutrition is no longer available via pager

## 2025-05-22 NOTE — PROGRESS NOTES
SURGICAL ICU PROGRESS NOTE  05/22/2025    Date of Service (when I saw the patient): 05/22/2025    ASSESSMENT:  Sebastián Rodas is a 29M with alcohol abuse, anxiety, and bipolar disorder, who was admitted 3/30/25 for alcohol withdrawal and abdominal pain. Workup showed leukocytosis and elevated lipase, consistent with acute pancreatitis. He developed encephalopathy and shock, requiring ICU care, intubation, vasopressors, and CRRT by 4/1. His course was complicated by cardiomyopathy (EF 20-25%, improved to 65-70% by 4/14), necrotizing pancreatitis, and persistent infection despite 14 days of meropenem. On 4/27, imaging showed likely perforated viscus; he underwent emergent laparotomy, necrosectomy, and colectomy. Transferred to [facility] SICU on 4/30. On 5/1, reoperation revealed colonic staple line breakdown, necrotic pancreas, vessel thrombosis, and adhesions. Colostomy with malankot drain and temporary abdominal closure performed. Prognosis is poor; palliative care involved. Care conference 5/2 with family to talk goals of care. Full code, family acknowledged that poor prognosis is to be expected. 5/4 s/p exploratory surgery, additional necrotic pancreas remove with no obvious spillage or sign of bleeding at the time. Went back to the OR 5/7 for abdominal wash out. CT abdomen 5/10 showed extravasation on arterial phase imaging. MTP initiated, IR arterial embolization of pancreatic vessels performed. Decreased stool output since 5/13, switched to TPN. Hemorraghic shock.  MTP 5/18, IR embolization 5/18.      CHANGES and MAJOR THINGS TODAY:  - wean precedex  - wean FiO2  - wean NE  - trend lactate  - recheck INR  - sputum culture  - Chest Xray  - blood cultures  - lymphedema consult  - discontinue lantus  - pressure support as tolerated     PLAN:    Neurological:  # Acute pain   -Fentanyl gtt, fentanyl bolus  #Encephalopathy- improving  #Insomnia  - Monitor neurological status. Delirium preventions and precautions.   #  Pain:   # Sedation: Precedex, weaning as able  # Anxiety  - Olanzapine incr to 5 q6 prn, with goal of getting off precedex     Pulmonary:   # Acute hypoxic respiratory failure  # S/p tracheostomy placement   - FiO2 (%): 50 %, Resp: 22, Vent Mode: (S) PS, Resp Rate (Set): 16 breaths/min, Tidal Volume (Set, mL): 420 mL, PEEP (cm H2O): 10 cmH2O, Pressure Support (cm H2O): 12 cmH2O, Resp Rate (Set): 16 breaths/min, Tidal Volume (Set, mL): 420 mL, PEEP (cm H2O): 10 cmH2O   - PST as able  - CT PE 5/9 showed no evidence of pulmonary embolism  - Pulmonary toilet, mobilize  - Ventilator bundle  - Wean FIO2 as tolerated, PEEP 10  - Start pressure support as tolerated    Cardiovascular:    # Stress Cardiomyopathy   - Initial LVEF 20-25%, improved to 65-70%  # Shock-distributive (septic)  # Hypovolemic shock  (hemorraghic)  # Sinus Tachycardia  - Monitor hemodynamic status. MAP goal > 65.   - Hydroxycortisone for stress dose steroid, currently 25mg Daily   - EKG 5/8, 5/9, 5/18 showed sinus tachycardia with no ectopy  - MTP 5/17  - Continue trend lactate, 5/21 4.2     Gastroenterology/Nutrition:  # S/p emergent exploratory laparotomy, necrosectomy, transverse colectomy, abdominal washout, and drain placement 5/1 and 5/4  # S/p Pancreatic branch vessel embolization with IR 5/10, 5/18  # Severe necrotizing pancreatitis  # Splenic vein thrombosis  - will defer management of dressings and drain removal per EGS   - Ongoing copious dark bloody drain output, CTM  - Cecostomy drain in place, no stool output. Abdomen distended.  - on PPI IV  - CT abdomen/pelvis with IV + enteral contrast through G tube 5/14: large volume of air and blood products in abdomen.   - Hold all Oral Meds and Feeds, strict NPO  -TPN hold lipids  -Repeat INR    # Severe Protein calorie deficit malnutrition due to critical illness  - on TPN  - G tube tear in the J portion, TF stopped.  - IR consulted for PEG exchange    - IR cannot safely exchange freshly placed  GJ tubes until they have been in place x6 weeks given risk of losing access into the stomach. Additionally, imaging shows there is no safe pexy between the stomach and abdominal wall. IR may not be able to offer safe exchange even after 6 weeks.   - Multivitamin supplementation ordered by nutrition  - RD consult. Appreciate cares and recommendations.     Renal/Fluids/Electrolytes:   # SARAHI  # Fluid overload  Baseline Cr 0.7-0.8. Presented with Cr 0.96 on 3/30. Rapidly markos to 5.2 on 4/1. Oliguric. Garcia UA with proteinuria, hematuria, pyuria, moderate bacteria.  Kidneys unremarkable on CT. Has severe ATN in the setting of shock, ADHF, intravascular hypovolemia/3rd spacing from pancreatitis.   - Trend lactate   - CRRT with I&Os goal -1000cc  - Continue to monitor intake and output  - Volume assessment daily    Endocrine:   # Stress hyperglycemia    - hgb A1c 5.3, no hx of DM   - Transition from insulin drip to sliding scale insulin and lantus. Increased lantus on 5/20 to 20units daily and HSSI   - Goal to keep BG< 180 for optimal wound healing   -discontinue lantus    # Leukocytosis  #Necrotizing pancreatitis  WBC Count   Date Value Ref Range Status   05/22/2025 18.8 (H) 4.0 - 11.0 10e3/uL Final   - CRP elevated at 454->344 >210, trend q3 days  - Completed 14 days course of meropenem and caspofungin and zosyn previously.   - Continue Zosyn for ongoing intra-abdominal infection     cultures:  - 4/30 blood cultures- NGTD   - 5/1 blood cultures ordered - NGTD  - Blood culture 5/12, 5/15 - NGTD  - Blood culture 5/22     Heme:     # Acute blood loss anemia   # Anemia of critical illness   # Thrombosed splenic vein   - Transfuse if hgb <7.0 or signs/symptoms of hypoperfusion. Monitor and trend  Hemoglobin   Date Value Ref Range Status   05/22/2025 8.7 (L) 13.3 - 17.7 g/dL Final     - MTP 5/10, 5/18  - hemoglobin stable, keeping on trending  - Continue subcutaneous heparin, watch bleeding from drains.     Musculoskeletal:    # Deconditioning and weakness due to critical illness   - Physical and occupational therapy consult      Skin:  # Pressure Ulcers - Buttocks/rectal area  - Barrier cream with liberal application. Continue fecal management system   - WOC consult      #Pressure Ulcers- Left Heel  Pressure Injury Location: Left heel   Wound type: Pressure Injury     Pressure Injury Stage: Deep Tissue Pressure Injury (DTPI), present on admission     General Cares/Prophylaxis:    DVT Prophylaxis: SQH   GI Prophylaxis: PPI     Lines/ tubes/ drains:  - HD line--  Right IJ  - PICC line- left   - Right radial A line  - PIV x 3  - Trach  - G-J  - Wound manager  - SHANNON x 3  - Cecostomy drain     Disposition:  - Surgical ICU    Jean Paul Pierre MD      ====================================    OBJECTIVE:   1. VITAL SIGNS:   Temp:  [97.9  F (36.6  C)-98.7  F (37.1  C)] 98.7  F (37.1  C)  Pulse:  [] 111  Resp:  [17-39] 22  BP: (125)/(55) 125/55  MAP:  [58 mmHg-145 mmHg] 78 mmHg  Arterial Line BP: (101-146)/(39-73) 131/59  FiO2 (%):  [50 %] 50 %  SpO2:  [89 %-100 %] 97 %  FiO2 (%): 50 %, Resp: 22, Vent Mode: (S) PS, Resp Rate (Set): 16 breaths/min, Tidal Volume (Set, mL): 420 mL, PEEP (cm H2O): 10 cmH2O, Pressure Support (cm H2O): 12 cmH2O, Resp Rate (Set): 16 breaths/min, Tidal Volume (Set, mL): 420 mL, PEEP (cm H2O): 10 cmH2O    2. INTAKE/ OUTPUT:   I/O last 3 completed shifts:  In: 2371.34 [I.V.:951.34; IV Piggyback:220]  Out: 3531.4 [Drains:1678; Other:1853.4]    3. PHYSICAL EXAMINATION:  General: laying in bed, awake and alert   HEENT: PERRLA. Trach present and secure, site dressed.   Pulm/Resp: Clear breath sounds bilaterally, lungs coarse but clear.  CV: RRR, S1/S2   Abdomen: Distended. Dark bloody output noted to all drains. G-J tube in place, site secured, abdomen with dressing in place  : scrotal edema, draining dark bloody output.   MSK/Extremities: generalized edema in extremities    4. INVESTIGATIONS:   Arterial Blood Gases    Recent Labs   Lab 05/20/25  0344 05/19/25  0407 05/18/25  0537 05/18/25  0055   PH 7.45 7.41 7.34* 7.37   PCO2 33* 38 41 33*   PO2 74* 150* 90 187*   HCO3 23 24 22 19*     Complete Blood Count   Recent Labs   Lab 05/22/25  0403 05/21/25  1616 05/21/25  0415 05/20/25  1610   WBC 18.8* 16.8* 12.7* 14.3*   HGB 8.7* 8.7* 8.8* 9.2*    230 192 157     Basic Metabolic Panel  Recent Labs   Lab 05/22/25  0748 05/22/25  0403 05/21/25  2343 05/21/25  1939 05/21/25  1616 05/21/25  0807 05/21/25  0415 05/20/25  1615 05/20/25  1610   NA  --  141  --   --  139  --  140  --  140   POTASSIUM  --  3.6  --   --  4.0  --  3.5  --  3.9   CHLORIDE  --  107  --   --  106  --  106  --  105   CO2  --  20*  --   --  20*  --  21*  --  21*   BUN  --  28.5*  --   --  27.7*  --  27.2*  --  26.3*   CR  --  0.50*  --   --  0.36*  --  0.37*  --  0.44*   GLC 97 77  81 83   < > 148*   < > 156*   < > 140*    < > = values in this interval not displayed.     Liver Function Tests  Recent Labs   Lab 05/22/25 0403 05/21/25 1616 05/21/25 0415 05/20/25  1610 05/20/25  0344 05/19/25  1612 05/19/25  0407 05/18/25  1548 05/18/25  0530 05/18/25  0047 05/17/25  2343 05/17/25  1526 05/17/25  1417   AST 56*  --  44  --  86*  --  128*  --  71*  --   --   --   --    ALT 62  --  68  --  83*  --  70  --  36  --   --   --   --    ALKPHOS 239*  --  175*  --  217*  --  234*  --  289*  --   --   --   --    BILITOTAL 3.4*  --  3.3*  --  2.0*  --  1.3*  --  2.6*  --   --   --   --    ALBUMIN 2.2* 2.3* 2.3* 2.4* 2.7*   < > 2.5*   < > 2.9*  --   --    < >  --    INR  --   --   --   --   --   --   --   --  1.21* 1.63* 1.54*  --  1.18*    < > = values in this interval not displayed.     Pancreatic Enzymes  No lab results found in last 7 days.    Coagulation Profile  Recent Labs   Lab 05/18/25  0530 05/18/25  0047 05/17/25  2343 05/17/25  1417   INR 1.21* 1.63* 1.54* 1.18*   PTT 31  --  40* 30     5. RADIOLOGY:   No results found for this or any previous visit (from  the past 24 hours).      =========================================

## 2025-05-22 NOTE — PROGRESS NOTES
Care Management Follow Up    Length of Stay (days): 22    Expected Discharge Date: 2025     Concerns to be Addressed: basic needs     Patient plan of care discussed at interdisciplinary rounds: Yes    Anticipated Discharge Disposition: Other (Comments) (TBD)              Anticipated Discharge Services: Other (see comment) (TBD)  Anticipated Discharge DME: Other (see comment) (TBD)    Patient/family educated on Medicare website which has current facility and service quality ratings: no  Education Provided on the Discharge Plan: No  Patient/Family in Agreement with the Plan: unable to assess    Referrals Placed by CM/SW:    Private pay costs discussed: Not applicable    Discussed  Partnership in Safe Discharge Planning  document with patient/family: No     Handoff Completed: No, handoff not indicated or clinically appropriate    Additional Information:  Writer received a call from the patients mother, Marbella who was inquiring about if a parking pass is available. Unfortunately due to very limited Care Management funds, the Care Management department is currently limited to giving out parking pass more than 1 time (if for a weekly pass). Marbella said she understands and will follow up with social work early next week to see if anything has changed with funds for the parking passes.     Next Steps: CM to follow for family support and discharge needs.       MARYELLEN Antonio   Coverin A/C/E ICU SW  Ph: [phone]  [facility]  Care Management Department    Securely message me on Vocera

## 2025-05-22 NOTE — PROGRESS NOTES
Surgery Progress Note  05/22/2025       Subjective:  Overnight patient had increasing pressors requirements. On 0.07 levo this morning. Has been more awake and alert, answering questions by mouthing words.      Objective:  Temp:  [97.9  F (36.6  C)-98.6  F (37  C)] 98.4  F (36.9  C)  Pulse:  [] 90  Resp:  [15-39] 24  MAP:  [58 mmHg-145 mmHg] 72 mmHg  Arterial Line BP: (101-146)/(39-73) 122/53  FiO2 (%):  [50 %-60 %] 50 %  SpO2:  [89 %-100 %] 98 %    I/O last 3 completed shifts:  In: 2381.34 [I.V.:961.34; IV Piggyback:220]  Out: 3531.4 [Drains:1678; Other:1853.4]      Gen: Resting comfortably in bed  Neuro: awake and alert, answering questions appropriately   Resp: Ventilated  Abd: Abdomen is taut but compressible, drains with dark bloody output, Malecot with minimal succus, midline abdominal dressing with wet to dry kerlix with dark bloody output around.   : Scrotum with pouch in place with serosanguinous drainage     Labs:  Recent Labs   Lab 05/22/25  0403 05/21/25  1616 05/21/25  0415   WBC 18.8* 16.8* 12.7*   HGB 8.7* 8.7* 8.8*    230 192       Recent Labs   Lab 05/22/25  0403 05/21/25  2343 05/21/25  1939 05/21/25  1616 05/21/25  0807 05/21/25  0415     --   --  139  --  140   POTASSIUM 3.6  --   --  4.0  --  3.5   CHLORIDE 107  --   --  106  --  106   CO2 20*  --   --  20*  --  21*   BUN 28.5*  --   --  27.7*  --  27.2*   CR 0.50*  --   --  0.36*  --  0.37*   GLC 77  81 83   < > 148*   < > 156*   KARINA 8.3*  --   --  8.0*  --  8.0*   MAG 2.2  --   --  2.2  --  1.9   PHOS 2.5  --   --  2.9  --  2.8    < > = values in this interval not displayed.     Assessment/Plan:   Sebastián Rodas is a 29-year-old male with a history of alcohol use disorder, anxiety, and bipolar disorder who was admitted on 3/30/2025 for alcohol withdrawal following a recent binge, presenting with diffuse abdominal pain. Initial workup revealed leukocytosis and elevated lipase concerning for acute pancreatitis. He  "developed acute encephalopathy and was transferred to the ICU on 3/31, requiring intubation and vasopressors by 4/1 due to shock. Hospital course has been complicated by acute renal failure requiring CRRT, cardiomyopathy (initial LVEF 20-25%, improved to 65-70% by 4/14), and necrotizing pancreatitis with unorganized fluid collections. He completed a 14-day course of meropenem but remains intermittently febrile and critically ill, requiring ongoing dialysis and vasopressor support. On 4/27, after clinical deterioration and imaging consistent with a likely perforated viscus, following family discussion, he underwent emergent exploratory laparotomy, necrosectomy, transverse colectomy, abdominal washout, and drain placement. Patient was transferred to [facility] on 4/30/25 for further surgical management. Went to OR 5/1 for re-open laparotomy, I&D, placement of colostomy tube in presumed previous colectomy staple line, necrosectomy, and Abthera placement. On 5/2, increasing sanguineous output from drains concerning for bleeding from pancreatic bed. Family care conference held 5/2, plan for restorative cares at that time. Take back to OR twice (5/4 and 5/7) for abdominal washout. Per nursing note: 5/9 morning after he saw a picture of his abdomen that he \"desires to speak to team about updating goals of care, expressed desire to pull back on cares\". Goals of care conference on 5/10 with continuation of full code and restorative cares. CTA on 5/10 revealed large area of active arterial extravasation in the upper abdomen. MTP was started. Patient is now s/p embolization of small pancreatic branch off of the distal celiac artery with IR on 5/10. Malecot drainage slowed and eventually stopped, with significant drainage through other drains and open into dressings. 5/17 night patient had active extravasation, with Hgb dropping from 7.8 to 5.6 in 1.5 hours. Lactate spiked from 3.6 to 10.6, with WBC 14.9 to 25.5. Family was consulted, " MTP was started, and IR embolized the celiac trunk/splenic artery branches. Patient requested bedside goals of care conference: will take place..    - No further surgical options for any intraabdominal pathology  - Nursing to change dressings in the am, pm, and PRN during the day if copious drainage during the day. Please place Xeroform over the bridging mesh. Please do not apply xeroform outside the wound bed/over the skin edges but make sure all of the wound bed is covered.   - Tube feeds on hold currently due to tear in J-tube. Receiving TPN (50 mL/hr), and Lipids & Albumin as needed.  - On Zosyn per SICU  - SQH PPX, would not recommend therapeutic dose given the risk of bleeding.   - Appreciate WOC cares  - Other cares per SICU, we appreciate cares      Seen, examined, and discussed with chief resident, who will discuss with staff.    Mireya López, DO  General Surgery, PGY-2

## 2025-05-22 NOTE — PROGRESS NOTES
CRRT STATUS NOTE    DATA:  Time:  0606    Pressures WNL:  TMP, Filter PD, and Filter pressures trending up.    Filter Status:  WDL    Problems Reported/Alarms Noted:  No    Supplies Present:  YES    ASSESSMENT:  Patient Net Fluid Balance:    05/21/2025 I/O= -214.82cc    Vital Signs 0000:   T=98.2 (oral), HR=80, RR=21, TN=917/52 (MAP=70), AQS4=189% on FIO2 50%.    Gtts-  Levophed    Labs:    Labs monitored and reviewed.    ROUTINE ICU LABS (Last four results)  CMP  Recent Labs   Lab 05/22/25  0403 05/21/25  2343 05/21/25  1939 05/21/25  1616 05/21/25  0807 05/21/25  0415 05/20/25  1615 05/20/25  1610 05/20/25  0441 05/20/25  0344 05/19/25  0415 05/19/25  0407     --   --  139  --  140  --  140  --  140   < > 140   POTASSIUM 3.6  --   --  4.0  --  3.5  --  3.9  --  3.8   < > 3.7   CHLORIDE 107  --   --  106  --  106  --  105  --  106   < > 106   CO2 20*  --   --  20*  --  21*  --  21*  --  20*   < > 21*   ANIONGAP 14  --   --  13  --  13  --  14  --  14   < > 13   GLC 77  81 83 107* 148*   < > 156*   < > 140*   < > 117*  123*   < > 155*   BUN 28.5*  --   --  27.7*  --  27.2*  --  26.3*  --  26.8*   < > 26.3*   CR 0.50*  --   --  0.36*  --  0.37*  --  0.44*  --  0.40*   < > 0.43*   GFRESTIMATED >90  --   --  >90  --  >90  --  >90  --  >90   < > >90   KARINA 8.3*  --   --  8.0*  --  8.0*  --  7.9*  --  8.1*   < > 7.9*   MAG 2.2  --   --  2.2  --  1.9  --  2.0  --  1.9   < > 2.0   PHOS 2.5  --   --  2.9  --  2.8  --  2.7  --  2.8   < > 3.1   PROTTOTAL 5.5*  --   --   --   --  5.2*  --   --   --  5.4*  --  5.1*   ALBUMIN 2.2*  --   --  2.3*  --  2.3*  --  2.4*  --  2.7*   < > 2.5*   BILITOTAL 3.4*  --   --   --   --  3.3*  --   --   --  2.0*  --  1.3*   ALKPHOS 239*  --   --   --   --  175*  --   --   --  217*  --  234*   AST 56*  --   --   --   --  44  --   --   --  86*  --  128*   ALT 62  --   --   --   --  68  --   --   --  83*  --  70    < > = values in this interval not displayed.     CBC  Recent Labs   Lab  05/22/25  0403 05/21/25  1616 05/21/25  0415 05/20/25  1610   WBC 18.8* 16.8* 12.7* 14.3*   RBC 2.91* 2.96* 2.94* 3.01*   HGB 8.7* 8.7* 8.8* 9.2*   HCT 26.6* 27.6* 27.2* 27.1*   MCV 91 93 93 90   MCH 29.9 29.4 29.9 30.6   MCHC 32.7 31.5 32.4 33.9   RDW 16.4* 16.6* 16.5* 16.3*    230 192 157     INR  Recent Labs   Lab 05/18/25  0530 05/18/25  0047 05/17/25  2343 05/17/25  1417   INR 1.21* 1.63* 1.54* 1.18*     Arterial Blood Gas  Recent Labs   Lab 05/20/25  0344 05/19/25  0407 05/18/25  0537 05/18/25  0055   PH 7.45 7.41 7.34* 7.37   PCO2 33* 38 41 33*   PO2 74* 150* 90 187*   HCO3 23 24 22 19*   O2PER 60 100 100 21     0403 ICa=4.6, LA=3.9    Goals of Therapy:   Net negative 0-50cc/hr    INTERVENTIONS:   None    PLAN:  Continue to monitor.  Change CRRT set q72hrs and PRN.  Call CRRT RN on [facility] phone with questions.  See flowsheets for details.

## 2025-05-22 NOTE — PROGRESS NOTES
Nephrology Progress Note  05/22/2025       Sebastián Rodas is a 29 yom with Bipolar disorder, ETOH use c/b necrotizing pancreatitis admitted 3/30/25 to [facility] for ETOH withdrawal and abdominal pain, found to have acute pancreatitis which has progressed to necrotizing pancreatitis complicated by SARAHI.  Seen by Nephrology at [place], started on CRRT 4/1.  Had periods of stability enough for iHD but back to CRRT on 4/21 until tx to [facility] for further surgical interventions.       Interval History :   Mr Rodas continues on CRRT, labs stable. Difficult situation remains with intraabdominal bleeding not amenable to intervention, continuing to support while GOC are discussed.  Ordered for 0-50cc/h again today which we achieved yesterday. Labs stable on 4k baths.     Assessment & Recommendations:   SARAHI-Baseline Cr 0.8 as recently as March 2025 before acute events, was started on CRRT emergently on 4/1 in setting of septic shock. Had ~2 weeks of stability enough to manage with iHD but back on CRRT 4/21 until tx to [facility] on 4/30. Running fevers with intraabdominal sepsis.  Continuing RRT from OSH, restarted CRRT on 5/2 after OR.                  -No need for new consent, continuing RRT started last month at [facility].                 -Access is tunneled RIJ from 4/21.                  -CRRT, dose of 20ml/kg/h with low Cr.  All 4k baths, planning I=O but mainly achieving this on his own with drain output.      Volume-Wt is up over the weekend after bleeding, ordered for 0-50cc/h again but hemodynamics have been prohibitive.      Electrolytes-K 3.6 on all 4k baths, bicarb 20.      BMD-No acute issues.      Anemia-Hgb 8.7, ongoing need for PRBC's.      Nutrition-On hold with bleeding, back on TPN.      Time spent: 50 minutes on this date of encounter for chart review, physical exam, medical decision making and co-ordination of care.      Discussed with Dr Samuel     Recommendations were communicated to primary team via  "verbal communication.      Raheem Gabriel, APRN CNS  Clinical Nurse Specialist  [phone]    Review of Systems:   I reviewed the following systems:  Gen: No fevers or chills  CV: No CP at rest  Resp: No SOB at rest  GI: No N/V    Physical Exam:   I/O last 3 completed shifts:  In: 2371.34 [I.V.:951.34; IV Piggyback:220]  Out: 3531.4 [Drains:1678; Other:1853.4]   /57   Pulse 101   Temp 98.7  F (37.1  C) (Oral)   Resp 24   Ht 1.727 m (5' 7.99\")   Wt 84.2 kg (185 lb 10 oz)   SpO2 100%   BMI 28.23 kg/m       GENERAL APPEARANCE: Vent via trach, CRRT running.   Pulmonary: Vent via trach  CV: Regular rhythm, normal rate   - Edema +2 generalized  GI: Surgical dressing in place  MS: no evidence of inflammation in joints, no muscle tenderness  : + Garcia  SKIN: no rash, warm, dry  NEURO: Vent via trach, no focal deficits.     Labs:   All labs reviewed by me  Electrolytes/Renal -   Recent Labs   Lab Test 05/22/25  0748 05/22/25  0403 05/21/25  1939 05/21/25  1616 05/21/25  0807 05/21/25  0415   NA  --  141  --  139  --  140   POTASSIUM  --  3.6  --  4.0  --  3.5   CHLORIDE  --  107  --  106  --  106   CO2  --  20*  --  20*  --  21*   BUN  --  28.5*  --  27.7*  --  27.2*   CR  --  0.50*  --  0.36*  --  0.37*   GLC 97 77  81   < > 148*   < > 156*   KARINA  --  8.3*  --  8.0*  --  8.0*   MAG  --  2.2  --  2.2  --  1.9   PHOS  --  2.5  --  2.9  --  2.8    < > = values in this interval not displayed.       CBC -   Recent Labs   Lab Test 05/22/25  0403 05/21/25  1616 05/21/25  0415   WBC 18.8* 16.8* 12.7*   HGB 8.7* 8.7* 8.8*    230 192       LFTs -   Recent Labs   Lab Test 05/22/25  0403 05/21/25  1616 05/21/25  0415 05/20/25  1610 05/20/25  0344   ALKPHOS 239*  --  175*  --  217*   BILITOTAL 3.4*  --  3.3*  --  2.0*   ALT 62  --  68  --  83*   AST 56*  --  44  --  86*   PROTTOTAL 5.5*  --  5.2*  --  5.4*   ALBUMIN 2.2* 2.3* 2.3*   < > 2.7*    < > = values in this interval not displayed.       Iron Panel - No " lab results found.        Current Medications:  Current Facility-Administered Medications   Medication Dose Route Frequency Provider Last Rate Last Admin    heparin ANTICOAGULANT injection 5,000 Units  5,000 Units Subcutaneous Q8H [facility] Sonia Moore NP   5,000 Units at 05/22/25 0449    hydrocortisone sodium succinate PF (solu-CORTEF) injection 25 mg  25 mg Intravenous Daily Sonia Moore NP   25 mg at 05/22/25 0752    insulin aspart (NovoLOG) injection (RAPID ACTING)  1-12 Units Subcutaneous Q4H Jessica Lancaster MD   1 Units at 05/21/25 1155    insulin glargine (LANTUS PEN) injection 20 Units  20 Units Subcutaneous Q24H Jessica Lancaster MD   20 Units at 05/21/25 1155    [Held by provider] lipids 4 oil (SMOFLIPID) 20 % infusion 250 mL  250 mL Intravenous Q24H Brendon Haas DO   Stopped at 05/19/25 0825    OLANZapine (zyPREXA) IV injection 2.5 mg  2.5 mg Intravenous QPM Sonia Moore NP   2.5 mg at 05/21/25 1940    pantoprazole (PROTONIX) IV push injection 40 mg  40 mg Intravenous QAM AC de La Mater, Renee Michelle, CNP   40 mg at 05/22/25 0749    piperacillin-tazobactam (ZOSYN) 4.5 g vial to attach to  mL bag  4.5 g Intravenous Q6H Eugenia Zavala MD   4.5 g at 05/22/25 0539    sodium chloride (PF) 0.9% PF flush 3 mL  3 mL Intracatheter Q8H Ganesh Griffiths MD   3 mL at 05/22/25 0541     Current Facility-Administered Medications   Medication Dose Route Frequency Provider Last Rate Last Admin    dexmedeTOMIDine (PRECEDEX) 4 mcg/mL in sodium chloride 0.9 % 100 mL infusion  0.1-1.2 mcg/kg/hr (Dosing Weight) Intravenous Continuous Sonia Moore NP 17.4 mL/hr at 05/22/25 0800 1 mcg/kg/hr at 05/22/25 0800    dextrose 10% infusion   Intravenous Continuous PRN Brendon Haas DO 10 mL/hr at 05/22/25 0800 Rate Verify at 05/22/25 0800    dialysate for CVVHD & CVVHDF (Phoxillum BK4/2.5)  10 mL/kg/hr CRRT Continuous Raheem Gabriel, APRN   mL/hr at 05/22/25 0405 10 mL/kg/hr at 05/22/25 0405    fentaNYL (SUBLIMAZE) infusion   mcg/hr Intravenous Continuous Roxi Alston MD 2 mL/hr at 05/22/25 0800 100 mcg/hr at 05/22/25 0800    No heparin required   Does not apply Continuous PRN Tico Spain MD        norepinephrine (LEVOPHED) 16 mg in  mL infusion MAX CONC CENTRAL LINE  0.01-0.6 mcg/kg/min (Dosing Weight) Intravenous Continuous Celso Ambrose MD 3.3 mL/hr at 05/22/25 0800 0.05 mcg/kg/min at 05/22/25 0800    parenteral nutrition - ADULT compounded formula   CENTRAL LINE IV TPN CONTINUOUS Brendon Haas DO 50 mL/hr at 05/22/25 0800 Rate Verify at 05/22/25 0800    POST-filter replacement solution for CVVHD & CVVHDF (Phoxillum BK4/2.5)   CRRT Continuous Tico Spain  mL/hr at 05/22/25 0455 New Bag at 05/22/25 0455    PRE-filter replacement solution for CVVHD & CVVHDF (Phoxillum BK4/2.5)  10 mL/kg/hr CRRT Continuous Raheem Gabriel APRN  mL/hr at 05/22/25 0405 10 mL/kg/hr at 05/22/25 0405    Reason statin not prescribed   Does not apply DOES NOT GO TO Rhys Bloom MD

## 2025-05-22 NOTE — PLAN OF CARE
ICU End of Shift Summary. See flowsheets for vital signs and detailed assessment.    Changes this shift: Pt A&O x4, Dex wean off. PRN Zyprexa x1. Remain in Fent gtt. Levo gtt for MAP >65. On 40 % FiO2. PS 12/10 for 2hrs. Abdominal dressing with large amount of sanguinous secretions. Changed x2 - photo uploaded to chart. MD aware. Tolerating pulling on CRRT. Lactic 4.0. WBC 25.5. MD aware. Blood culture and sputum culture collected. Lantus held. D10 wean off.     Plan: Continue plan of care, update team with changes.

## 2025-05-22 NOTE — PROGRESS NOTES
CRRT STATUS NOTE    DATA:  Time: 1500  Pressures WNL:  YES  Filter Status:  WDL  Problems Reported/Alarms Noted:  none  Supplies Present:  YES      ASSESSMENT:  Patient Net Fluid Balance:  Yesterday, Net - 200mL; Today thus far net -700mL.     Vital Signs: On norepi 0.05. Trach/vent. B/P: 125/55, T: 98, P: 86, R: 21.     Labs:  Stable on 4k baths: k 3.6, ical 4.6. LA 3.9 - trending, stable.     Goals of Therapy: Net -0-50mL/hr. Meeting goals of therapy.       INTERVENTIONS:   Filter clotted this AM, bedside RN was able to return blood.   Primed and started new filter at 0900 without issue.    PLAN:  Continue CRRT to meet goals of therapy while pt on pressors.    Please contact CRRT RN 1 on [facility] with questions/updates.

## 2025-05-23 ENCOUNTER — APPOINTMENT (OUTPATIENT)
Dept: PHYSICAL THERAPY | Facility: CLINIC | Age: 30
End: 2025-05-23
Attending: SURGERY
Payer: COMMERCIAL

## 2025-05-23 VITALS
DIASTOLIC BLOOD PRESSURE: 55 MMHG | HEART RATE: 95 BPM | WEIGHT: 185.63 LBS | BODY MASS INDEX: 28.13 KG/M2 | HEIGHT: 68 IN | TEMPERATURE: 99.4 F | SYSTOLIC BLOOD PRESSURE: 125 MMHG | RESPIRATION RATE: 20 BRPM | OXYGEN SATURATION: 98 %

## 2025-05-23 LAB
ALBUMIN SERPL BCG-MCNC: 2.1 G/DL (ref 3.5–5.2)
ALBUMIN SERPL BCG-MCNC: 2.1 G/DL (ref 3.5–5.2)
ALP SERPL-CCNC: 245 U/L (ref 40–150)
ALT SERPL W P-5'-P-CCNC: 58 U/L (ref 0–70)
ANION GAP SERPL CALCULATED.3IONS-SCNC: 13 MMOL/L (ref 7–15)
ANION GAP SERPL CALCULATED.3IONS-SCNC: 15 MMOL/L (ref 7–15)
AST SERPL W P-5'-P-CCNC: 47 U/L (ref 0–45)
BILIRUB SERPL-MCNC: 2.4 MG/DL
BILIRUBIN DIRECT (ROCHE PRO & PURE): 1.97 MG/DL (ref 0–0.45)
BUN SERPL-MCNC: 34.5 MG/DL (ref 6–20)
BUN SERPL-MCNC: 36.9 MG/DL (ref 6–20)
CA-I BLD-MCNC: 4.5 MG/DL (ref 4.4–5.2)
CA-I BLD-MCNC: 4.6 MG/DL (ref 4.4–5.2)
CALCIUM SERPL-MCNC: 8 MG/DL (ref 8.8–10.4)
CALCIUM SERPL-MCNC: 8.2 MG/DL (ref 8.8–10.4)
CHLORIDE SERPL-SCNC: 104 MMOL/L (ref 98–107)
CHLORIDE SERPL-SCNC: 106 MMOL/L (ref 98–107)
CREAT SERPL-MCNC: 0.57 MG/DL (ref 0.67–1.17)
CREAT SERPL-MCNC: 0.59 MG/DL (ref 0.67–1.17)
EGFRCR SERPLBLD CKD-EPI 2021: >90 ML/MIN/1.73M2
EGFRCR SERPLBLD CKD-EPI 2021: >90 ML/MIN/1.73M2
ERYTHROCYTE [DISTWIDTH] IN BLOOD BY AUTOMATED COUNT: 16.7 % (ref 10–15)
ERYTHROCYTE [DISTWIDTH] IN BLOOD BY AUTOMATED COUNT: 16.8 % (ref 10–15)
GLUCOSE BLDC GLUCOMTR-MCNC: 165 MG/DL (ref 70–99)
GLUCOSE BLDC GLUCOMTR-MCNC: 174 MG/DL (ref 70–99)
GLUCOSE BLDC GLUCOMTR-MCNC: 195 MG/DL (ref 70–99)
GLUCOSE BLDC GLUCOMTR-MCNC: 197 MG/DL (ref 70–99)
GLUCOSE BLDC GLUCOMTR-MCNC: 215 MG/DL (ref 70–99)
GLUCOSE BLDC GLUCOMTR-MCNC: 224 MG/DL (ref 70–99)
GLUCOSE SERPL-MCNC: 185 MG/DL (ref 70–99)
GLUCOSE SERPL-MCNC: 210 MG/DL (ref 70–99)
HCO3 SERPL-SCNC: 20 MMOL/L (ref 22–29)
HCO3 SERPL-SCNC: 21 MMOL/L (ref 22–29)
HCT VFR BLD AUTO: 24.8 % (ref 40–53)
HCT VFR BLD AUTO: 25.2 % (ref 40–53)
HGB BLD-MCNC: 8 G/DL (ref 13.3–17.7)
HGB BLD-MCNC: 8.1 G/DL (ref 13.3–17.7)
LACTATE SERPL-SCNC: 4 MMOL/L (ref 0.7–2)
LACTATE SERPL-SCNC: 4.1 MMOL/L (ref 0.7–2)
MAGNESIUM SERPL-MCNC: 2.3 MG/DL (ref 1.7–2.3)
MAGNESIUM SERPL-MCNC: 3.6 MG/DL (ref 1.7–2.3)
MCH RBC QN AUTO: 29.7 PG (ref 26.5–33)
MCH RBC QN AUTO: 29.9 PG (ref 26.5–33)
MCHC RBC AUTO-ENTMCNC: 32.1 G/DL (ref 31.5–36.5)
MCHC RBC AUTO-ENTMCNC: 32.3 G/DL (ref 31.5–36.5)
MCV RBC AUTO: 92 FL (ref 78–100)
MCV RBC AUTO: 93 FL (ref 78–100)
PHOSPHATE SERPL-MCNC: 3 MG/DL (ref 2.5–4.5)
PHOSPHATE SERPL-MCNC: 3.1 MG/DL (ref 2.5–4.5)
PLATELET # BLD AUTO: 294 10E3/UL (ref 150–450)
PLATELET # BLD AUTO: 314 10E3/UL (ref 150–450)
POTASSIUM SERPL-SCNC: 3.6 MMOL/L (ref 3.4–5.3)
POTASSIUM SERPL-SCNC: 3.8 MMOL/L (ref 3.4–5.3)
PROT SERPL-MCNC: 5.8 G/DL (ref 6.4–8.3)
RBC # BLD AUTO: 2.68 10E6/UL (ref 4.4–5.9)
RBC # BLD AUTO: 2.73 10E6/UL (ref 4.4–5.9)
SODIUM SERPL-SCNC: 139 MMOL/L (ref 135–145)
SODIUM SERPL-SCNC: 140 MMOL/L (ref 135–145)
TRIGL SERPL-MCNC: 335 MG/DL
TRIGL SERPL-MCNC: 376 MG/DL
WBC # BLD AUTO: 20.3 10E3/UL (ref 4–11)
WBC # BLD AUTO: 23.4 10E3/UL (ref 4–11)

## 2025-05-23 PROCEDURE — 84075 ASSAY ALKALINE PHOSPHATASE: CPT | Performed by: STUDENT IN AN ORGANIZED HEALTH CARE EDUCATION/TRAINING PROGRAM

## 2025-05-23 PROCEDURE — 250N000011 HC RX IP 250 OP 636

## 2025-05-23 PROCEDURE — 85014 HEMATOCRIT: CPT | Performed by: CLINICAL NURSE SPECIALIST

## 2025-05-23 PROCEDURE — 85018 HEMOGLOBIN: CPT | Performed by: CLINICAL NURSE SPECIALIST

## 2025-05-23 PROCEDURE — 82040 ASSAY OF SERUM ALBUMIN: CPT | Performed by: CLINICAL NURSE SPECIALIST

## 2025-05-23 PROCEDURE — 82248 BILIRUBIN DIRECT: CPT | Performed by: STUDENT IN AN ORGANIZED HEALTH CARE EDUCATION/TRAINING PROGRAM

## 2025-05-23 PROCEDURE — 99418 PROLNG IP/OBS E/M EA 15 MIN: CPT | Performed by: INTERNAL MEDICINE

## 2025-05-23 PROCEDURE — 83605 ASSAY OF LACTIC ACID: CPT | Performed by: NURSE PRACTITIONER

## 2025-05-23 PROCEDURE — 82330 ASSAY OF CALCIUM: CPT | Performed by: CLINICAL NURSE SPECIALIST

## 2025-05-23 PROCEDURE — 250N000011 HC RX IP 250 OP 636: Performed by: STUDENT IN AN ORGANIZED HEALTH CARE EDUCATION/TRAINING PROGRAM

## 2025-05-23 PROCEDURE — 84460 ALANINE AMINO (ALT) (SGPT): CPT | Performed by: STUDENT IN AN ORGANIZED HEALTH CARE EDUCATION/TRAINING PROGRAM

## 2025-05-23 PROCEDURE — 99233 SBSQ HOSP IP/OBS HIGH 50: CPT | Mod: 24 | Performed by: INTERNAL MEDICINE

## 2025-05-23 PROCEDURE — 999N000253 HC STATISTIC WEANING TRIALS

## 2025-05-23 PROCEDURE — 84478 ASSAY OF TRIGLYCERIDES: CPT | Performed by: SURGERY

## 2025-05-23 PROCEDURE — 250N000009 HC RX 250: Performed by: CLINICAL NURSE SPECIALIST

## 2025-05-23 PROCEDURE — 97164 PT RE-EVAL EST PLAN CARE: CPT | Mod: GP | Performed by: REHABILITATION PRACTITIONER

## 2025-05-23 PROCEDURE — 97110 THERAPEUTIC EXERCISES: CPT | Mod: GP | Performed by: REHABILITATION PRACTITIONER

## 2025-05-23 PROCEDURE — 84478 ASSAY OF TRIGLYCERIDES: CPT | Performed by: STUDENT IN AN ORGANIZED HEALTH CARE EDUCATION/TRAINING PROGRAM

## 2025-05-23 PROCEDURE — 84450 TRANSFERASE (AST) (SGOT): CPT | Performed by: STUDENT IN AN ORGANIZED HEALTH CARE EDUCATION/TRAINING PROGRAM

## 2025-05-23 PROCEDURE — 250N000011 HC RX IP 250 OP 636: Mod: JW

## 2025-05-23 PROCEDURE — 99291 CRITICAL CARE FIRST HOUR: CPT | Mod: 24 | Performed by: SURGERY

## 2025-05-23 PROCEDURE — 250N000009 HC RX 250: Performed by: NURSE PRACTITIONER

## 2025-05-23 PROCEDURE — 250N000011 HC RX IP 250 OP 636: Performed by: NURSE PRACTITIONER

## 2025-05-23 PROCEDURE — B4185 PARENTERAL SOL 10 GM LIPIDS: HCPCS | Performed by: SURGERY

## 2025-05-23 PROCEDURE — 250N000009 HC RX 250: Performed by: SURGERY

## 2025-05-23 PROCEDURE — 84100 ASSAY OF PHOSPHORUS: CPT | Performed by: CLINICAL NURSE SPECIALIST

## 2025-05-23 PROCEDURE — 94003 VENT MGMT INPAT SUBQ DAY: CPT

## 2025-05-23 PROCEDURE — 83735 ASSAY OF MAGNESIUM: CPT | Performed by: CLINICAL NURSE SPECIALIST

## 2025-05-23 PROCEDURE — 97535 SELF CARE MNGMENT TRAINING: CPT | Mod: GP | Performed by: REHABILITATION PRACTITIONER

## 2025-05-23 PROCEDURE — 999N000157 HC STATISTIC RCP TIME EA 10 MIN

## 2025-05-23 PROCEDURE — 200N000002 HC R&B ICU UMMC

## 2025-05-23 PROCEDURE — 85027 COMPLETE CBC AUTOMATED: CPT | Performed by: CLINICAL NURSE SPECIALIST

## 2025-05-23 PROCEDURE — 250N000009 HC RX 250: Performed by: STUDENT IN AN ORGANIZED HEALTH CARE EDUCATION/TRAINING PROGRAM

## 2025-05-23 PROCEDURE — G0463 HOSPITAL OUTPT CLINIC VISIT: HCPCS

## 2025-05-23 PROCEDURE — 90947 DIALYSIS REPEATED EVAL: CPT

## 2025-05-23 PROCEDURE — 85041 AUTOMATED RBC COUNT: CPT | Performed by: CLINICAL NURSE SPECIALIST

## 2025-05-23 RX ORDER — HYDROCORTISONE SODIUM SUCCINATE 100 MG/2ML
50 INJECTION INTRAMUSCULAR; INTRAVENOUS EVERY 6 HOURS
Status: DISCONTINUED | OUTPATIENT
Start: 2025-05-23 | End: 2025-05-26

## 2025-05-23 RX ORDER — HYDROCORTISONE SODIUM SUCCINATE 100 MG/2ML
25 INJECTION INTRAMUSCULAR; INTRAVENOUS ONCE
Status: COMPLETED | OUTPATIENT
Start: 2025-05-23 | End: 2025-05-23

## 2025-05-23 RX ADMIN — Medication 100 MCG/HR: at 09:09

## 2025-05-23 RX ADMIN — SMOFLIPID 250 ML: 6; 6; 5; 3 INJECTION, EMULSION INTRAVENOUS at 19:59

## 2025-05-23 RX ADMIN — PANTOPRAZOLE SODIUM 40 MG: 40 INJECTION, POWDER, FOR SOLUTION INTRAVENOUS at 08:05

## 2025-05-23 RX ADMIN — CALCIUM CHLORIDE, MAGNESIUM CHLORIDE, SODIUM CHLORIDE, SODIUM BICARBONATE, POTASSIUM CHLORIDE AND SODIUM PHOSPHATE DIBASIC DIHYDRATE 10 ML/KG/HR: 3.68; 3.05; 6.34; 3.09; .314; .187 INJECTION INTRAVENOUS at 13:13

## 2025-05-23 RX ADMIN — CALCIUM CHLORIDE, MAGNESIUM CHLORIDE, SODIUM CHLORIDE, SODIUM BICARBONATE, POTASSIUM CHLORIDE AND SODIUM PHOSPHATE DIBASIC DIHYDRATE 10 ML/KG/HR: 3.68; 3.05; 6.34; 3.09; .314; .187 INJECTION INTRAVENOUS at 19:57

## 2025-05-23 RX ADMIN — OLANZAPINE 5 MG: 10 INJECTION, POWDER, FOR SOLUTION INTRAMUSCULAR at 04:52

## 2025-05-23 RX ADMIN — PIPERACILLIN AND TAZOBACTAM 4.5 G: 4; .5 INJECTION, POWDER, LYOPHILIZED, FOR SOLUTION INTRAVENOUS at 05:27

## 2025-05-23 RX ADMIN — CALCIUM CHLORIDE, MAGNESIUM CHLORIDE, SODIUM CHLORIDE, SODIUM BICARBONATE, POTASSIUM CHLORIDE AND SODIUM PHOSPHATE DIBASIC DIHYDRATE 10 ML/KG/HR: 3.68; 3.05; 6.34; 3.09; .314; .187 INJECTION INTRAVENOUS at 06:48

## 2025-05-23 RX ADMIN — Medication 25 MCG: at 01:23

## 2025-05-23 RX ADMIN — CALCIUM CHLORIDE, MAGNESIUM CHLORIDE, SODIUM CHLORIDE, SODIUM BICARBONATE, POTASSIUM CHLORIDE AND SODIUM PHOSPHATE DIBASIC DIHYDRATE 10 ML/KG/HR: 3.68; 3.05; 6.34; 3.09; .314; .187 INJECTION INTRAVENOUS at 00:46

## 2025-05-23 RX ADMIN — HYDROCORTISONE SODIUM SUCCINATE 25 MG: 100 INJECTION, POWDER, FOR SOLUTION INTRAMUSCULAR; INTRAVENOUS at 11:11

## 2025-05-23 RX ADMIN — CALCIUM CHLORIDE, MAGNESIUM CHLORIDE, SODIUM CHLORIDE, SODIUM BICARBONATE, POTASSIUM CHLORIDE AND SODIUM PHOSPHATE DIBASIC DIHYDRATE 10 ML/KG/HR: 3.68; 3.05; 6.34; 3.09; .314; .187 INJECTION INTRAVENOUS at 13:15

## 2025-05-23 RX ADMIN — CALCIUM CHLORIDE, MAGNESIUM CHLORIDE, SODIUM CHLORIDE, SODIUM BICARBONATE, POTASSIUM CHLORIDE AND SODIUM PHOSPHATE DIBASIC DIHYDRATE: 3.68; 3.05; 6.34; 3.09; .314; .187 INJECTION INTRAVENOUS at 07:57

## 2025-05-23 RX ADMIN — DEXMEDETOMIDINE HYDROCHLORIDE 0.8 MCG/KG/HR: 400 INJECTION INTRAVENOUS at 18:55

## 2025-05-23 RX ADMIN — MAGNESIUM SULFATE HEPTAHYDRATE: 500 INJECTION, SOLUTION INTRAMUSCULAR; INTRAVENOUS at 19:59

## 2025-05-23 RX ADMIN — DEXMEDETOMIDINE HYDROCHLORIDE 0.8 MCG/KG/HR: 400 INJECTION INTRAVENOUS at 06:38

## 2025-05-23 RX ADMIN — DEXMEDETOMIDINE HYDROCHLORIDE 1 MCG/KG/HR: 400 INJECTION INTRAVENOUS at 13:18

## 2025-05-23 RX ADMIN — HYDROCORTISONE SODIUM SUCCINATE 50 MG: 100 INJECTION, POWDER, FOR SOLUTION INTRAMUSCULAR; INTRAVENOUS at 16:15

## 2025-05-23 RX ADMIN — HEPARIN SODIUM 5000 UNITS: 5000 INJECTION, SOLUTION INTRAVENOUS; SUBCUTANEOUS at 19:57

## 2025-05-23 RX ADMIN — ONDANSETRON 4 MG: 2 INJECTION, SOLUTION INTRAMUSCULAR; INTRAVENOUS at 02:58

## 2025-05-23 RX ADMIN — HEPARIN SODIUM 5000 UNITS: 5000 INJECTION, SOLUTION INTRAVENOUS; SUBCUTANEOUS at 05:26

## 2025-05-23 RX ADMIN — PIPERACILLIN AND TAZOBACTAM 4.5 G: 4; .5 INJECTION, POWDER, LYOPHILIZED, FOR SOLUTION INTRAVENOUS at 18:52

## 2025-05-23 RX ADMIN — HYDROCORTISONE SODIUM SUCCINATE 50 MG: 100 INJECTION, POWDER, FOR SOLUTION INTRAMUSCULAR; INTRAVENOUS at 22:16

## 2025-05-23 RX ADMIN — Medication 25 MCG: at 09:11

## 2025-05-23 RX ADMIN — PIPERACILLIN AND TAZOBACTAM 4.5 G: 4; .5 INJECTION, POWDER, LYOPHILIZED, FOR SOLUTION INTRAVENOUS at 12:20

## 2025-05-23 RX ADMIN — OLANZAPINE 2.5 MG: 10 INJECTION, POWDER, FOR SOLUTION INTRAMUSCULAR at 19:57

## 2025-05-23 RX ADMIN — HEPARIN SODIUM 5000 UNITS: 5000 INJECTION, SOLUTION INTRAVENOUS; SUBCUTANEOUS at 12:50

## 2025-05-23 RX ADMIN — HYDROCORTISONE SODIUM SUCCINATE 25 MG: 100 INJECTION, POWDER, FOR SOLUTION INTRAMUSCULAR; INTRAVENOUS at 08:05

## 2025-05-23 RX ADMIN — Medication 25 MCG: at 02:38

## 2025-05-23 ASSESSMENT — ACTIVITIES OF DAILY LIVING (ADL)
ADLS_ACUITY_SCORE: 56
ADLS_ACUITY_SCORE: 57
ADLS_ACUITY_SCORE: 47
ADLS_ACUITY_SCORE: 57
ADLS_ACUITY_SCORE: 47
ADLS_ACUITY_SCORE: 56
ADLS_ACUITY_SCORE: 57
ADLS_ACUITY_SCORE: 56
ADLS_ACUITY_SCORE: 56
ADLS_ACUITY_SCORE: 47
ADLS_ACUITY_SCORE: 56
ADLS_ACUITY_SCORE: 47
ADLS_ACUITY_SCORE: 47

## 2025-05-23 NOTE — PROGRESS NOTES
CRRT STATUS NOTE    DATA:  Time:  ****    Pressures WNL:  YES    Filter Status:  WDL    Problems Reported/Alarms Noted:  No    Supplies Present:  YES    ASSESSMENT:  Patient Net Fluid Balance:    05/22/2025 I/O= ****    Vital Signs 0000:       Gtts-  Levophed    Labs:    Labs monitored and reviewed.    ****      Goals of Therapy:   Net negative 0-50cc/hr    INTERVENTIONS:   None    PLAN:  Continue to monitor.  Change CRRT set q72hrs and PRN.  Call CRRT RN on [employer] phone with questions.  See flowsheets for details.     05/22/25  1556 05/22/25  0403   WBC 20.3* 21.8* 25.5* 18.8*   RBC 2.73* 2.96* 2.85* 2.91*   HGB 8.1* 8.8* 8.6* 8.7*   HCT 25.2* 27.3* 26.4* 26.6*   MCV 92 92 93 91   MCH 29.7 29.7 30.2 29.9   MCHC 32.1 32.2 32.6 32.7   RDW 16.7* 16.7* 16.6* 16.4*    302 306 269     INR  Recent Labs   Lab 05/22/25  2031 05/22/25  1002 05/18/25  0530 05/18/25  0047   INR 1.22* 1.18* 1.21* 1.63*     Arterial Blood Gas  Recent Labs   Lab 05/20/25  0344 05/19/25  0407 05/18/25  0537 05/18/25  0055   PH 7.45 7.41 7.34* 7.37   PCO2 33* 38 41 33*   PO2 74* 150* 90 187*   HCO3 23 24 22 19*   O2PER 60 100 100 21         0331 ICa=4.5, LA=4.1    Goals of Therapy:   Net negative 0-50cc/hr    INTERVENTIONS:   None    PLAN:  Continue to monitor.  Change CRRT set q72hrs and PRN.  Call CRRT RN on [employer] phone with questions.  See flowsheets for details.

## 2025-05-23 NOTE — PROGRESS NOTES
[facility] Nurse Inpatient Assessment     Consulted for: Tracheostomy site, sacrum, left heel  5/9: New consult for midline wound draining heavily    WO nurse follow-up plan: Tuesday/Friday    Patient History (according to provider note(s):      Sebastián Rodas is a 29-year-old male with a history of alcohol abuse, anxiety, and bipolar disorder, admitted on 3/30/2025 for alcohol withdrawal after a binge, presenting with abdominal pain. Initial workup revealed leukocytosis and elevated lipase, suggestive of acute pancreatitis. He developed encephalopathy and required ICU transfer on 3/31, intubation, and vasopressors by 4/1 due to shock. His hospital course was complicated by acute renal failure (requiring CRRT), cardiomyopathy (LVEF 20-25%, improved to 65-70% by 4/14), and necrotizing pancreatitis with unorganized fluid collections. He completed a 14-day course of meropenem but remained febrile and critically ill, requiring ongoing dialysis and vasopressor support. On 4/27, imaging indicated likely perforated viscus, and after family discussion, he underwent emergent laparotomy, necrosectomy, transverse colectomy, abdominal washout, and drain placement. He was transferred to the SICU at [facility] on 4/30 for further monitoring. On 5/1, he underwent a second laparotomy with irrigation and debridement, colostomy tube placement, temporary abdominal closure, and further necrosectomy due to breakdown of the colonic resection staple line, necrotic pancreas, thrombosed middle colic vessels, fat necrosis, and dense interloop adhesions. He has a poor prognosis given the OR findings, palliative care consulted.     Assessment:      Areas visualized during today's visit: Focused: and Abdomen    Wound location: Abdomen           Superior side of wound with displaced mesh    Last photo: 5/120  Wound due to: Surgical Wound  Wound history/plan of care: On 4/27, imaging showed likely  perforated viscus; he underwent emergent laparotomy, necrosectomy, and colectomy. Transferred to [facility] SICU on 4/30. On 5/1, reoperation revealed colonic staple line breakdown, necrotic pancreas, vessel thrombosis, and adhesions. Colostomy with malankot drain and temporary abdominal closure performed. Prognosis is poor; palliative care involved. Care conference 5/2 with family to talk goasl of care. Full code, family acknowledged that poor prognosis is to be expected. Today 5/4 s/p exploratory surgery, additional necrotic pancreas remove with no obvious spillage or sign of bleeding   5/12: Abdomen is much more distended today. The superior side of the wound bed had a large black clot loosely adhered to the mesh. There was pooling of darker thick liquid in the inferior side of the wound bed.   5/13: Pouch intact. Replaced Kerlix in wound bed.  5/14: Pouch replaced due to leaking at the superior side by PEG tube. Most of the slough covering the adipose tissue at the wound margins has fallen off. There is marbled granulation tissue and adipose tissue on the wound margins. Wound smelled more foul today.   5/20: Wound dimensions have increased markedly. The bowel under the mesh is much more distended and the mesh at the superior end of wound has displaced and is no longer attached. There is a large amount of blood clot and stool in this location. The wound manager was unable to be replaced today due to the increased wound dimensions as well as erosion of the various tubes around the analilia-wound area making pouching impossible.   5/23: Patient seems to be tolerating dressing changes ok. Found that more mesh had  and bowel loops were visible so asked team o come see and they were aware. Will continue with dressing changes. If he has a strong desire to get out of bed (he asked during my visit) I would strongly recommend an abdominal binder.   Wound base: 70 % surgical mesh- with open bowel visible on top edge , 30 %  Granulation tissue, Slough, and Adipose tissue     Palpation of the wound bed: fluctuance      Drainage: copious     Description of drainage: serosanguinous, bloody, and black     Measurements (length x width x depth, in cm): 21  x 17  x  3+ cm - measured on Friday's- did not probe into cavity with exposed bowel     Tunneling: N/A     Undermining: N/A  Periwound skin: Intact      Color: normal and consistent with surrounding tissue      Temperature: normal   Odor: none  Pain: facial expression of distress, sharp  Pain interventions prior to dressing change: oral oxycodone, IV fentanyl, and slow and gentle cares   Treatment goal: Drainage control and Infection control/prevention  STATUS: deteriorating  Supplies ordered: at bedside    Pressure Injury Location: Left heel - Assessed on Fridays        Last photo: 5/23  Wound type: Pressure Injury     Pressure Injury Stage: Deep Tissue Pressure Injury (DTPI), present on admission       Wound history/plan of care:   Pt admitted from OSH on 4/31. Wound present on admission. There is an intact blister over a deeper maroon discoloration.    Wound base: 100 % Maroon, Blister, and Epidermis     Palpation of the wound bed: boggy      Drainage: none     Description of drainage: none     Measurements (length x width x depth, in cm) 2  x 1.5  x  0 cm      Tunneling N/A     Undermining N/A  Periwound skin: Intact      Color: normal and consistent with surrounding tissue      Temperature: normal   Odor: none  Pain: denies , none  Pain intervention prior to dressing change: slow and gentle cares   Treatment goal: Heal  and Protection  STATUS: stable  Supplies ordered: at bedside    My PI Risk Assessment     Sensory Perception: 2 - Very Limited     Moisture: 2 - Very moist      Activity: 1 - Bedfast      Mobility: 2 - Very limited     Nutrition: 2 - Probably inadequate      Friction/Shear: 1 - Problem     TOTAL: 10    Pressure Injury Location: coccyx - Assessed on Fridays    Zoomed out                                               Zoomed in        Last photo: 5/16  Wound type: Pressure Injury, Incontinence Associated Dermatitis (IAD), and Moisture Associated Skin Damage (MASD)     Pressure Injury Stage: 2, present on admission        Wound history/plan of care:   Pt transferred from OSH on 4/31. Wound present on admission. There is MASD/IAD to the bilateral buttocks with exposed dermis. There is further breakdown over the Pt's coccyx that is fibrin vs slough. Wound is at least a stage 2. The wound will need to evolve further before it can be definitively staged.   5/9: Wound continues to improve. Wound is a stage 2 pressure injury over coccyx with surrounding MASD and exposed dermis.  5/16: Dermal buds seen throughout wound base.  5/23: wound care just completed by RN so did not assess   Wound base: 80 % Dermis, 20 % Fibrin     Palpation of the wound bed: normal      Drainage: moderate     Description of drainage: serosanguinous     Measurements (length x width x depth, in cm) 6  x 6  x  0.2 cm - central wound, 1.5 x 1.5 x 0.1 cm left buttock     Tunneling N/A     Undermining N/A  Periwound skin: Edematous and Skin stripping      Color: pink and red      Temperature: normal   Odor: none  Pain: moderate, tender  Pain intervention prior to dressing change: slow and gentle cares   Treatment goal: Heal  and Protection  STATUS: improving and stable  Supplies ordered: at bedside    My PI Risk Assessment     Sensory Perception: 2 - Very Limited     Moisture: 2 - Very moist      Activity: 1 - Bedfast      Mobility: 2 - Very limited     Nutrition: 2 - Probably inadequate      Friction/Shear: 1 - Problem     TOTAL: 10    Wound location: Trach - Assessed on Fridays               Last photo: 5/23  Wound due to: Surgical Wound  Wound history/plan of care:  Pt transferred from OSH on 4/31. Pt had trach placed at OSH. There are areas of fibrin from previous trach suture locations which are mostly healed. There is  a linear incision on the left side of Trach cannula from insertion. The wound base wass difficult to visualize, appears to be fibrin and adipose tissue marbled together.  5/5: Called by bedside RN with concerns for skin breakdown near trach. On assessment Trach incision appears to have opened up significantly from prior assessment. Moderate amount of respiratory secretions present. ENT consulted by primary team to assess trach site as well.  5/16: Overall dimensions decreasing. Wound edges stating to heal.   Wound base: 100 % Granulation tissue, Fibrin, and Adipose tissue     Palpation of the wound bed: normal      Drainage: moderate     Description of drainage: yellow - respiratory secretions     Measurements (length x width x depth, in cm): 2  x 3  x  0.8 cm      Tunneling: N/A     Undermining: N/A  Periwound skin: Intact      Color: normal and consistent with surrounding tissue      Temperature: normal   Odor: none  Pain: moderate, tender  Pain interventions prior to dressing change: slow and gentle cares   Treatment goal: Heal  and Protection  STATUS: granulating and stable  Supplies ordered: at bedside      Wound location: Scrotum      Last photo: 5/13- not able to obtain photo 5/23 since area was pouched  Wound due to: Abscess  Wound history/plan of care: Wound noted during assessment of abdominal wounds 5/12. Surgery at bedside, stated that inguinal canal is patent with the abdominal cavity and drainage most likely secondary to abdominal cavity contents, and recommended BID gauze dressings. Wound continued to leak copious amounts of blood/stool/intraabdominal fluid. Overnight and was pouched with an ostomy pouch by SICU resident overnight. Pouch continued to leak and was replaced with a primofit male external catheter set to low continuous suction.  5/13: Ostomy pouch applied to area to collect drainage. Intact at time of assessment.  Wound base: 100 % Moist scrotal tissue     Palpation of the wound bed:  fluctuance      Drainage: copious     Description of drainage: serosanguinous, purulent, bloody, and red     Measurements (length x width x depth, in cm): See photos - 4 small open areas.     Tunneling: N/A     Undermining: N/A  Periwound skin: Indurated      Color: red      Temperature: normal   Odor: none  Pain: moderate, tender  Pain interventions prior to dressing change: slow and gentle cares   Treatment goal: Drainage control  STATUS: stable  Supplies ordered: at bedside       Treatment Plan:     Left heel wound(s): Every 3 days Cleanse with saline and pat dry. Conform a sacral Mepilex around Pt's heel. Place in Prevalon boots. Float heels off of support surface with pillows under calves.    Buttocks/coccyx wound(s): Every 3 days   Cleanse the area with wound cleanser and pat dry.  Apply No sting film barrier to periwound skin.  Cover wound with Sacral Mepilex (#[id number])  Nursing to peel back dressing to assess wound bed with skin assessment.   Change dressing Q 3 days.  Turn and reposition Q 2hrs side to side only.  Ensure pt has Vikram-cushion while sitting up in the chair.  If not in ICU: Ensure air pump on bed and set to Isoflex.  FYI- If pt has constant incontinent loose stools needing dressing changes Q shift please discontinue the Mepilex dressing and apply criticaid barrier paste BID and PRN.     Trach wound: Perform trach cares per unit routine. Place a fenestrated optifoam between trach faceplate and Pt's skin.    Abdominal wound: Daily and PRN. Remove soiled Kerlix. Gently absorb as much drainage from wound base as possible. Lightly dust analilia-wound skin with ostomy powder and apply a thin layer of Triad paste (#[id number]) to the analilia-wound area. Cover exposed mesh with Xeroform gauze. (Change Xerform daily) Fluff Kerlix into superior and inferior wound base to help absorb drainage. Keep wound manager to gravity drainage. WOC will assess wound manager daily T/F.     If wound manager fails, place wet to dry  "dressing with Xeroform gauze covered by Vashe moistened Kerlix Covered by Mextra Superabsorbant pads. Change PRN.    Drain tube cares: PRN for saturation. Cleanse analilia-tubular skin with Vashe and pat dry. Apply no sting barrier film to analilia-tubular area and allow to dry. Fenestrate a 5x5 Mextra super absorbant pad ([id number]) and place around tube to absorb drainage.     Scrotal wound: PRN with pouch leakage. Cleanse with wound cleanser and pat dry. Apply no sting barrier film to analilia-wound area and allow to dry. Using a 1 piece ostomy pouch cut a hole in barrier slightly larger than open areas and apply to scrotum.      Pressure Injury Prevention (PIP) Plan:  If patient is declining pressure injury prevention interventions: Explore reason why and address patient's concerns, Educate on pressure injury risk and prevention intervention(s), If patient is still declining, document \"informed refusal\" , and Ensure Care team is aware ( provider, charge nurse, etc)  Mattress: Follow bed algorithm, add Low Air Loss (Air+) mattress pump if skin is very moist or constantly moist.   HOB: Maintain at or below 30 degrees, unless contraindicated  Repositioning in bed: Every 1-2 hours , Left/right positioning; avoid supine, Raise foot of bed prior to raising head of bed, to reduce patient sliding down (shear), and Frequent microturns using positioning wedges, as patient tolerates  Heels: Pillows under calves and Heel lift boots  Protective Dressing: Sacral Mepilex for prevention (#[id number]),  especially for the agitated patient   Positioning Equipment:Positioning wedges (#[id number]) to help maintain 30 degree side lying position   Chair positioning: Chair cushion (#[id number]) , Assist patient to reposition hourly, and Do NOT use a donut for sitting (this increases pressure to smaller area and creates a higher potential for injury)    If patient has a buttock pressure injury, or high risk for PI use chair cushion or SPS.  Moisture Management: " Perineal cleansing /protection: Follow Incontinence Protocol, Avoid brief in bed, Clean and dry skin folds with bathing , Consider InterDry (#370500) between folds, and Moisturize dry skin  Under Devices: Inspect skin under all medical devices during skin inspection , Ensure tubes are stabilized without tension, and Ensure patient is not lying on medical devices or equipment when repositioned  Ask provider to discontinue device when no longer needed.     Orders: Reviewed and Updated    RECOMMEND PRIMARY TEAM ORDER: None, at this time  Education provided: plan of care  Discussed plan of care with: Nurse  Notify [facility] if wound(s) deteriorate.  Nursing to notify the Provider(s) and re-consult the [facility] Nurse if new skin concern.    DATA:     Current support surface: Standard  Low air loss (ISABELL pump, Isolibrium, Pulsate)  Containment of urine/stool: Incontinent pad in bed, Indwelling catheter, and Internal fecal management  BMI: Body mass index is 27.63 kg/m .   Active diet order: None     Output: I/O last 3 completed shifts:  In: 2437.87 [I.V.:1021.2]  Out: 2409 [Drains:1218; Other:1191]     Labs:   Recent Labs   Lab 05/23/25  0331 05/22/25 2031   ALBUMIN 2.1*  --    HGB 8.1* 8.8*   INR  --  1.22*   WBC 20.3* 21.8*     Pressure injury risk assessment:   Sensory Perception: 3-->slightly limited  Moisture: 1-->constantly moist  Activity: 1-->bedfast  Mobility: 2-->very limited  Nutrition: 2-->probably inadequate  Friction and Shear: 1-->problem  Dick Score: 10      Pager no longer in use, please contact through [employer] group: [facility] Nurse [place]    Dept. Office Number: [phone]

## 2025-05-23 NOTE — PLAN OF CARE
Occupational Therapy Discharge Summary    Reason for therapy discharge:    Per discussion with PT, pt appropriate for one discipline until mobility progresses. MD's ok with in bed activity. Will reconsult for OT services if able to participate in more.    Progress towards therapy goal(s). See goals on Care Plan in [facility] electronic health record for goal details.  Goals not met.  Barriers to achieving goals:   limited participation d/t medical status.    Therapy recommendation(s):    Continued therapy is recommended.  Rationale/Recommendations:  continue therapy with PT/edema to promote strength and activity tolerance. Reconsult OT when mobility has progressed and able to tolerate two therapies.

## 2025-05-23 NOTE — PROGRESS NOTES
"CRRT STATUS NOTE    DATA:  Time:  5:54 PM  Pressures WNL:  YES  Filter Status:  WDL    Problems Reported/Alarms Noted: None    Supplies Present:  YES    ASSESSMENT:  Patient Net Fluid Balance: Net -357 l since MN, +17L since admission.  Vital Signs:  Temp: 98.1  F (36.7  C) Temp src: Oral BP: 119/53 Pulse: 81   Resp: 14 SpO2: 100 % O2 Device: Mechanical Ventilator      Labs:  Last Comprehensive Metabolic Panel:  Sodium   Date Value Ref Range Status   05/23/2025 140 135 - 145 mmol/L Final     Potassium   Date Value Ref Range Status   05/23/2025 3.8 3.4 - 5.3 mmol/L Final     Chloride   Date Value Ref Range Status   05/23/2025 106 98 - 107 mmol/L Final     Carbon Dioxide (CO2)   Date Value Ref Range Status   05/23/2025 21 (L) 22 - 29 mmol/L Final     Anion Gap   Date Value Ref Range Status   05/23/2025 13 7 - 15 mmol/L Final     Glucose   Date Value Ref Range Status   05/23/2025 210 (H) 70 - 99 mg/dL Final     GLUCOSE BY METER POCT   Date Value Ref Range Status   05/23/2025 195 (H) 70 - 99 mg/dL Final     Urea Nitrogen   Date Value Ref Range Status   05/23/2025 36.9 (H) 6.0 - 20.0 mg/dL Final     Creatinine   Date Value Ref Range Status   05/23/2025 0.57 (L) 0.67 - 1.17 mg/dL Final     GFR Estimate   Date Value Ref Range Status   05/23/2025 >90 >60 mL/min/1.73m2 Final     Comment:     eGFR calculated using 2021 CKD-EPI equation.     Calcium   Date Value Ref Range Status   05/23/2025 8.0 (L) 8.8 - 10.4 mg/dL Final      Lab Results   Component Value Date    WBC 23.4 05/23/2025     Lab Results   Component Value Date    RBC 2.68 05/23/2025     Lab Results   Component Value Date    HGB 8.0 05/23/2025     Lab Results   Component Value Date    HCT 24.8 05/23/2025     No components found for: \"MCT\"  Lab Results   Component Value Date    MCV 93 05/23/2025     Lab Results   Component Value Date    MCH 29.9 05/23/2025     Lab Results   Component Value Date    MCHC 32.3 05/23/2025     Lab Results   Component Value Date    RDW " 16.8 05/23/2025     Lab Results   Component Value Date     05/23/2025        Goals of Therapy:  0-50cc/h; meetin fluid removal goal.    INTERVENTIONS:   Circuit changed this afternoon.    PLAN:  Continue fluid removal per goals of care as patient tolerates. Check filter daily. Change filter q72 hours and PRN. Please call CRRT resource RN on Vocera with any questions or concerns.

## 2025-05-23 NOTE — PROGRESS NOTES
SURGICAL ICU PROGRESS NOTE  05/23/2025    Date of Service (when I saw the patient): 05/23/2025    ASSESSMENT:  Sebastián Rodas is a 29M with alcohol abuse, anxiety, and bipolar disorder, who was admitted 3/30/25 for alcohol withdrawal and abdominal pain. Workup showed leukocytosis and elevated lipase, consistent with acute pancreatitis. He developed encephalopathy and shock, requiring ICU care, intubation, vasopressors, and CRRT by 4/1. His course was complicated by cardiomyopathy (EF 20-25%, improved to 65-70% by 4/14), necrotizing pancreatitis, and persistent infection despite 14 days of meropenem. On 4/27, imaging showed likely perforated viscus; he underwent emergent laparotomy, necrosectomy, and colectomy. Transferred to [facility] SICU on 4/30. On 5/1, reoperation revealed colonic staple line breakdown, necrotic pancreas, vessel thrombosis, and adhesions. Colostomy with malankot drain and temporary abdominal closure performed. Prognosis is poor; palliative care involved. Care conference 5/2 with family to talk goals of care. Full code, family acknowledged that poor prognosis is to be expected. 5/4 s/p exploratory surgery, additional necrotic pancreas remove with no obvious spillage or sign of bleeding at the time. Went back to the OR 5/7 for abdominal wash out. CT abdomen 5/10 showed extravasation on arterial phase imaging. MTP initiated, IR arterial embolization of pancreatic vessels performed. Decreased stool output since 5/13, switched to TPN. Hemorraghic shock.  MTP 5/18, IR embolization 5/18.      CHANGES and MAJOR THINGS TODAY:  - wean precedex as able  - continue prn zyprexa for breakthrough agitation  - Pressure support as able  - Decrease PEEP to 8  - 25 mg hydrocortisone x 1, in addition to scheduled dose  - TG re-check at noon  - Lantus 5u    PLAN:    Neurological:  # Acute pain   -Fentanyl gtt, fentanyl bolus  #Encephalopathy- improving  #Insomnia  - Monitor neurological status. Delirium preventions  and precautions.   # Pain:   # Sedation: Precedex, weaning as able  # Anxiety  - Olanzapine incr to 5 q6 prn, with goal of getting off precedex     Pulmonary:   # Acute hypoxic respiratory failure  # S/p tracheostomy placement   - FiO2 (%): 40 %, Resp: 25, Vent Mode: (S) PS, Resp Rate (Set): 16 breaths/min, Tidal Volume (Set, mL): 420 mL, PEEP (cm H2O): 8 cmH2O, Pressure Support (cm H2O): 12 cmH2O, Resp Rate (Set): 16 breaths/min, Tidal Volume (Set, mL): 420 mL, PEEP (cm H2O): 8 cmH2O   - PST as able  - CT PE 5/9 showed no evidence of pulmonary embolism  - Pulmonary toilet, mobilize  - Ventilator bundle  - Wean FIO2 as tolerated, PEEP 10  - Pressure support as tolerated    Cardiovascular:    # Stress Cardiomyopathy   - Initial LVEF 20-25%, improved to 65-70%  # Shock-distributive (septic)  # Hypovolemic shock  (hemorraghic)  # Sinus Tachycardia  - Monitor hemodynamic status. MAP goal > 65.   - Hydroxycortisone for stress dose steroid, currently 25mg Daily   - EKG 5/8, 5/9, 5/18 showed sinus tachycardia with no ectopy  - MTP 5/17  - Continue trend lactate  - 1 more dose of 25 mg hydrocortisone      Gastroenterology/Nutrition:  # S/p emergent exploratory laparotomy, necrosectomy, transverse colectomy, abdominal washout, and drain placement 5/1 and 5/4  # S/p Pancreatic branch vessel embolization with IR 5/10, 5/18  # Severe necrotizing pancreatitis  # Splenic vein thrombosis  - will defer management of dressings and drain removal per EGS   - Ongoing copious dark bloody drain output, CTM  - Cecostomy drain in place, no stool output. Abdomen distended.  - on PPI IV  - CT abdomen/pelvis with IV + enteral contrast through G tube 5/14: large volume of air and blood products in abdomen.   - Hold all Oral Meds and Feeds, strict NPO  - Recheck lipid 12PM    # Severe Protein calorie deficit malnutrition due to critical illness  - on TPN  - G tube tear in the J portion, TF stopped.  - IR consulted for PEG exchange    - IR  cannot safely exchange freshly placed GJ tubes until they have been in place x6 weeks given risk of losing access into the stomach. Additionally, imaging shows there is no safe pexy between the stomach and abdominal wall. IR may not be able to offer safe exchange even after 6 weeks.   - Multivitamin supplementation ordered by nutrition  - RD consult. Appreciate cares and recommendations.     Renal/Fluids/Electrolytes:   # SARAHI  # Fluid overload  Baseline Cr 0.7-0.8. Presented with Cr 0.96 on 3/30. Rapidly markos to 5.2 on 4/1. Oliguric. Garcia UA with proteinuria, hematuria, pyuria, moderate bacteria.  Kidneys unremarkable on CT. Has severe ATN in the setting of shock, ADHF, intravascular hypovolemia/3rd spacing from pancreatitis.   - Trend lactate   - CRRT with I&Os goal -500cc  - Continue to monitor intake and output  - Volume assessment daily    Endocrine:   # Stress hyperglycemia    - hgb A1c 5.3, no hx of DM   - Transition from insulin drip to sliding scale insulin and lantus. Increased lantus on 5/20 to 20units daily and HSSI   - Goal to keep BG< 180 for optimal wound healing   - Adding 5u lantus daily    # Leukocytosis  #Necrotizing pancreatitis  WBC Count   Date Value Ref Range Status   05/23/2025 20.3 (H) 4.0 - 11.0 10e3/uL Final   - Trend CRP   - Trend WBC  - Completed 14 days course of meropenem and caspofungin and zosyn previously.   - Continue Zosyn for ongoing intra-abdominal infection     cultures:  - 4/30 blood cultures- NGTD   - 5/1 blood cultures ordered - NGTD  - Blood culture 5/12, 5/15 - NGTD  - Blood culture 5/22 - +candida     Heme:     # Acute blood loss anemia   # Anemia of critical illness   # Thrombosed splenic vein   - Transfuse if hgb <7.0 or signs/symptoms of hypoperfusion. Monitor and trend  Hemoglobin   Date Value Ref Range Status   05/23/2025 8.1 (L) 13.3 - 17.7 g/dL Final     - MTP 5/10, 5/18  - hemoglobin stable, keeping on trending  - Continue subcutaneous heparin, watch bleeding from  drains.     Musculoskeletal:   # Deconditioning and weakness due to critical illness   - Physical and occupational therapy consult      Skin:  # Pressure Ulcers - Buttocks/rectal area  - Barrier cream with liberal application. Continue fecal management system   - WOC consult      #Pressure Ulcers- Left Heel  Pressure Injury Location: Left heel   Wound type: Pressure Injury     Pressure Injury Stage: Deep Tissue Pressure Injury (DTPI), present on admission     General Cares/Prophylaxis:    DVT Prophylaxis: SQH   GI Prophylaxis: PPI     Lines/ tubes/ drains:  - HD line--  Right IJ  - PICC line- left   - Right radial A line  - PIV x 3  - Trach  - G-J  - Wound manager  - SHANNON x 3  - Cecostomy drain     Disposition:  - Surgical ICU    Jean Paul Pierre MD      ====================================    OBJECTIVE:   1. VITAL SIGNS:   Temp:  [98  F (36.7  C)-100.1  F (37.8  C)] 99  F (37.2  C)  Pulse:  [] 110  Resp:  [11-32] 25  MAP:  [50 mmHg-153 mmHg] 81 mmHg  Arterial Line BP: ()/() 132/60  FiO2 (%):  [35 %-50 %] 40 %  SpO2:  [91 %-100 %] 95 %  FiO2 (%): 40 %, Resp: 25, Vent Mode: (S) PS, Resp Rate (Set): 16 breaths/min, Tidal Volume (Set, mL): 420 mL, PEEP (cm H2O): 8 cmH2O, Pressure Support (cm H2O): 12 cmH2O, Resp Rate (Set): 16 breaths/min, Tidal Volume (Set, mL): 420 mL, PEEP (cm H2O): 8 cmH2O    2. INTAKE/ OUTPUT:   I/O last 3 completed shifts:  In: 2437.87 [I.V.:1021.2]  Out: 2409 [Drains:1218; Other:1191]    3. PHYSICAL EXAMINATION:  General: laying in bed, awake and alert   HEENT: PERRLA. Trach present and secure, site dressed.   Pulm/Resp: Clear breath sounds bilaterally, lungs coarse but clear.  CV: RRR, S1/S2   Abdomen: Distended. Dark bloody output noted to all drains. G-J tube in place, site secured, abdomen with dressing in place  : scrotal edema, draining dark bloody output.   MSK/Extremities: generalized edema in extremities    4. INVESTIGATIONS:   Arterial Blood Gases   Recent Labs   Lab  05/20/25 0344 05/19/25 0407 05/18/25  0537 05/18/25  0055   PH 7.45 7.41 7.34* 7.37   PCO2 33* 38 41 33*   PO2 74* 150* 90 187*   HCO3 23 24 22 19*     Complete Blood Count   Recent Labs   Lab 05/23/25 0331 05/22/25 2031 05/22/25  1556 05/22/25  0403   WBC 20.3* 21.8* 25.5* 18.8*   HGB 8.1* 8.8* 8.6* 8.7*    302 306 269     Basic Metabolic Panel  Recent Labs   Lab 05/23/25  0804 05/23/25 0331 05/23/25  0008 05/22/25 1935 05/22/25 1556 05/22/25  0748 05/22/25 0403 05/21/25 1939 05/21/25  1616   NA  --  139  --   --  139  --  141  --  139   POTASSIUM  --  3.6  --   --  4.0  --  3.6  --  4.0   CHLORIDE  --  104  --   --  106  --  107  --  106   CO2  --  20*  --   --  20*  --  20*  --  20*   BUN  --  34.5*  --   --  32.7*  --  28.5*  --  27.7*   CR  --  0.59*  --   --  0.53*  --  0.50*  --  0.36*   * 174*  185* 165*   < > 194*   < > 77  81   < > 148*    < > = values in this interval not displayed.     Liver Function Tests  Recent Labs   Lab 05/23/25 0331 05/22/25 2031 05/22/25 1556 05/22/25  1002 05/22/25  0403 05/21/25  1616 05/21/25  0415 05/20/25  1610 05/20/25 0344 05/18/25  1548 05/18/25  0530 05/18/25  0047   AST 47*  --   --   --  56*  --  44  --  86*   < > 71*  --    ALT 58  --   --   --  62  --  68  --  83*   < > 36  --    ALKPHOS 245*  --   --   --  239*  --  175*  --  217*   < > 289*  --    BILITOTAL 2.4*  --   --   --  3.4*  --  3.3*  --  2.0*   < > 2.6*  --    ALBUMIN 2.1*  --  2.2*  --  2.2* 2.3* 2.3*   < > 2.7*   < > 2.9*  --    INR  --  1.22*  --  1.18*  --   --   --   --   --   --  1.21* 1.63*    < > = values in this interval not displayed.     Pancreatic Enzymes  No lab results found in last 7 days.    Coagulation Profile  Recent Labs   Lab 05/22/25  2031 05/22/25  1002 05/18/25  0530 05/18/25  0047 05/17/25  2343 05/17/25  1417   INR 1.22* 1.18* 1.21* 1.63* 1.54* 1.18*   PTT  --   --  31  --  40* 30     5. RADIOLOGY:   No results found for this or any previous visit (from  the past 24 hours).      =========================================

## 2025-05-23 NOTE — PROGRESS NOTES
Nephrology Progress Note  05/23/2025       Sebastián Rodas is a 29 yom with Bipolar disorder, ETOH use c/b necrotizing pancreatitis admitted 3/30/25 to [facility] for ETOH withdrawal and abdominal pain, found to have acute pancreatitis which has progressed to necrotizing pancreatitis complicated by SARAHI.  Seen by Nephrology at [place], started on CRRT 4/1.  Had periods of stability enough for iHD but back to CRRT on 4/21 until tx to [facility] for further surgical interventions.       Interval History :   Mr Rodas' labs remain stable on 4k baths with dose of 20ml/kg/h, checking labs BID. Have tried to be gently net negative the past few days and will continue to order 0-50cc/h volume goal today.  Continues with difficult situation with intraabdominal bleeding, discussing GOC with family.       Assessment & Recommendations:   SARAHI-Baseline Cr 0.8 as recently as March 2025 before acute events, was started on CRRT emergently on 4/1 in setting of septic shock. Had ~2 weeks of stability enough to manage with iHD but back on CRRT 4/21 until tx to [facility] on 4/30. Running fevers with intraabdominal sepsis.  Continuing RRT from OSH, restarted CRRT on 5/2 after OR.  Remains tenuous due to intraabdominal bleeding and necrosis.                 -No need for new consent, continuing RRT started last month at [facility].                 -Access is tunneled RIJ from 4/21.                  -CRRT, dose of 20ml/kg/h with low Cr.  All 4k baths, planning I=O but mainly achieving this on his own with drain output.      Volume-Wt is up 12kg from baseline, ordered for 0-50cc/h again but hemodynamics have been up and down.      Electrolytes-K 3.6 on all 4k baths, bicarb 20.      BMD-No acute issues.      Anemia-Hgb 8.1, ongoing need for PRBC's.      Nutrition-On hold with bleeding, back on TPN.      Time spent: 50 minutes on this date of encounter for chart review, physical exam, medical decision making and co-ordination of care.     "  Discussed with Dr Samuel     Recommendations were communicated to primary team via verbal communication.      ASIF Roger CNS  Clinical Nurse Specialist  [phone]    Review of Systems:   I reviewed the following systems:  Gen: No fevers or chills  CV: No CP at rest  Resp: No SOB at rest  GI: No N/V    Physical Exam:   I/O last 3 completed shifts:  In: 2437.87 [I.V.:1021.2]  Out: 2409 [Drains:1218; Other:1191]   /55   Pulse 90   Temp 98  F (36.7  C) (Oral)   Resp 17   Ht 1.727 m (5' 7.99\")   Wt 82.4 kg (181 lb 10.5 oz)   SpO2 99%   BMI 27.63 kg/m       GENERAL APPEARANCE: Vent via trach, CRRT running.   Pulmonary: Vent via trach  CV: Regular rhythm, normal rate   - Edema +2 generalized  GI: Surgical dressing in place  MS: no evidence of inflammation in joints, no muscle tenderness  : + Garcia  SKIN: no rash, warm, dry  NEURO: Vent via trach, no focal deficits.     Labs:   All labs reviewed by me  Electrolytes/Renal -   Recent Labs   Lab Test 05/23/25  0804 05/23/25  0331 05/22/25  1935 05/22/25  1556 05/22/25  0748 05/22/25  0403   NA  --  139  --  139  --  141   POTASSIUM  --  3.6  --  4.0  --  3.6   CHLORIDE  --  104  --  106  --  107   CO2  --  20*  --  20*  --  20*   BUN  --  34.5*  --  32.7*  --  28.5*   CR  --  0.59*  --  0.53*  --  0.50*   * 174*  185*   < > 194*   < > 77  81   KARINA  --  8.2*  --  8.0*  --  8.3*   MAG  --  2.3  --  2.2  --  2.2   PHOS  --  3.0  --  3.1  --  2.5    < > = values in this interval not displayed.       CBC -   Recent Labs   Lab Test 05/23/25  0331 05/22/25  2031 05/22/25  1556   WBC 20.3* 21.8* 25.5*   HGB 8.1* 8.8* 8.6*    302 306       LFTs -   Recent Labs   Lab Test 05/23/25  0331 05/22/25  1556 05/22/25  0403 05/21/25  1616 05/21/25  0415   ALKPHOS 245*  --  239*  --  175*   BILITOTAL 2.4*  --  3.4*  --  3.3*   ALT 58  --  62  --  68   AST 47*  --  56*  --  44   PROTTOTAL 5.8*  --  5.5*  --  5.2*   ALBUMIN 2.1* 2.2* 2.2*   < > 2.3*    " < > = values in this interval not displayed.       Iron Panel - No lab results found.        Current Medications:  Current Facility-Administered Medications   Medication Dose Route Frequency Provider Last Rate Last Admin    heparin ANTICOAGULANT injection 5,000 Units  5,000 Units Subcutaneous Q8H [facility] Sonia Moore NP   5,000 Units at 05/23/25 0526    hydrocortisone sodium succinate PF (solu-CORTEF) injection 25 mg  25 mg Intravenous Daily Sonia Moore NP   25 mg at 05/23/25 0805    insulin aspart (NovoLOG) injection (RAPID ACTING)  1-12 Units Subcutaneous Q4H Jessica Lancaster MD   4 Units at 05/23/25 0805    insulin glargine (LANTUS PEN) injection 5 Units  5 Units Subcutaneous Daily Olga Lidia Velarde PA-C   5 Units at 05/23/25 1130    lipids 4 oil (SMOFLIPID) 20 % infusion 250 mL  250 mL Intravenous Q24H Brendon Haas DO   Stopped at 05/23/25 0735    OLANZapine (zyPREXA) IV injection 2.5 mg  2.5 mg Intravenous QPM Sonia Moore NP   2.5 mg at 05/22/25 1935    pantoprazole (PROTONIX) IV push injection 40 mg  40 mg Intravenous QAM AC de La MaterRenee CNP   40 mg at 05/23/25 0805    piperacillin-tazobactam (ZOSYN) 4.5 g vial to attach to  mL bag  4.5 g Intravenous Q6H Eugenia Zavala  mL/hr at 05/23/25 1220 4.5 g at 05/23/25 1220    sodium chloride (PF) 0.9% PF flush 3 mL  3 mL Intracatheter Q8H Ganesh Griffiths MD   3 mL at 05/23/25 0530     Current Facility-Administered Medications   Medication Dose Route Frequency Provider Last Rate Last Admin    dexmedeTOMIDine (PRECEDEX) 4 mcg/mL in sodium chloride 0.9 % 100 mL infusion  0.1-1.2 mcg/kg/hr (Dosing Weight) Intravenous Continuous Sonia Moore NP 17.4 mL/hr at 05/23/25 1200 1 mcg/kg/hr at 05/23/25 1200    dextrose 10% infusion   Intravenous Continuous PRN Brendon Haas DO   Stopped at 05/22/25 1232    dialysate for CVVHD & CVVHDF (Phoxillum BK4/2.5)  10 mL/kg/hr  CRRT Continuous Raheem Gabriel APRN  mL/hr at 05/23/25 0648 10 mL/kg/hr at 05/23/25 0648    fentaNYL (SUBLIMAZE) infusion   mcg/hr Intravenous Continuous Roxi Alston MD 2 mL/hr at 05/23/25 1200 100 mcg/hr at 05/23/25 1200    No heparin required   Does not apply Continuous PRN Tico Spain MD        norepinephrine (LEVOPHED) 16 mg in  mL infusion MAX CONC CENTRAL LINE  0.01-0.6 mcg/kg/min (Dosing Weight) Intravenous Continuous Celso Ambrose MD 1.3 mL/hr at 05/23/25 1200 0.02 mcg/kg/min at 05/23/25 1200    parenteral nutrition - ADULT compounded formula   CENTRAL LINE IV TPN CONTINUOUS Brendon Haas DO        parenteral nutrition - ADULT compounded formula   CENTRAL LINE IV TPN CONTINUOUS Brendon Haas DO 50 mL/hr at 05/23/25 1200 Rate Verify at 05/23/25 1200    POST-filter replacement solution for CVVHD & CVVHDF (Phoxillum BK4/2.5)   CRRT Continuous Tico Spain  mL/hr at 05/23/25 0757 New Bag at 05/23/25 0757    PRE-filter replacement solution for CVVHD & CVVHDF (Phoxillum BK4/2.5)  10 mL/kg/hr CRRT Continuous Raheem Gabriel APRN  mL/hr at 05/23/25 0648 10 mL/kg/hr at 05/23/25 0648    Reason statin not prescribed   Does not apply DOES NOT GO TO Rhys Bloom MD

## 2025-05-23 NOTE — PROGRESS NOTES
Surgery Progress Note  05/23/2025       Subjective:  Overnight patient was noted to have stool coming from the superior aspect of his wound. On 0.05 levo this morning. Lactate 4.1 this AM.      Objective:  Temp:  [98  F (36.7  C)-100.1  F (37.8  C)] 99  F (37.2  C)  Pulse:  [] 93  Resp:  [11-32] 20  MAP:  [50 mmHg-153 mmHg] 66 mmHg  Arterial Line BP: ()/() 115/46  FiO2 (%):  [35 %-50 %] 40 %  SpO2:  [91 %-100 %] 98 %    I/O last 3 completed shifts:  In: 2437.87 [I.V.:1021.2]  Out: 2409 [Drains:1218; Other:1191]      Gen: Resting comfortably in bed  Neuro: awake and alert, answering questions appropriately   Resp: Ventilated  Abd: Abdomen is taut but compressible, drains with dark bloody output, Malecot with minimal succus, with dark bloody output around midline incision  : Scrotum with pouch in place with serosanguinous drainage     Labs:  Recent Labs   Lab 05/23/25  0331 05/22/25  2031 05/22/25  1556   WBC 20.3* 21.8* 25.5*   HGB 8.1* 8.8* 8.6*    302 306       Recent Labs   Lab 05/23/25  0804 05/23/25  0331 05/22/25  1935 05/22/25  1556 05/22/25  0748 05/22/25  0403   NA  --  139  --  139  --  141   POTASSIUM  --  3.6  --  4.0  --  3.6   CHLORIDE  --  104  --  106  --  107   CO2  --  20*  --  20*  --  20*   BUN  --  34.5*  --  32.7*  --  28.5*   CR  --  0.59*  --  0.53*  --  0.50*   * 174*  185*   < > 194*   < > 77  81   KARINA  --  8.2*  --  8.0*  --  8.3*   MAG  --  2.3  --  2.2  --  2.2   PHOS  --  3.0  --  3.1  --  2.5    < > = values in this interval not displayed.     Assessment/Plan:   Sebastián Rodas is a 29-year-old male with a history of alcohol use disorder, anxiety, and bipolar disorder who was admitted on 3/30/2025 for alcohol withdrawal following a recent binge, presenting with diffuse abdominal pain. Initial workup revealed leukocytosis and elevated lipase concerning for acute pancreatitis. He developed acute encephalopathy and was transferred to the ICU on 3/31,  "requiring intubation and vasopressors by 4/1 due to shock. Hospital course has been complicated by acute renal failure requiring CRRT, cardiomyopathy (initial LVEF 20-25%, improved to 65-70% by 4/14), and necrotizing pancreatitis with unorganized fluid collections. He completed a 14-day course of meropenem but remains intermittently febrile and critically ill, requiring ongoing dialysis and vasopressor support. On 4/27, after clinical deterioration and imaging consistent with a likely perforated viscus, following family discussion, he underwent emergent exploratory laparotomy, necrosectomy, transverse colectomy, abdominal washout, and drain placement. Patient was transferred to [facility] on 4/30/25 for further surgical management. Went to OR 5/1 for re-open laparotomy, I&D, placement of colostomy tube in presumed previous colectomy staple line, necrosectomy, and Abthera placement. On 5/2, increasing sanguineous output from drains concerning for bleeding from pancreatic bed. Family care conference held 5/2, plan for restorative cares at that time. Take back to OR twice (5/4 and 5/7) for abdominal washout. Per nursing note: 5/9 morning after he saw a picture of his abdomen that he \"desires to speak to team about updating goals of care, expressed desire to pull back on cares\". Goals of care conference on 5/10 with continuation of full code and restorative cares. CTA on 5/10 revealed large area of active arterial extravasation in the upper abdomen. MTP was started. Patient is now s/p embolization of small pancreatic branch off of the distal celiac artery with IR on 5/10. Malecot drainage slowed and eventually stopped, with significant drainage through other drains and open into dressings. 5/17 night patient had active extravasation, with Hgb dropping from 7.8 to 5.6 in 1.5 hours. Lactate spiked from 3.6 to 10.6, with WBC 14.9 to 25.5. Family was consulted, MTP was started, and IR embolized the celiac trunk/splenic artery " branches.     - No further surgical options for any intraabdominal pathology  - Nursing to change dressings in the am, pm, and PRN during the day if copious drainage during the day. Please place Xeroform over the bridging mesh. Please do not apply xeroform outside the wound bed/over the skin edges but make sure all of the wound bed is covered.  - Tube feeds on hold currently due to tear in J-tube. Receiving TPN (50 mL/hr), and Lipids & Albumin as needed.  - On Zosyn per SICU  - SQH PPX, would not recommend therapeutic dose given the risk of bleeding.   - Appreciate WOC cares, okay with wound manager if able.   - Other cares per SICU, we appreciate cares      Seen, examined, and discussed with chief resident, who will discuss with staff.    Mireya López, DO  General Surgery, PGY-2

## 2025-05-23 NOTE — PROGRESS NOTES
SICU progress note    Notified about darker drainage from superior aspect of midline abdominal wound. Patient also more tachycardic to 120s. Stable amount of pressors. Approximately net -900 ml/24h today.     He is flushed and feels similar to when he had massive bleeds requiring MTP. Upon my examination, he had dark red liquid welling in superior aspect of wound. It was constant at first then slowed down. Abdomen otherwise soft and compressible. No increased drain output or change in color.     WBC 22. Hemoglobin 8.8 from 8.6. Plt count 302. INR 1.22 from 1.8.     Stopped pulling on CRRT and HR down to 110-112 bpm. Tachycardia likely 2/2 hypovolemia in setting of CRRT.     Plan  - goal net even for tonight   - continue to monitor wound    Seen with ICU fellow.     Mallory Holman, DO  General Surgery, PGY-2   [place], Pager [phone]

## 2025-05-23 NOTE — PLAN OF CARE
ICU End of Shift Summary ([id number]). See flowsheets for vital signs and detailed assessment.    Changes this shift: Pt A/O x4, communicating with pen/paper, anxious to start shift, flushed face and feeling similar to when began bleeding in the past. MD notified, increased bloody drainage noted from abd wound, ST 120s-130s, labs sent, Hgb stable 8.8, Lactic 4.3. Stopped pulling on CRRT. Pt now SR 90s-100s, Levo on to maintain MAP >65. On 40% PEEP 10.     Plan: monitor for signs of bleeding, update team with changes

## 2025-05-23 NOTE — PLAN OF CARE
ICU End of Shift Summary. See flowsheets for vital signs and detailed assessment.    Changes this shift: Pt remained A&O, RASS +1, anxious. NSR to ST MAP maintained > 65 with levo. gtt, t-max 100.1. No acute changes to respiratory status, vent settings: 40%, RR 16, , PEEP 10. TPN infusing, c/o nausea x1; relieved with PRN zofran. Anuric on CRRT, due to increased bleeding goal changed to I=O per resident, see note from 5/22/25 at 20:48.   Pt's abdominal incision with copious drainage throughout the night requiring dressing changes at 02:00 and 04:00. At 02:00 upon taking down the dressing it was noted that stool was now oozing out of the abdominal incision as well as the same bloody drainage as previous. Dr. IVAN Holman with SICU was notified and came to the bedside to assess. MD said pt has developed a fistula. MD also wishes for WOC to place wound containment device in the AM.  At 06:00 dressing had leaked onto pt's gown and patient refused clean gown and dressing change, dressing reinforced with ABD pads.    Plan: Monitor dressings and bleeding, update team with changes, check with WOC about wound containment device.      Problem: Adult Inpatient Plan of Care  Goal: Absence of Hospital-Acquired Illness or Injury  Intervention: Identify and Manage Fall Risk  Recent Flowsheet Documentation  Taken 5/23/2025 0400 by Regi Tavarez RN  Safety Promotion/Fall Prevention:   activity supervised   assistive device/personal items within reach   room near nurse's station   patient and family education  Taken 5/23/2025 0000 by Regi Tavarez RN  Safety Promotion/Fall Prevention:   activity supervised   assistive device/personal items within reach   room near nurse's station   patient and family education  Intervention: Prevent Skin Injury  Recent Flowsheet Documentation  Taken 5/23/2025 0400 by Regi Tavarez RN  Body Position:   turned   heels elevated   upper extremity elevated  Taken 5/23/2025 0200 by King  Regi MARTINEZ RN  Body Position:   refuses positioning   position maintained   heels elevated   upper extremity elevated  Taken 5/23/2025 0000 by Regi Tavarez RN  Body Position:   refuses positioning   position maintained   heels elevated   upper extremity elevated  Intervention: Prevent and Manage VTE (Venous Thromboembolism) Risk  Recent Flowsheet Documentation  Taken 5/23/2025 0400 by Regi Tavarez RN  VTE Prevention/Management: SCDs off (sequential compression devices)  Taken 5/23/2025 0000 by Regi Tavarez RN  VTE Prevention/Management: SCDs off (sequential compression devices)  Goal: Optimal Comfort and Wellbeing  Intervention: Monitor Pain and Promote Comfort  Recent Flowsheet Documentation  Taken 5/23/2025 0400 by Regi Tavarez RN  Pain Management Interventions:   around-the-clock dosing utilized   rest   repositioned  Taken 5/23/2025 0000 by Regi Tavarez RN  Pain Management Interventions:   around-the-clock dosing utilized   rest   repositioned     Problem: Risk for Delirium  Goal: Optimal Coping  Intervention: Optimize Psychosocial Adjustment to Delirium  Recent Flowsheet Documentation  Taken 5/23/2025 0400 by Regi Tavarez RN  Supportive Measures:   positive reinforcement provided   relaxation techniques promoted  Taken 5/23/2025 0000 by Regi Tavarez RN  Supportive Measures:   positive reinforcement provided   relaxation techniques promoted  Goal: Improved Behavioral Control  Intervention: Prevent and Manage Agitation  Recent Flowsheet Documentation  Taken 5/23/2025 0400 by Regi Tavarez RN  Environment Familiarity/Consistency:   daily routine followed   familiar objects from home provided  Taken 5/23/2025 0000 by Regi Tavarez RN  Environment Familiarity/Consistency:   daily routine followed   familiar objects from home provided  Intervention: Minimize Safety Risk  Recent Flowsheet Documentation  Taken 5/23/2025 0400 by Regi Tavarez RN  Enhanced Safety  Measures:   pain management   monitor patients coagulation values   patient/family teach back on injury risk  Taken 5/23/2025 0000 by Regi Tavarez RN  Enhanced Safety Measures:   pain management   monitor patients coagulation values   patient/family teach back on injury risk  Goal: Improved Attention and Thought Clarity  Intervention: Maximize Cognitive Function  Recent Flowsheet Documentation  Taken 5/23/2025 0400 by Regi Tavarez RN  Sensory Stimulation Regulation:   care clustered   quiet environment promoted   television on   lighting decreased  Reorientation Measures:   calendar in view   clock in view   glasses use encouraged  Taken 5/23/2025 0000 by Regi Tavarez RN  Sensory Stimulation Regulation:   care clustered   quiet environment promoted   television on   lighting decreased  Reorientation Measures:   calendar in view   clock in view   glasses use encouraged     Problem: Restraint, Nonviolent  Goal: Absence of Harm or Injury  Intervention: Protect Skin and Joint Integrity  Recent Flowsheet Documentation  Taken 5/23/2025 0400 by Regi Tavarez RN  Body Position:   turned   heels elevated   upper extremity elevated  Range of Motion: (refused) other (see comments)  Taken 5/23/2025 0200 by Regi Tavarez RN  Body Position:   refuses positioning   position maintained   heels elevated   upper extremity elevated  Taken 5/23/2025 0000 by Regi Tavarez RN  Body Position:   refuses positioning   position maintained   heels elevated   upper extremity elevated  Range of Motion: (refused) other (see comments)     Problem: Gas Exchange Impaired  Goal: Optimal Gas Exchange  Intervention: Optimize Oxygenation and Ventilation  Recent Flowsheet Documentation  Taken 5/23/2025 0400 by Regi Tavarez RN  Head of Bed (HOB) Positioning: HOB at 30-45 degrees  Taken 5/23/2025 0200 by Regi Tavarez RN  Head of Bed (HOB) Positioning: HOB at 30-45 degrees  Taken 5/23/2025 0000 by Regi Tavarez  RN  Head of Bed (HOB) Positioning: HOB at 30-45 degrees     Problem: Infection  Goal: Absence of Infection Signs and Symptoms  Intervention: Prevent or Manage Infection  Recent Flowsheet Documentation  Taken 5/23/2025 0400 by Regi Tavarez RN  Fever Reduction/Comfort Measures:   cool cloth applied   other (see comments)  Infection Management: aseptic technique maintained  Taken 5/23/2025 0000 by Regi Tavarez RN  Fever Reduction/Comfort Measures:   cool cloth applied   other (see comments)  Infection Management: aseptic technique maintained   Goal Outcome Evaluation:

## 2025-05-24 LAB
ALBUMIN SERPL BCG-MCNC: 2.2 G/DL (ref 3.5–5.2)
ALBUMIN SERPL BCG-MCNC: 2.3 G/DL (ref 3.5–5.2)
ALP SERPL-CCNC: 500 U/L (ref 40–150)
ALT SERPL W P-5'-P-CCNC: 49 U/L (ref 0–70)
ANION GAP SERPL CALCULATED.3IONS-SCNC: 12 MMOL/L (ref 7–15)
ANION GAP SERPL CALCULATED.3IONS-SCNC: 13 MMOL/L (ref 7–15)
AST SERPL W P-5'-P-CCNC: 37 U/L (ref 0–45)
BACTERIA SPT CULT: ABNORMAL
BILIRUB SERPL-MCNC: 1.7 MG/DL
BILIRUBIN DIRECT (ROCHE PRO & PURE): 1.33 MG/DL (ref 0–0.45)
BUN SERPL-MCNC: 36.6 MG/DL (ref 6–20)
BUN SERPL-MCNC: 38.7 MG/DL (ref 6–20)
CA-I BLD-MCNC: 4.5 MG/DL (ref 4.4–5.2)
CA-I BLD-MCNC: 4.7 MG/DL (ref 4.4–5.2)
CALCIUM SERPL-MCNC: 8.2 MG/DL (ref 8.8–10.4)
CALCIUM SERPL-MCNC: 8.3 MG/DL (ref 8.8–10.4)
CHLORIDE SERPL-SCNC: 105 MMOL/L (ref 98–107)
CHLORIDE SERPL-SCNC: 105 MMOL/L (ref 98–107)
CREAT SERPL-MCNC: 0.54 MG/DL (ref 0.67–1.17)
CREAT SERPL-MCNC: 0.54 MG/DL (ref 0.67–1.17)
EGFRCR SERPLBLD CKD-EPI 2021: >90 ML/MIN/1.73M2
EGFRCR SERPLBLD CKD-EPI 2021: >90 ML/MIN/1.73M2
ERYTHROCYTE [DISTWIDTH] IN BLOOD BY AUTOMATED COUNT: 16.4 % (ref 10–15)
ERYTHROCYTE [DISTWIDTH] IN BLOOD BY AUTOMATED COUNT: 16.6 % (ref 10–15)
GLUCOSE BLDC GLUCOMTR-MCNC: 207 MG/DL (ref 70–99)
GLUCOSE BLDC GLUCOMTR-MCNC: 228 MG/DL (ref 70–99)
GLUCOSE BLDC GLUCOMTR-MCNC: 231 MG/DL (ref 70–99)
GLUCOSE BLDC GLUCOMTR-MCNC: 236 MG/DL (ref 70–99)
GLUCOSE BLDC GLUCOMTR-MCNC: 253 MG/DL (ref 70–99)
GLUCOSE BLDC GLUCOMTR-MCNC: 258 MG/DL (ref 70–99)
GLUCOSE BLDC GLUCOMTR-MCNC: 272 MG/DL (ref 70–99)
GLUCOSE SERPL-MCNC: 239 MG/DL (ref 70–99)
GLUCOSE SERPL-MCNC: 272 MG/DL (ref 70–99)
GRAM STAIN RESULT: ABNORMAL
GRAM STAIN RESULT: ABNORMAL
HCO3 SERPL-SCNC: 21 MMOL/L (ref 22–29)
HCO3 SERPL-SCNC: 21 MMOL/L (ref 22–29)
HCT VFR BLD AUTO: 24.1 % (ref 40–53)
HCT VFR BLD AUTO: 24.2 % (ref 40–53)
HGB BLD-MCNC: 7.7 G/DL (ref 13.3–17.7)
HGB BLD-MCNC: 7.7 G/DL (ref 13.3–17.7)
LACTATE SERPL-SCNC: 3.2 MMOL/L (ref 0.7–2)
MAGNESIUM SERPL-MCNC: 2.3 MG/DL (ref 1.7–2.3)
MAGNESIUM SERPL-MCNC: 2.3 MG/DL (ref 1.7–2.3)
MCH RBC QN AUTO: 29.6 PG (ref 26.5–33)
MCH RBC QN AUTO: 30.1 PG (ref 26.5–33)
MCHC RBC AUTO-ENTMCNC: 31.8 G/DL (ref 31.5–36.5)
MCHC RBC AUTO-ENTMCNC: 32 G/DL (ref 31.5–36.5)
MCV RBC AUTO: 93 FL (ref 78–100)
MCV RBC AUTO: 94 FL (ref 78–100)
PHOSPHATE SERPL-MCNC: 3.3 MG/DL (ref 2.5–4.5)
PHOSPHATE SERPL-MCNC: 3.4 MG/DL (ref 2.5–4.5)
PLATELET # BLD AUTO: 278 10E3/UL (ref 150–450)
PLATELET # BLD AUTO: 297 10E3/UL (ref 150–450)
POTASSIUM SERPL-SCNC: 3.9 MMOL/L (ref 3.4–5.3)
POTASSIUM SERPL-SCNC: 3.9 MMOL/L (ref 3.4–5.3)
PROT SERPL-MCNC: 5.8 G/DL (ref 6.4–8.3)
RBC # BLD AUTO: 2.56 10E6/UL (ref 4.4–5.9)
RBC # BLD AUTO: 2.6 10E6/UL (ref 4.4–5.9)
SODIUM SERPL-SCNC: 138 MMOL/L (ref 135–145)
SODIUM SERPL-SCNC: 139 MMOL/L (ref 135–145)
WBC # BLD AUTO: 16.5 10E3/UL (ref 4–11)
WBC # BLD AUTO: 19.6 10E3/UL (ref 4–11)

## 2025-05-24 PROCEDURE — 84450 TRANSFERASE (AST) (SGOT): CPT | Performed by: STUDENT IN AN ORGANIZED HEALTH CARE EDUCATION/TRAINING PROGRAM

## 2025-05-24 PROCEDURE — 85027 COMPLETE CBC AUTOMATED: CPT | Performed by: CLINICAL NURSE SPECIALIST

## 2025-05-24 PROCEDURE — 250N000009 HC RX 250: Performed by: SURGERY

## 2025-05-24 PROCEDURE — 83735 ASSAY OF MAGNESIUM: CPT | Performed by: CLINICAL NURSE SPECIALIST

## 2025-05-24 PROCEDURE — 250N000009 HC RX 250: Performed by: NURSE PRACTITIONER

## 2025-05-24 PROCEDURE — 84460 ALANINE AMINO (ALT) (SGPT): CPT | Performed by: STUDENT IN AN ORGANIZED HEALTH CARE EDUCATION/TRAINING PROGRAM

## 2025-05-24 PROCEDURE — 250N000009 HC RX 250: Performed by: CLINICAL NURSE SPECIALIST

## 2025-05-24 PROCEDURE — 84100 ASSAY OF PHOSPHORUS: CPT | Performed by: CLINICAL NURSE SPECIALIST

## 2025-05-24 PROCEDURE — 85014 HEMATOCRIT: CPT | Performed by: CLINICAL NURSE SPECIALIST

## 2025-05-24 PROCEDURE — 250N000011 HC RX IP 250 OP 636: Performed by: NURSE PRACTITIONER

## 2025-05-24 PROCEDURE — 250N000011 HC RX IP 250 OP 636

## 2025-05-24 PROCEDURE — 999N000157 HC STATISTIC RCP TIME EA 10 MIN

## 2025-05-24 PROCEDURE — 82374 ASSAY BLOOD CARBON DIOXIDE: CPT | Performed by: CLINICAL NURSE SPECIALIST

## 2025-05-24 PROCEDURE — 97535 SELF CARE MNGMENT TRAINING: CPT | Mod: GP | Performed by: REHABILITATION PRACTITIONER

## 2025-05-24 PROCEDURE — 250N000009 HC RX 250: Performed by: STUDENT IN AN ORGANIZED HEALTH CARE EDUCATION/TRAINING PROGRAM

## 2025-05-24 PROCEDURE — 82330 ASSAY OF CALCIUM: CPT | Performed by: CLINICAL NURSE SPECIALIST

## 2025-05-24 PROCEDURE — 90947 DIALYSIS REPEATED EVAL: CPT

## 2025-05-24 PROCEDURE — 99291 CRITICAL CARE FIRST HOUR: CPT | Mod: 24 | Performed by: SURGERY

## 2025-05-24 PROCEDURE — 99292 CRITICAL CARE ADDL 30 MIN: CPT | Mod: 24 | Performed by: SURGERY

## 2025-05-24 PROCEDURE — 84075 ASSAY ALKALINE PHOSPHATASE: CPT | Performed by: STUDENT IN AN ORGANIZED HEALTH CARE EDUCATION/TRAINING PROGRAM

## 2025-05-24 PROCEDURE — 83605 ASSAY OF LACTIC ACID: CPT | Performed by: STUDENT IN AN ORGANIZED HEALTH CARE EDUCATION/TRAINING PROGRAM

## 2025-05-24 PROCEDURE — 84155 ASSAY OF PROTEIN SERUM: CPT | Performed by: STUDENT IN AN ORGANIZED HEALTH CARE EDUCATION/TRAINING PROGRAM

## 2025-05-24 PROCEDURE — B4185 PARENTERAL SOL 10 GM LIPIDS: HCPCS | Performed by: SURGERY

## 2025-05-24 PROCEDURE — 99233 SBSQ HOSP IP/OBS HIGH 50: CPT | Mod: 24 | Performed by: INTERNAL MEDICINE

## 2025-05-24 PROCEDURE — 94003 VENT MGMT INPAT SUBQ DAY: CPT

## 2025-05-24 PROCEDURE — 82248 BILIRUBIN DIRECT: CPT | Performed by: STUDENT IN AN ORGANIZED HEALTH CARE EDUCATION/TRAINING PROGRAM

## 2025-05-24 PROCEDURE — 82310 ASSAY OF CALCIUM: CPT | Performed by: CLINICAL NURSE SPECIALIST

## 2025-05-24 PROCEDURE — 250N000011 HC RX IP 250 OP 636: Mod: JW | Performed by: NURSE PRACTITIONER

## 2025-05-24 PROCEDURE — 250N000011 HC RX IP 250 OP 636: Performed by: STUDENT IN AN ORGANIZED HEALTH CARE EDUCATION/TRAINING PROGRAM

## 2025-05-24 PROCEDURE — 200N000002 HC R&B ICU UMMC

## 2025-05-24 RX ADMIN — HEPARIN SODIUM 5000 UNITS: 5000 INJECTION, SOLUTION INTRAVENOUS; SUBCUTANEOUS at 13:07

## 2025-05-24 RX ADMIN — OLANZAPINE 2.5 MG: 10 INJECTION, POWDER, FOR SOLUTION INTRAMUSCULAR at 19:55

## 2025-05-24 RX ADMIN — DEXMEDETOMIDINE HYDROCHLORIDE 1.2 MCG/KG/HR: 400 INJECTION INTRAVENOUS at 22:55

## 2025-05-24 RX ADMIN — HYDROCORTISONE SODIUM SUCCINATE 50 MG: 100 INJECTION, POWDER, FOR SOLUTION INTRAMUSCULAR; INTRAVENOUS at 09:33

## 2025-05-24 RX ADMIN — PIPERACILLIN AND TAZOBACTAM 4.5 G: 4; .5 INJECTION, POWDER, LYOPHILIZED, FOR SOLUTION INTRAVENOUS at 18:18

## 2025-05-24 RX ADMIN — Medication 125 MCG/HR: at 06:11

## 2025-05-24 RX ADMIN — CALCIUM CHLORIDE, MAGNESIUM CHLORIDE, SODIUM CHLORIDE, SODIUM BICARBONATE, POTASSIUM CHLORIDE AND SODIUM PHOSPHATE DIBASIC DIHYDRATE 10 ML/KG/HR: 3.68; 3.05; 6.34; 3.09; .314; .187 INJECTION INTRAVENOUS at 02:40

## 2025-05-24 RX ADMIN — HEPARIN SODIUM 5000 UNITS: 5000 INJECTION, SOLUTION INTRAVENOUS; SUBCUTANEOUS at 19:55

## 2025-05-24 RX ADMIN — DEXMEDETOMIDINE HYDROCHLORIDE 1.2 MCG/KG/HR: 400 INJECTION INTRAVENOUS at 13:35

## 2025-05-24 RX ADMIN — HYDROCORTISONE SODIUM SUCCINATE 50 MG: 100 INJECTION, POWDER, FOR SOLUTION INTRAMUSCULAR; INTRAVENOUS at 02:41

## 2025-05-24 RX ADMIN — DEXMEDETOMIDINE HYDROCHLORIDE 0.9 MCG/KG/HR: 400 INJECTION INTRAVENOUS at 07:45

## 2025-05-24 RX ADMIN — Medication 25 MCG: at 20:07

## 2025-05-24 RX ADMIN — CALCIUM CHLORIDE, MAGNESIUM CHLORIDE, SODIUM CHLORIDE, SODIUM BICARBONATE, POTASSIUM CHLORIDE AND SODIUM PHOSPHATE DIBASIC DIHYDRATE 10 ML/KG/HR: 3.68; 3.05; 6.34; 3.09; .314; .187 INJECTION INTRAVENOUS at 15:24

## 2025-05-24 RX ADMIN — Medication 25 MCG: at 01:30

## 2025-05-24 RX ADMIN — MAGNESIUM SULFATE HEPTAHYDRATE: 500 INJECTION, SOLUTION INTRAMUSCULAR; INTRAVENOUS at 19:56

## 2025-05-24 RX ADMIN — Medication 25 MCG: at 00:09

## 2025-05-24 RX ADMIN — PIPERACILLIN AND TAZOBACTAM 4.5 G: 4; .5 INJECTION, POWDER, LYOPHILIZED, FOR SOLUTION INTRAVENOUS at 06:11

## 2025-05-24 RX ADMIN — Medication 25 MCG: at 11:10

## 2025-05-24 RX ADMIN — PANTOPRAZOLE SODIUM 40 MG: 40 INJECTION, POWDER, FOR SOLUTION INTRAVENOUS at 07:56

## 2025-05-24 RX ADMIN — DEXMEDETOMIDINE HYDROCHLORIDE 1.2 MCG/KG/HR: 400 INJECTION INTRAVENOUS at 18:47

## 2025-05-24 RX ADMIN — CALCIUM CHLORIDE, MAGNESIUM CHLORIDE, SODIUM CHLORIDE, SODIUM BICARBONATE, POTASSIUM CHLORIDE AND SODIUM PHOSPHATE DIBASIC DIHYDRATE: 3.68; 3.05; 6.34; 3.09; .314; .187 INJECTION INTRAVENOUS at 10:00

## 2025-05-24 RX ADMIN — HEPARIN SODIUM 5000 UNITS: 5000 INJECTION, SOLUTION INTRAVENOUS; SUBCUTANEOUS at 03:46

## 2025-05-24 RX ADMIN — PIPERACILLIN AND TAZOBACTAM 4.5 G: 4; .5 INJECTION, POWDER, LYOPHILIZED, FOR SOLUTION INTRAVENOUS at 00:08

## 2025-05-24 RX ADMIN — OLANZAPINE 5 MG: 10 INJECTION, POWDER, FOR SOLUTION INTRAMUSCULAR at 02:40

## 2025-05-24 RX ADMIN — HYDROCORTISONE SODIUM SUCCINATE 50 MG: 100 INJECTION, POWDER, FOR SOLUTION INTRAMUSCULAR; INTRAVENOUS at 21:17

## 2025-05-24 RX ADMIN — CALCIUM CHLORIDE, MAGNESIUM CHLORIDE, SODIUM CHLORIDE, SODIUM BICARBONATE, POTASSIUM CHLORIDE AND SODIUM PHOSPHATE DIBASIC DIHYDRATE 10 ML/KG/HR: 3.68; 3.05; 6.34; 3.09; .314; .187 INJECTION INTRAVENOUS at 20:04

## 2025-05-24 RX ADMIN — PIPERACILLIN AND TAZOBACTAM 4.5 G: 4; .5 INJECTION, POWDER, LYOPHILIZED, FOR SOLUTION INTRAVENOUS at 23:45

## 2025-05-24 RX ADMIN — PIPERACILLIN AND TAZOBACTAM 4.5 G: 4; .5 INJECTION, POWDER, LYOPHILIZED, FOR SOLUTION INTRAVENOUS at 13:07

## 2025-05-24 RX ADMIN — HYDROCORTISONE SODIUM SUCCINATE 50 MG: 100 INJECTION, POWDER, FOR SOLUTION INTRAMUSCULAR; INTRAVENOUS at 16:07

## 2025-05-24 RX ADMIN — DEXMEDETOMIDINE HYDROCHLORIDE 1 MCG/KG/HR: 400 INJECTION INTRAVENOUS at 00:54

## 2025-05-24 RX ADMIN — CALCIUM CHLORIDE, MAGNESIUM CHLORIDE, SODIUM CHLORIDE, SODIUM BICARBONATE, POTASSIUM CHLORIDE AND SODIUM PHOSPHATE DIBASIC DIHYDRATE 10 ML/KG/HR: 3.68; 3.05; 6.34; 3.09; .314; .187 INJECTION INTRAVENOUS at 08:49

## 2025-05-24 RX ADMIN — SMOFLIPID 250 ML: 6; 6; 5; 3 INJECTION, EMULSION INTRAVENOUS at 19:55

## 2025-05-24 RX ADMIN — CALCIUM CHLORIDE, MAGNESIUM CHLORIDE, SODIUM CHLORIDE, SODIUM BICARBONATE, POTASSIUM CHLORIDE AND SODIUM PHOSPHATE DIBASIC DIHYDRATE 10 ML/KG/HR: 3.68; 3.05; 6.34; 3.09; .314; .187 INJECTION INTRAVENOUS at 09:00

## 2025-05-24 RX ADMIN — CALCIUM CHLORIDE, MAGNESIUM CHLORIDE, SODIUM CHLORIDE, SODIUM BICARBONATE, POTASSIUM CHLORIDE AND SODIUM PHOSPHATE DIBASIC DIHYDRATE 10 ML/KG/HR: 3.68; 3.05; 6.34; 3.09; .314; .187 INJECTION INTRAVENOUS at 15:14

## 2025-05-24 ASSESSMENT — ACTIVITIES OF DAILY LIVING (ADL)
ADLS_ACUITY_SCORE: 64
ADLS_ACUITY_SCORE: 59
ADLS_ACUITY_SCORE: 57
ADLS_ACUITY_SCORE: 66
ADLS_ACUITY_SCORE: 59
ADLS_ACUITY_SCORE: 57
ADLS_ACUITY_SCORE: 66
ADLS_ACUITY_SCORE: 59
ADLS_ACUITY_SCORE: 64
ADLS_ACUITY_SCORE: 59
ADLS_ACUITY_SCORE: 57
ADLS_ACUITY_SCORE: 66
ADLS_ACUITY_SCORE: 57
ADLS_ACUITY_SCORE: 66
ADLS_ACUITY_SCORE: 59

## 2025-05-24 NOTE — PROGRESS NOTES
Surgery Progress Note  05/24/2025       Subjective:  No acute events overnight. This morning patient is resting comfortably in bed. Not on any pressors. Tolerating pressure support. WBC down to 19.6 from 23.4.     Objective:  Temp:  [97.6  F (36.4  C)-99  F (37.2  C)] 97.6  F (36.4  C)  Pulse:  [] 80  Resp:  [12-29] 13  BP: (119-124)/(53-57) 119/53  MAP:  [45 mmHg-89 mmHg] 77 mmHg  Arterial Line BP: ()/(43-68) 129/61  FiO2 (%):  [40 %] 40 %  SpO2:  [92 %-100 %] 100 %    I/O last 3 completed shifts:  In: 2514.66 [I.V.:1073.38]  Out: 4018.6 [Emesis/NG output:32; Drains:1547; Other:2439.6]      Gen: Resting comfortably in bed  Neuro: awake and alert, answering questions appropriately   Resp: Ventilated via trach  Abd: Abdomen is taut but compressible, drains with dark bloody output, Malecot with minimal succus, with dark bloody output around midline incision  : Scrotum with pouch in place with serosanguinous drainage     Labs:  Recent Labs   Lab 05/24/25  0326 05/23/25  1626 05/23/25  0331   WBC 19.6* 23.4* 20.3*   HGB 7.7* 8.0* 8.1*    314 294       Recent Labs   Lab 05/24/25  0334 05/24/25  0326 05/24/25  0039 05/23/25  1635 05/23/25  1626 05/23/25  0804 05/23/25  0331   NA  --  138  --   --  140  --  139   POTASSIUM  --  3.9  --   --  3.8  --  3.6   CHLORIDE  --  105  --   --  106  --  104   CO2  --  21*  --   --  21*  --  20*   BUN  --  36.6*  --   --  36.9*  --  34.5*   CR  --  0.54*  --   --  0.57*  --  0.59*   * 272* 231*   < > 210*   < > 174*  185*   KARINA  --  8.2*  --   --  8.0*  --  8.2*   MAG  --  2.3  --   --  3.6*  --  2.3   PHOS  --  3.4  --   --  3.1  --  3.0    < > = values in this interval not displayed.     Assessment/Plan:   Sebastián Rodas is a 29-year-old male with a history of alcohol use disorder, anxiety, and bipolar disorder who was admitted on 3/30/2025 for alcohol withdrawal following a recent binge, presenting with diffuse abdominal pain. Initial workup revealed  "leukocytosis and elevated lipase concerning for acute pancreatitis. He developed acute encephalopathy and was transferred to the ICU on 3/31, requiring intubation and vasopressors by 4/1 due to shock. Hospital course has been complicated by acute renal failure requiring CRRT, cardiomyopathy (initial LVEF 20-25%, improved to 65-70% by 4/14), and necrotizing pancreatitis with unorganized fluid collections. He completed a 14-day course of meropenem but remains intermittently febrile and critically ill, requiring ongoing dialysis and vasopressor support. On 4/27, after clinical deterioration and imaging consistent with a likely perforated viscus, following family discussion, he underwent emergent exploratory laparotomy, necrosectomy, transverse colectomy, abdominal washout, and drain placement. Patient was transferred to [facility] on 4/30/25 for further surgical management. Went to OR 5/1 for re-open laparotomy, I&D, placement of colostomy tube in presumed previous colectomy staple line, necrosectomy, and Abthera placement. On 5/2, increasing sanguineous output from drains concerning for bleeding from pancreatic bed. Family care conference held 5/2, plan for restorative cares at that time. Take back to OR twice (5/4 and 5/7) for abdominal washout. Per nursing note: 5/9 morning after he saw a picture of his abdomen that he \"desires to speak to team about updating goals of care, expressed desire to pull back on cares\". Goals of care conference on 5/10 with continuation of full code and restorative cares. CTA on 5/10 revealed large area of active arterial extravasation in the upper abdomen. MTP was started. Patient is now s/p embolization of small pancreatic branch off of the distal celiac artery with IR on 5/10. Malecot drainage slowed and eventually stopped, with significant drainage through other drains and open into dressings. 5/17 night patient had active extravasation, with Hgb dropping from 7.8 to 5.6 in 1.5 hours. " Lactate spiked from 3.6 to 10.6, with WBC 14.9 to 25.5. Family was consulted, MTP was started, and IR embolized the celiac trunk/splenic artery branches.     - No further surgical options for any intraabdominal pathology  - Nursing to change dressings in the am, pm, and PRN during the day if copious drainage during the day. Please place Xeroform over the bridging mesh. Please do not apply xeroform outside the wound bed/over the skin edges but make sure all of the wound bed is covered.  - Tube feeds on hold currently due to tear in J-tube. Receiving TPN (50 mL/hr), and Lipids & Albumin as needed.  - On Zosyn per SICU  - SQH PPX, would not recommend therapeutic dose given the risk of bleeding.   - Appreciate WOC cares, okay with wound manager if able.   - Other cares per SICU, we appreciate cares      Seen, examined, and discussed with chief resident, who will discuss with staff.    Mireya López, DO  General Surgery, PGY-2

## 2025-05-24 NOTE — PLAN OF CARE
"Goal Outcome Evaluation:      Plan of Care Reviewed With: patient, family    Overall Patient Progress: no changeOverall Patient Progress: no change    Outcome Evaluation: patient BP has been stable overnight and Levo gtt currrently off.  tolerating CRRT.  remained on PS overnight and tolerated.  Fentanyl and Dex gtt's increased for comfort.  patient slept well overnight. Noted patient with pain with turns and dressing change.    ICU End of Shift Summary. See flowsheets for vital signs and detailed assessment.    Changes this shift: patient able to communicate needs by mouthing words and writing.  Abdomen is very distended/taut with edema going into pubis/groin/scrotal area.  Bilat groin reddened and moist.  Large amount of bleeding/drainage from upper abdominal wound area and around left side drainage tubes with big turn.  Dressing redressed following this.  Patient is having bleeding to the right abdomen but refusing dressing change this morning.  Patient wrote \"dressing was already done\".  Area of bleeding reinforced with chux for now. Refusing repositioning this morning.  Asked if in pain and nodded yes but also nodded \"yes\" to feeling that the Fentanyl dose was sufficient for his pain.  Has been able to rest between cares.      Plan:  follow up with SICU regarding orders to match PS settings of 12/8.  Patient denies feeling short of breath.  Continue to meet pain and agitation needs with Fentanyl and Precedex gtt's.  Monitor dressings for bleeding.        "

## 2025-05-24 NOTE — PROGRESS NOTES
SURGICAL ICU PROGRESS NOTE  05/24/2025    Date of Service (when I saw the patient): 05/24/2025    ASSESSMENT:  Sebastián Rodas is a 29M with alcohol abuse, anxiety, and bipolar disorder, who was admitted 3/30/25 for alcohol withdrawal and abdominal pain. Workup showed leukocytosis and elevated lipase, consistent with acute pancreatitis. He developed encephalopathy and shock, requiring ICU care, intubation, vasopressors, and CRRT by 4/1. His course was complicated by cardiomyopathy (EF 20-25%, improved to 65-70% by 4/14), necrotizing pancreatitis, and persistent infection despite 14 days of meropenem. On 4/27, imaging showed likely perforated viscus; he underwent emergent laparotomy, necrosectomy, and colectomy. Transferred to [facility] SICU on 4/30. On 5/1, reoperation revealed colonic staple line breakdown, necrotic pancreas, vessel thrombosis, and adhesions. Colostomy with malankot drain and temporary abdominal closure performed. Prognosis is poor; palliative care involved. Care conference 5/2 with family to talk goals of care. Full code, family acknowledged that poor prognosis is to be expected. 5/4 s/p exploratory surgery, additional necrotic pancreas remove with no obvious spillage or sign of bleeding at the time. Went back to the OR 5/7 for abdominal wash out. CT abdomen 5/10 showed extravasation on arterial phase imaging. MTP initiated, IR arterial embolization of pancreatic vessels performed. Decreased stool output since 5/13, switched to TPN. Hemorraghic shock.  MTP 5/18, IR embolization 5/18.      CHANGES and MAJOR THINGS TODAY:  - PST 10/7  - Increase lantus      PLAN:    Neurological:  # Acute pain   -Fentanyl gtt, fentanyl bolus  #Encephalopathy- improving  #Insomnia  - Monitor neurological status. Delirium preventions and precautions.   # Pain:   # Sedation: Precedex, weaning as able  # Anxiety  - RASS goal 0 to -1  - Olanzapine incr to 5 q6 prn, with goal of weaning precedex down     Pulmonary:   # Acute  hypoxic respiratory failure  # S/p tracheostomy placement   FiO2 (%): 40 %, Resp: 12, Vent Mode: PS, Resp Rate (Set): 16 breaths/min, Tidal Volume (Set, mL): 420 mL, PEEP (cm H2O): 8 cmH2O, Pressure Support (cm H2O): 12 cmH2O, Resp Rate (Set): 16 breaths/min, Tidal Volume (Set, mL): 420 mL, PEEP (cm H2O): 8 cmH2O  - PST as able. Titrate PST to 10/7 from 12/8  - CT PE 5/9 showed no evidence of pulmonary embolism  - Pulmonary toilet, mobilize  - Ventilator bundle  - Wean FIO2 as tolerated    Cardiovascular:    # Stress Cardiomyopathy   - Initial LVEF 20-25%, improved to 65-70%  # Shock-distributive (septic)  # Hypovolemic shock  (hemorraghic)  # Sinus Tachycardia  - Monitor hemodynamic status. MAP goal > 65.   - Hydrocortisone 50mg q6hrs x72 hours then go back to 25mg daily  - EKG 5/8, 5/9, 5/18 showed sinus tachycardia with no ectopy  - Continue trend lactate     Gastroenterology/Nutrition:  # S/p emergent exploratory laparotomy, necrosectomy, transverse colectomy, abdominal washout, and drain placement 5/1 and 5/4  # S/p Pancreatic branch vessel embolization with IR 5/10, 5/18  # Severe necrotizing pancreatitis  # Splenic vein thrombosis  - Will defer management of dressings and drain removal per EGS   - Ongoing  dark bloody drain output, CTM  - Cecostomy drain in place, no stool output. Abdomen distended.  - PPI IV  - CT abdomen/pelvis with IV + enteral contrast through G tube 5/14: large volume of air and blood products in abdomen.   - Hold all Oral Meds and Feeds, strict NPO    # Severe Protein calorie deficit malnutrition due to critical illness  - Continue TPN  - G tube tear in the J portion, TF stopped.  - IR consulted for PEG exchange  - IR cannot safely exchange freshly placed GJ tubes until they have been in place x6 weeks given risk of losing access into the stomach. Additionally, imaging shows there is no safe pexy between the stomach and abdominal wall. IR may not be able to offer safe exchange even  after 6 weeks.   - Multivitamin supplementation ordered by nutrition  - RD consult. Appreciate cares and recommendations.     Renal/Fluids/Electrolytes:   # SARAHI  # Fluid overload  Baseline Cr 0.7-0.8. Presented with Cr 0.96 on 3/30. Rapidly markos to 5.2 on 4/1. Oliguric. Garcia UA with proteinuria, hematuria, pyuria, moderate bacteria.  Kidneys unremarkable on CT. Has severe ATN in the setting of shock, ADHF, intravascular hypovolemia/3rd spacing from pancreatitis.   - Trend lactate   - CRRT with I&Os goal -500cc  - Continue to monitor intake and output  - Volume assessment daily    Endocrine:   # Stress and steroid induced hyperglycemia    Hgb A1c 5.3, no hx of DM   - Continues to have high sliding scale insulin needs  - Increase lantus to 12untis daily   - Goal to keep BG< 180 for optimal wound healing     # Leukocytosis  # Necrotizing pancreatitis  - Trend CRP   - Trend WBC  - Continue Zosyn for ongoing intra-abdominal infection  - Completed 14 days course of meropenem and caspofungin and zosyn previously.      cultures:  - 4/30 blood cultures- NGTD   - 5/1 blood cultures ordered - NGTD  - Blood culture 5/12, 5/15 - NGTD  - Blood culture 5/22 - +candida     Heme:     # Acute blood loss anemia   # Anemia of critical illness   # Thrombosed splenic vein   - Transfuse if hgb <7.0 or signs/symptoms of hypoperfusion. Monitor and trend  - MTP 5/10, 5/18  - hemoglobin stable, keeping on trending  - Continue subcutaneous heparin, watch bleeding from drains.     Musculoskeletal:   # Deconditioning and weakness due to critical illness   - Physical and occupational therapy consult      Skin:  # Pressure Ulcers - Buttocks/rectal area  - Barrier cream with liberal application. Continue fecal management system   - WOC consult      #Pressure Ulcers- Left Heel  Pressure Injury Location: Left heel   Wound type: Pressure Injury     Pressure Injury Stage: Deep Tissue Pressure Injury (DTPI), present on admission     General  "Cares/Prophylaxis:    DVT Prophylaxis: SQH   GI Prophylaxis: PPI     Lines/ tubes/ drains:  - HD line--  Right IJ  - PICC line- left   - Right radial A line  - PIV x 3  - Trach  - G-J  - Wound manager  - SHANNON x 3  - Cecostomy drain     Disposition:  - Surgical ICU    Time spent on this Encounter   Billing:  I spent 55 minutes bedside and on the inpatient unit today managing the critical care of Sebastián Rodas in relation to the issues listed in this note.    ASIF Nunez CNP      ====================================    OBJECTIVE:   1. VITAL SIGNS:   Temp: 97.6  F (36.4  C) Temp src: Axillary BP: 119/53 Pulse: 81   Resp: 13 SpO2: 100 % O2 Device: Mechanical Ventilator Oxygen Delivery: 50 LPM Height: 172.7 cm (5' 7.99\") Weight: 77.6 kg (171 lb 1.2 oz)  Estimated body mass index is 26.02 kg/m  as calculated from the following:    Height as of this encounter: 1.727 m (5' 7.99\").    Weight as of this encounter: 77.6 kg (171 lb 1.2 oz).      FiO2 (%): 40 %, Resp: 13, Vent Mode: PS, Resp Rate (Set): 16 breaths/min, Tidal Volume (Set, mL): 420 mL, PEEP (cm H2O): 8 cmH2O, Pressure Support (cm H2O): 12 cmH2O, Resp Rate (Set): 16 breaths/min, Tidal Volume (Set, mL): 420 mL, PEEP (cm H2O): 8 cmH2O      2. INTAKE/ OUTPUT:   Intake/Output Summary (Last 24 hours) at 5/24/2025 0621  Last data filed at 5/24/2025 0600  Gross per 24 hour   Intake 2514.66 ml   Output 4018.6 ml   Net -1503.94 ml       3. PHYSICAL EXAMINATION:  General: laying in bed, awake and alert   HEENT: PERRLA. Trach present and secure, site dressed.   Pulm/Resp: Clear breath sounds bilaterall  CV: RRR, S1/S2   Abdomen: Distended. Dark bloody output noted to all drains. G-J tube in place, site secured, abdomen with dressing in place C/D/I  : scrotal edema, draining dark bloody output.   MSK/Extremities: generalized edema in extremities    4. INVESTIGATIONS:   Arterial Blood Gases   Recent Labs   Lab 05/20/25  0344 05/19/25  0407 05/18/25  0537 " 05/18/25  0055   PH 7.45 7.41 7.34* 7.37   PCO2 33* 38 41 33*   PO2 74* 150* 90 187*   HCO3 23 24 22 19*     Complete Blood Count   Recent Labs   Lab 05/24/25 0326 05/23/25 1626 05/23/25 0331 05/22/25 2031   WBC 19.6* 23.4* 20.3* 21.8*   HGB 7.7* 8.0* 8.1* 8.8*    314 294 302     Basic Metabolic Panel  Recent Labs   Lab 05/24/25 0334 05/24/25 0326 05/24/25  0039 05/23/25 2029 05/23/25  1635 05/23/25 1626 05/23/25  0804 05/23/25 0331 05/22/25 1935 05/22/25  1556   NA  --  138  --   --   --  140  --  139  --  139   POTASSIUM  --  3.9  --   --   --  3.8  --  3.6  --  4.0   CHLORIDE  --  105  --   --   --  106  --  104  --  106   CO2  --  21*  --   --   --  21*  --  20*  --  20*   BUN  --  36.6*  --   --   --  36.9*  --  34.5*  --  32.7*   CR  --  0.54*  --   --   --  0.57*  --  0.59*  --  0.53*   * 272* 231* 197*   < > 210*   < > 174*  185*   < > 194*    < > = values in this interval not displayed.     Liver Function Tests  Recent Labs   Lab 05/24/25 0326 05/23/25 1626 05/23/25 0331 05/22/25 2031 05/22/25  1556 05/22/25  1002 05/22/25  0403 05/21/25  1616 05/21/25  0415 05/18/25  1548 05/18/25  0530 05/18/25  0047   AST 37  --  47*  --   --   --  56*  --  44   < > 71*  --    ALT 49  --  58  --   --   --  62  --  68   < > 36  --    ALKPHOS 500*  --  245*  --   --   --  239*  --  175*   < > 289*  --    BILITOTAL 1.7*  --  2.4*  --   --   --  3.4*  --  3.3*   < > 2.6*  --    ALBUMIN 2.2* 2.1* 2.1*  --  2.2*  --  2.2*   < > 2.3*   < > 2.9*  --    INR  --   --   --  1.22*  --  1.18*  --   --   --   --  1.21* 1.63*    < > = values in this interval not displayed.     Pancreatic Enzymes  No lab results found in last 7 days.    Coagulation Profile  Recent Labs   Lab 05/22/25  2031 05/22/25  1002 05/18/25  0530 05/18/25  0047 05/17/25  2343 05/17/25  1417   INR 1.22* 1.18* 1.21* 1.63* 1.54* 1.18*   PTT  --   --  31  --  40* 30     5. RADIOLOGY:   No results found for this or any previous visit (from  the past 24 hours).      =========================================

## 2025-05-24 NOTE — PROGRESS NOTES
Nephrology Progress Note  05/24/2025       Sebastián Rodas is a 29 yom with Bipolar disorder, ETOH use c/b necrotizing pancreatitis admitted 3/30/25 to [facility] for ETOH withdrawal necrotizing pancreatitis complicated by SARAHI.  Seen by Nephrology at [place], started on CRRT 4/1.  Had periods of stability enough for iHD but back to CRRT on 4/21 - remains in ICU     Interval History :   Mr Rodas' labs remain stable on 4k baths with dose of 20ml/kg/h, checking labs BID. Have tried to be gently net negative the past few days and will continue to order 0-50cc/h volume goal today.  Continues with difficult situation with intraabdominal bleeding, discussing GOC with family.       Assessment & Recommendations:   SARAHI-Baseline Cr 0.8 as recently as March 2025 before acute events, was started on CRRT emergently on 4/1 in setting of septic shock. Had ~2 weeks of stability enough to manage with iHD but back on CRRT 4/21 and has remained on CRRT with septic shock and hemorrhagic shock in last several weeks                -No need for new consent, continuing RRT started last month at [facility].                 -Access is tunneled RIJ from 4/21.                  -CRRT, dose of 20ml/kg/h with low Cr.  All 4k baths, planning I=O but mainly achieving this on his own with drain output.      # necrotizing pancreatitis - persistent infection despite 2wks meropenem, 4/27/2025 had laparotomy, necrosectomy and colectomy after imaging showed perforated viscus  Return to OR 5/1/2025, 5/7/2025  Hemorrhage 5/10/2025 managed with MTP and IR arterial embolization of pancreatic vessels  Recurrent bleeding 5/18  again requiring MTP and IR embolization    # septic shock  # hemorrhagic shock  - norepi off since late 5/23/2025    #stress cardiomyopathy  EF 20-25% and then improved to 65-70%       Recommendations were communicated to primary team via note     Edith Harris MD  [facility]  Department of  "Medicine  Division of Nephrology and Hypertension  [facility] Web Console     Review of Systems:   I reviewed the following systems:  Gen: No fevers or chills  CV: No CP at rest  Resp: No SOB at rest  GI: No N/V    Physical Exam:   I/O last 3 completed shifts:  In: 2497.92 [I.V.:1048.32]  Out: 4060.4 [Emesis/NG output:32; Drains:1286; Other:2742.4]   /53   Pulse 75   Temp 98.1  F (36.7  C) (Oral)   Resp 15   Ht 1.727 m (5' 7.99\")   Wt 77.6 kg (171 lb 1.2 oz)   SpO2 100%   BMI 26.02 kg/m       GENERAL APPEARANCE: Vent via trach, CRRT running.   Pulmonary: Vent via trach  CV: Regular rhythm, normal rate   - Edema none  GI: Surgical dressing in place  MS: no evidence of inflammation in joints, no muscle tenderness  : + Garcia  SKIN: no rash, warm, dry  NEURO: diffuse weakness, face symmetric    Labs:   All labs reviewed by me  Electrolytes/Renal -   Recent Labs   Lab Test 05/24/25  1613 05/24/25  1315 05/24/25  0814 05/24/25  0334 05/24/25  0326 05/23/25  1635 05/23/25  1626 05/23/25  0804 05/23/25  0331   NA  --   --   --   --  138  --  140  --  139   POTASSIUM  --   --   --   --  3.9  --  3.8  --  3.6   CHLORIDE  --   --   --   --  105  --  106  --  104   CO2  --   --   --   --  21*  --  21*  --  20*   BUN  --   --   --   --  36.6*  --  36.9*  --  34.5*   CR  --   --   --   --  0.54*  --  0.57*  --  0.59*   * 258* 272*   < > 272*   < > 210*   < > 174*  185*   KARINA  --   --   --   --  8.2*  --  8.0*  --  8.2*   MAG  --   --   --   --  2.3  --  3.6*  --  2.3   PHOS  --   --   --   --  3.4  --  3.1  --  3.0    < > = values in this interval not displayed.       CBC -   Recent Labs   Lab Test 05/24/25  1614 05/24/25  0326 05/23/25  1626   WBC 16.5* 19.6* 23.4*   HGB 7.7* 7.7* 8.0*    278 314       LFTs -   Recent Labs   Lab Test 05/24/25  0326 05/23/25  1626 05/23/25  0331 05/22/25  1556 05/22/25  0403   ALKPHOS 500*  --  245*  --  239*   BILITOTAL 1.7*  --  2.4*  --  3.4*   ALT 49  --  58  " --  62   AST 37  --  47*  --  56*   PROTTOTAL 5.8*  --  5.8*  --  5.5*   ALBUMIN 2.2* 2.1* 2.1*   < > 2.2*    < > = values in this interval not displayed.       Iron Panel - No lab results found.        Current Medications:  Current Facility-Administered Medications   Medication Dose Route Frequency Provider Last Rate Last Admin    heparin ANTICOAGULANT injection 5,000 Units  5,000 Units Subcutaneous Q8H [facility] Sonia Moore NP   5,000 Units at 05/24/25 1307    hydrocortisone sodium succinate PF (solu-CORTEF) injection 50 mg  50 mg Intravenous Q6H Olga Lidia Velarde PA-C   50 mg at 05/24/25 1607    insulin aspart (NovoLOG) injection (RAPID ACTING)  1-12 Units Subcutaneous Q4H Jessica Lancaster MD   4 Units at 05/24/25 1617    [START ON 5/25/2025] insulin glargine (LANTUS PEN) injection 12 Units  12 Units Subcutaneous Daily Marina Zamarripa MD        lipids 4 oil (SMOFLIPID) 20 % infusion 250 mL  250 mL Intravenous Q24H Brendon Haas, DO   Stopped at 05/24/25 0759    OLANZapine (zyPREXA) IV injection 2.5 mg  2.5 mg Intravenous QPM Sonia Moore NP   2.5 mg at 05/23/25 1957    pantoprazole (PROTONIX) IV push injection 40 mg  40 mg Intravenous QAM AC de La MaterRenee, CNP   40 mg at 05/24/25 0756    piperacillin-tazobactam (ZOSYN) 4.5 g vial to attach to  mL bag  4.5 g Intravenous Q6H Eugenia Zavala  mL/hr at 05/23/25 1220 4.5 g at 05/24/25 1307    sodium chloride (PF) 0.9% PF flush 3 mL  3 mL Intracatheter Q8H Ganesh Griffiths MD   3 mL at 05/24/25 1331     Current Facility-Administered Medications   Medication Dose Route Frequency Provider Last Rate Last Admin    dexmedeTOMIDine (PRECEDEX) 4 mcg/mL in sodium chloride 0.9 % 100 mL infusion  0.1-1.2 mcg/kg/hr (Dosing Weight) Intravenous Continuous Sonia Moore NP 20.9 mL/hr at 05/24/25 1600 1.2 mcg/kg/hr at 05/24/25 1600    dextrose 10% infusion   Intravenous Continuous PRN Waldo,  Brendon Cruz DO   Stopped at 05/22/25 1232    dialysate for CVVHD & CVVHDF (Phoxillum BK4/2.5)  10 mL/kg/hr CRRT Continuous Raheem Gabriel APRN  mL/hr at 05/24/25 1524 10 mL/kg/hr at 05/24/25 1524    fentaNYL (SUBLIMAZE) infusion   mcg/hr Intravenous Continuous Roxi Alston MD 2 mL/hr at 05/24/25 1600 100 mcg/hr at 05/24/25 1600    No heparin required   Does not apply Continuous PRN Tico Spain MD        norepinephrine (LEVOPHED) 16 mg in  mL infusion MAX CONC CENTRAL LINE  0.01-0.6 mcg/kg/min (Dosing Weight) Intravenous Continuous Celso Ambrose MD   Stopped at 05/24/25 0500    parenteral nutrition - ADULT compounded formula   CENTRAL LINE IV TPN CONTINUOUS Brendon Haas DO        parenteral nutrition - ADULT compounded formula   CENTRAL LINE IV TPN CONTINUOUS Brendon Haas DO 50 mL/hr at 05/24/25 1600 Rate Verify at 05/24/25 1600    POST-filter replacement solution for CVVHD & CVVHDF (Phoxillum BK4/2.5)   CRRT Continuous Tico Spain  mL/hr at 05/24/25 1000 New Bag at 05/24/25 1000    PRE-filter replacement solution for CVVHD & CVVHDF (Phoxillum BK4/2.5)  10 mL/kg/hr CRRT Continuous Raheem Gabriel APRN  mL/hr at 05/24/25 1514 10 mL/kg/hr at 05/24/25 1514    Reason statin not prescribed   Does not apply DOES NOT GO TO Rhys Bloom MD

## 2025-05-24 NOTE — PLAN OF CARE
ICU End of Shift Summary. See flowsheets for vital signs and detailed assessment.    Changes this shift: A&O RASS +1 to -1. Able to follow commands and make needs known. Report some abdominal pain that is controlled with fentanyl gtt. Large dark red/brown output from open abdominal incision and some drains. Able to pressure support most of shift (1015- to shift change). Anxious in AM and at start of PS, precedex increased, tolerating and becoming comfortable shortly after.     Plan: Continue to wean pressors as able. Assess for uncontrolled pain. Monitor pt continuing on pressure support.       Goal Outcome Evaluation:      Plan of Care Reviewed With: patient, family    Overall Patient Progress: no changeOverall Patient Progress: no change    Outcome Evaluation: Able to reduce Levo to 0.01. report intermittent abdominal pain. anxiety improved with increased precedex rate.

## 2025-05-24 NOTE — PROGRESS NOTES
CRRT STATUS NOTE    DATA:  Time:  6:40 AM  Pressures WNL:  YES  Filter Status:  WDL    Problems Reported/Alarms Noted:  none    Supplies Present:  YES    ASSESSMENT:  Patient Net Fluid Balance:  5/23  -845  5/24 0600 -289  Vital Signs:  Temp: 97.6  F (36.4  C) Temp src: Axillary BP: 119/53 Pulse: 81   Resp: 13 SpO2: 100 % O2 Device: Mechanical Ventilator Oxygen Delivery: 50 LPM  Levo weaned off  Labs:  K 3.9  Ica 4.5  LA 3.2  Hgb 7.7  Goals of Therapy:  0-50ml/hr, meeting goals of therapy    INTERVENTIONS:   none    PLAN:  Continue per Renal orders.  Call CRRT resource RN via CUPS.

## 2025-05-24 NOTE — PROGRESS NOTES
05/23/25 1700   Appointment Info   Signing Clinician's Name / Credentials (PT) Jeaan Samayoa, DPT   Rehab Comments (PT) Dr. Jean Paul Pierre, SICU resident, indicates via Vocera that okay to get EOB using abdominal binder, though RN clarifies that patient must be able to tolerate chair position in bed with minimal to no leakage of wounds before can progress to EOB.  Up to recliner not allowed at this time.  Mesh is displaced.   Quick Adds   Quick Adds Edema Eval   General Information   Onset of Illness/Injury or Date of Surgery 05/18/25   Referring Physician Brendon Haas, DO   Patient/Family Therapy Goals Statement (PT) Manage BLE edema for improved comfort and mobility.  Edema eval is at patient request.   Pertinent History of Current Problem (include personal factors and/or comorbidities that impact the POC) Per notes...29-year-old male with a history of alcohol use disorder, anxiety, and bipolar disorder who was admitted on 3/30/2025 for alcohol withdrawal following a recent binge, presenting with diffuse abdominal pain. Initial workup revealed leukocytosis and elevated lipase concerning for acute pancreatitis., many complications, no further abdominal wound surgical options.   Existing Precautions/Restrictions abdominal   General Observations nods head, mouths words, writes with marker.   Cognition   Affect/Mental Status (Cognition) WFL   Orientation Status (Cognition) oriented x 4   Follows Commands (Cognition) WFL   Integumentary/Edema   Onset of Edema 05/18/25   Affected Body Part(s) Left LE;Right LE   Edema Etiology Surgery   Etiology Comments fluid overload, decreased muscle pump   Edema Examination/Assessment   Skin Condition Dryness   Skin Condition Comments LE skin mostly intact, dry, with normal warmth, and color except stretch marks along anterior ankles, red and white scabbing the size of pinhead's in vertical lines along ankles and distal calf.  White flaking and cracked skin along  toe creases bilateral feet.  Normal dorsal pedal pulses and capillary refill. 2+ pitting edema BLE from feet to distal knee region, worst at dorsum of feet with minimal edema in thigh region.  Has dressing in place for left heel wound.   Scar No   Dorsal Pedal Pulse Symmetrical   Stemmer Sign Negative   Clinical Impression   Criteria for Skilled Therapeutic Intervention Yes, treatment indicated   PT Diagnosis (PT) impaired functional mobility   Edema: Patient Presentation Edema   Influenced by the following impairments increased edema, decreased muscle pump   Functional limitations due to impairments decreased comfort and mobility.   Clinical Presentation (PT Evaluation Complexity) evolving   Clinical Presentation Rationale PMH and clinical judgement   Clinical Decision Making (Complexity) moderate complexity   Edema: Planned Interventions Gradient compression bandaging;Fit for compression garment;Edema exercises;Precautions to prevent infection/exacerbation;Education;Manual therapy;ADL training   Risk & Benefits of therapy have been explained evaluation/treatment results reviewed;care plan/treatment goals reviewed;risks/benefits reviewed;current/potential barriers reviewed;participants voiced agreement with care plan;participants included;patient   PT Total Evaluation Time   PT Eval, Re-eval Minutes (83284) 10   Physical Therapy Goals   PT Frequency 2x/week   PT Goals Edema   PT: Edema education to increase ability to manage edema after discharge from the hospital Patient;Caregiver;Verbalize;Demonstrate;Supervision/Stand-by assist;limb positioning;Skin care routine;signs/symptoms of intolerance;wear schedule;garment/bandage care;discharge recommendations   PT: Management of edema bandages Patient;Caregiver;Verbalize;Demonstrate;garment(s);Max assist   PT: Functional edema exercise program to reduce limb volume, increase activity tolerance and improve independence with ADL Patient;Demonstrates;HEP;Mod assist    Interventions   Interventions Quick Adds Group Therapeutic Procedures;Self-Care/Home Mgmt   Therapeutic Procedure/Exercise   Ther. Procedure: strength, endurance, ROM, flexibillity Minutes (68963) 38   Symptoms Noted During/After Treatment fatigue   Treatment Detail/Skilled Intervention To increase functional endurance and UE and LE strength for transfers and gait, pt instructed in and performed the following UE and LE, and finger putty supine exercises: ankle dorsiflexion/plantarflexion, heel slides, short arc quad, hip internal/external rotation, shoulder flexion, shoulder abduction, scapular retraction, elbow flexion/extension, wrist flexion/extension, forearm pronation/supination and with medium level putty, over 10 exercises addressing , pinch, AB and adduction of fingers and thumb,, x 3-8 reps with cues for technique including line safety awareness, eccentric control and education on progression.  Issued beads that he can use inside the putty as well.  Educated on strategies for alternating between exercise programs and spreading out activity throughout the day and throughout the week.  Pt indicated understanding.  Extra time for communication via writing with marker when it was difficult to understand his mouthing of words.   Self-Care/Home Management   Self-Care/Home Mgmt/ADL, Compensatory, Meal Prep Minutes (73501) 23   Symptoms Noted During/After Treatment none   Treatment Detail/Skilled Intervention Edema: Educated pt on current treatment options including pros & cons.  Pt agreed to proceed with LE size medium edema wear. Patient care order entered. LE skin intact, Cleansed skin and applied lotion while educating on skin care. Instructed in donning technique. Size medium edema wear applied from MTPs to knee crease with any extra material cut off.  Pt reports edema wear is comfortable up through mid calf, but too tight approximately, so switch to edema wear from MTPs to several inches above smallest  ankle, with patient reporting good comfort level.  Exhibits baseline warmth, color and sensation distal to tubular compression.  Educated pt and RN on wear schedule, expected response, indications for removal, elevation of LE, importance of physical activity, patient and RN verbalized understanding of education.   PT Discharge Planning   PT Plan Edema: Assess response to compression, progress independence with pt/non-staff caregiver to self doff/don & perform skin cares.   PT Discharge Recommendation (DC Rec) Transitional Care Facility   PT Rationale for DC Rec Patient presents significantly below baseline functional mobility with long medical course and admission of over 1 month (including at outside hospital).  Patient will need rehab stay to progress functional independence, safety and tolerance for mobility and ADLs.  Due to no additional surgical options available, per notes unlikely for patient to survive outside of hospital setting, thus focused on patient comfort level and meaningful activities, with patient asking for supine arm and leg exercises and by end of session demonstrates ability to perform the exercises with the handouts and intermittent assistance of nursing, thus low frequency for exercise progression. Currently not interested in EOB, though at end of session he was asking what he needs to do to be able to get out of bed, and reassured him that nursing will contact therapies when he is allowed EOB and OOB routinely, so therapies can increase frequencies then.   PT Brief overview of current status May need to check with medical team for latest mobility allowed, as of 5/23/2025, allowed to sit in chair position of bed with abdominal binder on as long as leakage is minimal, if does really well with this, allowed EOB but not in recliner.  Likely will be lift dependent for EOB.   PT Total Distance Amb During Session (feet) 0   Physical Therapy Time and Intention   Timed Code Treatment Minutes 61    Total Session Time (sum of timed and untimed services) 71

## 2025-05-24 NOTE — PROGRESS NOTES
"CRRT STATUS NOTE    DATA:  Time:  1833  Pressures WNL:  YES  Filter Status:  WDL    Problems Reported/Alarms Noted:  None. Filter pressures in 250s.     Supplies Present:  Yes    ASSESSMENT:  Patient Net Fluid Balance:      Intake/Output Summary (Last 24 hours) at 5/24/2025 1834  Last data filed at 5/24/2025 1800  Gross per 24 hour   Intake 2496.89 ml   Output 3746.1 ml   Net -1249.21 ml        Vital Signs:  /53   Pulse 79   Temp 98.1  F (36.7  C) (Oral)   Resp 15   Ht 1.727 m (5' 7.99\")   Wt 77.6 kg (171 lb 1.2 oz)   SpO2 100%   BMI 26.02 kg/m         Labs:   Lab Results   Component Value Date    WBC 16.5 (H) 05/24/2025    HGB 7.7 (L) 05/24/2025    HCT 24.2 (L) 05/24/2025    MCV 93 05/24/2025     05/24/2025     Lab Results   Component Value Date     05/24/2025    POTASSIUM 3.9 05/24/2025    CHLORIDE 105 05/24/2025    CO2 21 (L) 05/24/2025     (H) 05/24/2025     Lab Results   Component Value Date    BUN 38.7 (H) 05/24/2025    CR 0.54 (L) 05/24/2025         Goals of Therapy:  0-50mL/hr    INTERVENTIONS: Continue fluid removal per goals of care as patient tolerates. Check filter daily. Change filter q72 hours and PRN. Please call CRRT Resource RN on [facility] with any questions or concerns.     "

## 2025-05-24 NOTE — PLAN OF CARE
ICU End of Shift Summary. See flowsheets for vital signs and detailed assessment.    Changes this shift: vent settings updated by provider PS 40% 10/7, Remains on CRRT. Precedex increased to 1.2 this shift, fentanyl decreased to 100. Pt remains intermittent between fully alert and playing video games/communicating to sleeping but easily awoken.  Drainage from abdominal wound and all drains continues with dark re to red/tan output. A&O follow all commands and makes needs known intermittently needing encouragement/education for repositioning and movement.     Plan: Continue to PS as tolerating, monitor and titrate for pain and anxiety. Monitor for bleeding.     Goal Outcome Evaluation:      Plan of Care Reviewed With: patient, family    Overall Patient Progress: no changeOverall Patient Progress: no change    Outcome Evaluation: Continues off levo, able to pull on CRRT, remains on PS all day 10/7 40%.

## 2025-05-25 LAB
ALBUMIN SERPL BCG-MCNC: 2.3 G/DL (ref 3.5–5.2)
ALBUMIN SERPL BCG-MCNC: 2.5 G/DL (ref 3.5–5.2)
ALLEN'S TEST: ABNORMAL
ALP SERPL-CCNC: 1164 U/L (ref 40–150)
ALT SERPL W P-5'-P-CCNC: 55 U/L (ref 0–70)
ANION GAP SERPL CALCULATED.3IONS-SCNC: 12 MMOL/L (ref 7–15)
ANION GAP SERPL CALCULATED.3IONS-SCNC: 13 MMOL/L (ref 7–15)
AST SERPL W P-5'-P-CCNC: 41 U/L (ref 0–45)
BASE EXCESS BLDA CALC-SCNC: -1.6 MMOL/L (ref -3–3)
BILIRUB SERPL-MCNC: 1.4 MG/DL
BILIRUBIN DIRECT (ROCHE PRO & PURE): 1.12 MG/DL (ref 0–0.45)
BUN SERPL-MCNC: 40.8 MG/DL (ref 6–20)
BUN SERPL-MCNC: 40.9 MG/DL (ref 6–20)
CA-I BLD-MCNC: 4.6 MG/DL (ref 4.4–5.2)
CA-I BLD-MCNC: 4.9 MG/DL (ref 4.4–5.2)
CALCIUM SERPL-MCNC: 8.1 MG/DL (ref 8.8–10.4)
CALCIUM SERPL-MCNC: 8.4 MG/DL (ref 8.8–10.4)
CHLORIDE SERPL-SCNC: 104 MMOL/L (ref 98–107)
CHLORIDE SERPL-SCNC: 105 MMOL/L (ref 98–107)
CREAT SERPL-MCNC: 0.54 MG/DL (ref 0.67–1.17)
CREAT SERPL-MCNC: 0.55 MG/DL (ref 0.67–1.17)
CRP SERPL-MCNC: 86 MG/L
EGFRCR SERPLBLD CKD-EPI 2021: >90 ML/MIN/1.73M2
EGFRCR SERPLBLD CKD-EPI 2021: >90 ML/MIN/1.73M2
ERYTHROCYTE [DISTWIDTH] IN BLOOD BY AUTOMATED COUNT: 16.1 % (ref 10–15)
ERYTHROCYTE [DISTWIDTH] IN BLOOD BY AUTOMATED COUNT: 16.1 % (ref 10–15)
GLUCOSE BLDC GLUCOMTR-MCNC: 200 MG/DL (ref 70–99)
GLUCOSE BLDC GLUCOMTR-MCNC: 210 MG/DL (ref 70–99)
GLUCOSE BLDC GLUCOMTR-MCNC: 219 MG/DL (ref 70–99)
GLUCOSE BLDC GLUCOMTR-MCNC: 246 MG/DL (ref 70–99)
GLUCOSE BLDC GLUCOMTR-MCNC: 247 MG/DL (ref 70–99)
GLUCOSE SERPL-MCNC: 223 MG/DL (ref 70–99)
GLUCOSE SERPL-MCNC: 254 MG/DL (ref 70–99)
HCO3 BLD-SCNC: 23 MMOL/L (ref 21–28)
HCO3 SERPL-SCNC: 21 MMOL/L (ref 22–29)
HCO3 SERPL-SCNC: 21 MMOL/L (ref 22–29)
HCT VFR BLD AUTO: 23.6 % (ref 40–53)
HCT VFR BLD AUTO: 24 % (ref 40–53)
HGB BLD-MCNC: 7.4 G/DL (ref 13.3–17.7)
HGB BLD-MCNC: 7.6 G/DL (ref 13.3–17.7)
LACTATE SERPL-SCNC: 3.2 MMOL/L (ref 0.7–2)
MAGNESIUM SERPL-MCNC: 2.2 MG/DL (ref 1.7–2.3)
MAGNESIUM SERPL-MCNC: 2.3 MG/DL (ref 1.7–2.3)
MCH RBC QN AUTO: 29.8 PG (ref 26.5–33)
MCH RBC QN AUTO: 30.1 PG (ref 26.5–33)
MCHC RBC AUTO-ENTMCNC: 31.4 G/DL (ref 31.5–36.5)
MCHC RBC AUTO-ENTMCNC: 31.7 G/DL (ref 31.5–36.5)
MCV RBC AUTO: 94 FL (ref 78–100)
MCV RBC AUTO: 96 FL (ref 78–100)
O2/TOTAL GAS SETTING VFR VENT: 40 %
OXYHGB MFR BLDA: 96 % (ref 92–100)
PCO2 BLD: 40 MM HG (ref 35–45)
PEEP: 5 CM H2O
PH BLD: 7.38 [PH] (ref 7.35–7.45)
PHOSPHATE SERPL-MCNC: 3.4 MG/DL (ref 2.5–4.5)
PHOSPHATE SERPL-MCNC: 3.5 MG/DL (ref 2.5–4.5)
PLATELET # BLD AUTO: 319 10E3/UL (ref 150–450)
PLATELET # BLD AUTO: 356 10E3/UL (ref 150–450)
PO2 BLD: 87 MM HG (ref 80–105)
POTASSIUM SERPL-SCNC: 3.7 MMOL/L (ref 3.4–5.3)
POTASSIUM SERPL-SCNC: 3.8 MMOL/L (ref 3.4–5.3)
PROT SERPL-MCNC: 5.8 G/DL (ref 6.4–8.3)
RBC # BLD AUTO: 2.46 10E6/UL (ref 4.4–5.9)
RBC # BLD AUTO: 2.55 10E6/UL (ref 4.4–5.9)
SAO2 % BLDA: 98 % (ref 96–97)
SODIUM SERPL-SCNC: 138 MMOL/L (ref 135–145)
SODIUM SERPL-SCNC: 138 MMOL/L (ref 135–145)
TRIGL SERPL-MCNC: 394 MG/DL
WBC # BLD AUTO: 15.8 10E3/UL (ref 4–11)
WBC # BLD AUTO: 20.6 10E3/UL (ref 4–11)

## 2025-05-25 PROCEDURE — 82805 BLOOD GASES W/O2 SATURATION: CPT

## 2025-05-25 PROCEDURE — 82330 ASSAY OF CALCIUM: CPT | Performed by: CLINICAL NURSE SPECIALIST

## 2025-05-25 PROCEDURE — 84100 ASSAY OF PHOSPHORUS: CPT | Performed by: CLINICAL NURSE SPECIALIST

## 2025-05-25 PROCEDURE — 84460 ALANINE AMINO (ALT) (SGPT): CPT | Performed by: STUDENT IN AN ORGANIZED HEALTH CARE EDUCATION/TRAINING PROGRAM

## 2025-05-25 PROCEDURE — 84075 ASSAY ALKALINE PHOSPHATASE: CPT | Performed by: STUDENT IN AN ORGANIZED HEALTH CARE EDUCATION/TRAINING PROGRAM

## 2025-05-25 PROCEDURE — 84478 ASSAY OF TRIGLYCERIDES: CPT

## 2025-05-25 PROCEDURE — 250N000009 HC RX 250: Performed by: SURGERY

## 2025-05-25 PROCEDURE — 99233 SBSQ HOSP IP/OBS HIGH 50: CPT | Mod: 24 | Performed by: INTERNAL MEDICINE

## 2025-05-25 PROCEDURE — 83735 ASSAY OF MAGNESIUM: CPT | Performed by: CLINICAL NURSE SPECIALIST

## 2025-05-25 PROCEDURE — 82248 BILIRUBIN DIRECT: CPT | Performed by: STUDENT IN AN ORGANIZED HEALTH CARE EDUCATION/TRAINING PROGRAM

## 2025-05-25 PROCEDURE — 86140 C-REACTIVE PROTEIN: CPT

## 2025-05-25 PROCEDURE — 99292 CRITICAL CARE ADDL 30 MIN: CPT | Mod: 24 | Performed by: SURGERY

## 2025-05-25 PROCEDURE — 90947 DIALYSIS REPEATED EVAL: CPT

## 2025-05-25 PROCEDURE — 85027 COMPLETE CBC AUTOMATED: CPT | Performed by: CLINICAL NURSE SPECIALIST

## 2025-05-25 PROCEDURE — 250N000011 HC RX IP 250 OP 636: Performed by: NURSE PRACTITIONER

## 2025-05-25 PROCEDURE — 82565 ASSAY OF CREATININE: CPT | Performed by: CLINICAL NURSE SPECIALIST

## 2025-05-25 PROCEDURE — 250N000009 HC RX 250: Performed by: CLINICAL NURSE SPECIALIST

## 2025-05-25 PROCEDURE — 83605 ASSAY OF LACTIC ACID: CPT | Performed by: STUDENT IN AN ORGANIZED HEALTH CARE EDUCATION/TRAINING PROGRAM

## 2025-05-25 PROCEDURE — 250N000009 HC RX 250: Performed by: STUDENT IN AN ORGANIZED HEALTH CARE EDUCATION/TRAINING PROGRAM

## 2025-05-25 PROCEDURE — 250N000011 HC RX IP 250 OP 636: Performed by: STUDENT IN AN ORGANIZED HEALTH CARE EDUCATION/TRAINING PROGRAM

## 2025-05-25 PROCEDURE — 84450 TRANSFERASE (AST) (SGOT): CPT | Performed by: STUDENT IN AN ORGANIZED HEALTH CARE EDUCATION/TRAINING PROGRAM

## 2025-05-25 PROCEDURE — 250N000011 HC RX IP 250 OP 636

## 2025-05-25 PROCEDURE — 99291 CRITICAL CARE FIRST HOUR: CPT | Mod: 24 | Performed by: SURGERY

## 2025-05-25 PROCEDURE — 82247 BILIRUBIN TOTAL: CPT | Performed by: STUDENT IN AN ORGANIZED HEALTH CARE EDUCATION/TRAINING PROGRAM

## 2025-05-25 PROCEDURE — 250N000009 HC RX 250: Performed by: NURSE PRACTITIONER

## 2025-05-25 PROCEDURE — 85041 AUTOMATED RBC COUNT: CPT | Performed by: CLINICAL NURSE SPECIALIST

## 2025-05-25 PROCEDURE — 94003 VENT MGMT INPAT SUBQ DAY: CPT

## 2025-05-25 PROCEDURE — 999N000157 HC STATISTIC RCP TIME EA 10 MIN

## 2025-05-25 PROCEDURE — 200N000002 HC R&B ICU UMMC

## 2025-05-25 PROCEDURE — 82040 ASSAY OF SERUM ALBUMIN: CPT | Performed by: CLINICAL NURSE SPECIALIST

## 2025-05-25 RX ADMIN — Medication 25 MCG: at 10:40

## 2025-05-25 RX ADMIN — DEXMEDETOMIDINE HYDROCHLORIDE 1 MCG/KG/HR: 400 INJECTION INTRAVENOUS at 20:22

## 2025-05-25 RX ADMIN — CALCIUM CHLORIDE, MAGNESIUM CHLORIDE, SODIUM CHLORIDE, SODIUM BICARBONATE, POTASSIUM CHLORIDE AND SODIUM PHOSPHATE DIBASIC DIHYDRATE 10 ML/KG/HR: 3.68; 3.05; 6.34; 3.09; .314; .187 INJECTION INTRAVENOUS at 03:11

## 2025-05-25 RX ADMIN — OLANZAPINE 5 MG: 10 INJECTION, POWDER, FOR SOLUTION INTRAMUSCULAR at 23:47

## 2025-05-25 RX ADMIN — Medication 25 MCG: at 20:50

## 2025-05-25 RX ADMIN — Medication 50 MCG: at 15:55

## 2025-05-25 RX ADMIN — HYDROCORTISONE SODIUM SUCCINATE 50 MG: 100 INJECTION, POWDER, FOR SOLUTION INTRAMUSCULAR; INTRAVENOUS at 14:39

## 2025-05-25 RX ADMIN — CALCIUM CHLORIDE, MAGNESIUM CHLORIDE, SODIUM CHLORIDE, SODIUM BICARBONATE, POTASSIUM CHLORIDE AND SODIUM PHOSPHATE DIBASIC DIHYDRATE 10 ML/KG/HR: 3.68; 3.05; 6.34; 3.09; .314; .187 INJECTION INTRAVENOUS at 16:13

## 2025-05-25 RX ADMIN — CALCIUM CHLORIDE, MAGNESIUM CHLORIDE, SODIUM CHLORIDE, SODIUM BICARBONATE, POTASSIUM CHLORIDE AND SODIUM PHOSPHATE DIBASIC DIHYDRATE 10 ML/KG/HR: 3.68; 3.05; 6.34; 3.09; .314; .187 INJECTION INTRAVENOUS at 10:01

## 2025-05-25 RX ADMIN — PIPERACILLIN AND TAZOBACTAM 4.5 G: 4; .5 INJECTION, POWDER, LYOPHILIZED, FOR SOLUTION INTRAVENOUS at 12:02

## 2025-05-25 RX ADMIN — CALCIUM CHLORIDE, MAGNESIUM CHLORIDE, SODIUM CHLORIDE, SODIUM BICARBONATE, POTASSIUM CHLORIDE AND SODIUM PHOSPHATE DIBASIC DIHYDRATE 10 ML/KG/HR: 3.68; 3.05; 6.34; 3.09; .314; .187 INJECTION INTRAVENOUS at 03:10

## 2025-05-25 RX ADMIN — HEPARIN SODIUM 5000 UNITS: 5000 INJECTION, SOLUTION INTRAVENOUS; SUBCUTANEOUS at 20:16

## 2025-05-25 RX ADMIN — DEXMEDETOMIDINE HYDROCHLORIDE 1 MCG/KG/HR: 400 INJECTION INTRAVENOUS at 03:16

## 2025-05-25 RX ADMIN — HYDROCORTISONE SODIUM SUCCINATE 50 MG: 100 INJECTION, POWDER, FOR SOLUTION INTRAMUSCULAR; INTRAVENOUS at 03:15

## 2025-05-25 RX ADMIN — PANTOPRAZOLE SODIUM 40 MG: 40 INJECTION, POWDER, FOR SOLUTION INTRAVENOUS at 08:13

## 2025-05-25 RX ADMIN — PIPERACILLIN AND TAZOBACTAM 4.5 G: 4; .5 INJECTION, POWDER, LYOPHILIZED, FOR SOLUTION INTRAVENOUS at 05:50

## 2025-05-25 RX ADMIN — CALCIUM CHLORIDE, MAGNESIUM CHLORIDE, SODIUM CHLORIDE, SODIUM BICARBONATE, POTASSIUM CHLORIDE AND SODIUM PHOSPHATE DIBASIC DIHYDRATE 10 ML/KG/HR: 3.68; 3.05; 6.34; 3.09; .314; .187 INJECTION INTRAVENOUS at 20:46

## 2025-05-25 RX ADMIN — PIPERACILLIN AND TAZOBACTAM 4.5 G: 4; .5 INJECTION, POWDER, LYOPHILIZED, FOR SOLUTION INTRAVENOUS at 18:12

## 2025-05-25 RX ADMIN — PIPERACILLIN AND TAZOBACTAM 4.5 G: 4; .5 INJECTION, POWDER, LYOPHILIZED, FOR SOLUTION INTRAVENOUS at 23:47

## 2025-05-25 RX ADMIN — CALCIUM CHLORIDE, MAGNESIUM CHLORIDE, SODIUM CHLORIDE, SODIUM BICARBONATE, POTASSIUM CHLORIDE AND SODIUM PHOSPHATE DIBASIC DIHYDRATE: 3.68; 3.05; 6.34; 3.09; .314; .187 INJECTION INTRAVENOUS at 10:59

## 2025-05-25 RX ADMIN — Medication 25 MCG: at 03:56

## 2025-05-25 RX ADMIN — OLANZAPINE 2.5 MG: 10 INJECTION, POWDER, FOR SOLUTION INTRAMUSCULAR at 20:15

## 2025-05-25 RX ADMIN — Medication 125 MCG/HR: at 03:16

## 2025-05-25 RX ADMIN — OLANZAPINE 5 MG: 10 INJECTION, POWDER, FOR SOLUTION INTRAMUSCULAR at 14:58

## 2025-05-25 RX ADMIN — Medication 150 MCG/HR: at 21:39

## 2025-05-25 RX ADMIN — HYDROCORTISONE SODIUM SUCCINATE 50 MG: 100 INJECTION, POWDER, FOR SOLUTION INTRAMUSCULAR; INTRAVENOUS at 09:33

## 2025-05-25 RX ADMIN — CALCIUM CHLORIDE, MAGNESIUM CHLORIDE, SODIUM CHLORIDE, SODIUM BICARBONATE, POTASSIUM CHLORIDE AND SODIUM PHOSPHATE DIBASIC DIHYDRATE 10 ML/KG/HR: 3.68; 3.05; 6.34; 3.09; .314; .187 INJECTION INTRAVENOUS at 10:00

## 2025-05-25 RX ADMIN — Medication 25 MCG: at 12:31

## 2025-05-25 RX ADMIN — HEPARIN SODIUM 5000 UNITS: 5000 INJECTION, SOLUTION INTRAVENOUS; SUBCUTANEOUS at 12:03

## 2025-05-25 RX ADMIN — DEXMEDETOMIDINE HYDROCHLORIDE 1 MCG/KG/HR: 400 INJECTION INTRAVENOUS at 14:39

## 2025-05-25 RX ADMIN — MAGNESIUM SULFATE HEPTAHYDRATE: 500 INJECTION, SOLUTION INTRAMUSCULAR; INTRAVENOUS at 20:07

## 2025-05-25 RX ADMIN — HYDROCORTISONE SODIUM SUCCINATE 50 MG: 100 INJECTION, POWDER, FOR SOLUTION INTRAMUSCULAR; INTRAVENOUS at 21:40

## 2025-05-25 RX ADMIN — DEXMEDETOMIDINE HYDROCHLORIDE 1 MCG/KG/HR: 400 INJECTION INTRAVENOUS at 09:33

## 2025-05-25 RX ADMIN — HEPARIN SODIUM 5000 UNITS: 5000 INJECTION, SOLUTION INTRAVENOUS; SUBCUTANEOUS at 03:16

## 2025-05-25 RX ADMIN — CALCIUM CHLORIDE, MAGNESIUM CHLORIDE, SODIUM CHLORIDE, SODIUM BICARBONATE, POTASSIUM CHLORIDE AND SODIUM PHOSPHATE DIBASIC DIHYDRATE 10 ML/KG/HR: 3.68; 3.05; 6.34; 3.09; .314; .187 INJECTION INTRAVENOUS at 20:45

## 2025-05-25 RX ADMIN — OLANZAPINE 5 MG: 10 INJECTION, POWDER, FOR SOLUTION INTRAMUSCULAR at 01:26

## 2025-05-25 ASSESSMENT — ACTIVITIES OF DAILY LIVING (ADL)
ADLS_ACUITY_SCORE: 65
ADLS_ACUITY_SCORE: 60
ADLS_ACUITY_SCORE: 65
ADLS_ACUITY_SCORE: 60
ADLS_ACUITY_SCORE: 65
ADLS_ACUITY_SCORE: 60
ADLS_ACUITY_SCORE: 60
ADLS_ACUITY_SCORE: 65
ADLS_ACUITY_SCORE: 60
ADLS_ACUITY_SCORE: 65
ADLS_ACUITY_SCORE: 60
ADLS_ACUITY_SCORE: 65

## 2025-05-25 NOTE — PLAN OF CARE
"Goal Outcome Evaluation:      Plan of Care Reviewed With: patient, family    Overall Patient Progress: no changeOverall Patient Progress: no change    Outcome Evaluation: no pressor needs, CRRT and tolerating fluid removal., continues to tolerate PS settings via vent.  increased anxiousness noted.      ICU End of Shift Summary. See flowsheets for vital signs and detailed assessment.    Changes this shift: patient seems more anxious this shift, for this writer, then the previous night,.  Continue with PRN Zyprexa and Dex gtt.  Patient reported pain of upper abdomen at the beginning of shift, stating he felt \"a pop\".  Fentanyl gtt was increased to 150 mcg/hr prior to dressing change.  Abdominal dressing was heavily saturated with bloody to maroon-brown drainage.  Malodorous.  Foul dark red to brown drainage from around all drainage tubes.  No real drainage to collection bags/bulbs other than the scrotal/penis site.   Patient reports he felt that dressing change was not as painful with the increase of Fentanyl gtt prior to dressing change.      Left leg/ankle/foot is more swollen/edematous that right leg/ankle/foot.  Pedal pulses equal R/L.      Alk Phos level has increased from 500 to 1164 over the last 24 hours.  ALT/AST remain unchanged.  No complaints of nausea.  Face is flushed.   Patient has been refusing repositioning.  Explained to patient the importance of turns for pressure injury prevention;  patient voices understanding.      Plan:  continue to pull 0-50 ml/hr per CRRT.  Continue to address pain and anxiety needs. Dressing changes per WOC recommendations. F/U with team in regards to increase in left leg swelling and rising alk phos levels.     "

## 2025-05-25 NOTE — PROGRESS NOTES
"CRRT STATUS NOTE    DATA:  Time:  1751  Pressures WNL:  YES  Filter Status:  WDL    Problems Reported/Alarms Noted:  None.    Supplies Present:  Yes    ASSESSMENT:  Patient Net Fluid Balance:      Intake/Output Summary (Last 24 hours) at 5/25/2025 1750  Last data filed at 5/25/2025 1700  Gross per 24 hour   Intake 2571.33 ml   Output 4689.2 ml   Net -2117.87 ml        Vital Signs:    /53   Pulse 96   Temp 98.3  F (36.8  C) (Oral)   Resp 12   Ht 1.727 m (5' 7.99\")   Wt 77.6 kg (171 lb 1.2 oz)   SpO2 100%   BMI 26.02 kg/m         Labs:    Lab Results   Component Value Date    WBC 20.6 (H) 05/25/2025    HGB 7.6 (L) 05/25/2025    HCT 24.0 (L) 05/25/2025    MCV 94 05/25/2025     05/25/2025     Lab Results   Component Value Date     05/25/2025    POTASSIUM 3.8 05/25/2025    CHLORIDE 104 05/25/2025    CO2 21 (L) 05/25/2025     (H) 05/25/2025     Lab Results   Component Value Date    BUN 40.8 (H) 05/25/2025    CR 0.54 (L) 05/25/2025         Goals of Therapy:  0-100mL/hr    INTERVENTIONS: Continue fluid removal per goals of care as patient tolerates. Check filter daily. Change filter q72 hours and PRN. Please call CRRT Resource RN on [facility] with any questions or concerns.     "

## 2025-05-25 NOTE — PROGRESS NOTES
Surgery Progress Note  05/25/2025       Subjective:  No acute events overnight. Patient reports he is having dry mouth this morning. Otherwise resting comfortably in bed. Off pressors.      Objective:  Temp:  [97.6  F (36.4  C)-98.3  F (36.8  C)] 97.8  F (36.6  C)  Pulse:  [] 92  Resp:  [11-21] 18  MAP:  [63 mmHg-85 mmHg] 77 mmHg  Arterial Line BP: ()/(47-66) 127/60  FiO2 (%):  [40 %-50 %] 40 %  SpO2:  [89 %-100 %] 99 %    I/O last 3 completed shifts:  In: 2552.16 [I.V.:1101.18]  Out: 3628.3 [Drains:947; Other:2681.3]      Gen: Resting comfortably in bed  Neuro: awake and alert, answering questions appropriately   Resp: Ventilated via trach  Abd: Abdomen is taut but compressible, drains with dark bloody output, Malecot with minimal succus, with dark bloody output around midline incision  : Scrotum with pouch in place with serosanguinous drainage     Labs:  Recent Labs   Lab 05/25/25 0330 05/24/25  1614 05/24/25  0326   WBC 15.8* 16.5* 19.6*   HGB 7.4* 7.7* 7.7*    297 278       Recent Labs   Lab 05/25/25  0336 05/25/25  0330 05/24/25  2351 05/24/25 2036 05/24/25  1614 05/24/25  0334 05/24/25  0326   NA  --  138  --   --  139  --  138   POTASSIUM  --  3.7  --   --  3.9  --  3.9   CHLORIDE  --  105  --   --  105  --  105   CO2  --  21*  --   --  21*  --  21*   BUN  --  40.9*  --   --  38.7*  --  36.6*   CR  --  0.55*  --   --  0.54*  --  0.54*   * 254* 236*   < > 239*   < > 272*   KARINA  --  8.1*  --   --  8.3*  --  8.2*   MAG  --  2.2  --   --  2.3  --  2.3   PHOS  --  3.5  --   --  3.3  --  3.4    < > = values in this interval not displayed.     Assessment/Plan:   Sebastián Rodas is a 29-year-old male with a history of alcohol use disorder, anxiety, and bipolar disorder who was admitted on 3/30/2025 for alcohol withdrawal following a recent binge, presenting with diffuse abdominal pain. Initial workup revealed leukocytosis and elevated lipase concerning for acute pancreatitis. He  "developed acute encephalopathy and was transferred to the ICU on 3/31, requiring intubation and vasopressors by 4/1 due to shock. Hospital course has been complicated by acute renal failure requiring CRRT, cardiomyopathy (initial LVEF 20-25%, improved to 65-70% by 4/14), and necrotizing pancreatitis with unorganized fluid collections. He completed a 14-day course of meropenem but remains intermittently febrile and critically ill, requiring ongoing dialysis and vasopressor support. On 4/27, after clinical deterioration and imaging consistent with a likely perforated viscus, following family discussion, he underwent emergent exploratory laparotomy, necrosectomy, transverse colectomy, abdominal washout, and drain placement. Patient was transferred to [facility] on 4/30/25 for further surgical management. Went to OR 5/1 for re-open laparotomy, I&D, placement of colostomy tube in presumed previous colectomy staple line, necrosectomy, and Abthera placement. On 5/2, increasing sanguineous output from drains concerning for bleeding from pancreatic bed. Family care conference held 5/2, plan for restorative cares at that time. Take back to OR twice (5/4 and 5/7) for abdominal washout. Per nursing note: 5/9 morning after he saw a picture of his abdomen that he \"desires to speak to team about updating goals of care, expressed desire to pull back on cares\". Goals of care conference on 5/10 with continuation of full code and restorative cares. CTA on 5/10 revealed large area of active arterial extravasation in the upper abdomen. MTP was started. Patient is now s/p embolization of small pancreatic branch off of the distal celiac artery with IR on 5/10. Malecot drainage slowed and eventually stopped, with significant drainage through other drains and open into dressings. 5/17 night patient had active extravasation, with Hgb dropping from 7.8 to 5.6 in 1.5 hours. Lactate spiked from 3.6 to 10.6, with WBC 14.9 to 25.5. Family was consulted, " MTP was started, and IR embolized the celiac trunk/splenic artery branches.     - No further surgical options for any intraabdominal pathology  - Nursing to change dressings in the am, pm, and PRN during the day if copious drainage during the day. Please place Xeroform over the bridging mesh. Please do not apply xeroform outside the wound bed/over the skin edges but make sure all of the wound bed is covered.  - Tube feeds on hold currently due to tear in J-tube. Receiving TPN (50 mL/hr), and Lipids & Albumin as needed.  - On Zosyn per SICU  - SQH PPX, would not recommend therapeutic dose given the risk of bleeding.   - Appreciate WOC cares, okay with wound manager if able.   - Other cares per SICU, we appreciate cares      Seen, examined, and discussed with chief resident, who will discuss with staff.    Mireya López, DO  General Surgery, PGY-2

## 2025-05-25 NOTE — PROGRESS NOTES
Nephrology Progress Note  05/25/2025       Sebastián Rodas is a 29 yom with Bipolar disorder, ETOH use c/b necrotizing pancreatitis admitted 3/30/25 to [facility] for ETOH withdrawal necrotizing pancreatitis complicated by SARAHI.  Seen by Nephrology at [place], started on CRRT 4/1.  Had periods of stability enough for iHD but back to CRRT on 4/21 - remains in ICU     Interval History :   Remains in ICU, on CRRT. Off pressors. No acute events overnight. Remains trached.    Assessment & Recommendations:   SARAHI-Baseline Cr 0.8 as recently as March 2025 before acute events, was started on CRRT emergently on 4/1 in setting of septic shock. Had ~2 weeks of stability enough to manage with iHD but back on CRRT 4/21 and has remained on CRRT with septic shock and hemorrhagic shock in last several weeks                -No need for new consent, continuing RRT started last month at [facility].                 -Access is tunneled RIJ from 4/21.                  -CRRT, dose of 20ml/kg/h with low Cr.  4K baths, No peripheral edema but weight is up significant so increase UF goal 0-100 ml per hour as tolerated     # necrotizing pancreatitis - persistent infection despite 2wks meropenem, 4/27/2025 had laparotomy, necrosectomy and colectomy after imaging showed perforated viscus  Return to OR 5/1/2025, 5/7/2025  Hemorrhage 5/10/2025 managed with MTP and IR arterial embolization of pancreatic vessels  Recurrent bleeding 5/18  again requiring MTP and IR embolization    # septic shock  # hemorrhagic shock  - norepi off since late 5/23/2025    #stress cardiomyopathy  EF 20-25% and then improved to 65-70%  Known issue that I take into account for other medical decisions, no current exacerbations or new concerns.       Recommendations were communicated to primary team via note     Edith Harris MD  [facility]  Department of Medicine  Division of Nephrology and Hypertension  [facility]  Vocera Web Console  "    Review of Systems:   I reviewed the following systems:  Gen: No fevers or chills  CV: No CP at rest  Resp: No SOB at rest  GI: No N/V    Physical Exam:   I/O last 3 completed shifts:  In: 2577.72 [I.V.:1121.19]  Out: 4105.2 [Drains:742; Other:3363.2]   /53   Pulse 103   Temp 98.3  F (36.8  C) (Oral)   Resp 18   Ht 1.727 m (5' 7.99\")   Wt 77.6 kg (171 lb 1.2 oz)   SpO2 99%   BMI 26.02 kg/m       GENERAL APPEARANCE: Vent via trach, CRRT running.   Pulmonary: Vent via trach  CV: Regular rhythm, normal rate   - Edema none  GI: Surgical dressing in place  MS: no evidence of inflammation in joints, no muscle tenderness  : + Garcia  SKIN: no rash, warm, dry  NEURO: diffuse weakness, face symmetric    Labs:   All labs reviewed by me  Electrolytes/Renal -   Recent Labs   Lab Test 05/25/25  1614 05/25/25  1611 05/25/25  1219 05/25/25  0336 05/25/25  0330 05/24/25 2036 05/24/25 1614     --   --   --  138  --  139   POTASSIUM 3.8  --   --   --  3.7  --  3.9   CHLORIDE 104  --   --   --  105  --  105   CO2 21*  --   --   --  21*  --  21*   BUN 40.8*  --   --   --  40.9*  --  38.7*   CR 0.54*  --   --   --  0.55*  --  0.54*   * 210* 219*   < > 254*   < > 239*   KARINA 8.4*  --   --   --  8.1*  --  8.3*   MAG 2.3  --   --   --  2.2  --  2.3   PHOS 3.4  --   --   --  3.5  --  3.3    < > = values in this interval not displayed.       CBC -   Recent Labs   Lab Test 05/25/25  1614 05/25/25  0330 05/24/25 1614   WBC 20.6* 15.8* 16.5*   HGB 7.6* 7.4* 7.7*    319 297       LFTs -   Recent Labs   Lab Test 05/25/25  1614 05/25/25  0330 05/24/25  1614 05/24/25  0326 05/23/25  1626 05/23/25 0331   ALKPHOS  --  1,164*  --  500*  --  245*   BILITOTAL  --  1.4*  --  1.7*  --  2.4*   ALT  --  55  --  49  --  58   AST  --  41  --  37  --  47*   PROTTOTAL  --  5.8*  --  5.8*  --  5.8*   ALBUMIN 2.5* 2.3* 2.3* 2.2*   < > 2.1*    < > = values in this interval not displayed.       Iron Panel - No lab results " found.        Current Medications:  Current Facility-Administered Medications   Medication Dose Route Frequency Provider Last Rate Last Admin    heparin ANTICOAGULANT injection 5,000 Units  5,000 Units Subcutaneous Q8H [facility] Sonia Moore NP   5,000 Units at 05/25/25 1203    hydrocortisone sodium succinate PF (solu-CORTEF) injection 50 mg  50 mg Intravenous Q6H Olga Lidia Velarde PA-C   50 mg at 05/25/25 1439    insulin aspart (NovoLOG) injection (RAPID ACTING)  1-12 Units Subcutaneous Q4H Jessica Lancaster MD   3 Units at 05/25/25 1612    [START ON 5/26/2025] insulin glargine (LANTUS PEN) injection 25 Units  25 Units Subcutaneous Daily Jolie Dexter, ASIF CNP        [Held by provider] lipids 4 oil (SMOFLIPID) 20 % infusion 250 mL  250 mL Intravenous Q24H Brendon Haas DO   Stopped at 05/25/25 0815    OLANZapine (zyPREXA) IV injection 2.5 mg  2.5 mg Intravenous QPM Sonia Moore NP   2.5 mg at 05/24/25 1955    pantoprazole (PROTONIX) IV push injection 40 mg  40 mg Intravenous QAM Renee Zamora CNP   40 mg at 05/25/25 0813    piperacillin-tazobactam (ZOSYN) 4.5 g vial to attach to  mL bag  4.5 g Intravenous Q6H Eugenia Zavala  mL/hr at 05/23/25 1220 4.5 g at 05/25/25 1202    sodium chloride (PF) 0.9% PF flush 3 mL  3 mL Intracatheter Q8H Ganesh Griffiths MD   3 mL at 05/25/25 0550     Current Facility-Administered Medications   Medication Dose Route Frequency Provider Last Rate Last Admin    dexmedeTOMIDine (PRECEDEX) 4 mcg/mL in sodium chloride 0.9 % 100 mL infusion  0.1-1.2 mcg/kg/hr (Dosing Weight) Intravenous Continuous Sonia Moore NP 17.4 mL/hr at 05/25/25 1700 1 mcg/kg/hr at 05/25/25 1700    dextrose 10% infusion   Intravenous Continuous PRN Brendon Haas DO   Stopped at 05/22/25 1232    dialysate for CVVHD & CVVHDF (Phoxillum BK4/2.5)  10 mL/kg/hr CRRT Continuous Raheem Gabriel, ASIF CNS  800 mL/hr at 05/25/25 1613 10 mL/kg/hr at 05/25/25 1613    fentaNYL (SUBLIMAZE) infusion   mcg/hr Intravenous Continuous Roxi Alston MD 2.5 mL/hr at 05/25/25 1700 125 mcg/hr at 05/25/25 1700    No heparin required   Does not apply Continuous PRN Tico Spain MD        norepinephrine (LEVOPHED) 16 mg in  mL infusion MAX CONC CENTRAL LINE  0.01-0.6 mcg/kg/min (Dosing Weight) Intravenous Continuous Celso Ambrose MD   Stopped at 05/24/25 0500    parenteral nutrition - ADULT compounded formula   CENTRAL LINE IV TPN CONTINUOUS Brendon Haas DO        parenteral nutrition - ADULT compounded formula   CENTRAL LINE IV TPN CONTINUOUS Brendon Haas DO 50 mL/hr at 05/24/25 1956 New Bag at 05/24/25 1956    POST-filter replacement solution for CVVHD & CVVHDF (Phoxillum BK4/2.5)   CRRT Continuous Tico Spain  mL/hr at 05/25/25 1059 New Bag at 05/25/25 1059    PRE-filter replacement solution for CVVHD & CVVHDF (Phoxillum BK4/2.5)  10 mL/kg/hr CRRT Continuous Raheem Gabriel APRN  mL/hr at 05/25/25 1613 10 mL/kg/hr at 05/25/25 1613    Reason statin not prescribed   Does not apply DOES NOT GO TO Rhys Bloom MD

## 2025-05-25 NOTE — PROGRESS NOTES
SURGICAL ICU PROGRESS NOTE      Date of Service (when I saw the patient): 05/25/2025    ASSESSMENT:  Sebastián Rodas is a 29M with alcohol abuse, anxiety, and bipolar disorder, who was admitted 3/30/25 for alcohol withdrawal and abdominal pain. Workup showed leukocytosis and elevated lipase, consistent with acute pancreatitis. He developed encephalopathy and shock, requiring ICU care, intubation, vasopressors, and CRRT by 4/1. His course was complicated by cardiomyopathy (EF 20-25%, improved to 65-70% by 4/14), necrotizing pancreatitis, and persistent infection despite 14 days of meropenem. On 4/27, imaging showed likely perforated viscus; he underwent emergent laparotomy, necrosectomy, and colectomy. Transferred to [facility] SICU on 4/30. On 5/1, reoperation revealed colonic staple line breakdown, necrotic pancreas, vessel thrombosis, and adhesions. Colostomy with malankot drain and temporary abdominal closure performed. Prognosis is poor; palliative care involved. Care conference 5/2 with family to talk goals of care. Full code, family acknowledged that poor prognosis is to be expected. 5/4 s/p exploratory surgery, additional necrotic pancreas remove with no obvious spillage or sign of bleeding at the time. Went back to the OR 5/7 for abdominal wash out. CT abdomen 5/10 showed extravasation on arterial phase imaging. MTP initiated, IR arterial embolization of pancreatic vessels performed. Decreased stool output since 5/13, switched to TPN. Hemorraghic shock.  MTP 5/18, IR embolization 5/18.      CHANGES and MAJOR THINGS TODAY:  - Continue PST 8/5  - UOP goal net negative 1-2L on CRRT  - Increase lantus  - Stop trending lactate  - tPa for PICC    PLAN:    Neurological:  # Acute pain   -Fentanyl gtt, fentanyl bolus  #Encephalopathy- improving  #Insomnia  - Monitor neurological status. Delirium preventions and precautions.   # Pain:   # Sedation: Precedex, weaning as able  # Anxiety  - RASS goal 0 to -1  - Olanzapine  incr to 5 q6 prn, with goal of weaning precedex down     Pulmonary:   # Acute hypoxic respiratory failure  # S/p tracheostomy placement   FiO2 (%): 40 %, Resp: 12, Vent Mode: PS, Tidal Volume (Set, mL): -- (pulling in the 500's), PEEP (cm H2O): 7 cmH2O, Pressure Support (cm H2O): 10 cmH2O, Tidal Volume (Set, mL): -- (pulling in the 500's), PEEP (cm H2O): 7 cmH2O  CT PE 5/9 showed no evidence of pulmonary embolism  - PST as able. Titrate PST to 8/5 today  - Pulmonary toilet, mobilize  - Ventilator bundle  - Wean FIO2 as tolerated    Cardiovascular:    # Stress Cardiomyopathy   - Initial LVEF 20-25%, improved to 65-70%  # Shock-distributive (septic)  # Hypovolemic shock  (hemorraghic), resolved  # Sinus Tachycardia  - Monitor hemodynamic status. MAP goal > 65.   - Hydrocortisone 50mg q6hrs x72 hours then go back to 25mg daily  - EKG 5/8, 5/9, 5/18 showed sinus tachycardia with no ectopy  - Stop trending lactate     Gastroenterology/Nutrition:  # S/p emergent exploratory laparotomy, necrosectomy, transverse colectomy, abdominal washout, and drain placement 5/1 and 5/4  # S/p Pancreatic branch vessel embolization with IR 5/10, 5/18  # Severe necrotizing pancreatitis  # Splenic vein thrombosis  - Will defer management of dressings and drain removal per EGS   - Ongoing  dark bloody drain output, CTM  - Cecostomy drain in place, no stool output. Abdomen distended.  - PPI IV  - CT abdomen/pelvis with IV + enteral contrast through G tube 5/14: large volume of air and blood products in abdomen.   - Hold all Oral Meds and Feeds, strict NPO    # Severe Protein calorie deficit malnutrition due to critical illness  - Continue TPN  - G tube tear in the J portion, TF stopped.  - IR consulted for PEG exchange  - IR cannot safely exchange freshly placed GJ tubes until they have been in place x6 weeks given risk of losing access into the stomach. Additionally, imaging shows there is no safe pexy between the stomach and abdominal wall.  IR may not be able to offer safe exchange even after 6 weeks.   - Multivitamin supplementation ordered by nutrition  - RD consult. Appreciate cares and recommendations.     Renal/Fluids/Electrolytes:   # SARAHI  # Fluid overload  # Elevated lactate, stable  Baseline Cr 0.7-0.8. Presented with Cr 0.96 on 3/30. Rapidly markos to 5.2 on 4/1. Oliguric. Garcia UA with proteinuria, hematuria, pyuria, moderate bacteria.  Kidneys unremarkable on CT. Has severe ATN in the setting of shock, ADHF, intravascular hypovolemia/3rd spacing from pancreatitis.   - Stop trending lactate   - CRRT with I&Os goal negative 1-2L  - Continue to monitor intake and output  - Volume assessment daily    Endocrine:   # Stress and steroid induced hyperglycemia    Hgb A1c 5.3, no hx of DM   - Continues to have high sliding scale insulin needs  - Increase lantus to 20 untis daily. If glucoses continue to be > 180 at 1600, start insulin gtt   - Goal to keep BG< 180 for optimal wound healing     # Leukocytosis  # Necrotizing pancreatitis  - Trend CRP, significantly decreased today to 86 from 276  - Trend WBC  - Continue Zosyn for ongoing intra-abdominal infection     cultures:  - 4/30 blood cultures- NGTD   - 5/1 blood cultures ordered - NGTD  - Blood culture 5/12, 5/15 - NGTD  - Blood culture 5/22 - +candida     Heme:     # Acute blood loss anemia   # Anemia of critical illness   # Thrombosed splenic vein   Hgb 7.4 from 7.7, CRRT circuit did go down overnight   - Transfuse if hgb <7.0 or signs/symptoms of hypoperfusion. Monitor and trend  - MTP 5/10, 5/18  - Continue subcutaneous heparin, watch bleeding from drains.     Musculoskeletal:   # Deconditioning and weakness due to critical illness   - Physical and occupational therapy consult      Skin:  # Pressure Ulcers - Buttocks/rectal area  - Barrier cream with liberal application. Continue fecal management system   - WOC consult      #Pressure Ulcers- Left Heel  Pressure Injury Location: Left heel   Wound  "type: Pressure Injury     Pressure Injury Stage: Deep Tissue Pressure Injury (DTPI), present on admission     General Cares/Prophylaxis:    DVT Prophylaxis: SQH   GI Prophylaxis: PPI     Lines/ tubes/ drains:  - HD line--  Right IJ  - PICC line- left   - Right radial A line  - PIV x 3  - Trach  - G-J  - Wound manager  - SHANNON x 3  - Cecostomy drain     Disposition:  - Surgical ICU    Time spent on this Encounter   Billing:  I spent 55 minutes bedside and on the inpatient unit today managing the critical care of Sebastián Rodas in relation to the issues listed in this note.    ASIF Nunez CNP      ====================================  SUBJECTIVE: Pt demetrius to mouth words and nod \"yes/no\". Able to communicate no anxiety, pain or SOB this morning.  NAEON.      OBJECTIVE:   1. VITAL SIGNS:   Temp: 97.8  F (36.6  C) Temp src: Axillary   Pulse: 85   Resp: 12 SpO2: 100 % O2 Device: Mechanical Ventilator Oxygen Delivery: 50 LPM Height: 172.7 cm (5' 7.99\") Weight: 77.6 kg (171 lb 1.2 oz)  Estimated body mass index is 26.02 kg/m  as calculated from the following:    Height as of this encounter: 1.727 m (5' 7.99\").    Weight as of this encounter: 77.6 kg (171 lb 1.2 oz).      FiO2 (%): 40 %, Resp: 12, Vent Mode: PS, Tidal Volume (Set, mL): -- (pulling in the 500's), PEEP (cm H2O): 7 cmH2O, Pressure Support (cm H2O): 10 cmH2O, Tidal Volume (Set, mL): -- (pulling in the 500's), PEEP (cm H2O): 7 cmH2O      2. INTAKE/ OUTPUT:   Intake/Output Summary (Last 24 hours) at 5/25/2025 0635  Last data filed at 5/25/2025 0600  Gross per 24 hour   Intake 2552.16 ml   Output 3628.3 ml   Net -1076.14 ml         3. PHYSICAL EXAMINATION:  General: laying in bed, awake and alert   HEENT: PERRLA. Trach present and secure, site dressed.   Pulm/Resp: Clear breath sounds bilaterall  CV: RRR, S1/S2   Abdomen: Distended. Dark red output noted to all drains. G-J tube in place, site secured, abdomen with dressing in place C/D/I  : scrotal " edema, draining drk red/brown output.   MSK/Extremities: generalized edema in extremities. +3 left foot edema    4. INVESTIGATIONS:   Arterial Blood Gases   Recent Labs   Lab 05/20/25 0344 05/19/25  0407   PH 7.45 7.41   PCO2 33* 38   PO2 74* 150*   HCO3 23 24     Complete Blood Count   Recent Labs   Lab 05/25/25 0330 05/24/25 1614 05/24/25 0326 05/23/25  1626   WBC 15.8* 16.5* 19.6* 23.4*   HGB 7.4* 7.7* 7.7* 8.0*    297 278 314     Basic Metabolic Panel  Recent Labs   Lab 05/25/25 0336 05/25/25 0330 05/24/25 2351 05/24/25 2036 05/24/25 1614 05/24/25 0334 05/24/25 0326 05/23/25  1635 05/23/25  1626   NA  --  138  --   --  139  --  138  --  140   POTASSIUM  --  3.7  --   --  3.9  --  3.9  --  3.8   CHLORIDE  --  105  --   --  105  --  105  --  106   CO2  --  21*  --   --  21*  --  21*  --  21*   BUN  --  40.9*  --   --  38.7*  --  36.6*  --  36.9*   CR  --  0.55*  --   --  0.54*  --  0.54*  --  0.57*   * 254* 236* 207* 239*   < > 272*   < > 210*    < > = values in this interval not displayed.     Liver Function Tests  Recent Labs   Lab 05/25/25 0330 05/24/25 1614 05/24/25 0326 05/23/25 1626 05/23/25 0331 05/22/25 2031 05/22/25  1556 05/22/25  1002 05/22/25  0403   AST 41  --  37  --  47*  --   --   --  56*   ALT 55  --  49  --  58  --   --   --  62   ALKPHOS 1,164*  --  500*  --  245*  --   --   --  239*   BILITOTAL 1.4*  --  1.7*  --  2.4*  --   --   --  3.4*   ALBUMIN 2.3* 2.3* 2.2* 2.1* 2.1*  --    < >  --  2.2*   INR  --   --   --   --   --  1.22*  --  1.18*  --     < > = values in this interval not displayed.     Pancreatic Enzymes  No lab results found in last 7 days.    Coagulation Profile  Recent Labs   Lab 05/22/25 2031 05/22/25  1002   INR 1.22* 1.18*     5. RADIOLOGY:   No results found for this or any previous visit (from the past 24 hours).      =========================================

## 2025-05-25 NOTE — PLAN OF CARE
ICU End of Shift Summary. See flowsheets for vital signs and detailed assessment.    Changes this shift: AOx3, makes needs known. HD stable, sinus tach 90s-low 100s, most shift. Afebrile. Pain controlled on Fent @ 125mcg/hr + dex gtt for anxiety @ 1. Tolerated vent wean on sedation; now SBT 5/5 on 40% all shift, infrequent tracheal suctioning~secretions small/clear. Refusing repositioning. Dressings exchanged; notable for green/sanguinous/brown copious drainage, with continued exposed abdominal cavity (@12:00), in which surgical mesh does not cover; team aware and following~ pt remains to be non-surgical candidate. Coughing does push intrabdominal fluid/drainage out through dressings. Scrotal ostomy pouch draining approximately 30cc/hr, other LDAs minimal to none. CRRT goal liberalized to 0-100; net negative 1.8L thus far today, tolerating well, without issue.     Plan: Vent wean; TD/PMSV in the coming days, per SLP.     Goal Outcome Evaluation:      Plan of Care Reviewed With: patient    Overall Patient Progress: no changeOverall Patient Progress: no change    Outcome Evaluation: Tolerating vent wean; now tolerating SBT 5/5 all shift, however overall treatment plan still limited d/t being non-surgical candidate.    Problem: Gas Exchange Impaired  Goal: Optimal Gas Exchange  Intervention: Optimize Oxygenation and Ventilation  Recent Flowsheet Documentation  Taken 5/25/2025 1600 by Scott Fletcher, RN  Airway/Ventilation Management:   airway patency maintained   calming measures promoted   humidification applied   position adjusted   pulmonary hygiene promoted  Head of Bed (HOB) Positioning: HOB at 20-30 degrees  Taken 5/25/2025 1400 by Scott Fletcher, RN  Head of Bed (HOB) Positioning: HOB at 20-30 degrees  Taken 5/25/2025 1200 by Scott Fletcher, RN  Airway/Ventilation Management:   airway patency maintained   calming measures promoted   humidification applied   position adjusted   pulmonary hygiene promoted  Head of  Bed (HOB) Positioning: HOB at 20-30 degrees  Taken 5/25/2025 1000 by Scott Fletcher, RN  Head of Bed (HOB) Positioning: HOB at 20-30 degrees  Taken 5/25/2025 0800 by Scott Fletcher, RN  Airway/Ventilation Management:   airway patency maintained   calming measures promoted   humidification applied   position adjusted   pulmonary hygiene promoted  Head of Bed (HOB) Positioning: HOB at 20-30 degrees

## 2025-05-25 NOTE — PROGRESS NOTES
CRRT STATUS NOTE    DATA:  Time:  6:00 AM  Pressures WNL:  YES  Filter Status:  WDL    Problems Reported/Alarms Noted:  None.    Supplies Present:  YES    ASSESSMENT:  Patient Net Fluid Balance:  5/24: Net -1.2L; 5/25: Net -180 ml @ 0600.  Vital Signs:  Temp:  [97.6  F (36.4  C)-98.3  F (36.8  C)] 97.8  F (36.6  C)  Pulse:  [] 92  Resp:  [11-21] 18  MAP:  [63 mmHg-85 mmHg] 77 mmHg  Arterial Line BP: ()/(47-66) 127/60  FiO2 (%):  [40 %-50 %] 40 %  SpO2:  [89 %-100 %] 99 %   Labs:  K 3.7, Mg 2.2, Phos 3.5, iCa 4.6, Hgb 7.4, Plt 319   Goals of Therapy:  0-50/hr    INTERVENTIONS:   None.    PLAN:  Continue to monitor circuit daily and change set q72 hours or PRN for clotting/clogging. Please call CRRT RN with any quesitons/problems.

## 2025-05-26 ENCOUNTER — APPOINTMENT (OUTPATIENT)
Dept: ULTRASOUND IMAGING | Facility: CLINIC | Age: 30
End: 2025-05-26
Payer: COMMERCIAL

## 2025-05-26 LAB
ABO + RH BLD: NORMAL
ALBUMIN SERPL BCG-MCNC: 2.5 G/DL (ref 3.5–5.2)
ALBUMIN SERPL BCG-MCNC: 2.7 G/DL (ref 3.5–5.2)
ALLEN'S TEST: ABNORMAL
ALP SERPL-CCNC: 1042 U/L (ref 40–150)
ALT SERPL W P-5'-P-CCNC: 57 U/L (ref 0–70)
ANION GAP SERPL CALCULATED.3IONS-SCNC: 13 MMOL/L (ref 7–15)
ANION GAP SERPL CALCULATED.3IONS-SCNC: 14 MMOL/L (ref 7–15)
AST SERPL W P-5'-P-CCNC: 30 U/L (ref 0–45)
BASE EXCESS BLDA CALC-SCNC: -3.6 MMOL/L (ref -3–3)
BILIRUB SERPL-MCNC: 1.1 MG/DL
BILIRUBIN DIRECT (ROCHE PRO & PURE): 0.84 MG/DL (ref 0–0.45)
BLD GP AB SCN SERPL QL: NEGATIVE
BUN SERPL-MCNC: 46 MG/DL (ref 6–20)
BUN SERPL-MCNC: 47.8 MG/DL (ref 6–20)
CA-I BLD-MCNC: 4.8 MG/DL (ref 4.4–5.2)
CA-I BLD-MCNC: 4.9 MG/DL (ref 4.4–5.2)
CALCIUM SERPL-MCNC: 8.5 MG/DL (ref 8.8–10.4)
CALCIUM SERPL-MCNC: 8.9 MG/DL (ref 8.8–10.4)
CHLORIDE SERPL-SCNC: 103 MMOL/L (ref 98–107)
CHLORIDE SERPL-SCNC: 104 MMOL/L (ref 98–107)
CREAT SERPL-MCNC: 0.54 MG/DL (ref 0.67–1.17)
CREAT SERPL-MCNC: 0.58 MG/DL (ref 0.67–1.17)
EGFRCR SERPLBLD CKD-EPI 2021: >90 ML/MIN/1.73M2
EGFRCR SERPLBLD CKD-EPI 2021: >90 ML/MIN/1.73M2
ERYTHROCYTE [DISTWIDTH] IN BLOOD BY AUTOMATED COUNT: 16.2 % (ref 10–15)
ERYTHROCYTE [DISTWIDTH] IN BLOOD BY AUTOMATED COUNT: 16.2 % (ref 10–15)
GLUCOSE BLDC GLUCOMTR-MCNC: 134 MG/DL (ref 70–99)
GLUCOSE BLDC GLUCOMTR-MCNC: 167 MG/DL (ref 70–99)
GLUCOSE BLDC GLUCOMTR-MCNC: 180 MG/DL (ref 70–99)
GLUCOSE BLDC GLUCOMTR-MCNC: 209 MG/DL (ref 70–99)
GLUCOSE BLDC GLUCOMTR-MCNC: 211 MG/DL (ref 70–99)
GLUCOSE BLDC GLUCOMTR-MCNC: 220 MG/DL (ref 70–99)
GLUCOSE BLDC GLUCOMTR-MCNC: 248 MG/DL (ref 70–99)
GLUCOSE SERPL-MCNC: 181 MG/DL (ref 70–99)
GLUCOSE SERPL-MCNC: 261 MG/DL (ref 70–99)
HCO3 BLD-SCNC: 20 MMOL/L (ref 21–28)
HCO3 SERPL-SCNC: 20 MMOL/L (ref 22–29)
HCO3 SERPL-SCNC: 21 MMOL/L (ref 22–29)
HCT VFR BLD AUTO: 23 % (ref 40–53)
HCT VFR BLD AUTO: 25.7 % (ref 40–53)
HGB BLD-MCNC: 7.2 G/DL (ref 13.3–17.7)
HGB BLD-MCNC: 7.4 G/DL (ref 13.3–17.7)
HGB BLD-MCNC: 7.5 G/DL (ref 13.3–17.7)
HGB BLD-MCNC: 7.7 G/DL (ref 13.3–17.7)
HGB BLD-MCNC: 8.2 G/DL (ref 13.3–17.7)
LACTATE SERPL-SCNC: 4.9 MMOL/L (ref 0.7–2)
LACTATE SERPL-SCNC: 5.2 MMOL/L (ref 0.7–2)
MAGNESIUM SERPL-MCNC: 2.3 MG/DL (ref 1.7–2.3)
MAGNESIUM SERPL-MCNC: 2.3 MG/DL (ref 1.7–2.3)
MCH RBC QN AUTO: 29.8 PG (ref 26.5–33)
MCH RBC QN AUTO: 29.8 PG (ref 26.5–33)
MCHC RBC AUTO-ENTMCNC: 31.3 G/DL (ref 31.5–36.5)
MCHC RBC AUTO-ENTMCNC: 31.9 G/DL (ref 31.5–36.5)
MCV RBC AUTO: 94 FL (ref 78–100)
MCV RBC AUTO: 95 FL (ref 78–100)
MCV RBC AUTO: 95 FL (ref 78–100)
O2/TOTAL GAS SETTING VFR VENT: 40 %
OXYHGB MFR BLDA: 97 % (ref 92–100)
PCO2 BLD: 29 MM HG (ref 35–45)
PEEP: 5 CM H2O
PH BLD: 7.45 [PH] (ref 7.35–7.45)
PHOSPHATE SERPL-MCNC: 3.3 MG/DL (ref 2.5–4.5)
PHOSPHATE SERPL-MCNC: 3.7 MG/DL (ref 2.5–4.5)
PLAT MORPH BLD: ABNORMAL
PLATELET # BLD AUTO: 340 10E3/UL (ref 150–450)
PLATELET # BLD AUTO: 518 10E3/UL (ref 150–450)
PO2 BLD: 118 MM HG (ref 80–105)
POLYCHROMASIA BLD QL SMEAR: SLIGHT
POTASSIUM SERPL-SCNC: 3.7 MMOL/L (ref 3.4–5.3)
POTASSIUM SERPL-SCNC: 3.9 MMOL/L (ref 3.4–5.3)
PROT SERPL-MCNC: 6.1 G/DL (ref 6.4–8.3)
RBC # BLD AUTO: 2.42 10E6/UL (ref 4.4–5.9)
RBC # BLD AUTO: 2.75 10E6/UL (ref 4.4–5.9)
RBC MORPH BLD: ABNORMAL
SAO2 % BLDA: 99.3 % (ref 96–97)
SODIUM SERPL-SCNC: 137 MMOL/L (ref 135–145)
SODIUM SERPL-SCNC: 138 MMOL/L (ref 135–145)
SPECIMEN EXP DATE BLD: NORMAL
TOXIC GRANULES BLD QL SMEAR: PRESENT
TRIGL SERPL-MCNC: 388 MG/DL
WBC # BLD AUTO: 19.9 10E3/UL (ref 4–11)
WBC # BLD AUTO: 24.1 10E3/UL (ref 4–11)

## 2025-05-26 PROCEDURE — 84478 ASSAY OF TRIGLYCERIDES: CPT | Performed by: SURGERY

## 2025-05-26 PROCEDURE — 84100 ASSAY OF PHOSPHORUS: CPT | Performed by: CLINICAL NURSE SPECIALIST

## 2025-05-26 PROCEDURE — 94003 VENT MGMT INPAT SUBQ DAY: CPT

## 2025-05-26 PROCEDURE — 258N000003 HC RX IP 258 OP 636

## 2025-05-26 PROCEDURE — 85018 HEMOGLOBIN: CPT | Performed by: CLINICAL NURSE SPECIALIST

## 2025-05-26 PROCEDURE — 83735 ASSAY OF MAGNESIUM: CPT | Performed by: CLINICAL NURSE SPECIALIST

## 2025-05-26 PROCEDURE — 86923 COMPATIBILITY TEST ELECTRIC: CPT | Performed by: NURSE PRACTITIONER

## 2025-05-26 PROCEDURE — 250N000011 HC RX IP 250 OP 636

## 2025-05-26 PROCEDURE — 85041 AUTOMATED RBC COUNT: CPT | Performed by: CLINICAL NURSE SPECIALIST

## 2025-05-26 PROCEDURE — 250N000009 HC RX 250: Performed by: CLINICAL NURSE SPECIALIST

## 2025-05-26 PROCEDURE — 86850 RBC ANTIBODY SCREEN: CPT

## 2025-05-26 PROCEDURE — 93005 ELECTROCARDIOGRAM TRACING: CPT

## 2025-05-26 PROCEDURE — 99291 CRITICAL CARE FIRST HOUR: CPT | Mod: 24

## 2025-05-26 PROCEDURE — 250N000013 HC RX MED GY IP 250 OP 250 PS 637

## 2025-05-26 PROCEDURE — 85027 COMPLETE CBC AUTOMATED: CPT | Performed by: CLINICAL NURSE SPECIALIST

## 2025-05-26 PROCEDURE — 250N000011 HC RX IP 250 OP 636: Performed by: NURSE PRACTITIONER

## 2025-05-26 PROCEDURE — 83605 ASSAY OF LACTIC ACID: CPT

## 2025-05-26 PROCEDURE — 93970 EXTREMITY STUDY: CPT

## 2025-05-26 PROCEDURE — 250N000011 HC RX IP 250 OP 636: Performed by: STUDENT IN AN ORGANIZED HEALTH CARE EDUCATION/TRAINING PROGRAM

## 2025-05-26 PROCEDURE — 999N000157 HC STATISTIC RCP TIME EA 10 MIN

## 2025-05-26 PROCEDURE — 82330 ASSAY OF CALCIUM: CPT | Performed by: CLINICAL NURSE SPECIALIST

## 2025-05-26 PROCEDURE — 85018 HEMOGLOBIN: CPT

## 2025-05-26 PROCEDURE — 250N000009 HC RX 250: Performed by: NURSE PRACTITIONER

## 2025-05-26 PROCEDURE — 36415 COLL VENOUS BLD VENIPUNCTURE: CPT | Performed by: SURGERY

## 2025-05-26 PROCEDURE — 82248 BILIRUBIN DIRECT: CPT | Performed by: STUDENT IN AN ORGANIZED HEALTH CARE EDUCATION/TRAINING PROGRAM

## 2025-05-26 PROCEDURE — 200N000002 HC R&B ICU UMMC

## 2025-05-26 PROCEDURE — 250N000009 HC RX 250

## 2025-05-26 PROCEDURE — 93970 EXTREMITY STUDY: CPT | Mod: 26 | Performed by: RADIOLOGY

## 2025-05-26 PROCEDURE — 250N000009 HC RX 250: Performed by: STUDENT IN AN ORGANIZED HEALTH CARE EDUCATION/TRAINING PROGRAM

## 2025-05-26 PROCEDURE — 250N000009 HC RX 250: Performed by: SURGERY

## 2025-05-26 PROCEDURE — 90947 DIALYSIS REPEATED EVAL: CPT

## 2025-05-26 PROCEDURE — 82805 BLOOD GASES W/O2 SATURATION: CPT

## 2025-05-26 PROCEDURE — 86901 BLOOD TYPING SEROLOGIC RH(D): CPT

## 2025-05-26 PROCEDURE — 80053 COMPREHEN METABOLIC PANEL: CPT | Performed by: CLINICAL NURSE SPECIALIST

## 2025-05-26 PROCEDURE — 99233 SBSQ HOSP IP/OBS HIGH 50: CPT | Mod: 24 | Performed by: INTERNAL MEDICINE

## 2025-05-26 PROCEDURE — 87040 BLOOD CULTURE FOR BACTERIA: CPT | Performed by: SURGERY

## 2025-05-26 RX ORDER — HYDROCORTISONE SODIUM SUCCINATE 100 MG/2ML
50 INJECTION INTRAMUSCULAR; INTRAVENOUS DAILY
Status: DISCONTINUED | OUTPATIENT
Start: 2025-05-27 | End: 2025-05-27

## 2025-05-26 RX ORDER — LIDOCAINE 4 G/G
1-3 PATCH TOPICAL
Status: DISCONTINUED | OUTPATIENT
Start: 2025-05-26 | End: 2025-06-03 | Stop reason: HOSPADM

## 2025-05-26 RX ORDER — HYDROXYZINE HCL 10 MG/5 ML
25 SOLUTION, ORAL ORAL ONCE
Status: DISCONTINUED | OUTPATIENT
Start: 2025-05-26 | End: 2025-05-26

## 2025-05-26 RX ORDER — OLANZAPINE 10 MG/1
2.5 INJECTION, POWDER, LYOPHILIZED, FOR SOLUTION INTRAMUSCULAR ONCE
Status: COMPLETED | OUTPATIENT
Start: 2025-05-26 | End: 2025-05-26

## 2025-05-26 RX ADMIN — DEXMEDETOMIDINE HYDROCHLORIDE 1.2 MCG/KG/HR: 400 INJECTION INTRAVENOUS at 15:49

## 2025-05-26 RX ADMIN — PIPERACILLIN AND TAZOBACTAM 4.5 G: 4; .5 INJECTION, POWDER, LYOPHILIZED, FOR SOLUTION INTRAVENOUS at 17:14

## 2025-05-26 RX ADMIN — DEXMEDETOMIDINE HYDROCHLORIDE 1.2 MCG/KG/HR: 400 INJECTION INTRAVENOUS at 21:01

## 2025-05-26 RX ADMIN — CALCIUM CHLORIDE, MAGNESIUM CHLORIDE, SODIUM CHLORIDE, SODIUM BICARBONATE, POTASSIUM CHLORIDE AND SODIUM PHOSPHATE DIBASIC DIHYDRATE 10 ML/KG/HR: 3.68; 3.05; 6.34; 3.09; .314; .187 INJECTION INTRAVENOUS at 03:08

## 2025-05-26 RX ADMIN — OLANZAPINE 5 MG: 10 INJECTION, POWDER, FOR SOLUTION INTRAMUSCULAR at 22:40

## 2025-05-26 RX ADMIN — OLANZAPINE 5 MG: 10 INJECTION, POWDER, FOR SOLUTION INTRAMUSCULAR at 11:32

## 2025-05-26 RX ADMIN — Medication 25 MCG: at 12:51

## 2025-05-26 RX ADMIN — PIPERACILLIN AND TAZOBACTAM 4.5 G: 4; .5 INJECTION, POWDER, LYOPHILIZED, FOR SOLUTION INTRAVENOUS at 23:35

## 2025-05-26 RX ADMIN — HYDROCORTISONE SODIUM SUCCINATE 50 MG: 100 INJECTION, POWDER, FOR SOLUTION INTRAMUSCULAR; INTRAVENOUS at 03:05

## 2025-05-26 RX ADMIN — CALCIUM CHLORIDE, MAGNESIUM CHLORIDE, SODIUM CHLORIDE, SODIUM BICARBONATE, POTASSIUM CHLORIDE AND SODIUM PHOSPHATE DIBASIC DIHYDRATE 10 ML/KG/HR: 3.68; 3.05; 6.34; 3.09; .314; .187 INJECTION INTRAVENOUS at 10:43

## 2025-05-26 RX ADMIN — DEXMEDETOMIDINE HYDROCHLORIDE 1 MCG/KG/HR: 400 INJECTION INTRAVENOUS at 04:12

## 2025-05-26 RX ADMIN — PIPERACILLIN AND TAZOBACTAM 4.5 G: 4; .5 INJECTION, POWDER, LYOPHILIZED, FOR SOLUTION INTRAVENOUS at 05:35

## 2025-05-26 RX ADMIN — SODIUM CHLORIDE, SODIUM LACTATE, POTASSIUM CHLORIDE, AND CALCIUM CHLORIDE 500 ML: .6; .31; .03; .02 INJECTION, SOLUTION INTRAVENOUS at 17:40

## 2025-05-26 RX ADMIN — CALCIUM CHLORIDE, MAGNESIUM CHLORIDE, SODIUM CHLORIDE, SODIUM BICARBONATE, POTASSIUM CHLORIDE AND SODIUM PHOSPHATE DIBASIC DIHYDRATE: 3.68; 3.05; 6.34; 3.09; .314; .187 INJECTION INTRAVENOUS at 13:38

## 2025-05-26 RX ADMIN — DEXMEDETOMIDINE HYDROCHLORIDE 1 MCG/KG/HR: 400 INJECTION INTRAVENOUS at 02:03

## 2025-05-26 RX ADMIN — Medication 25 MCG: at 00:59

## 2025-05-26 RX ADMIN — Medication 25 MCG: at 09:46

## 2025-05-26 RX ADMIN — CALCIUM CHLORIDE, MAGNESIUM CHLORIDE, SODIUM CHLORIDE, SODIUM BICARBONATE, POTASSIUM CHLORIDE AND SODIUM PHOSPHATE DIBASIC DIHYDRATE 10 ML/KG/HR: 3.68; 3.05; 6.34; 3.09; .314; .187 INJECTION INTRAVENOUS at 18:00

## 2025-05-26 RX ADMIN — DEXMEDETOMIDINE HYDROCHLORIDE 1.2 MCG/KG/HR: 400 INJECTION INTRAVENOUS at 09:22

## 2025-05-26 RX ADMIN — OLANZAPINE 2.5 MG: 10 INJECTION, POWDER, FOR SOLUTION INTRAMUSCULAR at 16:36

## 2025-05-26 RX ADMIN — Medication 125 MCG/HR: at 11:30

## 2025-05-26 RX ADMIN — NOREPINEPHRINE BITARTRATE 0.03 MCG/KG/MIN: 0.06 INJECTION, SOLUTION INTRAVENOUS at 18:33

## 2025-05-26 RX ADMIN — OLANZAPINE 2.5 MG: 10 INJECTION, POWDER, FOR SOLUTION INTRAMUSCULAR at 20:00

## 2025-05-26 RX ADMIN — PIPERACILLIN AND TAZOBACTAM 4.5 G: 4; .5 INJECTION, POWDER, LYOPHILIZED, FOR SOLUTION INTRAVENOUS at 11:26

## 2025-05-26 RX ADMIN — HEPARIN SODIUM 5000 UNITS: 5000 INJECTION, SOLUTION INTRAVENOUS; SUBCUTANEOUS at 13:39

## 2025-05-26 RX ADMIN — PANTOPRAZOLE SODIUM 40 MG: 40 INJECTION, POWDER, FOR SOLUTION INTRAVENOUS at 08:44

## 2025-05-26 RX ADMIN — MAGNESIUM SULFATE HEPTAHYDRATE: 500 INJECTION, SOLUTION INTRAMUSCULAR; INTRAVENOUS at 20:07

## 2025-05-26 RX ADMIN — CALCIUM CHLORIDE, MAGNESIUM CHLORIDE, SODIUM CHLORIDE, SODIUM BICARBONATE, POTASSIUM CHLORIDE AND SODIUM PHOSPHATE DIBASIC DIHYDRATE 10 ML/KG/HR: 3.68; 3.05; 6.34; 3.09; .314; .187 INJECTION INTRAVENOUS at 10:44

## 2025-05-26 RX ADMIN — HEPARIN SODIUM 5000 UNITS: 5000 INJECTION, SOLUTION INTRAVENOUS; SUBCUTANEOUS at 23:31

## 2025-05-26 RX ADMIN — OLANZAPINE 5 MG: 10 INJECTION, POWDER, FOR SOLUTION INTRAMUSCULAR at 06:13

## 2025-05-26 RX ADMIN — Medication 25 MCG: at 22:37

## 2025-05-26 ASSESSMENT — ACTIVITIES OF DAILY LIVING (ADL)
ADLS_ACUITY_SCORE: 60
ADLS_ACUITY_SCORE: 54
ADLS_ACUITY_SCORE: 60
ADLS_ACUITY_SCORE: 54
ADLS_ACUITY_SCORE: 60

## 2025-05-26 NOTE — PROGRESS NOTES
CRRT STATUS NOTE    DATA:  Time: 6:13 PM   Pressures WNL: Yes  Filter Status: WNL    Problems Reported/Alarms Noted: None    Supplies Present: YES    ASSESSMENT:  Patient Net Fluid Balance: -779 ml since 0000  Vital Signs: B/P: 119/53, T: 98.1, P: 146, R: 25  Labs: K 3.7, Mg 2.3, P 3.3, iCal 4.8, Hgb 8.2, Plt 518  Goals of Therapy: 0-100ml/hr    INTERVENTIONS:  No interventions needed.    PLAN:  Continue fluid removal goal as patient tolerates. Check filter daily and change circuit Q72h and PRN. Contact CRRT Resource RN with any questions/concerns.

## 2025-05-26 NOTE — PROGRESS NOTES
Surgery Progress Note  05/26/2025       Subjective:  No acute events overnight. Resting comfortably in bed. Remains off pressors. Sedated with dex and fentanyl.      Objective:  Temp:  [97.9  F (36.6  C)-98.3  F (36.8  C)] 98.1  F (36.7  C)  Pulse:  [] 102  Resp:  [11-29] 16  MAP:  [64 mmHg-85 mmHg] 78 mmHg  Arterial Line BP: (100-123)/(49-67) 118/60  FiO2 (%):  [40 %] 40 %  SpO2:  [94 %-100 %] 100 %    I/O last 3 completed shifts:  In: 2368.07 [I.V.:1121.27]  Out: 4810 [Drains:907; Other:3903]      Gen: Resting comfortably in bed  Neuro: awake and alert, answering questions appropriately   Resp: Ventilated via trach  Abd: Abdomen is taut but compressible, drains with minimal dark bloody output, Malecot with minimal succus, dark bloody output around midline incision.   : Scrotum with pouch in place with serous drainage     Labs:  Recent Labs   Lab 05/26/25 0321 05/25/25  1614 05/25/25  0330   WBC 19.9* 20.6* 15.8*   HGB 7.2* 7.6* 7.4*    356 319       Recent Labs   Lab 05/26/25  0819 05/26/25  0326 05/26/25  0321 05/25/25 2033 05/25/25  1614 05/25/25  0336 05/25/25  0330   NA  --   --  137  --  138  --  138   POTASSIUM  --   --  3.9  --  3.8  --  3.7   CHLORIDE  --   --  103  --  104  --  105   CO2  --   --  21*  --  21*  --  21*   BUN  --   --  47.8*  --  40.8*  --  40.9*   CR  --   --  0.54*  --  0.54*  --  0.55*   * 248* 261*   < > 223*   < > 254*   KARINA  --   --  8.5*  --  8.4*  --  8.1*   MAG  --   --  2.3  --  2.3  --  2.2   PHOS  --   --  3.7  --  3.4  --  3.5    < > = values in this interval not displayed.     Assessment/Plan:   Sebastián Rodas is a 29-year-old male with a history of alcohol use disorder, anxiety, and bipolar disorder who was admitted on 3/30/2025 for alcohol withdrawal following a recent binge, presenting with diffuse abdominal pain. Initial workup revealed leukocytosis and elevated lipase concerning for acute pancreatitis. He developed acute encephalopathy and was  "transferred to the ICU on 3/31, requiring intubation and vasopressors by 4/1 due to shock. Hospital course has been complicated by acute renal failure requiring CRRT, cardiomyopathy (initial LVEF 20-25%, improved to 65-70% by 4/14), and necrotizing pancreatitis with unorganized fluid collections. He completed a 14-day course of meropenem but remains intermittently febrile and critically ill, requiring ongoing dialysis and vasopressor support. On 4/27, after clinical deterioration and imaging consistent with a likely perforated viscus, following family discussion, he underwent emergent exploratory laparotomy, necrosectomy, transverse colectomy, abdominal washout, and drain placement. Patient was transferred to [facility] on 4/30/25 for further surgical management. Went to OR 5/1 for re-open laparotomy, I&D, placement of colostomy tube in presumed previous colectomy staple line, necrosectomy, and Abthera placement. On 5/2, increasing sanguineous output from drains concerning for bleeding from pancreatic bed. Family care conference held 5/2, plan for restorative cares at that time. Take back to OR twice (5/4 and 5/7) for abdominal washout. Per nursing note: 5/9 morning after he saw a picture of his abdomen that he \"desires to speak to team about updating goals of care, expressed desire to pull back on cares\". Goals of care conference on 5/10 with continuation of full code and restorative cares. CTA on 5/10 revealed large area of active arterial extravasation in the upper abdomen. MTP was started. Patient is now s/p embolization of small pancreatic branch off of the distal celiac artery with IR on 5/10. Malecot drainage slowed and eventually stopped, with significant drainage through other drains and open into dressings. 5/17 night patient had active extravasation, with Hgb dropping from 7.8 to 5.6 in 1.5 hours. Lactate spiked from 3.6 to 10.6, with WBC 14.9 to 25.5. Family was consulted, MTP was started, and IR embolized the " celiac trunk/splenic artery branches.     - No further surgical options for any intraabdominal pathology  - Nursing to change dressings in the am, pm, and PRN during the day if copious drainage during the day. Please place Xeroform over the bridging mesh. Please do not apply xeroform outside the wound bed/over the skin edges but make sure all of the wound bed is covered.  - Tube feeds on hold currently Receiving TPN (50 mL/hr), and Lipids & Albumin as needed.  - On Zosyn per SICU  - SQH PPX, would not recommend therapeutic dose given the risk of bleeding.   - Appreciate WOC cares, okay with wound manager if able.   - Other cares per SICU, we appreciate cares      Seen, examined, and discussed with chief resident, who will discuss with staff.     Casimiro Chirinos MD  Urology PGY-1  General surgery service

## 2025-05-26 NOTE — PLAN OF CARE
Goal Outcome Evaluation:      Plan of Care Reviewed With: patient, family    Overall Patient Progress: no changeOverall Patient Progress: no change    Outcome Evaluation: tolerating PS, CRRT continues.  wound care BID/PRN.  Continues to be anxious about, and has pain with, dressing changes.    ICU End of Shift Summary. See flowsheets for vital signs and detailed assessment.    Changes this shift: Patient continues to be anxious, jay with dressing change.  Communicates concerns about the pain that comes with the dressing change and with turning.  Did increase Fentanyl and Precedex gtt's prior and during dressing change, plus PRN Zyprexa prior to dressing change which seemed to help.  Very teary-eyed and overwhelmed following these cares and asking for more pain medication.  When asked, agreed that his goal for the night was to sleep and then be more awake with visitors during the day.  Has refused repositioning overnight d/t pain involved with it.    Area above abdominal wound at 12 o'clock is gaping with notable amount of foul smelling, dark red-brown drainage.  Extra ABD packing over this area.  Dressing removed from abdominal wound has greenish hue to it now.    Ostomy bag exchanged over penile site.  Small wound at 3 o'clock of penis with thick, brown drainage during exchange.  However, drainage now to this ostomy bag is now a clear, katy color.      Plan:  Will decrease Fentanyl and Precedex gtts this morning so that this patient can be more awake during the day.  Continue with CRRT.  Plans to trach dome today.

## 2025-05-26 NOTE — PROGRESS NOTES
Nephrology Progress Note  05/26/2025       Sebastián Rodas is a 29 yom with Bipolar disorder, ETOH use c/b necrotizing pancreatitis admitted 3/30/25 to [facility] for ETOH withdrawal necrotizing pancreatitis complicated by SARAHI.  Seen by Nephrology at [place], started on CRRT 4/1.  Had periods of stability enough for iHD but back to CRRT on 4/21 - remains in ICU     Interval History :   Remains in ICU, on CRRT. Off pressors. No acute events overnight. Remains trached.    Assessment & Recommendations:   SARAHI-Baseline Cr 0.8 as recently as March 2025 before acute events, was started on CRRT emergently on 4/1 in setting of septic shock. Had ~2 weeks of stability enough to manage with iHD but back on CRRT 4/21 and has remained on CRRT with septic shock and hemorrhagic shock in last several weeks                -No need for new consent, continuing RRT started last month at [facility].                 -Access is tunneled RIJ from 4/21.                  -CRRT, dose of 20ml/kg/h with low Cr.  4K baths, some edema, continue UF with CRRT   - obligate intakes ~2.5 L/day though has drain output approaching 1L per day so may be able to do IHD in near future     # necrotizing pancreatitis - persistent infection despite 2wks meropenem, 4/27/2025 had laparotomy, necrosectomy and colectomy after imaging showed perforated viscus  Return to OR 5/1/2025, 5/7/2025  Hemorrhage 5/10/2025 managed with MTP and IR arterial embolization of pancreatic vessels  Recurrent bleeding 5/18  again requiring MTP and IR embolization    # septic shock  # hemorrhagic shock  - norepi off since late 5/23/2025    #stress cardiomyopathy  EF 20-25% and then improved to 65-70%  Known issue that I take into account for other medical decisions, no current exacerbations or new concerns.     Recommendations were communicated to primary team via note     Edith Harris MD  [facility]  Department of Medicine  Division of  "Nephrology and Hypertension  [facility] [place]  [facility] Web [employer]     Review of Systems:   I reviewed the following systems:  Gen: No fevers or chills  CV: No CP at rest  Resp: No SOB at rest  GI: No N/V    Physical Exam:   I/O last 3 completed shifts:  In: 2368.07 [I.V.:1121.27]  Out: 4810 [Drains:907; Other:3903]   /53   Pulse 113   Temp 98.1  F (36.7  C) (Oral)   Resp 18   Ht 1.727 m (5' 7.99\")   Wt 72.9 kg (160 lb 11.5 oz)   SpO2 98%   BMI 24.44 kg/m       GENERAL APPEARANCE: Vent via trach, CRRT running.   Pulmonary: Vent via trach  CV: Regular rhythm, normal rate   - Edema none  GI: Surgical dressing in place  MS: no evidence of inflammation in joints, no muscle tenderness  : + Garcia  SKIN: no rash, warm, dry  NEURO: diffuse weakness, face symmetric    Labs:   All labs reviewed by me  Electrolytes/Renal -   Recent Labs   Lab Test 05/26/25  0819 05/26/25 0326 05/26/25 0321 05/25/25 2033 05/25/25  1614 05/25/25  0336 05/25/25  0330   NA  --   --  137  --  138  --  138   POTASSIUM  --   --  3.9  --  3.8  --  3.7   CHLORIDE  --   --  103  --  104  --  105   CO2  --   --  21*  --  21*  --  21*   BUN  --   --  47.8*  --  40.8*  --  40.9*   CR  --   --  0.54*  --  0.54*  --  0.55*   * 248* 261*   < > 223*   < > 254*   KARINA  --   --  8.5*  --  8.4*  --  8.1*   MAG  --   --  2.3  --  2.3  --  2.2   PHOS  --   --  3.7  --  3.4  --  3.5    < > = values in this interval not displayed.       CBC -   Recent Labs   Lab Test 05/26/25 0321 05/25/25  1614 05/25/25  0330   WBC 19.9* 20.6* 15.8*   HGB 7.2* 7.6* 7.4*    356 319       LFTs -   Recent Labs   Lab Test 05/26/25  0321 05/25/25  1614 05/25/25  0330 05/24/25  1614 05/24/25 0326   ALKPHOS 1,042*  --  1,164*  --  500*   BILITOTAL 1.1  --  1.4*  --  1.7*   ALT 57  --  55  --  49   AST 30  --  41  --  37   PROTTOTAL 6.1*  --  5.8*  --  5.8*   ALBUMIN 2.5* 2.5* 2.3*   < > 2.2*    < > = values in this interval not displayed.       Iron Panel - No lab " results found.        Current Medications:  Current Facility-Administered Medications   Medication Dose Route Frequency Provider Last Rate Last Admin    heparin ANTICOAGULANT injection 5,000 Units  5,000 Units Subcutaneous Q8H [facility] Sonia Moore NP   5,000 Units at 05/25/25 2016    [START ON 5/27/2025] hydrocortisone sodium succinate PF (solu-CORTEF) injection 50 mg  50 mg Intravenous Daily Durga Euceda MD        insulin aspart (NovoLOG) injection (RAPID ACTING)  1-12 Units Subcutaneous Q4H Jessica Lancaster MD   4 Units at 05/26/25 0843    insulin glargine (LANTUS PEN) injection 30 Units  30 Units Subcutaneous Daily Jessica Lancaster MD   30 Units at 05/26/25 0843    Lidocaine (LIDOCARE) 4 % Patch 1-3 patch  1-3 patch Transdermal Q24H OnwuDurga guerrero MD   1 patch at 05/26/25 0845    [Held by provider] lipids 4 oil (SMOFLIPID) 20 % infusion 250 mL  250 mL Intravenous Q24H Brendon Haas,    Stopped at 05/25/25 0815    OLANZapine (zyPREXA) IV injection 2.5 mg  2.5 mg Intravenous QPM Sonia Moore NP   2.5 mg at 05/25/25 2015    pantoprazole (PROTONIX) IV push injection 40 mg  40 mg Intravenous QAM AC de La MaterRenee, CNP   40 mg at 05/26/25 0844    piperacillin-tazobactam (ZOSYN) 4.5 g vial to attach to  mL bag  4.5 g Intravenous Q6H Eugenia Zavala  mL/hr at 05/25/25 1812 4.5 g at 05/26/25 1126    sodium chloride (PF) 0.9% PF flush 3 mL  3 mL Intracatheter Q8H Ganesh Griffiths MD   3 mL at 05/26/25 0531     Current Facility-Administered Medications   Medication Dose Route Frequency Provider Last Rate Last Admin    dexmedeTOMIDine (PRECEDEX) 4 mcg/mL in sodium chloride 0.9 % 100 mL infusion  0.1-1.2 mcg/kg/hr (Dosing Weight) Intravenous Continuous Sonia Moore NP 20.9 mL/hr at 05/26/25 1200 1.2 mcg/kg/hr at 05/26/25 1200    dextrose 10% infusion   Intravenous Continuous PRN Brendon Haas DO   Stopped at  05/22/25 1232    dialysate for CVVHD & CVVHDF (Phoxillum BK4/2.5)  10 mL/kg/hr CRRT Continuous Raheem Gabriel APRN  mL/hr at 05/26/25 1044 10 mL/kg/hr at 05/26/25 1044    fentaNYL (SUBLIMAZE) infusion   mcg/hr Intravenous Continuous Roxi Alston MD 2.5 mL/hr at 05/26/25 1200 125 mcg/hr at 05/26/25 1200    No heparin required   Does not apply Continuous PRN Tico Spain MD        norepinephrine (LEVOPHED) 16 mg in  mL infusion MAX CONC CENTRAL LINE  0.01-0.6 mcg/kg/min (Dosing Weight) Intravenous Continuous Celso Ambrose MD   Stopped at 05/24/25 0500    parenteral nutrition - ADULT compounded formula   CENTRAL LINE IV TPN CONTINUOUS Brendon Haas DO        parenteral nutrition - ADULT compounded formula   CENTRAL LINE IV TPN CONTINUOUS Brendon Haas DO 50 mL/hr at 05/25/25 2007 New Bag at 05/25/25 2007    POST-filter replacement solution for CVVHD & CVVHDF (Phoxillum BK4/2.5)   CRRT Continuous Tico Spain  mL/hr at 05/25/25 1059 New Bag at 05/25/25 1059    PRE-filter replacement solution for CVVHD & CVVHDF (Phoxillum BK4/2.5)  10 mL/kg/hr CRRT Continuous Raheem Gabriel APRN  mL/hr at 05/26/25 1043 10 mL/kg/hr at 05/26/25 1043    Reason statin not prescribed   Does not apply DOES NOT GO TO Rhys Bloom MD

## 2025-05-26 NOTE — PROGRESS NOTES
"    Pt states increased anxiety this afternoon, post abdominal dressing change at 15:00.  Notified SICU and received zyprexa 2.5 mg, 1 time dose.   17:00--Anxiety \"about the same\" per pt. HR remains in the 140's to 150's sustained, arterial line BP= 94/36 (MAP=58). MAP goal is 65. Oxygen sats now 92-94%.   Pt's mother at bedside and also helping to decrease pt's anxiety.  Delayed trach dome for now, until vital signs improve and anxiety level is decreased.   Paged SICU Resident with current vital signs.   ml bolus given for low BP.  "

## 2025-05-26 NOTE — PROGRESS NOTES
SURGICAL ICU PROGRESS NOTE      Date of Service (when I saw the patient): 05/26/2025    ASSESSMENT:  Sebastián Rodas is a 29M with alcohol abuse, anxiety, and bipolar disorder, who was admitted 3/30/25 for alcohol withdrawal and abdominal pain. Workup showed leukocytosis and elevated lipase, consistent with acute pancreatitis. He developed encephalopathy and shock, requiring ICU care, intubation, vasopressors, and CRRT by 4/1. His course was complicated by cardiomyopathy (EF 20-25%, improved to 65-70% by 4/14), necrotizing pancreatitis, and persistent infection despite 14 days of meropenem. On 4/27, imaging showed likely perforated viscus; he underwent emergent laparotomy, necrosectomy, and colectomy. Transferred to [facility] SICU on 4/30. On 5/1, reoperation revealed colonic staple line breakdown, necrotic pancreas, vessel thrombosis, and adhesions. Colostomy with malankot drain and temporary abdominal closure performed. Prognosis is poor; palliative care involved. Care conference 5/2 with family to talk goals of care. Full code, family acknowledged that poor prognosis is to be expected. 5/4 s/p exploratory surgery, additional necrotic pancreas remove with no obvious spillage or sign of bleeding at the time. Went back to the OR 5/7 for abdominal wash out. CT abdomen 5/10 showed extravasation on arterial phase imaging. MTP initiated, IR arterial embolization of pancreatic vessels performed. Decreased stool output since 5/13, switched to TPN. Hemorraghic shock.  MTP 5/18, IR embolization 5/18.      CHANGES and MAJOR THINGS TODAY:  - Trach dome  - UOP goal net negative 1-2L on CRRT  - Start hydrocortisone 50mg daily  - DVT ultrasound  - Start lidocaine patches  - Send type and screen  - Increased lantus  - Use abdominal binder when up w/ PT    PLAN:    Neurological:  # Acute pain   - Fentanyl gtt, fentanyl bolus  # Nerve pain  - Schedule lidocaine patches  # Encephalopathy- improving  # Insomnia  - Monitor neurological  status. Delirium preventions and precautions.   # Sedation: Precedex, weaning as able  # Anxiety  - RASS goal 0 to -1  - Olanzapine 2.5 mg @ night  - Olanzapine incr to 5 q6 prn, with goal of weaning precedex down     Pulmonary:   # Acute hypoxic respiratory failure  # S/p tracheostomy placement   FiO2 (%): 40 %, Resp: 11, Vent Mode: PS, Tidal Volume (Set, mL): -- (patient has been pulling volumes of 450-500 overnight), PEEP (cm H2O): 5 cmH2O, Pressure Support (cm H2O): 5 cmH2O, Tidal Volume (Set, mL): -- (patient has been pulling volumes of 450-500 overnight), PEEP (cm H2O): 5 cmH2O  CT PE 5/9 showed no evidence of pulmonary embolism  - Pt has been on PST for approx 3 days. Transition to [facility] today  - Pulmonary toilet, mobilize  - Ventilator bundle  - Wean FIO2 as tolerated    Cardiovascular:    # Stress Cardiomyopathy   - Initial LVEF 20-25%, improved to 65-70%  # Shock-distributive (septic)  # Sinus Tachycardia  - Monitor hemodynamic status. MAP goal > 65.   - Hydrocortisone 50mg q6hrs x72 hours, finished. Today go back to 50mg daily  - EKG 5/8, 5/9, 5/18 showed sinus tachycardia with no ectopy     Gastroenterology/Nutrition:  # S/p emergent exploratory laparotomy, necrosectomy, transverse colectomy, abdominal washout, and drain placement 5/1 and 5/4  # S/p Pancreatic branch vessel embolization with IR 5/10, 5/18  # Severe necrotizing pancreatitis  # Splenic vein thrombosis  CT abdomen/pelvis with IV + enteral contrast through G tube 5/14: large volume of air and blood products in abdomen.   - Will defer management of dressings and drain removal per EGS   - Ongoing dark bloody drain output, CTM  - Cecostomy drain in place, no stool output. Abdomen distended.  - PPI IV  - Hold all Oral Meds and Feeds, strict NPO    # Severe Protein calorie deficit malnutrition due to critical illness  # Hypertriglyceridemia   - Continue TPN. Lipids on hold d/t hypertriglyceridemia  - G tube tear in the J portion, TF held  -  IR consulted for PEG exchange  - IR cannot safely exchange freshly placed GJ tubes until they have been in place x6 weeks given risk of losing access into the stomach. Additionally, imaging shows there is no safe pexy between the stomach and abdominal wall. IR may not be able to offer safe exchange even after 6 weeks.   - Multivitamin supplementation ordered by nutrition  - RD consult. Appreciate cares and recommendations.     Renal/Fluids/Electrolytes:   # SARAHI, improving  # Hypervolemia  # Elevated lactate, stable  Baseline Cr 0.7-0.8. Presented with Cr 0.96 on 3/30. Rapidly markos to 5.2 on 4/1. Oliguric. Garcia UA with proteinuria, hematuria, pyuria, moderate bacteria.  Kidneys unremarkable on CT. Has severe ATN in the setting of shock, ADHF, intravascular hypovolemia/3rd spacing from pancreatitis.   - Nephrology consulted, appreciate recs  - CRRT with I&Os goal negative 1-2L  - Continue to monitor intake and output  - Volume assessment daily    Endocrine:   # Stress and steroid induced hyperglycemia    Hgb A1c 5.3, no hx of DM   - Continues to have high sliding scale insulin needs  - Increase lantus to 30 untis daily.  - Goal to keep BG< 180 for optimal wound healing     # Leukocytosis  # Necrotizing pancreatitis  - Trend CRP  - Trend WBC  - Continue Zosyn for ongoing intra-abdominal infection     cultures:  - 4/30 blood cultures- NGTD   - 5/1 blood cultures ordered - NGTD  - Blood culture 5/12, 5/15 - NGTD  - Blood culture 5/22 - +candida     Heme:     # Acute blood loss anemia   # Anemia of critical illness   # Thrombosed splenic vein   Hgb 7.2 from 7.6 this morning.  - Transfuse if hgb <7.0 or signs/symptoms of hypoperfusion. Monitor and trend  - MTP 5/10, 5/18  - Continue subcutaneous heparin, watch bleeding from drains.     Musculoskeletal:   # Deconditioning and weakness due to critical illness   # LLE swelling  - LLE DVT ultrasound  - Physical and occupational therapy consult   - Use abdominal binder when up  "w/ PT     Skin:  # Pressure Ulcers - Buttocks/rectal area  - Barrier cream with liberal application. Continue fecal management system   - WO consult      #Pressure Ulcers- Left Heel  Pressure Injury Location: Left heel   Wound type: Pressure Injury     Pressure Injury Stage: Deep Tissue Pressure Injury (DTPI), present on admission     General Cares/Prophylaxis:    DVT Prophylaxis: SQH   GI Prophylaxis: PPI     Lines/ tubes/ drains:  - HD line--  Right IJ  - PICC line- left   - Right radial A line  - PIV x 3  - Trach  - G-J  - RUQ ostomy  - LLQ drain  - Scrotal drain  - SHANNON x 3  - Cecostomy drain     Disposition:  - Surgical ICU    Time spent on this Encounter   Billing:  I spent 55 minutes bedside and on the inpatient unit today managing the critical care of Sebastián Rodas in relation to the issues listed in this note.    ASIF Nunez CNP      ====================================  SUBJECTIVE: Denies pain, SOB. NAEON.      OBJECTIVE:   1. VITAL SIGNS:   Temp: 98.1  F (36.7  C) Temp src: Axillary   Pulse: 87   Resp: 11 SpO2: 100 % O2 Device: Mechanical Ventilator Oxygen Delivery: 50 LPM Height: 172.7 cm (5' 7.99\") Weight: 72.9 kg (160 lb 11.5 oz)  Estimated body mass index is 24.44 kg/m  as calculated from the following:    Height as of this encounter: 1.727 m (5' 7.99\").    Weight as of this encounter: 72.9 kg (160 lb 11.5 oz).      FiO2 (%): 40 %, Resp: 11, Vent Mode: PS, Tidal Volume (Set, mL): -- (patient has been pulling volumes of 450-500 overnight), PEEP (cm H2O): 5 cmH2O, Pressure Support (cm H2O): 5 cmH2O, Tidal Volume (Set, mL): -- (patient has been pulling volumes of 450-500 overnight), PEEP (cm H2O): 5 cmH2O      2. INTAKE/ OUTPUT:   Intake/Output Summary (Last 24 hours) at 5/26/2025 0607  Last data filed at 5/26/2025 0600  Gross per 24 hour   Intake 2347.92 ml   Output 4674 ml   Net -2326.08 ml         3. PHYSICAL EXAMINATION:  General: laying in bed, awake and alert   HEENT: PERRLA. " Trach present and secure, site dressed.   Pulm/Resp: Clear breath sounds bilaterall  CV: RRR, S1/S2   Abdomen: Distended. Dark red output noted to all drains. G-J tube in place, site secured, abdomen with dressing in place C/D/I  : scrotal edema, draining drk red/brown output.   MSK/Extremities: generalized edema in extremities. +3 left foot edema    4. INVESTIGATIONS:   Arterial Blood Gases   Recent Labs   Lab 05/25/25  1220 05/20/25  0344   PH 7.38 7.45   PCO2 40 33*   PO2 87 74*   HCO3 23 23     Complete Blood Count   Recent Labs   Lab 05/26/25 0321 05/25/25 1614 05/25/25  0330 05/24/25 1614   WBC 19.9* 20.6* 15.8* 16.5*   HGB 7.2* 7.6* 7.4* 7.7*    356 319 297     Basic Metabolic Panel  Recent Labs   Lab 05/26/25  0326 05/26/25 0321 05/25/25  2357 05/25/25 2033 05/25/25 1614 05/25/25 0336 05/25/25  0330 05/24/25 2036 05/24/25  1614   NA  --  137  --   --  138  --  138  --  139   POTASSIUM  --  3.9  --   --  3.8  --  3.7  --  3.9   CHLORIDE  --  103  --   --  104  --  105  --  105   CO2  --  21*  --   --  21*  --  21*  --  21*   BUN  --  47.8*  --   --  40.8*  --  40.9*  --  38.7*   CR  --  0.54*  --   --  0.54*  --  0.55*  --  0.54*   * 261* 211* 200* 223*   < > 254*   < > 239*    < > = values in this interval not displayed.     Liver Function Tests  Recent Labs   Lab 05/26/25 0321 05/25/25 1614 05/25/25  0330 05/24/25  1614 05/24/25 0326 05/23/25  1626 05/23/25  0331 05/22/25 2031 05/22/25  1556 05/22/25  1002   AST 30  --  41  --  37  --  47*  --   --   --    ALT 57  --  55  --  49  --  58  --   --   --    ALKPHOS 1,042*  --  1,164*  --  500*  --  245*  --   --   --    BILITOTAL 1.1  --  1.4*  --  1.7*  --  2.4*  --   --   --    ALBUMIN 2.5* 2.5* 2.3* 2.3* 2.2*   < > 2.1*  --    < >  --    INR  --   --   --   --   --   --   --  1.22*  --  1.18*    < > = values in this interval not displayed.     Pancreatic Enzymes  No lab results found in last 7 days.    Coagulation Profile  Recent  Labs   Lab 05/22/25 2031 05/22/25  1002   INR 1.22* 1.18*     5. RADIOLOGY:   No results found for this or any previous visit (from the past 24 hours).      =========================================

## 2025-05-26 NOTE — PROGRESS NOTES
CRRT STATUS NOTE    DATA:  Time:  5/26 0635  Pressures WNL:  YES  Filter Status:  WDL - clotted at 0530, filter changed without issue    Problems Reported/Alarms Noted:  None reported by bedside RN overnight    Supplies Present:  Yes    ASSESSMENT:  Patient Net Fluid Balance:    Intake/Output Summary (Last 24 hours) at 5/26/2025 0636  Last data filed at 5/26/2025 0600  Gross per 24 hour   Intake 2368.07 ml   Output 4810 ml   Net -2441.93 ml     Ended 5/25 -2.3L, currently -266ml since 000    Vital Signs:  Temp: 98.1  F (36.7  C) Temp src: Axillary   Pulse: 116   Resp: 15 SpO2: 97 % O2 Device: Mechanical Ventilator        Labs:    Lab Results   Component Value Date    WBC 19.9 (H) 05/26/2025    HGB 7.2 (L) 05/26/2025    HCT 23.0 (L) 05/26/2025    MCV 95 05/26/2025     05/26/2025     Lab Results   Component Value Date     05/26/2025    POTASSIUM 3.9 05/26/2025    CHLORIDE 103 05/26/2025    CO2 21 (L) 05/26/2025     (H) 05/26/2025     Lab Results   Component Value Date    BUN 47.8 (H) 05/26/2025    CR 0.54 (L) 05/26/2025         Goals of Therapy:  0-100mL/hr    INTERVENTIONS: Continue fluid removal per goals of care as patient tolerates. Check filter daily. Change filter q72 hours and PRN. Please call CRRT Resource RN on [facility] with any questions or concerns.

## 2025-05-26 NOTE — PROGRESS NOTES
SICU progress note    Notified about tachycardia to 140-150, hypotension.    Patient reports feeling anxious, got OTD zyprexa 2.5 mg (in addition to scheduled doses q6h). Denies chest pain or increasing amount of abdominal pain. Abdomen is distended but compressible, non tender. Stopped pulling from CRRT about 1 hour ago. Currently net -500 ml for today but was net -2L yesterday. Hgb 8.2 this afternoon. In the past, patient's tachycardia and hypotension have responded well to transitioning to net even.. but that did not happen this evening.     - give 500 ml bolus     Mallory Holman, DO  General Surgery, PGY-2   [place], Pager [phone]    ----------------------------------------------------------    Notified about increasing amount of drainage from superior aspect of midline. Drainage is the same color and consistency. Exposed bowel due to mesh detaching from fascia (seen earlier in the day). Continues to be hypotensive and tachycardic. Bolus likely completed around 1830 with no change in HR and SBP.     Lactate 4.9. Hgb 7.4. EKG with ST w/ventricular rate of 129 bpm.     - restart levo   - continue to monitor vitals     D/w ICU fellow.    Mallory Holman, DO  General Surgery, PGY-2   [place], Pager [phone]  ----------------------------------------------------------    Patient has continued to be tachycardic to 120-140 throughout the evening. Now on levo 0.08. PEG tube fell out. Still having copious amounts of drainage from midline. Back on vent.     Lactate 5.2 from 4.9   ABG 7.45/29/118/20   Hgb 7.5 from 7.4   Blood cultures pending     Low suspicion for bleeding at this time. likely developing abdominal sepsis given known leakage of stool intra-abdominally. Has no further surgical options     - will hold off on scanning for now   - continue zosyn, hydrocortisone     D/w EGS chief and ICU fellow.     Mallory Holman, DO  General Surgery, PGY-2   [facility], Pager [phone]

## 2025-05-27 ENCOUNTER — APPOINTMENT (OUTPATIENT)
Dept: GENERAL RADIOLOGY | Facility: CLINIC | Age: 30
DRG: 856 | End: 2025-05-27
Attending: NURSE PRACTITIONER
Payer: COMMERCIAL

## 2025-05-27 LAB
ALBUMIN SERPL BCG-MCNC: 2.2 G/DL (ref 3.5–5.2)
ALBUMIN SERPL BCG-MCNC: 2.3 G/DL (ref 3.5–5.2)
ALLEN'S TEST: ABNORMAL
ALP SERPL-CCNC: 658 U/L (ref 40–150)
ALT SERPL W P-5'-P-CCNC: 43 U/L (ref 0–70)
ANION GAP SERPL CALCULATED.3IONS-SCNC: 16 MMOL/L (ref 7–15)
ANION GAP SERPL CALCULATED.3IONS-SCNC: 17 MMOL/L (ref 7–15)
AST SERPL W P-5'-P-CCNC: 34 U/L (ref 0–45)
BACTERIA SPEC CULT: NO GROWTH
BACTERIA SPEC CULT: NO GROWTH
BASE EXCESS BLDA CALC-SCNC: -5.4 MMOL/L (ref -3–3)
BASOPHILS # BLD MANUAL: 0 10E3/UL (ref 0–0.2)
BASOPHILS NFR BLD MANUAL: 0 %
BILIRUB SERPL-MCNC: 1.5 MG/DL
BILIRUBIN DIRECT (ROCHE PRO & PURE): 1.11 MG/DL (ref 0–0.45)
BLD PROD TYP BPU: NORMAL
BLOOD COMPONENT TYPE: NORMAL
BUN SERPL-MCNC: 45.3 MG/DL (ref 6–20)
BUN SERPL-MCNC: 47.8 MG/DL (ref 6–20)
CA-I BLD-MCNC: 4.7 MG/DL (ref 4.4–5.2)
CA-I BLD-MCNC: 4.7 MG/DL (ref 4.4–5.2)
CALCIUM SERPL-MCNC: 8.2 MG/DL (ref 8.8–10.4)
CALCIUM SERPL-MCNC: 8.4 MG/DL (ref 8.8–10.4)
CHLORIDE SERPL-SCNC: 104 MMOL/L (ref 98–107)
CHLORIDE SERPL-SCNC: 104 MMOL/L (ref 98–107)
CODING SYSTEM: NORMAL
CREAT SERPL-MCNC: 0.58 MG/DL (ref 0.67–1.17)
CREAT SERPL-MCNC: 0.58 MG/DL (ref 0.67–1.17)
CROSSMATCH: NORMAL
EGFRCR SERPLBLD CKD-EPI 2021: >90 ML/MIN/1.73M2
EGFRCR SERPLBLD CKD-EPI 2021: >90 ML/MIN/1.73M2
EOSINOPHIL # BLD MANUAL: 0.2 10E3/UL (ref 0–0.7)
EOSINOPHIL NFR BLD MANUAL: 1 %
ERYTHROCYTE [DISTWIDTH] IN BLOOD BY AUTOMATED COUNT: 16.4 % (ref 10–15)
ERYTHROCYTE [DISTWIDTH] IN BLOOD BY AUTOMATED COUNT: 16.4 % (ref 10–15)
ERYTHROCYTE [DISTWIDTH] IN BLOOD BY AUTOMATED COUNT: 16.5 % (ref 10–15)
GLUCOSE BLDC GLUCOMTR-MCNC: 138 MG/DL (ref 70–99)
GLUCOSE BLDC GLUCOMTR-MCNC: 151 MG/DL (ref 70–99)
GLUCOSE BLDC GLUCOMTR-MCNC: 165 MG/DL (ref 70–99)
GLUCOSE BLDC GLUCOMTR-MCNC: 167 MG/DL (ref 70–99)
GLUCOSE BLDC GLUCOMTR-MCNC: 174 MG/DL (ref 70–99)
GLUCOSE BLDC GLUCOMTR-MCNC: 176 MG/DL (ref 70–99)
GLUCOSE BLDC GLUCOMTR-MCNC: 181 MG/DL (ref 70–99)
GLUCOSE BLDC GLUCOMTR-MCNC: 192 MG/DL (ref 70–99)
GLUCOSE BLDC GLUCOMTR-MCNC: 205 MG/DL (ref 70–99)
GLUCOSE BLDC GLUCOMTR-MCNC: 208 MG/DL (ref 70–99)
GLUCOSE BLDC GLUCOMTR-MCNC: 215 MG/DL (ref 70–99)
GLUCOSE BLDC GLUCOMTR-MCNC: 219 MG/DL (ref 70–99)
GLUCOSE SERPL-MCNC: 182 MG/DL (ref 70–99)
GLUCOSE SERPL-MCNC: 220 MG/DL (ref 70–99)
HCO3 BLD-SCNC: 19 MMOL/L (ref 21–28)
HCO3 SERPL-SCNC: 17 MMOL/L (ref 22–29)
HCO3 SERPL-SCNC: 18 MMOL/L (ref 22–29)
HCT VFR BLD AUTO: 23 % (ref 40–53)
HCT VFR BLD AUTO: 23 % (ref 40–53)
HCT VFR BLD AUTO: 24.6 % (ref 40–53)
HGB BLD-MCNC: 7.1 G/DL (ref 13.3–17.7)
HGB BLD-MCNC: 7.1 G/DL (ref 13.3–17.7)
HGB BLD-MCNC: 8.1 G/DL (ref 13.3–17.7)
HOWELL-JOLLY BOD BLD QL SMEAR: PRESENT
INR PPP: 1.28 (ref 0.85–1.15)
ISSUE DATE AND TIME: NORMAL
LACTATE SERPL-SCNC: 4.4 MMOL/L (ref 0.7–2)
LACTATE SERPL-SCNC: 5.7 MMOL/L (ref 0.7–2)
LACTATE SERPL-SCNC: 5.7 MMOL/L (ref 0.7–2)
LACTATE SERPL-SCNC: 5.9 MMOL/L (ref 0.7–2)
LYMPHOCYTES # BLD MANUAL: 1.6 10E3/UL (ref 0.8–5.3)
LYMPHOCYTES NFR BLD MANUAL: 5 %
MAGNESIUM SERPL-MCNC: 2.2 MG/DL (ref 1.7–2.3)
MAGNESIUM SERPL-MCNC: 2.2 MG/DL (ref 1.7–2.3)
MCH RBC QN AUTO: 29.1 PG (ref 26.5–33)
MCH RBC QN AUTO: 29.1 PG (ref 26.5–33)
MCH RBC QN AUTO: 30.2 PG (ref 26.5–33)
MCHC RBC AUTO-ENTMCNC: 30.9 G/DL (ref 31.5–36.5)
MCHC RBC AUTO-ENTMCNC: 30.9 G/DL (ref 31.5–36.5)
MCHC RBC AUTO-ENTMCNC: 32.9 G/DL (ref 31.5–36.5)
MCV RBC AUTO: 92 FL (ref 78–100)
MCV RBC AUTO: 94 FL (ref 78–100)
MCV RBC AUTO: 94 FL (ref 78–100)
METAMYELOCYTES # BLD MANUAL: 0.7 10E3/UL
METAMYELOCYTES NFR BLD MANUAL: 2 %
MONOCYTES # BLD MANUAL: 0.3 10E3/UL (ref 0–1.3)
MONOCYTES NFR BLD MANUAL: 1 %
MRSA DNA SPEC QL NAA+PROBE: NEGATIVE
MYELOCYTES # BLD MANUAL: 1.9 10E3/UL
MYELOCYTES NFR BLD MANUAL: 6 %
NEUTROPHILS # BLD MANUAL: 27.4 10E3/UL (ref 1.6–8.3)
NEUTROPHILS NFR BLD MANUAL: 85 %
NRBC # BLD AUTO: 0.5 10E3/UL
NRBC BLD MANUAL-RTO: 2 %
O2/TOTAL GAS SETTING VFR VENT: 40 %
OXYHGB MFR BLDA: 94 % (ref 92–100)
PCO2 BLD: 32 MM HG (ref 35–45)
PEEP: 5 CM H2O
PH BLD: 7.39 [PH] (ref 7.35–7.45)
PHOSPHATE SERPL-MCNC: 3.4 MG/DL (ref 2.5–4.5)
PHOSPHATE SERPL-MCNC: 3.9 MG/DL (ref 2.5–4.5)
PLAT MORPH BLD: ABNORMAL
PLATELET # BLD AUTO: 459 10E3/UL (ref 150–450)
PLATELET # BLD AUTO: 568 10E3/UL (ref 150–450)
PLATELET # BLD AUTO: 568 10E3/UL (ref 150–450)
PO2 BLD: 75 MM HG (ref 80–105)
POLYCHROMASIA BLD QL SMEAR: SLIGHT
POTASSIUM SERPL-SCNC: 3.9 MMOL/L (ref 3.4–5.3)
POTASSIUM SERPL-SCNC: 3.9 MMOL/L (ref 3.4–5.3)
PROT SERPL-MCNC: 5.4 G/DL (ref 6.4–8.3)
PROTHROMBIN TIME: 15.9 SECONDS (ref 11.8–14.8)
RBC # BLD AUTO: 2.44 10E6/UL (ref 4.4–5.9)
RBC # BLD AUTO: 2.44 10E6/UL (ref 4.4–5.9)
RBC # BLD AUTO: 2.68 10E6/UL (ref 4.4–5.9)
RBC MORPH BLD: ABNORMAL
SA TARGET DNA: NEGATIVE
SAO2 % BLDA: 95.8 % (ref 96–97)
SODIUM SERPL-SCNC: 138 MMOL/L (ref 135–145)
SODIUM SERPL-SCNC: 138 MMOL/L (ref 135–145)
TOXIC GRANULES BLD QL SMEAR: PRESENT
UNIT ABO/RH: NORMAL
UNIT NUMBER: NORMAL
UNIT STATUS: NORMAL
UNIT TYPE ISBT: 5100
WBC # BLD AUTO: 31.2 10E3/UL (ref 4–11)
WBC # BLD AUTO: 32.1 10E3/UL (ref 4–11)
WBC # BLD AUTO: 32.1 10E3/UL (ref 4–11)

## 2025-05-27 PROCEDURE — P9016 RBC LEUKOCYTES REDUCED: HCPCS | Performed by: NURSE PRACTITIONER

## 2025-05-27 PROCEDURE — 250N000011 HC RX IP 250 OP 636

## 2025-05-27 PROCEDURE — 250N000009 HC RX 250: Performed by: NURSE PRACTITIONER

## 2025-05-27 PROCEDURE — 84075 ASSAY ALKALINE PHOSPHATASE: CPT | Performed by: STUDENT IN AN ORGANIZED HEALTH CARE EDUCATION/TRAINING PROGRAM

## 2025-05-27 PROCEDURE — 258N000003 HC RX IP 258 OP 636: Performed by: NURSE PRACTITIONER

## 2025-05-27 PROCEDURE — 84450 TRANSFERASE (AST) (SGOT): CPT | Performed by: STUDENT IN AN ORGANIZED HEALTH CARE EDUCATION/TRAINING PROGRAM

## 2025-05-27 PROCEDURE — 90947 DIALYSIS REPEATED EVAL: CPT

## 2025-05-27 PROCEDURE — 85048 AUTOMATED LEUKOCYTE COUNT: CPT | Performed by: CLINICAL NURSE SPECIALIST

## 2025-05-27 PROCEDURE — 250N000011 HC RX IP 250 OP 636: Performed by: NURSE PRACTITIONER

## 2025-05-27 PROCEDURE — 83605 ASSAY OF LACTIC ACID: CPT | Performed by: NURSE PRACTITIONER

## 2025-05-27 PROCEDURE — 250N000011 HC RX IP 250 OP 636: Performed by: STUDENT IN AN ORGANIZED HEALTH CARE EDUCATION/TRAINING PROGRAM

## 2025-05-27 PROCEDURE — 87640 STAPH A DNA AMP PROBE: CPT | Performed by: NURSE PRACTITIONER

## 2025-05-27 PROCEDURE — 84460 ALANINE AMINO (ALT) (SGPT): CPT | Performed by: STUDENT IN AN ORGANIZED HEALTH CARE EDUCATION/TRAINING PROGRAM

## 2025-05-27 PROCEDURE — 94003 VENT MGMT INPAT SUBQ DAY: CPT

## 2025-05-27 PROCEDURE — 258N000003 HC RX IP 258 OP 636

## 2025-05-27 PROCEDURE — 85610 PROTHROMBIN TIME: CPT | Performed by: NURSE PRACTITIONER

## 2025-05-27 PROCEDURE — 82805 BLOOD GASES W/O2 SATURATION: CPT | Performed by: NURSE PRACTITIONER

## 2025-05-27 PROCEDURE — G0463 HOSPITAL OUTPT CLINIC VISIT: HCPCS

## 2025-05-27 PROCEDURE — 999N000157 HC STATISTIC RCP TIME EA 10 MIN

## 2025-05-27 PROCEDURE — 99291 CRITICAL CARE FIRST HOUR: CPT | Mod: 24 | Performed by: NURSE PRACTITIONER

## 2025-05-27 PROCEDURE — 82435 ASSAY OF BLOOD CHLORIDE: CPT | Performed by: CLINICAL NURSE SPECIALIST

## 2025-05-27 PROCEDURE — 85014 HEMATOCRIT: CPT | Performed by: CLINICAL NURSE SPECIALIST

## 2025-05-27 PROCEDURE — 250N000011 HC RX IP 250 OP 636: Performed by: SURGERY

## 2025-05-27 PROCEDURE — 99232 SBSQ HOSP IP/OBS MODERATE 35: CPT | Performed by: PHYSICIAN ASSISTANT

## 2025-05-27 PROCEDURE — 85041 AUTOMATED RBC COUNT: CPT | Performed by: CLINICAL NURSE SPECIALIST

## 2025-05-27 PROCEDURE — 200N000002 HC R&B ICU UMMC

## 2025-05-27 PROCEDURE — 85027 COMPLETE CBC AUTOMATED: CPT | Performed by: NURSE PRACTITIONER

## 2025-05-27 PROCEDURE — 99233 SBSQ HOSP IP/OBS HIGH 50: CPT | Mod: 24 | Performed by: CLINICAL NURSE SPECIALIST

## 2025-05-27 PROCEDURE — 84132 ASSAY OF SERUM POTASSIUM: CPT | Performed by: CLINICAL NURSE SPECIALIST

## 2025-05-27 PROCEDURE — 85007 BL SMEAR W/DIFF WBC COUNT: CPT | Performed by: NURSE PRACTITIONER

## 2025-05-27 PROCEDURE — 250N000009 HC RX 250: Performed by: CLINICAL NURSE SPECIALIST

## 2025-05-27 PROCEDURE — 82248 BILIRUBIN DIRECT: CPT | Performed by: STUDENT IN AN ORGANIZED HEALTH CARE EDUCATION/TRAINING PROGRAM

## 2025-05-27 PROCEDURE — 83735 ASSAY OF MAGNESIUM: CPT | Performed by: CLINICAL NURSE SPECIALIST

## 2025-05-27 PROCEDURE — 71045 X-RAY EXAM CHEST 1 VIEW: CPT | Mod: 26 | Performed by: RADIOLOGY

## 2025-05-27 PROCEDURE — 82374 ASSAY BLOOD CARBON DIOXIDE: CPT | Performed by: CLINICAL NURSE SPECIALIST

## 2025-05-27 PROCEDURE — 82330 ASSAY OF CALCIUM: CPT | Performed by: CLINICAL NURSE SPECIALIST

## 2025-05-27 PROCEDURE — 999N000248 HC STATISTIC IV INSERT WITH US BY RN

## 2025-05-27 PROCEDURE — 250N000011 HC RX IP 250 OP 636: Mod: JW | Performed by: NURSE PRACTITIONER

## 2025-05-27 PROCEDURE — 87205 SMEAR GRAM STAIN: CPT | Performed by: NURSE PRACTITIONER

## 2025-05-27 PROCEDURE — 250N000009 HC RX 250: Mod: JW | Performed by: STUDENT IN AN ORGANIZED HEALTH CARE EDUCATION/TRAINING PROGRAM

## 2025-05-27 PROCEDURE — 71045 X-RAY EXAM CHEST 1 VIEW: CPT

## 2025-05-27 PROCEDURE — 250N000009 HC RX 250: Performed by: SURGERY

## 2025-05-27 PROCEDURE — 87641 MR-STAPH DNA AMP PROBE: CPT | Performed by: NURSE PRACTITIONER

## 2025-05-27 RX ORDER — NICOTINE POLACRILEX 4 MG
15-30 LOZENGE BUCCAL
Status: DISCONTINUED | OUTPATIENT
Start: 2025-05-27 | End: 2025-05-30

## 2025-05-27 RX ORDER — OLANZAPINE 10 MG/1
5 INJECTION, POWDER, LYOPHILIZED, FOR SOLUTION INTRAMUSCULAR ONCE
Status: DISCONTINUED | OUTPATIENT
Start: 2025-05-27 | End: 2025-05-27

## 2025-05-27 RX ORDER — DEXTROSE MONOHYDRATE 25 G/50ML
25-50 INJECTION, SOLUTION INTRAVENOUS
Status: DISCONTINUED | OUTPATIENT
Start: 2025-05-27 | End: 2025-05-30

## 2025-05-27 RX ORDER — CALCIUM CHLORIDE, MAGNESIUM CHLORIDE, SODIUM CHLORIDE, SODIUM BICARBONATE, POTASSIUM CHLORIDE AND SODIUM PHOSPHATE DIBASIC DIHYDRATE 3.68; 3.05; 6.34; 3.09; .314; .187 G/L; G/L; G/L; G/L; G/L; G/L
12.5 INJECTION INTRAVENOUS CONTINUOUS
Status: DISCONTINUED | OUTPATIENT
Start: 2025-05-27 | End: 2025-06-03 | Stop reason: HOSPADM

## 2025-05-27 RX ORDER — LIDOCAINE HYDROCHLORIDE 20 MG/ML
5 SOLUTION OROPHARYNGEAL ONCE
Status: COMPLETED | OUTPATIENT
Start: 2025-05-27 | End: 2025-05-27

## 2025-05-27 RX ORDER — HYDROCORTISONE SODIUM SUCCINATE 100 MG/2ML
50 INJECTION INTRAMUSCULAR; INTRAVENOUS EVERY 6 HOURS
Status: DISCONTINUED | OUTPATIENT
Start: 2025-05-27 | End: 2025-05-28

## 2025-05-27 RX ORDER — OLANZAPINE 10 MG/1
5 INJECTION, POWDER, LYOPHILIZED, FOR SOLUTION INTRAMUSCULAR
Status: COMPLETED | OUTPATIENT
Start: 2025-05-27 | End: 2025-05-27

## 2025-05-27 RX ORDER — DEXTROSE MONOHYDRATE 100 MG/ML
INJECTION, SOLUTION INTRAVENOUS CONTINUOUS PRN
Status: DISCONTINUED | OUTPATIENT
Start: 2025-05-27 | End: 2025-05-30

## 2025-05-27 RX ORDER — IOPAMIDOL 755 MG/ML
97 INJECTION, SOLUTION INTRAVASCULAR ONCE
Status: COMPLETED | OUTPATIENT
Start: 2025-05-27 | End: 2025-05-27

## 2025-05-27 RX ORDER — MEROPENEM 1 G/1
1 INJECTION, POWDER, FOR SOLUTION INTRAVENOUS EVERY 8 HOURS
Status: DISCONTINUED | OUTPATIENT
Start: 2025-05-27 | End: 2025-05-30

## 2025-05-27 RX ADMIN — CALCIUM CHLORIDE, MAGNESIUM CHLORIDE, SODIUM CHLORIDE, SODIUM BICARBONATE, POTASSIUM CHLORIDE AND SODIUM PHOSPHATE DIBASIC DIHYDRATE 12.5 ML/KG/HR: 3.68; 3.05; 6.34; 3.09; .314; .187 INJECTION INTRAVENOUS at 14:40

## 2025-05-27 RX ADMIN — IOPAMIDOL 97 ML: 755 INJECTION, SOLUTION INTRAVENOUS at 11:33

## 2025-05-27 RX ADMIN — Medication 125 MCG/HR: at 04:20

## 2025-05-27 RX ADMIN — CALCIUM CHLORIDE, MAGNESIUM CHLORIDE, SODIUM CHLORIDE, SODIUM BICARBONATE, POTASSIUM CHLORIDE AND SODIUM PHOSPHATE DIBASIC DIHYDRATE 12.5 ML/KG/HR: 3.68; 3.05; 6.34; 3.09; .314; .187 INJECTION INTRAVENOUS at 20:23

## 2025-05-27 RX ADMIN — HYDROCORTISONE SODIUM SUCCINATE 50 MG: 100 INJECTION, POWDER, FOR SOLUTION INTRAMUSCULAR; INTRAVENOUS at 23:50

## 2025-05-27 RX ADMIN — DEXMEDETOMIDINE HYDROCHLORIDE 1.2 MCG/KG/HR: 400 INJECTION INTRAVENOUS at 06:24

## 2025-05-27 RX ADMIN — HYDROCORTISONE SODIUM SUCCINATE 50 MG: 100 INJECTION, POWDER, FOR SOLUTION INTRAMUSCULAR; INTRAVENOUS at 07:49

## 2025-05-27 RX ADMIN — Medication 25 MCG: at 04:20

## 2025-05-27 RX ADMIN — OLANZAPINE 2.5 MG: 10 INJECTION, POWDER, FOR SOLUTION INTRAMUSCULAR at 20:17

## 2025-05-27 RX ADMIN — MICAFUNGIN SODIUM 100 MG: 100 INJECTION, POWDER, LYOPHILIZED, FOR SOLUTION INTRAVENOUS at 10:14

## 2025-05-27 RX ADMIN — HYDROCORTISONE SODIUM SUCCINATE 50 MG: 100 INJECTION, POWDER, FOR SOLUTION INTRAMUSCULAR; INTRAVENOUS at 18:15

## 2025-05-27 RX ADMIN — DEXMEDETOMIDINE HYDROCHLORIDE 1.2 MCG/KG/HR: 400 INJECTION INTRAVENOUS at 15:46

## 2025-05-27 RX ADMIN — Medication 25 MCG: at 00:48

## 2025-05-27 RX ADMIN — MEROPENEM 1 G: 1 INJECTION, POWDER, FOR SOLUTION INTRAVENOUS at 16:39

## 2025-05-27 RX ADMIN — DEXMEDETOMIDINE HYDROCHLORIDE 1.2 MCG/KG/HR: 400 INJECTION INTRAVENOUS at 20:16

## 2025-05-27 RX ADMIN — CALCIUM CHLORIDE, MAGNESIUM CHLORIDE, SODIUM CHLORIDE, SODIUM BICARBONATE, POTASSIUM CHLORIDE AND SODIUM PHOSPHATE DIBASIC DIHYDRATE 10 ML/KG/HR: 3.68; 3.05; 6.34; 3.09; .314; .187 INJECTION INTRAVENOUS at 07:02

## 2025-05-27 RX ADMIN — HEPARIN SODIUM 5000 UNITS: 5000 INJECTION, SOLUTION INTRAVENOUS; SUBCUTANEOUS at 06:01

## 2025-05-27 RX ADMIN — CALCIUM CHLORIDE, MAGNESIUM CHLORIDE, SODIUM CHLORIDE, SODIUM BICARBONATE, POTASSIUM CHLORIDE AND SODIUM PHOSPHATE DIBASIC DIHYDRATE 10 ML/KG/HR: 3.68; 3.05; 6.34; 3.09; .314; .187 INJECTION INTRAVENOUS at 00:52

## 2025-05-27 RX ADMIN — DEXMEDETOMIDINE HYDROCHLORIDE 1.2 MCG/KG/HR: 400 INJECTION INTRAVENOUS at 10:56

## 2025-05-27 RX ADMIN — SODIUM CHLORIDE, SODIUM LACTATE, POTASSIUM CHLORIDE, AND CALCIUM CHLORIDE 500 ML: .6; .31; .03; .02 INJECTION, SOLUTION INTRAVENOUS at 02:32

## 2025-05-27 RX ADMIN — ONDANSETRON 4 MG: 2 INJECTION, SOLUTION INTRAMUSCULAR; INTRAVENOUS at 13:09

## 2025-05-27 RX ADMIN — HYDROCORTISONE SODIUM SUCCINATE 50 MG: 100 INJECTION, POWDER, FOR SOLUTION INTRAMUSCULAR; INTRAVENOUS at 13:10

## 2025-05-27 RX ADMIN — MEROPENEM 1 G: 1 INJECTION, POWDER, FOR SOLUTION INTRAVENOUS at 23:51

## 2025-05-27 RX ADMIN — DEXMEDETOMIDINE HYDROCHLORIDE 1.2 MCG/KG/HR: 400 INJECTION INTRAVENOUS at 10:52

## 2025-05-27 RX ADMIN — HEPARIN SODIUM 5000 UNITS: 5000 INJECTION, SOLUTION INTRAVENOUS; SUBCUTANEOUS at 21:41

## 2025-05-27 RX ADMIN — INSULIN HUMAN 2 UNITS/HR: 1 INJECTION, SOLUTION INTRAVENOUS at 14:48

## 2025-05-27 RX ADMIN — MAGNESIUM SULFATE HEPTAHYDRATE: 500 INJECTION, SOLUTION INTRAMUSCULAR; INTRAVENOUS at 20:14

## 2025-05-27 RX ADMIN — MEROPENEM 1 G: 1 INJECTION, POWDER, FOR SOLUTION INTRAVENOUS at 09:22

## 2025-05-27 RX ADMIN — Medication 25 MCG: at 13:51

## 2025-05-27 RX ADMIN — OLANZAPINE 5 MG: 10 INJECTION, POWDER, FOR SOLUTION INTRAMUSCULAR at 11:07

## 2025-05-27 RX ADMIN — VASOPRESSIN 2.4 UNITS/HR: 20 INJECTION, SOLUTION INTRAMUSCULAR; SUBCUTANEOUS at 08:41

## 2025-05-27 RX ADMIN — PIPERACILLIN AND TAZOBACTAM 4.5 G: 4; .5 INJECTION, POWDER, LYOPHILIZED, FOR SOLUTION INTRAVENOUS at 06:01

## 2025-05-27 RX ADMIN — Medication 1500 MG: at 13:17

## 2025-05-27 RX ADMIN — Medication 125 MCG/HR: at 21:41

## 2025-05-27 RX ADMIN — PANTOPRAZOLE SODIUM 40 MG: 40 INJECTION, POWDER, FOR SOLUTION INTRAVENOUS at 07:51

## 2025-05-27 RX ADMIN — DEXMEDETOMIDINE HYDROCHLORIDE 1.2 MCG/KG/HR: 400 INJECTION INTRAVENOUS at 01:44

## 2025-05-27 RX ADMIN — HEPARIN SODIUM 5000 UNITS: 5000 INJECTION, SOLUTION INTRAVENOUS; SUBCUTANEOUS at 14:42

## 2025-05-27 RX ADMIN — INSULIN GLARGINE 20 UNITS: 100 INJECTION, SOLUTION SUBCUTANEOUS at 10:54

## 2025-05-27 RX ADMIN — LIDOCAINE HYDROCHLORIDE 1 ML: 10 INJECTION, SOLUTION EPIDURAL; INFILTRATION; INTRACAUDAL; PERINEURAL at 13:11

## 2025-05-27 RX ADMIN — SODIUM CHLORIDE, SODIUM LACTATE, POTASSIUM CHLORIDE, AND CALCIUM CHLORIDE 500 ML: .6; .31; .03; .02 INJECTION, SOLUTION INTRAVENOUS at 10:51

## 2025-05-27 ASSESSMENT — ACTIVITIES OF DAILY LIVING (ADL)
ADLS_ACUITY_SCORE: 60
ADLS_ACUITY_SCORE: 58
ADLS_ACUITY_SCORE: 56
ADLS_ACUITY_SCORE: 56
ADLS_ACUITY_SCORE: 52
ADLS_ACUITY_SCORE: 54
ADLS_ACUITY_SCORE: 58
ADLS_ACUITY_SCORE: 56
ADLS_ACUITY_SCORE: 52
ADLS_ACUITY_SCORE: 52
ADLS_ACUITY_SCORE: 58
ADLS_ACUITY_SCORE: 58
ADLS_ACUITY_SCORE: 52
ADLS_ACUITY_SCORE: 58
ADLS_ACUITY_SCORE: 54
ADLS_ACUITY_SCORE: 58
ADLS_ACUITY_SCORE: 56
ADLS_ACUITY_SCORE: 56

## 2025-05-27 NOTE — PROGRESS NOTES
[facility] Nurse Inpatient Assessment     Consulted for: Tracheostomy site, sacrum, left heel  5/9: New consult for midline wound draining heavily    WO nurse follow-up plan: Monday/WednesdayFriday    Patient History (according to provider note(s):      Sebastián Rodas is a 29-year-old male with a history of alcohol abuse, anxiety, and bipolar disorder, admitted on 3/30/2025 for alcohol withdrawal after a binge, presenting with abdominal pain. Initial workup revealed leukocytosis and elevated lipase, suggestive of acute pancreatitis. He developed encephalopathy and required ICU transfer on 3/31, intubation, and vasopressors by 4/1 due to shock. His hospital course was complicated by acute renal failure (requiring CRRT), cardiomyopathy (LVEF 20-25%, improved to 65-70% by 4/14), and necrotizing pancreatitis with unorganized fluid collections. He completed a 14-day course of meropenem but remained febrile and critically ill, requiring ongoing dialysis and vasopressor support. On 4/27, imaging indicated likely perforated viscus, and after family discussion, he underwent emergent laparotomy, necrosectomy, transverse colectomy, abdominal washout, and drain placement. He was transferred to the SICU at [facility] on 4/30 for further monitoring. On 5/1, he underwent a second laparotomy with irrigation and debridement, colostomy tube placement, temporary abdominal closure, and further necrosectomy due to breakdown of the colonic resection staple line, necrotic pancreas, thrombosed middle colic vessels, fat necrosis, and dense interloop adhesions. He has a poor prognosis given the OR findings, palliative care consulted.     Assessment:      Areas visualized during today's visit: Focused: and Abdomen    Wound location: Abdomen           Superior side of wound with displaced mesh      Last photo: 5/27  Wound due to: Surgical Wound  Wound history/plan of care: On 4/27, imaging showed  likely perforated viscus; he underwent emergent laparotomy, necrosectomy, and colectomy. Transferred to [facility] SICU on 4/30. On 5/1, reoperation revealed colonic staple line breakdown, necrotic pancreas, vessel thrombosis, and adhesions. Colostomy with malankot drain and temporary abdominal closure performed. Prognosis is poor; palliative care involved. Care conference 5/2 with family to talk goasl of care. Full code, family acknowledged that poor prognosis is to be expected. Today 5/4 s/p exploratory surgery, additional necrotic pancreas remove with no obvious spillage or sign of bleeding   5/12: Abdomen is much more distended today. The superior side of the wound bed had a large black clot loosely adhered to the mesh. There was pooling of darker thick liquid in the inferior side of the wound bed.   5/13: Pouch intact. Replaced Kerlix in wound bed.  5/14: Pouch replaced due to leaking at the superior side by PEG tube. Most of the slough covering the adipose tissue at the wound margins has fallen off. There is marbled granulation tissue and adipose tissue on the wound margins. Wound smelled more foul today.   5/20: Wound dimensions have increased markedly. The bowel under the mesh is much more distended and the mesh at the superior end of wound has displaced and is no longer attached. There is a large amount of blood clot and stool in this location. The wound manager was unable to be replaced today due to the increased wound dimensions as well as erosion of the various tubes around the analilia-wound area making pouching impossible.   5/23: Patient seems to be tolerating dressing changes ok. Found that more mesh had  and bowel loops were visible so asked team o come see and they were aware. Will continue with dressing changes. If he has a strong desire to get out of bed (he asked during my visit) I would strongly recommend an abdominal binder.   5/27: Pt's wound with increased drainage from superior side with  displaced mesh. Pt much less edematous with dialysis. PEG tube fell out over night. Saddle bagged wound pouch able to be placed today.   Wound base: 80 % surgical mesh- with open bowel visible on top edge , 20 % Granulation tissue, Slough, and Adipose tissue     Palpation of the wound bed: fluctuance      Drainage: copious     Description of drainage: serosanguinous, bloody, and black     Measurements (length x width x depth, in cm): 21  x 17  x  3+ cm - measured on Friday's- did not probe into cavity with exposed bowel     Tunneling: N/A     Undermining: N/A  Periwound skin: Intact      Color: normal and consistent with surrounding tissue      Temperature: normal   Odor: none  Pain: facial expression of distress, sharp  Pain interventions prior to dressing change: oral oxycodone, IV fentanyl, and slow and gentle cares   Treatment goal: Drainage control and Infection control/prevention  STATUS: deteriorating  Supplies ordered: at bedside    Pressure Injury Location: Left heel - Assessed on Fridays        Last photo: 5/23  Wound type: Pressure Injury     Pressure Injury Stage: Deep Tissue Pressure Injury (DTPI), present on admission       Wound history/plan of care:   Pt admitted from OSH on 4/31. Wound present on admission. There is an intact blister over a deeper maroon discoloration.    Wound base: 100 % Maroon, Blister, and Epidermis     Palpation of the wound bed: boggy      Drainage: none     Description of drainage: none     Measurements (length x width x depth, in cm) 2  x 1.5  x  0 cm      Tunneling N/A     Undermining N/A  Periwound skin: Intact      Color: normal and consistent with surrounding tissue      Temperature: normal   Odor: none  Pain: denies , none  Pain intervention prior to dressing change: slow and gentle cares   Treatment goal: Heal  and Protection  STATUS: stable  Supplies ordered: at bedside    My PI Risk Assessment     Sensory Perception: 2 - Very Limited     Moisture: 2 - Very moist       Activity: 1 - Bedfast      Mobility: 2 - Very limited     Nutrition: 2 - Probably inadequate      Friction/Shear: 1 - Problem     TOTAL: 10    Pressure Injury Location: coccyx - Assessed on Fridays    Zoomed out                                              Zoomed in        Last photo: 5/16  Wound type: Pressure Injury, Incontinence Associated Dermatitis (IAD), and Moisture Associated Skin Damage (MASD)     Pressure Injury Stage: 2, present on admission        Wound history/plan of care:   Pt transferred from OSH on 4/31. Wound present on admission. There is MASD/IAD to the bilateral buttocks with exposed dermis. There is further breakdown over the Pt's coccyx that is fibrin vs slough. Wound is at least a stage 2. The wound will need to evolve further before it can be definitively staged.   5/9: Wound continues to improve. Wound is a stage 2 pressure injury over coccyx with surrounding MASD and exposed dermis.  5/16: Dermal buds seen throughout wound base.  5/23: wound care just completed by RN so did not assess   Wound base: 80 % Dermis, 20 % Fibrin     Palpation of the wound bed: normal      Drainage: moderate     Description of drainage: serosanguinous     Measurements (length x width x depth, in cm) 6  x 6  x  0.2 cm - central wound, 1.5 x 1.5 x 0.1 cm left buttock     Tunneling N/A     Undermining N/A  Periwound skin: Edematous and Skin stripping      Color: pink and red      Temperature: normal   Odor: none  Pain: moderate, tender  Pain intervention prior to dressing change: slow and gentle cares   Treatment goal: Heal  and Protection  STATUS: improving and stable  Supplies ordered: at bedside    My PI Risk Assessment     Sensory Perception: 2 - Very Limited     Moisture: 2 - Very moist      Activity: 1 - Bedfast      Mobility: 2 - Very limited     Nutrition: 2 - Probably inadequate      Friction/Shear: 1 - Problem     TOTAL: 10    Wound location: Trach - Assessed on Fridays               Last photo:  5/23  Wound due to: Surgical Wound  Wound history/plan of care:  Pt transferred from OSH on 4/31. Pt had trach placed at OSH. There are areas of fibrin from previous trach suture locations which are mostly healed. There is a linear incision on the left side of Trach cannula from insertion. The wound base wass difficult to visualize, appears to be fibrin and adipose tissue marbled together.  5/5: Called by bedside RN with concerns for skin breakdown near trach. On assessment Trach incision appears to have opened up significantly from prior assessment. Moderate amount of respiratory secretions present. ENT consulted by primary team to assess trach site as well.  5/16: Overall dimensions decreasing. Wound edges stating to heal.   Wound base: 100 % Granulation tissue, Fibrin, and Adipose tissue     Palpation of the wound bed: normal      Drainage: moderate     Description of drainage: yellow - respiratory secretions     Measurements (length x width x depth, in cm): 2  x 3  x  0.8 cm      Tunneling: N/A     Undermining: N/A  Periwound skin: Intact      Color: normal and consistent with surrounding tissue      Temperature: normal   Odor: none  Pain: moderate, tender  Pain interventions prior to dressing change: slow and gentle cares   Treatment goal: Heal  and Protection  STATUS: granulating and stable  Supplies ordered: at bedside      Wound location: Scrotum      Last photo: 5/27  Wound due to: Abscess  Wound history/plan of care: Wound noted during assessment of abdominal wounds 5/12. Surgery at bedside, stated that inguinal canal is patent with the abdominal cavity and drainage most likely secondary to abdominal cavity contents, and recommended BID gauze dressings. Wound continued to leak copious amounts of blood/stool/intraabdominal fluid. Overnight and was pouched with an ostomy pouch by SICU resident overnight. Pouch continued to leak and was replaced with a primofit male external catheter set to low continuous  suction.  5/13: Ostomy pouch applied to area to collect drainage. Intact at time of assessment.  Wound base: 100 % Moist scrotal tissue     Palpation of the wound bed: fluctuance      Drainage: copious     Description of drainage: serosanguinous, purulent, bloody, and red     Measurements (length x width x depth, in cm): See photos - 4 small open areas.     Tunneling: N/A     Undermining: N/A  Periwound skin: Indurated      Color: red      Temperature: normal   Odor: none  Pain: moderate, tender  Pain interventions prior to dressing change: slow and gentle cares   Treatment goal: Drainage control  STATUS: stable  Supplies ordered: at bedside       Treatment Plan:     Left heel wound(s): Every 3 days Cleanse with saline and pat dry. Conform a sacral Mepilex around Pt's heel. Place in Prevalon boots. Float heels off of support surface with pillows under calves.    Buttocks/coccyx wound(s): Every 3 days   Cleanse the area with wound cleanser and pat dry.  Apply No sting film barrier to periwound skin.  Cover wound with Sacral Mepilex (#046680)  Nursing to peel back dressing to assess wound bed with skin assessment.   Change dressing Q 3 days.  Turn and reposition Q 2hrs side to side only.  Ensure pt has Vikram-cushion while sitting up in the chair.  If not in ICU: Ensure air pump on bed and set to Isoflex.  FYI- If pt has constant incontinent loose stools needing dressing changes Q shift please discontinue the Mepilex dressing and apply criticaid barrier paste BID and PRN.     Trach wound: Perform trach cares per unit routine. Place a fenestrated optifoam between trach faceplate and Pt's skin.    Abdominal wound: BID and PRN. Remove soiled Kerlix. Gently absorb as much drainage from wound base as possible. It can be helpful to use yaunker suction to remove excess fluid. Cover exposed mesh with Xeroform gauze. (Change Xerform daily) Fluff Kerlix into superior and inferior wound base to help absorb drainage. Keep wound  "manager to gravity drainage. [facility] will assess wound manager daily MWF.    If wound manager fails, place wet to dry dressing with Xeroform gauze covered by Vashe moistened Kerlix Covered by Mextra Superabsorbant pads. Change PRN.    Drain tube cares: PRN for saturation. Cleanse analilia-tubular skin with Vashe and pat dry. Apply no sting barrier film to analilia-tubular area and allow to dry. Fenestrate a 5x5 Mextra super absorbant pad (311319) and place around tube to absorb drainage.     Scrotal wound: PRN with pouch leakage. Cleanse with wound cleanser and pat dry. Apply no sting barrier film to analilia-wound area and allow to dry. Cut a small Josephine pouch (422964) large enough to fit Pt's penis and fistula openings. Apply a strip of Josephine ring around the area and apply pouch. Place pouch to straight drainage to assist with pouch integrity.      Pressure Injury Prevention (PIP) Plan:  If patient is declining pressure injury prevention interventions: Explore reason why and address patient's concerns, Educate on pressure injury risk and prevention intervention(s), If patient is still declining, document \"informed refusal\" , and Ensure Care team is aware ( provider, charge nurse, etc)  Mattress: Follow bed algorithm, add Low Air Loss (Air+) mattress pump if skin is very moist or constantly moist.   HOB: Maintain at or below 30 degrees, unless contraindicated  Repositioning in bed: Every 1-2 hours , Left/right positioning; avoid supine, Raise foot of bed prior to raising head of bed, to reduce patient sliding down (shear), and Frequent microturns using positioning wedges, as patient tolerates  Heels: Pillows under calves and Heel lift boots  Protective Dressing: Sacral Mepilex for prevention (#157468),  especially for the agitated patient   Positioning Equipment:Positioning wedges (#718949) to help maintain 30 degree side lying position   Chair positioning: Chair cushion (#545404) , Assist patient to reposition hourly, and Do NOT " use a donut for sitting (this increases pressure to smaller area and creates a higher potential for injury)    If patient has a buttock pressure injury, or high risk for PI use chair cushion or SPS.  Moisture Management: Perineal cleansing /protection: Follow Incontinence Protocol, Avoid brief in bed, Clean and dry skin folds with bathing , Consider InterDry (#769751) between folds, and Moisturize dry skin  Under Devices: Inspect skin under all medical devices during skin inspection , Ensure tubes are stabilized without tension, and Ensure patient is not lying on medical devices or equipment when repositioned  Ask provider to discontinue device when no longer needed.     Orders: Reviewed and Updated    RECOMMEND PRIMARY TEAM ORDER: None, at this time  Education provided: plan of care  Discussed plan of care with: Nurse  Notify [facility] if wound(s) deteriorate.  Nursing to notify the Provider(s) and re-consult the [facility] Nurse if new skin concern.    DATA:     Current support surface: Standard  Low air loss (ISABELL pump, Isolibrium, Pulsate)  Containment of urine/stool: Incontinent pad in bed, Indwelling catheter, and Internal fecal management  BMI: Body mass index is 24.27 kg/m .   Active diet order: None     Output: I/O last 3 completed shifts:  In: 2833.67 [I.V.:1133.17; Other:0.5; IV Piggyback:500]  Out: 2445.9 [Drains:620; Other:1825.9]     Labs:   Recent Labs   Lab 05/27/25  0657 05/27/25  0401   ALBUMIN  --  2.3*   HGB  --  7.1*  7.1*   INR 1.28*  --    WBC  --  32.1*  32.1*     Pressure injury risk assessment:   Sensory Perception: 3-->slightly limited  Moisture: 3-->occasionally moist  Activity: 1-->bedfast  Mobility: 2-->very limited  Nutrition: 2-->probably inadequate  Friction and Shear: 1-->problem  Dick Score: 12      Pager no longer in use, please contact through [employer] group: [facility] Nurse [place]    Dept. Office Number: [phone]

## 2025-05-27 NOTE — PROGRESS NOTES
Shift Summary:    Pt remained trached with # 6 Shiley XLT and mechanically ventilated on the following vent settings:     Vent Mode: (S) (Volume Control-Assist Control)  Flow Type: Autoflow  Resp Rate (Set): 16 breaths/min  Tidal Volume (Set, mL): 420 mL   FiO2: 40 %   PEEP (cm H2O): 5 cmH2O   Pressure Support (cm H2O): 5 cmH2O        Inspiratory Time (sec): 0.85 sec  Sensitivity Flow Triggered (L/min): 2 L/min      SBT:  Weaning Assessment Complete: Yes  Safety Screen Weaning Assessment: Weaning Assessment meets all criteria;Other (pt was on continuous ps for the last few days)   Spontaneous Respiratory Rate: 33 breaths/min  Spontaneous Tidal Volume (mL): 446  Spontaneous Minute Ventilation: 13 mL   RSBI: 63105   Weaning trial discontinued due to::increased WOB, SOB and Increased  BPM  Wean End Time: 08.21 pm        Assessment: BS clear diminished all lobes. RT suctioned scant amount of thin cloudy secretions.        Major Respiratory Related Events: Increased HR to Highs 140     Plan was to TD patient's today but pt wants family to be at bedsides when starting TD  When family arrived at bedside, RT unable to TD due to increase WOB and HR    Plan:  ps and TD if HR becomes stable      Ambu bag, mask, and valve present at bedside. Emergency trach supplies also present at bedside    RT will continue to follow and monitor pt as appropriate.     Yogesh Villanueva, RT on 5/26/2025 at 10:01 PM

## 2025-05-27 NOTE — CONSULTS
"      [facility] Consult Service Note  Interventional Radiology  05/27/25   2:53 PM    Consult Requested: \"Bilateral Retroperitoneal Fluid collections- IR drain placement\"    Recommendations/Plan:    IR certainly has a window in the bilateral retroperitoneal areas, however this is predominately air that is noted. Team wants to hold off on pursuing drain placements at this time given it is predominately air and team/patient family would like to discuss further goals of care and options with multidisciplinary team. IR  could consider bilateral thalquick drains if indicated.     Team would like to hold off for now given patient has improved after resuscitation. They plan to discuss further and re-consult if indicated.    Case and imaging discussed with IR attending Dr. Marva Blackman MD   Recommendations were reviewed with Dr. Pierre and team.       History of Present Illness:  Sebastián Rodas is a 28 y/o male with history of alcohol abuse, anxiety, and bipolar disorder, who was admitted to an outside hospital on 3/30/25 for alcohol withdrawal and abdominal pain. Workup showed leukocytosis and elevated lipase, consistent with acute pancreatitis. He developed encephalopathy and shock, requiring ICU care, intubation, vasopressors, and CRRT by 4/1. His course was complicated by cardiomyopathy (EF 20-25%, improved to 65-70% by 4/14), necrotizing pancreatitis, and persistent infection despite 14 days of meropenem. On 4/27, imaging showed likely perforated viscus; he underwent emergent laparotomy, necrosectomy, and colectomy. Transferred to [facility] SICU on 4/30. On 5/1, reoperation revealed colonic staple line breakdown, necrotic pancreas, vessel thrombosis, and adhesions. Colostomy with malankot drain and temporary abdominal closure performed. 5/4 s/p exploratory surgery, additional necrotic pancreas remove with no obvious spillage or sign of bleeding at the time. Went back to the OR 5/7 for abdominal wash out. " "CT abdomen 5/10 showed extravasation on arterial phase imaging. MTP initiated, s/p IR arterial embolization of pancreatic vessels performed. IR is now being asked to place bilateral retroperitoneal drains.     Pertinent Imaging Reviewed:   CT CAP 5/27/25  Recent Results (from the past 24 hours)   XR Chest Port 1 View    Narrative    Exam: XR CHEST PORT 1 VIEW, 5/27/2025 6:58 AM    Comparison: 5/22/2025    History: respiratory failure    Findings:  Single AP portable supine view of the chest. Tracheostomy tube. Stable  position Right IJ central venous catheter and left upper extremity  PICC line    Trachea is midline. Mediastinum is within normal limits.  Cardiopulmonary silhouette is within normal limits. Perihilar and  bibasilar streaky opacities. No pneumothorax. Small bilateral pleural  effusions. The upper abdomen is unremarkable.      Impression    Impression: Slightly decreased perihilar and basilar opacities likely  infection and atelectasis. Stable small infusions.    I have personally reviewed the examination and initial interpretation  and I agree with the findings.    CHEO FELTON MD         SYSTEM ID:  [id number]       Expected date of discharge:  06/30/2025    Vitals:   /61   Pulse (!) 128   Temp 98  F (36.7  C) (Oral)   Resp 21   Ht 1.727 m (5' 7.99\")   Wt 72.4 kg (159 lb 9.8 oz)   SpO2 97%   BMI 24.27 kg/m      Pertinent Labs Reviewed:  CBC:  Lab Results   Component Value Date    WBC 32.1 (H) 05/27/2025    WBC 32.1 (H) 05/27/2025    WBC 24.1 (H) 05/26/2025     Lab Results   Component Value Date    HGB 7.1 05/27/2025    HGB 7.1 05/27/2025    HGB 7.5 05/26/2025     Lab Results   Component Value Date     05/27/2025     05/27/2025     05/26/2025    INR:  Lab Results   Component Value Date    INR 1.28 (H) 05/27/2025    PTT 31 05/18/2025          COVID Results:  COVID-19 Antibody Results, Testing for Immunity           No data to display              COVID-19 PCR Results  "          No data to display             Potassium   Date Value Ref Range Status   05/27/2025 3.9 3.4 - 5.3 mmol/L Final          Dianna Damico PA-C  Interventional Radiology  Pager: [phone]

## 2025-05-27 NOTE — PROGRESS NOTES
CRRT STATUS NOTE    DATA:  Time:  1:52 PM  Pressures WNL:    Filter Status:      Problems Reported/Alarms Noted: No issues noted.    Supplies Present:  YES    ASSESSMENT:  Patient Net Fluid Balance: (+) 2,516.76 today at 1800 since   MN    Vital Signs: Temp: 98  F (36.7  C) Temp src: Oral BP: 101/61 Pulse: 116   Resp: 19 SpO2: 100 % O2 Device: Mechanical Ventilator   102/42 (63) arterial line    Labs: K+ 3.9 Mag 2.2 Phos 3.96 iCal 4.7 Lactic 5.7 Hgb 8.1    Goals of Therapy: No UF (may adjust after CT to eval for bleeding)     INTERVENTIONS:   Re-circulated with saline around 1057, re-circ failed shortly afterward. Patient restarted with new circuit at 1235 after returning from CT scan.    PLAN:  Continue to monitor circuit, change q72hr and as needed. Please contact CRRT resource with any questions or concerns.

## 2025-05-27 NOTE — PROGRESS NOTES
PALLIATIVE CARE PROGRESS NOTE  [facility]     Patient Name: Sebastián Rodas  Date of Admission: 4/30/2025   Today the patient was seen for: care conference     Recommendations & Counseling     GOALS OF CARE:   Restorative and life-prolonging without limits  Did not revisit today - Palliative remains available to assist with goals of care conversations along with primary team as needed    Information sharing/decision making preferences:  Sebastián prefers to have family present (mom, uncle and/or brother) when receiving medical updates and facing medical decisions. Sebastián wants to have an active role in making decisions about his own care, though he also values input from family.  Sebastián is easily overwhelmed by multiple providers in the room and/or difficult conversations with multiple different provider teams in one day.  Recommend continuing to center Sebastián in medical updates and decision making while ensuring he has family support at bedside. When possible, limit these conversations to no more than once a day and 1-2 providers.    ADVANCE CARE PLANNING:  No health care directive on file. Per system policy, Surrogate Decision-makers for Patients With Diminished Decision-making Capacity offers guidance on possible decision-makers. Marbella (mother) has been identified as a surrogate decision maker.   There is no POLST form on file, defer to patient and/or next of kin for decisions   Code status: Full Code    MEDICAL MANAGEMENT:   We are not actively managing symptoms at this time.    PSYCHOSOCIAL/SPIRITUAL:  Family - supported by Marbella (mom), Dustin (uncle), 3 siblings. Lives with youngest sibling and his cat.  Friends  Kathy - Hindu, appreciate ongoing  support    Palliative Care will continue to follow.     Ronda Persaud PA-C  [facility], Palliative Care  Securely message with the Vocera Web Console (learn more here) or  Text page via [employer] Paging/Directory   "    Assessment          Sebastián Rodas is a 29-year-old male with a history of alcohol use disorder, anxiety, and bipolar disorder who was admitted on 3/30/2025 to OSH for alcohol withdrawal following a recent binge, presenting with diffuse abdominal pain. Initial workup revealed leukocytosis and elevated lipase concerning for acute pancreatitis.   On 4/27, after clinical deterioration and imaging consistent with a likely perforated viscus, he underwent emergent exploratory laparotomy, necrosectomy, transverse colectomy, abdominal washout, drain placement, and abthera placement.    Started on CRRT.       Patient was transferred to [facility] on 4/30/25 for further surgical management.  Reexploration of belly here at [facility] reveals no possibility of creating diverting colostomy. Course complicated by recurrent intra-abdominal bleeding requiring MTP and IR embolization x2 (5/10 and 5/18). Palliative following for goals of care conversations.      Interval History:     Multidisciplinary collaboration:  Reviewed notes from SICU, nursing  Worsening anxiety, tachycardia, hypotension, rising lactate and increased drainage over past 12 hours  Discussed with SICU in person    Patient/family narrative  Sebastián is accompanied by uncle Dustin during my visit today. Sebastián was laying on his back, unable to write or speak due to trach, communicated by nodding/shaking head and mouthing a few words. He denied pain, endorsed anxiety, mouthed words \"too fast.\" Dustin providing support at bedside.    Physical Exam:   Temp:  [97.2  F (36.2  C)-99  F (37.2  C)] 98.1  F (36.7  C)  Pulse:  [108-159] 128  Resp:  [14-36] 26  MAP:  [43 mmHg-90 mmHg] 84 mmHg  Arterial Line BP: ()/(25-70) 122/65  FiO2 (%):  [40 %] 40 %  SpO2:  [92 %-100 %] 94 %  159 lbs 9.81 oz    Physical Exam  General: laying in bed, +mild distress  Lungs: trach in place, remains on vent support  Abdomen: +increased dark bloody drainage from abdominal wound  Neuro: eyes open and " tracking, engaged with family, communicates by mouthing words or nodding/shaking head  Psych: +anxious        Data Reviewed:     Lactic acid 5.9 (up from 5.7)    WBC 32.1 (up from 24)  Hemoglobin 7.1 (down from 7.5)    Medical Decision Making       MANAGEMENT DISCUSSED with the following over the past 24 hours: SICU   NOTE(S)/MEDICAL RECORDS REVIEWED over the past 24 hours: SICU, Surgery, Renal  SUPPLEMENTAL HISTORY, in addition to the patient's history, over the past 24 hours obtained from:   - uncle

## 2025-05-27 NOTE — PLAN OF CARE
Goal Outcome Evaluation:ICU End of Shift Summary. See flowsheets for vital signs and detailed assessment.    Changes this shift:   Neuro: Unchanged from previous, alert and interactive with staff, SHANNA, quite anxious at times, follows commands, BLE very weak.  Precedex at 1.2 mcg/kg/hr with scheduled and PRN zyprexa with only moderate effectiveness in relieving anxiety.  Fentanyl gtt continues at 125 mcg/hr with PRN boluses prior to cares.  Pt denies pain at rest usually.     Hemodynamics/respiratory: Pt c/o not being able to catch his breath, requested to be back on vent early in shift, has been stable on 40% PEEP of 5 on CMV setttings.  -150's all night (even at rest when he appears to be sleeping) and pt needing increased dosed levophed to maintain MAP over 65, see flowsheets.  Hgb drifting lower but stable at 7.1 this am. Lactic acid is 5.7, MD aware of both.    GI/:  Pt has been anuric but did appear to leak small amount of urine from penis when changing the ostomy bag over abscess on scrotom (multiple areas of scrotom leaking red purulent foul drainage).  Mallocot tube to colon does not have any output.  CRRT running easily, not meeting I=O because pt received LR bolus for increased lactic acid and has increasing pressor needs.     Wounds: Pt continues to leak large amount of dark red drainage from around drains and the upper aspect of wound.  Ostomy bags to R lateral abd and scrotom changed and dressings changed x 1.   Large LUQ SHANNON is no longer sutured to pt, MD taped it to dressing.     Plan:   Continue to monitor and update MD with changes and concerns.  Follow up with MD with decision on what to do with loose SHANNON that is at high risk to fall out.         Plan of Care Reviewed With: patient    Overall Patient Progress: decliningOverall Patient Progress: declining    Outcome Evaluation: Pt was dyspneic so switched back to CMV settings, has been anxious overnight.  HR quite tachycardic 120-150's,  increasing lactic acid and increasing pressor needs overnight.  Abdomen continues to drain moderate to large amount of dark red drainage (especially from upper aspect of wound and around drain insertion sites).           Oxybutynin Pregnancy And Lactation Text: This medication is Pregnancy Category B and is considered safe during pregnancy. It is unknown if it is excreted in breast milk.

## 2025-05-27 NOTE — PROGRESS NOTES
Nephrology Progress Note  05/27/2025       Sebastián Rodas is a 29 yom with Bipolar disorder, ETOH use c/b necrotizing pancreatitis admitted 3/30/25 to [facility] for ETOH withdrawal and abdominal pain, found to have acute pancreatitis which has progressed to necrotizing pancreatitis complicated by SARAHI.  Seen by Nephrology at [place], started on CRRT 4/1.  Had periods of stability enough for iHD but back to CRRT on 4/21 and has been on since with continued GIB.       Interval History :   Mr Rodas' labs remain stable on 4k baths, increased to standard dose as bicarb is on low side at 18 with lactate of 6.  Getting CT to evaluate for bleeding, continuing CRRT but holding on any UF as his pressor needs are increasing over the past ~24h.     Difficult case with ongoing intraabdominal bleeding, holding on UF this am but will see what CT shows, is nearly back to admit wt overall but certainly has lost some muscle mass being bed-bound for long ICU course so does still have some global hypervolemia.      Assessment & Recommendations:   SARAHI-Baseline Cr 0.8 as recently as March 2025 before acute events, was started on CRRT emergently on 4/1 in setting of septic shock. Had ~2 weeks of stability enough to manage with iHD but back on CRRT 4/21 until tx to [facility] on 4/30. Running fevers with intraabdominal sepsis.  Continuing RRT from OSH, restarted CRRT on 5/2 after OR.  Remains tenuous due to intraabdominal bleeding and necrosis.                 -No need for new consent, continuing RRT started last month at [facility].                 -Access is tunneled RIJ from 4/21.                  -CRRT, standard dose.  All 4k baths, holding UF this am      Volume-Wt down to within ~2kg of admit wt but has lost muscle mass being bedbound, still with peripheral edema.  With increasing pressor needs we are holding UF this am but may adjust depending on Ct findings.       Electrolytes-K 3.9 on all 4k baths, bicarb 18.      BMD-No acute  "issues.      Anemia-Hgb 7.1, ongoing need for PRBC's.      Nutrition-On hold with bleeding, back on TPN.      Time spent: 50 minutes on this date of encounter for chart review, physical exam, medical decision making and co-ordination of care.      Seen and discussed with Dr Harris     Recommendations were communicated to primary team via verbal communication.      ASIF Roger CNS  Clinical Nurse Specialist  [phone]    Review of Systems:   I reviewed the following systems:  Gen: No fevers or chills  CV: No CP at rest  Resp: No SOB at rest  GI: No N/V    Physical Exam:   I/O last 3 completed shifts:  In: 2833.67 [I.V.:1133.17; Other:0.5; IV Piggyback:500]  Out: 2445.9 [Drains:620; Other:1825.9]   /53   Pulse 117   Temp 99  F (37.2  C) (Axillary)   Resp 18   Ht 1.727 m (5' 7.99\")   Wt 72.4 kg (159 lb 9.8 oz)   SpO2 99%   BMI 24.27 kg/m       GENERAL APPEARANCE: Vent via trach, CRRT running.   Pulmonary: Vent via trach  CV: Regular rhythm, normal rate   - Edema +2 BLE  GI: Surgical dressing in place  MS: no evidence of inflammation in joints, no muscle tenderness  : No Garcia  SKIN: Abdominal dressing in place, multiple drains.   NEURO: Vent via trach, no focal deficits.     Labs:   All labs reviewed by me  Electrolytes/Renal -   Recent Labs   Lab Test 05/27/25  0748 05/27/25  0401 05/27/25  0400 05/26/25 2002 05/26/25  1624 05/26/25  0326 05/26/25  0321   NA  --  138  --   --  138  --  137   POTASSIUM  --  3.9  --   --  3.7  --  3.9   CHLORIDE  --  104  --   --  104  --  103   CO2  --  18*  --   --  20*  --  21*   BUN  --  47.8*  --   --  46.0*  --  47.8*   CR  --  0.58*  --   --  0.58*  --  0.54*   * 182* 176*   < > 181*   < > 261*   KARINA  --  8.4*  --   --  8.9  --  8.5*   MAG  --  2.2  --   --  2.3  --  2.3   PHOS  --  3.4  --   --  3.3  --  3.7    < > = values in this interval not displayed.       CBC -   Recent Labs   Lab Test 05/27/25  0401 05/26/25  2341 05/26/25  2517 " 05/26/25  1624 05/26/25  0321   WBC 32.1*  32.1*  --   --  24.1* 19.9*   HGB 7.1*  7.1* 7.5* 7.4* 8.2* 7.7*  7.2*   *  568*  --   --  518* 340       LFTs -   Recent Labs   Lab Test 05/27/25  0401 05/26/25  1624 05/26/25  0321 05/25/25  1614 05/25/25  0330   ALKPHOS 658*  --  1,042*  --  1,164*   BILITOTAL 1.5*  --  1.1  --  1.4*   ALT 43  --  57  --  55   AST 34  --  30  --  41   PROTTOTAL 5.4*  --  6.1*  --  5.8*   ALBUMIN 2.3* 2.7* 2.5*   < > 2.3*    < > = values in this interval not displayed.       Iron Panel - No lab results found.        Current Medications:  Current Facility-Administered Medications   Medication Dose Route Frequency Provider Last Rate Last Admin    heparin ANTICOAGULANT injection 5,000 Units  5,000 Units Subcutaneous Q8H [facility] Sonia Moore NP   5,000 Units at 05/27/25 0601    hydrocortisone sodium succinate PF (solu-CORTEF) injection 50 mg  50 mg Intravenous Q6H Renee Kwon CNP   50 mg at 05/27/25 0749    insulin glargine (LANTUS PEN) injection 20 Units  20 Units Subcutaneous Daily Sonia Moore NP        lactated ringers BOLUS 500 mL  500 mL Intravenous Once Renee Kwon CNP        Lidocaine (LIDOCARE) 4 % Patch 1-3 patch  1-3 patch Transdermal Q24H Durga Euceda MD        [Held by provider] lipids 4 oil (SMOFLIPID) 20 % infusion 250 mL  250 mL Intravenous Q24H Brendon Haas DO   Stopped at 05/25/25 0815    meropenem (MERREM) 1 g vial to attach to  mL bag  1 g Intravenous Q8H Sonia Moore NP   1 g at 05/27/25 0922    micafungin (MYCAMINE) 100 mg in sodium chloride 0.9 % 100 mL intermittent infusion  100 mg Intravenous Q24H Sonia Moore,  mL/hr at 05/27/25 1014 100 mg at 05/27/25 1014    OLANZapine (zyPREXA) IV injection 2.5 mg  2.5 mg Intravenous QPM Sonia Moore, NP   2.5 mg at 05/26/25 2000    pantoprazole (PROTONIX) IV push injection 40 mg  40 mg Intravenous QAM [place]  Renee James, CNP   40 mg at 05/27/25 0751    sodium chloride (PF) 0.9% PF flush 3 mL  3 mL Intracatheter Q8H Ganesh Griffiths MD   3 mL at 05/27/25 0601    vancomycin (VANCOCIN) 1,500 mg in 0.9% NaCl 250 mL intermittent infusion  1,500 mg Intravenous Q24H Brendon Haas DO         Current Facility-Administered Medications   Medication Dose Route Frequency Provider Last Rate Last Admin    dexmedeTOMIDine (PRECEDEX) 4 mcg/mL in sodium chloride 0.9 % 100 mL infusion  0.1-1.2 mcg/kg/hr (Dosing Weight) Intravenous Continuous Sonia Moroe NP 20.9 mL/hr at 05/27/25 0900 1.2 mcg/kg/hr at 05/27/25 0900    dextrose 10% infusion   Intravenous Continuous PRN Sonia Moore NP        dextrose 10% infusion   Intravenous Continuous PRN Brendon Haas DO   Stopped at 05/22/25 1232    dialysate for CVVHD & CVVHDF (Phoxillum BK4/2.5)  10 mL/kg/hr CRRT Continuous Raheem Gabriel APRN  mL/hr at 05/27/25 0702 10 mL/kg/hr at 05/27/25 0702    fentaNYL (SUBLIMAZE) infusion   mcg/hr Intravenous Continuous Roxi Alston MD 2.5 mL/hr at 05/27/25 0900 125 mcg/hr at 05/27/25 0900    insulin regular (MYXREDLIN) 1 unit/mL infusion  0-24 Units/hr Intravenous Continuous Sonia Moore NP        No heparin required   Does not apply Continuous PRN Tico Spain MD        norepinephrine (LEVOPHED) 16 mg in  mL infusion MAX CONC CENTRAL LINE  0.01-0.6 mcg/kg/min (Dosing Weight) Intravenous Continuous Celso Ambrose MD 2.6 mL/hr at 05/27/25 0920 0.04 mcg/kg/min at 05/27/25 0920    parenteral nutrition - ADULT compounded formula   CENTRAL LINE IV TPN CONTINUOUS Brendon Haas DO        parenteral nutrition - ADULT compounded formula   CENTRAL LINE IV TPN CONTINUOUS Brendon Haas DO 50 mL/hr at 05/27/25 1000 Rate Verify at 05/27/25 1000    POST-filter replacement solution for CVVHD & CVVHDF (Phoxillum BK4/2.5)   CRRT  Continuous Tico Spain  mL/hr at 05/26/25 1338 New Bag at 05/26/25 1338    PRE-filter replacement solution for CVVHD & CVVHDF (Phoxillum BK4/2.5)  10 mL/kg/hr CRRT Continuous Raheem Gabriel APRN  mL/hr at 05/27/25 0702 10 mL/kg/hr at 05/27/25 0702    Reason statin not prescribed   Does not apply DOES NOT GO TO Rhys Bloom MD        vasopressin 1 unit/mL MAX Conc (PITRESSIN) infusion  2.4 Units/hr Intravenous Continuous Renee Kwon CNP 2.4 mL/hr at 05/27/25 0900 2.4 Units/hr at 05/27/25 0900

## 2025-05-27 NOTE — PROGRESS NOTES
Surgery Progress Note  05/27/2025       Subjective:  HR sustained in 140-150s with MAP in 40-50s. BP remained low despite boluses; levophed GTT restarted. On 0.12 of levo. Large amount of blood leaking from abdominal dressing.  Lactate 5.7 and Hgb 7.4. PEG tube came out during dressing change overnight. Increasing drainage also noted from superior aspect of midline. Bowel noted to be exposed d/t mesh detaching from fascia. This morning, patient awake, nodding in response to questions.     Objective:  Temp:  [97.2  F (36.2  C)-98.8  F (37.1  C)] 98.1  F (36.7  C)  Pulse:  [] 143  Resp:  [12-36] 30  MAP:  [43 mmHg-84 mmHg] 68 mmHg  Arterial Line BP: ()/(25-63) 104/50  FiO2 (%):  [40 %] 40 %  SpO2:  [92 %-100 %] 97 %    I/O last 3 completed shifts:  In: 2833.67 [I.V.:1133.17; Other:0.5; IV Piggyback:500]  Out: 2445.9 [Drains:620; Other:1825.9]      Gen: Resting comfortably in bed  Neuro: awake and alert, answering questions appropriately   Resp: Ventilated via trach  Abd: Abdomen is taut but compressible, drains with minimal dark bloody output, Malecot with minimal succus, significant dark bloody output around midline incision. No mesh covering abdominal wound, did not remove dressing on exam.   : Scrotum with pouch in place with serous drainage     Labs:  Recent Labs   Lab 05/27/25  0401 05/26/25  2341 05/26/25  1847 05/26/25  1624 05/26/25  0321   WBC 32.1*  --   --  24.1* 19.9*   HGB 7.1* 7.5* 7.4* 8.2* 7.7*  7.2*   *  --   --  518* 340       Recent Labs   Lab 05/27/25  0748 05/27/25  0401 05/27/25  0400 05/26/25  2002 05/26/25  1624 05/26/25  0326 05/26/25  0321   NA  --  138  --   --  138  --  137   POTASSIUM  --  3.9  --   --  3.7  --  3.9   CHLORIDE  --  104  --   --  104  --  103   CO2  --  18*  --   --  20*  --  21*   BUN  --  47.8*  --   --  46.0*  --  47.8*   CR  --  0.58*  --   --  0.58*  --  0.54*   * 182* 176*   < > 181*   < > 261*   KARINA  --  8.4*  --   --  8.9  --  8.5*   MAG  " --  2.2  --   --  2.3  --  2.3   PHOS  --  3.4  --   --  3.3  --  3.7    < > = values in this interval not displayed.     Assessment/Plan:   Sebastián Rodas is a 29-year-old male with a history of alcohol use disorder, anxiety, and bipolar disorder who was admitted on 3/30/2025 for alcohol withdrawal following a recent binge, presenting with diffuse abdominal pain. Initial workup revealed leukocytosis and elevated lipase concerning for acute pancreatitis. He developed acute encephalopathy and was transferred to the ICU on 3/31, requiring intubation and vasopressors by 4/1 due to shock. Hospital course has been complicated by acute renal failure requiring CRRT, cardiomyopathy (initial LVEF 20-25%, improved to 65-70% by 4/14), and necrotizing pancreatitis with unorganized fluid collections. He completed a 14-day course of meropenem but remains intermittently febrile and critically ill, requiring ongoing dialysis and vasopressor support. On 4/27, after clinical deterioration and imaging consistent with a likely perforated viscus, following family discussion, he underwent emergent exploratory laparotomy, necrosectomy, transverse colectomy, abdominal washout, and drain placement. Patient was transferred to [facility] on 4/30/25 for further surgical management. Went to OR 5/1 for re-open laparotomy, I&D, placement of colostomy tube in presumed previous colectomy staple line, necrosectomy, and Abthera placement. On 5/2, increasing sanguineous output from drains concerning for bleeding from pancreatic bed. Family care conference held 5/2, plan for restorative cares at that time. Take back to OR twice (5/4 and 5/7) for abdominal washout. Per nursing note: 5/9 morning after he saw a picture of his abdomen that he \"desires to speak to team about updating goals of care, expressed desire to pull back on cares\". Goals of care conference on 5/10 with continuation of full code and restorative cares. CTA on 5/10 revealed large area of " active arterial extravasation in the upper abdomen. MTP was started. Patient is now s/p embolization of small pancreatic branch off of the distal celiac artery with IR on 5/10. Malecot drainage slowed and eventually stopped, with significant drainage through other drains and open into dressings. 5/17 night patient had active extravasation, with Hgb dropping from 7.8 to 5.6 in 1.5 hours. Lactate spiked from 3.6 to 10.6, with WBC 14.9 to 25.5. Family was consulted, MTP was started, and IR embolized the celiac trunk/splenic artery branches.     - No further surgical options for any intraabdominal pathology  - Nursing to change dressings in the am, pm, and PRN during the day if copious drainage during the day. Please place Xeroform over the bridging mesh (please continue cares). Please do not apply xeroform outside the wound bed/over the skin edges but make sure all of the wound bed is covered.  - Tube feeds on hold currently Receiving TPN (50 mL/hr), and Lipids & Albumin as needed.  - On Zosyn per SICU  - SQH PPX, would not recommend therapeutic dose given the risk of bleeding.   - Appreciate WO cares, okay with wound manager if able.   - Other cares per SICU, we appreciate cares      Seen, examined, and discussed with chief resident, who will discuss with staff.     Leticia Jones, MS3 [facility]  May 27, 2025     Resident/Fellow Attestation   I, Vanita Saldana MD, was present with the medical/SANTOS student who participated in the service and in the documentation of the note.  I have verified the history and personally performed the physical exam and medical decision making.  I agree with the assessment and plan of care as documented in the note.      - Concern for development of abdominal sepsis. Resumed levo for hypotension overnight. Lactate rising. Back on mechanical ventilation. Exposed bowel from midline. No surgical intervention. Will discuss drain cares with staff and SICU.     Vanita Saldana MD  PGY1  Date of  Service (when I saw the patient): 05/27/25

## 2025-05-27 NOTE — PHARMACY-VANCOMYCIN DOSING SERVICE
Pharmacy Vancomycin Initial Note  Date of Service May 27, 2025  Patient's  1995  29 year old, male    Indication: Intra-abdominal infection and Sepsis    Current estimated CrCl = Estimated Creatinine Clearance: 189 mL/min (A) (based on SCr of 0.58 mg/dL (L)).    Creatinine for last 3 days  2025:  4:14 PM Creatinine 0.54 mg/dL  2025:  3:30 AM Creatinine 0.55 mg/dL;  4:14 PM Creatinine 0.54 mg/dL  2025:  3:21 AM Creatinine 0.54 mg/dL;  4:24 PM Creatinine 0.58 mg/dL  2025:  4:01 AM Creatinine 0.58 mg/dL    Recent Vancomycin Level(s) for last 3 days  No results found for requested labs within last 3 days.      Vancomycin IV Administrations (past 72 hours)        No vancomycin orders with administrations in past 72 hours.                    Nephrotoxins and other renal medications (From now, onward)      Start     Dose/Rate Route Frequency Ordered Stop    25 0900  vancomycin (VANCOCIN) 1,500 mg in 0.9% NaCl 250 mL intermittent infusion         1,500 mg  over 90 Minutes Intravenous EVERY 24 HOURS 25 0840      25 0830  vasopressin 1 unit/mL MAX Conc (PITRESSIN) infusion         2.4 Units/hr  2.4 mL/hr  Intravenous CONTINUOUS 25 0814      25 2130  norepinephrine (LEVOPHED) 16 mg in  mL infusion MAX CONC CENTRAL LINE         0.01-0.6 mcg/kg/min × 69.7 kg (Dosing Weight)  0.7-39.2 mL/hr  Intravenous CONTINUOUS 25 2126      25 1130  piperacillin-tazobactam (ZOSYN) 4.5 g vial to attach to  mL bag         4.5 g  over 30 Minutes Intravenous EVERY 6 HOURS 25 1100              Contrast Orders - past 72 hours (72h ago, onward)      None                Plan:  Start vancomycin  1500mg IV q24h.   Vancomycin monitoring method: Renal Replacement Therapy  Vancomycin therapeutic monitoring goal: 15-20 mg/L  Pharmacy will check vancomycin levels as appropriate in 2-4 days.    Serum creatinine levels will be ordered daily for the first week of therapy and  at least twice weekly for subsequent weeks.      Leticia Garrett, PharmD, BCCCP

## 2025-05-27 NOTE — PROGRESS NOTES
SURGICAL ICU PROGRESS NOTE      Date of Service (when I saw the patient): 05/27/2025    ASSESSMENT:  Sebastián Rodas is a 29M with alcohol abuse, anxiety, and bipolar disorder, who was admitted 3/30/25 for alcohol withdrawal and abdominal pain. Workup showed leukocytosis and elevated lipase, consistent with acute pancreatitis. He developed encephalopathy and shock, requiring ICU care, intubation, vasopressors, and CRRT by 4/1. His course was complicated by cardiomyopathy (EF 20-25%, improved to 65-70% by 4/14), necrotizing pancreatitis, and persistent infection despite 14 days of meropenem. On 4/27, imaging showed likely perforated viscus; he underwent emergent laparotomy, necrosectomy, and colectomy. Transferred to [facility] SICU on 4/30. On 5/1, reoperation revealed colonic staple line breakdown, necrotic pancreas, vessel thrombosis, and adhesions. Colostomy with malankot drain and temporary abdominal closure performed. Prognosis is poor; palliative care involved. Care conference 5/2 with family to talk goals of care. Full code, family acknowledged that poor prognosis is to be expected. 5/4 s/p exploratory surgery, additional necrotic pancreas remove with no obvious spillage or sign of bleeding at the time. Went back to the OR 5/7 for abdominal wash out. CT abdomen 5/10 showed extravasation on arterial phase imaging. MTP initiated, IR arterial embolization of pancreatic vessels performed. Decreased stool output since 5/13, switched to TPN. Hemorraghic shock.  MTP 5/18, IR embolization 5/18.      CHANGES and MAJOR THINGS TODAY:    1 PRBC  Increase stress dose steroids  ABG  Add on coags  Repeat lactate  CXR  Add vasopressin  Insulin gtt  Cultures  Broaden to Meropenem, Vancomycin, Micafungin  CT C/A/P  Check c diff PCR if able       PLAN:    Neurological:  # Acute pain   - Fentanyl gtt with bolus PRN  # Nerve pain  - Schedule lidocaine patches  # Encephalopathy- improving  # Insomnia  - Monitor neurological status.  Delirium preventions and precautions.   # Sedation: Precedex, weaning as able  # Anxiety  - RASS goal 0 to -1  - Olanzapine 2.5 mg @ night  - Olanzapine PRN     Pulmonary:   # Acute hypoxic respiratory failure  # S/p tracheostomy placement   FiO2 (%): 40 %, Resp: 28, Vent Mode: VC/AC, Resp Rate (Set): 16 breaths/min, Tidal Volume (Set, mL): 420 mL, PEEP (cm H2O): 5 cmH2O, Pressure Support (cm H2O): 13 cmH2O, Resp Rate (Set): 16 breaths/min, Tidal Volume (Set, mL): 420 mL, PEEP (cm H2O): 5 cmH2O  CT PE 5/9 showed no evidence of pulmonary embolism  - On full support overnight, failed PST this morning due to tachypnea, will keep on full support for now.   - Pulmonary toilet, mobilize  - Ventilator bundle  - Wean FIO2 as tolerated    Cardiovascular:    # Stress Cardiomyopathy   - Initial LVEF 20-25%, improved to 65-70%  # Septic shock  # Sinus Tachycardia  - Monitor hemodynamic status. MAP goal > 65  - Hydrocortisone 50mg daily. Return to 50q 6 hours  - On escalating doses of norepinephrine, add on vasopressin   - Lactate continues to rise, 5.9. Trend q4 hours  - 1 PRBC now, and additional 500 ml LR  - EKG 5/8, 5/9, 5/18 showed sinus tachycardia with no ectopy     Gastroenterology/Nutrition:  # S/p emergent exploratory laparotomy, necrosectomy, transverse colectomy, abdominal washout, and drain placement 5/1 and 5/4  # S/p Pancreatic branch vessel embolization with IR 5/10, 5/18  # Severe necrotizing pancreatitis  # Splenic vein thrombosis  CT abdomen/pelvis with IV + enteral contrast through G tube 5/14: large volume of air and blood products in abdomen.   - Will defer management of dressings and drain removal per EGS. Minimal output per dressings.   - Cecostomy drain in place, no stool output. Abdomen distended.  - PPI IV  - Hold all Oral Meds and Feeds, strict NPO  - given hemodynamic decompensation, will obtain CT C/A?P, evaluate for fluid collections that could be amenable to drain placement.     # Severe Protein  calorie deficit malnutrition due to critical illness  # Hypertriglyceridemia   - Continue TPN. Lipids on hold d/t hypertriglyceridemia  - GJ tube migrated out. Keep NPO  - IR consulted for PEG exchange  - IR cannot safely exchange freshly placed GJ tubes until they have been in place x6 weeks given risk of losing access into the stomach. Additionally, imaging shows there is no safe pexy between the stomach and abdominal wall. IR may not be able to offer safe exchange even after 6 weeks.   - Multivitamin supplementation ordered by nutrition  - RD consult. Appreciate cares and recommendations.     Renal/Fluids/Electrolytes:   # SARAHI  # Intravascular hypovolemia  # total fluid overload  # Elevated lactate, stable  Baseline Cr 0.7-0.8. Presented with Cr 0.96 on 3/30. Rapidly markos to 5.2 on 4/1. Oliguric. Garcia UA with proteinuria, hematuria, pyuria, moderate bacteria.  Kidneys unremarkable on CT. Has severe ATN in the setting of shock, ADHF, intravascular hypovolemia/3rd spacing from pancreatitis.   - Nephrology consulted, appreciate recs  - CRRT, unable to UF for now given unstable hemodynamics, intravascular depletion.   - Continue to monitor intake and output  - Volume assessment daily    Endocrine:   # Stress and steroid induced hyperglycemia    Hgb A1c 5.3, no hx of DM   - Start insulin gtt  - Keep lantus at 20  - Goal to keep BG< 180 for optimal wound healing     ID  # Leukocytosis  # Necrotizing pancreatitis  # Intraabdominal sepsis  - Trend CRP  - Trend WBC  - Broaden Zosyn to Meropenem, Vancomycin and Micafungin  - Repeat BC x2, sputum culture, MRSA swab.   - Obtain c diff PCR if able to obtain a sample from wound.      cultures:  - 4/30 blood cultures- NGTD   - 5/1 blood cultures ordered - NGTD  - Blood culture 5/12, 5/15 - NGTD  - Blood culture 5/22 - +candida     Heme:     # Acute blood loss anemia   # Anemia of critical illness   # Thrombosed splenic vein   # s/p splenic artery embolization 5/18  - Transfuse  "if hgb <7.0 or signs/symptoms of hypoperfusion. Monitor and trend  - MTP 5/10, 5/18  - Continue subcutaneous heparin, watch bleeding from drains.  - s/p IR 5/18 coil embolization of splenic artery proximal to great pancreatic artery.   - Hemoglobin down to 7.1, 1 PRBC this am       Musculoskeletal:   # Deconditioning and weakness due to critical illness   # LLE swelling  - LLE DVT ultrasound  - Physical and occupational therapy consult   - Use abdominal binder when up w/ PT     Skin:  # Pressure Ulcers - Buttocks/rectal area  - Barrier cream with liberal application. Continue fecal management system   - WOC consult      #Pressure Ulcers- Left Heel  Pressure Injury Location: Left heel   Wound type: Pressure Injury     Pressure Injury Stage: Deep Tissue Pressure Injury (DTPI), present on admission     General Cares/Prophylaxis:    DVT Prophylaxis: SQH   GI Prophylaxis: PPI     Lines/ tubes/ drains:  - HD line--  Right IJ  - PICC line- left   - Right radial A line  - PIV x 3  - Trach  - G-J  - RUQ ostomy  - LLQ drain  - Scrotal drain  - SHANNON x 3  - Colotomy drain     Disposition:  - Surgical ICU    Time spent on this Encounter   Billing:  I spent 55 minutes bedside and on the inpatient unit today managing the critical care of Sebastián Rodas in relation to the issues listed in this note.    Renee De La Mater     ====================================  SUBJECTIVE: Denies pain, SOB. NAEON.      OBJECTIVE:   1. VITAL SIGNS:   Temp: 98.2  F (36.8  C) Temp src: Axillary   Pulse: (!) 140   Resp: 28 SpO2: 100 % O2 Device: Mechanical Ventilator Oxygen Delivery: 50 LPM Height: 172.7 cm (5' 7.99\") Weight: 72.4 kg (159 lb 9.8 oz)  Estimated body mass index is 24.27 kg/m  as calculated from the following:    Height as of this encounter: 1.727 m (5' 7.99\").    Weight as of this encounter: 72.4 kg (159 lb 9.8 oz).      FiO2 (%): 40 %, Resp: 28, Vent Mode: VC/AC, Resp Rate (Set): 16 breaths/min, Tidal Volume (Set, mL): 420 mL, PEEP (cm " H2O): 5 cmH2O, Pressure Support (cm H2O): 13 cmH2O, Resp Rate (Set): 16 breaths/min, Tidal Volume (Set, mL): 420 mL, PEEP (cm H2O): 5 cmH2O      2. INTAKE/ OUTPUT:   Intake/Output Summary (Last 24 hours) at 5/26/2025 0607  Last data filed at 5/26/2025 0600  Gross per 24 hour   Intake 2347.92 ml   Output 4674 ml   Net -2326.08 ml         3. PHYSICAL EXAMINATION:  General: laying in bed, awake and alert   HEENT: PERRLA. Trach present and secure, site dressed.   Pulm/Resp: Clear breath sounds bilaterall  CV: RRR, S1/S2   Abdomen: Distended. Dark red output noted to all drains. G-J tube in place, site secured, abdomen with dressing in place C/D/I  : scrotal edema, draining drk red/brown output.   MSK/Extremities: generalized edema in extremities. +3 left foot edema    4. INVESTIGATIONS:   Arterial Blood Gases   Recent Labs   Lab 05/26/25 2156 05/25/25  1220   PH 7.45 7.38   PCO2 29* 40   PO2 118* 87   HCO3 20* 23     Complete Blood Count   Recent Labs   Lab 05/27/25  0401 05/26/25  2341 05/26/25  1847 05/26/25 1624 05/26/25 0321 05/25/25  1614   WBC 32.1*  --   --  24.1* 19.9* 20.6*   HGB 7.1* 7.5* 7.4* 8.2* 7.7*  7.2* 7.6*   *  --   --  518* 340 356     Basic Metabolic Panel  Recent Labs   Lab 05/27/25  0401 05/27/25  0400 05/26/25  2334 05/26/25 2002 05/26/25  1624 05/26/25  0326 05/26/25  0321 05/25/25 2033 05/25/25  1614     --   --   --  138  --  137  --  138   POTASSIUM 3.9  --   --   --  3.7  --  3.9  --  3.8   CHLORIDE 104  --   --   --  104  --  103  --  104   CO2 18*  --   --   --  20*  --  21*  --  21*   BUN 47.8*  --   --   --  46.0*  --  47.8*  --  40.8*   CR 0.58*  --   --   --  0.58*  --  0.54*  --  0.54*   * 176* 180* 134* 181*   < > 261*   < > 223*    < > = values in this interval not displayed.     Liver Function Tests  Recent Labs   Lab 05/27/25  0401 05/26/25  1624 05/26/25  0321 05/25/25  1614 05/25/25  0330 05/24/25  1614 05/24/25  0326 05/23/25  0331 05/22/25 2031  05/22/25  1556 05/22/25  1002   AST 34  --  30  --  41  --  37   < >  --   --   --    ALT 43  --  57  --  55  --  49   < >  --   --   --    ALKPHOS 658*  --  1,042*  --  1,164*  --  500*   < >  --   --   --    BILITOTAL 1.5*  --  1.1  --  1.4*  --  1.7*   < >  --   --   --    ALBUMIN 2.3* 2.7* 2.5* 2.5* 2.3*   < > 2.2*   < >  --    < >  --    INR  --   --   --   --   --   --   --   --  1.22*  --  1.18*    < > = values in this interval not displayed.     Pancreatic Enzymes  No lab results found in last 7 days.    Coagulation Profile  Recent Labs   Lab 05/22/25 2031 05/22/25  1002   INR 1.22* 1.18*     5. RADIOLOGY:   Recent Results (from the past 24 hours)   US Lower Extremity Venous Duplex Bilateral    Narrative    EXAMINATION: DOPPLER VENOUS ULTRASOUND OF BILATERAL LOWER EXTREMITIES,  5/26/2025 12:57 PM     COMPARISON: None.    HISTORY:  prolonged immobility with new left LE swelling. r/o DVT    TECHNIQUE:  Gray-scale evaluation with compression, spectral flow and  color Doppler assessment of the deep venous system of both legs from  groin to knee, and then at the ankles.    FINDINGS:  In both lower extremities, the common femoral, femoral, popliteal and  posterior tibial veins demonstrate normal compressibility and blood  flow.      Impression    IMPRESSION:  No evidence of deep venous thrombosis in either lower extremity.    I have personally reviewed the examination and initial interpretation  and I agree with the findings.    SALMA YEN MD         SYSTEM ID:  [id number]         =========================================

## 2025-05-27 NOTE — PROGRESS NOTES
"CRRT STATUS NOTE    DATA:  Time:  5:42 AM  Pressures WNL:  YES  Filter Status:  WDL    Problems Reported/Alarms Noted:  Filter pressure alarms. Set changed.     Supplies Present:  YES    ASSESSMENT:  Patient Net Fluid Balance:  -692 on 5/26, +745 as of 0500 5/27. Patient received fluid bolus due to tachycardia.   Vital Signs:  /53   Pulse (!) 142   Temp 98.2  F (36.8  C) (Axillary)   Resp 25   Ht 1.727 m (5' 7.99\")   Wt 72.4 kg (159 lb 9.8 oz)   SpO2 98%   BMI 24.27 kg/m    Remains on Levophed.   Labs:  K 3.9,Mg 2.2, Phos 3.4, ICa 4.7, hgb 7.1  Goals of Therapy:  0-100 net negative. Above goals of therapy due to fluid bolus for tachycardia. Hard to quantify output due to fluid leaking from multiple sources in abdomen.     INTERVENTIONS:   Programing checked. Dressing checked. Restarted CRRT machine due to  filter pressure alarms.     PLAN:  Continue per CRRT orders. Call Resource with any concerns.      "

## 2025-05-27 NOTE — PROGRESS NOTES
18:00--During repositioning, large amount of blood leaking from abdominal dressing (leaking from both left and right sides of dressing). Notified SICU, ordered Hgb and lactic stat. Lactic acid=4.9, Hgb= 7.4 (down from 8.2 at 16:24). Notified SICU who spoke with Surgery. SICU said to change abdominal dressing. Dressing changed. During dressing change, PEG tube came out. (It had been documented that PEG tube bumper was loose. Upon initial assessment, PEG bumper was about 6-7 inches away from the actual PEG site. So it's possible it came out during the turn.) Notified SICU, no new orders.     BP remained low after  ml bolus given, started levophed gtt for MAP 65. Pt remains anxious, mom at bedside to reassure and comfort pt.   Gave report to night shift RN.

## 2025-05-27 NOTE — PROVIDER NOTIFICATION
Called MD to inform that HR continues to be 120-150's and increasing doses of levophed.  RN asked MD when we would be rechecking Hgb as pt had full dressing change at 1600 and at 1830 per report.  Pt c/o not being able to catch his breath and requested to be put back on full vent settings.  MD ordered labs.  Will continue to monitor and attempt to pull I=O on CRRT if able.     Addendum:  Pt had large amount of dark serosanguinanous drainage (mostly from superior aspect of wound) at 2230 so dressing changed, MD aware. Lab did not result Hgb drawn at 2156 so it was redrawn.

## 2025-05-28 LAB
ALBUMIN SERPL BCG-MCNC: 1.9 G/DL (ref 3.5–5.2)
ALBUMIN SERPL BCG-MCNC: 2 G/DL (ref 3.5–5.2)
ALP SERPL-CCNC: 431 U/L (ref 40–150)
ALT SERPL W P-5'-P-CCNC: 33 U/L (ref 0–70)
ANION GAP SERPL CALCULATED.3IONS-SCNC: 12 MMOL/L (ref 7–15)
ANION GAP SERPL CALCULATED.3IONS-SCNC: 13 MMOL/L (ref 7–15)
AST SERPL W P-5'-P-CCNC: 26 U/L (ref 0–45)
ATRIAL RATE - MUSE: 129 BPM
BILIRUB SERPL-MCNC: 0.9 MG/DL
BILIRUBIN DIRECT (ROCHE PRO & PURE): 0.67 MG/DL (ref 0–0.45)
BUN SERPL-MCNC: 37.2 MG/DL (ref 6–20)
BUN SERPL-MCNC: 40.8 MG/DL (ref 6–20)
CA-I BLD-MCNC: 4.7 MG/DL (ref 4.4–5.2)
CA-I BLD-MCNC: 4.7 MG/DL (ref 4.4–5.2)
CALCIUM SERPL-MCNC: 7.8 MG/DL (ref 8.8–10.4)
CALCIUM SERPL-MCNC: 8.3 MG/DL (ref 8.8–10.4)
CHLORIDE SERPL-SCNC: 106 MMOL/L (ref 98–107)
CHLORIDE SERPL-SCNC: 107 MMOL/L (ref 98–107)
CREAT SERPL-MCNC: 0.46 MG/DL (ref 0.67–1.17)
CREAT SERPL-MCNC: 0.53 MG/DL (ref 0.67–1.17)
CRP SERPL-MCNC: 291 MG/L
DIASTOLIC BLOOD PRESSURE - MUSE: NORMAL MMHG
EGFRCR SERPLBLD CKD-EPI 2021: >90 ML/MIN/1.73M2
EGFRCR SERPLBLD CKD-EPI 2021: >90 ML/MIN/1.73M2
ERYTHROCYTE [DISTWIDTH] IN BLOOD BY AUTOMATED COUNT: 16.7 % (ref 10–15)
ERYTHROCYTE [DISTWIDTH] IN BLOOD BY AUTOMATED COUNT: 16.8 % (ref 10–15)
GLUCOSE BLDC GLUCOMTR-MCNC: 100 MG/DL (ref 70–99)
GLUCOSE BLDC GLUCOMTR-MCNC: 119 MG/DL (ref 70–99)
GLUCOSE BLDC GLUCOMTR-MCNC: 120 MG/DL (ref 70–99)
GLUCOSE BLDC GLUCOMTR-MCNC: 126 MG/DL (ref 70–99)
GLUCOSE BLDC GLUCOMTR-MCNC: 129 MG/DL (ref 70–99)
GLUCOSE BLDC GLUCOMTR-MCNC: 131 MG/DL (ref 70–99)
GLUCOSE BLDC GLUCOMTR-MCNC: 132 MG/DL (ref 70–99)
GLUCOSE BLDC GLUCOMTR-MCNC: 132 MG/DL (ref 70–99)
GLUCOSE BLDC GLUCOMTR-MCNC: 133 MG/DL (ref 70–99)
GLUCOSE BLDC GLUCOMTR-MCNC: 133 MG/DL (ref 70–99)
GLUCOSE BLDC GLUCOMTR-MCNC: 134 MG/DL (ref 70–99)
GLUCOSE BLDC GLUCOMTR-MCNC: 139 MG/DL (ref 70–99)
GLUCOSE BLDC GLUCOMTR-MCNC: 144 MG/DL (ref 70–99)
GLUCOSE BLDC GLUCOMTR-MCNC: 149 MG/DL (ref 70–99)
GLUCOSE BLDC GLUCOMTR-MCNC: 89 MG/DL (ref 70–99)
GLUCOSE SERPL-MCNC: 128 MG/DL (ref 70–99)
GLUCOSE SERPL-MCNC: 84 MG/DL (ref 70–99)
HCO3 SERPL-SCNC: 20 MMOL/L (ref 22–29)
HCO3 SERPL-SCNC: 21 MMOL/L (ref 22–29)
HCT VFR BLD AUTO: 22.9 % (ref 40–53)
HCT VFR BLD AUTO: 23.6 % (ref 40–53)
HGB BLD-MCNC: 7.4 G/DL (ref 13.3–17.7)
HGB BLD-MCNC: 7.5 G/DL (ref 13.3–17.7)
INTERPRETATION ECG - MUSE: NORMAL
LACTATE SERPL-SCNC: 2.9 MMOL/L (ref 0.7–2)
LACTATE SERPL-SCNC: 3.3 MMOL/L (ref 0.7–2)
LACTATE SERPL-SCNC: 3.6 MMOL/L (ref 0.7–2)
LACTATE SERPL-SCNC: 3.9 MMOL/L (ref 0.7–2)
MAGNESIUM SERPL-MCNC: 2.1 MG/DL (ref 1.7–2.3)
MAGNESIUM SERPL-MCNC: 2.3 MG/DL (ref 1.7–2.3)
MCH RBC QN AUTO: 30.3 PG (ref 26.5–33)
MCH RBC QN AUTO: 30.4 PG (ref 26.5–33)
MCHC RBC AUTO-ENTMCNC: 31.8 G/DL (ref 31.5–36.5)
MCHC RBC AUTO-ENTMCNC: 32.3 G/DL (ref 31.5–36.5)
MCV RBC AUTO: 94 FL (ref 78–100)
MCV RBC AUTO: 96 FL (ref 78–100)
P AXIS - MUSE: 68 DEGREES
PHOSPHATE SERPL-MCNC: 3.3 MG/DL (ref 2.5–4.5)
PHOSPHATE SERPL-MCNC: 3.4 MG/DL (ref 2.5–4.5)
PLATELET # BLD AUTO: 327 10E3/UL (ref 150–450)
PLATELET # BLD AUTO: 328 10E3/UL (ref 150–450)
POTASSIUM SERPL-SCNC: 3.6 MMOL/L (ref 3.4–5.3)
POTASSIUM SERPL-SCNC: 3.8 MMOL/L (ref 3.4–5.3)
PR INTERVAL - MUSE: 114 MS
PROT SERPL-MCNC: 5 G/DL (ref 6.4–8.3)
QRS DURATION - MUSE: 74 MS
QT - MUSE: 320 MS
QTC - MUSE: 468 MS
R AXIS - MUSE: 79 DEGREES
RBC # BLD AUTO: 2.44 10E6/UL (ref 4.4–5.9)
RBC # BLD AUTO: 2.47 10E6/UL (ref 4.4–5.9)
SODIUM SERPL-SCNC: 139 MMOL/L (ref 135–145)
SODIUM SERPL-SCNC: 140 MMOL/L (ref 135–145)
SYSTOLIC BLOOD PRESSURE - MUSE: NORMAL MMHG
T AXIS - MUSE: 63 DEGREES
TRIGL SERPL-MCNC: 245 MG/DL
VENTRICULAR RATE- MUSE: 129 BPM
WBC # BLD AUTO: 19.9 10E3/UL (ref 4–11)
WBC # BLD AUTO: 21.8 10E3/UL (ref 4–11)

## 2025-05-28 PROCEDURE — 250N000011 HC RX IP 250 OP 636: Performed by: STUDENT IN AN ORGANIZED HEALTH CARE EDUCATION/TRAINING PROGRAM

## 2025-05-28 PROCEDURE — 80053 COMPREHEN METABOLIC PANEL: CPT | Performed by: STUDENT IN AN ORGANIZED HEALTH CARE EDUCATION/TRAINING PROGRAM

## 2025-05-28 PROCEDURE — 250N000009 HC RX 250: Performed by: STUDENT IN AN ORGANIZED HEALTH CARE EDUCATION/TRAINING PROGRAM

## 2025-05-28 PROCEDURE — 83735 ASSAY OF MAGNESIUM: CPT | Performed by: CLINICAL NURSE SPECIALIST

## 2025-05-28 PROCEDURE — 84100 ASSAY OF PHOSPHORUS: CPT | Performed by: CLINICAL NURSE SPECIALIST

## 2025-05-28 PROCEDURE — 82248 BILIRUBIN DIRECT: CPT | Performed by: STUDENT IN AN ORGANIZED HEALTH CARE EDUCATION/TRAINING PROGRAM

## 2025-05-28 PROCEDURE — 85018 HEMOGLOBIN: CPT | Performed by: CLINICAL NURSE SPECIALIST

## 2025-05-28 PROCEDURE — 84478 ASSAY OF TRIGLYCERIDES: CPT | Performed by: SURGERY

## 2025-05-28 PROCEDURE — 250N000009 HC RX 250: Performed by: CLINICAL NURSE SPECIALIST

## 2025-05-28 PROCEDURE — 90947 DIALYSIS REPEATED EVAL: CPT

## 2025-05-28 PROCEDURE — 94003 VENT MGMT INPAT SUBQ DAY: CPT

## 2025-05-28 PROCEDURE — 82247 BILIRUBIN TOTAL: CPT | Performed by: STUDENT IN AN ORGANIZED HEALTH CARE EDUCATION/TRAINING PROGRAM

## 2025-05-28 PROCEDURE — 250N000011 HC RX IP 250 OP 636: Performed by: NURSE PRACTITIONER

## 2025-05-28 PROCEDURE — 83605 ASSAY OF LACTIC ACID: CPT | Performed by: NURSE PRACTITIONER

## 2025-05-28 PROCEDURE — 250N000009 HC RX 250: Performed by: SURGERY

## 2025-05-28 PROCEDURE — 99291 CRITICAL CARE FIRST HOUR: CPT | Mod: 24 | Performed by: NURSE PRACTITIONER

## 2025-05-28 PROCEDURE — 258N000003 HC RX IP 258 OP 636: Performed by: NURSE PRACTITIONER

## 2025-05-28 PROCEDURE — 999N000157 HC STATISTIC RCP TIME EA 10 MIN

## 2025-05-28 PROCEDURE — 84075 ASSAY ALKALINE PHOSPHATASE: CPT | Performed by: STUDENT IN AN ORGANIZED HEALTH CARE EDUCATION/TRAINING PROGRAM

## 2025-05-28 PROCEDURE — 250N000009 HC RX 250: Performed by: NURSE PRACTITIONER

## 2025-05-28 PROCEDURE — 82310 ASSAY OF CALCIUM: CPT | Performed by: CLINICAL NURSE SPECIALIST

## 2025-05-28 PROCEDURE — 84460 ALANINE AMINO (ALT) (SGPT): CPT | Performed by: STUDENT IN AN ORGANIZED HEALTH CARE EDUCATION/TRAINING PROGRAM

## 2025-05-28 PROCEDURE — 99233 SBSQ HOSP IP/OBS HIGH 50: CPT | Mod: 24 | Performed by: INTERNAL MEDICINE

## 2025-05-28 PROCEDURE — 250N000011 HC RX IP 250 OP 636: Mod: JZ | Performed by: NURSE PRACTITIONER

## 2025-05-28 PROCEDURE — 200N000002 HC R&B ICU UMMC

## 2025-05-28 PROCEDURE — 84450 TRANSFERASE (AST) (SGOT): CPT | Performed by: STUDENT IN AN ORGANIZED HEALTH CARE EDUCATION/TRAINING PROGRAM

## 2025-05-28 PROCEDURE — G0463 HOSPITAL OUTPT CLINIC VISIT: HCPCS

## 2025-05-28 PROCEDURE — 250N000011 HC RX IP 250 OP 636: Performed by: SURGERY

## 2025-05-28 PROCEDURE — 82330 ASSAY OF CALCIUM: CPT | Performed by: CLINICAL NURSE SPECIALIST

## 2025-05-28 PROCEDURE — 86140 C-REACTIVE PROTEIN: CPT

## 2025-05-28 PROCEDURE — B4185 PARENTERAL SOL 10 GM LIPIDS: HCPCS | Performed by: SURGERY

## 2025-05-28 PROCEDURE — 85027 COMPLETE CBC AUTOMATED: CPT | Performed by: CLINICAL NURSE SPECIALIST

## 2025-05-28 RX ORDER — HYDROCORTISONE SODIUM SUCCINATE 100 MG/2ML
50 INJECTION INTRAMUSCULAR; INTRAVENOUS EVERY 8 HOURS
Status: DISCONTINUED | OUTPATIENT
Start: 2025-05-28 | End: 2025-05-29

## 2025-05-28 RX ADMIN — CALCIUM CHLORIDE, MAGNESIUM CHLORIDE, SODIUM CHLORIDE, SODIUM BICARBONATE, POTASSIUM CHLORIDE AND SODIUM PHOSPHATE DIBASIC DIHYDRATE 200 ML/HR: 3.68; 3.05; 6.34; 3.09; .314; .187 INJECTION INTRAVENOUS at 18:08

## 2025-05-28 RX ADMIN — OLANZAPINE 2.5 MG: 10 INJECTION, POWDER, FOR SOLUTION INTRAMUSCULAR at 20:13

## 2025-05-28 RX ADMIN — CALCIUM CHLORIDE, MAGNESIUM CHLORIDE, SODIUM CHLORIDE, SODIUM BICARBONATE, POTASSIUM CHLORIDE AND SODIUM PHOSPHATE DIBASIC DIHYDRATE 12.5 ML/KG/HR: 3.68; 3.05; 6.34; 3.09; .314; .187 INJECTION INTRAVENOUS at 01:51

## 2025-05-28 RX ADMIN — HYDROCORTISONE SODIUM SUCCINATE 50 MG: 100 INJECTION, POWDER, FOR SOLUTION INTRAMUSCULAR; INTRAVENOUS at 21:51

## 2025-05-28 RX ADMIN — CALCIUM CHLORIDE, MAGNESIUM CHLORIDE, SODIUM CHLORIDE, SODIUM BICARBONATE, POTASSIUM CHLORIDE AND SODIUM PHOSPHATE DIBASIC DIHYDRATE 12.5 ML/KG/HR: 3.68; 3.05; 6.34; 3.09; .314; .187 INJECTION INTRAVENOUS at 18:49

## 2025-05-28 RX ADMIN — HYDROCORTISONE SODIUM SUCCINATE 50 MG: 100 INJECTION, POWDER, FOR SOLUTION INTRAMUSCULAR; INTRAVENOUS at 13:13

## 2025-05-28 RX ADMIN — DEXMEDETOMIDINE HYDROCHLORIDE 1.2 MCG/KG/HR: 400 INJECTION INTRAVENOUS at 01:10

## 2025-05-28 RX ADMIN — CALCIUM CHLORIDE, MAGNESIUM CHLORIDE, SODIUM CHLORIDE, SODIUM BICARBONATE, POTASSIUM CHLORIDE AND SODIUM PHOSPHATE DIBASIC DIHYDRATE 12.5 ML/KG/HR: 3.68; 3.05; 6.34; 3.09; .314; .187 INJECTION INTRAVENOUS at 13:24

## 2025-05-28 RX ADMIN — HEPARIN SODIUM 5000 UNITS: 5000 INJECTION, SOLUTION INTRAVENOUS; SUBCUTANEOUS at 21:51

## 2025-05-28 RX ADMIN — Medication 1500 MG: at 08:28

## 2025-05-28 RX ADMIN — CALCIUM CHLORIDE, MAGNESIUM CHLORIDE, SODIUM CHLORIDE, SODIUM BICARBONATE, POTASSIUM CHLORIDE AND SODIUM PHOSPHATE DIBASIC DIHYDRATE 12.5 ML/KG/HR: 3.68; 3.05; 6.34; 3.09; .314; .187 INJECTION INTRAVENOUS at 07:48

## 2025-05-28 RX ADMIN — DEXMEDETOMIDINE HYDROCHLORIDE 1.2 MCG/KG/HR: 400 INJECTION INTRAVENOUS at 05:50

## 2025-05-28 RX ADMIN — MICAFUNGIN SODIUM 100 MG: 100 INJECTION, POWDER, LYOPHILIZED, FOR SOLUTION INTRAVENOUS at 10:46

## 2025-05-28 RX ADMIN — HYDROCORTISONE SODIUM SUCCINATE 50 MG: 100 INJECTION, POWDER, FOR SOLUTION INTRAMUSCULAR; INTRAVENOUS at 05:50

## 2025-05-28 RX ADMIN — INSULIN GLARGINE 20 UNITS: 100 INJECTION, SOLUTION SUBCUTANEOUS at 09:59

## 2025-05-28 RX ADMIN — HEPARIN SODIUM 5000 UNITS: 5000 INJECTION, SOLUTION INTRAVENOUS; SUBCUTANEOUS at 05:50

## 2025-05-28 RX ADMIN — HEPARIN SODIUM 5000 UNITS: 5000 INJECTION, SOLUTION INTRAVENOUS; SUBCUTANEOUS at 13:13

## 2025-05-28 RX ADMIN — Medication 25 MCG: at 12:00

## 2025-05-28 RX ADMIN — SMOFLIPID 250 ML: 6; 6; 5; 3 INJECTION, EMULSION INTRAVENOUS at 20:17

## 2025-05-28 RX ADMIN — MEROPENEM 1 G: 1 INJECTION, POWDER, FOR SOLUTION INTRAVENOUS at 15:48

## 2025-05-28 RX ADMIN — Medication 25 MCG: at 20:58

## 2025-05-28 RX ADMIN — Medication 25 MCG: at 03:42

## 2025-05-28 RX ADMIN — DEXMEDETOMIDINE HYDROCHLORIDE 1.2 MCG/KG/HR: 400 INJECTION INTRAVENOUS at 10:51

## 2025-05-28 RX ADMIN — PANTOPRAZOLE SODIUM 40 MG: 40 INJECTION, POWDER, FOR SOLUTION INTRAVENOUS at 07:50

## 2025-05-28 RX ADMIN — MEROPENEM 1 G: 1 INJECTION, POWDER, FOR SOLUTION INTRAVENOUS at 07:50

## 2025-05-28 RX ADMIN — DEXMEDETOMIDINE HYDROCHLORIDE 0.8 MCG/KG/HR: 400 INJECTION INTRAVENOUS at 21:50

## 2025-05-28 RX ADMIN — Medication 125 MCG/HR: at 13:11

## 2025-05-28 RX ADMIN — MAGNESIUM SULFATE HEPTAHYDRATE: 500 INJECTION, SOLUTION INTRAMUSCULAR; INTRAVENOUS at 20:17

## 2025-05-28 RX ADMIN — Medication 25 MCG: at 13:11

## 2025-05-28 RX ADMIN — DEXMEDETOMIDINE HYDROCHLORIDE 1.2 MCG/KG/HR: 400 INJECTION INTRAVENOUS at 16:00

## 2025-05-28 ASSESSMENT — ACTIVITIES OF DAILY LIVING (ADL)
ADLS_ACUITY_SCORE: 52

## 2025-05-28 NOTE — PROGRESS NOTES
SURGICAL ICU PROGRESS NOTE      Date of Service (when I saw the patient): 05/28/2025    ASSESSMENT:  Sebastián Rodas is a 29M with alcohol abuse, anxiety, and bipolar disorder, who was admitted 3/30/25 for alcohol withdrawal and abdominal pain. Workup showed leukocytosis and elevated lipase, consistent with acute pancreatitis. He developed encephalopathy and shock, requiring ICU care, intubation, vasopressors, and CRRT by 4/1. His course was complicated by cardiomyopathy (EF 20-25%, improved to 65-70% by 4/14), necrotizing pancreatitis, and persistent infection despite 14 days of meropenem. On 4/27, imaging showed likely perforated viscus; he underwent emergent laparotomy, necrosectomy, and colectomy. Transferred to [facility] SICU on 4/30. On 5/1, reoperation revealed colonic staple line breakdown, necrotic pancreas, vessel thrombosis, and adhesions. Colostomy with malankot drain and temporary abdominal closure performed. Prognosis is poor; palliative care involved. Care conference 5/2 with family to talk goals of care. Full code, family acknowledged that poor prognosis is to be expected. 5/4 s/p exploratory surgery, additional necrotic pancreas remove with no obvious spillage or sign of bleeding at the time. Went back to the OR 5/7 for abdominal wash out. CT abdomen 5/10 showed extravasation on arterial phase imaging. MTP initiated, IR arterial embolization of pancreatic vessels performed. Decreased stool output since 5/13, switched to TPN. Hemorraghic shock.  MTP 5/18, IR embolization 5/18.      CHANGES and MAJOR THINGS TODAY:    Continue broad antimicrobial coverage  Trend lactate  Arterial line malfunctioning and removed  Follow cultures, if remain negative discontinue vancomycin tomorrow  Decrease hydrocortisone to 50 q 8   PST  I=O on CRRT    PLAN:    Neurological:  # Acute pain   - Fentanyl gtt with bolus PRN  # Nerve pain  - Schedule lidocaine patches  # Encephalopathy- improving  # Insomnia  - Monitor  neurological status. Delirium preventions and precautions.   # Sedation: Precedex, weaning as able  # Anxiety  - RASS goal 0 to -1  - Olanzapine 2.5 mg @ night  - Olanzapine PRN     Pulmonary:   # Acute hypoxic respiratory failure  # S/p tracheostomy placement   FiO2 (%): 30 %, Resp: 12, Vent Mode: VC/AC, Resp Rate (Set): 16 breaths/min, Tidal Volume (Set, mL): 420 mL, PEEP (cm H2O): 5 cmH2O, Resp Rate (Set): 16 breaths/min, Tidal Volume (Set, mL): 420 mL, PEEP (cm H2O): 5 cmH2O  CT PE 5/9 showed no evidence of pulmonary embolism  - On PST this am, 7/5, continue as able   - Pulmonary toilet, mobilize  - Ventilator bundle  - Wean FIO2 as tolerated    Cardiovascular:    # Stress Cardiomyopathy   - Initial LVEF 20-25%, improved to 65-70%  # Septic shock  # Sinus Tachycardia  #Lactic acidosis  - Monitor hemodynamic status. MAP goal > 65  - Restarted on stress dose steroids 5/27 in the setting of two pressor shock. Now that off pressors, will reduce to 50 q 8 hours.   - Norepinephrine and Vasopressin weaned off.   - Lactate improving 5.9-->3.3     Gastroenterology/Nutrition:  # S/p emergent exploratory laparotomy, necrosectomy, transverse colectomy, abdominal washout, and drain placement 5/1 and 5/4  # S/p Pancreatic branch vessel embolization with IR 5/10, 5/18  # Severe necrotizing pancreatitis  # Splenic vein thrombosis  CT abdomen/pelvis with IV + enteral contrast through G tube 5/14: large volume of air and blood products in abdomen.   - Will defer management of dressings and drain removal per EGS. Minimal output per dressings.   - Cecostomy drain in place, no stool output. Abdomen distended.  - PPI IV  - Hold all Oral Meds and Feeds, strict NPO  - 5/27 CT C/A/P with large encapsulated intraperitoneal and retroperitoenal air and fluid containing necrotic collections with decreased hemorrhagic components along the aterior inferior aspect of the right hepatic lobe.   - Patient will be discussed in GI conference 5/29.  Discussed with IR 5/27, amenable to placement of bilateral retroperitoneal drains.     # Severe Protein calorie deficit malnutrition due to critical illness  # Hypertriglyceridemia   - Continue TPN. Lipids on hold d/t hypertriglyceridemia  - GJ tube migrated out. Keep NPO  - IR consulted for PEG exchange  - IR cannot safely exchange freshly placed GJ tubes until they have been in place x6 weeks given risk of losing access into the stomach. Additionally, imaging shows there is no safe pexy between the stomach and abdominal wall. IR may not be able to offer safe exchange even after 6 weeks.   - Multivitamin supplementation ordered by nutrition  - RD consult. Appreciate cares and recommendations.     Renal/Fluids/Electrolytes:   # SARAHI  # Intravascular hypovolemia  # total fluid overload  Baseline Cr 0.7-0.8. Presented with Cr 0.96 on 3/30. Rapidly markos to 5.2 on 4/1. Oliguric. Garcia UA with proteinuria, hematuria, pyuria, moderate bacteria.  Kidneys unremarkable on CT. Has severe ATN in the setting of shock, ADHF, intravascular hypovolemia/3rd spacing from pancreatitis.   - Nephrology consulted, appreciate recs  - CRRT, had had no UF over the last 24 hours in the setting of hemodynamic instability 5/27. Id request to aim for I=O today.   - Continue to monitor intake and output  - Volume assessment daily    Endocrine:   # Stress and steroid induced hyperglycemia    Hgb A1c 5.3, no hx of DM   - Start insulin gtt  - Keep lantus at 20  - Goal to keep BG< 180 for optimal wound healing     ID  # Leukocytosis  # Necrotizing pancreatitis  # Intraabdominal sepsis  - Trend CRP  - WBC 31 to 22 today  - 5/27 Broadened Zosyn to Meropenem, Vancomycin and Micafungin  - Repeat BC x2, sputum culture, MRSA swab.   - Obtain c diff PCR if able to obtain a sample from wound.        cultures:  - 4/30 blood cultures- NGTD   - 5/1 blood cultures ordered - NGTD  - Blood culture 5/12, 5/15 - NGTD  - Blood culture 5/22 - +candida  - 5/27 Resp  "culture +1 yeast     Heme:     # Acute blood loss anemia   # Anemia of critical illness   # Thrombosed splenic vein   # s/p splenic artery embolization 5/18  #Non occlusive thrombus adherent to RIJ catheter on CT 5/27  - Transfuse if hgb <7.0 or signs/symptoms of hypoperfusion. Monitor and trend  - MTP 5/10, 5/18  - Continue subcutaneous heparin, watch bleeding from drains.  - s/p IR 5/18 coil embolization of splenic artery proximal to great pancreatic artery.   - Hemoglobin 7.1 yesterday, received 1 PRBC, 7.4 this am.   - Hold additional anticoagulation         Musculoskeletal:   # Deconditioning and weakness due to critical illness   # LLE swelling  - LLE DVT ultrasound  - Physical and occupational therapy consult   - Use abdominal binder when up w/ PT     Skin:  # Pressure Ulcers - Buttocks/rectal area  - Barrier cream with liberal application.   - WOC consult      #Pressure Ulcers- Left Heel  Pressure Injury Location: Left heel   Wound type: Pressure Injury     Pressure Injury Stage: Deep Tissue Pressure Injury (DTPI), present on admission     General Cares/Prophylaxis:    DVT Prophylaxis: SQH   GI Prophylaxis: PPI     Lines/ tubes/ drains:  - HD line--  Right IJ  - PICC line- left   - Right radial A line- removed due to malfunctioning  - PIV x 3  - Trach  - G-J  - RUQ ostomy  - LLQ drain  - Scrotal drain  - SHANNON x 3  - Colotomy drain     Disposition:  - Surgical ICU    Time spent on this Encounter   Billing:  I spent 55 minutes bedside and on the inpatient unit today managing the critical care of Sebastián Rodas in relation to the issues listed in this note.    Renee De La Mater     ====================================  SUBJECTIVE: Denies pain, SOB. NAEON. Came off pressors.       OBJECTIVE:   1. VITAL SIGNS:   Temp: 98.2  F (36.8  C) Temp src: Axillary BP: 115/66 Pulse: 90   Resp: 12 SpO2: 96 % O2 Device: Mechanical Ventilator Oxygen Delivery: 50 LPM Height: 172.7 cm (5' 7.99\") Weight: 73.7 kg (162 lb 7.7 " "oz)  Estimated body mass index is 24.71 kg/m  as calculated from the following:    Height as of this encounter: 1.727 m (5' 7.99\").    Weight as of this encounter: 73.7 kg (162 lb 7.7 oz).      FiO2 (%): 30 %, Resp: 12, Vent Mode: VC/AC, Resp Rate (Set): 16 breaths/min, Tidal Volume (Set, mL): 420 mL, PEEP (cm H2O): 5 cmH2O, Resp Rate (Set): 16 breaths/min, Tidal Volume (Set, mL): 420 mL, PEEP (cm H2O): 5 cmH2O      2. INTAKE/ OUTPUT:   Intake/Output Summary (Last 24 hours) at 5/26/2025 0607  Last data filed at 5/26/2025 0600  Gross per 24 hour   Intake 2347.92 ml   Output 4674 ml   Net -2326.08 ml         3. PHYSICAL EXAMINATION:  General: laying in bed, awake and alert   HEENT: PERRLA. Trach present and secure, site dressed.   Pulm/Resp: Clear breath sounds bilaterall  CV: RRR, S1/S2   Abdomen: Distended. Dark red output noted to all drains. G-J tube in place, site secured, abdomen with dressing in place C/D/I  : scrotal edema, draining drk red/brown output.   MSK/Extremities: generalized edema in extremities. +3 left foot edema    4. INVESTIGATIONS:   Arterial Blood Gases   Recent Labs   Lab 05/27/25  0657 05/26/25  2156 05/25/25  1220   PH 7.39 7.45 7.38   PCO2 32* 29* 40   PO2 75* 118* 87   HCO3 19* 20* 23     Complete Blood Count   Recent Labs   Lab 05/28/25  0318 05/27/25  1500 05/27/25  0401 05/26/25  2341 05/26/25  1847 05/26/25  1624   WBC 21.8* 31.2* 32.1*  32.1*  --   --  24.1*   HGB 7.4* 8.1* 7.1*  7.1* 7.5*   < > 8.2*    459* 568*  568*  --   --  518*    < > = values in this interval not displayed.     Basic Metabolic Panel  Recent Labs   Lab 05/28/25  0638 05/28/25  0511 05/28/25  0318 05/28/25  0317 05/27/25  1606 05/27/25  1500 05/27/25  0748 05/27/25  0401 05/26/25 2002 05/26/25  1624   NA  --   --  139  --   --  138  --  138  --  138   POTASSIUM  --   --  3.6  --   --  3.9  --  3.9  --  3.7   CHLORIDE  --   --  106  --   --  104  --  104  --  104   CO2  --   --  21*  --   --  17*  --  " 18*  --  20*   BUN  --   --  40.8*  --   --  45.3*  --  47.8*  --  46.0*   CR  --   --  0.53*  --   --  0.58*  --  0.58*  --  0.58*   * 133* 128* 129*   < > 220*   < > 182*   < > 181*    < > = values in this interval not displayed.     Liver Function Tests  Recent Labs   Lab 05/28/25  0318 05/27/25  1500 05/27/25  0657 05/27/25  0401 05/26/25  1624 05/26/25  0321 05/25/25  1614 05/25/25  0330 05/23/25  0331 05/22/25 2031 05/22/25  1556 05/22/25  1002   AST 26  --   --  34  --  30  --  41   < >  --   --   --    ALT 33  --   --  43  --  57  --  55   < >  --   --   --    ALKPHOS 431*  --   --  658*  --  1,042*  --  1,164*   < >  --   --   --    BILITOTAL 0.9  --   --  1.5*  --  1.1  --  1.4*   < >  --   --   --    ALBUMIN 2.0* 2.2*  --  2.3* 2.7* 2.5*   < > 2.3*   < >  --    < >  --    INR  --   --  1.28*  --   --   --   --   --   --  1.22*  --  1.18*    < > = values in this interval not displayed.     Pancreatic Enzymes  No lab results found in last 7 days.    Coagulation Profile  Recent Labs   Lab 05/27/25  0657 05/22/25 2031 05/22/25  1002   INR 1.28* 1.22* 1.18*     5. RADIOLOGY:   Recent Results (from the past 24 hours)   CT Chest/Abdomen/Pelvis w Contrast    Narrative    EXAM: CT chest, abdomen, and pelvis with intravenous contrast.  5/27/2025 12:26 PM    HISTORY: Evaluate for abscess, rising lactate, hypotension    TECHNIQUE: Helical acquisition of image data was performed for the  chest, abdomen, and pelvis with intravenous contrast.    COMPARISON: Chest x-ray 5/27/2025, CTA chest, abdomen and pelvis  5/18/2025, CT abdomen and pelvis 5/14/2025    FINDINGS:    : Unremarkable.    Lines and tubes: Left anterior approach percutaneous abdominal drain  terminates within the anterior peritoneum at the open abdomen. Right  anterior lateral approach percutaneous drain terminates in the right  upper quadrant adjacent to the border of the liver. Additional right  anterior inferior percutaneous drain  terminates in the left mid  abdomen paracolic gutter. Right IJ central venous catheter tip  terminates in the low SVC. Left upper extremity PICC tip terminates in  the low SVC. Nonocclusive thrombus adherent to the right internal  jugular central venous catheter within the right brachiocephalic vein  and SVC.    CHEST:    Lungs/pleura/airways: Tracheotomy tube tip terminates in the mid  thoracic trachea. No suspicious pulmonary nodules or masses. Right  greater than left basilar atelectasis with continued complete collapse  of the right lower lobe, which is associated with endobronchial mucous  plugging throughout the right lower lobe. Trace bilateral pleural  effusions. No pneumothorax. Decreased bilateral interlobular septal  thickening. Near resolution of the previously seen diffuse bilateral  centrilobular nodularity persisting in the posterior inferior right  upper lobe.    Lower neck/axillae/mediastinum: Thyroid appears unremarkable. No  enlarged axillary, mediastinal, or hilar lymph nodes by short axis  criteria. The heart is not enlarged. No pericardial effusion. Thoracic  aorta and main pulmonary artery are normal in caliber. No  calcifications of the coronary arteries or aorta. The esophagus  appears unremarkable.    ABDOMEN/PELVIS:    Liver: Decreased mass effect on the anterior liver. No intrahepatic  biliary dilatation. No focal hepatic mass.    Gallbladder/biliary tree: The common bile duct is not dilated.  Excretion of contrast within the gallbladder.    Pancreas: Postsurgical changes of necrosectomy. Stable extensive  peripancreatic fat stranding and fluid. Decreased enhancement of the  pancreatic tail since prior. Stable atrophy and relative  hypoenhancement of the pancreatic neck and body.    Spleen: Multiple wedge-shaped regions of parenchymal enhancement  peripherally which are new from prior.    Adrenal glands: No adrenal nodules.    Kidneys/ureters: Mild bilateral hydronephrosis. The mid to  distal  ureters are surrounded by inflammation and retroperitoneal  collections. No renal calculi.    Bladder/pelvic organs: The urinary bladder is mildly distended. There  is moderate mucosal hyperenhancement with irregular wall thickening.    Bowel/mesentery: Postsurgical changes of transverse colectomy and  Abthera placement. There is dense contrast material within the  ascending colon. Right upper quadrant percutaneous tube terminates at  the distal portion of the blind-ending proximal transverse colon  adjacent to the staple line, similar to prior exam favored  intraluminal given the association with the hyperattenuating internal  contents of the hepatic flexure colon. No dilated small or large  bowel.    Stomach: Interval removal of the percutaneous gastrojejunostomy tube.  Enhancing gastric mucosa/rugae beyond the expected margin of the  anterior gastric wall at the location of the prior percutaneous  gastrojejunostomy tube (series 4 image 195).    Peritoneum/retroperitoneum: Open abdomen with packing material  anteriorly. Continued large volume multiloculated encapsulated  intraperitoneal and retroperitoneal/pelvic extraperitoneal fluid and  air with with extensive air. The collections are decreased along the  anterior and inferior aspects of the right hepatic lobe, within the  lesser sac, and throughout the anterior intraperitoneal space deep to  the open abdominal wall defect corresponding to the sites of prior  intraperitoneal hemorrhage on 5/18/2025. There is peritoneal wall  hyperenhancement. There is peripheral enhancement along the borders of  several areas of the fluid collections including the fluid tracking  into the inguinal canals and right scrotum.     Lymph nodes: Multiple enlarged mesenteric lymph nodes are decreased in  size from prior, for example in right mesenteric lymph node measures 7  mm in short axis, previously measuring 11 mm (series 5, image 77).    Major vessels: Post procedure  changes of prior coil embolization  splenic artery.    BONES/SOFT TISSUE: Moderate anasarca. There is peripherally enhancing  fluid tracking along the right anterior abdominal wall.      Impression    IMPRESSION:  1. Open abdomen with continued large encapsulated intraperitoneal and  retroperitoneal air and fluid containing necrotic collections, though  with decreased hemorrhagic components along the anterior inferior  aspect of the right hepatic lobe, within the lesser sac, and  throughout the anterior abdomen since 5/18/2025. No new or enlarging  intraperitoneal or retroperitoneal collection  2. Postprocedural changes of splenic artery coil embolization with new  multifocal regions of wedge-shaped splenic parenchymal nonenhancement,  likely infarcts. Similarly, new non-enhancement of the pancreatic tail  which is also likely related to splenic artery embolization with  parenchymal infarct and/or necrotizing pancreatitis  3. Postoperative changes of transverse colectomy with intraluminal  colon catheter placement in the proximal transverse colon. The  catheter is stable within the lumen of the blind-ending proximal  transverse colon. Multiple intra-abdominal drains.  4. Mild bilateral hydronephrosis, likely related to  collections/inflammation surrounding the ureters.  5. Gastric rugal folds are partially herniating through the anterior  wall of the gastric antrum at the site of a prior percutaneous  gastrojejunostomy tube.  6. Extensive mesenteric lymphadenopathy is slightly decreased in  extent from prior CT abdomen and pelvis 5/18/2025.  7. Decreased pulmonary edema since 5/18/2025 with continued small  bilateral pleural effusions and right greater than left basilar  atelectasis, including complete collapse of the right lower lobe  associated with extensive endobronchial mucous plugging.  8. Near resolution of the previously seen diffuse centrilobular  nodularity, persist in the posterior inferior right upper  lobe, likely  an improving infectious/inflammatory process.  9. Nonocclusive thrombus adherent to the right internal jugular  central venous catheter within the right brachiocephalic vein and SVC.    I have personally reviewed the examination and initial interpretation  and I agree with the findings.    GONZALEZ SALAZAR,          SYSTEM ID:  [id number]         =========================================

## 2025-05-28 NOTE — PROGRESS NOTES
Shift Summary:    Pt remained trached with a #6 XLT Shiley cuffed and mechanically ventilated on the following vent settings:     Vent Mode: Trach dome  30% 35L      SBT:  Weaning Assessment Complete: Yes  Safety Screen Weaning Assessment: Weaning Assessment meets all criteria;Other (comment)  Spontaneous Respiratory Rate: 12 breaths/min  Spontaneous Tidal Volume (mL): 542 mL   Spontaneous Minute Ventilation: 5.2 mL   Wean Start Time : 0645  End Start Time: 1415  TD:  Start time: 1430  End time: currently still on      Assessment: BS clear left upper lobe., coarse on R lobes.  RT suctioned small amount of thick tan and white secretions.      Major Respiratory Related Events: pt initially refused TD in the morning d/t anxiety attacks. Finally agreed to TD at 1430. To stay on TD for as long as tolerated. CPT ordered for unclear reasons, discussed with team. Will hold CPT for now, will reassess tmr.    Plan: rest on full vent support overnight. Reassess for PS/TD trials in the morning.      Ambu bag, mask, and valve present at bedside. Emergency trach supplies also present at bedside. RT to follow.      Travis Singer, RT on 5/28/2025 at 5:56 PM

## 2025-05-28 NOTE — PROGRESS NOTES
ICU End of Shift Summary. See flowsheets for vital signs and detailed assessment.    Changes this shift:  Neuro status unchanged.  ml bolus x 1 for soft BP. 1 unit blood given for Hgb=7.1. Recheck Hgb=8.1. 2nd pressor added for a short time this morning, off at 11:00. Levo running for MAP 65. Precedex  at 1.2 + fent at 125. New orders added today for antibiotics: meropenem, vanco, micfungin. Insulin gtt started (solumedrol and TPN).  CT chest/abd/pelvis. See chart for results.  Pt remained on full vent all day, no change in vent settings, FIO2=30%.  WOC nurse changed abd dressing today, new appliance in place. Straight cathed pt for 105 ml. CRRT with new circuit started upon return from CT (recirculating did not work).  Pt's mother and uncle here today and have been updated.    Plan:  Consult for IR tomorrow for possible drains, bilateral retroperitoneal fluid collection found on CT.

## 2025-05-28 NOTE — PROGRESS NOTES
CRRT STATUS NOTE    DATA:  Time:  1800  Pressures WNL:  YES  Filter Status:  WDL    Problems Reported/Alarms Noted:  none reported    Supplies Present:  YES    ASSESSMENT:  Patient Net Fluid Balance:   NET NEGATIVE 133 ml since midnight  Intake/Output Summary (Last 24 hours) at 5/28/2025 1723  Last data filed at 5/28/2025 1700  Gross per 24 hour   Intake 2642.71 ml   Output 2712.9 ml   Net -70.19 ml                                                    Date 05/28/25 0700 - 05/29/25 0659   Shift [id number] 24 Hour Total   INTAKE   I.V. 254.83 95.2  350.03   IV Piggyback 450 100  550    200  600   Shift Total(mL/kg) 1104.83(14.99) 395.2(5.36)  1500.03(20.35)   OUTPUT   Drains 450 90  540   Other 482.9 450  932.9   Shift Total(mL/kg) 932.9(12.66) 540(7.33)  1472.9(19.99)   Weight (kg) 73.7 73.7 73.7 73.7                  Vital Signs:  Temp: 98.3  F (36.8  C) Temp src: Axillary BP: 121/77 Pulse: 93   Resp: 14 SpO2: 98 % O2 Device: Trach dome Oxygen Delivery: 35 LPM          Labs: K 3.6 (05/28 0318), Mag 2.3 (05/28 0318), Phos 3.3 (05/28 0318), Hgb 7.5 (05/28 1600)                    Goals of Therapy:  I=O    INTERVENTIONS:   None requested    PLAN:  Continue CRRT per plan of care- per bedside RN, plan to return to OR tomorrow

## 2025-05-28 NOTE — PLAN OF CARE
Goal Outcome Evaluation:ICU End of Shift Summary. See flowsheets for vital signs and detailed assessment.    Changes this shift:   Neuro status unchanged, pt comfortable and was able to sleep most of the night (continues on Precedex gtt at 1.2 mcg/kg/hr and Fentanyl 125 mcg/hr).    Respiratory/hemodynamics:  Stable on 30% PEEP of 5 full vent settings overnight.  HR much lower tonight, not 90's to low 100's sinus rhythm.  Levophed titrated to off.  R radial arterial line that was redressed yesterday is now very positional and does not have a brisk blood return to draw labs.  Line was placed on 5/1 and no longer has sutures per report.  MD aware I am following cuff BP now.     GI/: Pt has not had any stool output since 5/22.  Scrotal abscess pouch that encompasses penis has moderate amount of drainage, serous to purulent pink/red tinged output.  CRRT set to 0 per orders.  Pt is net + over 270 mls since midnight so far today.  CRRT set clotted off but pt was able to get blood back from set.     Labs:  Hgb is 7.4, Lactic acid trending down again, now 3.6.  CRP is 291, WBC is 21.8  Insulin gtt infusing on algorhythm #2 with glucoses well controlled.     Plan: Continue to monitor and update MD with changes and concerns. Per report, pt may get a bronch today.  Consider removing arterial line +/- replacing art line.         Plan of Care Reviewed With: patient    Overall Patient Progress: improvingOverall Patient Progress: improving    Outcome Evaluation: Pt slept most of night, was able to wean off pressor support

## 2025-05-28 NOTE — PROGRESS NOTES
[facility] Nurse Inpatient Assessment       Consulted for: Tracheostomy site, sacrum, left heel  5/9: New consult for midline wound draining heavily    5/28: Stopped to check on pouching / wound manager placed on 5/27. All appear intact, some leakage around drain tubes that is managed with dressings by RN.          [facility] nurse follow-up plan: Monday/WednesdayFriday    Patient History (according to provider note(s):      Sebastián Rodas is a 29-year-old male with a history of alcohol abuse, anxiety, and bipolar disorder, admitted on 3/30/2025 for alcohol withdrawal after a binge, presenting with abdominal pain. Initial workup revealed leukocytosis and elevated lipase, suggestive of acute pancreatitis. He developed encephalopathy and required ICU transfer on 3/31, intubation, and vasopressors by 4/1 due to shock. His hospital course was complicated by acute renal failure (requiring CRRT), cardiomyopathy (LVEF 20-25%, improved to 65-70% by 4/14), and necrotizing pancreatitis with unorganized fluid collections. He completed a 14-day course of meropenem but remained febrile and critically ill, requiring ongoing dialysis and vasopressor support. On 4/27, imaging indicated likely perforated viscus, and after family discussion, he underwent emergent laparotomy, necrosectomy, transverse colectomy, abdominal washout, and drain placement. He was transferred to the SICU at [facility] on 4/30 for further monitoring. On 5/1, he underwent a second laparotomy with irrigation and debridement, colostomy tube placement, temporary abdominal closure, and further necrosectomy due to breakdown of the colonic resection staple line, necrotic pancreas, thrombosed middle colic vessels, fat necrosis, and dense interloop adhesions. He has a poor prognosis given the OR findings, palliative care consulted.     Assessment:      Areas visualized during today's visit: Focused: and Abdomen    Wound location:  Abdomen           Superior side of wound with displaced mesh      Last photo: 5/27  Wound due to: Surgical Wound  Wound history/plan of care: On 4/27, imaging showed likely perforated viscus; he underwent emergent laparotomy, necrosectomy, and colectomy. Transferred to [facility] SICU on 4/30. On 5/1, reoperation revealed colonic staple line breakdown, necrotic pancreas, vessel thrombosis, and adhesions. Colostomy with malankot drain and temporary abdominal closure performed. Prognosis is poor; palliative care involved. Care conference 5/2 with family to talk goasl of care. Full code, family acknowledged that poor prognosis is to be expected. Today 5/4 s/p exploratory surgery, additional necrotic pancreas remove with no obvious spillage or sign of bleeding   5/12: Abdomen is much more distended today. The superior side of the wound bed had a large black clot loosely adhered to the mesh. There was pooling of darker thick liquid in the inferior side of the wound bed.   5/13: Pouch intact. Replaced Kerlix in wound bed.  5/14: Pouch replaced due to leaking at the superior side by PEG tube. Most of the slough covering the adipose tissue at the wound margins has fallen off. There is marbled granulation tissue and adipose tissue on the wound margins. Wound smelled more foul today.   5/20: Wound dimensions have increased markedly. The bowel under the mesh is much more distended and the mesh at the superior end of wound has displaced and is no longer attached. There is a large amount of blood clot and stool in this location. The wound manager was unable to be replaced today due to the increased wound dimensions as well as erosion of the various tubes around the analilia-wound area making pouching impossible.   5/23: Patient seems to be tolerating dressing changes ok. Found that more mesh had  and bowel loops were visible so asked team o come see and they were aware. Will continue with dressing changes. If he has a strong  desire to get out of bed (he asked during my visit) I would strongly recommend an abdominal binder.   5/27: Pt's wound with increased drainage from superior side with displaced mesh. Pt much less edematous with dialysis. PEG tube fell out over night. Saddle bagged wound pouch able to be placed today.   Wound base: 80 % surgical mesh- with open bowel visible on top edge , 20 % Granulation tissue, Slough, and Adipose tissue     Palpation of the wound bed: fluctuance      Drainage: copious     Description of drainage: serosanguinous, bloody, and black     Measurements (length x width x depth, in cm): 21  x 17  x  3+ cm - measured on Friday's- did not probe into cavity with exposed bowel     Tunneling: N/A     Undermining: N/A  Periwound skin: Intact      Color: normal and consistent with surrounding tissue      Temperature: normal   Odor: none  Pain: facial expression of distress, sharp  Pain interventions prior to dressing change: oral oxycodone, IV fentanyl, and slow and gentle cares   Treatment goal: Drainage control and Infection control/prevention  STATUS: deteriorating  Supplies ordered: at bedside    Pressure Injury Location: Left heel - Assessed on Fridays        Last photo: 5/23  Wound type: Pressure Injury     Pressure Injury Stage: Deep Tissue Pressure Injury (DTPI), present on admission       Wound history/plan of care:   Pt admitted from OSH on 4/31. Wound present on admission. There is an intact blister over a deeper maroon discoloration.    Wound base: 100 % Maroon, Blister, and Epidermis     Palpation of the wound bed: boggy      Drainage: none     Description of drainage: none     Measurements (length x width x depth, in cm) 2  x 1.5  x  0 cm      Tunneling N/A     Undermining N/A  Periwound skin: Intact      Color: normal and consistent with surrounding tissue      Temperature: normal   Odor: none  Pain: denies , none  Pain intervention prior to dressing change: slow and gentle cares   Treatment goal:  Heal  and Protection  STATUS: stable  Supplies ordered: at bedside    My PI Risk Assessment     Sensory Perception: 2 - Very Limited     Moisture: 2 - Very moist      Activity: 1 - Bedfast      Mobility: 2 - Very limited     Nutrition: 2 - Probably inadequate      Friction/Shear: 1 - Problem     TOTAL: 10    Pressure Injury Location: coccyx - Assessed on Fridays    Zoomed out                                              Zoomed in        Last photo: 5/16  Wound type: Pressure Injury, Incontinence Associated Dermatitis (IAD), and Moisture Associated Skin Damage (MASD)     Pressure Injury Stage: 2, present on admission        Wound history/plan of care:   Pt transferred from OSH on 4/31. Wound present on admission. There is MASD/IAD to the bilateral buttocks with exposed dermis. There is further breakdown over the Pt's coccyx that is fibrin vs slough. Wound is at least a stage 2. The wound will need to evolve further before it can be definitively staged.   5/9: Wound continues to improve. Wound is a stage 2 pressure injury over coccyx with surrounding MASD and exposed dermis.  5/16: Dermal buds seen throughout wound base.  5/23: wound care just completed by RN so did not assess   Wound base: 80 % Dermis, 20 % Fibrin     Palpation of the wound bed: normal      Drainage: moderate     Description of drainage: serosanguinous     Measurements (length x width x depth, in cm) 6  x 6  x  0.2 cm - central wound, 1.5 x 1.5 x 0.1 cm left buttock     Tunneling N/A     Undermining N/A  Periwound skin: Edematous and Skin stripping      Color: pink and red      Temperature: normal   Odor: none  Pain: moderate, tender  Pain intervention prior to dressing change: slow and gentle cares   Treatment goal: Heal  and Protection  STATUS: improving and stable  Supplies ordered: at bedside    My PI Risk Assessment     Sensory Perception: 2 - Very Limited     Moisture: 2 - Very moist      Activity: 1 - Bedfast      Mobility: 2 - Very limited      Nutrition: 2 - Probably inadequate      Friction/Shear: 1 - Problem     TOTAL: 10    Wound location: Trach - Assessed on Fridays               Last photo: 5/23  Wound due to: Surgical Wound  Wound history/plan of care:  Pt transferred from OSH on 4/31. Pt had trach placed at OSH. There are areas of fibrin from previous trach suture locations which are mostly healed. There is a linear incision on the left side of Trach cannula from insertion. The wound base wass difficult to visualize, appears to be fibrin and adipose tissue marbled together.  5/5: Called by bedside RN with concerns for skin breakdown near trach. On assessment Trach incision appears to have opened up significantly from prior assessment. Moderate amount of respiratory secretions present. ENT consulted by primary team to assess trach site as well.  5/16: Overall dimensions decreasing. Wound edges stating to heal.   Wound base: 100 % Granulation tissue, Fibrin, and Adipose tissue     Palpation of the wound bed: normal      Drainage: moderate     Description of drainage: yellow - respiratory secretions     Measurements (length x width x depth, in cm): 2  x 3  x  0.8 cm      Tunneling: N/A     Undermining: N/A  Periwound skin: Intact      Color: normal and consistent with surrounding tissue      Temperature: normal   Odor: none  Pain: moderate, tender  Pain interventions prior to dressing change: slow and gentle cares   Treatment goal: Heal  and Protection  STATUS: granulating and stable  Supplies ordered: at bedside      Wound location: Scrotum      Last photo: 5/27  Wound due to: Abscess  Wound history/plan of care: Wound noted during assessment of abdominal wounds 5/12. Surgery at bedside, stated that inguinal canal is patent with the abdominal cavity and drainage most likely secondary to abdominal cavity contents, and recommended BID gauze dressings. Wound continued to leak copious amounts of blood/stool/intraabdominal fluid. Overnight and was  pouched with an ostomy pouch by SICU resident overnight. Pouch continued to leak and was replaced with a primofit male external catheter set to low continuous suction.  5/13: Ostomy pouch applied to area to collect drainage. Intact at time of assessment.  Wound base: 100 % Moist scrotal tissue     Palpation of the wound bed: fluctuance      Drainage: copious     Description of drainage: serosanguinous, purulent, bloody, and red     Measurements (length x width x depth, in cm): See photos - 4 small open areas.     Tunneling: N/A     Undermining: N/A  Periwound skin: Indurated      Color: red      Temperature: normal   Odor: none  Pain: moderate, tender  Pain interventions prior to dressing change: slow and gentle cares   Treatment goal: Drainage control  STATUS: stable  Supplies ordered: at bedside       Treatment Plan:     Left heel wound(s): Every 3 days Cleanse with saline and pat dry. Conform a sacral Mepilex around Pt's heel. Place in Prevalon boots. Float heels off of support surface with pillows under calves.    Buttocks/coccyx wound(s): Every 3 days   Cleanse the area with wound cleanser and pat dry.  Apply No sting film barrier to periwound skin.  Cover wound with Sacral Mepilex (#[id number])  Nursing to peel back dressing to assess wound bed with skin assessment.   Change dressing Q 3 days.  Turn and reposition Q 2hrs side to side only.  Ensure pt has Vikram-cushion while sitting up in the chair.  If not in ICU: Ensure air pump on bed and set to Isoflex.  FYI- If pt has constant incontinent loose stools needing dressing changes Q shift please discontinue the Mepilex dressing and apply criticaid barrier paste BID and PRN.     Trach wound: Perform trach cares per unit routine. Place a fenestrated optifoam between trach faceplate and Pt's skin.    Abdominal wound: BID and PRN. Remove soiled Kerlix. Gently absorb as much drainage from wound base as possible. It can be helpful to use yaunker suction to remove excess  "fluid. Cover exposed mesh with Xeroform gauze. (Change Xerform daily) Fluff Kerlix into superior and inferior wound base to help absorb drainage. Keep wound manager to gravity drainage. [facility] will assess wound manager daily MWF.    If wound manager fails, place wet to dry dressing with Xeroform gauze covered by Vashe moistened Kerlix Covered by Mextra Superabsorbant pads. Change PRN.    Drain tube cares: PRN for saturation. Cleanse analilia-tubular skin with Vashe and pat dry. Apply no sting barrier film to analilia-tubular area and allow to dry. Fenestrate a 5x5 Mextra super absorbant pad (033446) and place around tube to absorb drainage.     Scrotal wound: PRN with pouch leakage. Cleanse with wound cleanser and pat dry. Apply no sting barrier film to analilia-wound area and allow to dry. Cut a small Josephine pouch (590391) large enough to fit Pt's penis and fistula openings. Apply a strip of Josephine ring around the area and apply pouch. Place pouch to straight drainage to assist with pouch integrity.      Pressure Injury Prevention (PIP) Plan:  If patient is declining pressure injury prevention interventions: Explore reason why and address patient's concerns, Educate on pressure injury risk and prevention intervention(s), If patient is still declining, document \"informed refusal\" , and Ensure Care team is aware ( provider, charge nurse, etc)  Mattress: Follow bed algorithm, add Low Air Loss (Air+) mattress pump if skin is very moist or constantly moist.   HOB: Maintain at or below 30 degrees, unless contraindicated  Repositioning in bed: Every 1-2 hours , Left/right positioning; avoid supine, Raise foot of bed prior to raising head of bed, to reduce patient sliding down (shear), and Frequent microturns using positioning wedges, as patient tolerates  Heels: Pillows under calves and Heel lift boots  Protective Dressing: Sacral Mepilex for prevention (#810985),  especially for the agitated patient   Positioning Equipment:Positioning " wedges (#[id number]) to help maintain 30 degree side lying position   Chair positioning: Chair cushion (#[id number]) , Assist patient to reposition hourly, and Do NOT use a donut for sitting (this increases pressure to smaller area and creates a higher potential for injury)    If patient has a buttock pressure injury, or high risk for PI use chair cushion or SPS.  Moisture Management: Perineal cleansing /protection: Follow Incontinence Protocol, Avoid brief in bed, Clean and dry skin folds with bathing , Consider InterDry (#[id number]) between folds, and Moisturize dry skin  Under Devices: Inspect skin under all medical devices during skin inspection , Ensure tubes are stabilized without tension, and Ensure patient is not lying on medical devices or equipment when repositioned  Ask provider to discontinue device when no longer needed.     Orders: Reviewed and Updated    RECOMMEND PRIMARY TEAM ORDER: None, at this time  Education provided: plan of care  Discussed plan of care with: Nurse  Notify WOC if wound(s) deteriorate.  Nursing to notify the Provider(s) and re-consult the WOC Nurse if new skin concern.    DATA:     Current support surface: Standard  Low air loss (ISABELL pump, Isolibrium, Pulsate)  Containment of urine/stool: Incontinent pad in bed, Indwelling catheter, and Internal fecal management  BMI: Body mass index is 24.71 kg/m .   Active diet order: None     Output: I/O last 3 completed shifts:  In: 3490.83 [I.V.:1489.43; Other:1.4; IV Piggyback:500]  Out: 2076 [Urine:105; Drains:1390; Other:581]     Labs:   Recent Labs   Lab 05/28/25  0318 05/27/25  1500 05/27/25  0657   ALBUMIN 2.0*   < >  --    HGB 7.4*   < >  --    INR  --   --  1.28*   WBC 21.8*   < >  --     < > = values in this interval not displayed.     Pressure injury risk assessment:   Sensory Perception: 2-->very limited  Moisture: 3-->occasionally moist  Activity: 1-->bedfast  Mobility: 2-->very limited  Nutrition: 3-->adequate  Friction and Shear:  1-->problem  Dick Score: 12      Pager no longer in use, please contact through [employer] group: [facility] Nurse [place]    Dept. Office Number: [phone]

## 2025-05-28 NOTE — PROGRESS NOTES
"CRRT STATUS NOTE    DATA:  Time:  5:28 AM  Pressures WNL:  YES  Filter Status:  WDL    Problems Reported/Alarms Noted:  Filter clotted and circuit replaced.    Supplies Present:  YES    ASSESSMENT:  Patient Net Fluid Balance:  5/27 +2157cc and +272cc since midnight  Vital Signs:  /68   Pulse 94   Temp 98.2  F (36.8  C) (Axillary)   Resp 13   Ht 1.727 m (5' 7.99\")   Wt 73.7 kg (162 lb 7.7 oz)   SpO2 97%   BMI 24.71 kg/m      Labs:  K 3.6, Mag 2.3, Phos 3.3, iCa 4.7, Hgb 7.4, Plt 328  Goals of Therapy:  No UF (may adjust after CT to eval for bleeding)     INTERVENTIONS:   Circuit replaced after filter clotted. Pt tolerated well.    PLAN:  Continue with plan of care. Call CRRT resource RN with questions or concerns.     "

## 2025-05-28 NOTE — PROGRESS NOTES
Surgery Progress Note  05/28/2025       Subjective:  Patient watching television this am. Denies worsening abdominal pain, or nausea/vomiting. Wanted to know of game plan,     Objective:  Temp:  [97.2  F (36.2  C)-99  F (37.2  C)] 98.1  F (36.7  C)  Pulse:  [] 84  Resp:  [11-35] 13  BP: (101-124)/(53-92) 124/70  MAP:  [54 mmHg-97 mmHg] 77 mmHg  Arterial Line BP: ()/(37-77) 113/64  FiO2 (%):  [30 %-40 %] 30 %  SpO2:  [91 %-100 %] 97 %    I/O last 3 completed shifts:  In: 3490.83 [I.V.:1489.43; Other:1.4; IV Piggyback:500]  Out: 2076 [Urine:105; Drains:1390; Other:581]      Gen: Resting comfortably in bed  Neuro: alert and oriented to conversation, writing via pen/paper or typing on phone    Resp: Ventilated via trach  Abd: Abdomen is taut but compressible, drains with minimal dark bloody output, Malecot with minimal succus, significant dark bloody output around midline incision. Abdominal wound covered with wound manager  : Scrotum with pouch in place with serous drainage     Labs:  Recent Labs   Lab 05/28/25 0318 05/27/25  1500 05/27/25  0401   WBC 21.8* 31.2* 32.1*  32.1*   HGB 7.4* 8.1* 7.1*  7.1*    459* 568*  568*       Recent Labs   Lab 05/28/25  0759 05/28/25  0638 05/28/25  0511 05/28/25  0318 05/27/25  1606 05/27/25  1500 05/27/25  0748 05/27/25  0401   NA  --   --   --  139  --  138  --  138   POTASSIUM  --   --   --  3.6  --  3.9  --  3.9   CHLORIDE  --   --   --  106  --  104  --  104   CO2  --   --   --  21*  --  17*  --  18*   BUN  --   --   --  40.8*  --  45.3*  --  47.8*   CR  --   --   --  0.53*  --  0.58*  --  0.58*   * 132* 133* 128*   < > 220*   < > 182*   KARINA  --   --   --  8.3*  --  8.2*  --  8.4*   MAG  --   --   --  2.3  --  2.2  --  2.2   PHOS  --   --   --  3.3  --  3.9  --  3.4    < > = values in this interval not displayed.     Assessment/Plan:   Sebastián Rodas is a 29-year-old male with a history of alcohol use disorder, anxiety, and bipolar disorder who  "was admitted on 3/30/2025 for alcohol withdrawal following a recent binge, presenting with diffuse abdominal pain. Initial workup revealed leukocytosis and elevated lipase concerning for acute pancreatitis. He developed acute encephalopathy and was transferred to the ICU on 3/31, requiring intubation and vasopressors by 4/1 due to shock. Hospital course has been complicated by acute renal failure requiring CRRT, cardiomyopathy (initial LVEF 20-25%, improved to 65-70% by 4/14), and necrotizing pancreatitis with unorganized fluid collections. He completed a 14-day course of meropenem but remains intermittently febrile and critically ill, requiring ongoing dialysis and vasopressor support.     On 4/27, after clinical deterioration and imaging consistent with a likely perforated viscus, following family discussion, he underwent emergent exploratory laparotomy, necrosectomy, transverse colectomy, abdominal washout, and drain placement. Patient was transferred to [facility] on 4/30/25 for further surgical management. Went to OR 5/1 for re-open laparotomy, I&D, placement of colostomy tube in presumed previous colectomy staple line, necrosectomy, and Abthera placement.     On 5/2, increasing sanguineous output from drains concerning for bleeding from pancreatic bed. Family care conference held 5/2, plan for restorative cares at that time. Take back to OR twice (5/4 and 5/7) for abdominal washout. Per nursing note:     5/9 morning after he saw a picture of his abdomen that he \"desires to speak to team about updating goals of care, expressed desire to pull back on cares\". Goals of care conference on 5/10 with continuation of full code and restorative cares. CTA on 5/10 revealed large area of active arterial extravasation in the upper abdomen. MTP was started. Patient is now s/p embolization of small pancreatic branch off of the distal celiac artery with IR on 5/10. Malecot drainage slowed and eventually stopped, with significant " drainage through other drains and open into dressings.     5/17 night patient had active extravasation, with Hgb dropping from 7.8 to 5.6 in 1.5 hours. Lactate spiked from 3.6 to 10.6, with WBC 14.9 to 25.5. Family was consulted, MTP was started, and IR embolized the celiac trunk/splenic artery branches.     5/27 overnight, patient had HR sustained in 140-150s with MAP in 40-50s. BP remained low despite boluses; levophed GTT restarted. Large amount of blood leaking from abdominal dressing requiring 1u PRBC.  Lactate 5.7 and Hgb 7.4. PEG tube came out during dressing change overnight. Increasing drainage also noted from superior aspect of midline. Bowel noted to be exposed d/t mesh detaching from fascia.     - No further surgical options for any intraabdominal pathology  - Nursing to change dressings in the am, pm, and PRN during the day if copious drainage during the day. Please place Xeroform over the bridging mesh (please continue cares). Please do not apply xeroform outside the wound bed/over the skin edges but make sure all of the wound bed is covered.  - Tube feeds on hold currently Receiving TPN (50 mL/hr), and Lipids & Albumin as needed.  - On Zosyn per SICU  - SQH PPX, would not recommend therapeutic dose given the risk of bleeding.   - Appreciate WOC cares, okay with wound manager if able.   - Other cares per SICU, we appreciate cares      Seen, examined, and discussed with chief resident, who will discuss with staff.     Uday Morrissey MD  General Surgery PGY-1   [facility]

## 2025-05-28 NOTE — PROGRESS NOTES
Nephrology Progress Note  05/28/2025       Sebastián Rodas is a 29 yom with Bipolar disorder, ETOH use c/b necrotizing pancreatitis admitted 3/30/25 to [facility] for ETOH withdrawal and abdominal pain, found to have acute pancreatitis which has progressed to necrotizing pancreatitis complicated by SARAHI.  Seen by Nephrology at [place], started on CRRT 4/1.  Had periods of stability enough for iHD but back to CRRT on 4/21 and has been on since with continued bleeding.       Interval History :   Mr Rodas continues on CRRT, was volume expanded yesterday and was ultimately +2L, weaned off of pressors, lactate normalizing. Labs stable on 4k baths, changed to I=O as able today but will hold on pulling as he improved with IVF yesterday.  CT did not show new bleeding, continuing supportive cares.      Assessment & Recommendations:   SARAHI-Baseline Cr 0.8 as recently as March 2025 before acute events, was started on CRRT emergently on 4/1 in setting of septic shock. Had ~2 weeks of stability enough to manage with iHD but back on CRRT 4/21 until tx to [facility] on 4/30. Running fevers with intraabdominal sepsis.  Continuing RRT from OSH, restarted CRRT on 5/2 after OR.  Hemodynamically remains tenuous due to intraabdominal bleeding and necrosis.                 -No need for new consent, continuing RRT started last month at [facility].                 -Access is tunneled RIJ from 4/21.                  -CRRT, standard dose.  All 4k baths, I=O today.      Volume-Wt down to within ~3kg of admit wt but has lost muscle mass being bedbound.  Was set to 0 for UF yesterday due to suspicion of bleeding, was on 2 pressors but weaned off the past 24h.  Did get 1L of LR.  Planning I=O today.      Electrolytes-K 3.6 on all 4k baths, bicarb 21, stable on standard dose CRRT. .      BMD-No acute issues.      Anemia-Hgb 7.4, ongoing need for PRBC's.      Nutrition-On hold with bleeding, back on TPN.      Time spent: 50 minutes on this date of  "encounter for chart review, physical exam, medical decision making and co-ordination of care.      Seen and discussed with Dr Harris     Recommendations were communicated to primary team via verbal communication.      ASIF Roger CNS  Clinical Nurse Specialist  [phone]    Review of Systems:   I reviewed the following systems:  Gen: No fevers or chills  CV: No CP at rest  Resp: No SOB at rest  GI: No N/V    Physical Exam:   I/O last 3 completed shifts:  In: 3490.83 [I.V.:1489.43; Other:1.4; IV Piggyback:500]  Out: 2076 [Urine:105; Drains:1390; Other:581]   /77   Pulse 98   Temp 98.1  F (36.7  C) (Axillary)   Resp 16   Ht 1.727 m (5' 7.99\")   Wt 73.7 kg (162 lb 7.7 oz)   SpO2 94%   BMI 24.71 kg/m       GENERAL APPEARANCE: Vent via trach, CRRT running.   Pulmonary: Vent via trach  CV: Regular rhythm, normal rate   - Edema +1 LE, improved.   GI: Surgical dressing in place  MS: no evidence of inflammation in joints, no muscle tenderness  : No Garcia  SKIN: Abdominal dressing in place, multiple drains.   NEURO: Vent via trach, communicating non-verbally.     Labs:   All labs reviewed by me  Electrolytes/Renal -   Recent Labs   Lab Test 05/28/25  0759 05/28/25  0638 05/28/25  0511 05/28/25  0318 05/27/25  1606 05/27/25  1500 05/27/25  0748 05/27/25  0401   NA  --   --   --  139  --  138  --  138   POTASSIUM  --   --   --  3.6  --  3.9  --  3.9   CHLORIDE  --   --   --  106  --  104  --  104   CO2  --   --   --  21*  --  17*  --  18*   BUN  --   --   --  40.8*  --  45.3*  --  47.8*   CR  --   --   --  0.53*  --  0.58*  --  0.58*   * 132* 133* 128*   < > 220*   < > 182*   KARINA  --   --   --  8.3*  --  8.2*  --  8.4*   MAG  --   --   --  2.3  --  2.2  --  2.2   PHOS  --   --   --  3.3  --  3.9  --  3.4    < > = values in this interval not displayed.       CBC -   Recent Labs   Lab Test 05/28/25  0318 05/27/25  1500 05/27/25  0401   WBC 21.8* 31.2* 32.1*  32.1*   HGB 7.4* 8.1* 7.1*  7.1* "    459* 568*  568*       LFTs -   Recent Labs   Lab Test 05/28/25  0318 05/27/25  1500 05/27/25  0401 05/26/25  1624 05/26/25  0321   ALKPHOS 431*  --  658*  --  1,042*   BILITOTAL 0.9  --  1.5*  --  1.1   ALT 33  --  43  --  57   AST 26  --  34  --  30   PROTTOTAL 5.0*  --  5.4*  --  6.1*   ALBUMIN 2.0* 2.2* 2.3*   < > 2.5*    < > = values in this interval not displayed.       Iron Panel - No lab results found.        Current Medications:  Current Facility-Administered Medications   Medication Dose Route Frequency Provider Last Rate Last Admin    heparin ANTICOAGULANT injection 5,000 Units  5,000 Units Subcutaneous Q8H [facility] Sonia Moore NP   5,000 Units at 05/28/25 0550    hydrocortisone sodium succinate PF (solu-CORTEF) injection 50 mg  50 mg Intravenous Q8H Renee Kwon CNP        insulin glargine (LANTUS PEN) injection 20 Units  20 Units Subcutaneous Daily Sonia Moore NP   20 Units at 05/27/25 1054    Lidocaine (LIDOCARE) 4 % Patch 1-3 patch  1-3 patch Transdermal Q24H Durga Euceda MD        [Held by provider] lipids 4 oil (SMOFLIPID) 20 % infusion 250 mL  250 mL Intravenous Q24H Brendon Haas,    Stopped at 05/25/25 0815    meropenem (MERREM) 1 g vial to attach to  mL bag  1 g Intravenous Q8H Sonia Moore NP   1 g at 05/28/25 0750    micafungin (MYCAMINE) 100 mg in sodium chloride 0.9 % 100 mL intermittent infusion  100 mg Intravenous Q24H Sonia Moore  mL/hr at 05/27/25 1014 100 mg at 05/27/25 1014    OLANZapine (zyPREXA) IV injection 2.5 mg  2.5 mg Intravenous QPM Sonia Moore NP   2.5 mg at 05/27/25 2017    pantoprazole (PROTONIX) IV push injection 40 mg  40 mg Intravenous QAM Renee Zamora, CNP   40 mg at 05/28/25 0750    sodium chloride (PF) 0.9% PF flush 3 mL  3 mL Intracatheter Q8H Ganesh Griffiths MD   3 mL at 05/27/25 2143    vancomycin (VANCOCIN) 1,500 mg in 0.9% NaCl 250 mL  intermittent infusion  1,500 mg Intravenous Q24H Brendon Haas DO   1,500 mg at 05/28/25 0828     Current Facility-Administered Medications   Medication Dose Route Frequency Provider Last Rate Last Admin    dexmedeTOMIDine (PRECEDEX) 4 mcg/mL in sodium chloride 0.9 % 100 mL infusion  0.1-1.2 mcg/kg/hr (Dosing Weight) Intravenous Continuous Sonia Moore NP 20.9 mL/hr at 05/28/25 0900 1.2 mcg/kg/hr at 05/28/25 0900    dextrose 10% infusion   Intravenous Continuous PRN Sonia Moore NP        dextrose 10% infusion   Intravenous Continuous PRN Brendon Haas DO   Stopped at 05/22/25 1232    dialysate for CVVHD & CVVHDF (Phoxillum BK4/2.5)  12.5 mL/kg/hr CRRT Continuous Raheem Gabriel APRN  mL/hr at 05/28/25 0748 12.5 mL/kg/hr at 05/28/25 0748    fentaNYL (SUBLIMAZE) infusion   mcg/hr Intravenous Continuous Roxi Alston MD 2.5 mL/hr at 05/28/25 0900 125 mcg/hr at 05/28/25 0900    insulin regular (MYXREDLIN) 1 unit/mL infusion  0-24 Units/hr Intravenous Continuous Sonia Moore NP 2 mL/hr at 05/28/25 0900 2 Units/hr at 05/28/25 0900    No heparin required   Does not apply Continuous PRN Tico Spain MD        norepinephrine (LEVOPHED) 16 mg in  mL infusion MAX CONC CENTRAL LINE  0.01-0.6 mcg/kg/min (Dosing Weight) Intravenous Continuous Celso Ambrose MD   Stopped at 05/28/25 0045    parenteral nutrition - ADULT compounded formula   CENTRAL LINE IV TPN CONTINUOUS Brendon Haas DO 50 mL/hr at 05/28/25 0900 Rate Verify at 05/28/25 0900    POST-filter replacement solution for CVVHD & CVVHDF (Phoxillum BK4/2.5)   CRRT Continuous Tico Spain  mL/hr at 05/26/25 1338 New Bag at 05/26/25 1338    PRE-filter replacement solution for CVVHD & CVVHDF (Phoxillum BK4/2.5)  12.5 mL/kg/hr CRRT Continuous Raheem Gabriel, ASIF  mL/hr at 05/28/25 0748 12.5 mL/kg/hr at 05/28/25 0748    Reason statin not prescribed    Does not apply DOES NOT GO TO Rhys Bloom MD        vasopressin 1 unit/mL MAX Conc (PITRESSIN) infusion  2.4 Units/hr Intravenous Continuous Renee Kwon CNP   Stopped at 05/27/25 5840

## 2025-05-28 NOTE — PLAN OF CARE
End of Shift Summary (see flowsheet for detailed vital signs and assessments)    Major Shift Events:  - PS 5/5 today from 7612-1335  - Trach dome 30%/35L at 1430-now  - Art line removed d/t malfunctioning  - Speech consulted for tomorrow in regards to speaking valve    Neuro: Patient A+Ox4 and able to verbalize needs to team. Patient can mouth words and write on paper. Endorses intermittent anxiety and pain especially during repositionings. On precedex and fentanyl gtt. Pupils E+R. Denies other neuro deficits.  CV: SR 80-100s with stable BP.  Resp: Trach dome 30%/35L. Will go back on PS overnight.  GI: LBM 5/22. 3 SHANNON drains. 1 of the SHANNON drain has an ostomy drain over it for fluid collection.   : Anuric. On CRRT. Open drain to scrotum for abscess with purulent/yellow discharge.   Skin: See flowsheet  Lines: DL IJ for CRRT/ TL PICC/ 3 PIV  Infusions: Precedex 0.8/ Fentanyl 100/ Insulin 1 U (Alg 2)/ TPN at 50  CRRT: I=O and currently Net -200. 4K baths.     Plan: Care of plan continues. Notify team of changes/concerns.  -Per team, PS overnight.  -GI conference tomorrow for patient   -Speech therapy to assess with speaking valve tomorrow     Care of Plan Reviewed With: Patient and mom    Overall Patient Progress: Progressing

## 2025-05-29 VITALS
TEMPERATURE: 98.3 F | HEIGHT: 68 IN | RESPIRATION RATE: 28 BRPM | OXYGEN SATURATION: 99 % | WEIGHT: 159.83 LBS | BODY MASS INDEX: 24.22 KG/M2 | HEART RATE: 153 BPM | DIASTOLIC BLOOD PRESSURE: 83 MMHG | SYSTOLIC BLOOD PRESSURE: 101 MMHG

## 2025-05-29 LAB
ALBUMIN SERPL BCG-MCNC: 2.1 G/DL (ref 3.5–5.2)
ALBUMIN SERPL BCG-MCNC: 2.2 G/DL (ref 3.5–5.2)
ALP SERPL-CCNC: 584 U/L (ref 40–150)
ALT SERPL W P-5'-P-CCNC: 37 U/L (ref 0–70)
ANION GAP SERPL CALCULATED.3IONS-SCNC: 11 MMOL/L (ref 7–15)
ANION GAP SERPL CALCULATED.3IONS-SCNC: 12 MMOL/L (ref 7–15)
AST SERPL W P-5'-P-CCNC: 30 U/L (ref 0–45)
BACTERIA SPEC CULT: NORMAL
BACTERIA SPEC CULT: NORMAL
BACTERIA SPT CULT: ABNORMAL
BILIRUB SERPL-MCNC: 0.8 MG/DL
BILIRUBIN DIRECT (ROCHE PRO & PURE): 0.62 MG/DL (ref 0–0.45)
BUN SERPL-MCNC: 36.7 MG/DL (ref 6–20)
BUN SERPL-MCNC: 38.8 MG/DL (ref 6–20)
CA-I BLD-MCNC: 4.7 MG/DL (ref 4.4–5.2)
CA-I BLD-MCNC: 4.7 MG/DL (ref 4.4–5.2)
CALCIUM SERPL-MCNC: 8.1 MG/DL (ref 8.8–10.4)
CALCIUM SERPL-MCNC: 8.1 MG/DL (ref 8.8–10.4)
CHLORIDE SERPL-SCNC: 106 MMOL/L (ref 98–107)
CHLORIDE SERPL-SCNC: 106 MMOL/L (ref 98–107)
CREAT SERPL-MCNC: 0.44 MG/DL (ref 0.67–1.17)
CREAT SERPL-MCNC: 0.44 MG/DL (ref 0.67–1.17)
EGFRCR SERPLBLD CKD-EPI 2021: >90 ML/MIN/1.73M2
EGFRCR SERPLBLD CKD-EPI 2021: >90 ML/MIN/1.73M2
ERYTHROCYTE [DISTWIDTH] IN BLOOD BY AUTOMATED COUNT: 16.9 % (ref 10–15)
ERYTHROCYTE [DISTWIDTH] IN BLOOD BY AUTOMATED COUNT: 17.2 % (ref 10–15)
GLUCOSE BLDC GLUCOMTR-MCNC: 103 MG/DL (ref 70–99)
GLUCOSE BLDC GLUCOMTR-MCNC: 113 MG/DL (ref 70–99)
GLUCOSE BLDC GLUCOMTR-MCNC: 116 MG/DL (ref 70–99)
GLUCOSE BLDC GLUCOMTR-MCNC: 116 MG/DL (ref 70–99)
GLUCOSE BLDC GLUCOMTR-MCNC: 118 MG/DL (ref 70–99)
GLUCOSE BLDC GLUCOMTR-MCNC: 123 MG/DL (ref 70–99)
GLUCOSE BLDC GLUCOMTR-MCNC: 128 MG/DL (ref 70–99)
GLUCOSE BLDC GLUCOMTR-MCNC: 135 MG/DL (ref 70–99)
GLUCOSE BLDC GLUCOMTR-MCNC: 139 MG/DL (ref 70–99)
GLUCOSE BLDC GLUCOMTR-MCNC: 140 MG/DL (ref 70–99)
GLUCOSE BLDC GLUCOMTR-MCNC: 146 MG/DL (ref 70–99)
GLUCOSE BLDC GLUCOMTR-MCNC: 147 MG/DL (ref 70–99)
GLUCOSE BLDC GLUCOMTR-MCNC: 90 MG/DL (ref 70–99)
GLUCOSE BLDC GLUCOMTR-MCNC: 96 MG/DL (ref 70–99)
GLUCOSE BLDC GLUCOMTR-MCNC: 97 MG/DL (ref 70–99)
GLUCOSE SERPL-MCNC: 102 MG/DL (ref 70–99)
GLUCOSE SERPL-MCNC: 121 MG/DL (ref 70–99)
GRAM STAIN RESULT: ABNORMAL
GRAM STAIN RESULT: ABNORMAL
HCO3 SERPL-SCNC: 21 MMOL/L (ref 22–29)
HCO3 SERPL-SCNC: 21 MMOL/L (ref 22–29)
HCT VFR BLD AUTO: 24.1 % (ref 40–53)
HCT VFR BLD AUTO: 24.7 % (ref 40–53)
HGB BLD-MCNC: 7.5 G/DL (ref 13.3–17.7)
HGB BLD-MCNC: 7.7 G/DL (ref 13.3–17.7)
LACTATE SERPL-SCNC: 3.5 MMOL/L (ref 0.7–2)
MAGNESIUM SERPL-MCNC: 2 MG/DL (ref 1.7–2.3)
MAGNESIUM SERPL-MCNC: 2.1 MG/DL (ref 1.7–2.3)
MCH RBC QN AUTO: 29.9 PG (ref 26.5–33)
MCH RBC QN AUTO: 30.3 PG (ref 26.5–33)
MCHC RBC AUTO-ENTMCNC: 31.1 G/DL (ref 31.5–36.5)
MCHC RBC AUTO-ENTMCNC: 31.2 G/DL (ref 31.5–36.5)
MCV RBC AUTO: 96 FL (ref 78–100)
MCV RBC AUTO: 97 FL (ref 78–100)
PHOSPHATE SERPL-MCNC: 3.2 MG/DL (ref 2.5–4.5)
PHOSPHATE SERPL-MCNC: 3.3 MG/DL (ref 2.5–4.5)
PLATELET # BLD AUTO: 307 10E3/UL (ref 150–450)
PLATELET # BLD AUTO: 312 10E3/UL (ref 150–450)
POTASSIUM SERPL-SCNC: 3.6 MMOL/L (ref 3.4–5.3)
POTASSIUM SERPL-SCNC: 3.7 MMOL/L (ref 3.4–5.3)
PROT SERPL-MCNC: 5.4 G/DL (ref 6.4–8.3)
RBC # BLD AUTO: 2.51 10E6/UL (ref 4.4–5.9)
RBC # BLD AUTO: 2.54 10E6/UL (ref 4.4–5.9)
SODIUM SERPL-SCNC: 138 MMOL/L (ref 135–145)
SODIUM SERPL-SCNC: 139 MMOL/L (ref 135–145)
WBC # BLD AUTO: 20.2 10E3/UL (ref 4–11)
WBC # BLD AUTO: 22.4 10E3/UL (ref 4–11)

## 2025-05-29 PROCEDURE — 258N000003 HC RX IP 258 OP 636: Performed by: NURSE PRACTITIONER

## 2025-05-29 PROCEDURE — 84132 ASSAY OF SERUM POTASSIUM: CPT | Performed by: CLINICAL NURSE SPECIALIST

## 2025-05-29 PROCEDURE — 250N000009 HC RX 250: Performed by: NURSE PRACTITIONER

## 2025-05-29 PROCEDURE — 83735 ASSAY OF MAGNESIUM: CPT | Performed by: CLINICAL NURSE SPECIALIST

## 2025-05-29 PROCEDURE — 250N000009 HC RX 250: Performed by: SURGERY

## 2025-05-29 PROCEDURE — 94003 VENT MGMT INPAT SUBQ DAY: CPT

## 2025-05-29 PROCEDURE — 94681 O2 UPTK CO2 OUTP % O2 XTRC: CPT

## 2025-05-29 PROCEDURE — B4185 PARENTERAL SOL 10 GM LIPIDS: HCPCS | Performed by: SURGERY

## 2025-05-29 PROCEDURE — 250N000011 HC RX IP 250 OP 636: Mod: JW

## 2025-05-29 PROCEDURE — 90947 DIALYSIS REPEATED EVAL: CPT

## 2025-05-29 PROCEDURE — 250N000011 HC RX IP 250 OP 636: Performed by: STUDENT IN AN ORGANIZED HEALTH CARE EDUCATION/TRAINING PROGRAM

## 2025-05-29 PROCEDURE — 85018 HEMOGLOBIN: CPT | Performed by: CLINICAL NURSE SPECIALIST

## 2025-05-29 PROCEDURE — 82330 ASSAY OF CALCIUM: CPT | Performed by: CLINICAL NURSE SPECIALIST

## 2025-05-29 PROCEDURE — 250N000012 HC RX MED GY IP 250 OP 636 PS 637: Performed by: NURSE PRACTITIONER

## 2025-05-29 PROCEDURE — 85041 AUTOMATED RBC COUNT: CPT | Performed by: CLINICAL NURSE SPECIALIST

## 2025-05-29 PROCEDURE — 80076 HEPATIC FUNCTION PANEL: CPT | Performed by: STUDENT IN AN ORGANIZED HEALTH CARE EDUCATION/TRAINING PROGRAM

## 2025-05-29 PROCEDURE — 84450 TRANSFERASE (AST) (SGOT): CPT | Performed by: STUDENT IN AN ORGANIZED HEALTH CARE EDUCATION/TRAINING PROGRAM

## 2025-05-29 PROCEDURE — 999N000157 HC STATISTIC RCP TIME EA 10 MIN

## 2025-05-29 PROCEDURE — 80053 COMPREHEN METABOLIC PANEL: CPT | Performed by: STUDENT IN AN ORGANIZED HEALTH CARE EDUCATION/TRAINING PROGRAM

## 2025-05-29 PROCEDURE — 80051 ELECTROLYTE PANEL: CPT | Performed by: CLINICAL NURSE SPECIALIST

## 2025-05-29 PROCEDURE — 200N000002 HC R&B ICU UMMC

## 2025-05-29 PROCEDURE — 84460 ALANINE AMINO (ALT) (SGPT): CPT | Performed by: STUDENT IN AN ORGANIZED HEALTH CARE EDUCATION/TRAINING PROGRAM

## 2025-05-29 PROCEDURE — 250N000011 HC RX IP 250 OP 636

## 2025-05-29 PROCEDURE — 85027 COMPLETE CBC AUTOMATED: CPT | Performed by: CLINICAL NURSE SPECIALIST

## 2025-05-29 PROCEDURE — 84075 ASSAY ALKALINE PHOSPHATASE: CPT | Performed by: STUDENT IN AN ORGANIZED HEALTH CARE EDUCATION/TRAINING PROGRAM

## 2025-05-29 PROCEDURE — 99233 SBSQ HOSP IP/OBS HIGH 50: CPT | Mod: 24 | Performed by: CLINICAL NURSE SPECIALIST

## 2025-05-29 PROCEDURE — 250N000011 HC RX IP 250 OP 636: Performed by: NURSE PRACTITIONER

## 2025-05-29 PROCEDURE — 82040 ASSAY OF SERUM ALBUMIN: CPT | Performed by: CLINICAL NURSE SPECIALIST

## 2025-05-29 PROCEDURE — 83605 ASSAY OF LACTIC ACID: CPT

## 2025-05-29 PROCEDURE — 99233 SBSQ HOSP IP/OBS HIGH 50: CPT | Mod: 24 | Performed by: SURGERY

## 2025-05-29 PROCEDURE — 92507 TX SP LANG VOICE COMM INDIV: CPT | Mod: GN

## 2025-05-29 PROCEDURE — 250N000009 HC RX 250: Performed by: CLINICAL NURSE SPECIALIST

## 2025-05-29 PROCEDURE — 250N000011 HC RX IP 250 OP 636: Performed by: SURGERY

## 2025-05-29 PROCEDURE — 82565 ASSAY OF CREATININE: CPT | Performed by: CLINICAL NURSE SPECIALIST

## 2025-05-29 PROCEDURE — 84100 ASSAY OF PHOSPHORUS: CPT | Performed by: CLINICAL NURSE SPECIALIST

## 2025-05-29 PROCEDURE — 250N000011 HC RX IP 250 OP 636: Mod: JW | Performed by: NURSE PRACTITIONER

## 2025-05-29 PROCEDURE — 250N000009 HC RX 250: Performed by: STUDENT IN AN ORGANIZED HEALTH CARE EDUCATION/TRAINING PROGRAM

## 2025-05-29 RX ORDER — LORAZEPAM 2 MG/ML
0.5 INJECTION INTRAMUSCULAR 3 TIMES DAILY
Status: DISCONTINUED | OUTPATIENT
Start: 2025-05-29 | End: 2025-05-30

## 2025-05-29 RX ORDER — HYDROCORTISONE SODIUM SUCCINATE 100 MG/2ML
50 INJECTION INTRAMUSCULAR; INTRAVENOUS
Status: DISCONTINUED | OUTPATIENT
Start: 2025-05-29 | End: 2025-05-30

## 2025-05-29 RX ADMIN — DEXMEDETOMIDINE HYDROCHLORIDE 0.8 MCG/KG/HR: 400 INJECTION INTRAVENOUS at 04:23

## 2025-05-29 RX ADMIN — HYDROCORTISONE SODIUM SUCCINATE 50 MG: 100 INJECTION, POWDER, FOR SOLUTION INTRAMUSCULAR; INTRAVENOUS at 17:05

## 2025-05-29 RX ADMIN — MAGNESIUM SULFATE HEPTAHYDRATE: 500 INJECTION, SOLUTION INTRAMUSCULAR; INTRAVENOUS at 20:47

## 2025-05-29 RX ADMIN — LORAZEPAM 0.5 MG: 2 INJECTION INTRAMUSCULAR; INTRAVENOUS at 15:34

## 2025-05-29 RX ADMIN — CALCIUM CHLORIDE, MAGNESIUM CHLORIDE, SODIUM CHLORIDE, SODIUM BICARBONATE, POTASSIUM CHLORIDE AND SODIUM PHOSPHATE DIBASIC DIHYDRATE 12.5 ML/KG/HR: 3.68; 3.05; 6.34; 3.09; .314; .187 INJECTION INTRAVENOUS at 06:16

## 2025-05-29 RX ADMIN — INSULIN GLARGINE 20 UNITS: 100 INJECTION, SOLUTION SUBCUTANEOUS at 10:22

## 2025-05-29 RX ADMIN — Medication 1500 MG: at 08:21

## 2025-05-29 RX ADMIN — HEPARIN SODIUM 5000 UNITS: 5000 INJECTION, SOLUTION INTRAVENOUS; SUBCUTANEOUS at 22:15

## 2025-05-29 RX ADMIN — Medication 25 MCG: at 05:01

## 2025-05-29 RX ADMIN — DEXMEDETOMIDINE HYDROCHLORIDE 0.8 MCG/KG/HR: 400 INJECTION INTRAVENOUS at 18:01

## 2025-05-29 RX ADMIN — MICAFUNGIN SODIUM 100 MG: 100 INJECTION, POWDER, LYOPHILIZED, FOR SOLUTION INTRAVENOUS at 10:08

## 2025-05-29 RX ADMIN — INSULIN HUMAN 2 UNITS/HR: 1 INJECTION, SOLUTION INTRAVENOUS at 07:44

## 2025-05-29 RX ADMIN — CALCIUM CHLORIDE, MAGNESIUM CHLORIDE, SODIUM CHLORIDE, SODIUM BICARBONATE, POTASSIUM CHLORIDE AND SODIUM PHOSPHATE DIBASIC DIHYDRATE 12.5 ML/KG/HR: 3.68; 3.05; 6.34; 3.09; .314; .187 INJECTION INTRAVENOUS at 01:11

## 2025-05-29 RX ADMIN — CALCIUM CHLORIDE, MAGNESIUM CHLORIDE, SODIUM CHLORIDE, SODIUM BICARBONATE, POTASSIUM CHLORIDE AND SODIUM PHOSPHATE DIBASIC DIHYDRATE 12.5 ML/KG/HR: 3.68; 3.05; 6.34; 3.09; .314; .187 INJECTION INTRAVENOUS at 18:57

## 2025-05-29 RX ADMIN — Medication 25 MCG: at 22:49

## 2025-05-29 RX ADMIN — DEXMEDETOMIDINE HYDROCHLORIDE 0.8 MCG/KG/HR: 400 INJECTION INTRAVENOUS at 10:37

## 2025-05-29 RX ADMIN — CALCIUM CHLORIDE, MAGNESIUM CHLORIDE, SODIUM CHLORIDE, SODIUM BICARBONATE, POTASSIUM CHLORIDE AND SODIUM PHOSPHATE DIBASIC DIHYDRATE: 3.68; 3.05; 6.34; 3.09; .314; .187 INJECTION INTRAVENOUS at 21:03

## 2025-05-29 RX ADMIN — Medication 25 MCG: at 02:32

## 2025-05-29 RX ADMIN — Medication 25 MCG: at 01:38

## 2025-05-29 RX ADMIN — OLANZAPINE 5 MG: 10 INJECTION, POWDER, FOR SOLUTION INTRAMUSCULAR at 04:34

## 2025-05-29 RX ADMIN — PANTOPRAZOLE SODIUM 40 MG: 40 INJECTION, POWDER, FOR SOLUTION INTRAVENOUS at 07:38

## 2025-05-29 RX ADMIN — Medication 100 MCG/HR: at 10:08

## 2025-05-29 RX ADMIN — CALCIUM CHLORIDE, MAGNESIUM CHLORIDE, SODIUM CHLORIDE, SODIUM BICARBONATE, POTASSIUM CHLORIDE AND SODIUM PHOSPHATE DIBASIC DIHYDRATE 12.5 ML/KG/HR: 3.68; 3.05; 6.34; 3.09; .314; .187 INJECTION INTRAVENOUS at 12:22

## 2025-05-29 RX ADMIN — OLANZAPINE 2.5 MG: 10 INJECTION, POWDER, FOR SOLUTION INTRAMUSCULAR at 20:33

## 2025-05-29 RX ADMIN — MEROPENEM 1 G: 1 INJECTION, POWDER, FOR SOLUTION INTRAVENOUS at 00:02

## 2025-05-29 RX ADMIN — Medication 100 MCG/HR: at 10:19

## 2025-05-29 RX ADMIN — SMOFLIPID 250 ML: 6; 6; 5; 3 INJECTION, EMULSION INTRAVENOUS at 20:47

## 2025-05-29 RX ADMIN — CALCIUM CHLORIDE, MAGNESIUM CHLORIDE, SODIUM CHLORIDE, SODIUM BICARBONATE, POTASSIUM CHLORIDE AND SODIUM PHOSPHATE DIBASIC DIHYDRATE 12.5 ML/KG/HR: 3.68; 3.05; 6.34; 3.09; .314; .187 INJECTION INTRAVENOUS at 01:12

## 2025-05-29 RX ADMIN — MEROPENEM 1 G: 1 INJECTION, POWDER, FOR SOLUTION INTRAVENOUS at 15:36

## 2025-05-29 RX ADMIN — HYDROCORTISONE SODIUM SUCCINATE 50 MG: 100 INJECTION, POWDER, FOR SOLUTION INTRAMUSCULAR; INTRAVENOUS at 05:52

## 2025-05-29 RX ADMIN — MEROPENEM 1 G: 1 INJECTION, POWDER, FOR SOLUTION INTRAVENOUS at 07:39

## 2025-05-29 RX ADMIN — LORAZEPAM 0.5 MG: 2 INJECTION INTRAMUSCULAR; INTRAVENOUS at 20:20

## 2025-05-29 RX ADMIN — Medication 25 MCG: at 09:55

## 2025-05-29 RX ADMIN — HEPARIN SODIUM 5000 UNITS: 5000 INJECTION, SOLUTION INTRAVENOUS; SUBCUTANEOUS at 05:53

## 2025-05-29 RX ADMIN — ONDANSETRON 4 MG: 2 INJECTION, SOLUTION INTRAMUSCULAR; INTRAVENOUS at 11:30

## 2025-05-29 RX ADMIN — HEPARIN SODIUM 5000 UNITS: 5000 INJECTION, SOLUTION INTRAVENOUS; SUBCUTANEOUS at 14:21

## 2025-05-29 ASSESSMENT — ACTIVITIES OF DAILY LIVING (ADL)
ADLS_ACUITY_SCORE: 52

## 2025-05-29 NOTE — PROGRESS NOTES
Nephrology Progress Note  05/29/2025       Sebastián Rodas is a 29 yom with Bipolar disorder, ETOH use c/b necrotizing pancreatitis admitted 3/30/25 to [facility] for ETOH withdrawal and abdominal pain, found to have acute pancreatitis which has progressed to necrotizing pancreatitis complicated by SARAHI.  Seen by Nephrology at [place], started on CRRT 4/1.  Had periods of stability enough for iHD but back to CRRT on 4/21 and has been on since with continued bleeding.       Interval History :   Mr Rodas continues on CRRT, ordered for I=O and was 200cc negative yesterday, remains off of pressor and labs are stable on 4k baths.  Hemodynamics stable off of pressors today, still intermittently bleeding but stable the past ~48h.  Surgery recommending comfort measures, continuing to discuss GOC with family.     Assessment & Recommendations:   SARAHI-Baseline Cr 0.8 as recently as March 2025 before acute events, was started on CRRT emergently on 4/1 in setting of septic shock. Had ~2 weeks of stability enough to manage with iHD but back on CRRT 4/21 until tx to [facility] on 4/30. Running fevers with intraabdominal sepsis.  Continuing RRT from OSH, restarted CRRT on 5/2 after OR.  Hemodynamically remains intermittently tenuous due to intraabdominal bleeding and necrosis.                 -No need for new consent, continuing RRT started last month at [facility].                 -Access is tunneled RIJ from 4/21.                  -CRRT, standard dose.  All 4k baths, 0-50cc/h negative today. .      Volume-Wt down to within ~2kg of admit wt but has lost muscle mass being bedbound.  Has been off of pressors for >24h so will try to be gently negative as long as this remains true.  Ordered for 0-50cc/h.      Electrolytes-K 3.7 on all 4k baths, bicarb 21, stable on standard dose CRRT. .      BMD-No acute issues.      Anemia-Hgb 7.5, ongoing need for PRBC's.      Nutrition-On hold with bleeding, back on TPN.      Time spent: 50 minutes  "on this date of encounter for chart review, physical exam, medical decision making and co-ordination of care.      Seen and discussed with Dr Harris     Recommendations were communicated to primary team via verbal communication.      ASIF Roger CNS  Clinical Nurse Specialist  [phone]    Review of Systems:   I reviewed the following systems:  Gen: No fevers or chills  CV: No CP at rest  Resp: No SOB at rest  GI: No N/V    Physical Exam:   I/O last 3 completed shifts:  In: 2477.87 [I.V.:642.04; IV Piggyback:650]  Out: 2977.3 [Drains:1015; Other:1962.3]   /74 (BP Location: Left leg)   Pulse 114   Temp 98.4  F (36.9  C) (Axillary)   Resp 21   Ht 1.727 m (5' 7.99\")   Wt 72.5 kg (159 lb 13.3 oz)   SpO2 94%   BMI 24.31 kg/m       GENERAL APPEARANCE: Vent via trach, CRRT running.   Pulmonary: Vent via trach  CV: Regular rhythm, normal rate   - Edema +2BLE  GI: Surgical dressing in place  MS: no evidence of inflammation in joints, no muscle tenderness  : No Garcia  SKIN: Abdominal dressing in place, multiple drains.   NEURO: Vent via trach, communicating non-verbally.     Labs:   All labs reviewed by me  Electrolytes/Renal -   Recent Labs   Lab Test 05/29/25  0755 05/29/25  0600 05/29/25  0342 05/29/25  0325 05/28/25  1608 05/28/25  1600 05/28/25  0511 05/28/25  0318   NA  --   --   --  138  --  140  --  139   POTASSIUM  --   --   --  3.7  --  3.8  --  3.6   CHLORIDE  --   --   --  106  --  107  --  106   CO2  --   --   --  21*  --  20*  --  21*   BUN  --   --   --  36.7*  --  37.2*  --  40.8*   CR  --   --   --  0.44*  --  0.46*  --  0.53*   * 147* 118* 121*   < > 84   < > 128*   KARINA  --   --   --  8.1*  --  7.8*  --  8.3*   MAG  --   --   --  2.1  --  2.1  --  2.3   PHOS  --   --   --  3.3  --  3.4  --  3.3    < > = values in this interval not displayed.       CBC -   Recent Labs   Lab Test 05/29/25  0325 05/28/25  1600 05/28/25  0318   WBC 22.4* 19.9* 21.8*   HGB 7.5* 7.5* 7.4*   PLT " 307 327 328       LFTs -   Recent Labs   Lab Test 05/29/25  0325 05/28/25  1600 05/28/25  0318 05/27/25  1500 05/27/25  0401   ALKPHOS 584*  --  431*  --  658*   BILITOTAL 0.8  --  0.9  --  1.5*   ALT 37  --  33  --  43   AST 30  --  26  --  34   PROTTOTAL 5.4*  --  5.0*  --  5.4*   ALBUMIN 2.1* 1.9* 2.0*   < > 2.3*    < > = values in this interval not displayed.       Iron Panel - No lab results found.        Current Medications:  Current Facility-Administered Medications   Medication Dose Route Frequency Provider Last Rate Last Admin    heparin ANTICOAGULANT injection 5,000 Units  5,000 Units Subcutaneous Q8H [facility] Sonia Moore NP   5,000 Units at 05/29/25 0553    hydrocortisone sodium succinate PF (solu-CORTEF) injection 50 mg  50 mg Intravenous Q8H Renee Kwon CNP   50 mg at 05/29/25 0552    insulin glargine (LANTUS PEN) injection 20 Units  20 Units Subcutaneous Daily Sonia Moore NP   20 Units at 05/28/25 0959    Lidocaine (LIDOCARE) 4 % Patch 1-3 patch  1-3 patch Transdermal Q24H Durga Euceda MD        lipids 4 oil (SMOFLIPID) 20 % infusion 250 mL  250 mL Intravenous Q24H Brendon Haas DO   Stopped at 05/29/25 0800    meropenem (MERREM) 1 g vial to attach to  mL bag  1 g Intravenous Q8H Sonia Moore NP   1 g at 05/29/25 0739    micafungin (MYCAMINE) 100 mg in sodium chloride 0.9 % 100 mL intermittent infusion  100 mg Intravenous Q24H Sonia Moore  mL/hr at 05/28/25 1046 100 mg at 05/28/25 1046    OLANZapine (zyPREXA) IV injection 2.5 mg  2.5 mg Intravenous QPM Sonia Moore NP   2.5 mg at 05/28/25 2013    pantoprazole (PROTONIX) IV push injection 40 mg  40 mg Intravenous QAM  Renee Kwon, CNP   40 mg at 05/29/25 0738    sodium chloride (PF) 0.9% PF flush 3 mL  3 mL Intracatheter Q8H Ganesh Griffiths MD   3 mL at 05/29/25 0613    vancomycin (VANCOCIN) 1,500 mg in 0.9% NaCl 250 mL intermittent  infusion  1,500 mg Intravenous Q24H Brendon Haas DO   1,500 mg at 05/28/25 0828     Current Facility-Administered Medications   Medication Dose Route Frequency Provider Last Rate Last Admin    dexmedeTOMIDine (PRECEDEX) 4 mcg/mL in sodium chloride 0.9 % 100 mL infusion  0.1-1.2 mcg/kg/hr (Dosing Weight) Intravenous Continuous Sonia Moore NP 13.9 mL/hr at 05/29/25 0800 0.8 mcg/kg/hr at 05/29/25 0800    dextrose 10% infusion   Intravenous Continuous PRN Sonia Moore NP        dextrose 10% infusion   Intravenous Continuous PRN Brendon Haas DO   Stopped at 05/22/25 1232    dialysate for CVVHD & CVVHDF (Phoxillum BK4/2.5)  12.5 mL/kg/hr CRRT Continuous Raheem Gabriel APRN  mL/hr at 05/29/25 0616 12.5 mL/kg/hr at 05/29/25 0616    fentaNYL (SUBLIMAZE) infusion   mcg/hr Intravenous Continuous Roxi Alston MD 2 mL/hr at 05/29/25 0800 100 mcg/hr at 05/29/25 0800    insulin regular (MYXREDLIN) 1 unit/mL infusion  0-24 Units/hr Intravenous Continuous Sonia Moore NP 2 mL/hr at 05/29/25 0800 2 Units/hr at 05/29/25 0800    No heparin required   Does not apply Continuous PRN Tico Spain MD        norepinephrine (LEVOPHED) 16 mg in  mL infusion MAX CONC CENTRAL LINE  0.01-0.6 mcg/kg/min (Dosing Weight) Intravenous Continuous Celso Ambrose MD   Stopped at 05/28/25 0045    parenteral nutrition - ADULT compounded formula   CENTRAL LINE IV TPN CONTINUOUS Brendon Haas DO 50 mL/hr at 05/29/25 0900 Rate Verify at 05/29/25 0900    POST-filter replacement solution for CVVHD & CVVHDF (Phoxillum BK4/2.5)   CRRT Continuous Tico Spain  mL/hr at 05/28/25 1808 200 mL/hr at 05/28/25 1808    PRE-filter replacement solution for CVVHD & CVVHDF (Phoxillum BK4/2.5)  12.5 mL/kg/hr CRRT Continuous Raheem Gabriel, ASIF  mL/hr at 05/29/25 0616 12.5 mL/kg/hr at 05/29/25 0616    Reason statin not prescribed   Does not  apply DOES NOT GO TO Rhys Bloom MD

## 2025-05-29 NOTE — PROGRESS NOTES
CRRT STATUS NOTE    DATA:  Time:  0640 AM  Pressures WNL:  YES  Filter Status:  WDL    Problems Reported/Alarms Noted:  Circuit restarted due to clotting    Supplies Present:  YES    ASSESSMENT:  Patient Net Fluid Balance:  Net -319 ml yesterday, Net -68 cc since midnight  Vital Signs:  Temp 96.9, , 115/72 (84), Sats 96%  Labs:  CBC RESULTS:   Recent Labs   Lab Test 05/29/25  0325   WBC 22.4*   RBC 2.51*   HGB 7.5*   HCT 24.1*   MCV 96   MCH 29.9   MCHC 31.1*   RDW 16.9*         Last Comprehensive Metabolic Panel:  Lab Results   Component Value Date     05/29/2025    POTASSIUM 3.7 05/29/2025    CHLORIDE 106 05/29/2025    CO2 21 (L) 05/29/2025    ANIONGAP 11 05/29/2025     (H) 05/29/2025    BUN 36.7 (H) 05/29/2025    CR 0.44 (L) 05/29/2025    GFRESTIMATED >90 05/29/2025    KARINA 8.1 (L) 05/29/2025     Magnesium 2.1, Phosphorus 3.3, Ionized Calcium 4.7, Lactic Acid 3.5      Goals of Therapy:  Intake + Output    INTERVENTIONS:   Circuit restarted    PLAN:  Continue with current plan of care. Change circuit Q 72 hours and prn. Contact CRRT Resource RN via [employer] with questions or concerns.

## 2025-05-29 NOTE — PLAN OF CARE
End of Shift Summary (see flowsheet for detailed vital signs and assessments)     Major Shift Events:  - PS 30% 5/5 today.  - Attempted trach dome 30%/30L for 15 min and back to PS d/t cuff leak, secretions, and patient endorsing anxiety.  - Attempted short period of speaking valve while on PS with speech therapy and RT at bedside.  - Scheduled ativan for anxiety.     Neuro: Patient A+Ox4 and able to verbalize needs to team. Patient can mouth words and write on paper. Endorses intermittent anxiety and pain especially during repositionings. On precedex and fentanyl gtt. Pupils E+R. Denies other neuro deficits.  CV: SR 80-100s with stable BP.  Resp: PS 30% 5/5. Keep on PS overnight if tolerates.  GI: LBM 5/22. 3 SHANNON drains. 1 of the SHANNON drain has an ostomy drain over it for fluid collection.   : Anuric. On CRRT. Open drain to scrotum for abscess with purulent/yellow discharge.   Skin: See flowsheet  Lines: DL IJ for CRRT/ TL PICC/ 3 PIV  Infusions: Precedex 0.8/ Fentanyl 100/ Insulin gtt 0.5 (Alg 1)/ TPN at 50  CRRT: Goal is net negative 500 and currently Net -503. 4K baths.      Plan: Care of plan continues. Notify team of changes/concerns.  -Trach dome again tomorrow during the day if patient tolerates    Care of Plan Reviewed With: Patient and mom     Overall Patient Progress: Progressing

## 2025-05-29 NOTE — PLAN OF CARE
Goal Outcome Evaluation:      Plan of Care Reviewed With: patient    Overall Patient Progress: no changeOverall Patient Progress: no change       ICU End of Shift Summary. See flowsheets for vital signs and detailed assessment.    Changes this shift: C/o severe abd pain w/ and w/o repositioning, Fent bolus given w/o improvement. MD notified, no change of plan. Continue precedex gtt 0.8, and insulin gtt 2u/hr. Endorses anxiety, and refuses reposition at times, prn zyprexa helped. On PS overnight, 5/5, 30% FiO2.       Lab: lactic trending up 3.5. WBC 22.4. Hgb unchanged, 7.5.

## 2025-05-29 NOTE — PROGRESS NOTES
CRRT STATUS NOTE    DATA:  Time:  1600  Pressures WNL:  YES  Filter Status:  WDL    Problems Reported/Alarms Noted:  Restarted at 1545 d/t clotting; no issues since restart.    Supplies Present:  Yes    ASSESSMENT:  Patient Net Fluid Balance:  -216ml since midnight.    Intake/Output Summary (Last 24 hours) at 2025 1807  Last data filed at 2025 1800  Gross per 24 hour   Intake 2620.88 ml   Output 3378.1 ml   Net -757.22 ml        Vital Signs:  Temp: 98.6  F (37  C) Temp  Min: 96.9  F (36.1  C)  Max: 98.6  F (37  C)  Resp: 16 Resp  Min: 12  Max: 25  SpO2: 97 % SpO2  Min: 93 %  Max: 100 %  Pulse: 105 Pulse  Min: 84  Max: 134  BP: 104/54 Systolic (24hrs), Av , Min:98 , Max:127  Diastolic (24hrs), Av, Min:54, Max:83        Labs:    Lab Results   Component Value Date    WBC 20.2 (H) 2025    HGB 7.7 (L) 2025    HCT 24.7 (L) 2025    MCV 97 2025     2025     Lab Results   Component Value Date     2025    POTASSIUM 3.6 2025    CHLORIDE 106 2025    CO2 21 (L) 2025    GLC 90 2025     Lab Results   Component Value Date    BUN 38.8 (H) 2025    CR 0.44 (L) 2025        Goals of Therapy:  0-50 mL/hr net negative.    INTERVENTIONS: Restarted d/t clotting.     PLAN: Continue fluid removal per goals of care as patient tolerates. Check filter daily. Change filter q72 hours and PRN. Please call CRRT Resource RN on [employer] with any questions or concerns.

## 2025-05-29 NOTE — PROGRESS NOTES
SURGICAL ICU PROGRESS NOTE      Date of Service (when I saw the patient): 05/29/2025    ASSESSMENT:  Sebastián Rodas is a 29M with alcohol abuse, anxiety, and bipolar disorder, who was admitted 3/30/25 for alcohol withdrawal and abdominal pain. Workup showed leukocytosis and elevated lipase, consistent with acute pancreatitis. He developed encephalopathy and shock, requiring ICU care, intubation, vasopressors, and CRRT by 4/1. His course was complicated by cardiomyopathy (EF 20-25%, improved to 65-70% by 4/14), necrotizing pancreatitis, and persistent infection despite 14 days of meropenem. On 4/27, imaging showed likely perforated viscus; he underwent emergent laparotomy, necrosectomy, and colectomy. Transferred to [facility] SICU on 4/30. On 5/1, reoperation revealed colonic staple line breakdown, necrotic pancreas, vessel thrombosis, and adhesions. Colostomy with malankot drain and temporary abdominal closure performed. Prognosis is poor; palliative care involved. Care conference 5/2 with family to talk goals of care. Full code, family acknowledged that poor prognosis is to be expected. 5/4 s/p exploratory surgery, additional necrotic pancreas remove with no obvious spillage or sign of bleeding at the time. Went back to the OR 5/7 for abdominal wash out. CT abdomen 5/10 showed extravasation on arterial phase imaging. MTP initiated, IR arterial embolization of pancreatic vessels performed. Decreased stool output since 5/13, switched to TPN. Hemorraghic shock.  MTP 5/18, IR embolization 5/18.      CHANGES and MAJOR THINGS TODAY:    Discontinue vanc    Trend lactate qD  Decrease hydrocortisone to 50 BID    Trach dome as tolerated, pressure support overnight  Speaking valve with SLP  Pull fluid on CRRT  Ativan 0.5 mg TID       PLAN:    Neurological:  # Acute pain   - Fentanyl gtt with bolus PRN  # Nerve pain  - Scheduled lidocaine patches  # Encephalopathy- improving  # Insomnia  - Monitor neurological status. Delirium  preventions and precautions.   # Sedation: Precedex, weaning as able. Ativan 0.5 mg TID  # Anxiety  - RASS goal 0 to -1  - Olanzapine 2.5 mg @ night  - Olanzapine PRN      Pulmonary:   # Acute hypoxic respiratory failure  # S/p tracheostomy placement   FiO2 (%): 30 %, Resp: 20, Vent Mode: PS, Resp Rate (Set): 16 breaths/min, Tidal Volume (Set, mL): 420 mL, PEEP (cm H2O): 5 cmH2O, Pressure Support (cm H2O): 5 cmH2O, Resp Rate (Set): 16 breaths/min, Tidal Volume (Set, mL): 420 mL, PEEP (cm H2O): 5 cmH2O  CT PE 5/9 showed no evidence of pulmonary embolism  - On PST this am, 5/5, continue as able. Trach dome today, pressure support overnight  - Pulmonary toilet, mobilize  - Ventilator bundle  - Wean FIO2 as tolerated  - SLP eval for speaking valve      Cardiovascular:    # Stress Cardiomyopathy   - Initial LVEF 20-25%, improved to 65-70%  # Septic shock  # Sinus Tachycardia  # Lactic acidosis  - Monitor hemodynamic status. MAP goal > 65  - Restarted on stress dose steroids 5/27 in the setting of two pressor shock. Now that off pressors, reduced to 50 q 8 hours on 5/28, reduced to 50 BID on 5/29  - Norepinephrine and Vasopressin weaned off.   - Lactate improving 5.9-->3.3     Gastroenterology/Nutrition:  # S/p emergent exploratory laparotomy, necrosectomy, transverse colectomy, abdominal washout, and drain placement 5/1 and 5/4  # S/p Pancreatic branch vessel embolization with IR 5/10, 5/18  # Severe necrotizing pancreatitis  # Splenic vein thrombosis  CT abdomen/pelvis with IV + enteral contrast through G tube 5/14: large volume of air and blood products in abdomen.   - Will defer management of dressings and drain removal per EGS. Minimal output per dressings.   - Cecostomy drain in place, no stool output. Abdomen distended.  - PPI IV  - Hold all Oral Meds and Feeds, strict NPO  - 5/27 CT C/A/P with large encapsulated intraperitoneal and retroperitoenal air and fluid containing necrotic collections with decreased  hemorrhagic components along the aterior inferior aspect of the right hepatic lobe.   - Patient will be discussed in GI conference 5/29. Discussed with IR 5/27, amenable to placement of bilateral retroperitoneal drains.  Plan for drain placement in the coming days.     # Severe Protein calorie deficit malnutrition due to critical illness  # Hypertriglyceridemia   - Continue TPN. Lipids resumed (checking triglycerides M/Th)  - GJ tube migrated out. Keep NPO  - IR consulted for PEG exchange  - IR cannot safely exchange freshly placed GJ tubes until they have been in place x6 weeks given risk of losing access into the stomach. Additionally, imaging shows there is no safe pexy between the stomach and abdominal wall. IR may not be able to offer safe exchange even after 6 weeks.   - Multivitamin supplementation ordered by nutrition  - RD consult. Appreciate cares and recommendations.     Renal/Fluids/Electrolytes:   # SARAHI  # Intravascular hypovolemia  # total fluid overload  Baseline Cr 0.7-0.8. Presented with Cr 0.96 on 3/30. Rapidly markos to 5.2 on 4/1. Oliguric. Garcia UA with proteinuria, hematuria, pyuria, moderate bacteria.  Kidneys unremarkable on CT. Has severe ATN in the setting of shock, ADHF, intravascular hypovolemia/3rd spacing from pancreatitis.   - Nephrology consulted, appreciate recs  - CRRT, request to aim for 0-50 ml/h negative on 5/29  - Continue to monitor intake and output  - Volume assessment daily    Endocrine:   # Stress and steroid induced hyperglycemia    Hgb A1c 5.3, no hx of DM   - Continue insulin gtt      - Keep lantus at 20  - Goal to keep BG< 180 for optimal wound healing   - On stress dose steroids, as above      ID  # Leukocytosis  # Necrotizing pancreatitis  # Intraabdominal sepsis  - Trend CRP q3days (291 on 5/28, from 86 on 5/22)  - WBC 31 to 22 today  - 5/27 Broadened Zosyn to Meropenem, Vancomycin (discontinued 5/29) and Micafungin. Continues on meropenem and micafungin.  - Repeat BC  x2, sputum culture, MRSA swab.   - Obtain c diff PCR if able to obtain a sample from wound.    - Vanc discontinued on 5/29         cultures:  - 4/30 blood cultures- NGTD   - 5/1 blood cultures ordered - NGTD  - Blood culture 5/12, 5/15 - NGTD  - Blood culture 5/22 - +candida  - 5/27 Resp culture +1 yeast     Heme:     # Acute blood loss anemia   # Anemia of critical illness   # Thrombosed splenic vein   # s/p splenic artery embolization 5/18  #Non occlusive thrombus adherent to RIJ catheter on CT 5/27  - Transfuse if hgb <7.0 or signs/symptoms of hypoperfusion. Monitor and trend  - MTP 5/10, 5/18  - Continue subcutaneous heparin, watch bleeding from drains.  - s/p IR 5/18 coil embolization of splenic artery proximal to great pancreatic artery.   - Last transfused on 5/27  - Hold additional anticoagulation       Musculoskeletal:   # Deconditioning and weakness due to critical illness   # LLE swelling  - LLE DVT ultrasound 5/26 negative  - Physical and occupational therapy consult   - Use abdominal binder when up w/ PT     Skin:  # Pressure Ulcers - Buttocks/rectal area  - Barrier cream with liberal application.   - WOC consult      #Pressure Ulcers- Left Heel  Pressure Injury Location: Left heel   Wound type: Pressure Injury     Pressure Injury Stage: Deep Tissue Pressure Injury (DTPI), present on admission     General Cares/Prophylaxis:    DVT Prophylaxis: SQH    GI Prophylaxis: PPI     Lines/ tubes/ drains:  - HD line--  Right IJ  - PICC line- left   - PIV x 3  - Trach  - RUQ ostomy  - LLQ drain  - Scrotal drain  - SHANNON x 3  - Colotomy drain     Disposition:  - Surgical ICU    Seen and discussed with staff.     Marina Zamarripa MD  General Surgery, PGY2     ====================================  SUBJECTIVE: No acute events overnight. Pain is controlled. No nausea. Having anxiety which is worse today than prior days, reports this is almost all the time. Using iPad and mouthing words to communicate. Doesn't want to do  "katiuska chenge today, wants to take a break and have pressure support. Willing to trial speaking valve.       OBJECTIVE:   1. VITAL SIGNS:   Temp: 98.4  F (36.9  C) Temp src: Axillary BP: 120/75 Pulse: 116   Resp: 20 SpO2: 94 % O2 Device: Mechanical Ventilator Oxygen Delivery: 35 LPM Height: 172.7 cm (5' 7.99\") Weight: 72.5 kg (159 lb 13.3 oz)  Estimated body mass index is 24.31 kg/m  as calculated from the following:    Height as of this encounter: 1.727 m (5' 7.99\").    Weight as of this encounter: 72.5 kg (159 lb 13.3 oz).      FiO2 (%): 30 %, Resp: 20, Vent Mode: PS, Resp Rate (Set): 16 breaths/min, Tidal Volume (Set, mL): 420 mL, PEEP (cm H2O): 5 cmH2O, Pressure Support (cm H2O): 5 cmH2O, Resp Rate (Set): 16 breaths/min, Tidal Volume (Set, mL): 420 mL, PEEP (cm H2O): 5 cmH2O      2. INTAKE/ OUTPUT:     Intake/Output Summary (Last 24 hours) at 5/29/2025 1558  Last data filed at 5/29/2025 1536  Gross per 24 hour   Intake 2646.3 ml   Output 3264.4 ml   Net -618.1 ml          3. PHYSICAL EXAMINATION:  General: laying in bed, awake and alert, anxious  HEENT: PERRLA. Trach present and secure, site dressed.   Pulm/Resp: Normal work of breathing, tracheostomy  CV: RRR, well perfused extremities  Abdomen: Distended. Dark red/brown output noted to all drains. Abdomen with dressing in place C/D/I  : scrotal edema, draining drk red/brown output.   MSK/Extremities: generalized edema in extremities. +3 left foot edema, 2+ right foot edema    4. INVESTIGATIONS:   Arterial Blood Gases   Recent Labs   Lab 05/27/25  0657 05/26/25  2156 05/25/25  1220   PH 7.39 7.45 7.38   PCO2 32* 29* 40   PO2 75* 118* 87   HCO3 19* 20* 23     Complete Blood Count   Recent Labs   Lab 05/29/25  0325 05/28/25  1600 05/28/25  0318 05/27/25  1500   WBC 22.4* 19.9* 21.8* 31.2*   HGB 7.5* 7.5* 7.4* 8.1*    327 328 459*     Basic Metabolic Panel  Recent Labs   Lab 05/29/25  0755 05/29/25  0600 05/29/25  0342 05/29/25  0325 05/28/25  1608 " 05/28/25 1600 05/28/25 0511 05/28/25 0318 05/27/25  1606 05/27/25  1500   NA  --   --   --  138  --  140  --  139  --  138   POTASSIUM  --   --   --  3.7  --  3.8  --  3.6  --  3.9   CHLORIDE  --   --   --  106  --  107  --  106  --  104   CO2  --   --   --  21*  --  20*  --  21*  --  17*   BUN  --   --   --  36.7*  --  37.2*  --  40.8*  --  45.3*   CR  --   --   --  0.44*  --  0.46*  --  0.53*  --  0.58*   * 147* 118* 121*   < > 84   < > 128*   < > 220*    < > = values in this interval not displayed.     Liver Function Tests  Recent Labs   Lab 05/29/25 0325 05/28/25 1600 05/28/25 0318 05/27/25  1500 05/27/25  0657 05/27/25  0401 05/26/25  1624 05/26/25 0321 05/23/25  0331 05/22/25 2031 05/22/25  1556 05/22/25  1002   AST 30  --  26  --   --  34  --  30   < >  --   --   --    ALT 37  --  33  --   --  43  --  57   < >  --   --   --    ALKPHOS 584*  --  431*  --   --  658*  --  1,042*   < >  --   --   --    BILITOTAL 0.8  --  0.9  --   --  1.5*  --  1.1   < >  --   --   --    ALBUMIN 2.1* 1.9* 2.0* 2.2*  --  2.3*   < > 2.5*   < >  --    < >  --    INR  --   --   --   --  1.28*  --   --   --   --  1.22*  --  1.18*    < > = values in this interval not displayed.     Pancreatic Enzymes  No lab results found in last 7 days.    Coagulation Profile  Recent Labs   Lab 05/27/25 0657 05/22/25 2031 05/22/25  1002   INR 1.28* 1.22* 1.18*     5. RADIOLOGY:   No results found for this or any previous visit (from the past 24 hours).        =========================================

## 2025-05-29 NOTE — PROGRESS NOTES
Surgery Progress Note  05/29/2025       Subjective:  NAEON. States abdominal exam is unchanged. Remains off pressors. Continues on vanc, meropenem, bright.     Objective:  Temp:  [96.9  F (36.1  C)-98.3  F (36.8  C)] 96.9  F (36.1  C)  Pulse:  [] 120  Resp:  [11-25] 25  BP: (107-137)/(62-83) 124/78  MAP:  [70 mmHg-80 mmHg] 74 mmHg  Arterial Line BP: ()/(54-65) 121/56  FiO2 (%):  [30 %] 30 %  SpO2:  [89 %-99 %] 98 %    I/O last 3 completed shifts:  In: 2477.87 [I.V.:642.04; IV Piggyback:650]  Out: 2977.3 [Drains:1015; Other:1962.3]      Gen: Resting comfortably in bed  Neuro: alert and oriented to conversation    Resp: Ventilated via trach  Abd: Abdomen is taut but compressible, drains with minimal dark bloody output, Malecot with minimal succus, significant dark bloody output around midline incision. Abdominal wound covered with wound manager  : Scrotum with pouch in place with serous drainage     Labs:  Recent Labs   Lab 05/29/25  0325 05/28/25  1600 05/28/25  0318   WBC 22.4* 19.9* 21.8*   HGB 7.5* 7.5* 7.4*    327 328       Recent Labs   Lab 05/29/25  0600 05/29/25  0342 05/29/25  0325 05/28/25  1608 05/28/25  1600 05/28/25  0511 05/28/25  0318   NA  --   --  138  --  140  --  139   POTASSIUM  --   --  3.7  --  3.8  --  3.6   CHLORIDE  --   --  106  --  107  --  106   CO2  --   --  21*  --  20*  --  21*   BUN  --   --  36.7*  --  37.2*  --  40.8*   CR  --   --  0.44*  --  0.46*  --  0.53*   * 118* 121*   < > 84   < > 128*   KARINA  --   --  8.1*  --  7.8*  --  8.3*   MAG  --   --  2.1  --  2.1  --  2.3   PHOS  --   --  3.3  --  3.4  --  3.3    < > = values in this interval not displayed.     Assessment/Plan:   Sebastián Rodas is a 29-year-old male with a history of alcohol use disorder, anxiety, and bipolar disorder who was admitted on 3/30/2025 for alcohol withdrawal following a recent binge, presenting with diffuse abdominal pain. Initial workup revealed leukocytosis and elevated lipase  "concerning for acute pancreatitis. He developed acute encephalopathy and was transferred to the ICU on 3/31, requiring intubation and vasopressors by 4/1 due to shock. Hospital course has been complicated by acute renal failure requiring CRRT, cardiomyopathy (initial LVEF 20-25%, improved to 65-70% by 4/14), and necrotizing pancreatitis with unorganized fluid collections. He completed a 14-day course of meropenem but remains intermittently febrile and critically ill, requiring ongoing dialysis and vasopressor support.     On 4/27, after clinical deterioration and imaging consistent with a likely perforated viscus, following family discussion, he underwent emergent exploratory laparotomy, necrosectomy, transverse colectomy, abdominal washout, and drain placement. Patient was transferred to [facility] on 4/30/25 for further surgical management. Went to OR 5/1 for re-open laparotomy, I&D, placement of colostomy tube in presumed previous colectomy staple line, necrosectomy, and Abthera placement.     On 5/2, increasing sanguineous output from drains concerning for bleeding from pancreatic bed. Family care conference held 5/2, plan for restorative cares at that time. Take back to OR twice (5/4 and 5/7) for abdominal washout. Per nursing note:     5/9 morning after he saw a picture of his abdomen that he \"desires to speak to team about updating goals of care, expressed desire to pull back on cares\". Goals of care conference on 5/10 with continuation of full code and restorative cares. CTA on 5/10 revealed large area of active arterial extravasation in the upper abdomen. MTP was started. Patient is now s/p embolization of small pancreatic branch off of the distal celiac artery with IR on 5/10. Malecot drainage slowed and eventually stopped, with significant drainage through other drains and open into dressings.     5/17 night patient had active extravasation, with Hgb dropping from 7.8 to 5.6 in 1.5 hours. Lactate spiked from 3.6 " Him/He to 10.6, with WBC 14.9 to 25.5. Family was consulted, MTP was started, and IR embolized the celiac trunk/splenic artery branches.     5/27 overnight, patient had HR sustained in 140-150s with MAP in 40-50s. BP remained low despite boluses; levophed GTT restarted. Large amount of blood leaking from abdominal dressing requiring 1u PRBC.  Lactate 5.7 and Hgb 7.4. PEG tube came out during dressing change overnight. Increasing drainage also noted from superior aspect of midline. Bowel noted to be exposed d/t mesh detaching from fascia.     - No further surgical options for any intraabdominal pathology  - Nursing to change dressings in the am, pm, and PRN during the day if copious drainage during the day. Please place Xeroform over the bridging mesh (please continue cares). Please do not apply xeroform outside the wound bed/over the skin edges but make sure all of the wound bed is covered.  - Tube feeds on hold. Receiving TPN (50 mL/hr), and Lipids & Albumin as needed.  - Antibiotics per SICU   - SQH PPX, would not recommend therapeutic dose given the risk of bleeding.   - Appreciate WOC cares, okay with wound manager if able.   - Other cares per SICU, we appreciate cares   - Recommend comfort measures      Seen, examined, and discussed with chief resident, who will discuss with staff.     Jose M Davidson DO, MS   General Surgery, PGY 1

## 2025-05-29 NOTE — PROGRESS NOTES
CLINICAL NUTRITION SERVICES - REASSESSMENT NOTE     RECOMMENDATIONS FOR MDs/PROVIDERS TO ORDER:  - Ongoing evaluation for ability to feed enterally    Registered Dietitian Interventions:  - Continue daily SMOF lipids; plan to check TG every Monday / Thursday at noon (fasting)  - Repeat Metabolic cart study ordered  - Changes to TPN starting 5/30 (discussed with PharmD): Increase AA to 175 gms, add thiamine and folate.  Goal PN of 270 g dextrose, 175g AA, daily SMOF lipids provides: 2118 kcals (30 kcal/kg or 84% MREE from 5/5), 2.5g AA, GIR 2.7    Future/Additional Recommendations:  - Hold lipid if fasting TG > 400  - Consider increasing dextrose in PN pending results of metabolic cart study     INFORMATION OBTAINED  Assessed patient in room.    CURRENT NUTRITION ORDERS  Diet: NPO  Nutrition Support: TPN: 270 g Dextrose, 150 g AA, 250 ml SMOF lipids (off and on with elevated TG)    CURRENT INTAKE/TOLERANCE  TPN at goal, lipids held 5/25 - 5/27.  7 day average intake of 1804 kcals (26 kcal/kg or 72% MREE), 150 g protein (2.1g/kg) for 90% kcal needs and 100% protein needs     NEW FINDINGS  Pt awake and nodding yes / no, acknowledging writer.    Resp: Trach lorelei trials, PMSV with SLP this afternoon    Renal: CRRT    GI symptoms: Pt to be discussed in GI conference today.  No output from cecostomy with non functioning GI tract.  PEGJ tube fell out 5/27. Pt fed enterally 5/5-5/14, otherwise TPN dependent since 5/1    Skin/wounds: WOCN following for abd surgical wound (deteriorating with copious drainage), Left heel (stable), Stg 2 coccyx wound (improving), trach site (stable) and scrotal abscess (stable).  Skin is dry and flaky on arms.    Nutrition-relevant labs:   Cr 0.44 (L) indicative of low LBM  Alk phos 584 (H) - down from peak of 1164 4 days ago  D bili 0.62 (H) - down from peak of 2.81 5/22 5/28     Nutrition-relevant medications:   Lantus 20 units in am + insulin gtt  Off pressors    Weight: 72.5 kg.  "Fluctuating 70 to 87 kg throughout admit. Weight of little value in the setting of significant edema / muscle loss.  Continue using dosing weight of 70 kg based on dry weight.    MALNUTRITION  % Intake: Decreased intake does not meet criteria  % Weight Loss: None noted - losses masked by edema  Subcutaneous Fat Loss: Orbital: Moderate, Buccal: Severe, Triceps: Moderate, and Fat overlying the ribs: did not assess  Muscle Loss: Global Moderate losses  Fluid Accumulation/Edema: Moderate to severe, 2-4+  Malnutrition Diagnosis: Severe malnutrition in the context of acute illness or injury  Malnutrition Present on Admission: Yes    EVALUATION OF THE PROGRESS TOWARD GOALS   Previous Goals  Total avg nutritional intake to meet a minimum of 29 kcal/kg or 80% MREE and 2g PRO/kg daily (per dosing wt 70 kg).   Evaluation: Not met for kcals (with lipids held), met for protein    Previous Nutrition Diagnosis  Altered gastrointestinal (GI) function related to concern for leak as evidenced by inability to feed enterally with reliance on TPN to meet 100% nutrition needs   Evaluation: No longer applicable, nutrition diagnosis changed below    NUTRITION DIAGNOSIS  Malnutrition (undernutrition) related to catabolic illness, inability to feed goal nutrition with hypertriglyceridemia as evidenced by fat and muscle wasting    INTERVENTIONS  - Collaboration by nutrition professional with other providers - Pharm D, SICU.  Discussed option of nasoenteric access for enteral nutrition.  - Parenteral nutrition/IV fluid management  - Vitamin and mineral supplement therapy    GOALS  Total avg nutritional intake to meet a minimum of 29 kcal/kg and 2 g PRO/kg daily (per dosing wt 70 kg).     MONITORING/EVALUATION  Progress toward goals will be monitored and evaluated per policy.    Maryann Cobb, RD, LD, CNSC  \"4E Clinical Dietitian\" on Vocera  Weekend/Holiday RD - \"Weekend Clinical Dietitian\" on vocera    "

## 2025-05-30 LAB
ABO + RH BLD: NORMAL
ALBUMIN SERPL BCG-MCNC: 1.9 G/DL (ref 3.5–5.2)
ALBUMIN SERPL BCG-MCNC: 2.1 G/DL (ref 3.5–5.2)
ALP SERPL-CCNC: 645 U/L (ref 40–150)
ALT SERPL W P-5'-P-CCNC: 44 U/L (ref 0–70)
ANION GAP SERPL CALCULATED.3IONS-SCNC: 11 MMOL/L (ref 7–15)
ANION GAP SERPL CALCULATED.3IONS-SCNC: 12 MMOL/L (ref 7–15)
AST SERPL W P-5'-P-CCNC: 38 U/L (ref 0–45)
BILIRUB SERPL-MCNC: 0.8 MG/DL
BILIRUBIN DIRECT (ROCHE PRO & PURE): 0.58 MG/DL (ref 0–0.45)
BLD GP AB SCN SERPL QL: NEGATIVE
BLD PROD TYP BPU: NORMAL
BLOOD COMPONENT TYPE: NORMAL
BUN SERPL-MCNC: 37.1 MG/DL (ref 6–20)
BUN SERPL-MCNC: 40.4 MG/DL (ref 6–20)
CA-I BLD-MCNC: 4.6 MG/DL (ref 4.4–5.2)
CA-I BLD-MCNC: 4.7 MG/DL (ref 4.4–5.2)
CALCIUM SERPL-MCNC: 7.7 MG/DL (ref 8.8–10.4)
CALCIUM SERPL-MCNC: 8.1 MG/DL (ref 8.8–10.4)
CHLORIDE SERPL-SCNC: 105 MMOL/L (ref 98–107)
CHLORIDE SERPL-SCNC: 105 MMOL/L (ref 98–107)
CODING SYSTEM: NORMAL
CREAT SERPL-MCNC: 0.44 MG/DL (ref 0.67–1.17)
CREAT SERPL-MCNC: 0.52 MG/DL (ref 0.67–1.17)
CROSSMATCH: NORMAL
EGFRCR SERPLBLD CKD-EPI 2021: >90 ML/MIN/1.73M2
EGFRCR SERPLBLD CKD-EPI 2021: >90 ML/MIN/1.73M2
ERYTHROCYTE [DISTWIDTH] IN BLOOD BY AUTOMATED COUNT: 17.5 % (ref 10–15)
ERYTHROCYTE [DISTWIDTH] IN BLOOD BY AUTOMATED COUNT: 17.6 % (ref 10–15)
GGT SERPL-CCNC: 440 U/L (ref 8–61)
GLUCOSE BLDC GLUCOMTR-MCNC: 119 MG/DL (ref 70–99)
GLUCOSE BLDC GLUCOMTR-MCNC: 123 MG/DL (ref 70–99)
GLUCOSE BLDC GLUCOMTR-MCNC: 131 MG/DL (ref 70–99)
GLUCOSE BLDC GLUCOMTR-MCNC: 132 MG/DL (ref 70–99)
GLUCOSE BLDC GLUCOMTR-MCNC: 137 MG/DL (ref 70–99)
GLUCOSE BLDC GLUCOMTR-MCNC: 143 MG/DL (ref 70–99)
GLUCOSE BLDC GLUCOMTR-MCNC: 145 MG/DL (ref 70–99)
GLUCOSE BLDC GLUCOMTR-MCNC: 157 MG/DL (ref 70–99)
GLUCOSE BLDC GLUCOMTR-MCNC: 182 MG/DL (ref 70–99)
GLUCOSE SERPL-MCNC: 131 MG/DL (ref 70–99)
GLUCOSE SERPL-MCNC: 161 MG/DL (ref 70–99)
HCO3 SERPL-SCNC: 20 MMOL/L (ref 22–29)
HCO3 SERPL-SCNC: 21 MMOL/L (ref 22–29)
HCT VFR BLD AUTO: 21.2 % (ref 40–53)
HCT VFR BLD AUTO: 23.2 % (ref 40–53)
HGB BLD-MCNC: 6.6 G/DL (ref 13.3–17.7)
HGB BLD-MCNC: 7.2 G/DL (ref 13.3–17.7)
HGB BLD-MCNC: 8 G/DL (ref 13.3–17.7)
ISSUE DATE AND TIME: NORMAL
LACTATE SERPL-SCNC: 4.8 MMOL/L (ref 0.7–2)
LACTATE SERPL-SCNC: 4.9 MMOL/L (ref 0.7–2)
MAGNESIUM SERPL-MCNC: 2.1 MG/DL (ref 1.7–2.3)
MAGNESIUM SERPL-MCNC: 2.1 MG/DL (ref 1.7–2.3)
MCH RBC QN AUTO: 30.4 PG (ref 26.5–33)
MCH RBC QN AUTO: 30.4 PG (ref 26.5–33)
MCHC RBC AUTO-ENTMCNC: 31 G/DL (ref 31.5–36.5)
MCHC RBC AUTO-ENTMCNC: 31.1 G/DL (ref 31.5–36.5)
MCV RBC AUTO: 96 FL (ref 78–100)
MCV RBC AUTO: 98 FL (ref 78–100)
MCV RBC AUTO: 98 FL (ref 78–100)
PHOSPHATE SERPL-MCNC: 3.3 MG/DL (ref 2.5–4.5)
PHOSPHATE SERPL-MCNC: 3.5 MG/DL (ref 2.5–4.5)
PLATELET # BLD AUTO: 229 10E3/UL (ref 150–450)
PLATELET # BLD AUTO: 237 10E3/UL (ref 150–450)
POTASSIUM SERPL-SCNC: 3.8 MMOL/L (ref 3.4–5.3)
POTASSIUM SERPL-SCNC: 4.2 MMOL/L (ref 3.4–5.3)
PROT SERPL-MCNC: 5 G/DL (ref 6.4–8.3)
RBC # BLD AUTO: 2.17 10E6/UL (ref 4.4–5.9)
RBC # BLD AUTO: 2.37 10E6/UL (ref 4.4–5.9)
SODIUM SERPL-SCNC: 137 MMOL/L (ref 135–145)
SODIUM SERPL-SCNC: 137 MMOL/L (ref 135–145)
SPECIMEN EXP DATE BLD: NORMAL
UNIT ABO/RH: NORMAL
UNIT NUMBER: NORMAL
UNIT STATUS: NORMAL
UNIT TYPE ISBT: 5100
WBC # BLD AUTO: 19 10E3/UL (ref 4–11)
WBC # BLD AUTO: 27.3 10E3/UL (ref 4–11)

## 2025-05-30 PROCEDURE — 85018 HEMOGLOBIN: CPT

## 2025-05-30 PROCEDURE — 82330 ASSAY OF CALCIUM: CPT | Performed by: CLINICAL NURSE SPECIALIST

## 2025-05-30 PROCEDURE — 84460 ALANINE AMINO (ALT) (SGPT): CPT | Performed by: STUDENT IN AN ORGANIZED HEALTH CARE EDUCATION/TRAINING PROGRAM

## 2025-05-30 PROCEDURE — 82435 ASSAY OF BLOOD CHLORIDE: CPT | Performed by: CLINICAL NURSE SPECIALIST

## 2025-05-30 PROCEDURE — G0463 HOSPITAL OUTPT CLINIC VISIT: HCPCS

## 2025-05-30 PROCEDURE — 99418 PROLNG IP/OBS E/M EA 15 MIN: CPT | Performed by: STUDENT IN AN ORGANIZED HEALTH CARE EDUCATION/TRAINING PROGRAM

## 2025-05-30 PROCEDURE — 85027 COMPLETE CBC AUTOMATED: CPT | Performed by: CLINICAL NURSE SPECIALIST

## 2025-05-30 PROCEDURE — 83735 ASSAY OF MAGNESIUM: CPT | Performed by: CLINICAL NURSE SPECIALIST

## 2025-05-30 PROCEDURE — 85041 AUTOMATED RBC COUNT: CPT | Performed by: CLINICAL NURSE SPECIALIST

## 2025-05-30 PROCEDURE — 250N000011 HC RX IP 250 OP 636

## 2025-05-30 PROCEDURE — 99233 SBSQ HOSP IP/OBS HIGH 50: CPT | Mod: 24 | Performed by: STUDENT IN AN ORGANIZED HEALTH CARE EDUCATION/TRAINING PROGRAM

## 2025-05-30 PROCEDURE — B4185 PARENTERAL SOL 10 GM LIPIDS: HCPCS | Performed by: SURGERY

## 2025-05-30 PROCEDURE — 250N000009 HC RX 250: Performed by: STUDENT IN AN ORGANIZED HEALTH CARE EDUCATION/TRAINING PROGRAM

## 2025-05-30 PROCEDURE — 258N000003 HC RX IP 258 OP 636: Performed by: NURSE PRACTITIONER

## 2025-05-30 PROCEDURE — P9016 RBC LEUKOCYTES REDUCED: HCPCS

## 2025-05-30 PROCEDURE — 82248 BILIRUBIN DIRECT: CPT | Performed by: STUDENT IN AN ORGANIZED HEALTH CARE EDUCATION/TRAINING PROGRAM

## 2025-05-30 PROCEDURE — 258N000003 HC RX IP 258 OP 636

## 2025-05-30 PROCEDURE — 97110 THERAPEUTIC EXERCISES: CPT | Mod: GP | Performed by: REHABILITATION PRACTITIONER

## 2025-05-30 PROCEDURE — 250N000009 HC RX 250: Performed by: SURGERY

## 2025-05-30 PROCEDURE — 84075 ASSAY ALKALINE PHOSPHATASE: CPT | Performed by: STUDENT IN AN ORGANIZED HEALTH CARE EDUCATION/TRAINING PROGRAM

## 2025-05-30 PROCEDURE — 94003 VENT MGMT INPAT SUBQ DAY: CPT

## 2025-05-30 PROCEDURE — 86923 COMPATIBILITY TEST ELECTRIC: CPT | Performed by: NURSE PRACTITIONER

## 2025-05-30 PROCEDURE — 250N000011 HC RX IP 250 OP 636: Performed by: NURSE PRACTITIONER

## 2025-05-30 PROCEDURE — 999N000157 HC STATISTIC RCP TIME EA 10 MIN

## 2025-05-30 PROCEDURE — 86923 COMPATIBILITY TEST ELECTRIC: CPT

## 2025-05-30 PROCEDURE — 84520 ASSAY OF UREA NITROGEN: CPT | Performed by: CLINICAL NURSE SPECIALIST

## 2025-05-30 PROCEDURE — 82247 BILIRUBIN TOTAL: CPT | Performed by: STUDENT IN AN ORGANIZED HEALTH CARE EDUCATION/TRAINING PROGRAM

## 2025-05-30 PROCEDURE — 84450 TRANSFERASE (AST) (SGOT): CPT | Performed by: STUDENT IN AN ORGANIZED HEALTH CARE EDUCATION/TRAINING PROGRAM

## 2025-05-30 PROCEDURE — 85018 HEMOGLOBIN: CPT | Performed by: CLINICAL NURSE SPECIALIST

## 2025-05-30 PROCEDURE — 82977 ASSAY OF GGT: CPT | Performed by: STUDENT IN AN ORGANIZED HEALTH CARE EDUCATION/TRAINING PROGRAM

## 2025-05-30 PROCEDURE — 250N000011 HC RX IP 250 OP 636: Performed by: STUDENT IN AN ORGANIZED HEALTH CARE EDUCATION/TRAINING PROGRAM

## 2025-05-30 PROCEDURE — 90947 DIALYSIS REPEATED EVAL: CPT

## 2025-05-30 PROCEDURE — 99233 SBSQ HOSP IP/OBS HIGH 50: CPT | Mod: 24 | Performed by: INTERNAL MEDICINE

## 2025-05-30 PROCEDURE — 250N000009 HC RX 250: Performed by: CLINICAL NURSE SPECIALIST

## 2025-05-30 PROCEDURE — 84295 ASSAY OF SERUM SODIUM: CPT | Performed by: CLINICAL NURSE SPECIALIST

## 2025-05-30 PROCEDURE — 250N000009 HC RX 250: Performed by: NURSE PRACTITIONER

## 2025-05-30 PROCEDURE — 97530 THERAPEUTIC ACTIVITIES: CPT | Mod: GP | Performed by: REHABILITATION PRACTITIONER

## 2025-05-30 PROCEDURE — 86901 BLOOD TYPING SEROLOGIC RH(D): CPT

## 2025-05-30 PROCEDURE — 99232 SBSQ HOSP IP/OBS MODERATE 35: CPT | Mod: 24 | Performed by: SURGERY

## 2025-05-30 PROCEDURE — 200N000002 HC R&B ICU UMMC

## 2025-05-30 RX ORDER — LORAZEPAM 2 MG/ML
0.5 INJECTION INTRAMUSCULAR EVERY 8 HOURS
Status: DISCONTINUED | OUTPATIENT
Start: 2025-05-30 | End: 2025-06-02

## 2025-05-30 RX ORDER — DEXTROSE MONOHYDRATE 25 G/50ML
25-50 INJECTION, SOLUTION INTRAVENOUS
Status: DISCONTINUED | OUTPATIENT
Start: 2025-05-30 | End: 2025-06-03 | Stop reason: HOSPADM

## 2025-05-30 RX ORDER — PIPERACILLIN SODIUM, TAZOBACTAM SODIUM 4; .5 G/20ML; G/20ML
4.5 INJECTION, POWDER, LYOPHILIZED, FOR SOLUTION INTRAVENOUS EVERY 6 HOURS
Status: DISCONTINUED | OUTPATIENT
Start: 2025-05-30 | End: 2025-05-31

## 2025-05-30 RX ORDER — HYDROCORTISONE SODIUM SUCCINATE 100 MG/2ML
50 INJECTION INTRAMUSCULAR; INTRAVENOUS DAILY
Status: DISCONTINUED | OUTPATIENT
Start: 2025-05-31 | End: 2025-05-31

## 2025-05-30 RX ORDER — NICOTINE POLACRILEX 4 MG
15-30 LOZENGE BUCCAL
Status: DISCONTINUED | OUTPATIENT
Start: 2025-05-30 | End: 2025-06-03 | Stop reason: HOSPADM

## 2025-05-30 RX ADMIN — LORAZEPAM 0.5 MG: 2 INJECTION INTRAMUSCULAR; INTRAVENOUS at 22:32

## 2025-05-30 RX ADMIN — HEPARIN SODIUM 5000 UNITS: 5000 INJECTION, SOLUTION INTRAVENOUS; SUBCUTANEOUS at 14:02

## 2025-05-30 RX ADMIN — MAGNESIUM SULFATE HEPTAHYDRATE: 500 INJECTION, SOLUTION INTRAMUSCULAR; INTRAVENOUS at 20:00

## 2025-05-30 RX ADMIN — CALCIUM CHLORIDE, MAGNESIUM CHLORIDE, SODIUM CHLORIDE, SODIUM BICARBONATE, POTASSIUM CHLORIDE AND SODIUM PHOSPHATE DIBASIC DIHYDRATE 12.5 ML/KG/HR: 3.68; 3.05; 6.34; 3.09; .314; .187 INJECTION INTRAVENOUS at 12:20

## 2025-05-30 RX ADMIN — CALCIUM CHLORIDE, MAGNESIUM CHLORIDE, SODIUM CHLORIDE, SODIUM BICARBONATE, POTASSIUM CHLORIDE AND SODIUM PHOSPHATE DIBASIC DIHYDRATE 12.5 ML/KG/HR: 3.68; 3.05; 6.34; 3.09; .314; .187 INJECTION INTRAVENOUS at 12:21

## 2025-05-30 RX ADMIN — PANTOPRAZOLE SODIUM 40 MG: 40 INJECTION, POWDER, FOR SOLUTION INTRAVENOUS at 07:46

## 2025-05-30 RX ADMIN — CALCIUM CHLORIDE, MAGNESIUM CHLORIDE, SODIUM CHLORIDE, SODIUM BICARBONATE, POTASSIUM CHLORIDE AND SODIUM PHOSPHATE DIBASIC DIHYDRATE 12.5 ML/KG/HR: 3.68; 3.05; 6.34; 3.09; .314; .187 INJECTION INTRAVENOUS at 06:42

## 2025-05-30 RX ADMIN — ONDANSETRON 4 MG: 2 INJECTION, SOLUTION INTRAMUSCULAR; INTRAVENOUS at 19:13

## 2025-05-30 RX ADMIN — CALCIUM CHLORIDE, MAGNESIUM CHLORIDE, SODIUM CHLORIDE, SODIUM BICARBONATE, POTASSIUM CHLORIDE AND SODIUM PHOSPHATE DIBASIC DIHYDRATE 12.5 ML/KG/HR: 3.68; 3.05; 6.34; 3.09; .314; .187 INJECTION INTRAVENOUS at 17:57

## 2025-05-30 RX ADMIN — LORAZEPAM 0.5 MG: 2 INJECTION INTRAMUSCULAR; INTRAVENOUS at 07:43

## 2025-05-30 RX ADMIN — OLANZAPINE 2.5 MG: 10 INJECTION, POWDER, FOR SOLUTION INTRAMUSCULAR at 19:57

## 2025-05-30 RX ADMIN — ONDANSETRON 4 MG: 2 INJECTION, SOLUTION INTRAMUSCULAR; INTRAVENOUS at 06:25

## 2025-05-30 RX ADMIN — DEXMEDETOMIDINE HYDROCHLORIDE 1 MCG/KG/HR: 400 INJECTION INTRAVENOUS at 08:14

## 2025-05-30 RX ADMIN — MEROPENEM 1 G: 1 INJECTION, POWDER, FOR SOLUTION INTRAVENOUS at 16:03

## 2025-05-30 RX ADMIN — MEROPENEM 1 G: 1 INJECTION, POWDER, FOR SOLUTION INTRAVENOUS at 07:47

## 2025-05-30 RX ADMIN — DEXMEDETOMIDINE HYDROCHLORIDE 0.8 MCG/KG/HR: 400 INJECTION INTRAVENOUS at 00:30

## 2025-05-30 RX ADMIN — CALCIUM CHLORIDE, MAGNESIUM CHLORIDE, SODIUM CHLORIDE, SODIUM BICARBONATE, POTASSIUM CHLORIDE AND SODIUM PHOSPHATE DIBASIC DIHYDRATE: 3.68; 3.05; 6.34; 3.09; .314; .187 INJECTION INTRAVENOUS at 22:51

## 2025-05-30 RX ADMIN — Medication 25 MCG: at 07:30

## 2025-05-30 RX ADMIN — INSULIN ASPART 2 UNITS: 100 INJECTION, SOLUTION INTRAVENOUS; SUBCUTANEOUS at 12:23

## 2025-05-30 RX ADMIN — MEROPENEM 1 G: 1 INJECTION, POWDER, FOR SOLUTION INTRAVENOUS at 00:13

## 2025-05-30 RX ADMIN — CALCIUM CHLORIDE, MAGNESIUM CHLORIDE, SODIUM CHLORIDE, SODIUM BICARBONATE, POTASSIUM CHLORIDE AND SODIUM PHOSPHATE DIBASIC DIHYDRATE 12.5 ML/KG/HR: 3.68; 3.05; 6.34; 3.09; .314; .187 INJECTION INTRAVENOUS at 00:18

## 2025-05-30 RX ADMIN — LORAZEPAM 0.5 MG: 2 INJECTION INTRAMUSCULAR; INTRAVENOUS at 14:02

## 2025-05-30 RX ADMIN — INSULIN GLARGINE 20 UNITS: 100 INJECTION, SOLUTION SUBCUTANEOUS at 10:00

## 2025-05-30 RX ADMIN — SMOFLIPID 250 ML: 6; 6; 5; 3 INJECTION, EMULSION INTRAVENOUS at 20:00

## 2025-05-30 RX ADMIN — SODIUM CHLORIDE, SODIUM LACTATE, POTASSIUM CHLORIDE, AND CALCIUM CHLORIDE 500 ML: .6; .31; .03; .02 INJECTION, SOLUTION INTRAVENOUS at 02:37

## 2025-05-30 RX ADMIN — HYDROCORTISONE SODIUM SUCCINATE 50 MG: 100 INJECTION, POWDER, FOR SOLUTION INTRAMUSCULAR; INTRAVENOUS at 07:47

## 2025-05-30 RX ADMIN — HEPARIN SODIUM 5000 UNITS: 5000 INJECTION, SOLUTION INTRAVENOUS; SUBCUTANEOUS at 22:31

## 2025-05-30 RX ADMIN — HEPARIN SODIUM 5000 UNITS: 5000 INJECTION, SOLUTION INTRAVENOUS; SUBCUTANEOUS at 06:19

## 2025-05-30 RX ADMIN — PIPERACILLIN AND TAZOBACTAM 4.5 G: 4; .5 INJECTION, POWDER, LYOPHILIZED, FOR SOLUTION INTRAVENOUS at 17:46

## 2025-05-30 RX ADMIN — DEXMEDETOMIDINE HYDROCHLORIDE 0.8 MCG/KG/HR: 400 INJECTION INTRAVENOUS at 14:08

## 2025-05-30 RX ADMIN — MICAFUNGIN SODIUM 100 MG: 100 INJECTION, POWDER, LYOPHILIZED, FOR SOLUTION INTRAVENOUS at 09:01

## 2025-05-30 RX ADMIN — Medication 100 MCG/HR: at 09:00

## 2025-05-30 RX ADMIN — Medication 25 MCG: at 23:50

## 2025-05-30 RX ADMIN — INSULIN ASPART 1 UNITS: 100 INJECTION, SOLUTION INTRAVENOUS; SUBCUTANEOUS at 16:09

## 2025-05-30 RX ADMIN — Medication 25 MCG: at 11:25

## 2025-05-30 RX ADMIN — Medication 25 MCG: at 02:46

## 2025-05-30 RX ADMIN — SODIUM CHLORIDE, SODIUM LACTATE, POTASSIUM CHLORIDE, AND CALCIUM CHLORIDE 500 ML: .6; .31; .03; .02 INJECTION, SOLUTION INTRAVENOUS at 05:22

## 2025-05-30 ASSESSMENT — ACTIVITIES OF DAILY LIVING (ADL)
ADLS_ACUITY_SCORE: 52

## 2025-05-30 NOTE — PROGRESS NOTES
SURGICAL ICU PROGRESS NOTE      Date of Service (when I saw the patient): 05/30/2025    ASSESSMENT:  Sebastián Rodas is a 29M with alcohol abuse, anxiety, and bipolar disorder, who was admitted 3/30/25 for alcohol withdrawal and abdominal pain. Workup showed leukocytosis and elevated lipase, consistent with acute pancreatitis. He developed encephalopathy and shock, requiring ICU care, intubation, vasopressors, and CRRT by 4/1. His course was complicated by cardiomyopathy (EF 20-25%, improved to 65-70% by 4/14), necrotizing pancreatitis, and persistent infection despite 14 days of meropenem. On 4/27, imaging showed likely perforated viscus; he underwent emergent laparotomy, necrosectomy, and colectomy. Transferred to [facility] SICU on 4/30. On 5/1, reoperation revealed colonic staple line breakdown, necrotic pancreas, vessel thrombosis, and adhesions. Colostomy with malankot drain and temporary abdominal closure performed. Prognosis is poor; palliative care involved. Care conference 5/2 with family to talk goals of care. Full code, family acknowledged that poor prognosis is to be expected. 5/4 s/p exploratory surgery, additional necrotic pancreas remove with no obvious spillage or sign of bleeding at the time. Went back to the OR 5/7 for abdominal wash out. CT abdomen 5/10 showed extravasation on arterial phase imaging. MTP initiated, IR arterial embolization of pancreatic vessels performed. Decreased stool output since 5/13, switched to TPN. Hemorraghic shock.  MTP 5/18, IR embolization 5/18.      CHANGES and MAJOR THINGS TODAY:    Trend lactate qD  Decrease hydrocortisone to 50 qD   Trach dome as tolerated, pressure support overnight  Speaking valve with SLP  Pull on CRRT   Continue Ativan 0.5 mg TID  Discontinue insulin gtt. Start SSI       PLAN:    Neurological:  # Acute pain   - Fentanyl gtt with bolus PRN    # Nerve pain  - Scheduled lidocaine patches  # Encephalopathy- improving  # Insomnia  - Monitor  neurological status. Delirium preventions and precautions.   # Sedation: Precedex, weaning as able. Ativan 0.5 mg TID  # Anxiety  - RASS goal 0 to -1  - Olanzapine 2.5 mg @ night  - Olanzapine PRN      Pulmonary:   # Acute hypoxic respiratory failure  # S/p tracheostomy placement   FiO2 (%): 30 %, Resp: 26, Vent Mode: PS, Resp Rate (Set): 16 breaths/min, PEEP (cm H2O): 5 cmH2O, Pressure Support (cm H2O): 5 cmH2O, Resp Rate (Set): 16 breaths/min, PEEP (cm H2O): 5 cmH2O  CT PE 5/9 showed no evidence of pulmonary embolism. Negative again on 5/18.   - On PST this am, 5/5, continue as able. Trach dome today, pressure support overnight  - Pulmonary toilet, mobilize  - Ventilator bundle  - Wean FIO2 as tolerated  - SLP eval for speaking valve, attempted on 5/29, continue to re-evaluate     Cardiovascular:    # Stress Cardiomyopathy   - Initial LVEF 20-25%, improved to 65-70%  # Septic shock  # Sinus Tachycardia  # Lactic acidosis  - Monitor hemodynamic status. MAP goal > 65  - Restarted on stress dose steroids 5/27 in the setting of two pressor shock. Now that off pressors, reduced to 50 q 8 hours on 5/28, reduced to 50 BID on 5/29, reduce to 50 qD on 5/30    - Norepinephrine and Vasopressin weaned off.   - Lactate improved 5.9-->3.3, then increased to 4.9 on 5/30. Given 500 ml LR bolus x2.    - Trend lactate qD       Gastroenterology/Nutrition:  # S/p emergent exploratory laparotomy, necrosectomy, transverse colectomy, abdominal washout, and drain placement 5/1 and 5/4  # S/p Pancreatic branch vessel embolization with IR 5/10, 5/18  # Severe necrotizing pancreatitis  # Splenic vein thrombosis  CT abdomen/pelvis with IV + enteral contrast through G tube 5/14: large volume of air and blood products in abdomen.   - Will defer management of dressings and drain removal per EGS. Minimal output per dressings. Drains with stable output, 820 ml (from 1100 ml) from scrotum on 5/30    - Cecostomy drain in place, no output. Abdomen  distended.  - PPI IV  - Hold all Oral Meds and Feeds, strict NPO  - 5/27 CT C/A/P with large encapsulated intraperitoneal and retroperitoenal air and fluid containing necrotic collections with decreased hemorrhagic components along the aterior inferior aspect of the right hepatic lobe.   - Patient will be discussed in GI conference 5/29. Discussed with IR 5/27, amenable to placement of bilateral retroperitoneal drains.  Plan for drain placement in the coming days (tentatively on 6/2).      # Severe Protein calorie deficit malnutrition due to critical illness  # Hypertriglyceridemia   - Continue TPN. Lipids resumed (checking triglycerides M/Th)  - GJ tube migrated out. Keep NPO  - IR consulted for PEG exchange  - IR cannot safely exchange freshly placed GJ tubes until they have been in place x6 weeks given risk of losing access into the stomach. Additionally, imaging shows there is no safe pexy between the stomach and abdominal wall. IR may not be able to offer safe exchange even after 6 weeks.   - Multivitamin supplementation ordered by nutrition  - RD consult. Appreciate cares and recommendations.     Renal/Fluids/Electrolytes:   # SARAHI  # Intravascular hypovolemia  # total fluid overload  Baseline Cr 0.7-0.8. Presented with Cr 0.96 on 3/30. Rapidly markos to 5.2 on 4/1. Oliguric. Garcia UA with proteinuria, hematuria, pyuria, moderate bacteria.  Kidneys unremarkable on CT. Has severe ATN in the setting of shock, ADHF, intravascular hypovolemia/3rd spacing from pancreatitis.   - Nephrology consulted, appreciate recs  - CRRT, request to aim for 0-50 ml/h negative on 5/30  - Continue to monitor intake and output  - Volume assessment daily    Endocrine:   # Stress and steroid induced hyperglycemia    Hgb A1c 5.3, no hx of DM   - Change to high sliding scale insulin on 5/30    - Keep lantus at 20    - Goal to keep BG< 180 for optimal wound healing   - On stress dose steroids, as above      ID:  # Leukocytosis  #  Necrotizing pancreatitis  # Intraabdominal sepsis  # Respiratory candidiasis   - Trend CRP q3days (291 on 5/28, from 86 on 5/22)  - WBC 31 to 22 today  - 5/27 Broadened Zosyn to Meropenem, Vancomycin (discontinued 5/29) and Micafungin. Continues on meropenem and micafungin. (EOT 14 days for micafungin)  - Repeat BC x2, sputum culture, MRSA swab on 5/27  - Obtain c diff PCR if able to obtain a sample from wound.    - Vanc discontinued on 5/29         cultures:  - 4/30 blood cultures- NGTD   - 5/1 blood cultures ordered - NGTD  - Blood culture 5/12, 5/15 - NGTD  - Blood culture 5/22 - +candida  - 5/27 Resp culture 2+ candida albicans, 1+ staph haemolyticus, 1+ staph epidermidis, +1 yeast     Heme:     # Acute blood loss anemia   # Anemia of critical illness   # Thrombosed splenic vein   # s/p splenic artery embolization 5/18  #Non occlusive thrombus adherent to RIJ catheter on CT 5/27  - Transfuse if hgb <7.0 or signs/symptoms of hypoperfusion. Monitor and trend  - MTP 5/10, 5/18  - Continue subcutaneous heparin, watch bleeding from drains.  - s/p IR 5/18 coil embolization of splenic artery proximal to great pancreatic artery.   - Last transfused on 5/27  - Hold additional anticoagulation       Musculoskeletal:   # Deconditioning and weakness due to critical illness   # LLE swelling  - LLE DVT ultrasound 5/26 negative  - Physical and occupational therapy consult   - Use abdominal binder when up w/ PT     Skin:  # Pressure Ulcers - Buttocks/rectal area  - Barrier cream with liberal application.   - WOC consult      #Pressure Ulcers- Left Heel  Pressure Injury Location: Left heel   Wound type: Pressure Injury     Pressure Injury Stage: Deep Tissue Pressure Injury (DTPI), present on admission     General Cares/Prophylaxis:    DVT Prophylaxis: SQH    GI Prophylaxis: PPI     Lines/ tubes/ drains:  - HD line--  Right IJ  - PICC line- left   - PIV x 3  - Trach  - RUQ ostomy  - LLQ drain  - Scrotal drain  - SHANNON x 3  - Colotomy  "drain     Disposition:  - Surgical ICU    Seen and discussed with staff.     Marina Zamarripa MD  General Surgery, PGY2     ====================================  SUBJECTIVE:  No acute events overnight. Pain is controlled. No nausea. Having anxiety which is stable. Using iPad and mouthing words to communicate. Tolerating pressure support. Got tired yesterday with trach dome, will try again today.       OBJECTIVE:   1. VITAL SIGNS:   Temp: 99.7  F (37.6  C) Temp src: Oral BP: (!) 79/45 Pulse: (!) 128   Resp: 26 SpO2: 100 % O2 Device: Mechanical Ventilator Oxygen Delivery: 40 LPM Height: 172.7 cm (5' 7.99\") Weight: 70.4 kg (155 lb 3.3 oz)  Estimated body mass index is 23.6 kg/m  as calculated from the following:    Height as of this encounter: 1.727 m (5' 7.99\").    Weight as of this encounter: 70.4 kg (155 lb 3.3 oz).      FiO2 (%): 30 %, Resp: 26, Vent Mode: PS, Resp Rate (Set): 16 breaths/min, PEEP (cm H2O): 5 cmH2O, Pressure Support (cm H2O): 5 cmH2O, Resp Rate (Set): 16 breaths/min, PEEP (cm H2O): 5 cmH2O      2. INTAKE/ OUTPUT:       Intake/Output Summary (Last 24 hours) at 5/30/2025 0635  Last data filed at 5/30/2025 0600  Gross per 24 hour   Intake 3814.17 ml   Output 4169 ml   Net -354.83 ml          3. PHYSICAL EXAMINATION:  General: laying in bed, awake and alert, anxious  HEENT: PERRLA. Trach present and secure, site dressed.   Pulm/Resp: Normal work of breathing, ventilated by tracheostomy on pressure support  CV: RRR, well perfused extremities  Abdomen: Distended. Dark red/brown output noted to all drains. Abdomen with dressing in place C/D/I  : scrotal edema, draining drk red/brown output.   MSK/Extremities: generalized edema in extremities. +3 left foot edema, 2+ right foot edema    4. INVESTIGATIONS:   Arterial Blood Gases   Recent Labs   Lab 05/27/25  0657 05/26/25  2156 05/25/25  1220   PH 7.39 7.45 7.38   PCO2 32* 29* 40   PO2 75* 118* 87   HCO3 19* 20* 23     Complete Blood Count   Recent Labs "   Lab 05/30/25 0419 05/29/25  1533 05/29/25  0325 05/28/25  1600   WBC 19.0* 20.2* 22.4* 19.9*   HGB 7.2* 7.7* 7.5* 7.5*    312 307 327     Basic Metabolic Panel  Recent Labs   Lab 05/30/25  1005 05/30/25  0803 05/30/25  0637 05/30/25  0419 05/29/25  1620 05/29/25  1533 05/29/25  0342 05/29/25  0325 05/28/25  1608 05/28/25  1600   NA  --   --   --  137  --  139  --  138  --  140   POTASSIUM  --   --   --  3.8  --  3.6  --  3.7  --  3.8   CHLORIDE  --   --   --  105  --  106  --  106  --  107   CO2  --   --   --  20*  --  21*  --  21*  --  20*   BUN  --   --   --  37.1*  --  38.8*  --  36.7*  --  37.2*   CR  --   --   --  0.44*  --  0.44*  --  0.44*  --  0.46*   * 131* 137* 131*   < > 102*   < > 121*   < > 84    < > = values in this interval not displayed.     Liver Function Tests  Recent Labs   Lab 05/30/25 0419 05/29/25  1533 05/29/25 0325 05/28/25  1600 05/28/25  0318 05/27/25  1500 05/27/25  0657 05/27/25  0401   AST 38  --  30  --  26  --   --  34   ALT 44  --  37  --  33  --   --  43   ALKPHOS 645*  --  584*  --  431*  --   --  658*   BILITOTAL 0.8  --  0.8  --  0.9  --   --  1.5*   ALBUMIN 2.1* 2.2* 2.1* 1.9* 2.0*   < >  --  2.3*   INR  --   --   --   --   --   --  1.28*  --     < > = values in this interval not displayed.     Pancreatic Enzymes  No lab results found in last 7 days.    Coagulation Profile  Recent Labs   Lab 05/27/25  0657   INR 1.28*     5. RADIOLOGY:   No results found for this or any previous visit (from the past 24 hours).        =========================================

## 2025-05-30 NOTE — PROGRESS NOTES
[facility] Nurse Inpatient Assessment       Consulted for: Tracheostomy site, sacrum, left heel  5/9: New consult for midline wound draining heavily    5/28: Stopped to check on pouching / wound manager placed on 5/27. All appear intact, some leakage around drain tubes that is managed with dressings by RN.          [facility] nurse follow-up plan: Monday/WednesdayFriday    Patient History (according to provider note(s):      Sebastián Rodas is a 29-year-old male with a history of alcohol abuse, anxiety, and bipolar disorder, admitted on 3/30/2025 for alcohol withdrawal after a binge, presenting with abdominal pain. Initial workup revealed leukocytosis and elevated lipase, suggestive of acute pancreatitis. He developed encephalopathy and required ICU transfer on 3/31, intubation, and vasopressors by 4/1 due to shock. His hospital course was complicated by acute renal failure (requiring CRRT), cardiomyopathy (LVEF 20-25%, improved to 65-70% by 4/14), and necrotizing pancreatitis with unorganized fluid collections. He completed a 14-day course of meropenem but remained febrile and critically ill, requiring ongoing dialysis and vasopressor support. On 4/27, imaging indicated likely perforated viscus, and after family discussion, he underwent emergent laparotomy, necrosectomy, transverse colectomy, abdominal washout, and drain placement. He was transferred to the SICU at [facility] on 4/30 for further monitoring. On 5/1, he underwent a second laparotomy with irrigation and debridement, colostomy tube placement, temporary abdominal closure, and further necrosectomy due to breakdown of the colonic resection staple line, necrotic pancreas, thrombosed middle colic vessels, fat necrosis, and dense interloop adhesions. He has a poor prognosis given the OR findings, palliative care consulted.     Assessment:      Areas visualized during today's visit: Focused: and Abdomen, trach, left heel,  Sacrum/coccyx    Wound location: Abdomen           Superior side of wound with displaced mesh      Last photo: 5/27 - no photo 5/30  Wound due to: Surgical Wound  Wound history/plan of care: On 4/27, imaging showed likely perforated viscus; he underwent emergent laparotomy, necrosectomy, and colectomy. Transferred to [facility] SICU on 4/30. On 5/1, reoperation revealed colonic staple line breakdown, necrotic pancreas, vessel thrombosis, and adhesions. Colostomy with malankot drain and temporary abdominal closure performed. Prognosis is poor; palliative care involved. Care conference 5/2 with family to talk goasl of care. Full code, family acknowledged that poor prognosis is to be expected. Today 5/4 s/p exploratory surgery, additional necrotic pancreas remove with no obvious spillage or sign of bleeding   5/12: Abdomen is much more distended today. The superior side of the wound bed had a large black clot loosely adhered to the mesh. There was pooling of darker thick liquid in the inferior side of the wound bed.   5/13: Pouch intact. Replaced Kerlix in wound bed.  5/14: Pouch replaced due to leaking at the superior side by PEG tube. Most of the slough covering the adipose tissue at the wound margins has fallen off. There is marbled granulation tissue and adipose tissue on the wound margins. Wound smelled more foul today.   5/20: Wound dimensions have increased markedly. The bowel under the mesh is much more distended and the mesh at the superior end of wound has displaced and is no longer attached. There is a large amount of blood clot and stool in this location. The wound manager was unable to be replaced today due to the increased wound dimensions as well as erosion of the various tubes around the analilia-wound area making pouching impossible.   5/23: Patient seems to be tolerating dressing changes ok. Found that more mesh had  and bowel loops were visible so asked team o come see and they were aware. Will  continue with dressing changes. If he has a strong desire to get out of bed (he asked during my visit) I would strongly recommend an abdominal binder.   5/27: Pt's wound with increased drainage from superior side with displaced mesh. Pt much less edematous with dialysis. PEG tube fell out over night. Saddle bagged wound pouch able to be placed today.   5/30: Pt's abdomen is much less distended today. Saddle bagged pouch still required to cover all analilia-wound skin. Large amount of liuid blood and feces suctioned from superior cavity within wound base.  Wound base: 80 % surgical mesh- with open bowel visible on top edge , 20 % Granulation tissue, Slough, and Adipose tissue     Palpation of the wound bed: fluctuance      Drainage: copious     Description of drainage: serosanguinous, bloody, and black     Measurements (length x width x depth, in cm): 21  x 16  x  3+ cm - measured on Friday's- did not probe into cavity with exposed bowel     Tunneling: N/A     Undermining: N/A  Periwound skin: Intact      Color: normal and consistent with surrounding tissue      Temperature: normal   Odor: none  Pain: facial expression of distress, sharp  Pain interventions prior to dressing change: oral oxycodone, IV fentanyl, and slow and gentle cares   Treatment goal: Drainage control and Infection control/prevention  STATUS: deteriorating  Supplies ordered: at bedside    Pressure Injury Location: Left heel - Assessed on Fridays        Last photo: 5/30  Wound type: Pressure Injury     Pressure Injury Stage: Deep Tissue Pressure Injury (DTPI), present on admission       Wound history/plan of care:   Pt admitted from OSH on 4/31. Wound present on admission. There is an intact blister over a deeper maroon discoloration.    Wound base: 100 % Maroon, Blister, and Epidermis     Palpation of the wound bed: boggy      Drainage: none     Description of drainage: none     Measurements (length x width x depth, in cm) 2  x 1.5  x  0 cm       Tunneling N/A     Undermining N/A  Periwound skin: Intact      Color: normal and consistent with surrounding tissue      Temperature: normal   Odor: none  Pain: denies , none  Pain intervention prior to dressing change: slow and gentle cares   Treatment goal: Heal  and Protection  STATUS: stable  Supplies ordered: at bedside    My PI Risk Assessment     Sensory Perception: 2 - Very Limited     Moisture: 2 - Very moist      Activity: 1 - Bedfast      Mobility: 2 - Very limited     Nutrition: 2 - Probably inadequate      Friction/Shear: 1 - Problem     TOTAL: 10    Pressure Injury Location: coccyx - Assessed on Fridays    Zoomed out                                              Zoomed in        Last photo: 5/16  Wound type: Pressure Injury, Incontinence Associated Dermatitis (IAD), and Moisture Associated Skin Damage (MASD)     Pressure Injury Stage: 2, present on admission        Wound history/plan of care:   Pt transferred from OSH on 4/31. Wound present on admission. There is MASD/IAD to the bilateral buttocks with exposed dermis. There is further breakdown over the Pt's coccyx that is fibrin vs slough. Wound is at least a stage 2. The wound will need to evolve further before it can be definitively staged.   5/9: Wound continues to improve. Wound is a stage 2 pressure injury over coccyx with surrounding MASD and exposed dermis.  5/16: Dermal buds seen throughout wound base.  5/23: wound care just completed by RN so did not assess   Wound base: 80 % Dermis, 20 % Fibrin     Palpation of the wound bed: normal      Drainage: moderate     Description of drainage: serosanguinous     Measurements (length x width x depth, in cm) 6  x 6  x  0.2 cm - central wound, 1.5 x 1.5 x 0.1 cm right buttock     Tunneling N/A     Undermining N/A  Periwound skin: Edematous and Skin stripping      Color: pink and red      Temperature: normal   Odor: none  Pain: moderate, tender  Pain intervention prior to dressing change: slow and gentle  cares   Treatment goal: Heal  and Protection  STATUS: improving and stable  Supplies ordered: at bedside    My PI Risk Assessment     Sensory Perception: 2 - Very Limited     Moisture: 2 - Very moist      Activity: 1 - Bedfast      Mobility: 2 - Very limited     Nutrition: 2 - Probably inadequate      Friction/Shear: 1 - Problem     TOTAL: 10    Wound location: Trach - Assessed on Fridays               Last photo: 5/30  Wound due to: Surgical Wound  Wound history/plan of care:  Pt transferred from OSH on 4/31. Pt had trach placed at OSH. There are areas of fibrin from previous trach suture locations which are mostly healed. There is a linear incision on the left side of Trach cannula from insertion. The wound base wass difficult to visualize, appears to be fibrin and adipose tissue marbled together.  5/5: Called by bedside RN with concerns for skin breakdown near trach. On assessment Trach incision appears to have opened up significantly from prior assessment. Moderate amount of respiratory secretions present. ENT consulted by primary team to assess trach site as well.  5/16: Overall dimensions decreasing. Wound edges stating to heal.   Wound base: 100 % Granulation tissue, Fibrin, and Adipose tissue     Palpation of the wound bed: normal      Drainage: moderate     Description of drainage: yellow - respiratory secretions     Measurements (length x width x depth, in cm): 2  x 3  x  0.8 cm      Tunneling: N/A     Undermining: N/A  Periwound skin: Intact      Color: normal and consistent with surrounding tissue      Temperature: normal   Odor: none  Pain: moderate, tender  Pain interventions prior to dressing change: slow and gentle cares   Treatment goal: Heal  and Protection  STATUS: granulating and stable  Supplies ordered: at bedside      Wound location: Scrotum      Last photo: 5/27  Wound due to: Abscess  Wound history/plan of care: Wound noted during assessment of abdominal wounds 5/12. Surgery at bedside,  stated that inguinal canal is patent with the abdominal cavity and drainage most likely secondary to abdominal cavity contents, and recommended BID gauze dressings. Wound continued to leak copious amounts of blood/stool/intraabdominal fluid. Overnight and was pouched with an ostomy pouch by SICU resident overnight. Pouch continued to leak and was replaced with a primofit male external catheter set to low continuous suction.  5/13: Ostomy pouch applied to area to collect drainage. Intact at time of assessment.  5/30: Pouch in place draining thin brown liquid.  Wound base: 100 % Moist scrotal tissue     Palpation of the wound bed: fluctuance      Drainage: copious     Description of drainage: serosanguinous, purulent, bloody, and red     Measurements (length x width x depth, in cm): See photos - 4 small open areas.     Tunneling: N/A     Undermining: N/A  Periwound skin: Indurated      Color: red      Temperature: normal   Odor: none  Pain: moderate, tender  Pain interventions prior to dressing change: slow and gentle cares   Treatment goal: Drainage control  STATUS: stable  Supplies ordered: at bedside       Treatment Plan:     Left heel wound(s): Every 3 days Cleanse with saline and pat dry. Conform a sacral Mepilex around Pt's heel. Place in Prevalon boots. Float heels off of support surface with pillows under calves.    Buttocks/coccyx wound(s): Every 3 days   Cleanse the area with wound cleanser and pat dry.  Apply No sting film barrier to periwound skin.  Cover wound with Sacral Mepilex (#[id number])  Nursing to peel back dressing to assess wound bed with skin assessment.   Change dressing Q 3 days.  Turn and reposition Q 2hrs side to side only.  Ensure pt has Vikram-cushion while sitting up in the chair.  If not in ICU: Ensure air pump on bed and set to Isoflex.  FYI- If pt has constant incontinent loose stools needing dressing changes Q shift please discontinue the Mepilex dressing and apply criticaid barrier paste  "BID and PRN.     Trach wound: Perform trach cares per unit routine. Place a fenestrated optifoam between trach faceplate and Pt's skin.    Abdominal wound: BID and PRN. Remove soiled Kerlix. Gently absorb as much drainage from wound base as possible. It can be helpful to use yaunker suction to remove excess fluid. Cover exposed mesh with Xeroform gauze. (Change Xerform daily) Fluff Kerlix into superior and inferior wound base to help absorb drainage. Keep wound manager to gravity drainage. WO will assess wound manager daily MWF.    If wound manager fails, place wet to dry dressing with Xeroform gauze covered by Vashe moistened Kerlix Covered by Mextra Superabsorbant pads. Change PRN.    Drain tube cares: PRN for saturation. Cleanse analilia-tubular skin with Vashe and pat dry. Apply no sting barrier film to analilia-tubular area and allow to dry. Fenestrate a 5x5 Mextra super absorbant pad ([id number]) and place around tube to absorb drainage.     Scrotal wound: PRN with pouch leakage. Cleanse with wound cleanser and pat dry. Apply no sting barrier film to analilia-wound area and allow to dry. Cut a small Josephine pouch ([id number]) large enough to fit Pt's penis and fistula openings. Apply a strip of Josephine ring around the area and apply pouch. Place pouch to straight drainage to assist with pouch integrity.      Pressure Injury Prevention (PIP) Plan:  If patient is declining pressure injury prevention interventions: Explore reason why and address patient's concerns, Educate on pressure injury risk and prevention intervention(s), If patient is still declining, document \"informed refusal\" , and Ensure Care team is aware ( provider, charge nurse, etc)  Mattress: Follow bed algorithm, add Low Air Loss (Air+) mattress pump if skin is very moist or constantly moist.   HOB: Maintain at or below 30 degrees, unless contraindicated  Repositioning in bed: Every 1-2 hours , Left/right positioning; avoid supine, Raise foot of bed prior to raising " head of bed, to reduce patient sliding down (shear), and Frequent microturns using positioning wedges, as patient tolerates  Heels: Pillows under calves and Heel lift boots  Protective Dressing: Sacral Mepilex for prevention (#[id number]),  especially for the agitated patient   Positioning Equipment:Positioning wedges (#593051) to help maintain 30 degree side lying position   Chair positioning: Chair cushion (#[id number]) , Assist patient to reposition hourly, and Do NOT use a donut for sitting (this increases pressure to smaller area and creates a higher potential for injury)    If patient has a buttock pressure injury, or high risk for PI use chair cushion or SPS.  Moisture Management: Perineal cleansing /protection: Follow Incontinence Protocol, Avoid brief in bed, Clean and dry skin folds with bathing , Consider InterDry (#472037) between folds, and Moisturize dry skin  Under Devices: Inspect skin under all medical devices during skin inspection , Ensure tubes are stabilized without tension, and Ensure patient is not lying on medical devices or equipment when repositioned  Ask provider to discontinue device when no longer needed.     Orders: Reviewed    RECOMMEND PRIMARY TEAM ORDER: None, at this time  Education provided: plan of care  Discussed plan of care with: Nurse  Notify [facility] if wound(s) deteriorate.  Nursing to notify the Provider(s) and re-consult the WOC Nurse if new skin concern.    DATA:     Current support surface: Standard  Low air loss (ISABELL pump, Isolibrium, Pulsate)  Containment of urine/stool: Incontinent pad in bed, Indwelling catheter, and Internal fecal management  BMI: Body mass index is 23.6 kg/m .   Active diet order: None     Output: I/O last 3 completed shifts:  In: 3814.17 [I.V.:1635.95; IV Piggyback:550]  Out: 4185.7 [Drains:1590; Other:2595.7]     Labs:   Recent Labs   Lab 05/30/25  0419 05/27/25  1500 05/27/25  0657   ALBUMIN 2.1*   < >  --    HGB 7.2*   < >  --    INR  --   --  1.28*   WBC  19.0*   < >  --     < > = values in this interval not displayed.     Pressure injury risk assessment:   Sensory Perception: 3-->slightly limited  Moisture: 3-->occasionally moist  Activity: 1-->bedfast  Mobility: 2-->very limited  Nutrition: 3-->adequate  Friction and Shear: 1-->problem  Dick Score: 13      Pager no longer in use, please contact through [employer] [facility]   Tiffanie group: [facility] Nurse [place]    Dept. Office Number: [phone]

## 2025-05-30 NOTE — PROGRESS NOTES
Blood pressures have remained on the low side with MAP ranging from 54 to 64. Notified Dr. Nash from SICU when he was rounding this evening.  Sopped pulling on CRRT as patient had ~500ml loss from dressing changes. Patient presently:     Intake/Output Summary (Last 24 hours) at 5/30/2025 1437  Last data filed at 5/30/2025 1400  Gross per 24 hour   Intake 3355.74 ml   Output 3924.2 ml   Net -568.46 ml     Lactic acid just reported at 4.8

## 2025-05-30 NOTE — PROGRESS NOTES
ICU End of Shift Summary. See flowsheets for vital signs and detailed assessment.    Changes this shift:   Pat has remained on pressure support today at 40% FiO2 5/5. Did not do any trach dome today 2/2 RR this AM was mid 40's and heart rate was 150's. Again this afternoon, patient was too sleepy to trach dome as patient was napping in the afternoon from not sleeping last night. Heart rate climbed as high as the 150's and seemed to be driven by anxiety mostly because when he would fall asleep, heart rate would drop. Tried going both up and down on the precedex gtt which really seemed to have no bearing on heart rate other than to make BP a little soft. Team made aware on blood pressure and lactic acid which is elevated but remains stable. Insulin gtt turned off today and sliding scale insulin started. Patient has been A & O x4 through the day although has seemed somewhat confused and or delirious from time to time. Getting one unit of PRBC's this evening for Hgb 6.6.     Plan:    Continue to monitor and assess. Follow treatment plan.

## 2025-05-30 NOTE — PROGRESS NOTES
IR progress note.    Please see IR consult note from Dianna Damico PA-C 5/27/25. Approved to proceed with bilateral RP drain placement though there was some concern about effectiveness of these drains given the amount of air present on imaging. Plan from ICU was to hold off and proceed with care conference.     We are now asked to proceed with RP drain placement x2. Procedure coordinated for 6/2. Pt currently receiving IV antibiotics. Subcutaneous heparin hold x1 dose.    Discussed with Dr. De La Fuente from IR and Dr. Zamarripa from ICU.    Jeanette Lee DNP, APRN  Interventional Radiology   IR on-call pager: [phone]

## 2025-05-30 NOTE — PROGRESS NOTES
CLINICAL NUTRITION SERVICES - BRIEF NOTE      Reason for RD note: Following up on metabolic cart results    New Findings/Chart Review:  Obtained metabolic cart study 5/29 @ 1550 with the following results: MREE = 2383 kcals/day (equiv to 34 kcal/kg/day) with RQ = 0.91.  Pt received 1200 ml of TPN + goal lipids in 24 hours preceding the study providing 2018 kcals (85 % MREE).  RQ is within physiologic range; RQ is somewhat logical given provisions (hypocaloric) received prior to study - would expect RQ to be <0.85.  Would aim energy intakes minimally at % of this MREE, 6069-9295 kcal/d (equiv to 27-34 kcal/kg/day).    Interventions:    TPN change order for Saturday, 5/31 (tomorrow):  Increase dextrose to new goal of 340 g per day.  Continue  g per day, SMOF lipids 250 mL per day.  This will better meet kcal needs per met cart from 5/29.  -->Provides 2356 kcal/day (99% MREE from 5/29), GIR 3.37 mg/kg/min    Nutrition will continue to follow per protocol.    Tennille Conn, MARGOT, LD  Available on [employer] - can search by name or unit Dietitian  **Clinical Nutrition is no longer available via pager

## 2025-05-30 NOTE — PROGRESS NOTES
Care Management Follow Up    Length of Stay (days): 30    Expected Discharge Date: 06/30/2025     Concerns to be Addressed: basic needs     Patient plan of care discussed at interdisciplinary rounds: Yes    Anticipated Discharge Disposition: Other (Comments) (TBD)              Anticipated Discharge Services: Other (see comment) (TBD)  Anticipated Discharge DME: Other (see comment) (TBD)    Patient/family educated on Medicare website which has current facility and service quality ratings: no  Education Provided on the Discharge Plan: No  Patient/Family in Agreement with the Plan: unable to assess    Referrals Placed by CM/SW:    Private pay costs discussed: Not applicable    Discussed  Partnership in Safe Discharge Planning  document with patient/family: No     Handoff Completed: No, handoff not indicated or clinically appropriate    Additional Information:  Pt remains in the ICU requiring ICU level of care. Pt is on CRRT and intubated.     Next Steps:   -SW/CM will continue to follow for safe discharge planning and needs as they arise.     RY Murray   [facility]  Available via [employer]  Covering 4A/E ICU SW  Ph: [phone]

## 2025-05-30 NOTE — PROGRESS NOTES
Antimicrobial Stewardship Team Note    Antimicrobial Stewardship Program - A joint venture between [facility] and  Physicians to optimize antibiotic management.  NOT a formal consult - Restricted Antimicrobial Review     Patient: Sebastián Rodas  MRN: [id number]  Allergies: Patient has no known allergies.    Brief Summary: Sebastián Rodas is a 29 year-old male with history of alcohol use disorder, anxiety, and bipolar disorder who initially presented to OSH on 3/30/2025 with diffuse abdominal pain and alcohol withdrawal following a recent binge. Initial workup revealed leukocytosis and elevated lipase concerning for acute pancreatitis. Patient also developed encephalopathy and shock requiring ICU care, intubation, vasopressors, and CRRT started on 4/1. Course was further complicated by cardiomyopathy (EF 20-25%, improved to 65-70% by 4/14), necrotizing pancreatitis, and ongoing concern for infection despite 14 days of meropenem and caspofungin. On 4/27, patient had a clinical deterioration with imaging consistent with a likely perforated viscus and underwent emergent exploratory laparotomy, necrosectomy, transverse colectomy, abdominal washout, drain placement, and abthera placement.     Patient was transferred to [facility] on 4/30/25 for further surgical management and meropenem and micafungin were continued, later switched to Zosyn and micafungin was stopped on 5/3. Patient was taken to the OR on 5/1 with findings of colonic staple line breakdown, necrotic pancreas, vessel thrombosis, and adhesions. Colostomy with malankot drain and temporary abdominal closure performed. RTOR on 5/4 for exploratory surgery, additional necrotic pancreas removed with no obvious spillage or sign of bleeding. Patient was taken back to the OR on 5/7 for abdominal wash out with CTA GI bleed on 5/10 showing large area of active arterial extravasation in the upper abdomen, with the source vessel not clearly identified but appears  "to arise anterior to the celiac trunk. There was also associated large amount of hemoperitoneum extending into the open abdominal wound. MTP was initiated and IR arterial embolization of pancreatic vessels performed. Zosyn was discontinued on 5/11.    On 5/14, Zosyn was restarted for \"possible abdominal fecal contamination due to increased leukocytosis, hypothermia, and decreased stool output\". Patient was intermittently tachycardic with stable vent settings. CT abdomen/pelvis showed large volume of air and blood products throughout the abdomen and retroperitoneum with portions of the fluid appear rim-enhancing. Gas within the anterior abdomen has increased from prior study and marked gas throughout the retroperitoneum is new from prior; gas may represent extensive progression of fat necrosis versus superimposed gas-forming organism infection. No definite localizing signs for bowel/enteric source of gas. Per surgery no further surgical options and recommended GOC discussion. Patient had hemorraghic shock overnight of 5/18 with MTP and IR embolization completed. Plans for GOC discussion are being planned by team but this has been postponed per patient request. Blood cultures from 4/30-5/22 are all negative and Zosyn was continued pending GOC.    On 5/27, patient had hemodynamic decompensation requiring increased pressor support, increased lactic acidosis, and persistent leukocytosis. Remains afebrile with stable O2 needs. Zosyn was escalated to meropenem, micafungin, and IV vancomycin pending infectious workup. CT chest/abdomen/pelvis consistent with ongoing open abdomen with continued large encapsulated intraperitoneal and retroperitoneal air and fluid containing necrotic collections, though with decreased hemorrhagic components along the anterior inferior aspect of the right hepatic lobe, within the lesser sac, and throughout the anterior abdomen since 5/18/2025. No new or enlarging intraperitoneal or " retroperitoneal collection. IR was initially consulted for drain placement of bilateral retroperitoneal fluid collections however, this was held off by primary team given patient has improved after resuscitation.    Repeat blood cultures remain NGTD x3 days, nares MRSA PCR negative and endotracheal sputum culture grew Candida albicans, Staph haemolyticus, and Staph epidermidis. IV vancomycin was stopped on 5/29. Continued on meropenem and micafungin.           Active Anti-infective Medications   (From admission, onward)                 Start     Stop    05/27/25 0930  micafungin  (MYCAMINE) injection  100 mg,   Intravenous,   100 mL/hr,   EVERY 24 HOURS        Fungal Infection Prophylaxis       --    05/27/25 0900  meropenem  1 g,   Intravenous,   EVERY 8 HOURS        Intra-Abdominal Infection       --                  Assessment: Complex necrotizing pancreatitis  Patient has complex necrotizing pancreatitis s/p multiple surgeries and prolonged broad spectrum antimicrobial therapy for over 4 weeks. Patient had a recent decompensation requiring increased pressor support, now improved with resuscitation, for which Zosyn was escalated to meropenem and micafungin pending infectious workup. Remains afebrile and on stable vent settings. Leukocytosis is expected and will persist given necrotizing pancreatitis conduplicated by necrotic collection and hemorrhagic components. Repeat infectious workup has remained unremarkable with no growth on blood cultures. Endotracheal sputum culture grew multiple coagulase Staph species and Candida albicans which are colonizers and do not require antimicrobial treatment. Patient's respiratory status has remained stable without any new findings of consolidations concerning for developing bacterial pneumonia. Additionally, CT of abdomen/pelvis is without findings of abdominal perforation or new or enlarging intraperitoneal or retroperitoneal collection that would require antifungal  coverage. We recommend discontinuing micafungin as there is no clear indication for empiric micafungin.  Patient does not have history of or current culture isolation of ESBL-producing organisms or multi-drug resistant Pseudomonas to warrant ongoing carbapenem therapy with meropenem. Patient's clinical decompensation unlikely related new infection given unremarkable infectious workup and imaging. We recommend discontinuing meropenem and starting Zosyn as GOC discussion is ongoing to determine plans for antibiotics. If team is concerned for new infection and wanting to continue current micafungin and meropenem, we recommend ID consult if within GOC further evaluation and antimicrobial management.       Recommendations:  Discontinue micafungin  Switch meropenem to Zosyn 4.5 g every 6 hours pending GOC to determine antibiotic plan  ID consult for further evaluation and antimicrobial management if concerns for new infection     Pharmacy took the following actions: Electronic note created, Called/paged provider.    Discussed with ID Staff: MD Jessa Lawson, PharmD, BCIDP  Pager: [phone]    Vital Signs/Clinical Features:  Vitals         05/28 0700  05/29 0659 05/29 0700  05/30 0659 05/30 0700  05/30 1554   Most Recent      Temp ( F) 96.9 -  98.3    98.2 -  98.7    99 -  99.7     99 (37.2) 05/30 1200    Pulse 77 -  142    97 -  153    121 -  139     128 05/30 1505    Resp 11 -  22    13 -  36    19 -  29     29 05/30 1505    /64 -  137/79    89/48 -  127/80    72/49 -  105/58     87/54 05/30 1505    SpO2 (%) 89 -  100    93 -  100    97 -  100     100 05/30 1505            Labs  Estimated Creatinine Clearance: 205 mL/min (A) (based on SCr of 0.52 mg/dL (L)).  Recent Labs   Lab Test 05/28/25  0318 05/28/25  1600 05/29/25  0325 05/29/25  1533 05/30/25  0419 05/30/25  1441   CR 0.53* 0.46* 0.44* 0.44* 0.44* 0.52*       Recent Labs   Lab Test 04/30/25  2201 05/01/25  0020 05/10/25  1841 05/10/25  1952  05/27/25  0401 05/27/25  1500 05/28/25 0318 05/28/25 1600 05/29/25 0325 05/29/25 1533 05/30/25 0419 05/30/25  1441   WBC 15.4*   < > 9.9   < > 32.1*  32.1*   < > 21.8* 19.9* 22.4* 20.2* 19.0* 27.3*   ANEU 12.7*  --  8.5*  --  27.4*  --   --   --   --   --   --   --    ALYM 1.1  --  0.6*  --  1.6  --   --   --   --   --   --   --    ANKIT 0.4  --  0.5  --  0.3  --   --   --   --   --   --   --    AEOS 0.0  --  0.0  --  0.2  --   --   --   --   --   --   --    HGB 7.7*   < > 9.3*  9.3*   < > 7.1*  7.1*   < > 7.4* 7.5* 7.5* 7.7* 7.2* 6.6*   HCT 24.3*   < > 28.5*   < > 23.0*  23.0*   < > 22.9* 23.6* 24.1* 24.7* 23.2* 21.2*   MCV 96   < > 92   < > 94  94   < > 94 96 96 97 98 98   *   < > 73*   < > 568*  568*   < > 328 327 307 312 237 229    < > = values in this interval not displayed.       Recent Labs   Lab Test 05/25/25  0330 05/25/25  1614 05/26/25 0321 05/26/25  1624 05/27/25  0401 05/27/25 1500 05/28/25 0318 05/28/25 1600 05/29/25 0325 05/29/25 1533 05/30/25 0419 05/30/25  1441   BILITOTAL 1.4*  --  1.1  --  1.5*  --  0.9  --  0.8  --  0.8  --    ALKPHOS 1,164*  --  1,042*  --  658*  --  431*  --  584*  --  645*  --    ALBUMIN 2.3*   < > 2.5*   < > 2.3*   < > 2.0* 1.9* 2.1* 2.2* 2.1* 1.9*   AST 41  --  30  --  34  --  26  --  30  --  38  --    ALT 55  --  57  --  43  --  33  --  37  --  44  --     < > = values in this interval not displayed.       Recent Labs   Lab Test 05/14/25  0403 05/14/25  1151 05/16/25  0319 05/16/25  1032 05/19/25  0407 05/19/25  0807 05/22/25  0403 05/22/25  1556 05/25/25  0330 05/26/25  1847 05/28/25  0318 05/28/25  0845 05/28/25  1157 05/29/25  0325 05/30/25  0419 05/30/25  1441   LACT  --    < > 2.5*   < > 3.3*   < > 3.9*   < > 3.2*   < > 3.6* 3.3* 2.9* 3.5* 4.9* 4.8*   CRPI 454.00*  --  344.00*  --  210.00*  --  276.00*  --  86.00*  --  291.00*  --   --   --   --   --     < > = values in this interval not displayed.             Culture Results:  7-Day Micro  Results       Procedure Component Value Units Date/Time    Respiratory Aerobic Bacterial Culture with Gram Stain [[id number]]  (Abnormal) Collected: 05/27/25 0702    Order Status: Completed Lab Status: Final result Updated: 05/29/25 1405    Specimen: Sputum from Endotracheal      Culture 2+ Candida albicans     Comment: Susceptibilities not routinely done, refer to antibiogram to view typical susceptibility profiles         1+ Staphylococcus haemolyticus     Comment: Susceptibilities not routinely done, refer to antibiogram to view typical susceptibility profiles         1+ Staphylococcus epidermidis     Comment: Susceptibilities not routinely done, refer to antibiogram to view typical susceptibility profiles        Gram Stain Result >25 PMNs/low power field      1+ Yeast    MRSA MSSA PCR, Nasal Swab [[id number]] Collected: 05/27/25 0701    Order Status: Completed Lab Status: Final result Updated: 05/27/25 0959    Specimen: Swab from Nares, Bilateral      MRSA Target DNA Negative     SA Target DNA Negative    Narrative:      The [facility]  Xpert SA Nasal Complete assay performed in the [employer]x System is a qualitative in vitro diagnostic test designed for rapid detection of Staphylococcus aureus (SA) and methicillin-resistant Staphylococcus aureus (MRSA) from nasal swabs in patients at risk for nasal colonization. The test utilizes automated real-time polymerase chain reaction (PCR) to detect MRSA/SA DNA. The Xpert SA Nasal Complete assay is intended to aid in the prevention and control of MRSA/SA infections in healthcare settings. The assay is not intended to diagnose, guide or monitor treatment for MRSA/SA infections, or provide results of susceptibility to methicillin. A negative result does not preclude MRSA/SA nasal colonization.     Blood Culture Peripheral blood (BC) Arm, Left [[id number]]  (Normal) Collected: 05/26/25 2245    Order Status: Completed Lab Status: Preliminary result Updated: 05/29/25  2331    Specimen: Peripheral blood (BC) from Arm, Left      Culture No growth after 3 days    Blood Culture Peripheral blood (BC) Wrist, Left [[id number]]  (Normal) Collected: 05/26/25 2241    Order Status: Completed Lab Status: Preliminary result Updated: 05/29/25 2331    Specimen: Peripheral blood (BC) from Wrist, Left      Culture No growth after 3 days    Blood Culture Peripheral blood (BC)     Order Status: Canceled Lab Status: No result     Specimen: Peripheral blood (BC)     Blood Culture Peripheral blood (BC)     Order Status: Canceled Lab Status: No result     Specimen: Peripheral blood (BC)                                     Imaging: CT Chest/Abdomen/Pelvis w Contrast  Result Date: 5/27/2025  EXAM: CT chest, abdomen, and pelvis with intravenous contrast. 5/27/2025 12:26 PM HISTORY: Evaluate for abscess, rising lactate, hypotension TECHNIQUE: Helical acquisition of image data was performed for the chest, abdomen, and pelvis with intravenous contrast. COMPARISON: Chest x-ray 5/27/2025, CTA chest, abdomen and pelvis 5/18/2025, CT abdomen and pelvis 5/14/2025 FINDINGS: : Unremarkable. Lines and tubes: Left anterior approach percutaneous abdominal drain terminates within the anterior peritoneum at the open abdomen. Right anterior lateral approach percutaneous drain terminates in the right upper quadrant adjacent to the border of the liver. Additional right anterior inferior percutaneous drain terminates in the left mid abdomen paracolic gutter. Right IJ central venous catheter tip terminates in the low SVC. Left upper extremity PICC tip terminates in the low SVC. Nonocclusive thrombus adherent to the right internal jugular central venous catheter within the right brachiocephalic vein and SVC. CHEST: Lungs/pleura/airways: Tracheotomy tube tip terminates in the mid thoracic trachea. No suspicious pulmonary nodules or masses. Right greater than left basilar atelectasis with continued complete collapse of  the right lower lobe, which is associated with endobronchial mucous plugging throughout the right lower lobe. Trace bilateral pleural effusions. No pneumothorax. Decreased bilateral interlobular septal thickening. Near resolution of the previously seen diffuse bilateral centrilobular nodularity persisting in the posterior inferior right upper lobe. Lower neck/axillae/mediastinum: Thyroid appears unremarkable. No enlarged axillary, mediastinal, or hilar lymph nodes by short axis criteria. The heart is not enlarged. No pericardial effusion. Thoracic aorta and main pulmonary artery are normal in caliber. No calcifications of the coronary arteries or aorta. The esophagus appears unremarkable. ABDOMEN/PELVIS: Liver: Decreased mass effect on the anterior liver. No intrahepatic biliary dilatation. No focal hepatic mass. Gallbladder/biliary tree: The common bile duct is not dilated. Excretion of contrast within the gallbladder. Pancreas: Postsurgical changes of necrosectomy. Stable extensive peripancreatic fat stranding and fluid. Decreased enhancement of the pancreatic tail since prior. Stable atrophy and relative hypoenhancement of the pancreatic neck and body. Spleen: Multiple wedge-shaped regions of parenchymal enhancement peripherally which are new from prior. Adrenal glands: No adrenal nodules. Kidneys/ureters: Mild bilateral hydronephrosis. The mid to distal ureters are surrounded by inflammation and retroperitoneal collections. No renal calculi. Bladder/pelvic organs: The urinary bladder is mildly distended. There is moderate mucosal hyperenhancement with irregular wall thickening. Bowel/mesentery: Postsurgical changes of transverse colectomy and Abthera placement. There is dense contrast material within the ascending colon. Right upper quadrant percutaneous tube terminates at the distal portion of the blind-ending proximal transverse colon adjacent to the staple line, similar to prior exam favored intraluminal  given the association with the hyperattenuating internal contents of the hepatic flexure colon. No dilated small or large bowel. Stomach: Interval removal of the percutaneous gastrojejunostomy tube. Enhancing gastric mucosa/rugae beyond the expected margin of the anterior gastric wall at the location of the prior percutaneous gastrojejunostomy tube (series 4 image 195). Peritoneum/retroperitoneum: Open abdomen with packing material anteriorly. Continued large volume multiloculated encapsulated intraperitoneal and retroperitoneal/pelvic extraperitoneal fluid and air with with extensive air. The collections are decreased along the anterior and inferior aspects of the right hepatic lobe, within the lesser sac, and throughout the anterior intraperitoneal space deep to the open abdominal wall defect corresponding to the sites of prior intraperitoneal hemorrhage on 5/18/2025. There is peritoneal wall hyperenhancement. There is peripheral enhancement along the borders of several areas of the fluid collections including the fluid tracking into the inguinal canals and right scrotum. Lymph nodes: Multiple enlarged mesenteric lymph nodes are decreased in size from prior, for example in right mesenteric lymph node measures 7 mm in short axis, previously measuring 11 mm (series 5, image 77). Major vessels: Post procedure changes of prior coil embolization splenic artery. BONES/SOFT TISSUE: Moderate anasarca. There is peripherally enhancing fluid tracking along the right anterior abdominal wall.     IMPRESSION: 1. Open abdomen with continued large encapsulated intraperitoneal and retroperitoneal air and fluid containing necrotic collections, though with decreased hemorrhagic components along the anterior inferior aspect of the right hepatic lobe, within the lesser sac, and throughout the anterior abdomen since 5/18/2025. No new or enlarging intraperitoneal or retroperitoneal collection 2. Postprocedural changes of splenic artery  coil embolization with new multifocal regions of wedge-shaped splenic parenchymal nonenhancement, likely infarcts. Similarly, new non-enhancement of the pancreatic tail which is also likely related to splenic artery embolization with parenchymal infarct and/or necrotizing pancreatitis 3. Postoperative changes of transverse colectomy with intraluminal colon catheter placement in the proximal transverse colon. The catheter is stable within the lumen of the blind-ending proximal transverse colon. Multiple intra-abdominal drains. 4. Mild bilateral hydronephrosis, likely related to collections/inflammation surrounding the ureters. 5. Gastric rugal folds are partially herniating through the anterior wall of the gastric antrum at the site of a prior percutaneous gastrojejunostomy tube. 6. Extensive mesenteric lymphadenopathy is slightly decreased in extent from prior CT abdomen and pelvis 5/18/2025. 7. Decreased pulmonary edema since 5/18/2025 with continued small bilateral pleural effusions and right greater than left basilar atelectasis, including complete collapse of the right lower lobe associated with extensive endobronchial mucous plugging. 8. Near resolution of the previously seen diffuse centrilobular nodularity, persist in the posterior inferior right upper lobe, likely an improving infectious/inflammatory process. 9. Nonocclusive thrombus adherent to the right internal jugular central venous catheter within the right brachiocephalic vein and SVC. I have personally reviewed the examination and initial interpretation and I agree with the findings. GONZALEZ SALAZAR DO   SYSTEM ID:  [id number]    XR Chest Port 1 View  Result Date: 5/27/2025  Exam: XR CHEST PORT 1 VIEW, 5/27/2025 6:58 AM Comparison: 5/22/2025 History: respiratory failure Findings: Single AP portable supine view of the chest. Tracheostomy tube. Stable position Right IJ central venous catheter and left upper extremity PICC line Trachea is midline.  Mediastinum is within normal limits. Cardiopulmonary silhouette is within normal limits. Perihilar and bibasilar streaky opacities. No pneumothorax. Small bilateral pleural effusions. The upper abdomen is unremarkable.     Impression: Slightly decreased perihilar and basilar opacities likely infection and atelectasis. Stable small infusions. I have personally reviewed the examination and initial interpretation and I agree with the findings. CHEO FELTON MD   SYSTEM ID:  [id number]     Lower Extremity Venous Duplex Bilateral  Result Date: 5/26/2025  EXAMINATION: DOPPLER VENOUS ULTRASOUND OF BILATERAL LOWER EXTREMITIES, 5/26/2025 12:57 PM COMPARISON: None. HISTORY:  prolonged immobility with new left LE swelling. r/o DVT TECHNIQUE:  Gray-scale evaluation with compression, spectral flow and color Doppler assessment of the deep venous system of both legs from groin to knee, and then at the ankles. FINDINGS: In both lower extremities, the common femoral, femoral, popliteal and posterior tibial veins demonstrate normal compressibility and blood flow.     IMPRESSION: No evidence of deep venous thrombosis in either lower extremity. I have personally reviewed the examination and initial interpretation and I agree with the findings. SALMA YEN MD   SYSTEM ID:  [id number]

## 2025-05-30 NOTE — PROGRESS NOTES
"  PALLIATIVE CARE PROGRESS NOTE  [facility]     Patient Name: Sebastián Rodas  Date of Admission: 4/30/2025   Today the patient was seen for: care conference     Recommendations & Counseling     GOALS OF CARE:   Plan of care - Restorative and/or life-prolonging without limits  Messaged SICU team yesterday 5/29 to see if they had any palliative needs for Sebastián. They noted that within the next couple of days, it would be helpful to have a check in with him.   Sebastián notes that his symptoms are managed well enough with drips and prn medications.  I asked him if he had any questions about his current cares or the plan moving forward. He wrote \"What do you think I should know?\" So I advised him that currently, we are hoping that his abdomen heals and that he can get stronger to get off the vent. We are also supplementing with wound cares, abx if needed, and monitor labs and vitals. Noted it is good at least that he has been off pressor for a few days.  Sebastián then asked \"what about the feeding tube?\" I noted that currently, we are giving him TPN and that the plan would be to see if there is a way to insert another feeding tube or not. After further discussion, Sebastián confirmed that he asked that question as he is worried what it might mean if he cannot get a feeding tube placed again.  Then, Sebastián became anxious and asked that I call his mom and tell her \"it's an emergency\" to get her attention.  Called mother Marbella and summarized the visit thus far. She notes that surgical teams are working on \"a plan to insert drains and the feeding tube again\". This is Sebastián's understanding too.  Visit was cut short as Sebastián received some scheduled medication for anxiety which made him loopy and asked about \"rainbow powder from the big jeny\" and \"where the clown party is in this [place]\".   Updated SICU. They note that drains could be placed likely early next week with IR but feeding tube " and unlikely possibility as there is not good target and it would just feed into the abdominal. Appreciate SICU updating family.  My impression is that Sebastián is scared of dying and doesn't like the fact his prognosis is very poor. Marbella seems to be in denial and does not want to entertain the fact Sebastián is unlikely to get better. Both of these reactions are normal given his age and the tragedy of this situation. However I would urge teams to not necessarily shy away at continuing goals of care conversations. As such, appreciate SICU consideration for care conference (framed even more as a medical update) for sometime next week. Palliative remains available to assist with goals of care conversations along with primary team as needed   Information sharing/decision making preferences:  Sebastián prefers to have family present (mom, uncle and/or brother) when receiving medical updates and facing medical decisions.   He wants to have an active role in making decisions about his own care but also values input from family.  He is easily overwhelmed by multiple providers being in the room and multiple difficult conversations in one day.  Consider, limiting these conversations to no more than once a day with 1-2 providers.    ADVANCE CARE PLANNING:  No health care directive on file. Per system policy, Surrogate Decision-makers for Patients With Diminished Decision-making Capacity offers guidance on possible decision-makers. Marbella (mother) has been identified as a surrogate decision maker.   There is no POLST form on file, defer to patient and/or next of kin for decisions   Code status: Full Code    MEDICAL MANAGEMENT:   We are not actively managing symptoms at this time. Fentanyl drip and prn boluses and Precedex per SICU team    PSYCHOSOCIAL/SPIRITUAL:  Family - supported by Marbella (mom), [place] (uncle), 3 siblings. Lives with youngest sibling and his cat.  Friends  [employer], appreciate ongoing   support    Palliative Care will continue to follow.     Total time spent was 80 minutes regarding goals of care and support on the date of the encounter. These recommendations were given to the primary team via this note/in-person.    Sunny Cintron DO / Internal and Palliative Medicine   Securely message with the Vocera Web Console (learn more here)   Text page via [facility] Paging/Directory      Assessment          Sebastián Rodas is a 29-year-old male with a history of alcohol use disorder, anxiety, and bipolar disorder who was admitted on 3/30/2025 to OSH for alcohol withdrawal following a recent binge, presenting with diffuse abdominal pain. Initial workup revealed leukocytosis and elevated lipase concerning for acute pancreatitis.   On 4/27, after clinical deterioration and imaging consistent with a likely perforated viscus, he underwent emergent exploratory laparotomy, necrosectomy, transverse colectomy, abdominal washout, drain placement, and abthera placement.    Started on CRRT.       Patient was transferred to [facility] on 4/30/25 for further surgical management.  Reexploration of belly here at [facility] reveals no possibility of creating diverting colostomy. Course complicated by recurrent intra-abdominal bleeding requiring MTP and IR embolization x2 (5/10 and 5/18). Palliative following for goals of care conversations.      Interval History:     Multidisciplinary collaboration:  Reviewed notes from SICU, surgery team, and nursing. Spoke with bedside RN. Currently status quo, off pressors for 3 days. Still having drainage from abdomen but symptoms seem controlled.     Patient/family narrative  Seen and examined at bedside. Visit as above.       Physical Exam:   Temp:  [98.2  F (36.8  C)-99.7  F (37.6  C)] 99.7  F (37.6  C)  Pulse:  [] 128  Resp:  [13-36] 26  BP: ()/(45-83) 79/45  FiO2 (%):  [30 %-35 %] 30 %  SpO2:  [93 %-100 %] 100 %  155 lbs 3.26 oz    Physical Exam  General: laying in bed, not in acute  distress  Lungs: trach in place, remains on vent support  Abdomen: +foul smelling drainage from abdominal wound  Neuro: eyes open and tracking, engaged, communicates by mouthing words or nodding/shaking head, tries to write at times on paper with marker  Psych: normal mood and affect      Data Reviewed:     Imaging  No new imaging    Labs  CMP  Recent Labs   Lab 05/30/25  1005 05/30/25  0803 05/30/25  0637 05/30/25  0419 05/29/25  1620 05/29/25  1533 05/29/25  0342 05/29/25  0325 05/28/25  1608 05/28/25  1600 05/28/25  0511 05/28/25  0318 05/27/25  0748 05/27/25  0401   NA  --   --   --  137  --  139  --  138  --  140  --  139   < > 138   POTASSIUM  --   --   --  3.8  --  3.6  --  3.7  --  3.8  --  3.6   < > 3.9   CHLORIDE  --   --   --  105  --  106  --  106  --  107  --  106   < > 104   CO2  --   --   --  20*  --  21*  --  21*  --  20*  --  21*   < > 18*   ANIONGAP  --   --   --  12  --  12  --  11  --  13  --  12   < > 16*   * 131* 137* 131*   < > 102*   < > 121*   < > 84   < > 128*   < > 182*   BUN  --   --   --  37.1*  --  38.8*  --  36.7*  --  37.2*  --  40.8*   < > 47.8*   CR  --   --   --  0.44*  --  0.44*  --  0.44*  --  0.46*  --  0.53*   < > 0.58*   GFRESTIMATED  --   --   --  >90  --  >90  --  >90  --  >90  --  >90   < > >90   KARINA  --   --   --  8.1*  --  8.1*  --  8.1*  --  7.8*  --  8.3*   < > 8.4*   MAG  --   --   --  2.1  --  2.0  --  2.1  --  2.1  --  2.3   < > 2.2   PHOS  --   --   --  3.3  --  3.2  --  3.3  --  3.4  --  3.3   < > 3.4   PROTTOTAL  --   --   --  5.0*  --   --   --  5.4*  --   --   --  5.0*  --  5.4*   ALBUMIN  --   --   --  2.1*  --  2.2*  --  2.1*  --  1.9*  --  2.0*   < > 2.3*   BILITOTAL  --   --   --  0.8  --   --   --  0.8  --   --   --  0.9  --  1.5*   ALKPHOS  --   --   --  645*  --   --   --  584*  --   --   --  431*  --  658*   AST  --   --   --  38  --   --   --  30  --   --   --  26  --  34   ALT  --   --   --  44  --   --   --  37  --   --   --  33  --  43    < >  = values in this interval not displayed.     CBC  Recent Labs   Lab 05/30/25  0419 05/29/25  1533 05/29/25  0325 05/28/25  1600   WBC 19.0* 20.2* 22.4* 19.9*   RBC 2.37* 2.54* 2.51* 2.47*   HGB 7.2* 7.7* 7.5* 7.5*   HCT 23.2* 24.7* 24.1* 23.6*   MCV 98 97 96 96   MCH 30.4 30.3 29.9 30.4   MCHC 31.0* 31.2* 31.1* 31.8   RDW 17.5* 17.2* 16.9* 16.8*    312 307 327     INR  Recent Labs   Lab 05/27/25  0657   INR 1.28*     Arterial Blood Gas  Recent Labs   Lab 05/27/25  0657 05/26/25  2156 05/25/25  1220   PH 7.39 7.45 7.38   PCO2 32* 29* 40   PO2 75* 118* 87   HCO3 19* 20* 23   O2PER 40 40 40

## 2025-05-30 NOTE — PROGRESS NOTES
CRRT STATUS NOTE    DATA:  Time:  6:24 PM  Pressures WNL:  YES  Filter Status:  WDL    Problems Reported/Alarms Noted:  none    Supplies Present:  YES    ASSESSMENT:  Patient Net Fluid Balance:  +48 since midnight  Vital Signs:    Temp: 98.6  F (37  C) Temp src: Oral BP: 100/52 Pulse: (!) 122   Resp: 18 SpO2: 100 %  Labs:  Hgb 6.6, 1 RBC given  Goals of Therapy:  0-50cc/hr    INTERVENTIONS:   1 RBC for Hgb 6.6    PLAN:  Contact CRRT resource with any questions or concerns

## 2025-05-30 NOTE — PROGRESS NOTES
ICU End of Shift Summary. See flowsheets for vital signs and detailed assessment.    Changes this shift: Pt remains AOx4. Able to make needs known. Reporting pain overnight with care. PRN meds given. Dex remains on. SR-ST with rates reaching 160s when anxious or doing cares. Total 1L bolus given for soft pressures with good results. Remains off pressors. On PS overnight 30% 5/5. Abdomen remains taut. Open abdomen continues to have copious foul smelling drainage. Remains anuric on CRRT.     CRRT set went down ~0600. Remains on 4K bath.     Plan:  Trach Dome on days. Care of plan continues.

## 2025-05-30 NOTE — PROGRESS NOTES
Nephrology Progress Note  05/30/2025       Sebastián Rodas is a 29 yom with Bipolar disorder, ETOH use c/b necrotizing pancreatitis admitted 3/30/25 to [facility] for ETOH withdrawal and abdominal pain, found to have acute pancreatitis which has progressed to necrotizing pancreatitis complicated by SARAHI.  Seen by Nephrology at [place], started on CRRT 4/1.  Had periods of stability enough for iHD but back to CRRT on 4/21 and has been on since with continued bleeding.       Interval History :   Mr Rodas continues on CRRT, ordered for I=O and was 200cc negative yesterday, remains off of pressor and labs are stable on 4k baths.  Hemodynamics stable off of pressors today, still intermittently bleeding but stable the past ~48h.  Surgery recommending comfort measures, continuing to discuss GOC with family.     Assessment & Recommendations:   SARAHI-Baseline Cr 0.8 as recently as March 2025 before acute events, was started on CRRT emergently on 4/1 in setting of septic shock. Had ~2 weeks of stability enough to manage with iHD but back on CRRT 4/21 until tx to [facility] on 4/30. Running fevers with intraabdominal sepsis.  Continuing RRT from OSH, restarted CRRT on 5/2 after OR.  Hemodynamically remains intermittently tenuous due to intraabdominal bleeding and necrosis.                 -No need for new consent, continuing RRT started last month at [facility].                 -Access is tunneled RIJ from 4/21.                  -CRRT, standard dose.  All 4k baths, 0-50cc/h negative today. .      Volume-Wt down to within ~2kg of admit wt but has lost muscle mass being bedbound.  Has been off of pressors for >24h so will try to be gently negative as long as this remains true.  Ordered for 0-50cc/h.      Electrolytes-K 3.7 on all 4k baths, bicarb 21, stable on standard dose CRRT. .      BMD-No acute issues.      Anemia-Hgb 7.5, ongoing need for PRBC's.      Nutrition-On hold with bleeding, back on TPN.      Time spent: 50 minutes  "on this date of encounter for chart review, physical exam, medical decision making and co-ordination of care.      Seen and discussed with Dr Harris     Recommendations were communicated to primary team via verbal communication.      ASIF Roger CNS  Clinical Nurse Specialist  [phone]    Review of Systems:   I reviewed the following systems:  Gen: No fevers or chills  CV: No CP at rest  Resp: No SOB at rest  GI: No N/V    Physical Exam:   I/O last 3 completed shifts:  In: 3814.17 [I.V.:1635.95; IV Piggyback:550]  Out: 4185.7 [Drains:1590; Other:2595.7]   BP 98/61   Pulse (!) 138   Temp 99.7  F (37.6  C) (Oral)   Resp 19   Ht 1.727 m (5' 7.99\")   Wt 70.4 kg (155 lb 3.3 oz)   SpO2 100%   BMI 23.60 kg/m       GENERAL APPEARANCE: Vent via trach, CRRT running.   Pulmonary: Vent via trach  CV: Regular rhythm, normal rate   - Edema +2BLE  GI: Surgical dressing in place  MS: no evidence of inflammation in joints, no muscle tenderness  : No Garcia  SKIN: Abdominal dressing in place, multiple drains.   NEURO: Vent via trach, communicating non-verbally.     Labs:   All labs reviewed by me  Electrolytes/Renal -   Recent Labs   Lab Test 05/30/25  1005 05/30/25  0803 05/30/25  0637 05/30/25  0419 05/29/25  1620 05/29/25  1533 05/29/25  0342 05/29/25  0325   NA  --   --   --  137  --  139  --  138   POTASSIUM  --   --   --  3.8  --  3.6  --  3.7   CHLORIDE  --   --   --  105  --  106  --  106   CO2  --   --   --  20*  --  21*  --  21*   BUN  --   --   --  37.1*  --  38.8*  --  36.7*   CR  --   --   --  0.44*  --  0.44*  --  0.44*   * 131* 137* 131*   < > 102*   < > 121*   KARINA  --   --   --  8.1*  --  8.1*  --  8.1*   MAG  --   --   --  2.1  --  2.0  --  2.1   PHOS  --   --   --  3.3  --  3.2  --  3.3    < > = values in this interval not displayed.       CBC -   Recent Labs   Lab Test 05/30/25  0419 05/29/25  1533 05/29/25  0325   WBC 19.0* 20.2* 22.4*   HGB 7.2* 7.7* 7.5*    312 307       LFTs " -   Recent Labs   Lab Test 05/30/25  0419 05/29/25  1533 05/29/25  0325 05/28/25  1600 05/28/25  0318   ALKPHOS 645*  --  584*  --  431*   BILITOTAL 0.8  --  0.8  --  0.9   ALT 44  --  37  --  33   AST 38  --  30  --  26   PROTTOTAL 5.0*  --  5.4*  --  5.0*   ALBUMIN 2.1* 2.2* 2.1*   < > 2.0*    < > = values in this interval not displayed.       Iron Panel - No lab results found.        Current Medications:  Current Facility-Administered Medications   Medication Dose Route Frequency Provider Last Rate Last Admin    heparin ANTICOAGULANT injection 5,000 Units  5,000 Units Subcutaneous Q8H [facility] Sonia Moore NP   5,000 Units at 05/30/25 0619    [START ON 5/31/2025] hydrocortisone sodium succinate PF (solu-CORTEF) injection 50 mg  50 mg Intravenous Daily Olga Lidia Velarde PA-C        insulin aspart (NovoLOG) injection (RAPID ACTING)  1-12 Units Subcutaneous Q4H Olga Lidia Velarde PA-C        insulin glargine (LANTUS PEN) injection 20 Units  20 Units Subcutaneous Daily Sonia Moore NP   20 Units at 05/30/25 1000    Lidocaine (LIDOCARE) 4 % Patch 1-3 patch  1-3 patch Transdermal Q24H Durga Euceda MD        lipids 4 oil (SMOFLIPID) 20 % infusion 250 mL  250 mL Intravenous Q24H Brendon Haas DO 20.8 mL/hr at 05/29/25 2047 250 mL at 05/29/25 2047    LORazepam (ATIVAN) injection 0.5 mg  0.5 mg Intravenous Q8H Olga Lidia Velarde PA-C        meropenem (MERREM) 1 g vial to attach to  mL bag  1 g Intravenous Q8H Sonia Moore NP   1 g at 05/30/25 0747    [START ON 5/31/2025] micafungin (MYCAMINE) 100 mg in sodium chloride 0.9 % 100 mL intermittent infusion  100 mg Intravenous Q24H Schwinghammer, Olga Lidia, PA-C        OLANZapine (zyPREXA) IV injection 2.5 mg  2.5 mg Intravenous QPM Sonia Moore NP   2.5 mg at 05/29/25 2033    pantoprazole (PROTONIX) IV push injection 40 mg  40 mg Intravenous QAM Renee Zamora, CNP   40 mg at 05/30/25 2272     sodium chloride (PF) 0.9% PF flush 3 mL  3 mL Intracatheter Q8H Ganesh Griffiths MD   3 mL at 05/29/25 1427     Current Facility-Administered Medications   Medication Dose Route Frequency Provider Last Rate Last Admin    dexmedeTOMIDine (PRECEDEX) 4 mcg/mL in sodium chloride 0.9 % 100 mL infusion  0.1-1.2 mcg/kg/hr (Dosing Weight) Intravenous Continuous Sonia Moore NP 13.9 mL/hr at 05/30/25 1004 0.8 mcg/kg/hr at 05/30/25 1004    dextrose 10% infusion   Intravenous Continuous PRN Brendon Haas DO   Stopped at 05/22/25 1232    dialysate for CVVHD & CVVHDF (Phoxillum BK4/2.5)  12.5 mL/kg/hr CRRT Continuous Raheem Gabriel APRN  mL/hr at 05/30/25 0642 12.5 mL/kg/hr at 05/30/25 0642    fentaNYL (SUBLIMAZE) infusion   mcg/hr Intravenous Continuous Roxi Alston MD 2 mL/hr at 05/30/25 1000 100 mcg/hr at 05/30/25 1000    No heparin required   Does not apply Continuous PRN Tico Spain MD        norepinephrine (LEVOPHED) 16 mg in  mL infusion MAX CONC CENTRAL LINE  0.01-0.6 mcg/kg/min (Dosing Weight) Intravenous Continuous Celso Ambrose MD   Stopped at 05/28/25 0045    parenteral nutrition - ADULT compounded formula   CENTRAL LINE IV TPN CONTINUOUS Brendon Haas DO        parenteral nutrition - ADULT compounded formula   CENTRAL LINE IV TPN CONTINUOUS Brendon Haas DO 50 mL/hr at 05/29/25 2047 New Bag at 05/29/25 2047    POST-filter replacement solution for CVVHD & CVVHDF (Phoxillum BK4/2.5)   CRRT Continuous Tico Spain  mL/hr at 05/29/25 2103 New Bag at 05/29/25 2103    PRE-filter replacement solution for CVVHD & CVVHDF (Phoxillum BK4/2.5)  12.5 mL/kg/hr CRRT Continuous Raheem Gabriel APRN  mL/hr at 05/30/25 0642 12.5 mL/kg/hr at 05/30/25 0642    Reason statin not prescribed   Does not apply DOES NOT GO TO Rhys Bloom MD

## 2025-05-30 NOTE — PROGRESS NOTES
Surgery Progress Note  05/30/2025       Subjective:  NAEON. States abdominal exam is unchanged. Remains off pressors. Continues on meropenem and bright. R drain with increased stool like output. Patient failed pressure support trial again. Pulling fluid per CRRT.     Objective:  Temp:  [98.2  F (36.8  C)-98.7  F (37.1  C)] 98.3  F (36.8  C)  Pulse:  [] 137  Resp:  [13-36] 26  BP: ()/(47-83) 100/50  FiO2 (%):  [30 %-35 %] 30 %  SpO2:  [93 %-100 %] 98 %    I/O last 3 completed shifts:  In: 3814.17 [I.V.:1635.95; IV Piggyback:550]  Out: 4185.7 [Drains:1590; Other:2595.7]      Gen: Resting comfortably in bed  Neuro: alert and oriented to conversation    Resp: Ventilated via trach  Abd: Abdomen is taut but compressible, drains with minimal dark bloody output, Malecot with minimal succus, significant dark bloody output around midline incision. Abdominal wound covered with wound manager  : Scrotum with pouch in place with serous drainage     Labs:  Recent Labs   Lab 05/30/25  0419 05/29/25  1533 05/29/25  0325   WBC 19.0* 20.2* 22.4*   HGB 7.2* 7.7* 7.5*    312 307       Recent Labs   Lab 05/30/25  0637 05/30/25  0419 05/30/25  0416 05/29/25  1620 05/29/25  1533 05/29/25  0342 05/29/25  0325   NA  --  137  --   --  139  --  138   POTASSIUM  --  3.8  --   --  3.6  --  3.7   CHLORIDE  --  105  --   --  106  --  106   CO2  --  20*  --   --  21*  --  21*   BUN  --  37.1*  --   --  38.8*  --  36.7*   CR  --  0.44*  --   --  0.44*  --  0.44*   * 131* 123*   < > 102*   < > 121*   KARINA  --  8.1*  --   --  8.1*  --  8.1*   MAG  --  2.1  --   --  2.0  --  2.1   PHOS  --  3.3  --   --  3.2  --  3.3    < > = values in this interval not displayed.     Assessment/Plan:   Sebastián Rodas is a 29-year-old male with a history of alcohol use disorder, anxiety, and bipolar disorder who was admitted on 3/30/2025 for alcohol withdrawal following a recent binge, presenting with diffuse abdominal pain. Initial workup  "revealed leukocytosis and elevated lipase concerning for acute pancreatitis. He developed acute encephalopathy and was transferred to the ICU on 3/31, requiring intubation and vasopressors by 4/1 due to shock. Hospital course has been complicated by acute renal failure requiring CRRT, cardiomyopathy (initial LVEF 20-25%, improved to 65-70% by 4/14), and necrotizing pancreatitis with unorganized fluid collections. He completed a 14-day course of meropenem but remains intermittently febrile and critically ill, requiring ongoing dialysis and vasopressor support.     On 4/27, after clinical deterioration and imaging consistent with a likely perforated viscus, following family discussion, he underwent emergent exploratory laparotomy, necrosectomy, transverse colectomy, abdominal washout, and drain placement. Patient was transferred to [facility] on 4/30/25 for further surgical management. Went to OR 5/1 for re-open laparotomy, I&D, placement of colostomy tube in presumed previous colectomy staple line, necrosectomy, and Abthera placement.     On 5/2, increasing sanguineous output from drains concerning for bleeding from pancreatic bed. Family care conference held 5/2, plan for restorative cares at that time. Take back to OR twice (5/4 and 5/7) for abdominal washout. Per nursing note:     5/9 morning after he saw a picture of his abdomen that he \"desires to speak to team about updating goals of care, expressed desire to pull back on cares\". Goals of care conference on 5/10 with continuation of full code and restorative cares. CTA on 5/10 revealed large area of active arterial extravasation in the upper abdomen. MTP was started. Patient is now s/p embolization of small pancreatic branch off of the distal celiac artery with IR on 5/10. Malecot drainage slowed and eventually stopped, with significant drainage through other drains and open into dressings.     5/17 night patient had active extravasation, with Hgb dropping from 7.8 to " 5.6 in 1.5 hours. Lactate spiked from 3.6 to 10.6, with WBC 14.9 to 25.5. Family was consulted, MTP was started, and IR embolized the celiac trunk/splenic artery branches.     5/27 overnight, patient had HR sustained in 140-150s with MAP in 40-50s. BP remained low despite boluses; levophed GTT restarted. Large amount of blood leaking from abdominal dressing requiring 1u PRBC.  Lactate 5.7 and Hgb 7.4. PEG tube came out during dressing change overnight. Increasing drainage also noted from superior aspect of midline. Bowel noted to be exposed d/t mesh detaching from fascia.     - No further surgical options for any intraabdominal pathology  - Nursing to change dressings in the am, pm, and PRN during the day if copious drainage during the day. Please place Xeroform over the bridging mesh (please continue cares). Please do not apply xeroform outside the wound bed/over the skin edges but make sure all of the wound bed is covered.  - Tube feeds on hold. Receiving TPN (50 mL/hr), and Lipids & Albumin as needed.  - Antibiotics per SICU   - SQH PPX, would not recommend therapeutic dose given the risk of bleeding.   - Appreciate WOC cares, okay with wound manager if able.   - Other cares per SICU, we appreciate cares   - Recommend comfort measures      Seen, examined, and discussed with chief resident, who will discuss with staff.     Jose M Davidson DO, MS   General Surgery, PGY 1

## 2025-05-30 NOTE — PROGRESS NOTES
CRRT STATUS NOTE    DATA:  Time:  0654  Pressures WNL:  YES  Filter Status:  WDL    Problems Reported/Alarms Noted:  none    Supplies Present:  YES    ASSESSMENT:    Patient Net Fluid Balance:  +517ml net since midnight       Intake/Output Summary (Last 24 hours) at 5/30/2025 0654  Last data filed at 5/30/2025 0601  Gross per 24 hour   Intake 3814.17 ml   Output 4185.7 ml   Net -371.53 ml       Vital Signs: Temp:  [98.2  F (36.8  C)-98.7  F (37.1  C)] 98.3  F (36.8  C)  Pulse:  [] 138  Resp:  [13-36] 36  BP: ()/(47-83) 112/61  FiO2 (%):  [30 %-35 %] 30 %  SpO2:  [93 %-100 %] 97 %       Most Recent BMP's:  Recent Labs   Lab Test 05/30/25 0419 05/29/25  1533 05/29/25 0325 05/28/25  1600 05/28/25  0318 05/27/25  1500    139 138 140 139 138   POTASSIUM 3.8 3.6 3.7 3.8 3.6 3.9   CHLORIDE 105 106 106 107 106 104   CO2 20* 21* 21* 20* 21* 17*   BUN 37.1* 38.8* 36.7* 37.2* 40.8* 45.3*   CR 0.44* 0.44* 0.44* 0.46* 0.53* 0.58*   ANIONGAP 12 12 11 13 12 17*   KARINA 8.1* 8.1* 8.1* 7.8* 8.3* 8.2*     Most Recent CBC's:  Recent Labs   Lab Test 05/30/25  0419 05/29/25  1533 05/29/25  0325 05/28/25  1600   WBC 19.0* 20.2* 22.4* 19.9*   HGB 7.2* 7.7* 7.5* 7.5*   MCV 98 97 96 96    312 307 327     Most Recent ABG's:  Recent Labs   Lab Test 05/27/25  0657 05/26/25  2156 05/25/25  1220   PH 7.39 7.45 7.38   PO2 75* 118* 87   PCO2 32* 29* 40   HCO3 19* 20* 23   MARGOTH -5.4* -3.6* -1.6       Goals of Therapy:  0-50 net neg     INTERVENTIONS:   Filter clotted off, unable to return blood. Restarted without issue.  Charting reviewed. Rounding completed. Treatment plan discussed with bedside nurse.     PLAN:  Continue with current plan of care please contact CRRT resource with any questions or concerns via [employer].

## 2025-05-31 LAB
ALBUMIN SERPL BCG-MCNC: 2 G/DL (ref 3.5–5.2)
ALBUMIN SERPL BCG-MCNC: 2.4 G/DL (ref 3.5–5.2)
ALP SERPL-CCNC: 459 U/L (ref 40–150)
ALT SERPL W P-5'-P-CCNC: 39 U/L (ref 0–70)
ANION GAP SERPL CALCULATED.3IONS-SCNC: 13 MMOL/L (ref 7–15)
ANION GAP SERPL CALCULATED.3IONS-SCNC: 14 MMOL/L (ref 7–15)
AST SERPL W P-5'-P-CCNC: 37 U/L (ref 0–45)
BACTERIA SPEC CULT: NO GROWTH
BACTERIA SPEC CULT: NO GROWTH
BASE EXCESS BLDV CALC-SCNC: -6.1 MMOL/L (ref -3–3)
BILIRUB SERPL-MCNC: 1.2 MG/DL
BILIRUBIN DIRECT (ROCHE PRO & PURE): 0.95 MG/DL (ref 0–0.45)
BLD PROD TYP BPU: NORMAL
BLOOD COMPONENT TYPE: NORMAL
BUN SERPL-MCNC: 42 MG/DL (ref 6–20)
BUN SERPL-MCNC: 43.1 MG/DL (ref 6–20)
CA-I BLD-MCNC: 4.6 MG/DL (ref 4.4–5.2)
CA-I BLD-MCNC: 4.7 MG/DL (ref 4.4–5.2)
CALCIUM SERPL-MCNC: 7.7 MG/DL (ref 8.8–10.4)
CALCIUM SERPL-MCNC: 7.8 MG/DL (ref 8.8–10.4)
CHLORIDE SERPL-SCNC: 105 MMOL/L (ref 98–107)
CHLORIDE SERPL-SCNC: 106 MMOL/L (ref 98–107)
CODING SYSTEM: NORMAL
CREAT SERPL-MCNC: 0.46 MG/DL (ref 0.67–1.17)
CREAT SERPL-MCNC: 0.49 MG/DL (ref 0.67–1.17)
CROSSMATCH: NORMAL
CRP SERPL-MCNC: 238 MG/L
EGFRCR SERPLBLD CKD-EPI 2021: >90 ML/MIN/1.73M2
EGFRCR SERPLBLD CKD-EPI 2021: >90 ML/MIN/1.73M2
ERYTHROCYTE [DISTWIDTH] IN BLOOD BY AUTOMATED COUNT: 16.9 % (ref 10–15)
ERYTHROCYTE [DISTWIDTH] IN BLOOD BY AUTOMATED COUNT: 17.2 % (ref 10–15)
ERYTHROCYTE [DISTWIDTH] IN BLOOD BY AUTOMATED COUNT: 17.2 % (ref 10–15)
GLUCOSE BLDC GLUCOMTR-MCNC: 150 MG/DL (ref 70–99)
GLUCOSE BLDC GLUCOMTR-MCNC: 172 MG/DL (ref 70–99)
GLUCOSE BLDC GLUCOMTR-MCNC: 175 MG/DL (ref 70–99)
GLUCOSE BLDC GLUCOMTR-MCNC: 175 MG/DL (ref 70–99)
GLUCOSE BLDC GLUCOMTR-MCNC: 187 MG/DL (ref 70–99)
GLUCOSE BLDC GLUCOMTR-MCNC: 229 MG/DL (ref 70–99)
GLUCOSE SERPL-MCNC: 181 MG/DL (ref 70–99)
GLUCOSE SERPL-MCNC: 187 MG/DL (ref 70–99)
HCO3 BLDV-SCNC: 21 MMOL/L (ref 21–28)
HCO3 SERPL-SCNC: 18 MMOL/L (ref 22–29)
HCO3 SERPL-SCNC: 22 MMOL/L (ref 22–29)
HCT VFR BLD AUTO: 23.2 % (ref 40–53)
HCT VFR BLD AUTO: 25 % (ref 40–53)
HCT VFR BLD AUTO: 26.6 % (ref 40–53)
HGB BLD-MCNC: 7.3 G/DL (ref 13.3–17.7)
HGB BLD-MCNC: 8 G/DL (ref 13.3–17.7)
HGB BLD-MCNC: 8.5 G/DL (ref 13.3–17.7)
INR PPP: 1.27 (ref 0.85–1.15)
ISSUE DATE AND TIME: NORMAL
LACTATE SERPL-SCNC: 3.5 MMOL/L (ref 0.7–2)
LACTATE SERPL-SCNC: 3.8 MMOL/L (ref 0.7–2)
LACTATE SERPL-SCNC: 5 MMOL/L (ref 0.7–2)
LACTATE SERPL-SCNC: 5 MMOL/L (ref 0.7–2)
LACTATE SERPL-SCNC: 5.2 MMOL/L (ref 0.7–2)
MAGNESIUM SERPL-MCNC: 2.2 MG/DL (ref 1.7–2.3)
MAGNESIUM SERPL-MCNC: 2.3 MG/DL (ref 1.7–2.3)
MCH RBC QN AUTO: 30.8 PG (ref 26.5–33)
MCHC RBC AUTO-ENTMCNC: 31.5 G/DL (ref 31.5–36.5)
MCHC RBC AUTO-ENTMCNC: 32 G/DL (ref 31.5–36.5)
MCHC RBC AUTO-ENTMCNC: 32 G/DL (ref 31.5–36.5)
MCV RBC AUTO: 96 FL (ref 78–100)
MCV RBC AUTO: 96 FL (ref 78–100)
MCV RBC AUTO: 98 FL (ref 78–100)
O2/TOTAL GAS SETTING VFR VENT: 40 %
OXYHGB MFR BLDV: 81 % (ref 70–75)
PCO2 BLDV: 49 MM HG (ref 40–50)
PH BLDV: 7.24 [PH] (ref 7.32–7.43)
PHOSPHATE SERPL-MCNC: 3.5 MG/DL (ref 2.5–4.5)
PHOSPHATE SERPL-MCNC: 4.3 MG/DL (ref 2.5–4.5)
PLATELET # BLD AUTO: 194 10E3/UL (ref 150–450)
PLATELET # BLD AUTO: 210 10E3/UL (ref 150–450)
PLATELET # BLD AUTO: 237 10E3/UL (ref 150–450)
PO2 BLDV: 52 MM HG (ref 25–47)
POTASSIUM SERPL-SCNC: 4.1 MMOL/L (ref 3.4–5.3)
POTASSIUM SERPL-SCNC: 4.2 MMOL/L (ref 3.4–5.3)
PROT SERPL-MCNC: 4.8 G/DL (ref 6.4–8.3)
PROTHROMBIN TIME: 15.8 SECONDS (ref 11.8–14.8)
RBC # BLD AUTO: 2.37 10E6/UL (ref 4.4–5.9)
RBC # BLD AUTO: 2.6 10E6/UL (ref 4.4–5.9)
RBC # BLD AUTO: 2.76 10E6/UL (ref 4.4–5.9)
SAO2 % BLDV: 82.5 % (ref 70–75)
SODIUM SERPL-SCNC: 137 MMOL/L (ref 135–145)
SODIUM SERPL-SCNC: 141 MMOL/L (ref 135–145)
UNIT ABO/RH: NORMAL
UNIT NUMBER: NORMAL
UNIT STATUS: NORMAL
UNIT TYPE ISBT: 5100
WBC # BLD AUTO: 29.2 10E3/UL (ref 4–11)
WBC # BLD AUTO: 34.8 10E3/UL (ref 4–11)
WBC # BLD AUTO: 42 10E3/UL (ref 4–11)

## 2025-05-31 PROCEDURE — 250N000011 HC RX IP 250 OP 636: Performed by: NURSE PRACTITIONER

## 2025-05-31 PROCEDURE — P9045 ALBUMIN (HUMAN), 5%, 250 ML: HCPCS | Performed by: NURSE PRACTITIONER

## 2025-05-31 PROCEDURE — 250N000011 HC RX IP 250 OP 636

## 2025-05-31 PROCEDURE — 82805 BLOOD GASES W/O2 SATURATION: CPT | Performed by: NURSE PRACTITIONER

## 2025-05-31 PROCEDURE — 80053 COMPREHEN METABOLIC PANEL: CPT | Performed by: CLINICAL NURSE SPECIALIST

## 2025-05-31 PROCEDURE — 90945 DIALYSIS ONE EVALUATION: CPT | Performed by: STUDENT IN AN ORGANIZED HEALTH CARE EDUCATION/TRAINING PROGRAM

## 2025-05-31 PROCEDURE — 258N000003 HC RX IP 258 OP 636: Performed by: NURSE PRACTITIONER

## 2025-05-31 PROCEDURE — 86140 C-REACTIVE PROTEIN: CPT

## 2025-05-31 PROCEDURE — P9016 RBC LEUKOCYTES REDUCED: HCPCS | Performed by: NURSE PRACTITIONER

## 2025-05-31 PROCEDURE — 999N000157 HC STATISTIC RCP TIME EA 10 MIN

## 2025-05-31 PROCEDURE — 84100 ASSAY OF PHOSPHORUS: CPT | Performed by: CLINICAL NURSE SPECIALIST

## 2025-05-31 PROCEDURE — 94003 VENT MGMT INPAT SUBQ DAY: CPT

## 2025-05-31 PROCEDURE — 258N000003 HC RX IP 258 OP 636

## 2025-05-31 PROCEDURE — 85610 PROTHROMBIN TIME: CPT | Performed by: NURSE PRACTITIONER

## 2025-05-31 PROCEDURE — 99291 CRITICAL CARE FIRST HOUR: CPT | Mod: 24 | Performed by: NURSE PRACTITIONER

## 2025-05-31 PROCEDURE — B4185 PARENTERAL SOL 10 GM LIPIDS: HCPCS | Performed by: SURGERY

## 2025-05-31 PROCEDURE — 250N000011 HC RX IP 250 OP 636: Mod: JW | Performed by: NURSE PRACTITIONER

## 2025-05-31 PROCEDURE — 87070 CULTURE OTHR SPECIMN AEROBIC: CPT | Performed by: NURSE PRACTITIONER

## 2025-05-31 PROCEDURE — 87040 BLOOD CULTURE FOR BACTERIA: CPT | Performed by: SURGERY

## 2025-05-31 PROCEDURE — 250N000011 HC RX IP 250 OP 636: Performed by: STUDENT IN AN ORGANIZED HEALTH CARE EDUCATION/TRAINING PROGRAM

## 2025-05-31 PROCEDURE — 83605 ASSAY OF LACTIC ACID: CPT | Performed by: NURSE PRACTITIONER

## 2025-05-31 PROCEDURE — 36415 COLL VENOUS BLD VENIPUNCTURE: CPT | Performed by: SURGERY

## 2025-05-31 PROCEDURE — 85027 COMPLETE CBC AUTOMATED: CPT

## 2025-05-31 PROCEDURE — 82330 ASSAY OF CALCIUM: CPT | Performed by: CLINICAL NURSE SPECIALIST

## 2025-05-31 PROCEDURE — 83735 ASSAY OF MAGNESIUM: CPT | Performed by: CLINICAL NURSE SPECIALIST

## 2025-05-31 PROCEDURE — 250N000009 HC RX 250: Performed by: SURGERY

## 2025-05-31 PROCEDURE — 82248 BILIRUBIN DIRECT: CPT | Performed by: STUDENT IN AN ORGANIZED HEALTH CARE EDUCATION/TRAINING PROGRAM

## 2025-05-31 PROCEDURE — 85014 HEMATOCRIT: CPT | Performed by: CLINICAL NURSE SPECIALIST

## 2025-05-31 PROCEDURE — 83605 ASSAY OF LACTIC ACID: CPT

## 2025-05-31 PROCEDURE — 200N000002 HC R&B ICU UMMC

## 2025-05-31 PROCEDURE — 85041 AUTOMATED RBC COUNT: CPT | Performed by: CLINICAL NURSE SPECIALIST

## 2025-05-31 PROCEDURE — 250N000012 HC RX MED GY IP 250 OP 636 PS 637: Mod: JA | Performed by: NURSE PRACTITIONER

## 2025-05-31 PROCEDURE — 90947 DIALYSIS REPEATED EVAL: CPT

## 2025-05-31 PROCEDURE — 250N000009 HC RX 250

## 2025-05-31 PROCEDURE — 250N000009 HC RX 250: Performed by: NURSE PRACTITIONER

## 2025-05-31 PROCEDURE — 250N000009 HC RX 250: Performed by: CLINICAL NURSE SPECIALIST

## 2025-05-31 RX ORDER — HYDROCORTISONE SODIUM SUCCINATE 100 MG/2ML
50 INJECTION INTRAMUSCULAR; INTRAVENOUS EVERY 6 HOURS
Status: DISCONTINUED | OUTPATIENT
Start: 2025-05-31 | End: 2025-06-02

## 2025-05-31 RX ORDER — THIAMINE HYDROCHLORIDE 100 MG/ML
200 INJECTION, SOLUTION INTRAMUSCULAR; INTRAVENOUS 2 TIMES DAILY
Status: DISCONTINUED | OUTPATIENT
Start: 2025-05-31 | End: 2025-06-03 | Stop reason: HOSPADM

## 2025-05-31 RX ORDER — MEROPENEM 1 G/1
1 INJECTION, POWDER, FOR SOLUTION INTRAVENOUS EVERY 8 HOURS
Status: DISCONTINUED | OUTPATIENT
Start: 2025-05-31 | End: 2025-06-02

## 2025-05-31 RX ORDER — OCTREOTIDE ACETATE 50 UG/ML
50 INJECTION, SOLUTION INTRAVENOUS; SUBCUTANEOUS EVERY 8 HOURS
Status: COMPLETED | OUTPATIENT
Start: 2025-05-31 | End: 2025-06-01

## 2025-05-31 RX ADMIN — LORAZEPAM 0.5 MG: 2 INJECTION INTRAMUSCULAR; INTRAVENOUS at 06:03

## 2025-05-31 RX ADMIN — INSULIN ASPART 2 UNITS: 100 INJECTION, SOLUTION INTRAVENOUS; SUBCUTANEOUS at 11:35

## 2025-05-31 RX ADMIN — CALCIUM CHLORIDE, MAGNESIUM CHLORIDE, SODIUM CHLORIDE, SODIUM BICARBONATE, POTASSIUM CHLORIDE AND SODIUM PHOSPHATE DIBASIC DIHYDRATE 12.5 ML/KG/HR: 3.68; 3.05; 6.34; 3.09; .314; .187 INJECTION INTRAVENOUS at 05:52

## 2025-05-31 RX ADMIN — MAGNESIUM SULFATE HEPTAHYDRATE: 500 INJECTION, SOLUTION INTRAMUSCULAR; INTRAVENOUS at 20:00

## 2025-05-31 RX ADMIN — Medication 150 MCG/HR: at 22:23

## 2025-05-31 RX ADMIN — PIPERACILLIN AND TAZOBACTAM 4.5 G: 4; .5 INJECTION, POWDER, LYOPHILIZED, FOR SOLUTION INTRAVENOUS at 00:14

## 2025-05-31 RX ADMIN — DEXMEDETOMIDINE HYDROCHLORIDE 0.6 MCG/KG/HR: 400 INJECTION INTRAVENOUS at 11:30

## 2025-05-31 RX ADMIN — INSULIN GLARGINE 20 UNITS: 100 INJECTION, SOLUTION SUBCUTANEOUS at 09:54

## 2025-05-31 RX ADMIN — MEROPENEM 1 G: 1 INJECTION, POWDER, FOR SOLUTION INTRAVENOUS at 14:36

## 2025-05-31 RX ADMIN — HYDROCORTISONE SODIUM SUCCINATE 50 MG: 100 INJECTION, POWDER, FOR SOLUTION INTRAMUSCULAR; INTRAVENOUS at 07:59

## 2025-05-31 RX ADMIN — NOREPINEPHRINE BITARTRATE 0.03 MCG/KG/MIN: 0.06 INJECTION, SOLUTION INTRAVENOUS at 01:26

## 2025-05-31 RX ADMIN — INSULIN ASPART 2 UNITS: 100 INJECTION, SOLUTION INTRAVENOUS; SUBCUTANEOUS at 04:05

## 2025-05-31 RX ADMIN — LORAZEPAM 0.5 MG: 2 INJECTION INTRAMUSCULAR; INTRAVENOUS at 22:23

## 2025-05-31 RX ADMIN — Medication 25 MCG: at 01:00

## 2025-05-31 RX ADMIN — PIPERACILLIN AND TAZOBACTAM 4.5 G: 4; .5 INJECTION, POWDER, LYOPHILIZED, FOR SOLUTION INTRAVENOUS at 06:04

## 2025-05-31 RX ADMIN — CALCIUM CHLORIDE, MAGNESIUM CHLORIDE, SODIUM CHLORIDE, SODIUM BICARBONATE, POTASSIUM CHLORIDE AND SODIUM PHOSPHATE DIBASIC DIHYDRATE 12.5 ML/KG/HR: 3.68; 3.05; 6.34; 3.09; .314; .187 INJECTION INTRAVENOUS at 00:14

## 2025-05-31 RX ADMIN — SODIUM CHLORIDE, SODIUM LACTATE, POTASSIUM CHLORIDE, AND CALCIUM CHLORIDE 500 ML: .6; .31; .03; .02 INJECTION, SOLUTION INTRAVENOUS at 01:40

## 2025-05-31 RX ADMIN — PANTOPRAZOLE SODIUM 40 MG: 40 INJECTION, POWDER, FOR SOLUTION INTRAVENOUS at 08:01

## 2025-05-31 RX ADMIN — HYDROCORTISONE SODIUM SUCCINATE 50 MG: 100 INJECTION, POWDER, FOR SOLUTION INTRAMUSCULAR; INTRAVENOUS at 19:48

## 2025-05-31 RX ADMIN — ALBUMIN HUMAN 25 G: 0.05 INJECTION, SOLUTION INTRAVENOUS at 13:55

## 2025-05-31 RX ADMIN — HYDROCORTISONE SODIUM SUCCINATE 50 MG: 100 INJECTION, POWDER, FOR SOLUTION INTRAMUSCULAR; INTRAVENOUS at 13:56

## 2025-05-31 RX ADMIN — OCTREOTIDE ACETATE 50 MCG: 50 INJECTION, SOLUTION INTRAVENOUS; SUBCUTANEOUS at 22:31

## 2025-05-31 RX ADMIN — CALCIUM CHLORIDE, MAGNESIUM CHLORIDE, SODIUM CHLORIDE, SODIUM BICARBONATE, POTASSIUM CHLORIDE AND SODIUM PHOSPHATE DIBASIC DIHYDRATE 12.5 ML/KG/HR: 3.68; 3.05; 6.34; 3.09; .314; .187 INJECTION INTRAVENOUS at 22:42

## 2025-05-31 RX ADMIN — MEROPENEM 1 G: 1 INJECTION, POWDER, FOR SOLUTION INTRAVENOUS at 07:49

## 2025-05-31 RX ADMIN — HEPARIN SODIUM 5000 UNITS: 5000 INJECTION, SOLUTION INTRAVENOUS; SUBCUTANEOUS at 13:56

## 2025-05-31 RX ADMIN — LORAZEPAM 0.5 MG: 2 INJECTION INTRAMUSCULAR; INTRAVENOUS at 13:56

## 2025-05-31 RX ADMIN — INSULIN ASPART 2 UNITS: 100 INJECTION, SOLUTION INTRAVENOUS; SUBCUTANEOUS at 07:58

## 2025-05-31 RX ADMIN — DEXMEDETOMIDINE HYDROCHLORIDE 0.7 MCG/KG/HR: 400 INJECTION INTRAVENOUS at 21:38

## 2025-05-31 RX ADMIN — THIAMINE HYDROCHLORIDE 200 MG: 100 INJECTION, SOLUTION INTRAMUSCULAR; INTRAVENOUS at 08:01

## 2025-05-31 RX ADMIN — INSULIN ASPART 1 UNITS: 100 INJECTION, SOLUTION INTRAVENOUS; SUBCUTANEOUS at 00:18

## 2025-05-31 RX ADMIN — INSULIN ASPART 4 UNITS: 100 INJECTION, SOLUTION INTRAVENOUS; SUBCUTANEOUS at 20:09

## 2025-05-31 RX ADMIN — DEXMEDETOMIDINE HYDROCHLORIDE 0.6 MCG/KG/HR: 400 INJECTION INTRAVENOUS at 02:09

## 2025-05-31 RX ADMIN — SMOFLIPID 250 ML: 6; 6; 5; 3 INJECTION, EMULSION INTRAVENOUS at 20:00

## 2025-05-31 RX ADMIN — SODIUM CHLORIDE, SODIUM LACTATE, POTASSIUM CHLORIDE, AND CALCIUM CHLORIDE 500 ML: .6; .31; .03; .02 INJECTION, SOLUTION INTRAVENOUS at 06:43

## 2025-05-31 RX ADMIN — HEPARIN SODIUM 5000 UNITS: 5000 INJECTION, SOLUTION INTRAVENOUS; SUBCUTANEOUS at 22:23

## 2025-05-31 RX ADMIN — OLANZAPINE 2.5 MG: 10 INJECTION, POWDER, FOR SOLUTION INTRAMUSCULAR at 19:48

## 2025-05-31 RX ADMIN — HEPARIN SODIUM 5000 UNITS: 5000 INJECTION, SOLUTION INTRAVENOUS; SUBCUTANEOUS at 06:03

## 2025-05-31 RX ADMIN — MEROPENEM 1 G: 1 INJECTION, POWDER, FOR SOLUTION INTRAVENOUS at 22:23

## 2025-05-31 RX ADMIN — THIAMINE HYDROCHLORIDE 200 MG: 100 INJECTION, SOLUTION INTRAMUSCULAR; INTRAVENOUS at 19:48

## 2025-05-31 RX ADMIN — INSULIN ASPART 2 UNITS: 100 INJECTION, SOLUTION INTRAVENOUS; SUBCUTANEOUS at 15:05

## 2025-05-31 RX ADMIN — Medication 150 MCG/HR: at 05:54

## 2025-05-31 RX ADMIN — OCTREOTIDE ACETATE 50 MCG: 50 INJECTION, SOLUTION INTRAVENOUS; SUBCUTANEOUS at 14:01

## 2025-05-31 RX ADMIN — Medication 25 MCG: at 02:02

## 2025-05-31 ASSESSMENT — ACTIVITIES OF DAILY LIVING (ADL)
ADLS_ACUITY_SCORE: 52

## 2025-05-31 NOTE — PLAN OF CARE
ICU End of Shift Summary. See flowsheets for vital signs and detailed assessment.    Changes this shift: MAPs in 40s after turning for bath. Levophed gtt restarted and 500cc LR bolus given. Pt c/o severe lower back pain. Provider assessed at bedside, lactic and CBC without significant changes. Intermittently anxious and forgetful. Fentanyl and precedex gtt titrated accordingly. CRRT continued, stopped fluid removal d/t increasing pressor needs. R SHANNON sutures no longer in place, team aware. Tmax 101.3. ST -150s. Lactic and WBC trending up, AM team placed orders for pan culture and additional 500cc LR bolus.     Plan: Continue with plan of care.     Goal Outcome Evaluation:      Plan of Care Reviewed With: patient    Overall Patient Progress: decliningOverall Patient Progress: declining    Outcome Evaluation: Back on levophed. WBC and lactic increasing

## 2025-05-31 NOTE — PROGRESS NOTES
CRRT STATUS NOTE    DATA:  Time:  5:38 PM  Pressures WNL:  YES  Filter Status:  WDL    Problems Reported/Alarms Noted:  none    Supplies Present:  YES    ASSESSMENT:  Patient Net Fluid Balance:  +1.3L since midnight  Vital Signs:  T 98.5     /64  Labs:  lactic 3.5  Hgb 7.3  K 4.2  Goals of Therapy:  0-50cc/hr    INTERVENTIONS:   1u PRBC    PLAN:  Contact CRRT resource with any questions or concerns

## 2025-05-31 NOTE — PROGRESS NOTES
CRRT STATUS NOTE    DATA:  Time:  6:00 AM  Pressures WNL:  YES  Filter Status:  WDL    Problems Reported/Alarms Noted:  None.    Supplies Present:  YES    ASSESSMENT:  Patient Net Fluid Balance:  Net -496 ml @ midnight, +517 ml @ 0600.  Vital Signs:  , /59, MAP 71  Labs:  K 4.1, Mg 2.2, Phos 3.5, iCa 4.7, Hgb 8.5, Plt 237  Goals of Therapy:  0-50cc/hr    INTERVENTIONS:   None.    PLAN:  Continue to monitor circuit daily and change set q72 hours or PRN for clotting/clogging. Please call CRRT RN with any questions/problems.

## 2025-05-31 NOTE — PROGRESS NOTES
Surgery Progress Note  05/31/2025       Subjective:  Overnight, patient became increasingly tachycardic to 140s with increased pressor requirements. Patient is currently on 0.1 of levo. Pulling fluid per CRRT. Resting in bed.     Objective:  Temp:  [98.1  F (36.7  C)-101.3  F (38.5  C)] 101.3  F (38.5  C)  Pulse:  [109-153] 135  Resp:  [16-36] 20  BP: ()/(27-67) 107/57  FiO2 (%):  [30 %-40 %] 40 %  SpO2:  [93 %-100 %] 99 %    I/O last 3 completed shifts:  In: 3635.59 [I.V.:1883.99]  Out: 4356.8 [Drains:1445; Other:2911.8]      Gen: Resting comfortably in bed  Neuro: alert and oriented to conversation    Resp: Ventilated via trach  Abd: Abdomen is taut but compressible, drains with minimal dark bloody output, Malecot with minimal succus, significant dark bloody output around midline incision. Abdominal wound covered with wound manager  : Scrotum with pouch in place with serous drainage     Labs:  Recent Labs   Lab 05/31/25  0402 05/31/25  0207 05/30/25 1951 05/30/25  1441   WBC 34.8* 29.2*  --  27.3*   HGB 8.5* 8.0* 8.0* 6.6*    210  --  229       Recent Labs   Lab 05/31/25  0404 05/31/25  0018 05/30/25 1952 05/30/25  1609 05/30/25  1441 05/30/25  0637 05/30/25  0419 05/29/25  1620 05/29/25  1533   NA  --   --   --   --  137  --  137  --  139   POTASSIUM  --   --   --   --  4.2  --  3.8  --  3.6   CHLORIDE  --   --   --   --  105  --  105  --  106   CO2  --   --   --   --  21*  --  20*  --  21*   BUN  --   --   --   --  40.4*  --  37.1*  --  38.8*   CR  --   --   --   --  0.52*  --  0.44*  --  0.44*   * 150* 119*   < > 161*   < > 131*   < > 102*   KARINA  --   --   --   --  7.7*  --  8.1*  --  8.1*   MAG  --   --   --   --  2.1  --  2.1  --  2.0   PHOS  --   --   --   --  3.5  --  3.3  --  3.2    < > = values in this interval not displayed.     Assessment/Plan:   Sebastián Rodas is a 29-year-old male with a history of alcohol use disorder, anxiety, and bipolar disorder who was admitted on  "3/30/2025 for alcohol withdrawal following a recent binge, presenting with diffuse abdominal pain. Initial workup revealed leukocytosis and elevated lipase concerning for acute pancreatitis. He developed acute encephalopathy and was transferred to the ICU on 3/31, requiring intubation and vasopressors by 4/1 due to shock. Hospital course has been complicated by acute renal failure requiring CRRT, cardiomyopathy (initial LVEF 20-25%, improved to 65-70% by 4/14), and necrotizing pancreatitis with unorganized fluid collections. He completed a 14-day course of meropenem but remains intermittently febrile and critically ill, requiring ongoing dialysis and vasopressor support.     On 4/27, after clinical deterioration and imaging consistent with a likely perforated viscus, following family discussion, he underwent emergent exploratory laparotomy, necrosectomy, transverse colectomy, abdominal washout, and drain placement. Patient was transferred to [facility] on 4/30/25 for further surgical management. Went to OR 5/1 for re-open laparotomy, I&D, placement of colostomy tube in presumed previous colectomy staple line, necrosectomy, and Abthera placement.     On 5/2, increasing sanguineous output from drains concerning for bleeding from pancreatic bed. Family care conference held 5/2, plan for restorative cares at that time. Take back to OR twice (5/4 and 5/7) for abdominal washout. Per nursing note:     5/9 morning after he saw a picture of his abdomen that he \"desires to speak to team about updating goals of care, expressed desire to pull back on cares\". Goals of care conference on 5/10 with continuation of full code and restorative cares. CTA on 5/10 revealed large area of active arterial extravasation in the upper abdomen. MTP was started. Patient is now s/p embolization of small pancreatic branch off of the distal celiac artery with IR on 5/10. Malecot drainage slowed and eventually stopped, with significant drainage through " other drains and open into dressings.     5/17 night patient had active extravasation, with Hgb dropping from 7.8 to 5.6 in 1.5 hours. Lactate spiked from 3.6 to 10.6, with WBC 14.9 to 25.5. Family was consulted, MTP was started, and IR embolized the celiac trunk/splenic artery branches.     5/27 overnight, patient had HR sustained in 140-150s with MAP in 40-50s. BP remained low despite boluses; levophed GTT restarted. Large amount of blood leaking from abdominal dressing requiring 1u PRBC.  Lactate 5.7 and Hgb 7.4. PEG tube came out during dressing change overnight. Increasing drainage also noted from superior aspect of midline. Bowel noted to be exposed d/t mesh detaching from fascia.     - No further surgical options for any intraabdominal pathology  - Nursing to change dressings in the am, pm, and PRN during the day if copious drainage during the day. Please place Xeroform over the bridging mesh (please continue cares). Please do not apply xeroform outside the wound bed/over the skin edges but make sure all of the wound bed is covered.  - Tube feeds on hold. Receiving TPN (50 mL/hr), and Lipids & Albumin as needed.  - Antibiotics per SICU   - SQH PPX, would not recommend therapeutic dose given the risk of bleeding.   - Appreciate WOC cares, okay with wound manager if able.   - Other cares per SICU, we appreciate cares   - Recommend comfort measures      Seen, examined, and discussed with chief resident, who will discuss with staff.     Jose M Davidson DO, MS   General Surgery, PGY 1

## 2025-05-31 NOTE — PROGRESS NOTES
ICU End of Shift Summary. See flowsheets for vital signs and detailed assessment.    Changes this shift:   Patient was on PST for most of the day today. This afternoon was placed on trach dome 35% 40 liters at 1700 and has remained on that since. Patient received a fluid bolus of albumin this afternoon as well and was able to wean down the levophed but was unable to turn it off as MAPs dropped into the low 50's when it was tiraled off. Heart rate also better now 's which is down from baseline of 120's to 130's. Heart rate hit as high as the 160's when waking up from naps and getting very anxious. During the albumin fluid bolus, patient's heart rate suddenly dropped to the 70's and became irregular and looked like Afib for a short time before settling back into 's to 110's. Allowed to gain some fluid through the day with CRRT per SICU team as well. Patient is presently   Intake/Output Summary (Last 24 hours) at 5/31/2025 1902  Last data filed at 5/31/2025 1800  Gross per 24 hour   Intake 3879.49 ml   Output 3143.9 ml   Net 735.59 ml    Patient was asking for straight answers this afternoon on why he is here and what his prognosis is. Writer told patient and brother that his prognosis is not good, he will never eat again, and he will live here until he codes and dies from any number of reasons. Also told that everything inside his abdomen is slowly dissolving and coming out his wound management system and drains.     Plan:    Continue to monitor and assess.    [FreeTextEntry1] : Follow up by Dr. Wick.

## 2025-05-31 NOTE — PROGRESS NOTES
Nephrology Dialysis Note    This patient was seen and examined while on dialysis. Laboratory results and nurses' notes were reviewed.    No changes to management of volume, anemia, BMD, acidosis, or electrolytes except as follows.     Diagnosis - SARAHI, on CRRT    Plan - Continue CRRT. Hemodynamics unstable today, likely impacting the I/Os that will be achieved toda.    Grant Crum MD  Nephrology

## 2025-05-31 NOTE — PROGRESS NOTES
SURGICAL ICU PROGRESS NOTE      Date of Service (when I saw the patient): 05/31/2025    ASSESSMENT:  Sebastián Rodas is a 29M with alcohol abuse, anxiety, and bipolar disorder, who was admitted 3/30/25 for alcohol withdrawal and abdominal pain. Workup showed leukocytosis and elevated lipase, consistent with acute pancreatitis. He developed encephalopathy and shock, requiring ICU care, intubation, vasopressors, and CRRT by 4/1. His course was complicated by cardiomyopathy (EF 20-25%, improved to 65-70% by 4/14), necrotizing pancreatitis, and persistent infection despite 14 days of meropenem. On 4/27, imaging showed likely perforated viscus; he underwent emergent laparotomy, necrosectomy, and colectomy. Transferred to [facility] SICU on 4/30. On 5/1, reoperation revealed colonic staple line breakdown, necrotic pancreas, vessel thrombosis, and adhesions. Colostomy with malankot drain and temporary abdominal closure performed. Prognosis is poor; palliative care involved. Care conference 5/2 with family to talk goals of care. Full code, family acknowledged that poor prognosis is to be expected. 5/4 s/p exploratory surgery, additional necrotic pancreas remove with no obvious spillage or sign of bleeding at the time. Went back to the OR 5/7 for abdominal wash out. CT abdomen 5/10 showed extravasation on arterial phase imaging. MTP initiated, IR arterial embolization of pancreatic vessels performed. Decreased stool output since 5/13, switched to TPN. Hemorraghic shock.  MTP 5/18, IR embolization 5/18.      CHANGES and MAJOR THINGS TODAY:    Repeat cultures  Rebroaden to Meropenem  Thiamine IV for sepsis  Replace arterial line  Increase stress dose steroids  Repeat lactate at 0800, add VBG with oxy  Check INR  LR bolus  CXR  Trach dome if able  POCUS  Start octreotide      PLAN:    Neurological:  # Acute pain   - Fentanyl gtt with bolus PRN    # Nerve pain  - Scheduled lidocaine patches  # Encephalopathy, toxic metabolic  #  Insomnia  - Monitor neurological status. Delirium preventions and precautions.   - More encephalopathic this morning  # Sedation: Precedex, weaning as able. Ativan 0.5 mg TID  # Anxiety  - RASS goal 0 to -1  - Olanzapine 2.5 mg @ night  - Olanzapine PRN      Pulmonary:   # Acute hypoxic respiratory failure  # S/p tracheostomy placement   FiO2 (%): 40 %, Resp: 21, Vent Mode: PS, PEEP (cm H2O): 5 cmH2O, Pressure Support (cm H2O): 5 cmH2O, PEEP (cm H2O): 5 cmH2O  CT PE 5/9 showed no evidence of pulmonary embolism. Negative again on 5/18.   - On PST this am, 5/5, continue as able. Trach dome today, pressure support overnight  - Pulmonary toilet, mobilize  - Ventilator bundle  - On PS overnight. TD trial today  - Wean FIO2 as tolerated  - SLP eval for speaking valve, attempted on 5/29, continue to re-evaluate     Cardiovascular:    # Stress Cardiomyopathy   - Initial LVEF 20-25%, improved to 65-70%  # Septic shock  # Sinus Tachycardia  # Lactic acidosis  - Monitor hemodynamic status. MAP goal > 65  - Back on Norepinephrine overnight. At 0.1 mcg/kg/min  - Lactate up  5.0-->5.2   - Repeat lactate. 500 ml LR bolus  - Add VBG  - IV thiamine x 4 days for sepsis     Gastroenterology/Nutrition:  # S/p emergent exploratory laparotomy, necrosectomy, transverse colectomy, abdominal washout, and drain placement 5/1 and 5/4  #s/p pancreatic branch of distal celiac artery embolization 5/10  # s/p splenic artery embolization 5/18   # Severe necrotizing pancreatitis  # Splenic vein thrombosis  - Drain output- 750 ml in wound manager, no output L  SHANNON (pancreatic bed), left scrotum 635. All other drains with no output.   - Wound manager output with mixed bilious drainage, and maroon output.   - Colotomy (in transverse colon) drain in place, no output. Abdomen distended.  - PPI IV  - Hold all Oral Meds and Feeds, strict NPO  - 5/27 CT C/A/P with large encapsulated intraperitoneal and retroperitoenal air and fluid containing necrotic  collections with decreased hemorrhagic components along the aterior inferior aspect of the right hepatic lobe.   - Patient will be discussed in GI conference 5/29. Discussed with IR 5/27, amenable to placement of bilateral retroperitoneal drains.  Plan for drain placement in the coming days (tentatively on 6/2).    - trial of Octretide     # Severe Protein calorie deficit malnutrition due to critical illness  # Hypertriglyceridemia   - Continue TPN. Lipids resumed (checking triglycerides M/Th)  - GJ tube migrated out. Keep NPO  - IR consulted for PEG exchange  - IR cannot safely exchange freshly placed GJ tubes until they have been in place x6 weeks given risk of losing access into the stomach. Additionally, imaging shows there is no safe pexy between the stomach and abdominal wall. IR may not be able to offer safe exchange even after 6 weeks.   - RD consult. Appreciate cares and recommendations.     Renal/Fluids/Electrolytes:   # SARAHI  # Intravascular hypovolemia  # total fluid overload  Baseline Cr 0.7-0.8. Presented with Cr 0.96 on 3/30. Rapidly markos to 5.2 on 4/1. Oliguric. Garcia UA with proteinuria, hematuria, pyuria, moderate bacteria.  Kidneys unremarkable on CT. Has severe ATN in the setting of shock, ADHF, intravascular hypovolemia/3rd spacing from pancreatitis.   - Nephrology consulted, appreciate recs  - CRRT, run even  - Continue to monitor intake and output  - Volume assessment daily      Endocrine:   # Stress and steroid induced hyperglycemia    Hgb A1c 5.3, no hx of DM   - Change to high sliding scale insulin on 5/30    - Keep lantus at 20    - Goal to keep BG< 180 for optimal wound healing   - On stress dose steroids, as above      ID:  # Leukocytosis  # Necrotizing pancreatitis  # Intraabdominal sepsis  # Respiratory candidiasis   - Trend CRP q3days (291 on 5/28, from 86 on 5/22)  - WBC 27 to 34K  - 5/30 Narrowed from Meropenem to Zosyn. Micafungin discontinued.   - In light of fever, rising WBC,  rising lactate and hypotension, will resume Meropenem for now.   - Repeat BC x2, sputum culture, UA with reflex.        cultures:  - 4/30 blood cultures- NGTD   - 5/1 blood cultures ordered - NGTD  - Blood culture 5/12, 5/15 - NGTD  - Blood culture 5/22 - +candida  - 5/27 Resp culture 2+ candida albicans, 1+ staph haemolyticus, 1+ staph epidermidis, +1 yeast     Heme:     # Acute blood loss anemia   # Anemia of critical illness   # Thrombosed splenic vein   #s/p pancreatic branch of distal celiac artery embolization 5/10  # s/p splenic artery embolization 5/18   #Non occlusive thrombus adherent to RIJ catheter on CT 5/27  - Transfuse if hgb <7.0 or signs/symptoms of hypoperfusion. Monitor and trend  - MTP 5/10, 5/18  - Continue subcutaneous heparin, watch bleeding from drains.  - s/p IR 5/18 coil embolization of splenic artery proximal to great pancreatic artery.   - Last transfused on 5/27  - Hold additional anticoagulation       Musculoskeletal:   # Deconditioning and weakness due to critical illness   # LLE swelling  - LLE DVT ultrasound 5/26 negative  - Physical and occupational therapy consult        Skin:  # Pressure Ulcers - Buttocks/rectal area  - Barrier cream with liberal application.   - WOC consult      #Pressure Ulcers- Left Heel  Pressure Injury Location: Left heel   Wound type: Pressure Injury     Pressure Injury Stage: Deep Tissue Pressure Injury (DTPI), present on admission     General Cares/Prophylaxis:    DVT Prophylaxis: SQH    GI Prophylaxis: PPI     Lines/ tubes/ drains:  - HD line--  Right IJ  - PICC line- left   - PIV x 3  - Trach  - RUQ ostomy  - LLQ drain  - Scrotal drain  - SHANNON x 3  - Colotomy drain     Disposition:  - Surgical ICU    Seen and discussed with staff.     Renee Sanchez La Mater  CC time 62 minutes exclusive of procedures    ====================================  SUBJECTIVE:  Hypotensive overnight. REsumed on norepinephrine.       OBJECTIVE:   1. VITAL SIGNS:   Temp: 100  F (37.8  " C) Temp src: Axillary BP: 106/57 Pulse: (!) 140   Resp: 21 SpO2: 100 % O2 Device: Mechanical Ventilator Oxygen Delivery: 40 LPM Height: 172.7 cm (5' 7.99\") Weight: 68.9 kg (151 lb 14.4 oz)  Estimated body mass index is 23.1 kg/m  as calculated from the following:    Height as of this encounter: 1.727 m (5' 7.99\").    Weight as of this encounter: 68.9 kg (151 lb 14.4 oz).      FiO2 (%): 40 %, Resp: 21, Vent Mode: PS, PEEP (cm H2O): 5 cmH2O, Pressure Support (cm H2O): 5 cmH2O, PEEP (cm H2O): 5 cmH2O      2. INTAKE/ OUTPUT:       Intake/Output Summary (Last 24 hours) at 5/30/2025 0635  Last data filed at 5/30/2025 0600  Gross per 24 hour   Intake 3814.17 ml   Output 4169 ml   Net -354.83 ml          3. PHYSICAL EXAMINATION:  General: NAD  Neuro: opens eyes to voice, drifts off to sleep, does not answer questions, does not follow commands, moving extremities spontaneously  HEENT: PERRLA. Trach present and secure, site dressed.   Pulm/Resp: Normal work of breathing, ventilated by tracheostomy on pressure support  CV: RRR, well perfused extremities  Abdomen: Distended. Dark red/brown output noted to all drains. Abdomen with dressing in place C/D/I  : scrotal edema, draining drk red/brown output.   MSK/Extremities: generalized edema in extremities. +3 left foot edema, 2+ right foot edema    4. INVESTIGATIONS:   Arterial Blood Gases   Recent Labs   Lab 05/27/25  0657 05/26/25  2156 05/25/25  1220   PH 7.39 7.45 7.38   PCO2 32* 29* 40   PO2 75* 118* 87   HCO3 19* 20* 23     Complete Blood Count   Recent Labs   Lab 05/31/25  0402 05/31/25  0207 05/30/25  1951 05/30/25  1441 05/30/25  0419   WBC 34.8* 29.2*  --  27.3* 19.0*   HGB 8.5* 8.0* 8.0* 6.6* 7.2*    210  --  229 237     Basic Metabolic Panel  Recent Labs   Lab 05/31/25  0404 05/31/25  0402 05/31/25  0018 05/30/25  1952 05/30/25  1609 05/30/25  1441 05/30/25  0637 05/30/25  0419 05/29/25  1620 05/29/25  1533   NA  --  137  --   --   --  137  --  137  --  139 "   POTASSIUM  --  4.1  --   --   --  4.2  --  3.8  --  3.6   CHLORIDE  --  105  --   --   --  105  --  105  --  106   CO2  --  18*  --   --   --  21*  --  20*  --  21*   BUN  --  42.0*  --   --   --  40.4*  --  37.1*  --  38.8*   CR  --  0.46*  --   --   --  0.52*  --  0.44*  --  0.44*   * 187* 150* 119*   < > 161*   < > 131*   < > 102*    < > = values in this interval not displayed.     Liver Function Tests  Recent Labs   Lab 05/31/25  0402 05/30/25  1441 05/30/25  0419 05/29/25  1533 05/29/25  0325 05/28/25  1600 05/28/25  0318 05/27/25  1500 05/27/25  0657   AST 37  --  38  --  30  --  26  --   --    ALT 39  --  44  --  37  --  33  --   --    ALKPHOS 459*  --  645*  --  584*  --  431*  --   --    BILITOTAL 1.2  --  0.8  --  0.8  --  0.9  --   --    ALBUMIN 2.0* 1.9* 2.1* 2.2* 2.1*   < > 2.0*   < >  --    INR  --   --   --   --   --   --   --   --  1.28*    < > = values in this interval not displayed.     Pancreatic Enzymes  No lab results found in last 7 days.    Coagulation Profile  Recent Labs   Lab 05/27/25  0657   INR 1.28*     5. RADIOLOGY:   No results found for this or any previous visit (from the past 24 hours).        =========================================

## 2025-06-01 LAB
ALBUMIN SERPL BCG-MCNC: 2.1 G/DL (ref 3.5–5.2)
ALBUMIN SERPL BCG-MCNC: 2.3 G/DL (ref 3.5–5.2)
ALP SERPL-CCNC: 340 U/L (ref 40–150)
ALT SERPL W P-5'-P-CCNC: 28 U/L (ref 0–70)
ANION GAP SERPL CALCULATED.3IONS-SCNC: 10 MMOL/L (ref 7–15)
ANION GAP SERPL CALCULATED.3IONS-SCNC: 11 MMOL/L (ref 7–15)
AST SERPL W P-5'-P-CCNC: 23 U/L (ref 0–45)
BILIRUB SERPL-MCNC: 0.8 MG/DL
BILIRUBIN DIRECT (ROCHE PRO & PURE): 0.61 MG/DL (ref 0–0.45)
BUN SERPL-MCNC: 38.9 MG/DL (ref 6–20)
BUN SERPL-MCNC: 39.9 MG/DL (ref 6–20)
CA-I BLD-MCNC: 4.7 MG/DL (ref 4.4–5.2)
CA-I BLD-MCNC: 4.8 MG/DL (ref 4.4–5.2)
CALCIUM SERPL-MCNC: 8 MG/DL (ref 8.8–10.4)
CALCIUM SERPL-MCNC: 8.1 MG/DL (ref 8.8–10.4)
CHLORIDE SERPL-SCNC: 106 MMOL/L (ref 98–107)
CHLORIDE SERPL-SCNC: 106 MMOL/L (ref 98–107)
CREAT SERPL-MCNC: 0.39 MG/DL (ref 0.67–1.17)
CREAT SERPL-MCNC: 0.39 MG/DL (ref 0.67–1.17)
EGFRCR SERPLBLD CKD-EPI 2021: >90 ML/MIN/1.73M2
EGFRCR SERPLBLD CKD-EPI 2021: >90 ML/MIN/1.73M2
ERYTHROCYTE [DISTWIDTH] IN BLOOD BY AUTOMATED COUNT: 16.4 % (ref 10–15)
ERYTHROCYTE [DISTWIDTH] IN BLOOD BY AUTOMATED COUNT: 16.5 % (ref 10–15)
GLUCOSE BLDC GLUCOMTR-MCNC: 114 MG/DL (ref 70–99)
GLUCOSE BLDC GLUCOMTR-MCNC: 123 MG/DL (ref 70–99)
GLUCOSE BLDC GLUCOMTR-MCNC: 126 MG/DL (ref 70–99)
GLUCOSE BLDC GLUCOMTR-MCNC: 135 MG/DL (ref 70–99)
GLUCOSE BLDC GLUCOMTR-MCNC: 139 MG/DL (ref 70–99)
GLUCOSE BLDC GLUCOMTR-MCNC: 146 MG/DL (ref 70–99)
GLUCOSE BLDC GLUCOMTR-MCNC: 162 MG/DL (ref 70–99)
GLUCOSE BLDC GLUCOMTR-MCNC: 184 MG/DL (ref 70–99)
GLUCOSE BLDC GLUCOMTR-MCNC: 225 MG/DL (ref 70–99)
GLUCOSE BLDC GLUCOMTR-MCNC: 241 MG/DL (ref 70–99)
GLUCOSE BLDC GLUCOMTR-MCNC: 242 MG/DL (ref 70–99)
GLUCOSE BLDC GLUCOMTR-MCNC: 252 MG/DL (ref 70–99)
GLUCOSE BLDC GLUCOMTR-MCNC: 99 MG/DL (ref 70–99)
GLUCOSE SERPL-MCNC: 139 MG/DL (ref 70–99)
GLUCOSE SERPL-MCNC: 278 MG/DL (ref 70–99)
HCO3 SERPL-SCNC: 21 MMOL/L (ref 22–29)
HCO3 SERPL-SCNC: 22 MMOL/L (ref 22–29)
HCT VFR BLD AUTO: 22.5 % (ref 40–53)
HCT VFR BLD AUTO: 24.5 % (ref 40–53)
HGB BLD-MCNC: 7.1 G/DL (ref 13.3–17.7)
HGB BLD-MCNC: 7.8 G/DL (ref 13.3–17.7)
LACTATE SERPL-SCNC: 2.5 MMOL/L (ref 0.7–2)
LACTATE SERPL-SCNC: 3.5 MMOL/L (ref 0.7–2)
MAGNESIUM SERPL-MCNC: 2.1 MG/DL (ref 1.7–2.3)
MAGNESIUM SERPL-MCNC: 2.2 MG/DL (ref 1.7–2.3)
MCH RBC QN AUTO: 30.2 PG (ref 26.5–33)
MCH RBC QN AUTO: 31 PG (ref 26.5–33)
MCHC RBC AUTO-ENTMCNC: 31.6 G/DL (ref 31.5–36.5)
MCHC RBC AUTO-ENTMCNC: 31.8 G/DL (ref 31.5–36.5)
MCV RBC AUTO: 96 FL (ref 78–100)
MCV RBC AUTO: 97 FL (ref 78–100)
PHOSPHATE SERPL-MCNC: 3.3 MG/DL (ref 2.5–4.5)
PHOSPHATE SERPL-MCNC: 3.8 MG/DL (ref 2.5–4.5)
PLATELET # BLD AUTO: 125 10E3/UL (ref 150–450)
PLATELET # BLD AUTO: 167 10E3/UL (ref 150–450)
POTASSIUM SERPL-SCNC: 3.9 MMOL/L (ref 3.4–5.3)
POTASSIUM SERPL-SCNC: 4.2 MMOL/L (ref 3.4–5.3)
PROT SERPL-MCNC: 4.9 G/DL (ref 6.4–8.3)
RBC # BLD AUTO: 2.35 10E6/UL (ref 4.4–5.9)
RBC # BLD AUTO: 2.52 10E6/UL (ref 4.4–5.9)
SODIUM SERPL-SCNC: 138 MMOL/L (ref 135–145)
SODIUM SERPL-SCNC: 138 MMOL/L (ref 135–145)
WBC # BLD AUTO: 22.2 10E3/UL (ref 4–11)
WBC # BLD AUTO: 26.3 10E3/UL (ref 4–11)

## 2025-06-01 PROCEDURE — 250N000009 HC RX 250: Performed by: NURSE PRACTITIONER

## 2025-06-01 PROCEDURE — 200N000002 HC R&B ICU UMMC

## 2025-06-01 PROCEDURE — 99291 CRITICAL CARE FIRST HOUR: CPT | Mod: 24 | Performed by: NURSE PRACTITIONER

## 2025-06-01 PROCEDURE — 84155 ASSAY OF PROTEIN SERUM: CPT | Performed by: STUDENT IN AN ORGANIZED HEALTH CARE EDUCATION/TRAINING PROGRAM

## 2025-06-01 PROCEDURE — 85041 AUTOMATED RBC COUNT: CPT | Performed by: CLINICAL NURSE SPECIALIST

## 2025-06-01 PROCEDURE — 250N000011 HC RX IP 250 OP 636

## 2025-06-01 PROCEDURE — 999N000157 HC STATISTIC RCP TIME EA 10 MIN

## 2025-06-01 PROCEDURE — 85014 HEMATOCRIT: CPT | Performed by: CLINICAL NURSE SPECIALIST

## 2025-06-01 PROCEDURE — 90947 DIALYSIS REPEATED EVAL: CPT

## 2025-06-01 PROCEDURE — 250N000011 HC RX IP 250 OP 636: Performed by: STUDENT IN AN ORGANIZED HEALTH CARE EDUCATION/TRAINING PROGRAM

## 2025-06-01 PROCEDURE — 80051 ELECTROLYTE PANEL: CPT | Performed by: CLINICAL NURSE SPECIALIST

## 2025-06-01 PROCEDURE — 250N000012 HC RX MED GY IP 250 OP 636 PS 637: Mod: JA

## 2025-06-01 PROCEDURE — 83605 ASSAY OF LACTIC ACID: CPT | Performed by: NURSE PRACTITIONER

## 2025-06-01 PROCEDURE — 83735 ASSAY OF MAGNESIUM: CPT | Performed by: CLINICAL NURSE SPECIALIST

## 2025-06-01 PROCEDURE — 94003 VENT MGMT INPAT SUBQ DAY: CPT

## 2025-06-01 PROCEDURE — 85027 COMPLETE CBC AUTOMATED: CPT | Performed by: CLINICAL NURSE SPECIALIST

## 2025-06-01 PROCEDURE — B4185 PARENTERAL SOL 10 GM LIPIDS: HCPCS | Performed by: SURGERY

## 2025-06-01 PROCEDURE — 250N000009 HC RX 250: Performed by: STUDENT IN AN ORGANIZED HEALTH CARE EDUCATION/TRAINING PROGRAM

## 2025-06-01 PROCEDURE — 82330 ASSAY OF CALCIUM: CPT | Performed by: CLINICAL NURSE SPECIALIST

## 2025-06-01 PROCEDURE — 250N000011 HC RX IP 250 OP 636: Performed by: NURSE PRACTITIONER

## 2025-06-01 PROCEDURE — 90945 DIALYSIS ONE EVALUATION: CPT | Performed by: STUDENT IN AN ORGANIZED HEALTH CARE EDUCATION/TRAINING PROGRAM

## 2025-06-01 PROCEDURE — 82565 ASSAY OF CREATININE: CPT | Performed by: CLINICAL NURSE SPECIALIST

## 2025-06-01 PROCEDURE — 82248 BILIRUBIN DIRECT: CPT | Performed by: STUDENT IN AN ORGANIZED HEALTH CARE EDUCATION/TRAINING PROGRAM

## 2025-06-01 PROCEDURE — 250N000011 HC RX IP 250 OP 636: Mod: JW

## 2025-06-01 PROCEDURE — 250N000012 HC RX MED GY IP 250 OP 636 PS 637

## 2025-06-01 PROCEDURE — 250N000009 HC RX 250: Performed by: CLINICAL NURSE SPECIALIST

## 2025-06-01 PROCEDURE — 250N000009 HC RX 250: Performed by: SURGERY

## 2025-06-01 PROCEDURE — 84100 ASSAY OF PHOSPHORUS: CPT | Performed by: CLINICAL NURSE SPECIALIST

## 2025-06-01 PROCEDURE — 250N000011 HC RX IP 250 OP 636: Mod: JW | Performed by: NURSE PRACTITIONER

## 2025-06-01 PROCEDURE — 84450 TRANSFERASE (AST) (SGOT): CPT | Performed by: STUDENT IN AN ORGANIZED HEALTH CARE EDUCATION/TRAINING PROGRAM

## 2025-06-01 PROCEDURE — 250N000011 HC RX IP 250 OP 636: Mod: JZ | Performed by: NURSE PRACTITIONER

## 2025-06-01 PROCEDURE — 84132 ASSAY OF SERUM POTASSIUM: CPT | Performed by: CLINICAL NURSE SPECIALIST

## 2025-06-01 PROCEDURE — 250N000012 HC RX MED GY IP 250 OP 636 PS 637: Mod: JA | Performed by: NURSE PRACTITIONER

## 2025-06-01 RX ORDER — OCTREOTIDE ACETATE 50 UG/ML
50 INJECTION, SOLUTION INTRAVENOUS; SUBCUTANEOUS EVERY 8 HOURS
Status: COMPLETED | OUTPATIENT
Start: 2025-06-01 | End: 2025-06-03

## 2025-06-01 RX ORDER — DEXTROSE MONOHYDRATE 100 MG/ML
INJECTION, SOLUTION INTRAVENOUS CONTINUOUS PRN
Status: DISCONTINUED | OUTPATIENT
Start: 2025-06-01 | End: 2025-06-03 | Stop reason: HOSPADM

## 2025-06-01 RX ADMIN — MEROPENEM 1 G: 1 INJECTION, POWDER, FOR SOLUTION INTRAVENOUS at 08:06

## 2025-06-01 RX ADMIN — LORAZEPAM 0.5 MG: 2 INJECTION INTRAMUSCULAR; INTRAVENOUS at 06:24

## 2025-06-01 RX ADMIN — THIAMINE HYDROCHLORIDE 200 MG: 100 INJECTION, SOLUTION INTRAMUSCULAR; INTRAVENOUS at 08:41

## 2025-06-01 RX ADMIN — Medication 25 MCG: at 14:25

## 2025-06-01 RX ADMIN — INSULIN ASPART 5 UNITS: 100 INJECTION, SOLUTION INTRAVENOUS; SUBCUTANEOUS at 00:11

## 2025-06-01 RX ADMIN — Medication 150 MCG/HR: at 13:22

## 2025-06-01 RX ADMIN — HEPARIN SODIUM 5000 UNITS: 5000 INJECTION, SOLUTION INTRAVENOUS; SUBCUTANEOUS at 13:31

## 2025-06-01 RX ADMIN — OLANZAPINE 5 MG: 10 INJECTION, POWDER, FOR SOLUTION INTRAMUSCULAR at 04:07

## 2025-06-01 RX ADMIN — HYDROCORTISONE SODIUM SUCCINATE 50 MG: 100 INJECTION, POWDER, FOR SOLUTION INTRAMUSCULAR; INTRAVENOUS at 08:06

## 2025-06-01 RX ADMIN — CALCIUM CHLORIDE, MAGNESIUM CHLORIDE, SODIUM CHLORIDE, SODIUM BICARBONATE, POTASSIUM CHLORIDE AND SODIUM PHOSPHATE DIBASIC DIHYDRATE 12.5 ML/KG/HR: 3.68; 3.05; 6.34; 3.09; .314; .187 INJECTION INTRAVENOUS at 21:32

## 2025-06-01 RX ADMIN — MEROPENEM 1 G: 1 INJECTION, POWDER, FOR SOLUTION INTRAVENOUS at 23:00

## 2025-06-01 RX ADMIN — DEXMEDETOMIDINE HYDROCHLORIDE 0.7 MCG/KG/HR: 400 INJECTION INTRAVENOUS at 03:51

## 2025-06-01 RX ADMIN — PROCHLORPERAZINE EDISYLATE 10 MG: 5 INJECTION INTRAMUSCULAR; INTRAVENOUS at 18:47

## 2025-06-01 RX ADMIN — MAGNESIUM SULFATE HEPTAHYDRATE: 500 INJECTION, SOLUTION INTRAMUSCULAR; INTRAVENOUS at 20:00

## 2025-06-01 RX ADMIN — OCTREOTIDE ACETATE 50 MCG: 50 INJECTION, SOLUTION INTRAVENOUS; SUBCUTANEOUS at 15:34

## 2025-06-01 RX ADMIN — HYDROCORTISONE SODIUM SUCCINATE 50 MG: 100 INJECTION, POWDER, FOR SOLUTION INTRAMUSCULAR; INTRAVENOUS at 13:23

## 2025-06-01 RX ADMIN — THIAMINE HYDROCHLORIDE 200 MG: 100 INJECTION, SOLUTION INTRAMUSCULAR; INTRAVENOUS at 20:02

## 2025-06-01 RX ADMIN — HEPARIN SODIUM 5000 UNITS: 5000 INJECTION, SOLUTION INTRAVENOUS; SUBCUTANEOUS at 06:06

## 2025-06-01 RX ADMIN — Medication 25 MCG: at 08:22

## 2025-06-01 RX ADMIN — ONDANSETRON 4 MG: 2 INJECTION, SOLUTION INTRAMUSCULAR; INTRAVENOUS at 17:14

## 2025-06-01 RX ADMIN — INSULIN HUMAN 2.5 UNITS/HR: 1 INJECTION, SOLUTION INTRAVENOUS at 06:52

## 2025-06-01 RX ADMIN — HYDROCORTISONE SODIUM SUCCINATE 50 MG: 100 INJECTION, POWDER, FOR SOLUTION INTRAMUSCULAR; INTRAVENOUS at 20:01

## 2025-06-01 RX ADMIN — CALCIUM CHLORIDE, MAGNESIUM CHLORIDE, SODIUM CHLORIDE, SODIUM BICARBONATE, POTASSIUM CHLORIDE AND SODIUM PHOSPHATE DIBASIC DIHYDRATE 12.5 ML/KG/HR: 3.68; 3.05; 6.34; 3.09; .314; .187 INJECTION INTRAVENOUS at 16:05

## 2025-06-01 RX ADMIN — INSULIN ASPART 5 UNITS: 100 INJECTION, SOLUTION INTRAVENOUS; SUBCUTANEOUS at 03:58

## 2025-06-01 RX ADMIN — Medication 25 MCG: at 04:04

## 2025-06-01 RX ADMIN — CALCIUM CHLORIDE, MAGNESIUM CHLORIDE, SODIUM CHLORIDE, SODIUM BICARBONATE, POTASSIUM CHLORIDE AND SODIUM PHOSPHATE DIBASIC DIHYDRATE: 3.68; 3.05; 6.34; 3.09; .314; .187 INJECTION INTRAVENOUS at 00:39

## 2025-06-01 RX ADMIN — SMOFLIPID 250 ML: 6; 6; 5; 3 INJECTION, EMULSION INTRAVENOUS at 20:00

## 2025-06-01 RX ADMIN — CALCIUM CHLORIDE, MAGNESIUM CHLORIDE, SODIUM CHLORIDE, SODIUM BICARBONATE, POTASSIUM CHLORIDE AND SODIUM PHOSPHATE DIBASIC DIHYDRATE 12.5 ML/KG/HR: 3.68; 3.05; 6.34; 3.09; .314; .187 INJECTION INTRAVENOUS at 10:34

## 2025-06-01 RX ADMIN — OCTREOTIDE ACETATE 50 MCG: 50 INJECTION, SOLUTION INTRAVENOUS; SUBCUTANEOUS at 06:24

## 2025-06-01 RX ADMIN — OCTREOTIDE ACETATE 50 MCG: 50 INJECTION, SOLUTION INTRAVENOUS; SUBCUTANEOUS at 22:10

## 2025-06-01 RX ADMIN — CALCIUM CHLORIDE, MAGNESIUM CHLORIDE, SODIUM CHLORIDE, SODIUM BICARBONATE, POTASSIUM CHLORIDE AND SODIUM PHOSPHATE DIBASIC DIHYDRATE 12.5 ML/KG/HR: 3.68; 3.05; 6.34; 3.09; .314; .187 INJECTION INTRAVENOUS at 04:48

## 2025-06-01 RX ADMIN — HYDROCORTISONE SODIUM SUCCINATE 50 MG: 100 INJECTION, POWDER, FOR SOLUTION INTRAMUSCULAR; INTRAVENOUS at 01:33

## 2025-06-01 RX ADMIN — PANTOPRAZOLE SODIUM 40 MG: 40 INJECTION, POWDER, FOR SOLUTION INTRAVENOUS at 08:06

## 2025-06-01 RX ADMIN — Medication 25 MCG: at 10:35

## 2025-06-01 RX ADMIN — OLANZAPINE 2.5 MG: 10 INJECTION, POWDER, FOR SOLUTION INTRAMUSCULAR at 20:02

## 2025-06-01 RX ADMIN — HEPARIN SODIUM 5000 UNITS: 5000 INJECTION, SOLUTION INTRAVENOUS; SUBCUTANEOUS at 22:10

## 2025-06-01 RX ADMIN — INSULIN GLARGINE 20 UNITS: 100 INJECTION, SOLUTION SUBCUTANEOUS at 10:34

## 2025-06-01 RX ADMIN — Medication 25 MCG: at 14:00

## 2025-06-01 RX ADMIN — Medication 25 MCG: at 05:07

## 2025-06-01 RX ADMIN — LORAZEPAM 0.5 MG: 2 INJECTION INTRAMUSCULAR; INTRAVENOUS at 13:22

## 2025-06-01 RX ADMIN — DEXMEDETOMIDINE HYDROCHLORIDE 0.4 MCG/KG/HR: 400 INJECTION INTRAVENOUS at 15:55

## 2025-06-01 RX ADMIN — MEROPENEM 1 G: 1 INJECTION, POWDER, FOR SOLUTION INTRAVENOUS at 15:34

## 2025-06-01 RX ADMIN — LORAZEPAM 0.5 MG: 2 INJECTION INTRAMUSCULAR; INTRAVENOUS at 22:10

## 2025-06-01 ASSESSMENT — ACTIVITIES OF DAILY LIVING (ADL)
ADLS_ACUITY_SCORE: 52

## 2025-06-01 NOTE — PROGRESS NOTES
Surgery Progress Note  06/01/2025       Subjective:  NAEON. Patient is currently on 0.02 of levo. Pulling fluid per CRRT. Resting in bed.     Objective:  Temp:  [97.4  F (36.3  C)-98.5  F (36.9  C)] 98.1  F (36.7  C)  Pulse:  [] 89  Resp:  [10-24] 12  BP: ()/(36-85) 99/55  FiO2 (%):  [35 %-40 %] 35 %  SpO2:  [92 %-100 %] 99 %    I/O last 3 completed shifts:  In: 4380.8 [I.V.:1022; IV Piggyback:1000]  Out: 3163.9 [Drains:1760; Other:1403.9]      Gen: Resting comfortably in bed  Neuro: alert and oriented to conversation    Resp: Ventilated via trach  Abd: Abdomen is taut but compressible, drains with minimal dark bloody output, Malecot with minimal succus, significant dark bloody output around midline incision. Abdominal wound covered with wound manager  : Scrotum with pouch in place with serous drainage     Labs:  Recent Labs   Lab 06/01/25  0356 05/31/25  1448 05/31/25  0402   WBC 26.3* 42.0* 34.8*   HGB 7.8* 7.3* 8.5*   * 194 237       Recent Labs   Lab 06/01/25  0358 06/01/25  0356 06/01/25  0010 05/31/25  1454 05/31/25  1448 05/31/25  0404 05/31/25  0402   NA  --  138  --   --  141  --  137   POTASSIUM  --  4.2  --   --  4.2  --  4.1   CHLORIDE  --  106  --   --  106  --  105   CO2  --  21*  --   --  22  --  18*   BUN  --  39.9*  --   --  43.1*  --  42.0*   CR  --  0.39*  --   --  0.49*  --  0.46*   * 278* 242*   < > 181*   < > 187*   KARINA  --  8.1*  --   --  7.7*  --  7.8*   MAG  --  2.1  --   --  2.3  --  2.2   PHOS  --  3.8  --   --  4.3  --  3.5    < > = values in this interval not displayed.     Assessment/Plan:   Sebastián Rodas is a 29-year-old male with a history of alcohol use disorder, anxiety, and bipolar disorder who was admitted on 3/30/2025 for alcohol withdrawal following a recent binge, presenting with diffuse abdominal pain. Initial workup revealed leukocytosis and elevated lipase concerning for acute pancreatitis. He developed acute encephalopathy and was transferred  "to the ICU on 3/31, requiring intubation and vasopressors by 4/1 due to shock. Hospital course has been complicated by acute renal failure requiring CRRT, cardiomyopathy (initial LVEF 20-25%, improved to 65-70% by 4/14), and necrotizing pancreatitis with unorganized fluid collections. He completed a 14-day course of meropenem but remains intermittently febrile and critically ill, requiring ongoing dialysis and vasopressor support.     On 4/27, after clinical deterioration and imaging consistent with a likely perforated viscus, following family discussion, he underwent emergent exploratory laparotomy, necrosectomy, transverse colectomy, abdominal washout, and drain placement. Patient was transferred to [facility] on 4/30/25 for further surgical management. Went to OR 5/1 for re-open laparotomy, I&D, placement of colostomy tube in presumed previous colectomy staple line, necrosectomy, and Abthera placement.     On 5/2, increasing sanguineous output from drains concerning for bleeding from pancreatic bed. Family care conference held 5/2, plan for restorative cares at that time. Take back to OR twice (5/4 and 5/7) for abdominal washout. Per nursing note:     5/9 morning after he saw a picture of his abdomen that he \"desires to speak to team about updating goals of care, expressed desire to pull back on cares\". Goals of care conference on 5/10 with continuation of full code and restorative cares. CTA on 5/10 revealed large area of active arterial extravasation in the upper abdomen. MTP was started. Patient is now s/p embolization of small pancreatic branch off of the distal celiac artery with IR on 5/10. Malecot drainage slowed and eventually stopped, with significant drainage through other drains and open into dressings.     5/17 night patient had active extravasation, with Hgb dropping from 7.8 to 5.6 in 1.5 hours. Lactate spiked from 3.6 to 10.6, with WBC 14.9 to 25.5. Family was consulted, MTP was started, and IR embolized " the celiac trunk/splenic artery branches.     5/27 overnight, patient had HR sustained in 140-150s with MAP in 40-50s. BP remained low despite boluses; levophed GTT restarted. Large amount of blood leaking from abdominal dressing requiring 1u PRBC.  Lactate 5.7 and Hgb 7.4. PEG tube came out during dressing change overnight. Increasing drainage also noted from superior aspect of midline. Bowel noted to be exposed d/t mesh detaching from fascia.     - No further surgical options for any intraabdominal pathology  - Nursing to change dressings in the am, pm, and PRN during the day if copious drainage during the day. Please place Xeroform over the bridging mesh (please continue cares). Please do not apply xeroform outside the wound bed/over the skin edges but make sure all of the wound bed is covered.  - Tube feeds on hold. Receiving TPN (50 mL/hr), and Lipids & Albumin as needed.  - Antibiotics per SICU   - SQH PPX, would not recommend therapeutic dose given the risk of bleeding.   - Appreciate WOC cares, okay with wound manager if able.   - Other cares per SICU, we appreciate cares   - Please page SICU and surgery team to see dressing changes   - Recommend comfort measures      Seen, examined, and discussed with chief resident, who will discuss with staff.     Jose M Davidson DO, MS   General Surgery, PGY 1

## 2025-06-01 NOTE — PROGRESS NOTES
SURGICAL ICU PROGRESS NOTE      Date of Service (when I saw the patient): 06/01/2025    ASSESSMENT:  Sebastián Rodas is a 29M with alcohol abuse, anxiety, and bipolar disorder, who was admitted 3/30/25 for alcohol withdrawal and abdominal pain. Workup showed leukocytosis and elevated lipase, consistent with acute pancreatitis. He developed encephalopathy and shock, requiring ICU care, intubation, vasopressors, and CRRT by 4/1. His course was complicated by cardiomyopathy (EF 20-25%, improved to 65-70% by 4/14), necrotizing pancreatitis, and persistent infection despite 14 days of meropenem. On 4/27, imaging showed likely perforated viscus; he underwent emergent laparotomy, necrosectomy, and colectomy. Transferred to [facility] SICU on 4/30. On 5/1, reoperation revealed colonic staple line breakdown, necrotic pancreas, vessel thrombosis, and adhesions. Colostomy with malankot drain and temporary abdominal closure performed. Prognosis is poor; palliative care involved. Care conference 5/2 with family to talk goals of care. Full code, family acknowledged that poor prognosis is to be expected. 5/4 s/p exploratory surgery, additional necrotic pancreas remove with no obvious spillage or sign of bleeding at the time. Went back to the OR 5/7 for abdominal wash out. CT abdomen 5/10 showed extravasation on arterial phase imaging. MTP initiated, IR arterial embolization of pancreatic vessels performed. Decreased stool output since 5/13, switched to TPN. Hemorraghic shock.  MTP 5/18, IR embolization 5/18.      CHANGES and MAJOR THINGS TODAY:    Resume insulin gtt  Monitor cultures  Mobilize  Family conference this week  Hold am heparin pending IR procedure  Resume octretide      PLAN:    Neurological:  # Acute pain   - Fentanyl gtt with bolus PRN    # Nerve pain  - Scheduled lidocaine patches  # Encephalopathy, toxic metabolic  # Insomnia  - Monitor neurological status. Delirium preventions and precautions.   - More encephalopathic  this morning  # Sedation: Precedex, weaning as able. Ativan 0.5 mg TID  # Anxiety  - RASS goal 0 to -1  - Olanzapine 2.5 mg @ night  - Olanzapine PRN      Pulmonary:   # Acute hypoxic respiratory failure  # S/p tracheostomy placement   FiO2 (%): 35 %, Resp: 12, Vent Mode: PS, PEEP (cm H2O): 5 cmH2O, Pressure Support (cm H2O): 5 cmH2O, PEEP (cm H2O): 5 cmH2O  CT PE 5/9 showed no evidence of pulmonary embolism. Negative again on 5/18.   - On PST this am, 5/5, continue as able. Trach dome today, pressure support overnight  - Pulmonary toilet, mobilize  - Ventilator bundle  - Wean FIO2 as tolerated  - SLP eval for speaking valve use    Cardiovascular:    # Stress Cardiomyopathy   - Initial LVEF 20-25%, improved to 65-70%  # Septic shock  # Sinus Tachycardia  # Lactic acidosis  - Monitor hemodynamic status. MAP goal > 65  - Weaning down norepinephrine. Responded to volume over the last 24 hours- 500 crystalloid, 500 colloid and 1 PRBC.   - Lactate improved, 3.5. Has not cleared since 5/17  - IV thiamine x 4 days for sepsis  - Continue stress dose steroids. Had been weaning, and resumed 50 q 6 hours Hydrocortisone on 5/31     Gastroenterology/Nutrition:  # S/p emergent exploratory laparotomy, necrosectomy, transverse colectomy, abdominal washout, and drain placement 5/1 and 5/4  #s/p pancreatic branch of distal celiac artery embolization 5/10  # s/p splenic artery embolization 5/18   # Severe necrotizing pancreatitis  # Splenic vein thrombosis  - Drain output- 350 ml in wound manager, 70  SHANNON (pancreatic bed), left scrotum 825. All other drains with no output.   - Wound manager output with mixed bilious drainage, and maroon output.   - Colotomy (in transverse colon) drain in place, no output. Abdomen distended.  - PPI IV  - Hold all Oral Meds and Feeds, strict NPO  - 5/27 CT C/A/P with large encapsulated intraperitoneal and retroperitoenal air and fluid containing necrotic collections with decreased hemorrhagic components  along the aterior inferior aspect of the right hepatic lobe.   - Patient will be discussed in GI conference 5/29. Discussed with IR 5/27, amenable to placement of bilateral retroperitoneal drains.  Plan for drain placement in the coming days (tentatively on 6/2).    - trial of Octretide 5/31, will reorder for 2 more days.     # Severe Protein calorie deficit malnutrition due to critical illness  # Hypertriglyceridemia   - Continue TPN. Lipids resumed (checking triglycerides M/Th)  - GJ tube migrated out. Keep NPO  - IR consulted for PEG exchange  - IR cannot safely exchange freshly placed GJ tubes until they have been in place x6 weeks given risk of losing access into the stomach. Additionally, imaging shows there is no safe pexy between the stomach and abdominal wall. IR may not be able to offer safe exchange even after 6 weeks.   - RD consult. Appreciate cares and recommendations.     Renal/Fluids/Electrolytes:   # SARAHI  # Intravascular hypovolemia  # Total fluid overload  Baseline Cr 0.7-0.8. Presented with Cr 0.96 on 3/30. Rapidly markos to 5.2 on 4/1. Oliguric. Garcia UA with proteinuria, hematuria, pyuria, moderate bacteria.  Kidneys unremarkable on CT. Has severe ATN in the setting of shock, ADHF, intravascular hypovolemia/3rd spacing from pancreatitis.   - Nephrology consulted, appreciate recs  - CRRT, run even  - Continue to monitor intake and output  - Volume assessment daily      Endocrine:   # Stress and steroid induced hyperglycemia    Hgb A1c 5.3, no hx of DM   - Resume insulin gtt  - Keep lantus at 20    - Goal to keep BG< 180 for optimal wound healing   - On stress dose steroids, as above      ID:  # Leukocytosis  # Necrotizing pancreatitis  # Intraabdominal sepsis  # Respiratory candidiasis   - Trend CRP q3days (291 on 5/28, from 86 on 5/22)  - WBC improved 42 to 26K after rebroadening Zosyn to Meropenem.   - Repeat BC x2, sputum culture, UA with reflex 5/31. Yeast in sputum culture. No indication to  treat.        cultures:  - 4/30 blood cultures- NGTD   - 5/1 blood cultures ordered - NGTD  - Blood culture 5/12, 5/15 - NGTD  - Blood culture 5/22 - +candida  - 5/27 Resp culture 2+ candida albicans, 1+ staph haemolyticus, 1+ staph epidermidis, +1 yeast     Heme:     # Acute blood loss anemia   # Anemia of critical illness   # Thrombosed splenic vein   #s/p pancreatic branch of distal celiac artery embolization 5/10  # s/p splenic artery embolization 5/18   #Non occlusive thrombus adherent to RIJ catheter on CT 5/27  - Transfuse if hgb <7.0 or signs/symptoms of hypoperfusion. Monitor and trend  - Received 1 PRBC yesterday with moderate response. Hemoglobin 7.8 today.   - MTP 5/10, 5/18  - Continue subcutaneous heparin, watch bleeding from drains.Hold am dose pending IR drain placement 6/2  - s/p IR 5/18 coil embolization of splenic artery proximal to great pancreatic artery.   - Hold additional anticoagulation       Musculoskeletal:   # Deconditioning and weakness due to critical illness   # LLE swelling  - LLE DVT ultrasound 5/26 negative  - Physical and occupational therapy consult        Skin:  # Pressure Ulcers - Buttocks/rectal area  - Barrier cream with liberal application.   - WOC consult      #Pressure Ulcers- Left Heel  Pressure Injury Location: Left heel   Wound type: Pressure Injury     Pressure Injury Stage: Deep Tissue Pressure Injury (DTPI), present on admission     General Cares/Prophylaxis:    DVT Prophylaxis: SQH    GI Prophylaxis: PPI     Lines/ tubes/ drains:  - HD line--  Right IJ  - PICC line- left   - PIV x 3  - Trach  - RUQ ostomy  - LLQ drain  - Scrotal drain  - SHANNON x 3  - Colotomy drain     Disposition:  - Surgical ICU    Family conference requested for 6/3. CC consult placed to coordinate.     Seen and discussed with staff.     Renee Campo  CC time 62 minutes exclusive of procedures    ====================================  SUBJECTIVE:  remains on norepinephrine. Denies pain and  "anxiety. Denies dyspnea and chest pain.       OBJECTIVE:   1. VITAL SIGNS:   Temp: 98.1  F (36.7  C) Temp src: Oral BP: 99/55 Pulse: 89   Resp: 12 SpO2: 99 % O2 Device: Mechanical Ventilator Oxygen Delivery: 40 LPM Height: 172.7 cm (5' 7.99\") Weight: 68.9 kg (151 lb 14.4 oz)  Estimated body mass index is 23.1 kg/m  as calculated from the following:    Height as of this encounter: 1.727 m (5' 7.99\").    Weight as of this encounter: 68.9 kg (151 lb 14.4 oz).      FiO2 (%): 35 %, Resp: 12, Vent Mode: PS, PEEP (cm H2O): 5 cmH2O, Pressure Support (cm H2O): 5 cmH2O, PEEP (cm H2O): 5 cmH2O      2. INTAKE/ OUTPUT:       Intake/Output Summary (Last 24 hours) at 5/30/2025 0635  Last data filed at 5/30/2025 0600  Gross per 24 hour   Intake 3814.17 ml   Output 4169 ml   Net -354.83 ml          3. PHYSICAL EXAMINATION:  General: NAD  Neuro: opens eyes to voice, follows commands, writing words.   HEENT: PERRLA. Trach present and secure, site dressed.   Pulm/Resp: Normal work of breathing, ventilated by tracheostomy on pressure support  CV: RRR, well perfused extremities  Abdomen: Distended. Dark red/brown output noted to all drains. Abdomen with dressing in place C/D/I  : scrotal edema, draining drk red/brown output.   MSK/Extremities: generalized edema in extremities. +3 left foot edema, 2+ right foot edema    4. INVESTIGATIONS:   Arterial Blood Gases   Recent Labs   Lab 05/27/25  0657 05/26/25  2156 05/25/25  1220   PH 7.39 7.45 7.38   PCO2 32* 29* 40   PO2 75* 118* 87   HCO3 19* 20* 23     Complete Blood Count   Recent Labs   Lab 06/01/25  0356 05/31/25  1448 05/31/25  0402 05/31/25  0207   WBC 26.3* 42.0* 34.8* 29.2*   HGB 7.8* 7.3* 8.5* 8.0*   * 194 237 210     Basic Metabolic Panel  Recent Labs   Lab 06/01/25  0358 06/01/25  0356 06/01/25  0010 05/31/25  2008 05/31/25  1454 05/31/25  1448 05/31/25  0404 05/31/25  0402 05/30/25  1609 05/30/25  1441   NA  --  138  --   --   --  141  --  137  --  137   POTASSIUM  --  " 4.2  --   --   --  4.2  --  4.1  --  4.2   CHLORIDE  --  106  --   --   --  106  --  105  --  105   CO2  --  21*  --   --   --  22  --  18*  --  21*   BUN  --  39.9*  --   --   --  43.1*  --  42.0*  --  40.4*   CR  --  0.39*  --   --   --  0.49*  --  0.46*  --  0.52*   * 278* 242* 229*   < > 181*   < > 187*   < > 161*    < > = values in this interval not displayed.     Liver Function Tests  Recent Labs   Lab 06/01/25  0356 05/31/25  1448 05/31/25  0806 05/31/25  0402 05/30/25  1441 05/30/25  0419 05/29/25  1533 05/29/25  0325 05/27/25  1500 05/27/25  0657   AST 23  --   --  37  --  38  --  30   < >  --    ALT 28  --   --  39  --  44  --  37   < >  --    ALKPHOS 340*  --   --  459*  --  645*  --  584*   < >  --    BILITOTAL 0.8  --   --  1.2  --  0.8  --  0.8   < >  --    ALBUMIN 2.3* 2.4*  --  2.0* 1.9* 2.1*   < > 2.1*   < >  --    INR  --   --  1.27*  --   --   --   --   --   --  1.28*    < > = values in this interval not displayed.     Pancreatic Enzymes  No lab results found in last 7 days.    Coagulation Profile  Recent Labs   Lab 05/31/25  0806 05/27/25  0657   INR 1.27* 1.28*     5. RADIOLOGY:   Recent Results (from the past 24 hours)   XR Chest Port 1 View    Narrative    Exam: XR CHEST PORT 1 VIEW, 5/31/2025 9:29 AM    Comparison: CT 5/27/2025, radiograph 5/27/2025    History: Fever    Findings:  Tracheostomy tube tip projects over the midthoracic trachea. Right IJ  central venous catheter and left upper extremity PICC line project in  stable position. Cardiomediastinal silhouette is within normal limits.  Similar mild right greater than left streaky bibasilar opacities. No  substantial pleural effusion. No pneumothorax. No acute osseous  abnormality.      Impression    impression:   Similar mild right greater than left streaky bibasilar opacities,  likely ongoing atelectasis seen on recent CT. No new focal airspace  opacity.    I have personally reviewed the examination and initial  interpretation  and I agree with the findings.    KAELYN PATRICK,          SYSTEM ID:  [id number]           =========================================

## 2025-06-01 NOTE — PLAN OF CARE
ICU End of Shift Summary. See flowsheets for vital signs and detailed assessment.    Changes this shift: Appears to have slept well. TD 35% 40L then PS 5/5 35% overnight. Levophed titrated for MAP > 65. CRRT continued. 1u PRBC transfused. Scrotal pouch removed d/t urine collecting in bag. External cath now in place. Pt refusing straight cath for UA. Insulin gtt initiated.     Plan: Continue with plan of care.     Goal Outcome Evaluation:      Plan of Care Reviewed With: patient    Overall Patient Progress: no changeOverall Patient Progress: no change    Outcome Evaluation: see note

## 2025-06-01 NOTE — PROGRESS NOTES
Nephrology Dialysis Note    This patient was seen and examined while on dialysis. Laboratory results and nurses' notes were reviewed.    No changes to management of volume, anemia, BMD, acidosis, or electrolytes except as follows.     Diagnosis - SARAHI, on CRRT    Plan - Continue CRRT at current Rx. Attempt modest net negative fluid status as hemodynamics allow.    Grant Crum MD  Nephrology

## 2025-06-01 NOTE — PLAN OF CARE
Major Shift Events:  Pt A&O, on Precidex and fentanyl > requiting levophed this morning but of pressor right now.   Pt scrotal opening has bloody discharge. Team notified. RN to watch it for now and notify team if bleed intensifies. Wound manager changed with general surgery. Picture in chart       For vital signs and complete assessments, please see documentation flowsheets.        Problem: Adult Inpatient Plan of Care  Goal: Plan of Care Review  Description: The Plan of Care Review/Shift note should be completed every shift.  The Outcome Evaluation is a brief statement about your assessment that the patient is improving, declining, or no change.  This information will be displayed automatically on your shiftnote.  Outcome: Not Progressing  Flowsheets (Taken 6/1/2025 0182)  Plan of Care Reviewed With: patient

## 2025-06-01 NOTE — PROGRESS NOTES
"CRRT STATUS NOTE    DATA:  Time: 5:10 AM   Pressures WNL:  YES  Filter Status:  WDL    Problems Reported/Alarms Noted:  none     Supplies Present:  YES    ASSESSMENT:  Patient Net Fluid Balance:  At midnight +1128mL at 0600 -514mL    Vital Signs:    BP 94/64   Pulse 112   Temp 98.1  F (36.7  C) (Oral)   Resp 15   Ht 1.727 m (5' 7.99\")   Wt 68.9 kg (151 lb 14.4 oz)   SpO2 96%   BMI 23.10 kg/m        Labs:   Na: 138  K:  4.2  Cr: 0.39  M.1  Phos: 3.8   iCal: 4.7  Lactic 3.5        Goals of Therapy:  0-50cc/hr    INTERVENTIONS:   Circuit changed    PLAN:  Continue with treatment goal. Call Resource RN with questions and concerns. [employer] [phone] CRRT resource  "

## 2025-06-02 LAB
ALBUMIN SERPL BCG-MCNC: 2.1 G/DL (ref 3.5–5.2)
ALBUMIN SERPL BCG-MCNC: 2.1 G/DL (ref 3.5–5.2)
ALP SERPL-CCNC: 725 U/L (ref 40–150)
ALT SERPL W P-5'-P-CCNC: 39 U/L (ref 0–70)
ANION GAP SERPL CALCULATED.3IONS-SCNC: 11 MMOL/L (ref 7–15)
ANION GAP SERPL CALCULATED.3IONS-SCNC: 9 MMOL/L (ref 7–15)
ANION GAP SERPL CALCULATED.3IONS-SCNC: 9 MMOL/L (ref 7–15)
AST SERPL W P-5'-P-CCNC: 44 U/L (ref 0–45)
BILIRUB SERPL-MCNC: 0.7 MG/DL
BILIRUBIN DIRECT (ROCHE PRO & PURE): 0.49 MG/DL (ref 0–0.45)
BLD PROD TYP BPU: NORMAL
BLOOD COMPONENT TYPE: NORMAL
BUN SERPL-MCNC: 37.6 MG/DL (ref 6–20)
BUN SERPL-MCNC: 52.1 MG/DL (ref 6–20)
BUN SERPL-MCNC: 52.1 MG/DL (ref 6–20)
CA-I BLD-MCNC: 4.4 MG/DL (ref 4.4–5.2)
CA-I BLD-MCNC: 4.8 MG/DL (ref 4.4–5.2)
CALCIUM SERPL-MCNC: 8 MG/DL (ref 8.8–10.4)
CALCIUM SERPL-MCNC: 8.2 MG/DL (ref 8.8–10.4)
CALCIUM SERPL-MCNC: 8.2 MG/DL (ref 8.8–10.4)
CHLORIDE SERPL-SCNC: 105 MMOL/L (ref 98–107)
CODING SYSTEM: NORMAL
CREAT SERPL-MCNC: 0.38 MG/DL (ref 0.67–1.17)
CREAT SERPL-MCNC: 0.47 MG/DL (ref 0.67–1.17)
CREAT SERPL-MCNC: 0.47 MG/DL (ref 0.67–1.17)
CROSSMATCH: NORMAL
EGFRCR SERPLBLD CKD-EPI 2021: >90 ML/MIN/1.73M2
ERYTHROCYTE [DISTWIDTH] IN BLOOD BY AUTOMATED COUNT: 17.1 % (ref 10–15)
ERYTHROCYTE [DISTWIDTH] IN BLOOD BY AUTOMATED COUNT: 17.2 % (ref 10–15)
GLUCOSE BLDC GLUCOMTR-MCNC: 111 MG/DL (ref 70–99)
GLUCOSE BLDC GLUCOMTR-MCNC: 125 MG/DL (ref 70–99)
GLUCOSE BLDC GLUCOMTR-MCNC: 128 MG/DL (ref 70–99)
GLUCOSE BLDC GLUCOMTR-MCNC: 138 MG/DL (ref 70–99)
GLUCOSE BLDC GLUCOMTR-MCNC: 140 MG/DL (ref 70–99)
GLUCOSE BLDC GLUCOMTR-MCNC: 145 MG/DL (ref 70–99)
GLUCOSE BLDC GLUCOMTR-MCNC: 146 MG/DL (ref 70–99)
GLUCOSE BLDC GLUCOMTR-MCNC: 146 MG/DL (ref 70–99)
GLUCOSE BLDC GLUCOMTR-MCNC: 87 MG/DL (ref 70–99)
GLUCOSE BLDC GLUCOMTR-MCNC: 89 MG/DL (ref 70–99)
GLUCOSE SERPL-MCNC: 119 MG/DL (ref 70–99)
GLUCOSE SERPL-MCNC: 119 MG/DL (ref 70–99)
GLUCOSE SERPL-MCNC: 155 MG/DL (ref 70–99)
HCO3 SERPL-SCNC: 22 MMOL/L (ref 22–29)
HCO3 SERPL-SCNC: 23 MMOL/L (ref 22–29)
HCO3 SERPL-SCNC: 23 MMOL/L (ref 22–29)
HCT VFR BLD AUTO: 22.6 % (ref 40–53)
HCT VFR BLD AUTO: 24.4 % (ref 40–53)
HGB BLD-MCNC: 6.9 G/DL (ref 13.3–17.7)
HGB BLD-MCNC: 7.2 G/DL (ref 13.3–17.7)
HGB BLD-MCNC: 7.7 G/DL (ref 13.3–17.7)
ISSUE DATE AND TIME: NORMAL
LACTATE SERPL-SCNC: 3.5 MMOL/L (ref 0.7–2)
LACTATE SERPL-SCNC: 3.6 MMOL/L (ref 0.7–2)
MAGNESIUM SERPL-MCNC: 2.1 MG/DL (ref 1.7–2.3)
MAGNESIUM SERPL-MCNC: 2.2 MG/DL (ref 1.7–2.3)
MCH RBC QN AUTO: 29.9 PG (ref 26.5–33)
MCH RBC QN AUTO: 30 PG (ref 26.5–33)
MCHC RBC AUTO-ENTMCNC: 31.6 G/DL (ref 31.5–36.5)
MCHC RBC AUTO-ENTMCNC: 31.9 G/DL (ref 31.5–36.5)
MCV RBC AUTO: 100 FL (ref 78–100)
MCV RBC AUTO: 94 FL (ref 78–100)
MCV RBC AUTO: 95 FL (ref 78–100)
PHOSPHATE SERPL-MCNC: 2.8 MG/DL (ref 2.5–4.5)
PHOSPHATE SERPL-MCNC: 2.9 MG/DL (ref 2.5–4.5)
PLATELET # BLD AUTO: 123 10E3/UL (ref 150–450)
PLATELET # BLD AUTO: 162 10E3/UL (ref 150–450)
POTASSIUM SERPL-SCNC: 3.8 MMOL/L (ref 3.4–5.3)
POTASSIUM SERPL-SCNC: 3.8 MMOL/L (ref 3.4–5.3)
POTASSIUM SERPL-SCNC: 4.1 MMOL/L (ref 3.4–5.3)
PROT SERPL-MCNC: 4.6 G/DL (ref 6.4–8.3)
RBC # BLD AUTO: 2.41 10E6/UL (ref 4.4–5.9)
RBC # BLD AUTO: 2.57 10E6/UL (ref 4.4–5.9)
SODIUM SERPL-SCNC: 137 MMOL/L (ref 135–145)
SODIUM SERPL-SCNC: 137 MMOL/L (ref 135–145)
SODIUM SERPL-SCNC: 138 MMOL/L (ref 135–145)
TRIGL SERPL-MCNC: 258 MG/DL
UNIT ABO/RH: NORMAL
UNIT NUMBER: NORMAL
UNIT STATUS: NORMAL
UNIT TYPE ISBT: 5100
WBC # BLD AUTO: 16.6 10E3/UL (ref 4–11)
WBC # BLD AUTO: 19.4 10E3/UL (ref 4–11)

## 2025-06-02 PROCEDURE — 84478 ASSAY OF TRIGLYCERIDES: CPT | Performed by: SURGERY

## 2025-06-02 PROCEDURE — 87070 CULTURE OTHR SPECIMN AEROBIC: CPT

## 2025-06-02 PROCEDURE — 250N000009 HC RX 250: Mod: JW

## 2025-06-02 PROCEDURE — 94003 VENT MGMT INPAT SUBQ DAY: CPT

## 2025-06-02 PROCEDURE — 82248 BILIRUBIN DIRECT: CPT | Performed by: STUDENT IN AN ORGANIZED HEALTH CARE EDUCATION/TRAINING PROGRAM

## 2025-06-02 PROCEDURE — 250N000011 HC RX IP 250 OP 636

## 2025-06-02 PROCEDURE — 83735 ASSAY OF MAGNESIUM: CPT | Performed by: CLINICAL NURSE SPECIALIST

## 2025-06-02 PROCEDURE — 250N000009 HC RX 250: Performed by: SURGERY

## 2025-06-02 PROCEDURE — 250N000011 HC RX IP 250 OP 636: Performed by: NURSE PRACTITIONER

## 2025-06-02 PROCEDURE — 85018 HEMOGLOBIN: CPT

## 2025-06-02 PROCEDURE — P9016 RBC LEUKOCYTES REDUCED: HCPCS

## 2025-06-02 PROCEDURE — 99233 SBSQ HOSP IP/OBS HIGH 50: CPT | Mod: 24 | Performed by: CLINICAL NURSE SPECIALIST

## 2025-06-02 PROCEDURE — 250N000011 HC RX IP 250 OP 636: Performed by: STUDENT IN AN ORGANIZED HEALTH CARE EDUCATION/TRAINING PROGRAM

## 2025-06-02 PROCEDURE — 258N000003 HC RX IP 258 OP 636

## 2025-06-02 PROCEDURE — 87150 DNA/RNA AMPLIFIED PROBE: CPT

## 2025-06-02 PROCEDURE — 82947 ASSAY GLUCOSE BLOOD QUANT: CPT | Performed by: CLINICAL NURSE SPECIALIST

## 2025-06-02 PROCEDURE — 250N000011 HC RX IP 250 OP 636: Mod: JZ

## 2025-06-02 PROCEDURE — 0W9H30Z DRAINAGE OF RETROPERITONEUM WITH DRAINAGE DEVICE, PERCUTANEOUS APPROACH: ICD-10-PCS | Performed by: STUDENT IN AN ORGANIZED HEALTH CARE EDUCATION/TRAINING PROGRAM

## 2025-06-02 PROCEDURE — 84450 TRANSFERASE (AST) (SGOT): CPT | Performed by: STUDENT IN AN ORGANIZED HEALTH CARE EDUCATION/TRAINING PROGRAM

## 2025-06-02 PROCEDURE — 83605 ASSAY OF LACTIC ACID: CPT

## 2025-06-02 PROCEDURE — 250N000011 HC RX IP 250 OP 636: Mod: JW

## 2025-06-02 PROCEDURE — 82330 ASSAY OF CALCIUM: CPT | Performed by: CLINICAL NURSE SPECIALIST

## 2025-06-02 PROCEDURE — 200N000002 HC R&B ICU UMMC

## 2025-06-02 PROCEDURE — 250N000009 HC RX 250: Performed by: NURSE PRACTITIONER

## 2025-06-02 PROCEDURE — 99207 PR NO CHARGE LOS: CPT | Performed by: UROLOGY

## 2025-06-02 PROCEDURE — G0463 HOSPITAL OUTPT CLINIC VISIT: HCPCS

## 2025-06-02 PROCEDURE — 90947 DIALYSIS REPEATED EVAL: CPT

## 2025-06-02 PROCEDURE — 999N000185 HC STATISTIC TRANSPORT TIME EA 15 MIN

## 2025-06-02 PROCEDURE — 84132 ASSAY OF SERUM POTASSIUM: CPT | Performed by: CLINICAL NURSE SPECIALIST

## 2025-06-02 PROCEDURE — 85041 AUTOMATED RBC COUNT: CPT | Performed by: CLINICAL NURSE SPECIALIST

## 2025-06-02 PROCEDURE — 87075 CULTR BACTERIA EXCEPT BLOOD: CPT

## 2025-06-02 PROCEDURE — 99291 CRITICAL CARE FIRST HOUR: CPT | Mod: 24 | Performed by: NURSE PRACTITIONER

## 2025-06-02 PROCEDURE — 999N000157 HC STATISTIC RCP TIME EA 10 MIN

## 2025-06-02 PROCEDURE — B4185 PARENTERAL SOL 10 GM LIPIDS: HCPCS | Performed by: SURGERY

## 2025-06-02 PROCEDURE — 85014 HEMATOCRIT: CPT | Performed by: CLINICAL NURSE SPECIALIST

## 2025-06-02 PROCEDURE — 250N000012 HC RX MED GY IP 250 OP 636 PS 637: Mod: JA

## 2025-06-02 PROCEDURE — 250N000011 HC RX IP 250 OP 636: Mod: JZ | Performed by: SURGERY

## 2025-06-02 PROCEDURE — 250N000009 HC RX 250: Performed by: CLINICAL NURSE SPECIALIST

## 2025-06-02 PROCEDURE — 250N000009 HC RX 250: Performed by: STUDENT IN AN ORGANIZED HEALTH CARE EDUCATION/TRAINING PROGRAM

## 2025-06-02 PROCEDURE — 84460 ALANINE AMINO (ALT) (SGPT): CPT | Performed by: STUDENT IN AN ORGANIZED HEALTH CARE EDUCATION/TRAINING PROGRAM

## 2025-06-02 RX ORDER — MEROPENEM 500 MG/1
500 INJECTION, POWDER, FOR SOLUTION INTRAVENOUS EVERY 24 HOURS
Status: DISCONTINUED | OUTPATIENT
Start: 2025-06-02 | End: 2025-06-03 | Stop reason: HOSPADM

## 2025-06-02 RX ORDER — NALOXONE HYDROCHLORIDE 0.4 MG/ML
0.2 INJECTION, SOLUTION INTRAMUSCULAR; INTRAVENOUS; SUBCUTANEOUS
Status: DISCONTINUED | OUTPATIENT
Start: 2025-06-02 | End: 2025-06-02

## 2025-06-02 RX ORDER — FLUMAZENIL 0.1 MG/ML
0.2 INJECTION, SOLUTION INTRAVENOUS
Status: DISCONTINUED | OUTPATIENT
Start: 2025-06-02 | End: 2025-06-02

## 2025-06-02 RX ORDER — NALOXONE HYDROCHLORIDE 0.4 MG/ML
0.4 INJECTION, SOLUTION INTRAMUSCULAR; INTRAVENOUS; SUBCUTANEOUS
Status: DISCONTINUED | OUTPATIENT
Start: 2025-06-02 | End: 2025-06-02

## 2025-06-02 RX ORDER — IOPAMIDOL 755 MG/ML
95 INJECTION, SOLUTION INTRAVASCULAR ONCE
Status: COMPLETED | OUTPATIENT
Start: 2025-06-02 | End: 2025-06-02

## 2025-06-02 RX ORDER — HYDROCORTISONE SODIUM SUCCINATE 100 MG/2ML
50 INJECTION INTRAMUSCULAR; INTRAVENOUS EVERY 8 HOURS
Status: DISCONTINUED | OUTPATIENT
Start: 2025-06-02 | End: 2025-06-03

## 2025-06-02 RX ORDER — FENTANYL CITRATE 50 UG/ML
25-50 INJECTION, SOLUTION INTRAMUSCULAR; INTRAVENOUS EVERY 5 MIN PRN
Refills: 0 | Status: DISCONTINUED | OUTPATIENT
Start: 2025-06-02 | End: 2025-06-02

## 2025-06-02 RX ADMIN — MIDAZOLAM 2 MG: 1 INJECTION INTRAMUSCULAR; INTRAVENOUS at 16:53

## 2025-06-02 RX ADMIN — MICAFUNGIN 100 MG: 20 INJECTION, POWDER, LYOPHILIZED, FOR SOLUTION INTRAVENOUS at 21:01

## 2025-06-02 RX ADMIN — LORAZEPAM 0.5 MG: 2 INJECTION INTRAMUSCULAR; INTRAVENOUS at 06:10

## 2025-06-02 RX ADMIN — OCTREOTIDE ACETATE 50 MCG: 50 INJECTION, SOLUTION INTRAVENOUS; SUBCUTANEOUS at 06:10

## 2025-06-02 RX ADMIN — THIAMINE HYDROCHLORIDE 200 MG: 100 INJECTION, SOLUTION INTRAMUSCULAR; INTRAVENOUS at 08:10

## 2025-06-02 RX ADMIN — CALCIUM CHLORIDE, MAGNESIUM CHLORIDE, SODIUM CHLORIDE, SODIUM BICARBONATE, POTASSIUM CHLORIDE AND SODIUM PHOSPHATE DIBASIC DIHYDRATE 12.5 ML/KG/HR: 3.68; 3.05; 6.34; 3.09; .314; .187 INJECTION INTRAVENOUS at 03:15

## 2025-06-02 RX ADMIN — OLANZAPINE 5 MG: 10 INJECTION, POWDER, FOR SOLUTION INTRAMUSCULAR at 04:07

## 2025-06-02 RX ADMIN — Medication 150 MCG/HR: at 08:08

## 2025-06-02 RX ADMIN — CALCIUM CHLORIDE, MAGNESIUM CHLORIDE, SODIUM CHLORIDE, SODIUM BICARBONATE, POTASSIUM CHLORIDE AND SODIUM PHOSPHATE DIBASIC DIHYDRATE 12.5 ML/KG/HR: 3.68; 3.05; 6.34; 3.09; .314; .187 INJECTION INTRAVENOUS at 03:16

## 2025-06-02 RX ADMIN — Medication 25 MCG: at 05:16

## 2025-06-02 RX ADMIN — INSULIN HUMAN 0.5 UNITS/HR: 1 INJECTION, SOLUTION INTRAVENOUS at 22:09

## 2025-06-02 RX ADMIN — FENTANYL CITRATE 25 MCG: 50 INJECTION INTRAMUSCULAR; INTRAVENOUS at 17:23

## 2025-06-02 RX ADMIN — SMOFLIPID 250 ML: 6; 6; 5; 3 INJECTION, EMULSION INTRAVENOUS at 20:54

## 2025-06-02 RX ADMIN — LIDOCAINE HYDROCHLORIDE 10 ML: 10 INJECTION, SOLUTION EPIDURAL; INFILTRATION; INTRACAUDAL; PERINEURAL at 16:54

## 2025-06-02 RX ADMIN — FENTANYL CITRATE 50 MCG: 50 INJECTION INTRAMUSCULAR; INTRAVENOUS at 17:28

## 2025-06-02 RX ADMIN — CALCIUM CHLORIDE, MAGNESIUM CHLORIDE, SODIUM CHLORIDE, SODIUM BICARBONATE, POTASSIUM CHLORIDE AND SODIUM PHOSPHATE DIBASIC DIHYDRATE 12.5 ML/KG/HR: 3.68; 3.05; 6.34; 3.09; .314; .187 INJECTION INTRAVENOUS at 08:56

## 2025-06-02 RX ADMIN — DEXMEDETOMIDINE HYDROCHLORIDE 0.5 MCG/KG/HR: 400 INJECTION INTRAVENOUS at 09:01

## 2025-06-02 RX ADMIN — OCTREOTIDE ACETATE 50 MCG: 50 INJECTION, SOLUTION INTRAVENOUS; SUBCUTANEOUS at 18:03

## 2025-06-02 RX ADMIN — OLANZAPINE 2.5 MG: 10 INJECTION, POWDER, FOR SOLUTION INTRAMUSCULAR at 20:57

## 2025-06-02 RX ADMIN — PANTOPRAZOLE SODIUM 40 MG: 40 INJECTION, POWDER, FOR SOLUTION INTRAVENOUS at 08:10

## 2025-06-02 RX ADMIN — CALCIUM CHLORIDE, MAGNESIUM CHLORIDE, SODIUM CHLORIDE, SODIUM BICARBONATE, POTASSIUM CHLORIDE AND SODIUM PHOSPHATE DIBASIC DIHYDRATE: 3.68; 3.05; 6.34; 3.09; .314; .187 INJECTION INTRAVENOUS at 02:02

## 2025-06-02 RX ADMIN — HYDROCORTISONE SODIUM SUCCINATE 50 MG: 100 INJECTION, POWDER, FOR SOLUTION INTRAMUSCULAR; INTRAVENOUS at 09:50

## 2025-06-02 RX ADMIN — THIAMINE HYDROCHLORIDE 200 MG: 100 INJECTION, SOLUTION INTRAMUSCULAR; INTRAVENOUS at 20:57

## 2025-06-02 RX ADMIN — HYDROCORTISONE SODIUM SUCCINATE 50 MG: 100 INJECTION, POWDER, FOR SOLUTION INTRAMUSCULAR; INTRAVENOUS at 02:02

## 2025-06-02 RX ADMIN — IOPAMIDOL 95 ML: 755 INJECTION, SOLUTION INTRAVENOUS at 12:18

## 2025-06-02 RX ADMIN — ONDANSETRON 4 MG: 2 INJECTION, SOLUTION INTRAMUSCULAR; INTRAVENOUS at 09:50

## 2025-06-02 RX ADMIN — OCTREOTIDE ACETATE 50 MCG: 50 INJECTION, SOLUTION INTRAVENOUS; SUBCUTANEOUS at 22:01

## 2025-06-02 RX ADMIN — MIDAZOLAM 0.5 MG: 1 INJECTION INTRAMUSCULAR; INTRAVENOUS at 17:24

## 2025-06-02 RX ADMIN — Medication 25 MCG: at 04:19

## 2025-06-02 RX ADMIN — MEROPENEM 500 MG: 500 INJECTION, POWDER, FOR SOLUTION INTRAVENOUS at 20:37

## 2025-06-02 RX ADMIN — OLANZAPINE 5 MG: 10 INJECTION, POWDER, FOR SOLUTION INTRAMUSCULAR at 11:02

## 2025-06-02 RX ADMIN — HYDROCORTISONE SODIUM SUCCINATE 50 MG: 100 INJECTION, POWDER, FOR SOLUTION INTRAMUSCULAR; INTRAVENOUS at 18:03

## 2025-06-02 RX ADMIN — MAGNESIUM SULFATE HEPTAHYDRATE: 500 INJECTION, SOLUTION INTRAMUSCULAR; INTRAVENOUS at 20:54

## 2025-06-02 RX ADMIN — DEXMEDETOMIDINE HYDROCHLORIDE 0.5 MCG/KG/HR: 400 INJECTION INTRAVENOUS at 22:09

## 2025-06-02 RX ADMIN — FENTANYL CITRATE 100 MCG: 50 INJECTION INTRAMUSCULAR; INTRAVENOUS at 16:53

## 2025-06-02 RX ADMIN — MEROPENEM 1 G: 1 INJECTION, POWDER, FOR SOLUTION INTRAVENOUS at 08:10

## 2025-06-02 RX ADMIN — INSULIN GLARGINE 20 UNITS: 100 INJECTION, SOLUTION SUBCUTANEOUS at 09:59

## 2025-06-02 ASSESSMENT — ACTIVITIES OF DAILY LIVING (ADL)
ADLS_ACUITY_SCORE: 52
ADLS_ACUITY_SCORE: 57
ADLS_ACUITY_SCORE: 56
ADLS_ACUITY_SCORE: 52
ADLS_ACUITY_SCORE: 57
ADLS_ACUITY_SCORE: 52
ADLS_ACUITY_SCORE: 57
ADLS_ACUITY_SCORE: 52
ADLS_ACUITY_SCORE: 57
ADLS_ACUITY_SCORE: 52
ADLS_ACUITY_SCORE: 56
ADLS_ACUITY_SCORE: 52

## 2025-06-02 NOTE — PROGRESS NOTES
CRRT STATUS NOTE    DATA:  Time:  4:03 PM  Pressures WNL:  YES  Filter Status:  WDL    Problems Reported/Alarms Noted:  none    Supplies Present:  YES    ASSESSMENT:  Patient Net Fluid Balance:  -377  Vital Signs:  , RR 19, /77, O2 sat 99 On 40% PST 7/5  Labs:  K 4.1, M 2.2, Ph 2.9, ical 4.8, hgb 7.7  Goals of Therapy:  I=O    INTERVENTIONS:   CRRT stopped at 1059 for CT abdomen.  Scan showed distended bladder and shore placed with 730 UOP returned    PLAN:  Per nephrology, keep CRRT stopped until tomorrow morning.  Contact CRRT resource RN with any questions/concerns.

## 2025-06-02 NOTE — PROGRESS NOTES
SURGICAL ICU PROGRESS NOTE      Date of Service (when I saw the patient): 06/02/2025    ASSESSMENT:  Sebastián Rodas is a 29M with alcohol abuse, anxiety, and bipolar disorder, who was admitted 3/30/25 for alcohol withdrawal and abdominal pain. Workup showed leukocytosis and elevated lipase, consistent with acute pancreatitis. He developed encephalopathy and shock, requiring ICU care, intubation, vasopressors, and CRRT by 4/1. His course was complicated by cardiomyopathy (EF 20-25%, improved to 65-70% by 4/14), necrotizing pancreatitis, and persistent infection despite 14 days of meropenem. On 4/27, imaging showed likely perforated viscus; he underwent emergent laparotomy, necrosectomy, and colectomy. Transferred to [facility] SICU on 4/30. On 5/1, reoperation revealed colonic staple line breakdown, necrotic pancreas, vessel thrombosis, and adhesions. Colostomy with malankot drain and temporary abdominal closure performed. Prognosis is poor; palliative care involved. Care conference 5/2 with family to talk goals of care. Full code, family acknowledged that poor prognosis is to be expected. 5/4 s/p exploratory surgery, additional necrotic pancreas remove with no obvious spillage or sign of bleeding at the time. Went back to the OR 5/7 for abdominal wash out. CT abdomen 5/10 showed extravasation on arterial phase imaging. MTP initiated, IR arterial embolization of pancreatic vessels performed. Decreased stool output since 5/13, switched to TPN. Hemorraghic shock.  MTP 5/18, IR embolization 5/18.      CHANGES and MAJOR THINGS TODAY:    Decrease hydrocortisone  IR for bilateral RP drain placement  Hold heparin  Trach dome  1 PRBC earlier today  Discontinue ativan  CT A/P with contrast    PLAN:    Neurological:  # Acute pain   - Fentanyl gtt with bolus PRN    # Nerve pain  - Scheduled lidocaine patches  # Encephalopathy, toxic metabolic  # Insomnia  - Monitor neurological status. Delirium preventions and precautions.   -  More encephalopathic this morning    # Anxiety  - RASS goal 0 to -1  - Olanzapine 2.5 mg @ night  - Olanzapine PRN   - Precedex, weaning as able.   - Stop Lorazepam- nursing describes the last two doses caused increased somnolence requiring backup ventilation and on the second instance hypotension.        Pulmonary:   # Acute hypoxic respiratory failure  # S/p tracheostomy placement   FiO2 (%): 40 %, Resp: 16, Vent Mode: VC/AC, Resp Rate (Set): 16 breaths/min, Tidal Volume (Set, mL): 420 mL, PEEP (cm H2O): 5 cmH2O, Pressure Support (cm H2O): 5 cmH2O, Resp Rate (Set): 16 breaths/min, Tidal Volume (Set, mL): 420 mL, PEEP (cm H2O): 5 cmH2O  CT PE 5/9 showed no evidence of pulmonary embolism. Negative again on 5/18.   - On PST this am, 5/5, continue as able. Trach dome today, pressure support overnight  - Pulmonary toilet, mobilize  - Ventilator bundle  - Wean FIO2 as tolerated  - SLP eval for speaking valve use    Cardiovascular:    # Stress Cardiomyopathy   - Initial LVEF 20-25%, improved to 65-70%  # Septic shock  # Sinus Tachycardia  # Lactic acidosis  - Monitor hemodynamic status. MAP goal > 65  - Norepinephrine weaned off this am.   - Lactate improved, 3.5. Has not cleared since 5/17  - IV thiamine x 4 days for sepsis  - Continue stress dose steroids,will slowly wean, go to hydrocortisone 50 q 8 hours.      Gastroenterology/Nutrition:  # S/p emergent exploratory laparotomy, necrosectomy, transverse colectomy, abdominal washout, and drain placement 5/1 and 5/4  #s/p pancreatic branch of distal celiac artery embolization 5/10  # s/p splenic artery embolization 5/18   # Severe necrotizing pancreatitis  # Splenic vein thrombosis  - Totally output 440  ml from wound manager, 330 LUQ (pancreatic bed) drain, minimal succus from colotomy tube, 950 out from L groin.   - Wound manager output with mixed bilious drainage, and maroon output.   - Colotomy (in transverse colon) drain in place, scant output. Abdomen distended.  -  PPI IV  - Hold all Oral Meds and Feeds, strict NPO  - 5/27 CT C/A/P with large encapsulated intraperitoneal and retroperitoenal air and fluid containing necrotic collections with decreased hemorrhagic components along the aterior inferior aspect of the right hepatic lobe.   - Patient will be discussed in GI conference 5/29. Discussed with IR 5/27, amenable to placement of bilateral retroperitoneal drains.  Plan for drain placement today  - trial of Octretide 5/31, will reorder for 2 more days.  - Known left scrotal wound that's in continuity with intrabdomninal cavity and drains old bloody drainage. Increased induration and redness in scrotum and suprapubic, new skin tear on the right scrotum. Will CT A/P today.       # Severe Protein calorie deficit malnutrition due to critical illness  # Hypertriglyceridemia   - Continue TPN. Lipids resumed (checking triglycerides M/Th)  - GJ tube migrated out. Keep NPO  - RD consult. Appreciate cares and recommendations.     Renal/Fluids/Electrolytes:   # SARAHI  # Intravascular hypovolemia  # Total fluid overload  Baseline Cr 0.7-0.8. Presented with Cr 0.96 on 3/30. Rapidly markos to 5.2 on 4/1. Oliguric. Garcia UA with proteinuria, hematuria, pyuria, moderate bacteria.  Kidneys unremarkable on CT. Has severe ATN in the setting of shock, ADHF, intravascular hypovolemia/3rd spacing from pancreatitis.   - Nephrology consulted, appreciate recs  - CRRT, run even  - Continue to monitor intake and output  - Volume assessment daily      Endocrine:   # Stress and steroid induced hyperglycemia    Hgb A1c 5.3, no hx of DM   - Insulin gtt  - Keep lantus at 20    - Goal to keep BG< 180 for optimal wound healing   - On stress dose steroids, as above      ID:  # Leukocytosis  # Necrotizing pancreatitis  # Intraabdominal sepsis  # Respiratory candidiasis   - Trend CRP q3days (291 on 5/28, from 86 on 5/22)  - WBC improved 42 to 26K to 19 after rebroadening Zosyn to Meropenem.   - Repeat BC x2, sputum  culture, UA with reflex 5/31.        cultures:  - 4/30 blood cultures- NGTD   - 5/1 blood cultures ordered - NGTD  - Blood culture 5/12, 5/15 - NGTD  - Blood culture 5/22 - +candida  - 5/27 Resp culture 2+ candida albicans, 1+ staph haemolyticus, 1+ staph epidermidis, +1 yeast     Heme:     # Acute blood loss anemia   # Anemia of critical illness   # Thrombosed splenic vein   #s/p pancreatic branch of distal celiac artery embolization 5/10  # s/p splenic artery embolization 5/18   #Non occlusive thrombus adherent to RIJ catheter on CT 5/27  - Transfuse if hgb <7.0 or signs/symptoms of hypoperfusion. Monitor and trend  - Received 1 PRBC today   - MTP 5/10, 5/18  - Continue subcutaneous heparin, watch bleeding from drains.Hold am dose pending IR drain placement 6/2  - s/p IR 5/18 coil embolization of splenic artery proximal to great pancreatic artery.   - Hold additional anticoagulation       Musculoskeletal:   # Deconditioning and weakness due to critical illness   # LLE swelling  - LLE DVT ultrasound 5/26 negative  - Physical and occupational therapy consult        Skin:  # Pressure Ulcers - Buttocks/rectal area  - Barrier cream with liberal application.   - WOC consult      #Pressure Ulcers- Left Heel  Pressure Injury Location: Left heel   Wound type: Pressure Injury     Pressure Injury Stage: Deep Tissue Pressure Injury (DTPI), present on admission     General Cares/Prophylaxis:    DVT Prophylaxis: SQH    GI Prophylaxis: PPI     Lines/ tubes/ drains:  - HD line--  Right IJ  - PICC line- left   - PIV x 3  - Trach  - RUQ ostomy  - LLQ drain  - Scrotal drain  - SHANNON x 3  - Colotomy drain     Disposition:  - Surgical ICU    Family conference requested for 6/3 1400 - asked to invite Palliative care, ethics, and General Surgery.     Seen and discussed with staff.     Renee Sanchez La Mater  CC time 62 minutes exclusive of procedures    ====================================  SUBJECTIVE:  off norepinephrine. Denies pain and  "anxiety. Denies dyspnea and chest pain.       OBJECTIVE:   1. VITAL SIGNS:   Temp: 98.2  F (36.8  C) Temp src: Oral BP: 95/69 Pulse: 108   Resp: 16 SpO2: 100 % O2 Device: Mechanical Ventilator Oxygen Delivery: 40 LPM Height: 172.7 cm (5' 7.99\") Weight: 70 kg (154 lb 5.2 oz)  Estimated body mass index is 23.47 kg/m  as calculated from the following:    Height as of this encounter: 1.727 m (5' 7.99\").    Weight as of this encounter: 70 kg (154 lb 5.2 oz).      FiO2 (%): 40 %, Resp: 16, Vent Mode: VC/AC, Resp Rate (Set): 16 breaths/min, Tidal Volume (Set, mL): 420 mL, PEEP (cm H2O): 5 cmH2O, Pressure Support (cm H2O): 5 cmH2O, Resp Rate (Set): 16 breaths/min, Tidal Volume (Set, mL): 420 mL, PEEP (cm H2O): 5 cmH2O      2. INTAKE/ OUTPUT:       Intake/Output Summary (Last 24 hours) at 5/30/2025 0635  Last data filed at 5/30/2025 0600  Gross per 24 hour   Intake 3814.17 ml   Output 4169 ml   Net -354.83 ml          3. PHYSICAL EXAMINATION:  General: NAD  Neuro: opens eyes to voice, follows commands, writing words.   HEENT: PERRLA. Trach present and secure, site dressed.   Pulm/Resp: Normal work of breathing, ventilated by tracheostomy on pressure support  CV: RRR, well perfused extremities  Abdomen: Distended. Dark red/brown output noted to all drains. Abdomen with dressing in place C/D/I  : scrotal edema, draining drk red/brown output from left scrotum. New dark drainage coming from right scrotum. Scrotum more indurated and red. Suprapubic distention and redness.   MSK/Extremities: generalized edema in extremities. +3 left foot edema, 2+ right foot edema    4. INVESTIGATIONS:   Arterial Blood Gases   Recent Labs   Lab 05/27/25  0657 05/26/25  2156   PH 7.39 7.45   PCO2 32* 29*   PO2 75* 118*   HCO3 19* 20*     Complete Blood Count   Recent Labs   Lab 06/02/25  0406 06/02/25  0014 06/01/25  1544 06/01/25  0356 05/31/25  1448   WBC 19.4*  --  22.2* 26.3* 42.0*   HGB 7.7* 6.9* 7.1* 7.8* 7.3*   *  --  167 125* 194 "     Basic Metabolic Panel  Recent Labs   Lab 06/02/25  0615 06/02/25  0407 06/02/25  0406 06/02/25  0153 06/01/25  1546 06/01/25  1544 06/01/25  0358 06/01/25  0356 05/31/25  1454 05/31/25  1448   NA  --   --  138  --   --  138  --  138  --  141   POTASSIUM  --   --  4.1  --   --  3.9  --  4.2  --  4.2   CHLORIDE  --   --  105  --   --  106  --  106  --  106   CO2  --   --  22  --   --  22  --  21*  --  22   BUN  --   --  37.6*  --   --  38.9*  --  39.9*  --  43.1*   CR  --   --  0.38*  --   --  0.39*  --  0.39*  --  0.49*   * 140* 155* 125*   < > 139*   < > 278*   < > 181*    < > = values in this interval not displayed.     Liver Function Tests  Recent Labs   Lab 06/02/25 0406 06/01/25  1544 06/01/25 0356 05/31/25  1448 05/31/25  0806 05/31/25  0402 05/30/25  1441 05/30/25  0419 05/27/25  1500 05/27/25  0657   AST 44  --  23  --   --  37  --  38   < >  --    ALT 39  --  28  --   --  39  --  44   < >  --    ALKPHOS 725*  --  340*  --   --  459*  --  645*   < >  --    BILITOTAL 0.7  --  0.8  --   --  1.2  --  0.8   < >  --    ALBUMIN 2.1* 2.1* 2.3* 2.4*  --  2.0*   < > 2.1*   < >  --    INR  --   --   --   --  1.27*  --   --   --   --  1.28*    < > = values in this interval not displayed.     Pancreatic Enzymes  No lab results found in last 7 days.    Coagulation Profile  Recent Labs   Lab 05/31/25  0806 05/27/25  0657   INR 1.27* 1.28*     5. RADIOLOGY:   No results found for this or any previous visit (from the past 24 hours).          =========================================

## 2025-06-02 NOTE — PROCEDURES
[facility]    Procedure: IR Procedure Note    Date/Time: 6/2/2025 5:34 PM    Performed by: Rhys Zamarripa MD  Authorized by: Rhys Zamarripa MD  IR Fellow Physician: Rhys Zamarripa MD  Other(s) attending procedure: Anish Harding MD    Pre Procedure Diagnosis: necrotizing pancreatitis  Post Procedure Diagnosis: same    UNIVERSAL PROTOCOL   Site Marked: NA  Prior Images Obtained and Reviewed:  Yes  Required items: Required blood products, implants, devices and special equipment available    Patient identity confirmed:  Arm band, provided demographic data, hospital-assigned identification number and verbally with patient  Patient was reevaluated immediately before administering moderate or deep sedation or anesthesia  Confirmation Checklist:  Correct equipment/implants were available, procedure was appropriate and matched the consent or emergent situation, relevant allergies and patient's identity using two indicators  Time out: Immediately prior to the procedure a time out was called    Universal Protocol: the Joint Commission Universal Protocol was followed    Preparation: Patient was prepped and draped in usual sterile fashion    ESBL (mL):  3     ANESTHESIA    Anesthesia:  Local infiltration  Local Anesthetic:  Lidocaine 1% without epinephrine  Anesthetic Total (mL):  20      SEDATION  Patient Sedated: Yes    Sedation Type:  Moderate (conscious) sedation  Sedation:  Fentanyl and midazolam  Vital signs: Vital signs monitored during sedation    See dictated procedure note for full details.  Findings: See dictation.    Specimens: fluid and/or tissue for laboratory analysis and culture    Procedural Complications: None    Condition: Stable    Plan: - Return to patient care unit.  - Flush each 24 Fr Thalquick with 30 mL NS Q8H.  - Alteplase protocol for the right lower quadrant drain as this was not having any drainage despite being positioned  justin.      PROCEDURE  Describe Procedure: Insertion of bilateral 24 Fr ThalQuick Drains into retroperitoneal collections. About 100 mL of necrotic brown fluid was aspirated from the left sided drain. Scant drainage from the right sided drain despite good positioning.  Patient Tolerance:  Patient tolerated the procedure well with no immediate complications  Length of time physician/provider present for 1:1 monitoring during sedation:  53-67 min

## 2025-06-02 NOTE — CARE CONFERENCE
Additional Information:     Scheduled Care Conference    Date: 6/4  Time: 2 pm Providers & 215 pm Family  Location: 4A conference room      Providers:  SICU: Renee Cmapo CNP  - Confirmed   Surgery: Jose M Davidson DO - Notfied on Vocera    Palliative: Ronda Persaud - Confirmed   Ethics: Delphine Johnson - Notified on Vocera      Family:  Mother: [place] - Confirmed   Uncle: [place] - Confirmed      Call into conference information:   [phone]   Meeting ID# [id number]  PIN # [id number]        Lyla Jha   Inpatient W  [facility] 4 ICU/ED/OBS  [phone]

## 2025-06-02 NOTE — CONSULTS
Urology Consult    Name: Sebastián Rodas    MRN: [id number]   YOB: 1995               Chief Complaint:   Pelvic fluid collections    History is obtained from the patient and chart review          History of Present Illness:   Sebastián Rodas is a 29 year old male with PMHx of alcohol abuse, anxiety, bipolar disorder and no prior urologic history who has been admitted since 3/30/25 for acute and now necrotizing pancreatitis. He has had an extended hospital course complicated by encephalopathy and shock, requiring ICU care, intubation, vasopressors, and CRRT. His course has been further complicated by likely perforated viscus s/p emergent laparotomy, necrosectomy, and colectomy (4/27). He was brought back to the OR on 5/1 and found to have colonic staple line breakdown and now s/p colostomy. He was brought to the OR again on 5/7 for abdominal wash out and has required multiple IR embolizations of bleeding pancreatic vessels in the setting of MTP needs. He is currently trach dependent, on TPN, and abdominal fascia open with bridging vicryl mesh after last procedure with General surgery. Prognosis is poor as there are no further surgical options for his abdominal pathology per General Surgery. Palliative care has been involved. Patient and family continue to desire all cares.     Urology consulted regarding CT demonstrating intra and retroperitoneal fluid collections extending through inguinal canal into scrotum. Additionally, team noted draining red/purulent fluid consistent with infected hematoma and possibly some fecal matter.     Currently, he remains afebrile. Tachycardic with HR 120s-130s. Normotensive, off pressors. Trach dependent. Most recent relevant labs: Hgb 7.7 (6.9, 7.1), WBC 19.4 (22.2), Cr 0.38.   He is currently receiving meropenem in ICU.           Past Medical History:   No past medical history on file.         Past Surgical History:     Past Surgical History:   Procedure Laterality Date     IR VISCERAL ANGIOGRAM  5/10/2025    IR VISCERAL ANGIOGRAM  5/18/2025    IRRIGATION AND DEBRIDEMENT ABDOMEN WASHOUT, COMBINED N/A 5/7/2025    Procedure: ABDOMINAL WASHOUT, CLOSURE WITH BRIDGING VICRYL MESH;  Surgeon: Bertrand Richardson MD;  Location: [facility] OR    LAPAROTOMY SECOND LOOK N/A 5/4/2025    Procedure: Laparotomy Exploratory, Open Pancreatic Necrosectomy, Abdominal Wash Out, Temporary Abdominal Closure;  Surgeon: Brendon Haas DO;  Location: [facility] OR    LAPAROTOMY, LYSIS ADHESIONS, COMBINED N/A 5/1/2025    Procedure: Reopen Laparotomy, Irrigation and Debriement, placement of colostomy tube, temporary abdominal closure, necrosectomy;  Surgeon: Brendon Haas DO;  Location: [facility] OR            Social History:     Social History     Tobacco Use    Smoking status: Never    Smokeless tobacco: Never   Substance Use Topics    Alcohol use: No     Alcohol/week: 0.0 standard drinks of alcohol            Family History:     Family History   Problem Relation Age of Onset    Hypertension Mother     Coronary Artery Disease Father     Substance Abuse Father     Mental Illness Mother     Mental Illness Father             Allergies:   No Known Allergies         Medications:     Current Facility-Administered Medications   Medication Dose Route Frequency Provider Last Rate Last Admin    calcium gluconate 2 g in  mL intermittent infusion  2 g Intravenous Q8H PRN Tico Spain MD   2 g at 05/21/25 1706    calcium gluconate 4 g in sodium chloride 0.9 % 100 mL intermittent infusion  4 g Intravenous Q8H PRN Tico Spain MD        dexmedeTOMIDine (PRECEDEX) 4 mcg/mL in sodium chloride 0.9 % 100 mL infusion  0.1-1.2 mcg/kg/hr (Dosing Weight) Intravenous Continuous Sonia Moore NP 8.7 mL/hr at 06/02/25 1100 0.5 mcg/kg/hr at 06/02/25 1100    dextrose 10% infusion   Intravenous Continuous PRN Renee Kwon CNP        dextrose 10% infusion   Intravenous Continuous PRN  Brendon Haas,    Stopped at 05/22/25 1232    glucose gel 15-30 g  15-30 g Oral Q15 Min PRN Olga Lidia Velarde PA-C        Or    dextrose 50 % injection 25-50 mL  25-50 mL Intravenous Q15 Min PRN Olga Lidia Velarde PA-C        Or    glucagon injection 1 mg  1 mg Subcutaneous Q15 Min PRN Olga Lidia Velarde PA-C        dialysate for CVVHD & CVVHDF (Phoxillum BK4/2.5)  12.5 mL/kg/hr CRRT Continuous Raheem Gabriel, ASIF  mL/hr at 06/02/25 0856 12.5 mL/kg/hr at 06/02/25 0856    fentaNYL (SUBLIMAZE) 50 mcg/mL bolus from pump  25 mcg Intravenous Q2H PRN Olga Lidia Velarde PA-C   25 mcg at 06/02/25 0516    fentaNYL (SUBLIMAZE) infusion   mcg/hr Intravenous Continuous Roxi Alston MD 3 mL/hr at 06/02/25 1100 150 mcg/hr at 06/02/25 1100    [Held by provider] heparin ANTICOAGULANT injection 5,000 Units  5,000 Units Subcutaneous Q8H [facility] Sonia Moore NP   5,000 Units at 06/01/25 2210    HOLD: heparin SQ 8 hour dosing (TID) pre-procedure   Does not apply HOLD Jeanette Lee, APRN CNP        hydrocortisone sodium succinate PF (solu-CORTEF) injection 50 mg  50 mg Intravenous Q8H Renee Kwon CNP   50 mg at 06/02/25 0950    insulin glargine (LANTUS PEN) injection 20 Units  20 Units Subcutaneous Daily Sonia Moore NP   20 Units at 06/02/25 0959    insulin regular (MYXREDLIN) 1 unit/mL infusion  0-24 Units/hr Intravenous Continuous Renee Kwon CNP 2 mL/hr at 06/02/25 1100 2 Units/hr at 06/02/25 1100    Lidocaine (LIDOCARE) 4 % Patch 1-3 patch  1-3 patch Transdermal Q24H Durga Euceda MD        lidocaine (LMX4) cream   Topical Q1H PRN Ganesh Griffiths MD        lidocaine 1 % 0.1-1 mL  0.1-1 mL Other Q1H PRN Ganesh Griffiths MD   1 mL at 05/27/25 1311    lipids 4 oil (SMOFLIPID) 20 % infusion 250 mL  250 mL Intravenous Q24H Brendon Haas DO   Stopped at 06/02/25 0800    magnesium sulfate 2  g in 50 mL sterile water intermittent infusion  2 g Intravenous Q8H PRN Tico Spain MD   2 g at 05/21/25 0530    meropenem (MERREM) 1 g vial to attach to  mL bag  1 g Intravenous Q8H [place], CNP   1 g at 06/02/25 0810    naloxone (NARCAN) injection 0.2 mg  0.2 mg Intravenous Q2 Min PRN Ganesh Griffiths MD        Or    naloxone (NARCAN) injection 0.4 mg  0.4 mg Intravenous Q2 Min PRN Ganesh Griffiths MD        Or    naloxone (NARCAN) injection 0.2 mg  0.2 mg Intramuscular Q2 Min PRN Ganesh Griffiths MD        Or    naloxone (NARCAN) injection 0.4 mg  0.4 mg Intramuscular Q2 Min PRN Ganesh Griffiths MD        No heparin required   Does not apply Continuous PRN Tico Spain MD        norepinephrine (LEVOPHED) 16 mg in  mL infusion MAX CONC CENTRAL LINE  0.01-0.6 mcg/kg/min (Dosing Weight) Intravenous Continuous Celso Ambrose MD   Stopped at 06/02/25 0100    octreotide (sandoSTATIN) injection 50 mcg  50 mcg Intravenous Q8H Marina Zamarripa MD   50 mcg at 06/02/25 0610    OLANZapine (zyPREXA) IV injection 2.5 mg  2.5 mg Intravenous QPM Sonia Moore NP   2.5 mg at 06/01/25 2002    OLANZapine (zyPREXA) IV injection 5 mg  5 mg Intravenous Q6H PRN Jean Paul Pierre MD   5 mg at 06/02/25 1102    ondansetron (ZOFRAN ODT) ODT tab 4 mg  4 mg Oral Q6H PRN Eugenia Zavala MD        Or    ondansetron (ZOFRAN) injection 4 mg  4 mg Intravenous Q6H PRN Eugenia Zavala MD   4 mg at 06/02/25 0950    pantoprazole (PROTONIX) IV push injection 40 mg  40 mg Intravenous QAM AC de [place] James, CNP   40 mg at 06/02/25 0810    parenteral nutrition - ADULT compounded formula   CENTRAL LINE IV TPN CONTINUOUS Brendon Haas DO        parenteral nutrition - ADULT compounded formula   CENTRAL LINE IV TPN CONTINUOUS Brendon Haas DO 60 mL/hr at 06/02/25 0400 Rate Verify at 06/02/25 0400    POST-filter replacement solution  for CVVHD & CVVHDF (Phoxillum BK4/2.5)   CRRT Continuous Tico Spain  mL/hr at 06/02/25 0202 New Bag at 06/02/25 0202    potassium chloride 20 mEq in 50 mL intermittent infusion  20 mEq Intravenous Q8H PRN Tico Spain MD 50 mL/hr at 05/21/25 0643 20 mEq at 05/21/25 0643    PRE-filter replacement solution for CVVHD & CVVHDF (Phoxillum BK4/2.5)  12.5 mL/kg/hr CRRT Continuous Raheem Gabriel APRN  mL/hr at 06/02/25 0856 12.5 mL/kg/hr at 06/02/25 0856    prochlorperazine (COMPAZINE) injection 10 mg  10 mg Intravenous Q6H PRN Eugenia Zavala MD   10 mg at 06/01/25 1847    Or    prochlorperazine (COMPAZINE) tablet 10 mg  10 mg Oral Q6H PRN Eugenia Zavala MD        Reason statin not prescribed   Does not apply DOES NOT GO TO Rhys Bloom MD        sodium chloride (PF) 0.9% PF flush 3 mL  3 mL Intracatheter Q8H Ganesh Griffiths MD   3 mL at 06/02/25 0610    sodium chloride (PF) 0.9% PF flush 3 mL  3 mL Intracatheter q1 min prn Ganesh Griffiths MD        sodium chloride 0.9% BOLUS 1,000 mL  1,000 mL CRRT Q1H PRN Tico Spain MD        sodium phosphate 15 mmol in sodium chloride 0.9 % 250 mL intermittent infusion  15 mmol Intravenous Q8H PRN Tico Spain MD        thiamine (B-1) injection 200 mg  200 mg Intravenous BID de La MaterRenee Michelle, CNP   200 mg at 06/02/25 0810             Review of Systems:    ROS: 10 point ROS neg other than the symptoms noted above in the HPI           Physical Exam:   VS:  T: 98.4    HR: 133    BP: 123/77    RR: 19   GEN:  AOx3.  NAD.    : enlarged inguinal region, erythematous and edematous scrotum with <1cm leaking sites from the left and right scrotum currently pouched, scrotum erythematous w/o obvious superficial fluctuance, unable to express additional fluid          Data:   All laboratory data reviewed:    Recent Labs   Lab 06/02/25  0406 06/02/25  0014 06/01/25  1544 06/01/25  0356 05/31/25  1448   WBC 19.4*  --  " 22.2* 26.3* 42.0*   HGB 7.7* 6.9* 7.1* 7.8* 7.3*   *  --  167 125* 194       Recent Labs   Lab 06/02/25  1202 06/02/25  0824 06/02/25  0615 06/02/25  0407 06/02/25  0406 06/01/25  1546 06/01/25  1544 06/01/25  0358 06/01/25  0356 05/31/25  1454 05/31/25  1448   NA  --   --   --   --  138  --  138  --  138  --  141   POTASSIUM  --   --   --   --  4.1  --  3.9  --  4.2  --  4.2   CHLORIDE  --   --   --   --  105  --  106  --  106  --  106   CO2  --   --   --   --  22  --  22  --  21*  --  22   BUN  --   --   --   --  37.6*  --  38.9*  --  39.9*  --  43.1*   CR  --   --   --   --  0.38*  --  0.39*  --  0.39*  --  0.49*   * 146* 128* 140* 155*   < > 139*   < > 278*   < > 181*   KARINA  --   --   --   --  8.0*  --  8.0*  --  8.1*  --  7.7*   MAG  --   --   --   --  2.2  --  2.2  --  2.1  --  2.3   PHOS  --   --   --   --  2.9  --  3.3  --  3.8  --  4.3    < > = values in this interval not displayed.     No lab results found in last 7 days.    Invalid input(s): \"URINEBLOOD\"    All pertinent imaging reviewed:    CT scan of the abdomen:      IMPRESSION:  1. Increased size of right greater than left scrotal hematoma and  increased size fluid collection within the bilateral inguinal canals.  Increased size of fluid collection adjacent to the right inguinal  canal.  2. Stable extensive peritoneal fluid and air with connection to the  anterior abdominal fluid collection.  3.Postsurgical changes of splenic artery coil embolization with  multifocal wedge-shaped splenic infarcts.  4. Postoperative changes of transverse colectomy with luminal colon  catheter.  5. Similar herniation of gastric mucosa through the anterior wall of  the gastric antrum at site of prior GJ tube.  Renal ultrasound:               Impression and Plan:   Impression:   Sebastián Rodas is a 30 y/o m / complex PMH and hospital course per above ultimately with necrotizing pancreatitis resulting in perforation of transverse colon, erosion into " retroperitoneal vessels s/p IR intervention x2, possible additional intestinal perforation, now with urologic consultation for scrotal fluid collection and leakage of red/purulent vs fecal contents.     Patients clinical prognosis is poor. At this time likely his scrotal fluid collection is abdominal/retroperitoneal contents draining through fascial layers to the scrotum vs per inguinal canal. These contents are likely liquefied/infected hematoma and while we did not visualize fecal contents on exam, it would be unsurprising if fecal matter were also a component of the fluid collection given his hx of bowel perforation and likely recurrent intestinal perforation.     At this time we have no concern for yovani's. His scrotum has start draining. For patients desiring maximal therapies, would normally recommend drainage of the scrotum if it does not accomplish sufficient decompression over the next 1-2 days. However, it is unlikely in this clinical picture that surgical I&D would improve his clinical prognosis even if his scrotum did not decompress with its current tracts.        Plan:  -Continue serial exams, urology to re-examine tomorrow. Please page with any acute changes.  -if patient develops signs of yovani's (such as clinical decompensation with crepitus), worsening scrotal exam AND surgery remains in line with patient goals of care, please page urology on call immediately  -urology will see tomorrow      This patient's exam findings, labs, and imaging discussed with urology staff surgeon Dr. Hwang who developed the treatment plan.    Rony Guo MD  Chief Urology Resident, PGY-5  [phone]

## 2025-06-02 NOTE — PROGRESS NOTES
[facility] Nurse Inpatient Assessment       Consulted for: Tracheostomy site, sacrum, left heel  5/9: New consult for midline wound draining heavily    5/28: Stopped to check on pouching / wound manager placed on 5/27. All appear intact, some leakage around drain tubes that is managed with dressings by RN.          [facility] nurse follow-up plan: Monday/WednesdayFriday    Patient History (according to provider note(s):      Sebastián Rodas is a 29-year-old male with a history of alcohol abuse, anxiety, and bipolar disorder, admitted on 3/30/2025 for alcohol withdrawal after a binge, presenting with abdominal pain. Initial workup revealed leukocytosis and elevated lipase, suggestive of acute pancreatitis. He developed encephalopathy and required ICU transfer on 3/31, intubation, and vasopressors by 4/1 due to shock. His hospital course was complicated by acute renal failure (requiring CRRT), cardiomyopathy (LVEF 20-25%, improved to 65-70% by 4/14), and necrotizing pancreatitis with unorganized fluid collections. He completed a 14-day course of meropenem but remained febrile and critically ill, requiring ongoing dialysis and vasopressor support. On 4/27, imaging indicated likely perforated viscus, and after family discussion, he underwent emergent laparotomy, necrosectomy, transverse colectomy, abdominal washout, and drain placement. He was transferred to the SICU at [facility] on 4/30 for further monitoring. On 5/1, he underwent a second laparotomy with irrigation and debridement, colostomy tube placement, temporary abdominal closure, and further necrosectomy due to breakdown of the colonic resection staple line, necrotic pancreas, thrombosed middle colic vessels, fat necrosis, and dense interloop adhesions. He has a poor prognosis given the OR findings, palliative care consulted.     Assessment:      Areas visualized during today's visit: Focused: and Abdomen, scrotum    Wound  location: Abdomen           Superior side of wound with displaced mesh      Last photo: 6/2  Wound due to: Surgical Wound  Wound history/plan of care: On 4/27, imaging showed likely perforated viscus; he underwent emergent laparotomy, necrosectomy, and colectomy. Transferred to [facility] SICU on 4/30. On 5/1, reoperation revealed colonic staple line breakdown, necrotic pancreas, vessel thrombosis, and adhesions. Colostomy with malankot drain and temporary abdominal closure performed. Prognosis is poor; palliative care involved. Care conference 5/2 with family to talk goasl of care. Full code, family acknowledged that poor prognosis is to be expected. Today 5/4 s/p exploratory surgery, additional necrotic pancreas remove with no obvious spillage or sign of bleeding   5/12: Abdomen is much more distended today. The superior side of the wound bed had a large black clot loosely adhered to the mesh. There was pooling of darker thick liquid in the inferior side of the wound bed.   5/13: Pouch intact. Replaced Kerlix in wound bed.  5/14: Pouch replaced due to leaking at the superior side by PEG tube. Most of the slough covering the adipose tissue at the wound margins has fallen off. There is marbled granulation tissue and adipose tissue on the wound margins. Wound smelled more foul today.   5/20: Wound dimensions have increased markedly. The bowel under the mesh is much more distended and the mesh at the superior end of wound has displaced and is no longer attached. There is a large amount of blood clot and stool in this location. The wound manager was unable to be replaced today due to the increased wound dimensions as well as erosion of the various tubes around the analilia-wound area making pouching impossible.   5/23: Patient seems to be tolerating dressing changes ok. Found that more mesh had  and bowel loops were visible so asked team o come see and they were aware. Will continue with dressing changes. If he has a  strong desire to get out of bed (he asked during my visit) I would strongly recommend an abdominal binder.   5/27: Pt's wound with increased drainage from superior side with displaced mesh. Pt much less edematous with dialysis. PEG tube fell out over night. Saddle bagged wound pouch able to be placed today.   5/30: Pt's abdomen is much less distended today. Saddle bagged pouch still required to cover all analilia-wound skin. Large amount of liquid blood and feces suctioned from superior cavity within wound base.  6/2: Pouch intact today, only changed Kerlix/Xeroform and suctioned fluid from wound. Mesh has fully detached and was removed by MD over the weekend. There is an exposed SHANNON drain in the RUQ of the wound.   Wound base: 80 % open bowel visible  , 20 % Granulation tissue, Slough, and Adipose tissue     Palpation of the wound bed: fluctuance      Drainage: copious     Description of drainage: serosanguinous, bloody, and black     Measurements (length x width x depth, in cm): 21  x 16  x  3+ cm - measured on Friday's- did not probe into cavity with exposed bowel     Tunneling: N/A     Undermining: N/A  Periwound skin: Intact      Color: normal and consistent with surrounding tissue      Temperature: normal   Odor: none  Pain: facial expression of distress, sharp  Pain interventions prior to dressing change: oral oxycodone, IV fentanyl, and slow and gentle cares   Treatment goal: Drainage control and Infection control/prevention  STATUS: deteriorating  Supplies ordered: at bedside    Pressure Injury Location: Left heel - Assessed on Fridays        Last photo: 5/30  Wound type: Pressure Injury     Pressure Injury Stage: Deep Tissue Pressure Injury (DTPI), present on admission       Wound history/plan of care:   Pt admitted from OSH on 4/31. Wound present on admission. There is an intact blister over a deeper maroon discoloration.    Wound base: 100 % Maroon, Blister, and Epidermis     Palpation of the wound bed: boggy       Drainage: none     Description of drainage: none     Measurements (length x width x depth, in cm) 2  x 1.5  x  0 cm      Tunneling N/A     Undermining N/A  Periwound skin: Intact      Color: normal and consistent with surrounding tissue      Temperature: normal   Odor: none  Pain: denies , none  Pain intervention prior to dressing change: slow and gentle cares   Treatment goal: Heal  and Protection  STATUS: stable  Supplies ordered: at bedside    My PI Risk Assessment     Sensory Perception: 2 - Very Limited     Moisture: 2 - Very moist      Activity: 1 - Bedfast      Mobility: 2 - Very limited     Nutrition: 2 - Probably inadequate      Friction/Shear: 1 - Problem     TOTAL: 10    Pressure Injury Location: coccyx - Assessed on Fridays    Zoomed out                                              Zoomed in        Last photo: 5/16  Wound type: Pressure Injury, Incontinence Associated Dermatitis (IAD), and Moisture Associated Skin Damage (MASD)     Pressure Injury Stage: 2, present on admission        Wound history/plan of care:   Pt transferred from OSH on 4/31. Wound present on admission. There is MASD/IAD to the bilateral buttocks with exposed dermis. There is further breakdown over the Pt's coccyx that is fibrin vs slough. Wound is at least a stage 2. The wound will need to evolve further before it can be definitively staged.   5/9: Wound continues to improve. Wound is a stage 2 pressure injury over coccyx with surrounding MASD and exposed dermis.  5/16: Dermal buds seen throughout wound base.  5/23: wound care just completed by RN so did not assess   Wound base: 80 % Dermis, 20 % Fibrin     Palpation of the wound bed: normal      Drainage: moderate     Description of drainage: serosanguinous     Measurements (length x width x depth, in cm) 6  x 6  x  0.2 cm - central wound, 1.5 x 1.5 x 0.1 cm right buttock     Tunneling N/A     Undermining N/A  Periwound skin: Edematous and Skin stripping      Color: pink and  red      Temperature: normal   Odor: none  Pain: moderate, tender  Pain intervention prior to dressing change: slow and gentle cares   Treatment goal: Heal  and Protection  STATUS: improving and stable  Supplies ordered: at bedside    My PI Risk Assessment     Sensory Perception: 2 - Very Limited     Moisture: 2 - Very moist      Activity: 1 - Bedfast      Mobility: 2 - Very limited     Nutrition: 2 - Probably inadequate      Friction/Shear: 1 - Problem     TOTAL: 10    Wound location: Trach - Assessed on Fridays               Last photo: 5/30  Wound due to: Surgical Wound  Wound history/plan of care:  Pt transferred from OSH on 4/31. Pt had trach placed at OSH. There are areas of fibrin from previous trach suture locations which are mostly healed. There is a linear incision on the left side of Trach cannula from insertion. The wound base wass difficult to visualize, appears to be fibrin and adipose tissue marbled together.  5/5: Called by bedside RN with concerns for skin breakdown near trach. On assessment Trach incision appears to have opened up significantly from prior assessment. Moderate amount of respiratory secretions present. ENT consulted by primary team to assess trach site as well.  5/16: Overall dimensions decreasing. Wound edges stating to heal.   Wound base: 100 % Granulation tissue, Fibrin, and Adipose tissue     Palpation of the wound bed: normal      Drainage: moderate     Description of drainage: yellow - respiratory secretions     Measurements (length x width x depth, in cm): 2  x 3  x  0.8 cm      Tunneling: N/A     Undermining: N/A  Periwound skin: Intact      Color: normal and consistent with surrounding tissue      Temperature: normal   Odor: none  Pain: moderate, tender  Pain interventions prior to dressing change: slow and gentle cares   Treatment goal: Heal  and Protection  STATUS: granulating and stable  Supplies ordered: at bedside      Wound location: Scrotum          Last photo:  6/2  Wound due to: Abscess  Wound history/plan of care: Wound noted during assessment of abdominal wounds 5/12. Surgery at bedside, stated that inguinal canal is patent with the abdominal cavity and drainage most likely secondary to abdominal cavity contents, and recommended BID gauze dressings. Wound continued to leak copious amounts of blood/stool/intraabdominal fluid. Overnight and was pouched with an ostomy pouch by SICU resident overnight. Pouch continued to leak and was replaced with a primofit male external catheter set to low continuous suction.  5/13: Ostomy pouch applied to area to collect drainage. Intact at time of assessment.  5/30: Pouch in place draining thin brown liquid.  6/2: There is a new open area on the right side of the scrotum leaking thick bloody output. Pt's scrotum is indurated and swollen, very tender to touch. Induration extends up the right side towards Pt's abdomen.  Wound base: 100 % Moist scrotal tissue     Palpation of the wound bed: fluctuance      Drainage: copious     Description of drainage: serosanguinous, purulent, bloody, and red     Measurements (length x width x depth, in cm): See photos     Tunneling: N/A     Undermining: N/A  Periwound skin: Indurated      Color: red      Temperature: normal   Odor: none  Pain: severe, tender  Pain interventions prior to dressing change: slow and gentle cares   Treatment goal: Drainage control  STATUS: deteriorating  Supplies ordered: at bedside       Treatment Plan:     Left heel wound(s): Every 3 days Cleanse with saline and pat dry. Conform a sacral Mepilex around Pt's heel. Place in Prevalon boots. Float heels off of support surface with pillows under calves.    Buttocks/coccyx wound(s): Every 3 days   Cleanse the area with wound cleanser and pat dry.  Apply No sting film barrier to periwound skin.  Cover wound with Sacral Mepilex (#[id number])  Nursing to peel back dressing to assess wound bed with skin assessment.   Change dressing Q 3  "days.  Turn and reposition Q 2hrs side to side only.  Ensure pt has Vikram-cushion while sitting up in the chair.  If not in ICU: Ensure air pump on bed and set to Isoflex.  FYI- If pt has constant incontinent loose stools needing dressing changes Q shift please discontinue the Mepilex dressing and apply criticaid barrier paste BID and PRN.     Trach wound: Perform trach cares per unit routine. Place a fenestrated optifoam between trach faceplate and Pt's skin.    Abdominal wound: BID and PRN. Remove soiled Kerlix. Gently absorb as much drainage from wound base as possible. It can be helpful to use yaunker suction to remove excess fluid. Cover exposed mesh with Xeroform gauze. (Change Xerform daily) Fluff Kerlix into superior and inferior wound base to help absorb drainage. Keep wound manager to gravity drainage. WOC will assess wound manager daily MWF.    If wound manager fails, place wet to dry dressing with Xeroform gauze covered by Vashe moistened Kerlix Covered by Mextra Superabsorbant pads. Change PRN.    Drain tube cares: PRN for saturation. Cleanse analilia-tubular skin with Vashe and pat dry. Apply no sting barrier film to analilia-tubular area and allow to dry. Fenestrate a 5x5 Mextra super absorbant pad (900560) and place around tube to absorb drainage.     Scrotal wound: PRN with pouch leakage. Cleanse with wound cleanser and pat dry. Apply no sting barrier film to analilia-wound area and allow to dry. Cut a small Josephine pouch ([id number]) large enough to fit Pt's penis and fistula openings. Apply a strip of Josephine ring around the area and apply pouch. Place pouch to straight drainage to assist with pouch integrity.      Pressure Injury Prevention (PIP) Plan:  If patient is declining pressure injury prevention interventions: Explore reason why and address patient's concerns, Educate on pressure injury risk and prevention intervention(s), If patient is still declining, document \"informed refusal\" , and Ensure Care team is " aware ( provider, charge nurse, etc)  Mattress: Follow bed algorithm, add Low Air Loss (Air+) mattress pump if skin is very moist or constantly moist.   HOB: Maintain at or below 30 degrees, unless contraindicated  Repositioning in bed: Every 1-2 hours , Left/right positioning; avoid supine, Raise foot of bed prior to raising head of bed, to reduce patient sliding down (shear), and Frequent microturns using positioning wedges, as patient tolerates  Heels: Pillows under calves and Heel lift boots  Protective Dressing: Sacral Mepilex for prevention (#982747),  especially for the agitated patient   Positioning Equipment:Positioning wedges (#030373) to help maintain 30 degree side lying position   Chair positioning: Chair cushion (#[id number]) , Assist patient to reposition hourly, and Do NOT use a donut for sitting (this increases pressure to smaller area and creates a higher potential for injury)    If patient has a buttock pressure injury, or high risk for PI use chair cushion or SPS.  Moisture Management: Perineal cleansing /protection: Follow Incontinence Protocol, Avoid brief in bed, Clean and dry skin folds with bathing , Consider InterDry (#351368) between folds, and Moisturize dry skin  Under Devices: Inspect skin under all medical devices during skin inspection , Ensure tubes are stabilized without tension, and Ensure patient is not lying on medical devices or equipment when repositioned  Ask provider to discontinue device when no longer needed.     Orders: Reviewed    RECOMMEND PRIMARY TEAM ORDER: None, at this time  Education provided: plan of care  Discussed plan of care with: Nurse  Notify [facility] if wound(s) deteriorate.  Nursing to notify the Provider(s) and re-consult the WO Nurse if new skin concern.    DATA:     Current support surface: Standard  Low air loss (ISABELL pump, Isolibrium, Pulsate)  Containment of urine/stool: Incontinent pad in bed, Indwelling catheter, and Internal fecal management  BMI: Body mass  index is 23.47 kg/m .   Active diet order: None     Output: I/O last 3 completed shifts:  In: 2780.72 [I.V.:740.32]  Out: 2195.1 [Drains:2195; Other:0.1]     Labs:   Recent Labs   Lab 06/02/25  0406 05/31/25  1448 05/31/25  0806   ALBUMIN 2.1*   < >  --    HGB 7.7*   < >  --    INR  --   --  1.27*   WBC 19.4*   < >  --     < > = values in this interval not displayed.     Pressure injury risk assessment:   Sensory Perception: 3-->slightly limited  Moisture: 3-->occasionally moist  Activity: 1-->bedfast  Mobility: 2-->very limited  Nutrition: 3-->adequate  Friction and Shear: 1-->problem  Dick Score: 13      Pager no longer in use, please contact through Tiffanie Moreno group: [facility] Nurse [place]    Dept. Office Number: [phone]

## 2025-06-02 NOTE — PLAN OF CARE
ICU End of Shift Summary. See flowsheets for vital signs and detailed assessment.    Changes this shift: Appears to have slept well. Anxious when awake. PRN zyprexa given before dressing change. Apneic after scheduled PM ativan, switched back to full vent support 40%, PEEP 5. Levophed needed briefly to maintain MAP > 65. 1u PRBC transfused. CRRT continued.     Plan: Continue with plan of care. IR today for drain placements.

## 2025-06-02 NOTE — PROGRESS NOTES
Patient Name: Sebastián Rodas  Medical Record Number: [id number]  Today's Date: 6/2/2025    Procedure: Retroperitoneal drain placement  Proceduralist: Dr. Zamarripa and Dr. Harding  Pathology present: no    Procedure Start: 1620  Procedure end: 1725  Sedation medications administered: 175 mcg fentanyl and 2.5 mg versed     Report given to: Mattie BRINK  : no    Other Notes: Pt arrived to IR room CT2 from . Consent reviewed. Pt denies any questions or concerns regarding procedure. Pt positioned supine and monitored per protocol. Pt tolerated procedure without any noted complications. Pt transferred back to .

## 2025-06-02 NOTE — PROGRESS NOTES
"Care Management Follow Up    Length of Stay (days): 33    Expected Discharge Date: 06/30/2025     Concerns to be Addressed: decision making  Health Care Directive  Patient plan of care discussed at interdisciplinary rounds: Yes    Anticipated Discharge Disposition:  (TBD)  Anticipated Discharge Services:  (tbd)  Anticipated Discharge DME: Other (see comment) (TBD)    Patient/family educated on Medicare website which has current facility and service quality ratings: no  Education Provided on the Discharge Plan: No  Patient/Family in Agreement with the Plan: unable to assess    Referrals Placed by CM/SW:    Private pay costs discussed: Not applicable    Discussed  Partnership in Safe Discharge Planning  document with patient/family: No     Handoff Completed: No, handoff not indicated or clinically appropriate    Additional Information:  Writer followed-up with Sebastián and Sebastián's uncle, Dustin, at request of bedside RN for purpose of discussion around a health care directive. Per bedside RN, patient expressing interest in completing health care directive.     Per most recent palliative note dated 5/30, as it relates to sharing/decision-making preferences:   \"Sebastián prefers to have family present (mom, uncle and/or brother) when receiving medical updates and facing medical decisions.   He wants to have an active role in making decisions about his own care but also values input from family.  He is easily overwhelmed by multiple providers being in the room and multiple difficult conversations in one day.  Consider, limiting these conversations to no more than once a day with 1-2 providers.\"    When writer entered the room, Sebastián was able to participate in some conversation by way of nodding and writing on paper. He appeared to fall asleep several times during the duration of this writer's visit. Writer spoke with Dustin at bedside and provided education around health care directives and process. Writer discussed the " "importance of an advanced directive and provided a HCD packet. Writer explained the form and what was necessary to make it legal. Sebastián asked \"how many chapters (pages) is it?\" by writing on a blank piece of paper. Writer went through each page and offered education around identifying health care agents, goals/values/wishes, and life-sustaining treatments. He expressed some understanding by nodding. Writer left informational handout and blank HCD with Sebastián and his Uncle Dustin at bedside with the offer of continued social work support with the conversation, upon Sebastián's request.     This afternoon, Bedside RN informed writer that Sebastián had completed the form with the support of his uncle and bedside RN serving as a scribe for him. Writer re-visited with Sebastián at bedside to confirm completion of HCD and accuracy of information. Sebastián confirmed that he would like his Uncle Dustin to be his primary health care agent, and his secondary/alternate agent as his brother Rony. At this time, he also expressed to bedside RN that he was tired and wanted to be put back on \"full support\" of his vent. He closed his eyes several times throughout this portion of the conversation, and was intermittently participatory in conversation, although conversation is complicated due to trach placement and reliance on mouthing words/writing.     Per Uncle Dustin, there is question as to whether or not Sebastián is choosing his health care agents based on the expectation of others/the fact that his Uncle is here and present today v. Sebastián's actual wishes. In regards to Sebastián's psychosocial history, his family endorses a history ADHD and suspected Autism (was reportedly process of evaluation for Autism), with language processing differences at baseline, as well as differences of his executive functioning. Although there is a reported history of neurodivergence and processing differences, Sebastián is legally his own decision-maker, although he has " historically relied on his mother/family to manage his health care needs (e.g. scheduling his appts).     It should also be noted that although somewhat lethargic, Sebastián was observed by this writer to clearly advocate for his specific care needs; observed to request full support of vent settings due to fatigue, and also observed requesting medication by name to be given prior to shore being placed RN.     Writer contacted Risk Management requesting further consultation and guidance around appropriateness of HCD given the communication barriers and historical variation in Sebastián's wishes throughout this hospital course. Per Risk and SW Supervisor consultations, writer will revisit with Sebastián in the morning and continue HCD conversation to assess for consistency and accuracy.     Next Steps:   -Writer will follow-up with Sebastián in the morning around 9am to confirm that HCD is completed to accurately reflect his wishes and values; as of today, pt had difficulty engaging in meaningful, sustained conversation  -RNCC and CHW scheduled Care Conference for 2:15 on Wednesday 6/4/25; ongoing goals of care conversations with support from palliative team; this writer to attend     JESENIA Morales, [facility] HospitalSW  [facility] - 4C/4E  Phone: [phone]  4C Vocera, & 4E Vocera

## 2025-06-02 NOTE — PROGRESS NOTES
Surgery Progress Note  06/02/2025       Subjective:  NAEON. No longer on pressors. Pulling fluid per CRRT. Resting in bed. Plan for IR procedure today .     Objective:  Temp:  [97  F (36.1  C)-98.2  F (36.8  C)] 98.2  F (36.8  C)  Pulse:  [] 108  Resp:  [9-24] 16  BP: ()/(49-86) 95/69  FiO2 (%):  [35 %-40 %] 40 %  SpO2:  [96 %-100 %] 100 %    I/O last 3 completed shifts:  In: 2780.72 [I.V.:740.32]  Out: 2195.1 [Drains:2195; Other:0.1]      Gen: Resting comfortably in bed  Neuro: alert and oriented to conversation    Resp: Ventilated via trach  Abd: Abdomen is taut but compressible, drains with minimal dark bloody output, Malecot with minimal succus, significant dark bloody output around midline incision. Abdominal wound covered with wound manager      Document Information    : Scrotum with pouch in place with serous drainage     Labs:  Recent Labs   Lab 06/02/25  0406 06/02/25  0014 06/01/25  1544 06/01/25  0356   WBC 19.4*  --  22.2* 26.3*   HGB 7.7* 6.9* 7.1* 7.8*   *  --  167 125*       Recent Labs   Lab 06/02/25  0615 06/02/25  0407 06/02/25  0406 06/01/25  1546 06/01/25  1544 06/01/25  0358 06/01/25  0356   NA  --   --  138  --  138  --  138   POTASSIUM  --   --  4.1  --  3.9  --  4.2   CHLORIDE  --   --  105  --  106  --  106   CO2  --   --  22  --  22  --  21*   BUN  --   --  37.6*  --  38.9*  --  39.9*   CR  --   --  0.38*  --  0.39*  --  0.39*   * 140* 155*   < > 139*   < > 278*   KARINA  --   --  8.0*  --  8.0*  --  8.1*   MAG  --   --  2.2  --  2.2  --  2.1   PHOS  --   --  2.9  --  3.3  --  3.8    < > = values in this interval not displayed.     Assessment/Plan:   Sebastián Rodas is a 29-year-old male with a history of alcohol use disorder, anxiety, and bipolar disorder who was admitted on 3/30/2025 for alcohol withdrawal following a recent binge, presenting with diffuse abdominal pain. Initial workup revealed leukocytosis and elevated lipase concerning for acute pancreatitis.  "He developed acute encephalopathy and was transferred to the ICU on 3/31, requiring intubation and vasopressors by 4/1 due to shock. Hospital course has been complicated by acute renal failure requiring CRRT, cardiomyopathy (initial LVEF 20-25%, improved to 65-70% by 4/14), and necrotizing pancreatitis with unorganized fluid collections. He completed a 14-day course of meropenem but remains intermittently febrile and critically ill, requiring ongoing dialysis and vasopressor support.     On 4/27, after clinical deterioration and imaging consistent with a likely perforated viscus, following family discussion, he underwent emergent exploratory laparotomy, necrosectomy, transverse colectomy, abdominal washout, and drain placement. Patient was transferred to [facility] on 4/30/25 for further surgical management. Went to OR 5/1 for re-open laparotomy, I&D, placement of colostomy tube in presumed previous colectomy staple line, necrosectomy, and Abthera placement.     On 5/2, increasing sanguineous output from drains concerning for bleeding from pancreatic bed. Family care conference held 5/2, plan for restorative cares at that time. Take back to OR twice (5/4 and 5/7) for abdominal washout. Per nursing note:     5/9 morning after he saw a picture of his abdomen that he \"desires to speak to team about updating goals of care, expressed desire to pull back on cares\". Goals of care conference on 5/10 with continuation of full code and restorative cares. CTA on 5/10 revealed large area of active arterial extravasation in the upper abdomen. MTP was started. Patient is now s/p embolization of small pancreatic branch off of the distal celiac artery with IR on 5/10. Malecot drainage slowed and eventually stopped, with significant drainage through other drains and open into dressings.     5/17 night patient had active extravasation, with Hgb dropping from 7.8 to 5.6 in 1.5 hours. Lactate spiked from 3.6 to 10.6, with WBC 14.9 to 25.5. " Family was consulted, MTP was started, and IR embolized the celiac trunk/splenic artery branches.     5/27 overnight, patient had HR sustained in 140-150s with MAP in 40-50s. BP remained low despite boluses; levophed GTT restarted. Large amount of blood leaking from abdominal dressing requiring 1u PRBC.  Lactate 5.7 and Hgb 7.4. PEG tube came out during dressing change overnight. Increasing drainage also noted from superior aspect of midline. Bowel noted to be exposed d/t mesh detaching from fascia.     - No further surgical options for any intraabdominal pathology  - Nursing to change dressings in the am, pm, and PRN during the day if copious drainage during the day. Please place Xeroform over the bridging mesh (please continue cares). Please do not apply xeroform outside the wound bed/over the skin edges but make sure all of the wound bed is covered.  - Tube feeds on hold. Receiving TPN (50 mL/hr), and Lipids & Albumin as needed.  - Antibiotics per SICU   - SQH PPX, would not recommend therapeutic dose given the risk of bleeding.   - Appreciate WO cares, okay with wound manager if able.   - Other cares per SICU, we appreciate cares   - Plan for care conference this weekend   - Recommend comfort measures      Seen, examined, and discussed with chief resident, who will discuss with staff.     Jose M Davidson DO, MS   General Surgery, PGY 1

## 2025-06-02 NOTE — PROGRESS NOTES
Nephrology Progress Note  06/02/2025       Sebastián Rodas is a 29 yom with Bipolar disorder, ETOH use c/b necrotizing pancreatitis admitted 3/30/25 to [facility] for ETOH withdrawal and abdominal pain, found to have acute pancreatitis which has progressed to necrotizing pancreatitis complicated by SARAHI.  Seen by Nephrology at [place], started on CRRT 4/1.  Had periods of stability enough for iHD but back to CRRT on 4/21 and has been on since with continued bleeding.       Interval History :   Mr Rodas continues on CRRT, planning I=O today.  Difficult case with ongoing abdominal bleeding without a durable surgical intervention to correct.  Labs stable on 4k baths, I=O volume goal today.     Assessment & Recommendations:   SARAHI-Baseline Cr 0.8 as recently as March 2025 before acute events, was started on CRRT emergently on 4/1 in setting of septic shock. Had ~2 weeks of stability enough to manage with iHD but back on CRRT 4/21 until tx to [facility] on 4/30. Running fevers with intraabdominal sepsis.  Continuing RRT from OSH, restarted CRRT on 5/2 after OR.  Hemodynamically remains intermittently tenuous due to intraabdominal bleeding and necrosis.                 -No need for new consent, continuing RRT started last month at [facility].                 -Access is tunneled RIJ from 4/21.                  -CRRT, standard dose.  All 4k baths, I=O.      Volume-Wt down to admit wt, discussed with team and planning I=O today.      Electrolytes-K 4.1 on all 4k baths, bicarb 22, stable on standard dose CRRT. .      BMD-No acute issues.      Anemia-Hgb 7.5, ongoing need for PRBC's.      Nutrition-On hold with bleeding, back on TPN.      Time spent: 50 minutes on this date of encounter for chart review, physical exam, medical decision making and co-ordination of care.      Seen and discussed with Dr Crum     Recommendations were communicated to primary team via verbal communication.      ASIF Roger CNS  Clinical  "Nurse Specialist  [phone]    Review of Systems:   I reviewed the following systems:  Gen: No fevers or chills  CV: No CP at rest  Resp: No SOB at rest  GI: No N/V    Physical Exam:   I/O last 3 completed shifts:  In: 2780.72 [I.V.:740.32]  Out: 2045.1 [Drains:2045; Other:0.1]   BP 92/51   Pulse 92   Temp 98  F (36.7  C) (Axillary)   Resp 11   Ht 1.727 m (5' 7.99\")   Wt 70 kg (154 lb 5.2 oz)   SpO2 100%   BMI 23.47 kg/m       GENERAL APPEARANCE: Vent via trach, CRRT running.   Pulmonary: Vent via trach  CV: Regular rhythm, normal rate   - Edema +2BLE  GI: Surgical dressing in place  MS: no evidence of inflammation in joints, no muscle tenderness  : No Garcia  SKIN: Abdominal dressing in place, multiple drains.   NEURO: Vent via trach, communicating non-verbally.     Labs:   All labs reviewed by me  Electrolytes/Renal -   Recent Labs   Lab Test 06/02/25  0824 06/02/25  0615 06/02/25  0407 06/02/25  0406 06/01/25  1546 06/01/25  1544 06/01/25  0358 06/01/25  0356   NA  --   --   --  138  --  138  --  138   POTASSIUM  --   --   --  4.1  --  3.9  --  4.2   CHLORIDE  --   --   --  105  --  106  --  106   CO2  --   --   --  22  --  22  --  21*   BUN  --   --   --  37.6*  --  38.9*  --  39.9*   CR  --   --   --  0.38*  --  0.39*  --  0.39*   * 128* 140* 155*   < > 139*   < > 278*   KARINA  --   --   --  8.0*  --  8.0*  --  8.1*   MAG  --   --   --  2.2  --  2.2  --  2.1   PHOS  --   --   --  2.9  --  3.3  --  3.8    < > = values in this interval not displayed.       CBC -   Recent Labs   Lab Test 06/02/25  0406 06/02/25  0014 06/01/25  1544 06/01/25  0356   WBC 19.4*  --  22.2* 26.3*   HGB 7.7* 6.9* 7.1* 7.8*   *  --  167 125*       LFTs -   Recent Labs   Lab Test 06/02/25  0406 06/01/25  1544 06/01/25  0356 05/31/25  1448 05/31/25  0402   ALKPHOS 725*  --  340*  --  459*   BILITOTAL 0.7  --  0.8  --  1.2   ALT 39  --  28  --  39   AST 44  --  23  --  37   PROTTOTAL 4.6*  --  4.9*  --  4.8*   ALBUMIN " 2.1* 2.1* 2.3*   < > 2.0*    < > = values in this interval not displayed.       Iron Panel - No lab results found.        Current Medications:  Current Facility-Administered Medications   Medication Dose Route Frequency Provider Last Rate Last Admin    [Held by provider] heparin ANTICOAGULANT injection 5,000 Units  5,000 Units Subcutaneous Q8H [facility] Sonia Moore NP   5,000 Units at 06/01/25 2210    hydrocortisone sodium succinate PF (solu-CORTEF) injection 50 mg  50 mg Intravenous Q8H Renee Kwon CNP   50 mg at 06/02/25 0950    insulin glargine (LANTUS PEN) injection 20 Units  20 Units Subcutaneous Daily Sonia Moore NP   20 Units at 06/02/25 0959    Lidocaine (LIDOCARE) 4 % Patch 1-3 patch  1-3 patch Transdermal Q24H Durga Euceda MD        lipids 4 oil (SMOFLIPID) 20 % infusion 250 mL  250 mL Intravenous Q24H Brendon Haas DO   Stopped at 06/02/25 0800    LORazepam (ATIVAN) injection 0.5 mg  0.5 mg Intravenous Q8H Olga Lidia Velarde PA-C   0.5 mg at 06/02/25 0610    meropenem (MERREM) 1 g vial to attach to  mL bag  1 g Intravenous Q8H Renee Kwon CNP   1 g at 06/02/25 0810    octreotide (sandoSTATIN) injection 50 mcg  50 mcg Intravenous Q8H Marina Zamarripa MD   50 mcg at 06/02/25 0610    OLANZapine (zyPREXA) IV injection 2.5 mg  2.5 mg Intravenous QPM Sonia Moore NP   2.5 mg at 06/01/25 2002    pantoprazole (PROTONIX) IV push injection 40 mg  40 mg Intravenous QAM AC Renee Kwon CNP   40 mg at 06/02/25 0810    sodium chloride (PF) 0.9% PF flush 3 mL  3 mL Intracatheter Q8H Ganesh Griffiths MD   3 mL at 06/02/25 0610    thiamine (B-1) injection 200 mg  200 mg Intravenous BID Renee Kwon CNP   200 mg at 06/02/25 0810     Current Facility-Administered Medications   Medication Dose Route Frequency Provider Last Rate Last Admin    dexmedeTOMIDine (PRECEDEX) 4 mcg/mL in sodium chloride  0.9 % 100 mL infusion  0.1-1.2 mcg/kg/hr (Dosing Weight) Intravenous Continuous Sonia Moore NP   Stopped at 06/02/25 0915    dextrose 10% infusion   Intravenous Continuous PRN Renee Kwon CNP        dextrose 10% infusion   Intravenous Continuous PRN Brendon Haas DO   Stopped at 05/22/25 1232    dialysate for CVVHD & CVVHDF (Phoxillum BK4/2.5)  12.5 mL/kg/hr CRRT Continuous Raheem Gabriel APRN  mL/hr at 06/02/25 0856 12.5 mL/kg/hr at 06/02/25 0856    fentaNYL (SUBLIMAZE) infusion   mcg/hr Intravenous Continuous Roxi Alston MD 3 mL/hr at 06/02/25 0900 150 mcg/hr at 06/02/25 0900    insulin regular (MYXREDLIN) 1 unit/mL infusion  0-24 Units/hr Intravenous Continuous Renee Kwon CNP 2 mL/hr at 06/02/25 0900 2 Units/hr at 06/02/25 0900    No heparin required   Does not apply Continuous PRN Tico Spain MD        norepinephrine (LEVOPHED) 16 mg in  mL infusion MAX CONC CENTRAL LINE  0.01-0.6 mcg/kg/min (Dosing Weight) Intravenous Continuous Celso Ambrose MD   Stopped at 06/02/25 0100    parenteral nutrition - ADULT compounded formula   CENTRAL LINE IV TPN CONTINUOUS Brendon Haas DO 60 mL/hr at 06/02/25 0400 Rate Verify at 06/02/25 0400    POST-filter replacement solution for CVVHD & CVVHDF (Phoxillum BK4/2.5)   CRRT Continuous Tico Spain  mL/hr at 06/02/25 0202 New Bag at 06/02/25 0202    PRE-filter replacement solution for CVVHD & CVVHDF (Phoxillum BK4/2.5)  12.5 mL/kg/hr CRRT Continuous Raheem Gabrile APRN  mL/hr at 06/02/25 0856 12.5 mL/kg/hr at 06/02/25 0856    Reason statin not prescribed   Does not apply DOES NOT GO TO Rhys Bloom MD

## 2025-06-02 NOTE — PRE-PROCEDURE
GENERAL PRE-PROCEDURE:   Procedure:  Image guided bilateral retroperitoneal drain insertions  Date/Time:  6/2/2025 4:15 PM    Verbal consent obtained?: Yes    Written consent obtained?: Yes    Risks and benefits: Risks, benefits and alternatives were discussed    Consent given by:  Patient  Patient states understanding of procedure being performed: Yes    Patient's understanding of procedure matches consent: Yes    Procedure consent matches procedure scheduled: Yes    Expected level of sedation:  Moderate  Appropriately NPO:  Yes  ASA Class:  2  Mallampati  :  Grade 2- soft palate, base of uvula, tonsillar pillars, and portion of posterior pharyngeal wall visible  Lungs:  Lungs clear with good breath sounds bilaterally  Heart:  Normal heart sounds and rate  History & Physical reviewed:  History and physical reviewed and no updates needed  Statement of review:  I have reviewed the lab findings, diagnostic data, medications, and the plan for sedation

## 2025-06-03 VITALS
WEIGHT: 153.44 LBS | RESPIRATION RATE: 16 BRPM | OXYGEN SATURATION: 100 % | SYSTOLIC BLOOD PRESSURE: 126 MMHG | TEMPERATURE: 99.3 F | HEIGHT: 68 IN | BODY MASS INDEX: 23.26 KG/M2 | HEART RATE: 137 BPM | DIASTOLIC BLOOD PRESSURE: 68 MMHG

## 2025-06-03 LAB
ABO + RH BLD: NORMAL
ALBUMIN SERPL BCG-MCNC: 2.1 G/DL (ref 3.5–5.2)
ALLEN'S TEST: ABNORMAL
ALP SERPL-CCNC: 778 U/L (ref 40–150)
ALT SERPL W P-5'-P-CCNC: 63 U/L (ref 0–70)
ANION GAP SERPL CALCULATED.3IONS-SCNC: 12 MMOL/L (ref 7–15)
APTT PPP: 25 SECONDS (ref 22–38)
APTT PPP: 27 SECONDS (ref 22–38)
AST SERPL W P-5'-P-CCNC: 61 U/L (ref 0–45)
BACTERIA SPT CULT: ABNORMAL
BACTERIA SPT CULT: ABNORMAL
BASE EXCESS BLDA CALC-SCNC: -3.2 MMOL/L (ref -3–3)
BASOPHILS # BLD MANUAL: 0 10E3/UL (ref 0–0.2)
BASOPHILS NFR BLD MANUAL: 0 %
BILIRUB SERPL-MCNC: 0.7 MG/DL
BILIRUBIN DIRECT (ROCHE PRO & PURE): 0.55 MG/DL (ref 0–0.45)
BLD GP AB SCN SERPL QL: NEGATIVE
BLD PROD TYP BPU: NORMAL
BLOOD COMPONENT TYPE: NORMAL
BUN SERPL-MCNC: 63.7 MG/DL (ref 6–20)
CA-I BLD-MCNC: 5.2 MG/DL (ref 4.4–5.2)
CA-I BLD-MCNC: 5.6 MG/DL (ref 4.4–5.2)
CALCIUM SERPL-MCNC: 8.6 MG/DL (ref 8.8–10.4)
CHLORIDE SERPL-SCNC: 105 MMOL/L (ref 98–107)
CLOT INIT KAOL IND TO POST HEP NEUT TRTO: 1.1 {RATIO}
CLOT INIT KAOLIN IND BLD US: 121 SEC (ref 113–166)
CLOT INIT KAOLIN IND P HEP NEUT BLD US: 114 SEC (ref 103–153)
CLOT STIFF PLT CONT BLD CALC: 31.7 HPA (ref 11.9–29.8)
CLOT STIFF TF IND P HEP NEUT BLD US: 35 HPA (ref 13–33.2)
CLOT STIFF TF IND+IIB-IIIA INH P HEP NEU: 3.3 HPA (ref 1–3.7)
CODING SYSTEM: NORMAL
CPR GENES ISLT/SPM QL: NEGATIVE
CREAT SERPL-MCNC: 0.51 MG/DL (ref 0.67–1.17)
CROSSMATCH: NORMAL
CRP SERPL-MCNC: 87.2 MG/L
EGFRCR SERPLBLD CKD-EPI 2021: >90 ML/MIN/1.73M2
EOSINOPHIL # BLD MANUAL: 0 10E3/UL (ref 0–0.7)
EOSINOPHIL NFR BLD MANUAL: 0 %
ERYTHROCYTE [DISTWIDTH] IN BLOOD BY AUTOMATED COUNT: 15.9 % (ref 10–15)
ERYTHROCYTE [DISTWIDTH] IN BLOOD BY AUTOMATED COUNT: 16.8 % (ref 10–15)
ERYTHROCYTE [DISTWIDTH] IN BLOOD BY AUTOMATED COUNT: 17.6 % (ref 10–15)
ERYTHROCYTE [DISTWIDTH] IN BLOOD BY AUTOMATED COUNT: 17.7 % (ref 10–15)
FIBRINOGEN PPP-MCNC: 210 MG/DL (ref 170–510)
FIBRINOGEN PPP-MCNC: 248 MG/DL (ref 170–510)
GLUCOSE BLDC GLUCOMTR-MCNC: 105 MG/DL (ref 70–99)
GLUCOSE BLDC GLUCOMTR-MCNC: 126 MG/DL (ref 70–99)
GLUCOSE BLDC GLUCOMTR-MCNC: 127 MG/DL (ref 70–99)
GLUCOSE BLDC GLUCOMTR-MCNC: 130 MG/DL (ref 70–99)
GLUCOSE BLDC GLUCOMTR-MCNC: 133 MG/DL (ref 70–99)
GLUCOSE BLDC GLUCOMTR-MCNC: 135 MG/DL (ref 70–99)
GLUCOSE BLDC GLUCOMTR-MCNC: 136 MG/DL (ref 70–99)
GLUCOSE BLDC GLUCOMTR-MCNC: 154 MG/DL (ref 70–99)
GLUCOSE BLDC GLUCOMTR-MCNC: 155 MG/DL (ref 70–99)
GLUCOSE BLDC GLUCOMTR-MCNC: 160 MG/DL (ref 70–99)
GLUCOSE SERPL-MCNC: 129 MG/DL (ref 70–99)
GRAM STAIN RESULT: ABNORMAL
GRAM STAIN RESULT: ABNORMAL
HCO3 BLD-SCNC: 22 MMOL/L (ref 21–28)
HCO3 SERPL-SCNC: 21 MMOL/L (ref 22–29)
HCT VFR BLD AUTO: 18.6 % (ref 40–53)
HCT VFR BLD AUTO: 19.8 % (ref 40–53)
HCT VFR BLD AUTO: 20.4 % (ref 40–53)
HCT VFR BLD AUTO: 22.5 % (ref 40–53)
HGB BLD-MCNC: 5.9 G/DL (ref 13.3–17.7)
HGB BLD-MCNC: 6.5 G/DL (ref 13.3–17.7)
HGB BLD-MCNC: 6.6 G/DL (ref 13.3–17.7)
HGB BLD-MCNC: 7.2 G/DL (ref 13.3–17.7)
INR PPP: 1.2 (ref 0.85–1.15)
INR PPP: 1.27 (ref 0.85–1.15)
ISSUE DATE AND TIME: NORMAL
LACTATE SERPL-SCNC: 4.1 MMOL/L (ref 0.7–2)
LACTATE SERPL-SCNC: 4.3 MMOL/L (ref 0.7–2)
LYMPHOCYTES # BLD MANUAL: 1.8 10E3/UL (ref 0.8–5.3)
LYMPHOCYTES NFR BLD MANUAL: 7 %
MAGNESIUM SERPL-MCNC: 2.1 MG/DL (ref 1.7–2.3)
MCH RBC QN AUTO: 29.5 PG (ref 26.5–33)
MCH RBC QN AUTO: 30.4 PG (ref 26.5–33)
MCH RBC QN AUTO: 30.4 PG (ref 26.5–33)
MCH RBC QN AUTO: 30.7 PG (ref 26.5–33)
MCHC RBC AUTO-ENTMCNC: 31.7 G/DL (ref 31.5–36.5)
MCHC RBC AUTO-ENTMCNC: 32 G/DL (ref 31.5–36.5)
MCHC RBC AUTO-ENTMCNC: 32.4 G/DL (ref 31.5–36.5)
MCHC RBC AUTO-ENTMCNC: 32.8 G/DL (ref 31.5–36.5)
MCV RBC AUTO: 91 FL (ref 78–100)
MCV RBC AUTO: 93 FL (ref 78–100)
MCV RBC AUTO: 95 FL (ref 78–100)
MCV RBC AUTO: 97 FL (ref 78–100)
METAMYELOCYTES # BLD MANUAL: 0.3 10E3/UL
METAMYELOCYTES NFR BLD MANUAL: 1 %
MONOCYTES # BLD MANUAL: 1.6 10E3/UL (ref 0–1.3)
MONOCYTES NFR BLD MANUAL: 6 %
MTP TRACKING ORDER: NORMAL
MYELOCYTES # BLD MANUAL: 1.6 10E3/UL
MYELOCYTES NFR BLD MANUAL: 6 %
NEUTROPHILS # BLD MANUAL: 20.9 10E3/UL (ref 1.6–8.3)
NEUTROPHILS NFR BLD MANUAL: 80 %
NRBC # BLD AUTO: 0.3 10E3/UL
NRBC BLD MANUAL-RTO: 1 %
O2/TOTAL GAS SETTING VFR VENT: 50 %
OXYHGB MFR BLDA: 98 % (ref 92–100)
PCO2 BLD: 39 MM HG (ref 35–45)
PEEP: 7 CM H2O
PH BLD: 7.36 [PH] (ref 7.35–7.45)
PHOSPHATE SERPL-MCNC: 2.4 MG/DL (ref 2.5–4.5)
PLAT MORPH BLD: ABNORMAL
PLATELET # BLD AUTO: 135 10E3/UL (ref 150–450)
PLATELET # BLD AUTO: 183 10E3/UL (ref 150–450)
PLATELET # BLD AUTO: 236 10E3/UL (ref 150–450)
PLATELET # BLD AUTO: 283 10E3/UL (ref 150–450)
PO2 BLD: 174 MM HG (ref 80–105)
POLYCHROMASIA BLD QL SMEAR: SLIGHT
POTASSIUM SERPL-SCNC: 4 MMOL/L (ref 3.4–5.3)
POTASSIUM SERPL-SCNC: 4.4 MMOL/L (ref 3.4–5.3)
PROT SERPL-MCNC: 4.7 G/DL (ref 6.4–8.3)
PROTHROMBIN TIME: 15.1 SECONDS (ref 11.8–14.8)
PROTHROMBIN TIME: 15.8 SECONDS (ref 11.8–14.8)
RBC # BLD AUTO: 1.92 10E6/UL (ref 4.4–5.9)
RBC # BLD AUTO: 2.14 10E6/UL (ref 4.4–5.9)
RBC # BLD AUTO: 2.24 10E6/UL (ref 4.4–5.9)
RBC # BLD AUTO: 2.37 10E6/UL (ref 4.4–5.9)
RBC MORPH BLD: ABNORMAL
SAO2 % BLDA: 100 % (ref 96–97)
SODIUM SERPL-SCNC: 138 MMOL/L (ref 135–145)
SPECIMEN EXP DATE BLD: NORMAL
SPECIMEN TYPE: NORMAL
UNIT ABO/RH: NORMAL
UNIT NUMBER: NORMAL
UNIT STATUS: NORMAL
UNIT TYPE ISBT: 1700
UNIT TYPE ISBT: 1700
UNIT TYPE ISBT: 5100
UNIT TYPE ISBT: 6200
UNIT TYPE ISBT: 7300
UNIT TYPE ISBT: 7300
UNIT TYPE ISBT: 9500
WBC # BLD AUTO: 14.7 10E3/UL (ref 4–11)
WBC # BLD AUTO: 19.1 10E3/UL (ref 4–11)
WBC # BLD AUTO: 23.3 10E3/UL (ref 4–11)
WBC # BLD AUTO: 26.1 10E3/UL (ref 4–11)

## 2025-06-03 PROCEDURE — 85007 BL SMEAR W/DIFF WBC COUNT: CPT | Performed by: STUDENT IN AN ORGANIZED HEALTH CARE EDUCATION/TRAINING PROGRAM

## 2025-06-03 PROCEDURE — 85384 FIBRINOGEN ACTIVITY: CPT | Performed by: STUDENT IN AN ORGANIZED HEALTH CARE EDUCATION/TRAINING PROGRAM

## 2025-06-03 PROCEDURE — 86140 C-REACTIVE PROTEIN: CPT

## 2025-06-03 PROCEDURE — 84460 ALANINE AMINO (ALT) (SGPT): CPT | Performed by: STUDENT IN AN ORGANIZED HEALTH CARE EDUCATION/TRAINING PROGRAM

## 2025-06-03 PROCEDURE — 82330 ASSAY OF CALCIUM: CPT | Performed by: CLINICAL NURSE SPECIALIST

## 2025-06-03 PROCEDURE — 250N000009 HC RX 250: Performed by: SURGERY

## 2025-06-03 PROCEDURE — 85018 HEMOGLOBIN: CPT | Performed by: SURGERY

## 2025-06-03 PROCEDURE — 82805 BLOOD GASES W/O2 SATURATION: CPT

## 2025-06-03 PROCEDURE — 82248 BILIRUBIN DIRECT: CPT | Performed by: STUDENT IN AN ORGANIZED HEALTH CARE EDUCATION/TRAINING PROGRAM

## 2025-06-03 PROCEDURE — 999N000007 HC SITE CHECK

## 2025-06-03 PROCEDURE — 85014 HEMATOCRIT: CPT | Performed by: STUDENT IN AN ORGANIZED HEALTH CARE EDUCATION/TRAINING PROGRAM

## 2025-06-03 PROCEDURE — 82310 ASSAY OF CALCIUM: CPT | Performed by: CLINICAL NURSE SPECIALIST

## 2025-06-03 PROCEDURE — 85610 PROTHROMBIN TIME: CPT | Performed by: SURGERY

## 2025-06-03 PROCEDURE — P9012 CRYOPRECIPITATE EACH UNIT: HCPCS

## 2025-06-03 PROCEDURE — 83605 ASSAY OF LACTIC ACID: CPT

## 2025-06-03 PROCEDURE — 85049 AUTOMATED PLATELET COUNT: CPT | Performed by: SURGERY

## 2025-06-03 PROCEDURE — 85041 AUTOMATED RBC COUNT: CPT | Performed by: CLINICAL NURSE SPECIALIST

## 2025-06-03 PROCEDURE — 85610 PROTHROMBIN TIME: CPT | Performed by: STUDENT IN AN ORGANIZED HEALTH CARE EDUCATION/TRAINING PROGRAM

## 2025-06-03 PROCEDURE — 86901 BLOOD TYPING SEROLOGIC RH(D): CPT | Performed by: SURGERY

## 2025-06-03 PROCEDURE — 250N000011 HC RX IP 250 OP 636

## 2025-06-03 PROCEDURE — P9016 RBC LEUKOCYTES REDUCED: HCPCS

## 2025-06-03 PROCEDURE — 258N000001 HC RX 258: Performed by: SURGERY

## 2025-06-03 PROCEDURE — 250N000011 HC RX IP 250 OP 636: Mod: JZ | Performed by: SURGERY

## 2025-06-03 PROCEDURE — 258N000003 HC RX IP 258 OP 636

## 2025-06-03 PROCEDURE — P9059 PLASMA, FRZ BETWEEN 8-24HOUR: HCPCS

## 2025-06-03 PROCEDURE — 250N000011 HC RX IP 250 OP 636: Performed by: STUDENT IN AN ORGANIZED HEALTH CARE EDUCATION/TRAINING PROGRAM

## 2025-06-03 PROCEDURE — 99291 CRITICAL CARE FIRST HOUR: CPT | Mod: 25 | Performed by: STUDENT IN AN ORGANIZED HEALTH CARE EDUCATION/TRAINING PROGRAM

## 2025-06-03 PROCEDURE — 85730 THROMBOPLASTIN TIME PARTIAL: CPT | Performed by: STUDENT IN AN ORGANIZED HEALTH CARE EDUCATION/TRAINING PROGRAM

## 2025-06-03 PROCEDURE — 999N000034 HC STATISTIC CODE BLUE ACCESS REQUIRED

## 2025-06-03 PROCEDURE — 85396 CLOTTING ASSAY WHOLE BLOOD: CPT

## 2025-06-03 PROCEDURE — 84450 TRANSFERASE (AST) (SGOT): CPT | Performed by: STUDENT IN AN ORGANIZED HEALTH CARE EDUCATION/TRAINING PROGRAM

## 2025-06-03 PROCEDURE — 258N000003 HC RX IP 258 OP 636: Performed by: STUDENT IN AN ORGANIZED HEALTH CARE EDUCATION/TRAINING PROGRAM

## 2025-06-03 PROCEDURE — 84075 ASSAY ALKALINE PHOSPHATASE: CPT | Performed by: STUDENT IN AN ORGANIZED HEALTH CARE EDUCATION/TRAINING PROGRAM

## 2025-06-03 PROCEDURE — 999N000157 HC STATISTIC RCP TIME EA 10 MIN

## 2025-06-03 PROCEDURE — 84132 ASSAY OF SERUM POTASSIUM: CPT | Performed by: SURGERY

## 2025-06-03 PROCEDURE — P9056 BLOOD, L/R, IRRADIATED: HCPCS

## 2025-06-03 PROCEDURE — 99292 CRITICAL CARE ADDL 30 MIN: CPT | Mod: 25 | Performed by: STUDENT IN AN ORGANIZED HEALTH CARE EDUCATION/TRAINING PROGRAM

## 2025-06-03 PROCEDURE — 83735 ASSAY OF MAGNESIUM: CPT | Performed by: CLINICAL NURSE SPECIALIST

## 2025-06-03 PROCEDURE — 36620 INSERTION CATHETER ARTERY: CPT

## 2025-06-03 PROCEDURE — 85014 HEMATOCRIT: CPT | Performed by: CLINICAL NURSE SPECIALIST

## 2025-06-03 PROCEDURE — 86850 RBC ANTIBODY SCREEN: CPT | Performed by: SURGERY

## 2025-06-03 PROCEDURE — 82330 ASSAY OF CALCIUM: CPT | Performed by: SURGERY

## 2025-06-03 PROCEDURE — 250N000012 HC RX MED GY IP 250 OP 636 PS 637: Mod: JA

## 2025-06-03 PROCEDURE — 99233 SBSQ HOSP IP/OBS HIGH 50: CPT | Mod: 24 | Performed by: PHYSICIAN ASSISTANT

## 2025-06-03 PROCEDURE — 84132 ASSAY OF SERUM POTASSIUM: CPT | Performed by: CLINICAL NURSE SPECIALIST

## 2025-06-03 PROCEDURE — 99233 SBSQ HOSP IP/OBS HIGH 50: CPT | Mod: 24 | Performed by: CLINICAL NURSE SPECIALIST

## 2025-06-03 PROCEDURE — 250N000011 HC RX IP 250 OP 636: Performed by: NURSE PRACTITIONER

## 2025-06-03 PROCEDURE — 250N000009 HC RX 250: Performed by: NURSE PRACTITIONER

## 2025-06-03 PROCEDURE — 250N000009 HC RX 250

## 2025-06-03 PROCEDURE — 86923 COMPATIBILITY TEST ELECTRIC: CPT

## 2025-06-03 PROCEDURE — 85384 FIBRINOGEN ACTIVITY: CPT | Performed by: SURGERY

## 2025-06-03 PROCEDURE — 85730 THROMBOPLASTIN TIME PARTIAL: CPT | Performed by: SURGERY

## 2025-06-03 PROCEDURE — 94003 VENT MGMT INPAT SUBQ DAY: CPT

## 2025-06-03 PROCEDURE — P9047 ALBUMIN (HUMAN), 25%, 50ML: HCPCS

## 2025-06-03 PROCEDURE — P9037 PLATE PHERES LEUKOREDU IRRAD: HCPCS

## 2025-06-03 RX ORDER — CALCIUM GLUCONATE 20 MG/ML
1 INJECTION, SOLUTION INTRAVENOUS EVERY 6 HOURS PRN
Status: DISCONTINUED | OUTPATIENT
Start: 2025-06-03 | End: 2025-06-03 | Stop reason: HOSPADM

## 2025-06-03 RX ORDER — TRANEXAMIC ACID 10 MG/ML
1 INJECTION, SOLUTION INTRAVENOUS ONCE
Status: COMPLETED | OUTPATIENT
Start: 2025-06-03 | End: 2025-06-03

## 2025-06-03 RX ORDER — HYDROCORTISONE SODIUM SUCCINATE 100 MG/2ML
50 INJECTION INTRAMUSCULAR; INTRAVENOUS EVERY 12 HOURS
Status: DISCONTINUED | OUTPATIENT
Start: 2025-06-03 | End: 2025-06-03 | Stop reason: HOSPADM

## 2025-06-03 RX ORDER — LINEZOLID 2 MG/ML
600 INJECTION, SOLUTION INTRAVENOUS EVERY 12 HOURS
Status: DISCONTINUED | OUTPATIENT
Start: 2025-06-03 | End: 2025-06-03 | Stop reason: HOSPADM

## 2025-06-03 RX ORDER — CALCIUM CHLORIDE 100 MG/ML
INJECTION INTRAVENOUS; INTRAVENTRICULAR
Status: COMPLETED
Start: 2025-06-03 | End: 2025-06-03

## 2025-06-03 RX ORDER — CALCIUM CHLORIDE 100 MG/ML
1 INJECTION INTRAVENOUS; INTRAVENTRICULAR ONCE
Status: COMPLETED | OUTPATIENT
Start: 2025-06-03 | End: 2025-06-03

## 2025-06-03 RX ORDER — ALBUMIN (HUMAN) 12.5 G/50ML
50 SOLUTION INTRAVENOUS ONCE
Status: COMPLETED | OUTPATIENT
Start: 2025-06-03 | End: 2025-06-03

## 2025-06-03 RX ORDER — HYDROCORTISONE SODIUM SUCCINATE 100 MG/2ML
50 INJECTION INTRAMUSCULAR; INTRAVENOUS ONCE
Status: COMPLETED | OUTPATIENT
Start: 2025-06-03 | End: 2025-06-03

## 2025-06-03 RX ADMIN — CALCIUM CHLORIDE 1 G: 100 INJECTION INTRAVENOUS; INTRAVENTRICULAR at 11:18

## 2025-06-03 RX ADMIN — OCTREOTIDE ACETATE 50 MCG: 50 INJECTION, SOLUTION INTRAVENOUS; SUBCUTANEOUS at 05:55

## 2025-06-03 RX ADMIN — TRANEXAMIC ACID 1 G: 10 INJECTION, SOLUTION INTRAVENOUS at 11:44

## 2025-06-03 RX ADMIN — Medication 200 MCG/HR: at 13:43

## 2025-06-03 RX ADMIN — TRANEXAMIC ACID 1 G: 10 INJECTION, SOLUTION INTRAVENOUS at 11:17

## 2025-06-03 RX ADMIN — Medication 50 MCG: at 11:50

## 2025-06-03 RX ADMIN — ONDANSETRON 4 MG: 2 INJECTION, SOLUTION INTRAMUSCULAR; INTRAVENOUS at 08:35

## 2025-06-03 RX ADMIN — ALBUMIN HUMAN 50 G: 0.25 SOLUTION INTRAVENOUS at 11:19

## 2025-06-03 RX ADMIN — ALTEPLASE: 2.2 INJECTION, POWDER, LYOPHILIZED, FOR SOLUTION INTRAVENOUS at 08:35

## 2025-06-03 RX ADMIN — THIAMINE HYDROCHLORIDE 200 MG: 100 INJECTION, SOLUTION INTRAMUSCULAR; INTRAVENOUS at 08:35

## 2025-06-03 RX ADMIN — SODIUM CHLORIDE, SODIUM LACTATE, POTASSIUM CHLORIDE, AND CALCIUM CHLORIDE 500 ML: .6; .31; .03; .02 INJECTION, SOLUTION INTRAVENOUS at 10:12

## 2025-06-03 RX ADMIN — ALTEPLASE: 2.2 INJECTION, POWDER, LYOPHILIZED, FOR SOLUTION INTRAVENOUS at 00:01

## 2025-06-03 RX ADMIN — Medication 50 MCG: at 12:08

## 2025-06-03 RX ADMIN — Medication 50 MCG: at 11:16

## 2025-06-03 RX ADMIN — HYDROCORTISONE SODIUM SUCCINATE 50 MG: 100 INJECTION, POWDER, FOR SOLUTION INTRAMUSCULAR; INTRAVENOUS at 02:02

## 2025-06-03 RX ADMIN — DEXMEDETOMIDINE HYDROCHLORIDE 0.7 MCG/KG/HR: 400 INJECTION INTRAVENOUS at 11:21

## 2025-06-03 RX ADMIN — Medication 25 MCG: at 06:08

## 2025-06-03 RX ADMIN — SODIUM PHOSPHATE, MONOBASIC, MONOHYDRATE AND SODIUM PHOSPHATE, DIBASIC ANHYDROUS 15 MMOL: 142; 276 INJECTION, SOLUTION INTRAVENOUS at 05:54

## 2025-06-03 RX ADMIN — CALCIUM CHLORIDE INJECTION 1 G: 100 INJECTION, SOLUTION INTRAVENOUS at 11:18

## 2025-06-03 RX ADMIN — Medication 150 MCG/HR: at 00:01

## 2025-06-03 RX ADMIN — PANTOPRAZOLE SODIUM 40 MG: 40 INJECTION, POWDER, FOR SOLUTION INTRAVENOUS at 08:35

## 2025-06-03 RX ADMIN — Medication 50 MCG: at 13:46

## 2025-06-03 ASSESSMENT — ACTIVITIES OF DAILY LIVING (ADL)
ADLS_ACUITY_SCORE: 57
ADLS_ACUITY_SCORE: 54
ADLS_ACUITY_SCORE: 57
ADLS_ACUITY_SCORE: 54
ADLS_ACUITY_SCORE: 57
ADLS_ACUITY_SCORE: 54
ADLS_ACUITY_SCORE: 57
ADLS_ACUITY_SCORE: 54
ADLS_ACUITY_SCORE: 57
ADLS_ACUITY_SCORE: 54

## 2025-06-03 NOTE — CONSULTS
SPIRITUAL HEALTH SERVICES  SPIRITUAL ASSESSMENT Progress Note  [facility] ([place]) 4C     REFERRAL SOURCE/REASON FOR VISIT: ASAP Consult    Visit with Sebastián Rodas and Family, they declined needs at this time.    PLAN: Chaplains are available for emotional/spiritual support as needed. Please message me on Vocera until 4:30, after 4:30 please place a STAT/ASAP consult in Epic.     Judy Esposito MDiv  Chaplain Resident    Spiritual Health Services is available 24/7 for emergent requests and consults, either by paging the on-call  or by entering an ASAP/STAT consult in [employer], which will also page the on-call .

## 2025-06-03 NOTE — PROGRESS NOTES
"Urology  Progress Note    24 hour events/Subjective:     - No acute events overnight   - S/p placement of bilateral retroperitoneal drains by IR yesterday     Exam  BP 95/45   Pulse 107   Temp 97.7  F (36.5  C) (Axillary)   Resp 13   Ht 1.727 m (5' 7.99\")   Wt 69.6 kg (153 lb 7 oz)   SpO2 96%   BMI 23.34 kg/m    No acute distress  Trach dependent   Abdomen open covered with wound manager  Several abdominal drains   : enlarged inguinal region, erythematous and edematous scrotum covered with wound manager, No crepitus through out perineum.   - Garcia draining clear yellow urine     Intake/Output Summary (Last 24 hours) at 6/3/2025 0520  Last data filed at 6/3/2025 0500  Gross per 24 hour   Intake 2288.14 ml   Output 2871 ml   Net -582.86 ml       UOP 1423/--     Labs    AM labs pending     7-Day Micro Results       Collected Updated Procedure Result Status      06/02/2025 1717 06/02/2025 1852 Body fluid, unsp Aerobic Bacterial Culture Routine With Gram Stain [[id number]]   (Abnormal)   Body fluid, unsp from Retroperitoneal    Preliminary result Component Value   Gram Stain Result 4+ Gram positive cocci  [P]     1+ Gram negative bacilli  [P]     1+ Fungal elements seen  [P]     No white blood cells seen  [P]                06/02/2025 1715 06/02/2025 1726 Anaerobic bacterial culture [[id number]]   Aspirate from Retroperitoneal    In process Component Value   No component results               06/02/2025 1356 06/02/2025 1448 [facility] Surveillance Carbapenemase-Producing Organism PCR [[id number]]   Swab from Rectum    In process Component Value   No component results            05/31/2025 0839 06/02/2025 0917 Blood Culture Peripheral blood (BC) Hand, Right [[id number]]   Peripheral blood (BC) from Hand, Right    Preliminary result Component Value   Culture No growth after 2 days  [P]                05/31/2025 0732 06/02/2025 0831 Blood Culture Peripheral blood (BC) Hand, Right [[id number]]   Peripheral blood " (BC) from Hand, Right    Preliminary result Component Value   Culture No growth after 2 days  [P]                05/31/2025 0649 06/02/2025 0843 Respiratory Aerobic Bacterial Culture with Gram Stain [[id number]]   (Abnormal)   Sputum from Endotracheal    Preliminary result Component Value   Culture Culture in progress  [P]     2+ Candida albicans  [P]     Susceptibilities not routinely done, refer to antibiogram to view typical susceptibility profiles   Gram Stain Result >25 PMNs/low power field  [P]     2+ Yeast  [P]                05/27/2025 0702 05/29/2025 1405 Respiratory Aerobic Bacterial Culture with Gram Stain [[id number]]   (Abnormal)   Sputum from Endotracheal    Final result Component Value   Culture 2+ Candida albicans    Susceptibilities not routinely done, refer to antibiogram to view typical susceptibility profiles    1+ Staphylococcus haemolyticus    Susceptibilities not routinely done, refer to antibiogram to view typical susceptibility profiles    1+ Staphylococcus epidermidis    Susceptibilities not routinely done, refer to antibiogram to view typical susceptibility profiles   Gram Stain Result >25 PMNs/low power field    1+ Yeast               05/27/2025 0701 05/27/2025 0959 MRSA MSSA PCR, Nasal Swab [[id number]]    Swab from Nares, Bilateral    Final result Component Value   MRSA Target DNA Negative   SA Target DNA Negative                    Assessment/Plan  Sebastián Rodas is a 30 y/o m / complex PMH and hospital course per above ultimately with necrotizing pancreatitis resulting in perforation of transverse colon, erosion into retroperitoneal vessels s/p IR intervention x2, possible additional intestinal perforation, now with urologic consultation for scrotal fluid collection and leakage of red/purulent vs fecal contents. He is now s/p bilateral retroperitoneal drain placement with IR on 6/2.      At this time, there is no concern for Jamilah's gangrene. The scrotal drainage is secondary  "to his abdominal pathology and intra/retroperitoneal fluid collections. The scrotal fluid has spontaneously started to drain without intervention. Recommend continuing with conservative management at this time with serial exams and local wound care. Surgical intervention is unlikely to resolve the issue and adds increased risks associated with poorly healing wounds in the setting of his overall clinical picture and nutritional status. It is also unlikely in this clinical picture that surgical I&D would improve his clinical prognosis even if his scrotum did not completely decompress with the current tracts.      - No plans for surgical intervention   - Continue broad spectrum antibiotics   - Wound cares with WOC   - Please page with any acute changes. If patient develops signs of yovani's (such as clinical decompensation with crepitus), worsening scrotal exam AND surgery remains in line with patient goals of care, please page urology on call immediately  - Urology to continue serial exams     Pt evaluated by chief resident and staff, Dr. Jaiden Griffiths MD, PGY-2  Urology Resident      Contacting the urology team: Please see [employer] and page on-call clinician with any questions or concerns regarding this patient. Note writer may be unavailable.     To access [employer] from intranet: under \"Applications\" --> \"Business Applications\" select \"[employer] Smart[employer]\" and search \"Urology Adult & Pediatric/[facility].\" Please note that any question about a urology inpatient, [place] or [place], should go to job code [id number].     "

## 2025-06-03 NOTE — PROGRESS NOTES
Care Management Discharge Note    Discharge Date: 6/3/25        Discharge Disposition: Death   Additional Information:  RNCC provided patient's family the information for affordable burials/ services and [facility] Burial assistance program.   ICU SW will follow up with family  for further help in am.     Provided support for grieving family.    Vianey Hoover, MSN, MA, CCM, RN  4C/4A/4E ICU Care Coordinator  Phone:[phone]  Available via [employer]     For Weekend & Holiday on call RN Care Coordinator:  [facility] ([phone]) Saturday & ; ([phone])  Recognized Holidays   Weekend 4C/4A/4E ICUs /6A Care Coordinator  Available via [employer]

## 2025-06-03 NOTE — PLAN OF CARE
ICU End of Shift Summary. See flowsheets for vital signs and detailed assessment.    Changes this shift: See social work note from Nona Fletcher regarding patient's wishes to complete an advance directive. CRRT stopped this morning for STAT CT abd/pelvis d/t increased swelling and redness in suprapubic and scrotal area. Bladder scanned for 730mL and shore placed with 730mL UO. CRRT will remain off overnight and need will be reassessed in AM. IR this afternoon for additional drain placement x2 in retroperitoneal cavity, fluid cultures came back positive (see charting for results). Patient only able to trach dome for a short amount of time today before getting tired and asking to be switched back to pressure support. Patient left on full vent settings upon returning from IR d/t receiving Versed.    Currently: Patient remains somnolent from sedative used during IR procedure; Dex @ 0.5 and Fent @ 150; arouses to voice/pain; makes needs known; OTERO; endorses abdominal pain. Afebrile; 's-120's; normotensive. Satting well on CMV settings 40/440/16/5. TPN remains @ 60mL/hr. Shore in place with good UO.    Plan: Turn off Precedex in the early morning to allow for patient to be awake and participatory with social work. Flush RP drains per POC. Monitor additional drains for change in output and notify SICU with any acute changes/concerns.    Goal Outcome Evaluation:    Plan of Care Reviewed With: patient, family    Overall Patient Progress: no change    Outcome Evaluation: STAT abd/pelvis CT. IR for additional drain placement x2.

## 2025-06-03 NOTE — DEATH PRONOUNCEMENT
MD DEATH PRONOUNCEMENT    Called to pronounce Sebastián Rodas dead.    Physical Exam: Unresponsive to noxious stimuli, Spontaneous respirations absent, Breath sounds absent, Carotid pulse absent, Heart sounds absent, and Pupillary light reflex absent    Patient was pronounced dead at 3:36 PM, Claudia 3, 2025.    Preliminary Cause of Death: Hemorrhagic Shock    Active Problems:    Pancreatitis  Severe Necrotizing Pancreatitis     Infectious disease present?: YES    Communicable disease present? (examples: HIV, chicken pox, TB, Ebola, CJD) :  NO    Multi-drug resistant organism present? (example: MRSA): NO    Please consider an autopsy if any of the following exist:  NO Unexpected or unexplained death during or following any dental, medical, or surgical diagnostic treatment procedures.   NO Death of mother at or up to seven days after delivery.     NO All  and pediatric deaths.     NO Death where the cause is sufficiently obscure to delay completion of the death certificate.   NO Deaths in which autopsy would confirm a suspected illness/condition that would affect surviving family members or recipients of transplanted organs.     The following deaths must be reported to the 's Office:  NO A death that may be due entirely or in part to any factors other than natural disease (recent surgery, recent trauma, suspected abuse/neglect).   NO A death that may be an accident, suicide, or homicide.     NO Any sudden, unexpected death in which there is no prior history of significant heart disease or any other condition associated with sudden death.   NO A death under suspicious, unusual, or unexpected circumstances.    NO Any death which is apparently due to natural causes but in which the  does not have a personal physician familiar with the patient s medical history, social, or environmental situation or the circumstances of the terminal event.   NO Any death apparently due to Sudden Infant Death Syndrome.      NO Deaths that occur during, in association with, or as consequences of a diagnostic, therapeutic, or anesthetic procedure.   NO Any death in which a fracture of a major bone has occurred within the past (6) six months.   NO A death of persons note seen by their physician within 120 days of demise.     NO Any death in which the  was an inmate of a public institution or was in the custody of Law Enforcement personnel.   NO  All unexpected deaths of children   NO Solid organ donors   NO Unidentified bodies   NO Deaths of persons whose bodies are to be cremated or otherwise disposed of so that the bodies will later be unavailable for examination;   NO Deaths unattended by a physician outside of a licensed healthcare facility or licensed residential hospice program   NO Deaths occurring within 24 hours of arrival to a health care facility if death is unexpected.    NO Deaths associated with the decedent s employment.   NO Deaths attributed to acts of terrorism.   NO Any death in which there is uncertainty as to whether it is a medical examiner s care should be discussed with the medical investigator.        Body disposition: Patient had indicated organ donation on their 's license.  Body released to the [facility].    CELINA Parham, DO  General Surgery PGY-1

## 2025-06-03 NOTE — PROGRESS NOTES
Brief Surgery Note    EGS to the bedside re tachycardia to 180s and hypotensive to the 80s systolic. Noted to have bright red blood from drains and wound manager consistent with hemorrhagic shock. MTP initiated, see SICU's note for details regarding resuscitation. Family notified who promptly came in. Some improvement of vitals with MTP with HR down to the 120-130s, and blood pressures in the 120s, MAP 70 with low dose levophed up to 0.08.     Mother, Marbella, at the bedside. Dr Nash and I informed her and Sebastián of the current situation, with additional support from our Palliative Care colleague Ronda Persaud. Unfortunately in his setting of hemorrhagic shock, we are extremely limited in our options for intervention. We discussed again the source of bleeding with his pancreatitis, in addition to the more recent complications with evidence of stool from his wound leading to intra-abdominal sepsis and complete decomposition of the vicryl mesh leading to an open abdomen with exposed intra-abdominal contents. We discussed the difficulty of IR the last time he had an emergent bleeding episode, and that given the challenges of his last intervention, it was felt if this were to recur there would not be a target for intervention, and therefore IR would not be a feasible option. We are therefore supporting him with MTP currently, with some improvement in his stability as above, but that clinically this is a sign that his body is shutting down, and we do not expect he will make it through this episode. Sebastián was able to communicate with us and nodded to his understanding of the situation. We offered to have additional family present to support him through this time, and that we would be able to support him temporarily, but that the worry is we will not be able to stop the bleeding. Additionally, we discussed that he is at risk for further acute decompensation despite our efforts, and this could lead to complete  "cardiorespiratory failure, in which case CPR would not be of any benefit given the underlying cause. He nodded again in understanding. We provided the opportunity to ask questions, and ultimately Sebastián nodded he understood and would agree to not providing CPR. Our ICU SW Nona was able to notify additional family members who are currently on their way. We will continue to support Sebastián with hopes of their arrival soon.    Leah Jamison MD    ADDENDUM  Additional 2+ liters of fresh blood from drains and wound manager in the last 20 minutes, in addition to large volume of clots within the midline. Continues to require MTP to maintain MAP > 65. Sebastián remains awake and able to communicate with mom, Marbella, at the bedside. I personally spoke with uncle Dustin and Danyel via telephone (after they had been contacted by our SW team), who are both on their way, but unfortunately Rony is still about an hour out. Discussed that we are approaching the end, as we are limited in how well we can keep up with the volume and rate of blood loss. Sebastián wrote to us he \"wants to fall asleep,\" but nodded that we wait until his family members could arrive which is reasonable. Now currently awaiting arrival of Dustin and Danyel, and planning to pursue comfort cares on their arrival.    On family arrival, MTP stopped. Continues to have multiple liters of blood loss per hour. Sebastián was able to spend some time with family as they said goodbye, but ultimately asked to be made \"sleepy,\" pursuing comfort measures only. Versed and fentanyl ordered for comfort. No escalation of cares.   "

## 2025-06-03 NOTE — PROCEDURES
ARTERIAL LINE INSERTION PROCEDURE NOTE  (NON-OR)    Procedure Date:  6/3/2025   Performing Physician:  ASIF Pitt CNP    Pre-Procedure Diagnosis:     Shock  Post-Procedure Diagnosis:  Same as Pre-Procedure Diagnosis    Procedure:  Arterial line insertion  Left Femoral  Indications:  HEMODYNAMIC SHOCK      Estimated Blood Loss: Minimal   Complications: none      Procedure Details:   Arterial line placement was done under emergent consent. The risks and potential complications of their problem and purposed procedure include but are not limited to infection, bleeding, pain,  the need for additional procedures, and nerve and vessel injury. The site of the procedure was properly noted/marked. The patient was identified as Sebastián Rodas with Date of Birth 1995 and the procedure verified as Arterial Line Insertion.  A Time Out was held and the above information confirmed.        An Vidal test not indicated  done before the procedure. In sterile fashion, the line site was prepped with Chlorhexidine.  Strict sterile conditions were maintained,  Cap, mask, and sterile gloves were worn by all participants.  2 ml of   Lidocaine 1% without epinephrine anesthetic were infiltrated into the skin.  The arterial line was placed in the Left Femoral artery percutaneously,  without  difficulty.  The linewas  sutured in place  and an occlusive sterile dressing was applied.  The total number of needle stick attempts was 1.      Condition: stable    ASIF Pitt CNP, 6/3/2025, 12:32 PM

## 2025-06-03 NOTE — PLAN OF CARE
Major Shift Events: Pt passed away at 1546 with family at bedside, after receiving life serving measures of MTP and pressors. He was bleeding from his abdomen and all the drains.  Belonging send with family.  Security picked up the body at 1840.     For vital signs and complete assessments, please see documentation flowsheets.         Problem: Adult Inpatient Plan of Care  Goal: Plan of Care Review  Description: The Plan of Care Review/Shift note should be completed every shift.  The Outcome Evaluation is a brief statement about your assessment that the patient is improving, declining, or no change.  This information will be displayed automatically on your shiftnote.  Outcome: Unable to Meet  Flowsheets (Taken 6/3/2025 1852)  Plan of Care Reviewed With: patient

## 2025-06-03 NOTE — DISCHARGE SUMMARY
[facility] Discharge Summary    Sebastián Rodas MRN# [id number]   Age: 29 year old YOB: 1995     Date of Admission:  4/30/2025  Date of Discharge::  6/3/2025  Admitting Physician:  Brendon Haas DO  Discharge Physician:  Ne Parham DO     Home clinic: NA          Admission Diagnoses:   Pancreatitis [K85.90]  Alcohol-induced acute pancreatitis with infected necrosis [K85.22]   Perforated viscus  Intraabdominal Infection  Hemorrhagic shock          Discharge Diagnosis:     Hemorrhagic Shock  Death          Procedures:     Procedure(s): Laparotomy Exploratory, Open Pancreatic Necrosectomy, Abdominal Wash Out, Temporary Abdominal Closure, N/A - Abdomen     ABDOMINAL WASHOUT, POSSIBLE OSTOMY CREATION, POSSIBLE CLOSURE     Embolization of small pancreatic branch off of the distal celiac artery with IR             Medications Prior to Admission:     No medications prior to admission.             Discharge Medications:   There are no discharge medications for this patient.            Consultations:   Consultation during this admission received from nephrology, Palliative medicine, Interventional Radiology, Ethics Committee, Urology, Nephrology, Nutrition, WOC ID          Brief History of Illness:   Reason for admission requiring a surgical or invasive procedure:   Alcohol-induced acute pancreatitis with infected necrosis [K85.22]   The patient underwent the following procedure(s):   Laparotomy Exploratory, Open Pancreatic Necrosectomy, Abdominal Wash Out, Temporary Abdominal Closure, N/A - Abdomen     ABDOMINAL WASHOUT, POSSIBLE OSTOMY CREATION, POSSIBLE CLOSURE     Embolization of small pancreatic branch off of the distal celiac artery with IR      Please refer to the full operative summary for details.             Hospital Course:   The patient's hospital course was complicated he was admitted on 3/30/2025 for alcohol withdrawal following a recent binge, presenting with diffuse  abdominal pain. Initial workup revealed leukocytosis and elevated lipase concerning for acute pancreatitis. On 4/27, after clinical deterioration and imaging consistent with a likely perforated viscus, he underwent emergent exploratory laparotomy, necrosectomy, transverse colectomy, abdominal washout, drain placement, and abthera placement. Patient was transferred to [facility] on 4/30/25 for further surgical management.   On 5/1, he underwent a second laparotomy with irrigation and debridement, colostomy tube placement, temporary abdominal closure, and further necrosectomy due to breakdown of the colonic resection staple line, necrotic pancreas, thrombosed middle colic vessels, fat necrosis, and dense interloop adhesions.   Care conference 5/2 with family to talk goasl of care. Full code, family acknowledged that poor prognosis is to be expected.   5/4 s/p exploratory surgery, additional necrotic pancreas remove with no obvious spillage or sign of bleeding.   5/7 abdominal wash out. Necrotic debris with grey/dark output from drain 3 and opening of the trach site.   5/10 Interventional radiology found a large extravasation found from small pancreatic branch off distal celiac artery.  The bleeding branch was embolized with coils and Obsidio. MTP initiated and received 11 units PRBCs, 10 units FPP, and 3 units of platelets. Potassium 6.2.   5/17 Found to have CTA abdomen/pelvis demonstrates active extravasation likely from branch of celiac trunk/splenic artery, MTP initiated and underwent IR visceral angiogram and embolization on 5/18.   5/17 IR placed retroperitoneal drain  6/03: bright red blood from drains, consistent with hemorrhagic shock, MTP initiated. Palliative consult, comfort cares initiated and no escalation of cares            Discharge Instructions and Follow-Up:     Discharge diet: NA       Discharge activity: NA   Discharge follow-up: NA   Wound care: NA           Discharge Disposition:     Release to [facility]       Attestation:  I have reviewed today's vital signs, notes, medications, labs and imaging.  Amount of time performed on this discharge summary: 45 minutes.    Ne Parham, DO

## 2025-06-03 NOTE — PLAN OF CARE
ICU End of Shift Summary. See flowsheets for vital signs and detailed assessment.    Changes this shift:  Pt A/O, drowsy, follows commands. Soft pressures when sleeping but BP recover when pt is awaken. On full support until 5am, then switched to PS. Garcia in place with adequate UOP about 40-50mL per hour. Replaced phos. Lactate 4.3. Insulin gtt Alg 2, fent gtt, dex gtt stopped at ~0615 so pt is more awake for social work conference.     Plan: Social work conference, monitor UOP, Care conference Wednesday, scrotal US.       Goal Outcome Evaluation:      Plan of Care Reviewed With: patient    Overall Patient Progress: no changeOverall Patient Progress: no change    Outcome Evaluation: Adequate UOP

## 2025-06-03 NOTE — PROGRESS NOTES
SPIRITUAL HEALTH SERVICES  SPIRITUAL ASSESSMENT Progress Note  [facility] ([place]) 4C     REFERRAL SOURCE/REASON FOR VISIT: Follow Up    Visit with Sebastián Rodas and mother Marbella. Marbella was facilitating a video call with loved ones for Sebastián while they await arrival of other family members. I introduced myself and re-oriented to Spiritual Health Services, they declined needs at this moment but inquired how to get in contact with us.     PLAN: Chaplains are available for emotional/spiritual support as needed. Please message me on Vocera until 4:30, after 4:30 please place a STAT/ASAP consult in Epic.     Judy Esposito MDiv  Chaplain Resident    Spiritual Health Services is available 24/7 for emergent requests and consults, either by paging the on-call  or by entering an ASAP/STAT consult in Smart Panel, which will also page the on-call .

## 2025-06-03 NOTE — PROGRESS NOTES
Surgery Progress Note  06/03/2025       Subjective:  NAEON. No longer on pressors. Resting in bed. Tolerated IR procedure yesterday.     Objective:  Temp:  [96.4  F (35.8  C)-99.3  F (37.4  C)] 99.3  F (37.4  C)  Pulse:  [] 167  Resp:  [8-27] 23  BP: ()/(44-82) 97/62  FiO2 (%):  [40 %] 40 %  SpO2:  [94 %-100 %] 98 %    I/O last 3 completed shifts:  In: 2357.98 [I.V.:687.1]  Out: 2836 [Urine:1488; Drains:1488]      Gen: Resting comfortably in bed  Neuro: alert and oriented to conversation    Resp: Ventilated via trach  Abd: Abdomen is taut but compressible, drains with minimal dark bloody output, Malecot with minimal succus, significant dark bloody output around midline incision. Abdominal wound covered with wound manager, Picture taken on 06/01      Document Information    : Scrotum with pouch in place with serous drainage     Labs:  Recent Labs   Lab 06/03/25  0338 06/02/25 2005 06/02/25  0406   WBC 19.1* 16.6* 19.4*   HGB 7.2* 7.2* 7.7*    162 123*       Recent Labs   Lab 06/03/25  0900 06/03/25  0559 06/03/25  0503 06/03/25  0411 06/03/25  0338 06/02/25  1812 06/02/25  1809 06/02/25  0407 06/02/25  0406   NA  --   --   --   --  138  --  137  137  --  138   POTASSIUM  --   --   --   --  4.0  --  3.8  3.8  --  4.1   CHLORIDE  --   --   --   --  105  --  105  105  --  105   CO2  --   --   --   --  21*  --  23  23  --  22   BUN  --   --   --   --  63.7*  --  52.1*  52.1*  --  37.6*   CR  --   --   --   --  0.51*  --  0.47*  0.47*  --  0.38*   * 127* 133*   < > 129*   < > 119*  119*   < > 155*   KARINA  --   --   --   --  8.6*  --  8.2*  8.2*  --  8.0*   MAG  --   --   --   --  2.1  --  2.1  --  2.2   PHOS  --   --   --   --  2.4*  --  2.8  --  2.9    < > = values in this interval not displayed.     Assessment/Plan:   Sebastián Rodas is a 29-year-old male with a history of alcohol use disorder, anxiety, and bipolar disorder who was admitted on 3/30/2025 for alcohol withdrawal  "following a recent binge, presenting with diffuse abdominal pain. Initial workup revealed leukocytosis and elevated lipase concerning for acute pancreatitis. He developed acute encephalopathy and was transferred to the ICU on 3/31, requiring intubation and vasopressors by 4/1 due to shock. Hospital course has been complicated by acute renal failure requiring CRRT, cardiomyopathy (initial LVEF 20-25%, improved to 65-70% by 4/14), and necrotizing pancreatitis with unorganized fluid collections. He completed a 14-day course of meropenem but remains intermittently febrile and critically ill, requiring ongoing dialysis and vasopressor support.     On 4/27, after clinical deterioration and imaging consistent with a likely perforated viscus, following family discussion, he underwent emergent exploratory laparotomy, necrosectomy, transverse colectomy, abdominal washout, and drain placement. Patient was transferred to [facility] on 4/30/25 for further surgical management. Went to OR 5/1 for re-open laparotomy, I&D, placement of colostomy tube in presumed previous colectomy staple line, necrosectomy, and Abthera placement.     On 5/2, increasing sanguineous output from drains concerning for bleeding from pancreatic bed. Family care conference held 5/2, plan for restorative cares at that time. Take back to OR twice (5/4 and 5/7) for abdominal washout. Per nursing note:     5/9 morning after he saw a picture of his abdomen that he \"desires to speak to team about updating goals of care, expressed desire to pull back on cares\". Goals of care conference on 5/10 with continuation of full code and restorative cares. CTA on 5/10 revealed large area of active arterial extravasation in the upper abdomen. MTP was started. Patient is now s/p embolization of small pancreatic branch off of the distal celiac artery with IR on 5/10. Malecot drainage slowed and eventually stopped, with significant drainage through other drains and open into " dressings.     5/17 night patient had active extravasation, with Hgb dropping from 7.8 to 5.6 in 1.5 hours. Lactate spiked from 3.6 to 10.6, with WBC 14.9 to 25.5. Family was consulted, MTP was started, and IR embolized the celiac trunk/splenic artery branches.     5/27 overnight, patient had HR sustained in 140-150s with MAP in 40-50s. BP remained low despite boluses; levophed GTT restarted. Large amount of blood leaking from abdominal dressing requiring 1u PRBC.  Lactate 5.7 and Hgb 7.4. PEG tube came out during dressing change overnight. Increasing drainage also noted from superior aspect of midline. Bowel noted to be exposed d/t mesh detaching from fascia.     06/02 Patient is now s/p  insertion of bilateral 24 Fr ThalQuick Drains into retroperitoneal collections. About 100 mL of necrotic brown fluid was aspirated from the left sided drain. Scant drainage from the right sided drain despite good positioning.    - No further surgical options for any intraabdominal pathology  - Nursing to change dressings in the am, pm, and PRN during the day if copious drainage during the day. Please place Xeroform over the bridging mesh (please continue cares). Please do not apply xeroform outside the wound bed/over the skin edges but make sure all of the wound bed is covered.  - Tube feeds on hold. Receiving TPN (50 mL/hr), and Lipids & Albumin as needed.  - Antibiotics per SICU   - SQH PPX, would not recommend therapeutic dose given the risk of bleeding.   - Appreciate WO cares, okay with wound manager if able.   - Other cares per SICU, we appreciate cares   - Plan for care conference today   - Recommend comfort measures      Seen, examined, and discussed with chief resident, who will discuss with staff.     Jose M Davidson DO, MS   General Surgery, PGY 1

## 2025-06-03 NOTE — PROGRESS NOTES
See Surgery staff note for details    I was at bedside to discuss with patient, mother and pt's uncle to discuss gravity of the situation. Patient continues to bleed from abdomen secondary to necrotizing pancreatitis, despite MTP we are unable to keep up with blood loss.     Patient was initially responsive and I discussed this with him. Will follow Gen Surg guidance. Based on futility of situation-we will assure patient is comfortable and have not escalated cares.     Palliative care team in to discuss with family as well.     Mallory Gonzalez MD

## 2025-06-03 NOTE — PROGRESS NOTES
"  PALLIATIVE CARE PROGRESS NOTE  [facility]     Patient Name: Sebastián Rodas  Date of Admission: 4/30/2025   Today the patient was seen for: goals of care     Recommendations & Counseling     GOALS OF CARE:   Life-prolonging with limits (DNR, no further surgery or IR procedures)  Continuing current supportive care including transfusions for now as family are making their way in to say goodbye  Massive transfusion protocol called for recurrent high volume bleeding from abdominal wound. I joined Surgery and SICU teams at bedside. They explained to Sebastián and mom Marbella that unfortunately we are reaching limits of what we can do to control Sebastián's bleeding. Further surgical or IR procedures are unlikely to benefit Sebastián at this point and are no longer an option. Sebastián was alert and nodded to indicate he was listening and following conversation.   Providers offered options of continuing with current supportive care to \"buy time\" for family to come in vs transitioning to comfort-focused care. Sebastián nodded to indicate he would like to continue with current interventions and asked his mom to call his family in. Appreciate SW assistance reaching other family members.  Explained that if Sebastián's condition continues to worsen and his heart stops, we would not resuscitate him. Sebastián nodded to indicate understanding. He asked \"how long?\" - discussed prognosis likely in range of hours to days, though difficult to predict. Sebastián asked his mom to bring in his cat \"Cisco,\" Marbella is unsure this will be feasible but can discuss with other family members.    ADVANCE CARE PLANNING:  No health care directive on file. Per system policy, Surrogate Decision-makers for Patients With Diminished Decision-making Capacity offers guidance on possible decision-makers. Marbella (mother) has been identified as a surrogate decision maker.   There is no POLST form on file, defer to patient and/or next of " kin for decisions   Code status: No CPR- Pre-arrest intubation OK    MEDICAL MANAGEMENT:   We are not actively managing symptoms at this time. Fentanyl drip and prn boluses and Precedex per SICU team.  Sebastián indicated some pain with abdominal suction, improved after fentanyl bolus.    PSYCHOSOCIAL/SPIRITUAL:  Family - supported by Marbella (mom), Dustin (uncle), 3 siblings. Lives with youngest sibling and his cat.  Friends  Kathy - [employer], appreciate ongoing  support    Palliative Care will continue to follow.     Ronda Persaud PA-C  [facility], Palliative Care  Securely message with the Vocera Web Console (learn more here) or  Text page via [facility] Paging/Directory        Assessment          Sebastián Rodas is a 29-year-old male with a history of alcohol use disorder, anxiety, and bipolar disorder who was admitted on 3/30/2025 to OSH for alcohol withdrawal following a recent binge, presenting with diffuse abdominal pain. Initial workup revealed leukocytosis and elevated lipase concerning for acute pancreatitis.   On 4/27, after clinical deterioration and imaging consistent with a likely perforated viscus, he underwent emergent exploratory laparotomy, necrosectomy, transverse colectomy, abdominal washout, drain placement, and abthera placement.    Started on CRRT.       Patient was transferred to [facility] on 4/30/25 for further surgical management.  Reexploration of belly here at [facility] reveals no possibility of creating diverting colostomy. Course complicated by recurrent intra-abdominal bleeding requiring MTP and IR embolization x2 (5/10 and 5/18). Palliative following for goals of care conversations.      Interval History:     Multidisciplinary collaboration:  Plan for care conference tomorrow  Notified of massive transfusion protocol called for Sebastián today  Discussed with SICU and Surgery teams outside room    Patient/family narrative  Seen and examined at bedside during MTP. Sebastián responded to my voice and  "nodded/shook head or mouth words in response to questions. He asked for his mom, explained she was on her way in. Sebastián noted some pain \"4/10\" when nursing was suctioning his abdominal wound, additional fentanyl given.    Sebastián was remarkably calm during my visit, in contrast to ongoing anxiety this hospitalization. He asked appropriate questions and nodded and shook his head throughout conversation. He was tearful when we spoke about his prognosis.      Physical Exam:   Temp:  [96.4  F (35.8  C)-99.3  F (37.4  C)] 99.3  F (37.4  C)  Pulse:  [] 139  Resp:  [8-27] 15  BP: ()/(34-82) 130/57  FiO2 (%):  [40 %] 40 %  SpO2:  [94 %-100 %] 100 %  153 lbs 7.04 oz    Physical Exam  General: laying in bed, not in acute distress  Lungs: trach in place, remains on vent support  Abdomen: open wound manager with active bleeding with large clots requiring frequent suctioning from nursing at bedside  Neuro: eyes open and tracking, engaged, communicates by mouthing words or nodding/shaking head, able to write \"Cisco\" on paper (cat's name)  Psych: mood-affect congruent      Data Reviewed:     CMP  Recent Labs   Lab 06/03/25  1123 06/03/25  1042 06/03/25  0900 06/03/25  0411 06/03/25  0338 06/02/25  1812 06/02/25  1809 06/02/25  0407 06/02/25  0406   NA  --   --   --   --  138  --  137  137  --  138   POTASSIUM 4.4  --   --   --  4.0  --  3.8  3.8  --  4.1   CHLORIDE  --   --   --   --  105  --  105  105  --  105   CO2  --   --   --   --  21*  --  23  23  --  22   ANIONGAP  --   --   --   --  12  --  9  9  --  11   GLC  --  136* 135*   < > 129*   < > 119*  119*   < > 155*   BUN  --   --   --   --  63.7*  --  52.1*  52.1*  --  37.6*   CR  --   --   --   --  0.51*  --  0.47*  0.47*  --  0.38*   GFRESTIMATED  --   --   --   --  >90  --  >90  >90  --  >90   KARINA  --   --   --   --  8.6*  --  8.2*  8.2*  --  8.0*   MAG  --   --   --   --  2.1  --  2.1  --  2.2   PHOS  --   --   --   --  2.4*  --  2.8  --  2.9 "   PROTTOTAL  --   --   --   --  4.7*  --   --   --  4.6*   ALBUMIN  --   --   --   --  2.1*  --  2.1*  --  2.1*   BILITOTAL  --   --   --   --  0.7  --   --   --  0.7   ALKPHOS  --   --   --   --  778*  --   --   --  725*   AST  --   --   --   --  61*  --   --   --  44   ALT  --   --   --   --  63  --   --   --  39    < > = values in this interval not displayed.     CBC  Recent Labs   Lab 06/03/25  1148 06/03/25  1123   WBC 14.7* 23.3*   RBC 2.24* 2.14*   HGB 6.6* 6.5*   HCT 20.4* 19.8*   MCV 91 93   MCH 29.5 30.4   MCHC 32.4 32.8   RDW 15.9* 16.8*   * 236     Medical Decision Making       MANAGEMENT DISCUSSED with the following over the past 24 hours: SICU, surgery, SW   NOTE(S)/MEDICAL RECORDS REVIEWED over the past 24 hours: SICU, SW  Medical complexity over the past 24 hours:  - Decision to DE-ESCALATE CARE based on prognosis

## 2025-06-03 NOTE — PROGRESS NOTES
SURGICAL ICU PROGRESS NOTE      Date of Service (when I saw the patient): 06/03/2025    ASSESSMENT:  Sebastián Rodas is a 29M with alcohol abuse, anxiety, and bipolar disorder, who was admitted 3/30/25 for alcohol withdrawal and abdominal pain. Workup showed leukocytosis and elevated lipase, consistent with acute pancreatitis. He developed encephalopathy and shock, requiring ICU care, intubation, vasopressors, and CRRT by 4/1. His course was complicated by cardiomyopathy (EF 20-25%, improved to 65-70% by 4/14), necrotizing pancreatitis, and persistent infection despite 14 days of meropenem. On 4/27, imaging showed likely perforated viscus; he underwent emergent laparotomy, necrosectomy, and colectomy. Transferred to [facility] SICU on 4/30. On 5/1, reoperation revealed colonic staple line breakdown, necrotic pancreas, vessel thrombosis, and adhesions. Colostomy with malankot drain and temporary abdominal closure performed. Prognosis is poor; palliative care involved. Care conference 5/2 with family to talk goals of care. Full code, family acknowledged that poor prognosis is to be expected. 5/4 s/p exploratory surgery, additional necrotic pancreas remove with no obvious spillage or sign of bleeding at the time. Went back to the OR 5/7 for abdominal wash out. CT abdomen 5/10 showed extravasation on arterial phase imaging. MTP initiated, IR arterial embolization of pancreatic vessels performed. Decreased stool output since 5/13, switched to TPN. Hemorraghic shock.  MTP 5/18, IR embolization 5/18.      CHANGES and MAJOR THINGS TODAY:  - ON broadened antimicrobials to micafungin as cx from retroperitoneal fluid collection already growing 1+ fungal elements   - MTP activated around 11:15 AM pt was having high volume ~ 1.0 L (700 ml from the wound manager and 300 ml of bloody output from the new R thalquic drain placed by IR yesterday) dark red bloody output. He was tachycardic up to 160s and MAPs down to 50. Emergent A- line  placed to monitor blood pressure. Pt has been given a total so far of of 1 unit of whole blood  6 pRBCs, PLT 2, FFP 4, 4 g of Ca+, 25% 50 mg albumin x1, 2 g of TXA . NE started EGS aware. Pt's mother was notified and told to come with any family members as soon as possible. Care conference once all family members arrive to bedside.   - Added linezolid due to growing 4+ enterococcus faecium from retroperitoneal aspirate   - Pt has been making good UOP and is currently not connected to CRRT will hold off on restarting for now and reassess after Care Conference   - Weaned hydrocortisone to 50 mg q 12 from aq 8 hr     PLAN:    Neurological:  # Acute pain   - Fentanyl gtt with bolus PRN      # Nerve pain  - Scheduled lidocaine patches  # Encephalopathy, toxic metabolic  # Insomnia  - Monitor neurological status. Delirium preventions and precautions.   - More encephalopathic this morning    # Anxiety  - RASS goal 0 to -1  - Olanzapine 2.5 mg @ night  - Olanzapine PRN   - Precedex gtt, weaning as able.          Pulmonary:   # Acute hypoxic respiratory failure  # S/p tracheostomy placement   FiO2 (%): 40 %, Resp: 23, Vent Mode: VC/AC, Resp Rate (Set): 16 breaths/min, Tidal Volume (Set, mL): 420 mL, PEEP (cm H2O): 5 cmH2O, Pressure Support (cm H2O): 7 cmH2O, Resp Rate (Set): 16 breaths/min, Tidal Volume (Set, mL): 420 mL, PEEP (cm H2O): 5 cmH2O  CT PE 5/9 showed no evidence of pulmonary embolism. Negative again on 5/18.   - This AM pt still feeling really anxious so held off on trach doming this morning. Will continue pt on vent support in setting of MTP and trial trach doming again tomorrow   - Pulmonary toilet, mobilize  - Ventilator bundle  - Wean FIO2 as tolerated  - SLP eval for speaking valve use  - SLP also consulted for palliative swallowing ice chips/popscicles     Cardiovascular:    # Stress Cardiomyopathy   # Hemorrhagic shock   - Initial LVEF 20-25%, improved to 65-70%  # Septic shock  # Sinus Tachycardia  #  Lactic acidosis  - Monitor hemodynamic status. MAP goal > 65  - Pt was restarted on NE in setting of hemorrhagic shock and activating MTP. Wean NE as able to MAP goals   - Lactate improved, 3.5. Has not cleared since 5/17  - IV thiamine x 4 days for sepsis  - Continue stress dose steroids,will slowly wean,  hydrocortisone 50 q 12 hrs   - Please seem Heme/ID for MTP resuscitation summary     Gastroenterology/Nutrition:  # S/p emergent exploratory laparotomy, necrosectomy, transverse colectomy, abdominal washout, and drain placement 5/1 and 5/4  #s/p pancreatic branch of distal celiac artery embolization 5/10  # s/p splenic artery embolization 5/18   # Severe necrotizing pancreatitis  # Splenic vein thrombosis  - Total output 700 ml within an hour 10:00 AM to 11:00 AM from wound manager, 110 LUQ (pancreatic bed) drain, minimal succus from colotomy tube, 500 ml out from L groin.   - Wound manager output dark red .   - Colotomy (in transverse colon) drain in place, scant output.   - PPI IV  - Hold all Oral Meds and Feeds, strict NPO  - 5/27 CT C/A/P with large encapsulated intraperitoneal and retroperitoenal air and fluid containing necrotic collections with decreased hemorrhagic components along the aterior inferior aspect of the right hepatic lobe.   - 6/2 IR placed b/l retroperitoneal drains  - trial of Octretide 5/31, will reorder for 2 more days.      # Severe Protein calorie deficit malnutrition due to critical illness  # Hypertriglyceridemia   - Continue TPN. Lipids resumed (checking triglycerides M/Th)  - GJ tube migrated out. Keep NPO  - RD consult. Appreciate cares and recommendations.     Renal/Fluids/Electrolytes:   # Intravascular hypovolemia    Baseline Cr 0.7-0.8. Presented with Cr 0.96 on 3/30. Rapidly markos to 5.2 on 4/1. Oliguric. Garcia UA with proteinuria, hematuria, pyuria, moderate bacteria.  Kidneys unremarkable on CT. Has severe ATN in the setting of shock, ADHF, intravascular hypovolemia/3rd  spacing from pancreatitis.   - Nephrology consulted, appreciate recs  - Pt kidney's appear to be recovering pt made 1.4 L urine/24 hours. Currently disconnected from CRRT, will hold off for now until Care Conference happens   - Continue to monitor intake and output  - Volume assessment daily    # L Scrotal wound/fluid collection   - Known left scrotal wound that's in continuity with intrabdomninal cavity and drains old bloody drainage. Increased induration and redness in scrotum and suprapubic, new skin tear on the right scrotum.   - Urology was consulted 6/2.  No concern for yovani's and don't recommended surgical I&D. They will continue to follow.     Endocrine:   # Stress and steroid induced hyperglycemia    Hgb A1c 5.3, no hx of DM   - Insulin gtt  - Keep lantus at 20    - Goal to keep BG< 180 for optimal wound healing   - On stress dose steroids, as above      ID:  # Necrotizing pancreatitis  # Intraabdominal sepsis  # Respiratory candidiasis   - Trend CRP q3days CRP down to 87.2 from 238.   - WBC 19.1 similar to yesterday 19.4 , pt afebrile with Tmax of 97.7    Antimicrobials  - Meropenem started on 5/27, added on micafungin ON for 1+ fungal elements growing on retroperitoneal aspirate, will also start linezolid as same aspirate grew 4+ enterococus faecium    Cultures:  6/2 Aerobic retroperitoneal aspirate 4+ enterococcus faecium, 1+ klebsiella aerogenes, 4+ gram positive occi, 1+ gram negative bacilli, 1+ fungal elements   6/2 anaerobic bacterial from retroperitoneal aspirate cx pending  5/31 Blood cx NGTD @ 3 days   5/31 resp cx 2+ candida albicans, 1+ staph haemolyticus, 2+ yeast   5/27 resp cx 2+ candida albicans, 1+ staph haemolyticus, 1+ staph epidermidis, +1 yeast    Heme:     # Hemorrhagic shock   # Acute blood loss anemia   # Anemia of critical illness   # Thrombosed splenic vein   #s/p pancreatic branch of distal celiac artery embolization 5/10  # s/p splenic artery embolization 5/18   #Non  occlusive thrombus adherent to RIJ catheter on CT 5/27  - MTP activated at 11:15 AM/ Pt has been given a total so far of of 1 unit of whole blood , 6 pRBCs, PLT x2, FFP x4, Cryo x2, 4 gram of Ca+, 25% 50 mg albumin x1, 2 g of TXA  - Transfuse if hgb <7.0 or signs/symptoms of hypoperfusion. Monitor and trend  - Received 1 PRBC today   - MTP 5/10, 5/18, 6/3   - Hold SQH in setting of MTP   - s/p IR 5/18 coil embolization of splenic artery proximal to great pancreatic artery.      Musculoskeletal:   # Deconditioning and weakness due to critical illness   # LLE swelling  - LLE DVT ultrasound 5/26 negative  - Physical and occupational therapy consult        Skin:  # Pressure Ulcers - Buttocks/rectal area  - Barrier cream with liberal application.   - WOC consult      #Pressure Ulcers- Left Heel  Pressure Injury Location: Left heel   Wound type: Pressure Injury     Pressure Injury Stage: Deep Tissue Pressure Injury (DTPI), present on admission  - Appreciate diligent nursing cares     General Cares/Prophylaxis:    DVT Prophylaxis: SQH- held   GI Prophylaxis: PPI     Lines/ tubes/ drains:  - HD line--  Right IJ  - PICC line- left   - PIV x 3  - Trach  - RUQ ostomy  - LLQ drain  - Scrotal drain  - SHANNON x 3  - Colotomy drain  - L fem arterial line       Family conference this afternoon ~ 13:00 General Surgery.     Disposition: SICU     Discussed plan today with patient and their family member. All questions were answered. They are in agreement with plan.      Patient seen, findings and plan discussed with SICU staff, EGS staff, and Palliative staff.     Total Critical Care time spent by me, excluding procedures, was 74 minutes.     Brandan Hicks PA-C         ====================================  SUBJECTIVE:  Pt states he slept okay and pain is managable. He is feeing pretty anxious.       OBJECTIVE:   1. VITAL SIGNS:   Temp: 99.3  F (37.4  C) Temp src: Axillary BP: (!) 91/34 Pulse: (!) 181   Resp: 23 SpO2: 100 % O2 Device:  "Mechanical Ventilator Oxygen Delivery: (S) 30 LPM Height: 172.7 cm (5' 7.99\") Weight: 69.6 kg (153 lb 7 oz)  Estimated body mass index is 23.34 kg/m  as calculated from the following:    Height as of this encounter: 1.727 m (5' 7.99\").    Weight as of this encounter: 69.6 kg (153 lb 7 oz).      FiO2 (%): 40 %, Resp: 23, Vent Mode: VC/AC, Resp Rate (Set): 16 breaths/min, Tidal Volume (Set, mL): 420 mL, PEEP (cm H2O): 5 cmH2O, Pressure Support (cm H2O): 7 cmH2O, Resp Rate (Set): 16 breaths/min, Tidal Volume (Set, mL): 420 mL, PEEP (cm H2O): 5 cmH2O      2. INTAKE/ OUTPUT:   I/O last 3 completed shifts:  In: 2357.98 [I.V.:687.1]  Out: 2836 [Urine:1488; Drains:1488]        3. PHYSICAL EXAMINATION:  Neuro: Awake. Answers questions appropriately with nodding/shaking head, hand gestures, and writing. Follows commands. In mild distress.   HEENT: Normocephalic, atraumatic. PERRL, and nonicteric.   CV: RRR on monitor, S1S2, all extremities well perfused difficult to palpate L foot in setting od edema.   Respiratory: Trach in place. Normal respiratory effort on PS 7/5, FiO2 40%, RR 16 equal chest rise b/l   GI: Abdomen soft. Wound manager with 700 ml of dark red, bloody output in the past hour. 300 ml of dark red bloody output in R retroperitoneal. Remaining drains with minimal dark red otuput.   : Voiding with Garcia. Ostomy bag covering penis with serosanguinous output.   MSK: Moves all extremities well with normal appearing strength. Sensation intact in all upper/lower extremities. L foot with pitting edema compared to R foot.   Skin: L heel pressure ulcer.       4. INVESTIGATIONS:   Arterial Blood Gases   No lab results found in last 7 days.    Complete Blood Count   Recent Labs   Lab 06/03/25  1105 06/03/25  0338 06/02/25  2005 06/02/25  0406 06/02/25  0014 06/01/25  1544   WBC 26.1* 19.1* 16.6* 19.4*  --  22.2*   HGB 5.9* 7.2* 7.2* 7.7*   < > 7.1*   PLT  --  183 162 123*  --  167    < > = values in this interval not " displayed.     Basic Metabolic Panel  Recent Labs   Lab 06/03/25  1042 06/03/25  0900 06/03/25  0559 06/03/25  0503 06/03/25  0411 06/03/25  0338 06/02/25  1812 06/02/25  1809 06/02/25  0407 06/02/25  0406 06/01/25  1546 06/01/25  1544   NA  --   --   --   --   --  138  --  137  137  --  138  --  138   POTASSIUM  --   --   --   --   --  4.0  --  3.8  3.8  --  4.1  --  3.9   CHLORIDE  --   --   --   --   --  105  --  105  105  --  105  --  106   CO2  --   --   --   --   --  21*  --  23  23  --  22  --  22   BUN  --   --   --   --   --  63.7*  --  52.1*  52.1*  --  37.6*  --  38.9*   CR  --   --   --   --   --  0.51*  --  0.47*  0.47*  --  0.38*  --  0.39*   * 135* 127* 133*   < > 129*   < > 119*  119*   < > 155*   < > 139*    < > = values in this interval not displayed.     Liver Function Tests  Recent Labs   Lab 06/03/25  1105 06/03/25  0338 06/02/25  1809 06/02/25  0406 06/01/25  1544 06/01/25  0356 05/31/25  1448 05/31/25  0806 05/31/25  0402   AST  --  61*  --  44  --  23  --   --  37   ALT  --  63  --  39  --  28  --   --  39   ALKPHOS  --  778*  --  725*  --  340*  --   --  459*   BILITOTAL  --  0.7  --  0.7  --  0.8  --   --  1.2   ALBUMIN  --  2.1* 2.1* 2.1* 2.1* 2.3*   < >  --  2.0*   INR 1.20*  --   --   --   --   --   --  1.27*  --     < > = values in this interval not displayed.     Pancreatic Enzymes  No lab results found in last 7 days.    Coagulation Profile  Recent Labs   Lab 06/03/25  1105 05/31/25  0806   INR 1.20* 1.27*   PTT 27  --      5. RADIOLOGY:   Recent Results (from the past 24 hours)   CT Abdomen Pelvis w Contrast    Narrative    EXAMINATION: CT ABDOMEN PELVIS W CONTRAST  6/2/2025 12:55 PM      CLINICAL HISTORY: hx of necrotizing pancreatitis, worsening suprapubic  and testicular induration    COMPARISON: CT 5/27/25    PROCEDURE COMMENTS: CT of the abdomen was performed with iopamidol  ([id number]) solution 95 mL intravenous contrast. Coronal and sagittal  reformatted images  were obtained.    FINDINGS:    Lines and tubes: Left anterior percutaneous abdominal drain terminates  within the anterior peritoneum at the open abdomen. Right anterior  lateral approach percutaneous drain terminates in the right lower  quadrant adjacent to the liver. Additional right anterior inferior  percutaneous drain terminates in the left mid paracolic gutter.     Lower thorax:   Right greater than left basilar atelectasis and trace bilateral  pleural effusions.    Abdomen and pelvis:  Open abdomen.  Liver: No intrahepatic biliary dilatation. No focal hepatic mass.  Gallbladder: Vicarious excretion of contrast within the gallbladder.  Pancreas: Postsurgical changes of necrosectomy. Stable peripancreatic  fat stranding and fluid similar to prior. Decrease enhancement of the  pancreatic tail compared to 5/27/2025.  Spleen: Multiple wedge-shaped regions of parenchymal hypoenhancement  indicating prior infarct.    Adrenals: Normal.  Kidneys: Unremarkable. No hydronephrosis.    Bowel: Postsurgical changes of transverse colectomy and Abthera  placement. Right upper quadrant percutaneous T-tube to the  blind-ending proximal transverse colon.  Similar enhancing gastric mucosa. Gastric mucosal herniation through  the site of prior GJ tube. Extensive free air and fluid in the  mesentery.     Peritoneum: Surgically open abdomen. Continued large volume  multiloculated intraperitoneal and retroperitoneal fluid and air  collections and presacral fluid collection. Collections are overall  similar compared to prior imaging. Similar peritoneal wall  hyperenhancement. Fluid collections extend through the inguinal canal  into the scrotum, with fluid collection larger in the right greater  than left scrotum.      Lymph nodes: Extensive mesenteric lymphadenopathy.    Vasculature: Postsurgical changes of prior coil embolization of the  splenic artery.    Osseous structures/soft tissue: Moderate anasarca. Increased volume of  right  scrotal hematoma.   Similar peripherally enhancing fluid collection along the right  anterior abdominal wall which appears to connect to the peritoneum.  The collection measures up to 12.5 cm length and contains air and  fluid. Fluid collection of the anterior pelvis which appears to  connect to the inguinal canal.      Impression    IMPRESSION:  1. Increased size of right greater than left scrotal hematoma and  increased size fluid collection within the bilateral inguinal canals.  Increased size of fluid collection adjacent to the right inguinal  canal.  2. Stable extensive peritoneal fluid and air with connection to the  anterior abdominal fluid collection.  3.Postsurgical changes of splenic artery coil embolization with  multifocal wedge-shaped splenic infarcts.  4. Postoperative changes of transverse colectomy with luminal colon  catheter.  5. Similar herniation of gastric mucosa through the anterior wall of  the gastric antrum at site of prior GJ tube.    I have personally reviewed the examination and initial interpretation  and I agree with the findings.    SALMA YEN MD         SYSTEM ID:  [id number]   CT Retroperitoneal Abscess Drainage    Narrative    PROCEDURE: Drainage catheter placement, bilateral    Procedural Personnel  Attending physician(s): Anish Harding MD  Fellow physician(s): Rhys Zamarripa MD  Resident physician(s): None  Advanced practice provider(s): None    Pre-procedure diagnosis: necrotizing pancreatitis  Post-procedure diagnosis: Same  Indication: Pancreatitis related fluid collection  Additional clinical history: None    Complications: No immediate complications.      Impression    IMPRESSION:  1. Percutaneous placement of 24 Beninese drainage catheter into the left  retroperitoneal collection. 100 mL of necrotic fluid was aspirated and  sent to lab.  2. Percutaneous placement of 24 Beninese drainage catheter into the  right retroperitoneal collection. The fluid that drained was  clearly  necrotic, though scant necrotic fluid could actually be drained,  likely due to extensive loculation. Recommend initiation of alteplase  protocol for right RP drain.    Plan:   - Flush both drains with 30 mL of NS Q8H  - Initiate alteplase protocol for RIGHT retroperitoneal drainage  catheter. This is specified in orders.    ______________________________________________________________________    PROCEDURE SUMMARY:  - Retroperitoneal drainage catheter placement, bilateral, under CT  guidance  - Additional procedure(s): None    PROCEDURE DETAILS:    Pre-procedure  Consent: Informed consent for the procedure including risks, benefits  and alternatives was obtained and time-out was performed prior to the  procedure.  Preparation: The site was prepared and draped using maximal sterile  barrier technique including cutaneous antisepsis.    Anesthesia/sedation  Level of anesthesia/sedation: Moderate sedation (conscious sedation)  Anesthesia/sedation administered by: Independent trained observer  under attending supervision with continuous monitoring of the  patient?s level of consciousness and physiologic status  Total intra-service sedation time (minutes): 65  Sedation medications administered: 175 mcg fentanyl and 2.5 mg versed     Drainage catheter placement  The patient was positioned supine. Initial imaging was performed.  Local anesthesia was administered. The bilateral retroperitoneal fluid  collections were accessed using an access needle followed by wire  insertion and serial dilation and a drainage catheter was placed.  Position of the drainage catheter within the fluid collection was  confirmed.  Initial imaging findings: Extensive necrotic retroperitoneal  collections bilaterally as previously seen.  Access route: Percutaneous  Drainage catheter placed: Thalquick  Drain size (Fr): 24 bilateral  External catheter securement: Non-absorbable suture  Drainage catheter contrast injection: No  Final imaging  findings: No significant change. Both drains were  positioned in the cranial aspects of the collection.    After final CT obtained, since the right drain was not putting much  fluid out, this was retracted 5 cm since the side holes were  positioned at the upper edge of the collection. Despite retraction  there was still scant drainage. The left drain position had already  drained almost 100 mL of necrotic brown fluid and so this position was  not adjusted as it appeared satisfactory on the CT scan.    Contrast  Contrast agent: None  Contrast volume (mL): 0    Radiation Dose  CT dose length product (mGy-cm): 2350     Additional Details  Additional description of procedure: None  Registry event: V/3/f  Device used: None  Equipment details: None  Specimens removed: 100 mL of brown fluid. Aspirated fluid was sent for  analysis.  Estimated blood loss (mL): Less than 10  Standardized report: [email or web address]    Attestation  Signer name: HUGO GROSS MD  I attest that I was present for the key elements of the procedure and  immediately available. I reviewed the stored images and agree with the  report as written.        I have personally reviewed the examination and initial interpretation  and I agree with the findings.    HUGO GROSS MD         SYSTEM ID:  [id number]             =========================================

## 2025-06-03 NOTE — PROGRESS NOTES
"Care Management Follow Up    Length of Stay (days): 34    Expected Discharge Date: 06/30/2025     Concerns to be Addressed: decision making  Health Care Directive  Patient plan of care discussed at interdisciplinary rounds: Yes    Anticipated Discharge Disposition:  (TBD)   Anticipated Discharge Services:  (tbd)  Anticipated Discharge DME: Other (see comment) (TBD)    Patient/family educated on Medicare website which has current facility and service quality ratings: no  Education Provided on the Discharge Plan: No  Patient/Family in Agreement with the Plan: unable to assess    Referrals Placed by CM/SW: NA  Private pay costs discussed: Not applicable    Discussed  Partnership in Safe Discharge Planning  document with patient/family: No     Handoff Completed: No, handoff not indicated or clinically appropriate    Additional Information:  This followed-up with Sebastián this morning for purpose of continued discussion around decision-making and a health care directive. Per bedside RN and Sebastián, he is now considering having his mother and Uncle Dustin jointly serve as his primary health care agent. Writer continued discussion related to HCD and asked if he recalled working on the form yesterday, to which he verified that he remembered working on it. Writer asked about Sebastián's worries related to his health to which he responded he is worried about \"everything.\" When asked about his goals related to his care, he wrote on a piece of blank paper he shared that he wants his care to be as \"painless and smooth as possible.\" Writer asked if he felt his care, thus far, has felt in alignment with his wishes and he gave a hand gesture that indicated \"somewhat.\" When asked about life-sustaining treatments and the HCD checkboxes under \"My Future Care Preferences if I'm Terminally Ill,\" Sebastián stated via mouthing words the he would like to make his own decisions, although he had previously checked the box indicating he \"can't make a " "decision now about life-sustaining treatments if I am terminally ill. My health care agent should work with my health care team to decide whether or not to use life-sustaining treatments based on my goals and values.\" ALISSON not ready for notarization/signing, as there is a pattern of inconsistency in his stated & written goals and wishes.     Goals of care conversations are ongoing. Appreciate palliative team support; palliative note from 5/30: \"He wants to have an active role in making decisions about his own care but also values input from family.\" Per this writer's conversations with Sebastián over the past two days, this seems to remain true.     Update at 1200: Code massive transfusion called; family contacted to come to bedside as there is concern Sebastián will pass within the coming hours or days. Writer offered support to mother and pt; mother requested I call Uncle Dustin and brother Rony. Writer called both family members and provided update; brother Rony will contact other family members; Dustin aware of situation, as well.       Next Steps:   Sebastián is now on comfort care post massive transfusion; see palliative note for additional information.     End-of-life resources and support packet with this writer's contact information left with Marbella at bedside. This writer/care management available for ongoing support and end-of-life resources, as indicated.     JESENIA Morales, MaineGeneral Medical CenterSW  [facility] Clinical - 4C/4E  Phone: [phone]  4C Vocera, & 4E Vocera            "

## 2025-06-03 NOTE — PROGRESS NOTES
Nephrology Progress Note  06/03/2025       Sebastián Rodas is a 29 yom with Bipolar disorder, ETOH use c/b necrotizing pancreatitis admitted 3/30/25 to [facility] for ETOH withdrawal and abdominal pain, found to have acute pancreatitis which has progressed to necrotizing pancreatitis complicated by SARAHI.  Seen by Nephrology at [place], started on CRRT 4/1.  Had periods of stability enough for iHD but back to CRRT on 4/21 and has been on since with continued bleeding. 6/2 had UOP with shore, CRRT stopped and watching      Interval History :   Mr Rodas had CRRT paused for CT yesterday, had planned to restart in the afternoon but had UOP and shore was placed with ~1L of UOP afterwards. Had planned to hold off but having active abdominal bleeding this am requiring consideration of MTP, team is discussing with family about GOC, anticipate transitioning to comfort cares but are available if situation changes.     Assessment & Recommendations:   SARAHI-Baseline Cr 0.8 as recently as March 2025 before acute events, was started on CRRT emergently on 4/1 in setting of septic shock. Had ~2 weeks of stability enough to manage with iHD but back on CRRT 4/21 until tx to [facility] on 4/30. Running fevers with intraabdominal sepsis.  Continuing RRT from OSH, restarted CRRT on 5/2 after OR.  Hemodynamically remains intermittently tenuous due to intraabdominal bleeding and necrosis but 6/2 started making UOP.                 -No need for new consent, continuing RRT started 4/1 last month at [facility].                 -Access is tunneled RIJ from 4/21.                  -CRRT 4/1-6/2, stopped for now with new UOP.  Will take it day-by-day.       Volume-Wt down to admit wt, UOP robust the past 24h and in balance with intake in the short term.       Electrolytes-K 4.0, bicarb 21, Na 138, no acute issues.      BMD-No acute issues.      Anemia-Hgb 7.2, ongoing need for PRBC's.      Nutrition-On hold with bleeding, back on TPN.      Time  "spent: 50 minutes on this date of encounter for chart review, physical exam, medical decision making and co-ordination of care.      Seen and discussed with Dr Crum     Recommendations were communicated to primary team via verbal communication.      ASIF Roger CNS  Clinical Nurse Specialist  [phone]    Review of Systems:   I reviewed the following systems:  Gen: No fevers or chills  CV: No CP at rest  Resp: No SOB at rest  GI: No N/V    Physical Exam:   I/O last 3 completed shifts:  In: 2357.98 [I.V.:687.1]  Out: 2836 [Urine:1488; Drains:1488]   BP 97/62   Pulse (!) 167   Temp 99.3  F (37.4  C) (Axillary)   Resp 23   Ht 1.727 m (5' 7.99\")   Wt 69.6 kg (153 lb 7 oz)   SpO2 98%   BMI 23.34 kg/m       GENERAL APPEARANCE: Vent via trach, CRRT running.   Pulmonary: Vent via trach  CV: Regular rhythm, normal rate   - Edema +2BLE  GI: Surgical dressing in place  MS: no evidence of inflammation in joints, no muscle tenderness  : No Garcia  SKIN: Abdominal dressing in place, multiple drains.   NEURO: Vent via trach, communicating non-verbally.     Labs:   All labs reviewed by me  Electrolytes/Renal -   Recent Labs   Lab Test 06/03/25  0900 06/03/25  0559 06/03/25  0503 06/03/25  0411 06/03/25  0338 06/02/25  1812 06/02/25  1809 06/02/25  0407 06/02/25  0406   NA  --   --   --   --  138  --  137  137  --  138   POTASSIUM  --   --   --   --  4.0  --  3.8  3.8  --  4.1   CHLORIDE  --   --   --   --  105  --  105  105  --  105   CO2  --   --   --   --  21*  --  23  23  --  22   BUN  --   --   --   --  63.7*  --  52.1*  52.1*  --  37.6*   CR  --   --   --   --  0.51*  --  0.47*  0.47*  --  0.38*   * 127* 133*   < > 129*   < > 119*  119*   < > 155*   KARINA  --   --   --   --  8.6*  --  8.2*  8.2*  --  8.0*   MAG  --   --   --   --  2.1  --  2.1  --  2.2   PHOS  --   --   --   --  2.4*  --  2.8  --  2.9    < > = values in this interval not displayed.       CBC -   Recent Labs   Lab Test " 06/03/25  0338 06/02/25 2005 06/02/25  0406   WBC 19.1* 16.6* 19.4*   HGB 7.2* 7.2* 7.7*    162 123*       LFTs -   Recent Labs   Lab Test 06/03/25  0338 06/02/25  1809 06/02/25  0406 06/01/25  1544 06/01/25  0356   ALKPHOS 778*  --  725*  --  340*   BILITOTAL 0.7  --  0.7  --  0.8   ALT 63  --  39  --  28   AST 61*  --  44  --  23   PROTTOTAL 4.7*  --  4.6*  --  4.9*   ALBUMIN 2.1* 2.1* 2.1*   < > 2.3*    < > = values in this interval not displayed.       Iron Panel - No lab results found.        Current Medications:  Current Facility-Administered Medications   Medication Dose Route Frequency Provider Last Rate Last Admin    alteplase (CATHFLO ACTIVASE) 5 mg in sodium chloride 0.9% irrigation (Syringe) 50 mL irrigation   Intracavitary BID Brendon Haas DO   Given at 06/03/25 0835    heparin ANTICOAGULANT injection 5,000 Units  5,000 Units Subcutaneous Q8H [facility] Raghu Kaiser MD   5,000 Units at 06/01/25 2210    hydrocortisone sodium succinate PF (solu-CORTEF) injection 50 mg  50 mg Intravenous Q12H Raghu Kaiser MD        insulin glargine (LANTUS PEN) injection 20 Units  20 Units Subcutaneous Daily Sonia Moore NP   20 Units at 06/02/25 0959    Lidocaine (LIDOCARE) 4 % Patch 1-3 patch  1-3 patch Transdermal Q24H Durga Euceda MD        lipids 4 oil (SMOFLIPID) 20 % infusion 250 mL  250 mL Intravenous Q24H Brendon Haas DO   Stopped at 06/03/25 0915    meropenem (MERREM) 500 mg vial to attach to  mL bag for ADULTS or 25 mL bag for PEDS  500 mg Intravenous Q24H Brendon Haas DO   500 mg at 06/02/25 2037    micafungin (MYCAMINE) 100 mg in sodium chloride 0.9 % 100 mL intermittent infusion  100 mg Intravenous Q24H Marina Zamarripa  mL/hr at 06/02/25 2101 100 mg at 06/02/25 2101    OLANZapine (zyPREXA) IV injection 2.5 mg  2.5 mg Intravenous QPM Sonia Moore NP   2.5 mg at 06/02/25 2057    pantoprazole (PROTONIX) IV push  injection 40 mg  40 mg Intravenous QAM AC de La Mater, Renee Michelle, CNP   40 mg at 06/03/25 0835    sodium chloride (PF) 0.9% PF flush 10 mL  10 mL Irrigation Q8H Rhys Zamarripa MD   30 mL at 06/03/25 0307    sodium chloride (PF) 0.9% PF flush 3 mL  3 mL Intracatheter Q8H Ganesh Griffiths MD   3 mL at 06/03/25 0555    sodium chloride (PF) 0.9% PF flush 30 mL  30 mL Irrigation Q8H Rhys Zamarripa MD   30 mL at 06/03/25 0308    thiamine (B-1) injection 200 mg  200 mg Intravenous BID de La Mater, Renee Michelle, CNP   200 mg at 06/03/25 0835     Current Facility-Administered Medications   Medication Dose Route Frequency Provider Last Rate Last Admin    dexmedeTOMIDine (PRECEDEX) 4 mcg/mL in sodium chloride 0.9 % 100 mL infusion  0.1-1.2 mcg/kg/hr (Dosing Weight) Intravenous Continuous Sonia Moore NP 7 mL/hr at 06/03/25 0915 0.4 mcg/kg/hr at 06/03/25 0915    dextrose 10% infusion   Intravenous Continuous PRN de La Renee James CNP        dextrose 10% infusion   Intravenous Continuous PRN Brendon Haas,    Stopped at 05/22/25 1232    dialysate for CVVHD & CVVHDF (Phoxillum BK4/2.5)  12.5 mL/kg/hr CRRT Continuous Raheem Gabriel, ASIF  mL/hr at 06/02/25 0856 12.5 mL/kg/hr at 06/02/25 0856    fentaNYL (SUBLIMAZE) infusion   mcg/hr Intravenous Continuous Roxi Alston MD 3 mL/hr at 06/03/25 1000 150 mcg/hr at 06/03/25 1000    insulin regular (MYXREDLIN) 1 unit/mL infusion  0-24 Units/hr Intravenous Continuous de La MaterRenee CNP 2 mL/hr at 06/03/25 1000 2 Units/hr at 06/03/25 1000    No heparin required   Does not apply Continuous PRN Tico Spain MD        norepinephrine (LEVOPHED) 16 mg in  mL infusion MAX CONC CENTRAL LINE  0.01-0.6 mcg/kg/min (Dosing Weight) Intravenous Continuous Celso Ambrose MD   Stopped at 06/02/25 0100    parenteral nutrition - ADULT compounded formula   CENTRAL LINE IV TPN CONTINUOUS  Brendon Haas DO        parenteral nutrition - ADULT compounded formula   CENTRAL LINE IV TPN CONTINUOUS Brendon Haas DO 60 mL/hr at 06/03/25 0900 Rate Verify at 06/03/25 0900    POST-filter replacement solution for CVVHD & CVVHDF (Phoxillum BK4/2.5)   CRRT Continuous Tico Spain  mL/hr at 06/02/25 0202 New Bag at 06/02/25 0202    PRE-filter replacement solution for CVVHD & CVVHDF (Phoxillum BK4/2.5)  12.5 mL/kg/hr CRRT Continuous Raheem Gabriel APRN  mL/hr at 06/02/25 0856 12.5 mL/kg/hr at 06/02/25 0856    Reason statin not prescribed   Does not apply DOES NOT GO TO Rhys Bloom MD

## 2025-06-05 LAB
BACTERIA ASPIRATE CULT: ABNORMAL
BACTERIA ASPIRATE CULT: ABNORMAL
BACTERIA FLD CULT: ABNORMAL
BACTERIA SPEC CULT: NO GROWTH
BACTERIA SPEC CULT: NO GROWTH
GRAM STAIN RESULT: ABNORMAL

## 2025-06-06 LAB
BLAIMP ISLT/SPM QL: NOT DETECTED
BLAKPC ISLT/SPM QL: NOT DETECTED
BLAOXA-48 ISLT/SPM QL: NOT DETECTED
BLAVIM ISLT/SPM QL: NOT DETECTED
NDM TARGET DNA: NOT DETECTED

## 2025-06-09 LAB
BACTERIA FLD CULT: ABNORMAL
GRAM STAIN RESULT: ABNORMAL

## (undated) DEVICE — SU SILK 3-0 SH CR 8X30" C017D

## (undated) DEVICE — SU VICRYL 2-0 CT-1 27" UND J259H

## (undated) DEVICE — CATH MALECOT 26FR LATEX 086026

## (undated) DEVICE — Device

## (undated) DEVICE — ESU ELEC BLADE 6" COATED E1450-6

## (undated) DEVICE — SU SILK 0 TIE 6X30" A306H

## (undated) DEVICE — DRAPE IOBAN INCISE 23X17" 6650EZ

## (undated) DEVICE — CLIP HORIZON LG ORANGE 004200

## (undated) DEVICE — SYR PISTON IRRIGATION 60 ML DYND20325

## (undated) DEVICE — SUCTION TIP YANKAUER W/O VENT K86

## (undated) DEVICE — SU SILK 2-0 TIE 12X30" A305H

## (undated) DEVICE — BAG URINARY DRAIN 4000ML LF 153509

## (undated) DEVICE — GLOVE BIOGEL PI MICRO INDICATOR UNDERGLOVE SZ 8.0 48980

## (undated) DEVICE — CLIP HORIZON MED BLUE 002200

## (undated) DEVICE — MINOR SINGLE BASIN KIT CSR WRAPX2 7QT SSK3002

## (undated) DEVICE — ESU GROUND PAD ADULT W/CORD E7507

## (undated) DEVICE — SU ETHILON 3-0 PS-1 18" 1663H

## (undated) DEVICE — DRSG ABDOMINAL 07 1/2X8" 7197D

## (undated) DEVICE — ESU LIGASURE IMPACT OPEN SEALER/DVDR CVD LG JAW LF4418

## (undated) DEVICE — CLIP HORIZON SM RED WIDE SLOT 001201

## (undated) DEVICE — CANISTER WOUND VAC W/GEL 500ML M8275063/5

## (undated) DEVICE — SU SILK 2-0 SH 30" K833H

## (undated) DEVICE — SU SILK 4-0 TIE 12X30" A303H

## (undated) DEVICE — PREP POVIDONE-IODINE 7.5% SCRUB 4OZ BOTTLE MDS093945

## (undated) DEVICE — SURGICEL HEMOSTAT 4X8" 1952

## (undated) DEVICE — CANISTER WOUND VAC W/GEL 1000ML M8275093/5

## (undated) DEVICE — PREP DURAPREP 26ML APL 8630

## (undated) DEVICE — SOL WATER IRRIG 1000ML BOTTLE 2F7114

## (undated) DEVICE — DEVICE SUTURE PASSER 14GA WECK EFX EFXSP2

## (undated) DEVICE — GLOVE BIOGEL PI MICRO SZ 7.0 48570

## (undated) DEVICE — LINEN TOWEL PACK X6 WHITE 5487

## (undated) DEVICE — DRAIN JACKSON PRATT RESERVOIR 400ML SU130-1000

## (undated) DEVICE — PAD CHUX UNDERPAD 23X36" 676105

## (undated) DEVICE — SURGICEL FIBRILLAR HEMOSTAT 4"X4" 1963

## (undated) DEVICE — SPONGE KITTNER 30-101

## (undated) DEVICE — SUCTION SLEEVE NEPTUNE 2 165MM 0703-005-165

## (undated) DEVICE — DRSG KERLIX 4 1/2"X4YDS ROLL 6715

## (undated) DEVICE — SU SILK 3-0 TIE 12X30" A304H

## (undated) DEVICE — SU SILK 3-0 SH CR 8X18" C013D

## (undated) DEVICE — DRSG ABTHERA ADVANCE OPEN ABDOMEN ABT1055

## (undated) DEVICE — TRAY PREP DRY SKIN SCRUB 067

## (undated) DEVICE — PREP CHLORAPREP 26ML TINTED HI-LITE ORANGE 930815

## (undated) DEVICE — PACK THORACOTOMY UMMC LF SCV32THF13

## (undated) DEVICE — SU SILK 0 SH 30" K834H

## (undated) DEVICE — SUCTION MANIFOLD NEPTUNE 2 SYS 4 PORT 0702-020-000

## (undated) DEVICE — SOL NACL 0.9% IRRIG 1000ML BOTTLE 2F7124

## (undated) DEVICE — LINEN TOWEL PACK X30 5481

## (undated) DEVICE — DRAIN JACKSON PRATT ROUND SIL 19FR W/TROCAR LF JP-2232

## (undated) DEVICE — PREP POVIDONE-IODINE 10% SOLUTION 4OZ BOTTLE MDS093944

## (undated) DEVICE — GLOVE BIOGEL PI MICRO INDICATOR UNDERGLOVE SZ 7.5 48975

## (undated) DEVICE — PITCHER STERILE 1000ML  SSK9004A

## (undated) DEVICE — SUCTION TIP POOLE K770

## (undated) DEVICE — DRSG XEROFORM 5X9" CUR253590W

## (undated) RX ORDER — FENTANYL CITRATE 50 UG/ML
INJECTION, SOLUTION INTRAMUSCULAR; INTRAVENOUS
Status: DISPENSED
Start: 2025-05-07

## (undated) RX ORDER — HYDROMORPHONE HYDROCHLORIDE 1 MG/ML
INJECTION, SOLUTION INTRAMUSCULAR; INTRAVENOUS; SUBCUTANEOUS
Status: DISPENSED
Start: 2025-05-07

## (undated) RX ORDER — LIDOCAINE HYDROCHLORIDE 10 MG/ML
INJECTION, SOLUTION EPIDURAL; INFILTRATION; INTRACAUDAL; PERINEURAL
Status: DISPENSED
Start: 2025-05-10

## (undated) RX ORDER — HYDROMORPHONE HYDROCHLORIDE 1 MG/ML
INJECTION, SOLUTION INTRAMUSCULAR; INTRAVENOUS; SUBCUTANEOUS
Status: DISPENSED
Start: 2025-05-04

## (undated) RX ORDER — LIDOCAINE HYDROCHLORIDE 10 MG/ML
INJECTION, SOLUTION EPIDURAL; INFILTRATION; INTRACAUDAL; PERINEURAL
Status: DISPENSED
Start: 2025-06-02

## (undated) RX ORDER — FENTANYL CITRATE-0.9 % NACL/PF 10 MCG/ML
PLASTIC BAG, INJECTION (ML) INTRAVENOUS
Status: DISPENSED
Start: 2025-05-04

## (undated) RX ORDER — FENTANYL CITRATE 50 UG/ML
INJECTION, SOLUTION INTRAMUSCULAR; INTRAVENOUS
Status: DISPENSED
Start: 2025-05-10

## (undated) RX ORDER — FENTANYL CITRATE-0.9 % NACL/PF 10 MCG/ML
PLASTIC BAG, INJECTION (ML) INTRAVENOUS
Status: DISPENSED
Start: 2025-05-07

## (undated) RX ORDER — FENTANYL CITRATE 50 UG/ML
INJECTION, SOLUTION INTRAMUSCULAR; INTRAVENOUS
Status: DISPENSED
Start: 2025-06-02

## (undated) RX ORDER — DEXAMETHASONE SODIUM PHOSPHATE 4 MG/ML
INJECTION, SOLUTION INTRA-ARTICULAR; INTRALESIONAL; INTRAMUSCULAR; INTRAVENOUS; SOFT TISSUE
Status: DISPENSED
Start: 2025-05-07

## (undated) RX ORDER — PROPOFOL 10 MG/ML
INJECTION, EMULSION INTRAVENOUS
Status: DISPENSED
Start: 2025-05-04

## (undated) RX ORDER — PROPOFOL 10 MG/ML
INJECTION, EMULSION INTRAVENOUS
Status: DISPENSED
Start: 2025-05-07

## (undated) RX ORDER — FENTANYL CITRATE 50 UG/ML
INJECTION, SOLUTION INTRAMUSCULAR; INTRAVENOUS
Status: DISPENSED
Start: 2025-05-04

## (undated) RX ORDER — FENTANYL CITRATE 50 UG/ML
INJECTION, SOLUTION INTRAMUSCULAR; INTRAVENOUS
Status: DISPENSED
Start: 2025-05-18

## (undated) RX ORDER — LIDOCAINE HYDROCHLORIDE 10 MG/ML
INJECTION, SOLUTION EPIDURAL; INFILTRATION; INTRACAUDAL; PERINEURAL
Status: DISPENSED
Start: 2025-05-18

## (undated) RX ORDER — FENTANYL CITRATE 50 UG/ML
INJECTION, SOLUTION INTRAMUSCULAR; INTRAVENOUS
Status: DISPENSED
Start: 2025-05-01

## (undated) RX ORDER — ONDANSETRON 2 MG/ML
INJECTION INTRAMUSCULAR; INTRAVENOUS
Status: DISPENSED
Start: 2025-05-07